# Patient Record
Sex: FEMALE | Race: WHITE | Employment: UNEMPLOYED | URBAN - METROPOLITAN AREA
[De-identification: names, ages, dates, MRNs, and addresses within clinical notes are randomized per-mention and may not be internally consistent; named-entity substitution may affect disease eponyms.]

---

## 2017-06-02 RX ORDER — SULINDAC 200 MG/1
TABLET ORAL
Qty: 60 TABLET | Refills: 2 | Status: SHIPPED | OUTPATIENT
Start: 2017-06-02 | End: 2018-03-08

## 2017-06-02 RX ORDER — OMEPRAZOLE 20 MG/1
20 CAPSULE, DELAYED RELEASE ORAL DAILY
Qty: 90 CAPSULE | Refills: 3 | Status: SHIPPED | OUTPATIENT
Start: 2017-06-02 | End: 2018-03-08 | Stop reason: SDUPTHER

## 2017-06-04 RX ORDER — LEVOTHYROXINE SODIUM 0.05 MG/1
50 TABLET ORAL DAILY
Qty: 90 TABLET | Refills: 3 | Status: SHIPPED | OUTPATIENT
Start: 2017-06-04 | End: 2018-03-08 | Stop reason: SDUPTHER

## 2017-06-04 RX ORDER — LISINOPRIL 2.5 MG/1
2.5 TABLET ORAL DAILY
Qty: 90 TABLET | Refills: 3 | Status: SHIPPED | OUTPATIENT
Start: 2017-06-04 | End: 2018-02-08 | Stop reason: HOSPADM

## 2017-08-09 ENCOUNTER — APPOINTMENT (OUTPATIENT)
Dept: CT IMAGING | Age: 62
End: 2017-08-09
Payer: COMMERCIAL

## 2017-08-09 ENCOUNTER — HOSPITAL ENCOUNTER (EMERGENCY)
Age: 62
Discharge: HOME | End: 2017-08-09
Attending: EMERGENCY MEDICINE
Payer: COMMERCIAL

## 2017-08-09 VITALS
TEMPERATURE: 97 F | OXYGEN SATURATION: 99 % | WEIGHT: 290 LBS | SYSTOLIC BLOOD PRESSURE: 156 MMHG | HEART RATE: 72 BPM | HEIGHT: 58 IN | BODY MASS INDEX: 60.87 KG/M2 | DIASTOLIC BLOOD PRESSURE: 109 MMHG | RESPIRATION RATE: 22 BRPM

## 2017-08-09 DIAGNOSIS — N20.0 KIDNEY STONE: Primary | ICD-10-CM

## 2017-08-09 LAB
-: ABNORMAL
AMORPHOUS: ABNORMAL
ANION GAP SERPL CALCULATED.3IONS-SCNC: 15 MMOL/L (ref 9–17)
BACTERIA: ABNORMAL
BILIRUBIN URINE: NEGATIVE
BUN BLDV-MCNC: 8 MG/DL (ref 8–23)
BUN/CREAT BLD: ABNORMAL (ref 9–20)
CALCIUM SERPL-MCNC: 8.9 MG/DL (ref 8.6–10.4)
CASTS UA: ABNORMAL /LPF (ref 0–8)
CHLORIDE BLD-SCNC: 99 MMOL/L (ref 98–107)
CO2: 24 MMOL/L (ref 20–31)
COLOR: YELLOW
CREAT SERPL-MCNC: 0.56 MG/DL (ref 0.5–0.9)
CRYSTALS, UA: ABNORMAL /HPF
EPITHELIAL CELLS UA: ABNORMAL /HPF (ref 0–5)
GFR AFRICAN AMERICAN: >60 ML/MIN
GFR NON-AFRICAN AMERICAN: >60 ML/MIN
GFR SERPL CREATININE-BSD FRML MDRD: ABNORMAL ML/MIN/{1.73_M2}
GFR SERPL CREATININE-BSD FRML MDRD: ABNORMAL ML/MIN/{1.73_M2}
GLUCOSE BLD-MCNC: 212 MG/DL (ref 70–99)
GLUCOSE URINE: NEGATIVE
HCT VFR BLD CALC: 46.9 % (ref 36–46)
HEMOGLOBIN: 15.4 G/DL (ref 12–16)
KETONES, URINE: NEGATIVE
LEUKOCYTE ESTERASE, URINE: ABNORMAL
MCH RBC QN AUTO: 28.6 PG (ref 26–34)
MCHC RBC AUTO-ENTMCNC: 32.8 G/DL (ref 31–37)
MCV RBC AUTO: 87.2 FL (ref 80–100)
MUCUS: ABNORMAL
NITRITE, URINE: NEGATIVE
OTHER OBSERVATIONS UA: ABNORMAL
PDW BLD-RTO: 13.9 % (ref 12.5–15.4)
PH UA: 8.5 (ref 5–8)
PLATELET # BLD: 193 K/UL (ref 140–450)
PMV BLD AUTO: 9.8 FL (ref 6–12)
POTASSIUM SERPL-SCNC: 3.8 MMOL/L (ref 3.7–5.3)
PROTEIN UA: ABNORMAL
RBC # BLD: 5.38 M/UL (ref 4–5.2)
RBC UA: ABNORMAL /HPF (ref 0–4)
RENAL EPITHELIAL, UA: ABNORMAL /HPF
SODIUM BLD-SCNC: 138 MMOL/L (ref 135–144)
SPECIFIC GRAVITY UA: 1.02 (ref 1–1.03)
TRICHOMONAS: ABNORMAL
TURBIDITY: CLEAR
URINE HGB: ABNORMAL
UROBILINOGEN, URINE: NORMAL
WBC # BLD: 8.5 K/UL (ref 3.5–11)
WBC UA: ABNORMAL /HPF (ref 0–5)
YEAST: ABNORMAL

## 2017-08-09 PROCEDURE — 99284 EMERGENCY DEPT VISIT MOD MDM: CPT

## 2017-08-09 PROCEDURE — 96375 TX/PRO/DX INJ NEW DRUG ADDON: CPT

## 2017-08-09 PROCEDURE — 2580000003 HC RX 258: Performed by: EMERGENCY MEDICINE

## 2017-08-09 PROCEDURE — 96374 THER/PROPH/DIAG INJ IV PUSH: CPT

## 2017-08-09 PROCEDURE — 81001 URINALYSIS AUTO W/SCOPE: CPT

## 2017-08-09 PROCEDURE — 74176 CT ABD & PELVIS W/O CONTRAST: CPT

## 2017-08-09 PROCEDURE — 85027 COMPLETE CBC AUTOMATED: CPT

## 2017-08-09 PROCEDURE — 6370000000 HC RX 637 (ALT 250 FOR IP): Performed by: EMERGENCY MEDICINE

## 2017-08-09 PROCEDURE — 93005 ELECTROCARDIOGRAM TRACING: CPT

## 2017-08-09 PROCEDURE — 6360000002 HC RX W HCPCS: Performed by: EMERGENCY MEDICINE

## 2017-08-09 PROCEDURE — 80048 BASIC METABOLIC PNL TOTAL CA: CPT

## 2017-08-09 RX ORDER — FENTANYL CITRATE 50 UG/ML
50 INJECTION, SOLUTION INTRAMUSCULAR; INTRAVENOUS ONCE
Status: COMPLETED | OUTPATIENT
Start: 2017-08-09 | End: 2017-08-09

## 2017-08-09 RX ORDER — PHENAZOPYRIDINE HYDROCHLORIDE 200 MG/1
200 TABLET, FILM COATED ORAL 3 TIMES DAILY PRN
Qty: 6 TABLET | Refills: 0 | Status: SHIPPED | OUTPATIENT
Start: 2017-08-09 | End: 2017-08-12

## 2017-08-09 RX ORDER — OXYCODONE HYDROCHLORIDE AND ACETAMINOPHEN 5; 325 MG/1; MG/1
TABLET ORAL
Qty: 10 TABLET | Refills: 0 | Status: SHIPPED | OUTPATIENT
Start: 2017-08-09 | End: 2018-02-08

## 2017-08-09 RX ORDER — 0.9 % SODIUM CHLORIDE 0.9 %
500 INTRAVENOUS SOLUTION INTRAVENOUS ONCE
Status: COMPLETED | OUTPATIENT
Start: 2017-08-09 | End: 2017-08-09

## 2017-08-09 RX ORDER — OXYCODONE HYDROCHLORIDE AND ACETAMINOPHEN 5; 325 MG/1; MG/1
2 TABLET ORAL EVERY 6 HOURS PRN
Qty: 10 TABLET | Refills: 0 | Status: SHIPPED | OUTPATIENT
Start: 2017-08-09 | End: 2018-02-08

## 2017-08-09 RX ORDER — KETOROLAC TROMETHAMINE 15 MG/ML
15 INJECTION, SOLUTION INTRAMUSCULAR; INTRAVENOUS ONCE
Status: COMPLETED | OUTPATIENT
Start: 2017-08-09 | End: 2017-08-09

## 2017-08-09 RX ORDER — OXYCODONE HYDROCHLORIDE AND ACETAMINOPHEN 5; 325 MG/1; MG/1
2 TABLET ORAL ONCE
Status: COMPLETED | OUTPATIENT
Start: 2017-08-09 | End: 2017-08-09

## 2017-08-09 RX ADMIN — SODIUM CHLORIDE 500 ML: 0.9 INJECTION, SOLUTION INTRAVENOUS at 10:42

## 2017-08-09 RX ADMIN — OXYCODONE HYDROCHLORIDE AND ACETAMINOPHEN 2 TABLET: 5; 325 TABLET ORAL at 10:41

## 2017-08-09 RX ADMIN — KETOROLAC TROMETHAMINE 15 MG: 15 INJECTION, SOLUTION INTRAMUSCULAR; INTRAVENOUS at 09:46

## 2017-08-09 RX ADMIN — HYDROMORPHONE HYDROCHLORIDE 0.5 MG: 1 INJECTION, SOLUTION INTRAMUSCULAR; INTRAVENOUS; SUBCUTANEOUS at 10:11

## 2017-08-09 RX ADMIN — FENTANYL CITRATE 50 MCG: 50 INJECTION INTRAMUSCULAR; INTRAVENOUS at 09:05

## 2017-08-09 ASSESSMENT — PAIN SCALES - GENERAL
PAINLEVEL_OUTOF10: 7
PAINLEVEL_OUTOF10: 8
PAINLEVEL_OUTOF10: 7
PAINLEVEL_OUTOF10: 8
PAINLEVEL_OUTOF10: 8

## 2017-08-09 ASSESSMENT — ENCOUNTER SYMPTOMS
BLOOD IN STOOL: 0
CONSTIPATION: 0
DIARRHEA: 0
COUGH: 0
SPUTUM PRODUCTION: 0
ABDOMINAL PAIN: 0
VOMITING: 0
NAUSEA: 0
SHORTNESS OF BREATH: 0

## 2017-08-09 ASSESSMENT — PAIN DESCRIPTION - PAIN TYPE: TYPE: ACUTE PAIN

## 2017-08-09 ASSESSMENT — PAIN DESCRIPTION - LOCATION: LOCATION: FLANK

## 2017-08-09 ASSESSMENT — PAIN DESCRIPTION - ORIENTATION: ORIENTATION: RIGHT

## 2017-08-14 LAB
EKG ATRIAL RATE: 82 BPM
EKG P AXIS: 27 DEGREES
EKG P-R INTERVAL: 136 MS
EKG Q-T INTERVAL: 430 MS
EKG QRS DURATION: 90 MS
EKG QTC CALCULATION (BAZETT): 502 MS
EKG R AXIS: 6 DEGREES
EKG T AXIS: 50 DEGREES
EKG VENTRICULAR RATE: 82 BPM

## 2017-11-01 ENCOUNTER — TELEPHONE (OUTPATIENT)
Dept: FAMILY MEDICINE CLINIC | Age: 62
End: 2017-11-01

## 2017-11-01 NOTE — TELEPHONE ENCOUNTER
Reach out to pt for no show appointment on 10/31/17 for diabetes and HTN with Dr Lidia Paige. Call to pt left vm to please call the office to reschedule her appointment and certified letter will be mailed to pt home today also.

## 2017-11-30 RX ORDER — INSULIN GLARGINE 100 [IU]/ML
INJECTION, SOLUTION SUBCUTANEOUS
Qty: 90 ML | Refills: 3 | Status: SHIPPED | OUTPATIENT
Start: 2017-11-30 | End: 2018-03-08 | Stop reason: DRUGHIGH

## 2017-11-30 NOTE — TELEPHONE ENCOUNTER
Please address the medication refill and close the encounter. If I can be of assistance, please route to the applicable pool. Thank you. Next Visit Date:  No future appointments. Health Maintenance   Topic Date Due    Hepatitis C screen  1955    HIV screen  05/14/1971    Cervical cancer screen  05/14/1977    Diabetic retinal exam  01/01/2014    Zostavax vaccine  05/14/2016    Diabetic foot exam  08/03/2016    Diabetic hemoglobin A1C test  08/10/2017    Diabetic microalbuminuria test  08/27/2017    Lipid screen  08/27/2017    Flu vaccine (1) 09/01/2017    Breast cancer screen  01/12/2018    DTaP/Tdap/Td vaccine (3 - Td) 06/27/2024    Colon cancer screen colonoscopy  08/01/2024    Pneumococcal med risk  Completed       Hemoglobin A1C (%)   Date Value   08/10/2016 10.1   08/03/2015 7.1   04/20/2015 8.1             ( goal A1C is < 7)   Microalb/Crt.  Ratio (mcg/mg creat)   Date Value   08/27/2016 50 (H)     LDL Cholesterol (mg/dL)   Date Value   08/27/2016 111       (goal LDL is <100)   AST (U/L)   Date Value   08/27/2016 47 (H)     ALT (U/L)   Date Value   05/27/2015 18     BUN (mg/dL)   Date Value   08/09/2017 8     BP Readings from Last 3 Encounters:   08/09/17 (!) 156/109   09/13/16 176/85   08/10/16 156/80          (goal 120/80)    All Future Testing planned in [facility]  Lab Frequency Next Occurrence               Patient Active Problem List:     Morbid obesity (HCC)     Anxiety     GERD (gastroesophageal reflux disease)     OA (osteoarthritis)     DM (diabetes mellitus)     Hypothyroid     Neuropathy (HCC)     Right knee DJD     Hypothyroidism     S/P left total knee arthroplasty     Obesity, morbid, BMI 50 or higher ([facility] [address]

## 2018-02-08 ENCOUNTER — OFFICE VISIT (OUTPATIENT)
Dept: FAMILY MEDICINE CLINIC | Age: 63
End: 2018-02-08

## 2018-02-08 DIAGNOSIS — J20.9 ACUTE BRONCHITIS, UNSPECIFIED ORGANISM: ICD-10-CM

## 2018-02-08 DIAGNOSIS — I10 ESSENTIAL HYPERTENSION: Primary | ICD-10-CM

## 2018-02-08 PROCEDURE — 99213 OFFICE O/P EST LOW 20 MIN: CPT | Performed by: STUDENT IN AN ORGANIZED HEALTH CARE EDUCATION/TRAINING PROGRAM

## 2018-02-08 PROCEDURE — 99213 OFFICE O/P EST LOW 20 MIN: CPT

## 2018-02-08 RX ORDER — LISINOPRIL 10 MG/1
10 TABLET ORAL DAILY
Qty: 30 TABLET | Refills: 3 | Status: SHIPPED | OUTPATIENT
Start: 2018-02-08 | End: 2018-03-08 | Stop reason: SDUPTHER

## 2018-02-08 RX ORDER — AZITHROMYCIN 500 MG/1
500 TABLET, FILM COATED ORAL DAILY
Qty: 1 PACKET | Refills: 0 | Status: CANCELLED | OUTPATIENT
Start: 2018-02-08 | End: 2018-02-11

## 2018-02-08 RX ORDER — AZITHROMYCIN 500 MG/1
500 TABLET, FILM COATED ORAL DAILY
Qty: 1 PACKET | Refills: 0 | Status: SHIPPED | OUTPATIENT
Start: 2018-02-08 | End: 2018-02-11

## 2018-02-08 ASSESSMENT — ENCOUNTER SYMPTOMS
NAUSEA: 0
SINUS PAIN: 0
RHINORRHEA: 1
STRIDOR: 0
COUGH: 1
SINUS PRESSURE: 0
CHEST TIGHTNESS: 0
VOMITING: 0

## 2018-02-08 NOTE — PROGRESS NOTES
materials - see patient instructions  Was a self-tracking handout given in paper form or via [employer]? No    Requested Prescriptions     Signed Prescriptions Disp Refills    lisinopril (PRINIVIL;ZESTRIL) 10 MG tablet 30 tablet 3     Sig: Take 1 tablet by mouth daily    Dextromethorphan-guaiFENesin (ROBITUSSIN COUGH+CHEST CESAR DM)  MG CAPS 168 capsule 0     Sig: Take as directed    azithromycin (ZITHROMAX) 500 MG tablet 1 packet 0     Sig: Take 1 tablet by mouth daily for 3 days    azithromycin (ZITHROMAX) 500 MG tablet 1 packet 0     Sig: Take 1 tablet by mouth daily for 3 days       All patient questions answered. Patient voiced understanding. Quality Measures    Body mass index is 54.27 kg/m². Elevated. Weight control planned discussed Healthy diet and regular exercise. BP: (!) 184/109 Blood pressure is high. Treatment plan consists of Antihypertensive Medication Increased. Lab Results   Component Value Date    LDLCHOLESTEROL 111 08/27/2016    (goal LDL reduction with dx if diabetes is 50% LDL reduction)      No flowsheet data found.   Interpretation of Total Score Depression Severity: 1-4 = Minimal depression, 5-9 = Mild depression, 10-14 = Moderate depression, 15-19 = Moderately severe depression, 20-27 = Severe depression    Requested Prescriptions     Signed Prescriptions Disp Refills    lisinopril (PRINIVIL;ZESTRIL) 10 MG tablet 30 tablet 3     Sig: Take 1 tablet by mouth daily    Dextromethorphan-guaiFENesin (ROBITUSSIN COUGH+CHEST ECSAR DM)  MG CAPS 168 capsule 0     Sig: Take as directed    azithromycin (ZITHROMAX) 500 MG tablet 1 packet 0     Sig: Take 1 tablet by mouth daily for 3 days    azithromycin (ZITHROMAX) 500 MG tablet 1 packet 0     Sig: Take 1 tablet by mouth daily for 3 days       Medications Discontinued During This Encounter   Medication Reason    butalbital-acetaminophen-caffeine-codeine (FIORICET WITH CODEINE) -92-30 MG per capsule Patient Choice   

## 2018-02-09 VITALS
HEART RATE: 114 BPM | WEIGHT: 259.6 LBS | SYSTOLIC BLOOD PRESSURE: 153 MMHG | HEIGHT: 58 IN | DIASTOLIC BLOOD PRESSURE: 83 MMHG | BODY MASS INDEX: 54.49 KG/M2 | TEMPERATURE: 97 F

## 2018-03-08 ENCOUNTER — OFFICE VISIT (OUTPATIENT)
Dept: FAMILY MEDICINE CLINIC | Age: 63
End: 2018-03-08

## 2018-03-08 VITALS
DIASTOLIC BLOOD PRESSURE: 86 MMHG | BODY MASS INDEX: 51.2 KG/M2 | HEART RATE: 108 BPM | WEIGHT: 254 LBS | TEMPERATURE: 96 F | HEIGHT: 59 IN | SYSTOLIC BLOOD PRESSURE: 142 MMHG

## 2018-03-08 DIAGNOSIS — I10 ESSENTIAL HYPERTENSION: ICD-10-CM

## 2018-03-08 DIAGNOSIS — E11.9 TYPE 2 DIABETES MELLITUS WITHOUT COMPLICATION, WITH LONG-TERM CURRENT USE OF INSULIN (HCC): Primary | Chronic | ICD-10-CM

## 2018-03-08 DIAGNOSIS — Z79.4 TYPE 2 DIABETES MELLITUS WITHOUT COMPLICATION, WITH LONG-TERM CURRENT USE OF INSULIN (HCC): Primary | Chronic | ICD-10-CM

## 2018-03-08 DIAGNOSIS — E78.00 HYPERCHOLESTEREMIA: ICD-10-CM

## 2018-03-08 DIAGNOSIS — E03.9 HYPOTHYROIDISM, UNSPECIFIED TYPE: ICD-10-CM

## 2018-03-08 LAB — HBA1C MFR BLD: 14 %

## 2018-03-08 PROCEDURE — 99213 OFFICE O/P EST LOW 20 MIN: CPT

## 2018-03-08 PROCEDURE — 99213 OFFICE O/P EST LOW 20 MIN: CPT | Performed by: FAMILY MEDICINE

## 2018-03-08 PROCEDURE — 83036 HEMOGLOBIN GLYCOSYLATED A1C: CPT | Performed by: FAMILY MEDICINE

## 2018-03-08 RX ORDER — OMEPRAZOLE 20 MG/1
20 CAPSULE, DELAYED RELEASE ORAL DAILY
Qty: 90 CAPSULE | Refills: 3 | Status: SHIPPED | OUTPATIENT
Start: 2018-03-08 | End: 2020-10-08 | Stop reason: SDUPTHER

## 2018-03-08 RX ORDER — LEVOTHYROXINE SODIUM 0.05 MG/1
50 TABLET ORAL DAILY
Qty: 90 TABLET | Refills: 3 | Status: SHIPPED | OUTPATIENT
Start: 2018-03-08 | End: 2018-09-13 | Stop reason: SDUPTHER

## 2018-03-08 RX ORDER — GABAPENTIN 300 MG/1
CAPSULE ORAL
Qty: 270 CAPSULE | Refills: 1 | Status: SHIPPED | OUTPATIENT
Start: 2018-03-08 | End: 2018-09-13 | Stop reason: SDUPTHER

## 2018-03-08 RX ORDER — GLIMEPIRIDE 4 MG/1
TABLET ORAL
Qty: 180 TABLET | Refills: 3 | Status: SHIPPED | OUTPATIENT
Start: 2018-03-08 | End: 2018-09-13 | Stop reason: SDUPTHER

## 2018-03-08 RX ORDER — ASPIRIN 81 MG/1
81 TABLET ORAL DAILY
Qty: 30 TABLET | Refills: 5 | Status: SHIPPED | OUTPATIENT
Start: 2018-03-08 | End: 2020-10-08 | Stop reason: SDUPTHER

## 2018-03-08 RX ORDER — LISINOPRIL 10 MG/1
10 TABLET ORAL DAILY
Qty: 30 TABLET | Refills: 3 | Status: SHIPPED | OUTPATIENT
Start: 2018-03-08 | End: 2018-09-13 | Stop reason: SDUPTHER

## 2018-03-08 NOTE — PROGRESS NOTES
Visit Information    Have you changed or started any medications since your last visit including any over-the-counter medicines, vitamins, or herbal medicines? no   Have you stopped taking any of your medications? Is so, why? -  no  Are you having any side effects from any of your medications? - no    Have you seen any other physician or provider since your last visit?  no   Have you had any other diagnostic tests since your last visit?  no   Have you been seen in the emergency room and/or had an admission in a hospital since we last saw you?  no   Have you had your routine dental cleaning in the past 6 months?  no     Do you have an active MyChart account? If no, what is the barrier?   Yes    Patient Care Team:  Irlanda Ruffin MD as PCP - Cooper Cortés MD as Surgeon (Orthopedic Surgery)    Medical History Review  Past Medical, Family, and Social History reviewed and does contribute to the patient presenting condition    Health Maintenance   Topic Date Due    Hepatitis C screen  1955    HIV screen  05/14/1971    Cervical cancer screen  05/14/1977    Shingles Vaccine (1 of 2 - 2 Dose Series) 05/14/2006    Diabetic retinal exam  01/01/2014    Diabetic foot exam  08/03/2016    A1C test (Diabetic or Prediabetic)  08/10/2017    Diabetic microalbuminuria test  08/27/2017    Lipid screen  08/27/2017    TSH testing  08/27/2017    Flu vaccine (1) 09/01/2017    Breast cancer screen  01/12/2018    Potassium monitoring  08/09/2018    Creatinine monitoring  08/09/2018    DTaP/Tdap/Td vaccine (3 - Td) 06/27/2024    Colon cancer screen colonoscopy  08/01/2024    Pneumococcal med risk  Completed

## 2018-04-04 ENCOUNTER — TELEPHONE (OUTPATIENT)
Dept: FAMILY MEDICINE CLINIC | Age: 63
End: 2018-04-04

## 2018-08-31 ENCOUNTER — TELEPHONE (OUTPATIENT)
Dept: ADMINISTRATIVE | Age: 63
End: 2018-08-31

## 2018-09-13 DIAGNOSIS — I10 ESSENTIAL HYPERTENSION: ICD-10-CM

## 2018-09-13 RX ORDER — GABAPENTIN 300 MG/1
CAPSULE ORAL
Qty: 270 CAPSULE | Refills: 1 | Status: SHIPPED | OUTPATIENT
Start: 2018-09-13 | End: 2019-05-12 | Stop reason: SDUPTHER

## 2018-09-13 RX ORDER — LISINOPRIL 10 MG/1
10 TABLET ORAL DAILY
Qty: 30 TABLET | Refills: 3 | Status: SHIPPED | OUTPATIENT
Start: 2018-09-13 | End: 2019-08-05 | Stop reason: SDUPTHER

## 2018-09-13 RX ORDER — GLIMEPIRIDE 4 MG/1
TABLET ORAL
Qty: 180 TABLET | Refills: 3 | Status: SHIPPED | OUTPATIENT
Start: 2018-09-13 | End: 2019-10-31 | Stop reason: SDUPTHER

## 2018-09-13 RX ORDER — CITALOPRAM 20 MG/1
TABLET ORAL
Qty: 270 TABLET | Refills: 3 | Status: SHIPPED | OUTPATIENT
Start: 2018-09-13 | End: 2019-10-31 | Stop reason: SDUPTHER

## 2018-09-13 RX ORDER — LEVOTHYROXINE SODIUM 0.05 MG/1
50 TABLET ORAL DAILY
Qty: 90 TABLET | Refills: 3 | Status: SHIPPED | OUTPATIENT
Start: 2018-09-13 | End: 2019-10-31 | Stop reason: SDUPTHER

## 2018-11-26 ENCOUNTER — TELEPHONE (OUTPATIENT)
Dept: FAMILY MEDICINE CLINIC | Age: 63
End: 2018-11-26

## 2019-01-10 ENCOUNTER — HOSPITAL ENCOUNTER (OUTPATIENT)
Age: 64
Setting detail: OBSERVATION
Discharge: HOME | End: 2019-01-11
Attending: EMERGENCY MEDICINE | Admitting: EMERGENCY MEDICINE

## 2019-01-10 ENCOUNTER — APPOINTMENT (OUTPATIENT)
Dept: GENERAL RADIOLOGY | Age: 64
End: 2019-01-10

## 2019-01-10 ENCOUNTER — APPOINTMENT (OUTPATIENT)
Dept: CT IMAGING | Age: 64
End: 2019-01-10

## 2019-01-10 DIAGNOSIS — R07.9 CHEST PAIN, UNSPECIFIED TYPE: Primary | ICD-10-CM

## 2019-01-10 LAB
ABSOLUTE EOS #: 0.18 K/UL (ref 0–0.44)
ABSOLUTE IMMATURE GRANULOCYTE: 0.03 K/UL (ref 0–0.3)
ABSOLUTE LYMPH #: 1.49 K/UL (ref 1.1–3.7)
ABSOLUTE MONO #: 0.81 K/UL (ref 0.1–1.2)
ALBUMIN SERPL-MCNC: 3.7 G/DL (ref 3.5–5.2)
ALBUMIN/GLOBULIN RATIO: 1.1 (ref 1–2.5)
ALP BLD-CCNC: 176 U/L (ref 35–104)
ALT SERPL-CCNC: 44 U/L (ref 5–33)
ANION GAP SERPL CALCULATED.3IONS-SCNC: 16 MMOL/L (ref 9–17)
AST SERPL-CCNC: 54 U/L
BASOPHILS # BLD: 0 % (ref 0–2)
BASOPHILS ABSOLUTE: <0.03 K/UL (ref 0–0.2)
BILIRUB SERPL-MCNC: 0.41 MG/DL (ref 0.3–1.2)
BNP INTERPRETATION: NORMAL
BUN BLDV-MCNC: 7 MG/DL (ref 8–23)
BUN/CREAT BLD: ABNORMAL (ref 9–20)
CALCIUM SERPL-MCNC: 9.2 MG/DL (ref 8.6–10.4)
CHLORIDE BLD-SCNC: 101 MMOL/L (ref 98–107)
CO2: 23 MMOL/L (ref 20–31)
CREAT SERPL-MCNC: 0.74 MG/DL (ref 0.5–0.9)
D-DIMER QUANTITATIVE: 0.75 MG/L FEU
DIFFERENTIAL TYPE: ABNORMAL
EOSINOPHILS RELATIVE PERCENT: 2 % (ref 1–4)
GFR AFRICAN AMERICAN: >60 ML/MIN
GFR NON-AFRICAN AMERICAN: >60 ML/MIN
GFR SERPL CREATININE-BSD FRML MDRD: ABNORMAL ML/MIN/{1.73_M2}
GFR SERPL CREATININE-BSD FRML MDRD: ABNORMAL ML/MIN/{1.73_M2}
GLUCOSE BLD-MCNC: 117 MG/DL (ref 65–105)
GLUCOSE BLD-MCNC: 200 MG/DL (ref 65–105)
GLUCOSE BLD-MCNC: 257 MG/DL (ref 65–105)
GLUCOSE BLD-MCNC: 479 MG/DL (ref 70–99)
HCT VFR BLD CALC: 45.1 % (ref 36.3–47.1)
HEMOGLOBIN: 14.7 G/DL (ref 11.9–15.1)
IMMATURE GRANULOCYTES: 0 %
LYMPHOCYTES # BLD: 18 % (ref 24–43)
MCH RBC QN AUTO: 29.3 PG (ref 25.2–33.5)
MCHC RBC AUTO-ENTMCNC: 32.6 G/DL (ref 28.4–34.8)
MCV RBC AUTO: 89.8 FL (ref 82.6–102.9)
MONOCYTES # BLD: 10 % (ref 3–12)
NRBC AUTOMATED: 0 PER 100 WBC
PDW BLD-RTO: 12.6 % (ref 11.8–14.4)
PLATELET # BLD: 165 K/UL (ref 138–453)
PLATELET ESTIMATE: ABNORMAL
PMV BLD AUTO: 11.4 FL (ref 8.1–13.5)
POTASSIUM SERPL-SCNC: 3.8 MMOL/L (ref 3.7–5.3)
PRO-BNP: 107 PG/ML
RBC # BLD: 5.02 M/UL (ref 3.95–5.11)
RBC # BLD: ABNORMAL 10*6/UL
SEG NEUTROPHILS: 70 % (ref 36–65)
SEGMENTED NEUTROPHILS ABSOLUTE COUNT: 5.67 K/UL (ref 1.5–8.1)
SODIUM BLD-SCNC: 140 MMOL/L (ref 135–144)
TOTAL PROTEIN: 7.2 G/DL (ref 6.4–8.3)
TROPONIN INTERP: ABNORMAL
TROPONIN INTERP: ABNORMAL
TROPONIN T: ABNORMAL NG/ML
TROPONIN T: ABNORMAL NG/ML
TROPONIN, HIGH SENSITIVITY: 17 NG/L (ref 0–14)
TROPONIN, HIGH SENSITIVITY: 17 NG/L (ref 0–14)
WBC # BLD: 8.2 K/UL (ref 3.5–11.3)
WBC # BLD: ABNORMAL 10*3/UL

## 2019-01-10 PROCEDURE — 2580000003 HC RX 258: Performed by: EMERGENCY MEDICINE

## 2019-01-10 PROCEDURE — 6360000004 HC RX CONTRAST MEDICATION: Performed by: EMERGENCY MEDICINE

## 2019-01-10 PROCEDURE — 85379 FIBRIN DEGRADATION QUANT: CPT

## 2019-01-10 PROCEDURE — 6360000002 HC RX W HCPCS: Performed by: EMERGENCY MEDICINE

## 2019-01-10 PROCEDURE — 6370000000 HC RX 637 (ALT 250 FOR IP): Performed by: STUDENT IN AN ORGANIZED HEALTH CARE EDUCATION/TRAINING PROGRAM

## 2019-01-10 PROCEDURE — 96374 THER/PROPH/DIAG INJ IV PUSH: CPT

## 2019-01-10 PROCEDURE — 96376 TX/PRO/DX INJ SAME DRUG ADON: CPT

## 2019-01-10 PROCEDURE — 6370000000 HC RX 637 (ALT 250 FOR IP): Performed by: EMERGENCY MEDICINE

## 2019-01-10 PROCEDURE — G0378 HOSPITAL OBSERVATION PER HR: HCPCS

## 2019-01-10 PROCEDURE — 84484 ASSAY OF TROPONIN QUANT: CPT

## 2019-01-10 PROCEDURE — 85025 COMPLETE CBC W/AUTO DIFF WBC: CPT

## 2019-01-10 PROCEDURE — 83880 ASSAY OF NATRIURETIC PEPTIDE: CPT

## 2019-01-10 PROCEDURE — 71260 CT THORAX DX C+: CPT

## 2019-01-10 PROCEDURE — 99285 EMERGENCY DEPT VISIT HI MDM: CPT

## 2019-01-10 PROCEDURE — 96372 THER/PROPH/DIAG INJ SC/IM: CPT

## 2019-01-10 PROCEDURE — 96375 TX/PRO/DX INJ NEW DRUG ADDON: CPT

## 2019-01-10 PROCEDURE — 93005 ELECTROCARDIOGRAM TRACING: CPT

## 2019-01-10 PROCEDURE — 80053 COMPREHEN METABOLIC PANEL: CPT

## 2019-01-10 PROCEDURE — 82947 ASSAY GLUCOSE BLOOD QUANT: CPT

## 2019-01-10 PROCEDURE — 71045 X-RAY EXAM CHEST 1 VIEW: CPT

## 2019-01-10 RX ORDER — FENTANYL CITRATE 50 UG/ML
25 INJECTION, SOLUTION INTRAMUSCULAR; INTRAVENOUS ONCE
Status: COMPLETED | OUTPATIENT
Start: 2019-01-10 | End: 2019-01-10

## 2019-01-10 RX ORDER — LIDOCAINE 4 G/G
1 PATCH TOPICAL DAILY
Status: DISCONTINUED | OUTPATIENT
Start: 2019-01-10 | End: 2019-01-11 | Stop reason: HOSPADM

## 2019-01-10 RX ORDER — MULTIVITAMIN WITH FOLIC ACID 400 MCG
1 TABLET ORAL DAILY
Status: DISCONTINUED | OUTPATIENT
Start: 2019-01-11 | End: 2019-01-11 | Stop reason: HOSPADM

## 2019-01-10 RX ORDER — KETOROLAC TROMETHAMINE 15 MG/ML
15 INJECTION, SOLUTION INTRAMUSCULAR; INTRAVENOUS ONCE
Status: COMPLETED | OUTPATIENT
Start: 2019-01-10 | End: 2019-01-10

## 2019-01-10 RX ORDER — CYCLOBENZAPRINE HCL 10 MG
10 TABLET ORAL ONCE
Status: COMPLETED | OUTPATIENT
Start: 2019-01-10 | End: 2019-01-10

## 2019-01-10 RX ORDER — NITROGLYCERIN 0.4 MG/1
0.4 TABLET SUBLINGUAL EVERY 5 MIN PRN
Status: DISCONTINUED | OUTPATIENT
Start: 2019-01-10 | End: 2019-01-11 | Stop reason: HOSPADM

## 2019-01-10 RX ORDER — CITALOPRAM 20 MG/1
20 TABLET ORAL DAILY
Status: DISCONTINUED | OUTPATIENT
Start: 2019-01-11 | End: 2019-01-11 | Stop reason: HOSPADM

## 2019-01-10 RX ORDER — ASPIRIN 81 MG/1
324 TABLET, CHEWABLE ORAL ONCE
Status: COMPLETED | OUTPATIENT
Start: 2019-01-10 | End: 2019-01-10

## 2019-01-10 RX ORDER — ONDANSETRON 2 MG/ML
4 INJECTION INTRAMUSCULAR; INTRAVENOUS EVERY 8 HOURS PRN
Status: DISCONTINUED | OUTPATIENT
Start: 2019-01-10 | End: 2019-01-11 | Stop reason: HOSPADM

## 2019-01-10 RX ORDER — LEVOTHYROXINE SODIUM 0.05 MG/1
50 TABLET ORAL DAILY
Status: DISCONTINUED | OUTPATIENT
Start: 2019-01-11 | End: 2019-01-11 | Stop reason: HOSPADM

## 2019-01-10 RX ORDER — SODIUM CHLORIDE 9 MG/ML
INJECTION, SOLUTION INTRAVENOUS CONTINUOUS
Status: DISCONTINUED | OUTPATIENT
Start: 2019-01-11 | End: 2019-01-11 | Stop reason: HOSPADM

## 2019-01-10 RX ORDER — SODIUM CHLORIDE 0.9 % (FLUSH) 0.9 %
10 SYRINGE (ML) INJECTION EVERY 12 HOURS SCHEDULED
Status: DISCONTINUED | OUTPATIENT
Start: 2019-01-10 | End: 2019-01-11 | Stop reason: HOSPADM

## 2019-01-10 RX ORDER — ASPIRIN 81 MG/1
81 TABLET ORAL DAILY
Status: DISCONTINUED | OUTPATIENT
Start: 2019-01-11 | End: 2019-01-11 | Stop reason: HOSPADM

## 2019-01-10 RX ORDER — PROMETHAZINE HYDROCHLORIDE 25 MG/ML
12.5 INJECTION, SOLUTION INTRAMUSCULAR; INTRAVENOUS ONCE
Status: COMPLETED | OUTPATIENT
Start: 2019-01-10 | End: 2019-01-10

## 2019-01-10 RX ORDER — LISINOPRIL 10 MG/1
10 TABLET ORAL DAILY
Status: DISCONTINUED | OUTPATIENT
Start: 2019-01-11 | End: 2019-01-11 | Stop reason: HOSPADM

## 2019-01-10 RX ORDER — DEXTROSE MONOHYDRATE 25 G/50ML
12.5 INJECTION, SOLUTION INTRAVENOUS PRN
Status: DISCONTINUED | OUTPATIENT
Start: 2019-01-10 | End: 2019-01-11 | Stop reason: HOSPADM

## 2019-01-10 RX ORDER — ACETAMINOPHEN 325 MG/1
650 TABLET ORAL EVERY 4 HOURS PRN
Status: DISCONTINUED | OUTPATIENT
Start: 2019-01-10 | End: 2019-01-11 | Stop reason: HOSPADM

## 2019-01-10 RX ORDER — ONDANSETRON 2 MG/ML
4 INJECTION INTRAMUSCULAR; INTRAVENOUS ONCE
Status: COMPLETED | OUTPATIENT
Start: 2019-01-10 | End: 2019-01-10

## 2019-01-10 RX ORDER — GABAPENTIN 300 MG/1
300 CAPSULE ORAL 3 TIMES DAILY
Status: DISCONTINUED | OUTPATIENT
Start: 2019-01-10 | End: 2019-01-11 | Stop reason: HOSPADM

## 2019-01-10 RX ORDER — DEXTROSE MONOHYDRATE 50 MG/ML
100 INJECTION, SOLUTION INTRAVENOUS PRN
Status: DISCONTINUED | OUTPATIENT
Start: 2019-01-10 | End: 2019-01-11 | Stop reason: HOSPADM

## 2019-01-10 RX ORDER — SODIUM CHLORIDE 0.9 % (FLUSH) 0.9 %
10 SYRINGE (ML) INJECTION PRN
Status: DISCONTINUED | OUTPATIENT
Start: 2019-01-10 | End: 2019-01-11 | Stop reason: HOSPADM

## 2019-01-10 RX ORDER — UREA 10 %
3 LOTION (ML) TOPICAL NIGHTLY PRN
Status: DISCONTINUED | OUTPATIENT
Start: 2019-01-10 | End: 2019-01-11 | Stop reason: HOSPADM

## 2019-01-10 RX ORDER — PANTOPRAZOLE SODIUM 40 MG/1
40 TABLET, DELAYED RELEASE ORAL
Status: DISCONTINUED | OUTPATIENT
Start: 2019-01-11 | End: 2019-01-11 | Stop reason: HOSPADM

## 2019-01-10 RX ORDER — NICOTINE POLACRILEX 4 MG
15 LOZENGE BUCCAL PRN
Status: DISCONTINUED | OUTPATIENT
Start: 2019-01-10 | End: 2019-01-11 | Stop reason: HOSPADM

## 2019-01-10 RX ORDER — 0.9 % SODIUM CHLORIDE 0.9 %
1000 INTRAVENOUS SOLUTION INTRAVENOUS ONCE
Status: COMPLETED | OUTPATIENT
Start: 2019-01-10 | End: 2019-01-10

## 2019-01-10 RX ORDER — FENTANYL CITRATE 50 UG/ML
25 INJECTION, SOLUTION INTRAMUSCULAR; INTRAVENOUS
Status: DISCONTINUED | OUTPATIENT
Start: 2019-01-10 | End: 2019-01-11 | Stop reason: HOSPADM

## 2019-01-10 RX ADMIN — ACETAMINOPHEN 650 MG: 325 TABLET ORAL at 23:02

## 2019-01-10 RX ADMIN — KETOROLAC TROMETHAMINE 15 MG: 15 INJECTION INTRAMUSCULAR; INTRAVENOUS at 14:16

## 2019-01-10 RX ADMIN — ONDANSETRON 4 MG: 2 INJECTION INTRAMUSCULAR; INTRAVENOUS at 14:00

## 2019-01-10 RX ADMIN — FENTANYL CITRATE 25 MCG: 50 INJECTION, SOLUTION INTRAMUSCULAR; INTRAVENOUS at 23:02

## 2019-01-10 RX ADMIN — FENTANYL CITRATE 25 MCG: 50 INJECTION, SOLUTION INTRAMUSCULAR; INTRAVENOUS at 18:13

## 2019-01-10 RX ADMIN — SODIUM CHLORIDE 1000 ML: 9 INJECTION, SOLUTION INTRAVENOUS at 14:00

## 2019-01-10 RX ADMIN — PROMETHAZINE HYDROCHLORIDE 12.5 MG: 25 INJECTION INTRAMUSCULAR; INTRAVENOUS at 15:33

## 2019-01-10 RX ADMIN — SODIUM CHLORIDE: 9 INJECTION, SOLUTION INTRAVENOUS at 23:03

## 2019-01-10 RX ADMIN — INSULIN LISPRO 10 UNITS: 100 INJECTION, SOLUTION INTRAVENOUS; SUBCUTANEOUS at 15:15

## 2019-01-10 RX ADMIN — FENTANYL CITRATE 25 MCG: 50 INJECTION, SOLUTION INTRAMUSCULAR; INTRAVENOUS at 15:30

## 2019-01-10 RX ADMIN — IOPAMIDOL 75 ML: 755 INJECTION, SOLUTION INTRAVENOUS at 17:16

## 2019-01-10 RX ADMIN — CYCLOBENZAPRINE HYDROCHLORIDE 10 MG: 10 TABLET, FILM COATED ORAL at 19:46

## 2019-01-10 RX ADMIN — ASPIRIN 81 MG 324 MG: 81 TABLET ORAL at 14:00

## 2019-01-10 ASSESSMENT — ENCOUNTER SYMPTOMS
ABDOMINAL PAIN: 0
VOICE CHANGE: 0
NAUSEA: 1
TROUBLE SWALLOWING: 0
VOMITING: 0
BACK PAIN: 0
SHORTNESS OF BREATH: 1

## 2019-01-10 ASSESSMENT — PAIN DESCRIPTION - LOCATION: LOCATION: CHEST

## 2019-01-10 ASSESSMENT — PAIN SCALES - WONG BAKER: WONGBAKER_NUMERICALRESPONSE: 6

## 2019-01-10 ASSESSMENT — PAIN SCALES - GENERAL
PAINLEVEL_OUTOF10: 6
PAINLEVEL_OUTOF10: 4
PAINLEVEL_OUTOF10: 10
PAINLEVEL_OUTOF10: 8
PAINLEVEL_OUTOF10: 6
PAINLEVEL_OUTOF10: 8
PAINLEVEL_OUTOF10: 6

## 2019-01-10 ASSESSMENT — PAIN DESCRIPTION - PAIN TYPE: TYPE: ACUTE PAIN

## 2019-01-11 ENCOUNTER — APPOINTMENT (OUTPATIENT)
Dept: NUCLEAR MEDICINE | Age: 64
End: 2019-01-11

## 2019-01-11 VITALS
SYSTOLIC BLOOD PRESSURE: 155 MMHG | RESPIRATION RATE: 16 BRPM | TEMPERATURE: 98.1 F | HEIGHT: 59 IN | HEART RATE: 72 BPM | OXYGEN SATURATION: 95 % | DIASTOLIC BLOOD PRESSURE: 84 MMHG | BODY MASS INDEX: 50.2 KG/M2 | WEIGHT: 249 LBS

## 2019-01-11 LAB
EKG ATRIAL RATE: 71 BPM
EKG ATRIAL RATE: 76 BPM
EKG ATRIAL RATE: 98 BPM
EKG P AXIS: 14 DEGREES
EKG P AXIS: 37 DEGREES
EKG P AXIS: 43 DEGREES
EKG P-R INTERVAL: 162 MS
EKG P-R INTERVAL: 170 MS
EKG P-R INTERVAL: 180 MS
EKG Q-T INTERVAL: 376 MS
EKG Q-T INTERVAL: 420 MS
EKG Q-T INTERVAL: 444 MS
EKG QRS DURATION: 76 MS
EKG QRS DURATION: 78 MS
EKG QRS DURATION: 98 MS
EKG QTC CALCULATION (BAZETT): 456 MS
EKG QTC CALCULATION (BAZETT): 480 MS
EKG QTC CALCULATION (BAZETT): 499 MS
EKG R AXIS: -15 DEGREES
EKG R AXIS: 0 DEGREES
EKG R AXIS: 8 DEGREES
EKG T AXIS: 39 DEGREES
EKG T AXIS: 46 DEGREES
EKG T AXIS: 55 DEGREES
EKG VENTRICULAR RATE: 71 BPM
EKG VENTRICULAR RATE: 76 BPM
EKG VENTRICULAR RATE: 98 BPM
GLUCOSE BLD-MCNC: 195 MG/DL (ref 65–105)
GLUCOSE BLD-MCNC: 223 MG/DL (ref 65–105)
LV EF: 57 %
LVEF MODALITY: NORMAL

## 2019-01-11 PROCEDURE — 96376 TX/PRO/DX INJ SAME DRUG ADON: CPT

## 2019-01-11 PROCEDURE — 93970 EXTREMITY STUDY: CPT

## 2019-01-11 PROCEDURE — 2580000003 HC RX 258: Performed by: EMERGENCY MEDICINE

## 2019-01-11 PROCEDURE — 3430000000 HC RX DIAGNOSTIC RADIOPHARMACEUTICAL: Performed by: EMERGENCY MEDICINE

## 2019-01-11 PROCEDURE — G0378 HOSPITAL OBSERVATION PER HR: HCPCS

## 2019-01-11 PROCEDURE — 6360000002 HC RX W HCPCS: Performed by: EMERGENCY MEDICINE

## 2019-01-11 PROCEDURE — 82947 ASSAY GLUCOSE BLOOD QUANT: CPT

## 2019-01-11 PROCEDURE — 93017 CV STRESS TEST TRACING ONLY: CPT | Performed by: NURSE PRACTITIONER

## 2019-01-11 PROCEDURE — G0108 DIAB MANAGE TRN  PER INDIV: HCPCS

## 2019-01-11 PROCEDURE — 6370000000 HC RX 637 (ALT 250 FOR IP): Performed by: EMERGENCY MEDICINE

## 2019-01-11 PROCEDURE — 78452 HT MUSCLE IMAGE SPECT MULT: CPT

## 2019-01-11 PROCEDURE — 96372 THER/PROPH/DIAG INJ SC/IM: CPT

## 2019-01-11 PROCEDURE — 93005 ELECTROCARDIOGRAM TRACING: CPT

## 2019-01-11 PROCEDURE — A9500 TC99M SESTAMIBI: HCPCS | Performed by: EMERGENCY MEDICINE

## 2019-01-11 RX ORDER — SODIUM CHLORIDE 9 MG/ML
INJECTION, SOLUTION INTRAVENOUS ONCE
Status: DISCONTINUED | OUTPATIENT
Start: 2019-01-11 | End: 2019-01-11

## 2019-01-11 RX ORDER — SODIUM CHLORIDE 0.9 % (FLUSH) 0.9 %
10 SYRINGE (ML) INJECTION PRN
Status: DISCONTINUED | OUTPATIENT
Start: 2019-01-11 | End: 2019-01-11 | Stop reason: HOSPADM

## 2019-01-11 RX ORDER — SODIUM CHLORIDE 0.9 % (FLUSH) 0.9 %
10 SYRINGE (ML) INJECTION PRN
Status: DISCONTINUED | OUTPATIENT
Start: 2019-01-11 | End: 2019-01-11

## 2019-01-11 RX ORDER — NITROGLYCERIN 0.4 MG/1
0.4 TABLET SUBLINGUAL EVERY 5 MIN PRN
Status: DISCONTINUED | OUTPATIENT
Start: 2019-01-11 | End: 2019-01-11

## 2019-01-11 RX ORDER — AMINOPHYLLINE DIHYDRATE 25 MG/ML
100 INJECTION, SOLUTION INTRAVENOUS
Status: DISCONTINUED | OUTPATIENT
Start: 2019-01-11 | End: 2019-01-11

## 2019-01-11 RX ORDER — METOPROLOL TARTRATE 5 MG/5ML
2.5 INJECTION INTRAVENOUS PRN
Status: DISCONTINUED | OUTPATIENT
Start: 2019-01-11 | End: 2019-01-11

## 2019-01-11 RX ADMIN — FENTANYL CITRATE 25 MCG: 50 INJECTION, SOLUTION INTRAMUSCULAR; INTRAVENOUS at 06:06

## 2019-01-11 RX ADMIN — SODIUM CHLORIDE, PRESERVATIVE FREE 10 ML: 5 INJECTION INTRAVENOUS at 10:30

## 2019-01-11 RX ADMIN — Medication 10 ML: at 02:53

## 2019-01-11 RX ADMIN — INSULIN LISPRO 4 UNITS: 100 INJECTION, SOLUTION INTRAVENOUS; SUBCUTANEOUS at 14:09

## 2019-01-11 RX ADMIN — ENOXAPARIN SODIUM 40 MG: 40 INJECTION SUBCUTANEOUS at 12:47

## 2019-01-11 RX ADMIN — Medication 10 ML: at 06:06

## 2019-01-11 RX ADMIN — ACETAMINOPHEN 650 MG: 325 TABLET ORAL at 06:06

## 2019-01-11 RX ADMIN — FENTANYL CITRATE 25 MCG: 50 INJECTION, SOLUTION INTRAMUSCULAR; INTRAVENOUS at 02:53

## 2019-01-11 RX ADMIN — TETRAKIS(2-METHOXYISOBUTYLISOCYANIDE)COPPER(I) TETRAFLUOROBORATE 44.4 MILLICURIE: 1 INJECTION, POWDER, LYOPHILIZED, FOR SOLUTION INTRAVENOUS at 10:29

## 2019-01-11 RX ADMIN — REGADENOSON 0.4 MG: 0.08 INJECTION, SOLUTION INTRAVENOUS at 10:28

## 2019-01-11 RX ADMIN — TETRAKIS(2-METHOXYISOBUTYLISOCYANIDE)COPPER(I) TETRAFLUOROBORATE 18.7 MILLICURIE: 1 INJECTION, POWDER, LYOPHILIZED, FOR SOLUTION INTRAVENOUS at 08:08

## 2019-01-11 RX ADMIN — Medication 10 ML: at 10:19

## 2019-01-11 RX ADMIN — Medication 10 ML: at 10:29

## 2019-01-11 RX ADMIN — FENTANYL CITRATE 25 MCG: 50 INJECTION, SOLUTION INTRAMUSCULAR; INTRAVENOUS at 12:47

## 2019-01-11 RX ADMIN — FENTANYL CITRATE 25 MCG: 50 INJECTION, SOLUTION INTRAMUSCULAR; INTRAVENOUS at 16:40

## 2019-01-11 ASSESSMENT — ENCOUNTER SYMPTOMS
ABDOMINAL PAIN: 0
SHORTNESS OF BREATH: 1
COUGH: 0
RHINORRHEA: 0
NAUSEA: 1
DIARRHEA: 0
SINUS PAIN: 0
BLOOD IN STOOL: 0
APNEA: 0
EYE PAIN: 0
EYE REDNESS: 0
VOMITING: 0
WHEEZING: 0

## 2019-01-11 ASSESSMENT — PAIN DESCRIPTION - PAIN TYPE: TYPE: ACUTE PAIN

## 2019-01-11 ASSESSMENT — PAIN SCALES - GENERAL
PAINLEVEL_OUTOF10: 6
PAINLEVEL_OUTOF10: 5
PAINLEVEL_OUTOF10: 7
PAINLEVEL_OUTOF10: 4

## 2019-01-11 ASSESSMENT — PAIN DESCRIPTION - ONSET: ONSET: ON-GOING

## 2019-01-11 ASSESSMENT — PAIN DESCRIPTION - PROGRESSION: CLINICAL_PROGRESSION: NOT CHANGED

## 2019-01-11 ASSESSMENT — PAIN DESCRIPTION - FREQUENCY: FREQUENCY: CONTINUOUS

## 2019-01-11 ASSESSMENT — PAIN DESCRIPTION - LOCATION: LOCATION: CHEST

## 2019-01-11 ASSESSMENT — PAIN DESCRIPTION - ORIENTATION: ORIENTATION: LEFT

## 2019-01-11 ASSESSMENT — PAIN DESCRIPTION - DIRECTION: RADIATING_TOWARDS: LEFT ARM

## 2019-01-11 ASSESSMENT — PAIN DESCRIPTION - DESCRIPTORS: DESCRIPTORS: DISCOMFORT

## 2019-05-08 ENCOUNTER — TELEPHONE (OUTPATIENT)
Dept: FAMILY MEDICINE CLINIC | Age: 64
End: 2019-05-08

## 2019-05-08 NOTE — TELEPHONE ENCOUNTER
Patient requesting Rx for Handicap sticker and handicap plates. Last visit:   Last Med refill:   Does patient have enough medication for 72 hours: Yes    Next Visit Date:  No future appointments. Health Maintenance   Topic Date Due    Hepatitis C screen  1955    HIV screen  05/14/1971    Cervical cancer screen  05/14/1977    Shingles Vaccine (1 of 2) 05/14/2006    Diabetic retinal exam  01/01/2014    Diabetic foot exam  08/03/2016    Diabetic microalbuminuria test  08/27/2017    Lipid screen  08/27/2017    TSH testing  08/27/2017    Breast cancer screen  01/12/2018    A1C test (Diabetic or Prediabetic)  03/08/2019    Flu vaccine (Season Ended) 09/01/2019    Potassium monitoring  01/10/2020    Creatinine monitoring  01/10/2020    DTaP/Tdap/Td vaccine (6 - Td) 06/27/2024    Colon cancer screen colonoscopy  08/01/2024    Pneumococcal 0-64 years Vaccine  Completed       Hemoglobin A1C (%)   Date Value   03/08/2018 14.0   08/10/2016 10.1   08/03/2015 7.1             ( goal A1C is < 7)   Microalb/Crt.  Ratio (mcg/mg creat)   Date Value   08/27/2016 50 (H)     LDL Cholesterol (mg/dL)   Date Value   08/27/2016 111   05/27/2015 109       (goal LDL is <100)   AST (U/L)   Date Value   01/10/2019 54 (H)     ALT (U/L)   Date Value   01/10/2019 44 (H)     BUN (mg/dL)   Date Value   01/10/2019 7 (L)     BP Readings from Last 3 Encounters:   01/11/19 (!) 155/84   03/08/18 (!) 142/86   02/09/18 (!) 153/83          (goal 120/80)    All Future Testing planned in [facility]  Lab Frequency Next Occurrence               Patient Active Problem List:     Morbid obesity (HCC)     Anxiety     GERD (gastroesophageal reflux disease)     OA (osteoarthritis)     DM (diabetes mellitus)     Hypothyroid     Neuropathy     Right knee DJD     Hypothyroidism     S/P left total knee arthroplasty     Obesity, morbid, BMI 50 or higher (HCC)     Chest pain

## 2019-05-13 RX ORDER — GABAPENTIN 300 MG/1
CAPSULE ORAL
Qty: 270 CAPSULE | Refills: 1 | Status: SHIPPED | OUTPATIENT
Start: 2019-05-13 | End: 2019-10-31 | Stop reason: SDUPTHER

## 2019-05-14 DIAGNOSIS — M17.0 PRIMARY OSTEOARTHRITIS OF BOTH KNEES: Primary | Chronic | ICD-10-CM

## 2019-07-17 ENCOUNTER — TELEPHONE (OUTPATIENT)
Dept: FAMILY MEDICINE CLINIC | Age: 64
End: 2019-07-17

## 2019-09-09 DIAGNOSIS — E11.9 TYPE 2 DIABETES MELLITUS WITHOUT COMPLICATION, WITH LONG-TERM CURRENT USE OF INSULIN (HCC): Primary | Chronic | ICD-10-CM

## 2019-09-09 DIAGNOSIS — Z79.4 TYPE 2 DIABETES MELLITUS WITHOUT COMPLICATION, WITH LONG-TERM CURRENT USE OF INSULIN (HCC): Primary | Chronic | ICD-10-CM

## 2019-10-22 ENCOUNTER — TELEPHONE (OUTPATIENT)
Dept: FAMILY MEDICINE CLINIC | Age: 64
End: 2019-10-22

## 2019-10-31 RX ORDER — GABAPENTIN 300 MG/1
CAPSULE ORAL
Qty: 270 CAPSULE | Refills: 1 | Status: SHIPPED | OUTPATIENT
Start: 2019-10-31 | End: 2020-04-24

## 2019-11-06 RX ORDER — GLIMEPIRIDE 4 MG/1
TABLET ORAL
Qty: 180 TABLET | Refills: 3 | Status: SHIPPED | OUTPATIENT
Start: 2019-11-06 | End: 2020-10-08 | Stop reason: SDUPTHER

## 2019-11-06 RX ORDER — CITALOPRAM 20 MG/1
TABLET ORAL
Qty: 270 TABLET | Refills: 3 | Status: ON HOLD | OUTPATIENT
Start: 2019-11-06 | End: 2020-07-21 | Stop reason: SDUPTHER

## 2019-11-06 RX ORDER — LEVOTHYROXINE SODIUM 50 UG/1
TABLET ORAL
Qty: 90 TABLET | Refills: 3 | Status: SHIPPED | OUTPATIENT
Start: 2019-11-06 | End: 2020-10-02

## 2019-11-22 ENCOUNTER — TELEPHONE (OUTPATIENT)
Dept: FAMILY MEDICINE CLINIC | Age: 64
End: 2019-11-22

## 2020-04-23 NOTE — TELEPHONE ENCOUNTER
Escribe Request for pending medications. Last Visit Date: 3/8/2018  Next Visit Date:  No future appointments. Health Maintenance   Topic Date Due    Hepatitis C screen  1955    HIV screen  05/14/1971    Cervical cancer screen  05/14/1977    Shingles Vaccine (1 of 2) 05/14/2006    Diabetic retinal exam  01/01/2014    Diabetic foot exam  08/03/2016    Diabetic microalbuminuria test  08/27/2017    Lipid screen  08/27/2017    TSH testing  08/27/2017    Breast cancer screen  01/12/2018    A1C test (Diabetic or Prediabetic)  03/08/2019    Potassium monitoring  01/10/2020    Creatinine monitoring  01/10/2020    Flu vaccine (Season Ended) 09/01/2020    DTaP/Tdap/Td vaccine (6 - Td) 06/27/2024    Colon cancer screen colonoscopy  08/01/2024    Pneumococcal 0-64 years Vaccine  Completed    Hepatitis A vaccine  Aged Out    Hib vaccine  Aged Out    Meningococcal (ACWY) vaccine  Aged Out       Hemoglobin A1C (%)   Date Value   03/08/2018 14.0   08/10/2016 10.1   08/03/2015 7.1             ( goal A1C is < 7)   Microalb/Crt. Ratio (mcg/mg creat)   Date Value   08/27/2016 50 (H)     LDL Cholesterol (mg/dL)   Date Value   08/27/2016 111       (goal LDL is <100)   AST (U/L)   Date Value   01/10/2019 54 (H)     ALT (U/L)   Date Value   01/10/2019 44 (H)     BUN (mg/dL)   Date Value   01/10/2019 7 (L)     BP Readings from Last 3 Encounters:   01/11/19 (!) 155/84   03/08/18 (!) 142/86   02/09/18 (!) 153/83          (goal 120/80)    All Future Testing planned in [facility]  Lab Frequency Next Occurrence   Hemoglobin A1C Once 09/09/2020   RADHA Digital Screen Bilateral [[id number]] Once 01/04/2021       Next Visit Date:  No future appointments.       Patient Active Problem List:     Morbid obesity ([address]     Anxiety     GERD (gastroesophageal reflux disease)     OA (osteoarthritis)     DM (diabetes mellitus)     Hypothyroid     Neuropathy     Right knee DJD     Hypothyroidism     S/P left total knee arthroplasty

## 2020-04-24 RX ORDER — LISINOPRIL 10 MG/1
TABLET ORAL
Qty: 90 TABLET | Refills: 0 | Status: ON HOLD | OUTPATIENT
Start: 2020-04-24 | End: 2020-07-20

## 2020-04-24 RX ORDER — GABAPENTIN 300 MG/1
CAPSULE ORAL
Qty: 270 CAPSULE | Refills: 0 | Status: SHIPPED | OUTPATIENT
Start: 2020-04-24 | End: 2020-08-21 | Stop reason: SDUPTHER

## 2020-05-18 ENCOUNTER — TELEPHONE (OUTPATIENT)
Dept: FAMILY MEDICINE CLINIC | Age: 65
End: 2020-05-18

## 2020-05-18 NOTE — TELEPHONE ENCOUNTER
Call pt. To make an appointment have not been seen in a year, pt. Is diabetic and needs A1C checked. Left message to call us for a appointment.

## 2020-06-04 ENCOUNTER — OFFICE VISIT (OUTPATIENT)
Dept: FAMILY MEDICINE CLINIC | Age: 65
End: 2020-06-04
Payer: COMMERCIAL

## 2020-06-04 ENCOUNTER — HOSPITAL ENCOUNTER (OUTPATIENT)
Age: 65
Setting detail: SPECIMEN
Discharge: HOME | End: 2020-06-04
Payer: COMMERCIAL

## 2020-06-04 ENCOUNTER — OFFICE VISIT (OUTPATIENT)
Dept: PRIMARY CARE CLINIC | Age: 65
End: 2020-06-04
Payer: COMMERCIAL

## 2020-06-04 VITALS — WEIGHT: 242.2 LBS | HEIGHT: 59 IN | BODY MASS INDEX: 48.83 KG/M2 | TEMPERATURE: 95 F

## 2020-06-04 VITALS
SYSTOLIC BLOOD PRESSURE: 147 MMHG | WEIGHT: 242 LBS | TEMPERATURE: 100.1 F | OXYGEN SATURATION: 98 % | BODY MASS INDEX: 48.79 KG/M2 | DIASTOLIC BLOOD PRESSURE: 76 MMHG | HEART RATE: 86 BPM

## 2020-06-04 PROCEDURE — 1036F TOBACCO NON-USER: CPT | Performed by: NURSE PRACTITIONER

## 2020-06-04 PROCEDURE — G8427 DOCREV CUR MEDS BY ELIG CLIN: HCPCS | Performed by: NURSE PRACTITIONER

## 2020-06-04 PROCEDURE — 3017F COLORECTAL CA SCREEN DOC REV: CPT | Performed by: NURSE PRACTITIONER

## 2020-06-04 PROCEDURE — 99212 OFFICE O/P EST SF 10 MIN: CPT | Performed by: FAMILY MEDICINE

## 2020-06-04 PROCEDURE — G8417 CALC BMI ABV UP PARAM F/U: HCPCS | Performed by: NURSE PRACTITIONER

## 2020-06-04 PROCEDURE — 96160 PT-FOCUSED HLTH RISK ASSMT: CPT | Performed by: NURSE PRACTITIONER

## 2020-06-04 PROCEDURE — 99213 OFFICE O/P EST LOW 20 MIN: CPT | Performed by: NURSE PRACTITIONER

## 2020-06-04 ASSESSMENT — ENCOUNTER SYMPTOMS
SHORTNESS OF BREATH: 0
CHEST TIGHTNESS: 0
EYE DISCHARGE: 0
DIARRHEA: 1
EYES NEGATIVE: 1
ABDOMINAL PAIN: 0
WHEEZING: 0
COUGH: 1
EYE ITCHING: 0
ALLERGIC/IMMUNOLOGIC NEGATIVE: 1
HEARTBURN: 0
HEMOPTYSIS: 0
VOMITING: 0
SORE THROAT: 0
NAUSEA: 1
RHINORRHEA: 0

## 2020-06-04 ASSESSMENT — PATIENT HEALTH QUESTIONNAIRE - PHQ9
5. POOR APPETITE OR OVEREATING: 2
8. MOVING OR SPEAKING SO SLOWLY THAT OTHER PEOPLE COULD HAVE NOTICED. OR THE OPPOSITE, BEING SO FIGETY OR RESTLESS THAT YOU HAVE BEEN MOVING AROUND A LOT MORE THAN USUAL: 3
6. FEELING BAD ABOUT YOURSELF - OR THAT YOU ARE A FAILURE OR HAVE LET YOURSELF OR YOUR FAMILY DOWN: 3
SUM OF ALL RESPONSES TO PHQ9 QUESTIONS 1 & 2: 3
SUM OF ALL RESPONSES TO PHQ QUESTIONS 1-9: 17
SUM OF ALL RESPONSES TO PHQ QUESTIONS 1-9: 17
3. TROUBLE FALLING OR STAYING ASLEEP: 3
9. THOUGHTS THAT YOU WOULD BE BETTER OFF DEAD, OR OF HURTING YOURSELF: 0
4. FEELING TIRED OR HAVING LITTLE ENERGY: 3
7. TROUBLE CONCENTRATING ON THINGS, SUCH AS READING THE NEWSPAPER OR WATCHING TELEVISION: 0
10. IF YOU CHECKED OFF ANY PROBLEMS, HOW DIFFICULT HAVE THESE PROBLEMS MADE IT FOR YOU TO DO YOUR WORK, TAKE CARE OF THINGS AT HOME, OR GET ALONG WITH OTHER PEOPLE: 1
2. FEELING DOWN, DEPRESSED OR HOPELESS: 2
1. LITTLE INTEREST OR PLEASURE IN DOING THINGS: 1

## 2020-06-04 NOTE — PROGRESS NOTES
Leonor Gary is a 60 y.o. female evaluated via telephone on 6/4/2020. Consent:  She and/or health care decision maker is aware that that she may receive a bill for this telephone service, depending on her insurance coverage, and has provided verbal consent to proceed: Yes      Documentation:  I communicated with the patient and/or health care decision maker about :  Covid concerns. Uncontrolled DM. Poor follow up. Health care screening. Details of this discussion including any medical advice provided:     Ms. Hanh Ley arrived today for an in person appointment. This was her first appointment with us in over 2 years. However upon arrival at the office she described having several symptoms that she was concerned about as being possible COVID related as a friend of hers stated that he thought he might had COVID and may currently be going under testing. She was asked to return to home and a phone visit with me was scheduled. She was also referred for testing later today at our [facility] clinic. Bishop Cockayne and I discussed that her diabetes was under very poor control at her follow-up in the spring 2018. She did not keep follow-up after that and did not get labs done which were ordered at that appointment. She states that she knows she is not very compliant and forgets to get things done and does not really like getting labs done but she will try to do better. Does not describe chest pain or shortness of breath. Reviewed with her the the labs that would need updating due to her hypothyroidism, her diabetes, and her hypertension. Counseled on the importance of follow-up for her health and trying to prevent the  possible very severe end-organ damage from uncontrolled diabetes, hypertension. For follow-up she will get these labs done within a month after she may have been tested for COVID.     She will try to follow-up with the office in about 6 weeks and we also will make that appointment for her today.    I affirm this is a Patient Initiated Episode with a Patient who has not had a related appointment within my department in the past 7 days or scheduled within the next 24 hours. Patient identification was verified at the start of the visit: Yes    Total Time: minutes: 5-10 minutes      Diagnosis Orders   1. Essential hypertension  Lipid Panel    Basic Metabolic Panel   2. Type 2 diabetes mellitus without complication, with long-term current use of insulin (HCC)  Microalbumin, Ur    Lipid Panel    Glycosylated HgB   3. Hypothyroidism due to Hashimoto's thyroiditis  TSH   4.  Encounter for health supervision  Lipid Panel         Note: not billable if this call serves to triage the patient into an appointment for the relevant concern      Marion Ariza

## 2020-06-04 NOTE — PROGRESS NOTES
[address] [address]  Dept: [phone]  Dept Fax: [phone]    Kermit Dorantes is a 60 y.o. female who presents today forher medical conditions/complaints as noted below. Kermit Dorantes is c/o of   Chief Complaint   Patient presents with    Covid Testing     pt c/o diarrhea, nausea, and coughing. HPI:     Cough   This is a new problem. The current episode started in the past 7 days. The problem has been unchanged. The problem occurs every few minutes. The cough is non-productive. Pertinent negatives include no chest pain, chills, ear congestion, ear pain, fever, headaches, heartburn, hemoptysis, myalgias, nasal congestion, postnasal drip, rash, rhinorrhea, sore throat, shortness of breath, sweats, weight loss or wheezing. Nothing aggravates the symptoms. She has tried nothing for the symptoms. The treatment provided mild relief. There is no history of asthma, bronchiectasis, bronchitis, COPD, emphysema, environmental allergies or pneumonia. Diarrhea    This is a new problem. The current episode started in the past 7 days. The problem occurs 2 to 4 times per day. The problem has been unchanged. The stool consistency is described as watery. The patient states that diarrhea does not awaken her from sleep. Associated symptoms include coughing. Pertinent negatives include no abdominal pain, chills, fever, headaches, myalgias, sweats, URI, vomiting or weight loss. Associated symptoms comments: Nausea . Nothing aggravates the symptoms. There are no known risk factors. She has tried nothing for the symptoms. Has been around someone with potential Covid. Denies any fever or chills.         Past Medical History:   Diagnosis Date    Anxiety     Borderline hypertension     GERD (gastroesophageal reflux disease)     Hypothyroidism     Neuropathy     Obesity     morbid    Osteoarthritis     Sleep apnea     possible    Type II or unspecified type diabetes mellitus without mention of complication, not stated as uncontrolled       Past Surgical History:   Procedure Laterality Date    APPENDECTOMY      COLONOSCOPY      DILATION AND CURETTAGE OF UTERUS      HIATAL HERNIA REPAIR      HYSTERECTOMY      THROAT SURGERY      POLYPS REMOVED FROM VOCAL CORD    TOTAL KNEE ARTHROPLASTY Right 01/06/2015    TOTAL KNEE ARTHROPLASTY Left 5/26/15    UPPER GASTROINTESTINAL ENDOSCOPY         Family History   Problem Relation Age of Onset    Heart Disease Mother     Arthritis Mother     Heart Disease Father     Arthritis Father     Cancer Father         \"oral\"    Diabetes Sister         gestational       Social History     Tobacco Use    Smoking status: Never Smoker    Smokeless tobacco: Never Used   Substance Use Topics    Alcohol use: Yes     Comment: once a month      Current Outpatient Medications   Medication Sig Dispense Refill    gabapentin (NEURONTIN) 300 MG capsule TAKE 1 CAPSULE BY MOUTH THREE TIMES DAILY 270 capsule 0    lisinopril (PRINIVIL;ZESTRIL) 10 MG tablet Take 1 tablet by mouth once daily 90 tablet 0    insulin glargine (LANTUS SOLOSTAR) 100 UNIT/ML injection pen INJECT 45 UNITS SUBCUTANEOUSLY IN THE MORNING AND 15 UNITS SUBCUTANEOUSLY IN THE EVENING 30 mL 3    EUTHYROX 50 MCG tablet TAKE 1 TABLET BY MOUTH ONCE DAILY 90 tablet 3    glimepiride (AMARYL) 4 MG tablet TAKE 1 TABLET BY MOUTH TWICE DAILY 180 tablet 3    citalopram (CELEXA) 20 MG tablet TAKE 2 TABLETS BY MOUTH IN THE MORNING AND 1 TABLET BY MOUTH IN THE EVENING 270 tablet 3    Handicap Placard MISC Is a permanent condition.   DX: M19.90 1 each 0    SITagliptin (JANUVIA) 100 MG tablet TAKE 1 TABLET DAILY 90 tablet 3    omeprazole (PRILOSEC) 20 MG delayed release capsule Take 1 capsule by mouth Daily 90 capsule 3    Insulin Pen Needle (B-D UF III MINI PEN NEEDLES) 31G X 5 MM MISC USE 1 PEN NEEDLE DAILY 50 each 1    aspirin 81 MG EC tablet Take 1 tablet by normal.      Nose: Nose normal.   Eyes:      Conjunctiva/sclera: Conjunctivae normal.      Pupils: Pupils are equal, round, and reactive to light. Neck:      Musculoskeletal: Normal range of motion and neck supple. Cardiovascular:      Rate and Rhythm: Normal rate and regular rhythm. Heart sounds: Normal heart sounds. No murmur. No friction rub. No gallop. Pulmonary:      Effort: Pulmonary effort is normal. No respiratory distress. Breath sounds: Normal breath sounds. Abdominal:      General: Bowel sounds are normal.      Palpations: Abdomen is soft. Musculoskeletal: Normal range of motion. Lymphadenopathy:      Cervical: No cervical adenopathy. Skin:     General: Skin is warm and dry. Findings: No rash. Neurological:      Mental Status: She is alert and oriented to person, place, and time. Cranial Nerves: No cranial nerve deficit. Coordination: Coordination normal.      Deep Tendon Reflexes: Reflexes are normal and symmetric. Psychiatric:         Thought Content: Thought content normal.       BP (!) 147/76 (Site: Left Upper Arm, Position: Sitting, Cuff Size: Large Adult)   Pulse 86   Temp 100.1 °F (37.8 °C) (Oral)   Wt 242 lb (109.8 kg)   SpO2 98%   BMI 48.79 kg/m²     Assessment:       Diagnosis Orders   1. Cough  COVID-19 Ambulatory             Plan:     1.) Covid swab collected- sent to lab, will call with results   2.) Quarantine until results given   3.) Symptom management   4.) Any severity of symptoms- severe SOB please go to ED for further management   5.) Follow-up with PCP     Advance Care Planning  People with COVID-19 may have no symptoms, mild symptoms, such as fever, cough, and shortness of breath or they may have more severe illness, developing severe and fatal pneumonia.   As a result, Advance Care Planning with attention to naming a health care decision maker (someone you trust to make healthcare decisions for you if you could not speak for yourself) When possible, have another member of your household care for your animals while you are sick. If you are sick with COVID-19, avoid contact with your pet, including petting, snuggling, being kissed or licked, and sharing food. If you must care for your pet or be around animals while you are sick, wash your hands before and after you interact with pets and wear a facemask. Call ahead before visiting your doctor  If you have a medical appointment, call the healthcare provider and tell them that you have or may have COVID-19. This will help the healthcare providers office take steps to keep other people from getting infected or exposed. Wear a facemask  You should wear a facemask when you are around other people (e.g., sharing a room or vehicle) or pets and before you enter a healthcare providers office. If you are not able to wear a facemask (for example, because it causes trouble breathing), then people who live with you should not stay in the same room with you, or they should wear a facemask if they enter your room. Cover your coughs and sneezes  Cover your mouth and nose with a tissue when you cough or sneeze. Throw used tissues in a lined trash can. Immediately wash your hands with soap and water for at least 20 seconds or, if soap and water are not available, clean your hands with an alcohol-based hand  that contains at least 60% alcohol. Clean your hands often  Wash your hands often with soap and water for at least 20 seconds, especially after blowing your nose, coughing, or sneezing; going to the bathroom; and before eating or preparing food. If soap and water are not readily available, use an alcohol-based hand  with at least 60% alcohol, covering all surfaces of your hands and rubbing them together until they feel dry. Soap and water are the best option if hands are visibly dirty. Avoid touching your eyes, nose, and mouth with unwashed hands.   Avoid sharing personal household The decision to discontinue home isolation precautions should be made on a case-by-case basis, in consultation with healthcare providers and state and local health departments. Problem List     None           Patient given educationalmaterials - see patient instructions. Discussed use, benefit, and side effectsof prescribed medications. All patient questions answered. Pt verbalized understanding. Instructed to continue current medications, diet and exercise. Patient agreedwith treatment plan. Follow up as directed.      Electronically signed by HAO Bonilla CNP on 6/4/2020 at 9:47 AM

## 2020-06-04 NOTE — PATIENT INSTRUCTIONS
condition or this instruction, always ask your healthcare professional. [place] [address] any warranty or liability for your use of this information.

## 2020-06-04 NOTE — PATIENT INSTRUCTIONS
Thank you for letting us take care of you today. We hope all your questions were addressed. If a question was overlooked or something else comes to mind after you return home, please contact a member of your Care Team listed below. Please make sure you have a routine office visit set up to follow-up on [address]. Your Care Team at [place] [address] is Team #4  Johnny Gonzalez MD (Faculty)  Corrine Mehta MD (Faculty  Venmatilde Crawford MD (Resident)  Paul Abernathy MD (Resident)  Cecelia Andujar MD (Resident)  Jaylene Severino MD (Resident)  Stephanie Bower MD (Resident)  LUIS Higgins. ,GIUSEPPE KERN,GIUSEPPE POLANCO, GIUSEPPE Yates ([address] [facility] office)  Autumn Rodriguez [facility] office)  [facility] office)  Julio Cesar Guerrero, [place] ([address] [address] )  Angeles Braun [facility] (Clinical Pharmacist)       Office phone number: [phone]    If you need to get in right away due to illness, please be advised we have \"Same Day\" appointments available Monday-Friday. Please call us at [phone] option #3 to schedule your \"Same Day\" appointment.

## 2020-06-08 LAB — SARS-COV-2, NAA: NOT DETECTED

## 2020-06-09 ENCOUNTER — TELEPHONE (OUTPATIENT)
Dept: PRIMARY CARE CLINIC | Age: 65
End: 2020-06-09

## 2020-06-17 ENCOUNTER — TELEPHONE (OUTPATIENT)
Dept: FAMILY MEDICINE CLINIC | Age: 65
End: 2020-06-17

## 2020-07-02 ENCOUNTER — TELEPHONE (OUTPATIENT)
Dept: FAMILY MEDICINE CLINIC | Age: 65
End: 2020-07-02

## 2020-07-02 NOTE — TELEPHONE ENCOUNTER
Call patient to talk about her A1C but get no answer and left message for her to give us a call back to schedule an appointment in office with provider or Nurse, or a lab order.

## 2020-07-06 ENCOUNTER — TELEPHONE (OUTPATIENT)
Dept: FAMILY MEDICINE CLINIC | Age: 65
End: 2020-07-06

## 2020-07-06 NOTE — TELEPHONE ENCOUNTER
Writer called the patient no answer left message on voicemail to call office and make herself an appointment.

## 2020-07-14 ENCOUNTER — HOSPITAL ENCOUNTER (OUTPATIENT)
Age: 65
Discharge: HOME | End: 2020-07-14
Payer: COMMERCIAL

## 2020-07-14 LAB
ANION GAP SERPL CALCULATED.3IONS-SCNC: 16 MMOL/L (ref 9–17)
BUN BLDV-MCNC: 7 MG/DL (ref 8–23)
BUN/CREAT BLD: ABNORMAL (ref 9–20)
CALCIUM SERPL-MCNC: 9.5 MG/DL (ref 8.6–10.4)
CHLORIDE BLD-SCNC: 100 MMOL/L (ref 98–107)
CHOLESTEROL/HDL RATIO: 3.6
CHOLESTEROL: 208 MG/DL
CO2: 26 MMOL/L (ref 20–31)
CREAT SERPL-MCNC: 0.66 MG/DL (ref 0.5–0.9)
CREATININE URINE: 122 MG/DL (ref 28–217)
GFR AFRICAN AMERICAN: >60 ML/MIN
GFR NON-AFRICAN AMERICAN: >60 ML/MIN
GFR SERPL CREATININE-BSD FRML MDRD: ABNORMAL ML/MIN/{1.73_M2}
GFR SERPL CREATININE-BSD FRML MDRD: ABNORMAL ML/MIN/{1.73_M2}
GLUCOSE BLD-MCNC: 336 MG/DL (ref 70–99)
HDLC SERPL-MCNC: 58 MG/DL
LDL CHOLESTEROL: 120 MG/DL (ref 0–130)
MICROALBUMIN/CREAT 24H UR: 41 MG/L
MICROALBUMIN/CREAT UR-RTO: 34 MCG/MG CREAT
POTASSIUM SERPL-SCNC: 3.9 MMOL/L (ref 3.7–5.3)
SODIUM BLD-SCNC: 142 MMOL/L (ref 135–144)
TRIGL SERPL-MCNC: 152 MG/DL
TSH SERPL DL<=0.05 MIU/L-ACNC: 2.74 MIU/L (ref 0.3–5)
VLDLC SERPL CALC-MCNC: ABNORMAL MG/DL (ref 1–30)

## 2020-07-14 PROCEDURE — 82570 ASSAY OF URINE CREATININE: CPT

## 2020-07-14 PROCEDURE — 83036 HEMOGLOBIN GLYCOSYLATED A1C: CPT

## 2020-07-14 PROCEDURE — 80048 BASIC METABOLIC PNL TOTAL CA: CPT

## 2020-07-14 PROCEDURE — 82043 UR ALBUMIN QUANTITATIVE: CPT

## 2020-07-14 PROCEDURE — 80061 LIPID PANEL: CPT

## 2020-07-14 PROCEDURE — 36415 COLL VENOUS BLD VENIPUNCTURE: CPT

## 2020-07-14 PROCEDURE — 84443 ASSAY THYROID STIM HORMONE: CPT

## 2020-07-14 NOTE — TELEPHONE ENCOUNTER
Next Visit Date:  No future appointments. Health Maintenance   Topic Date Due    Hepatitis C screen  1955    HIV screen  05/14/1971    Cervical cancer screen  05/14/1977    Shingles Vaccine (1 of 2) 05/14/2006    Diabetic retinal exam  01/01/2014    Diabetic foot exam  08/03/2016    Diabetic microalbuminuria test  08/27/2017    Lipid screen  08/27/2017    TSH testing  08/27/2017    Breast cancer screen  01/12/2018    A1C test (Diabetic or Prediabetic)  03/08/2019    Potassium monitoring  01/10/2020    Creatinine monitoring  01/10/2020    Flu vaccine (1) 09/01/2020    DTaP/Tdap/Td vaccine (6 - Td) 06/27/2024    Colon cancer screen colonoscopy  08/01/2024    Pneumococcal 0-64 years Vaccine  Completed    Hepatitis A vaccine  Aged Out    Hib vaccine  Aged Out    Meningococcal (ACWY) vaccine  Aged Out       Hemoglobin A1C (%)   Date Value   03/08/2018 14.0   08/10/2016 10.1   08/03/2015 7.1             ( goal A1C is < 7)   Microalb/Crt.  Ratio (mcg/mg creat)   Date Value   08/27/2016 50 (H)     LDL Cholesterol (mg/dL)   Date Value   08/27/2016 111   05/27/2015 109       (goal LDL is <100)   AST (U/L)   Date Value   01/10/2019 54 (H)     ALT (U/L)   Date Value   01/10/2019 44 (H)     BUN (mg/dL)   Date Value   01/10/2019 7 (L)     BP Readings from Last 3 Encounters:   06/04/20 (!) 147/76   01/11/19 (!) 155/84   03/08/18 (!) 142/86          (goal 120/80)    All Future Testing planned in [facility]  Lab Frequency Next Occurrence   Hemoglobin A1C Once 09/09/2020   RADHA Digital Screen Bilateral [[id number]] Once 01/04/2021   TSH Once 10/10/2020   Microalbumin, Ur Once 10/10/2020   Lipid Panel Once 79/34/2475   Basic Metabolic Panel Once 53/43/1790   Glycosylated HgB Once 10/10/2020               Patient Active Problem List:     Morbid obesity ([address]     Anxiety     GERD (gastroesophageal reflux disease)     OA (osteoarthritis)     DM (diabetes mellitus)     Hypothyroid     Neuropathy     Right knee DJD     Hypothyroidism     S/P left total knee arthroplasty     Obesity, morbid, BMI 50 or higher (HCC)     Chest pain

## 2020-07-16 LAB
ESTIMATED AVERAGE GLUCOSE: 260 MG/DL
HBA1C MFR BLD: 10.7 %

## 2020-07-18 ENCOUNTER — APPOINTMENT (OUTPATIENT)
Dept: GENERAL RADIOLOGY | Age: 65
DRG: 175 | End: 2020-07-18
Payer: COMMERCIAL

## 2020-07-18 ENCOUNTER — APPOINTMENT (OUTPATIENT)
Dept: NUCLEAR MEDICINE | Age: 65
DRG: 175 | End: 2020-07-18
Payer: COMMERCIAL

## 2020-07-18 ENCOUNTER — HOSPITAL ENCOUNTER (INPATIENT)
Age: 65
LOS: 3 days | Discharge: HOME | DRG: 175 | End: 2020-07-21
Attending: EMERGENCY MEDICINE | Admitting: FAMILY MEDICINE
Payer: COMMERCIAL

## 2020-07-18 LAB
ABSOLUTE EOS #: 0.23 K/UL (ref 0–0.44)
ABSOLUTE IMMATURE GRANULOCYTE: 0.03 K/UL (ref 0–0.3)
ABSOLUTE LYMPH #: 1.89 K/UL (ref 1.1–3.7)
ABSOLUTE MONO #: 0.76 K/UL (ref 0.1–1.2)
ANION GAP SERPL CALCULATED.3IONS-SCNC: 11 MMOL/L (ref 9–17)
BASOPHILS # BLD: 0 % (ref 0–2)
BASOPHILS ABSOLUTE: <0.03 K/UL (ref 0–0.2)
BUN BLDV-MCNC: 7 MG/DL (ref 8–23)
BUN/CREAT BLD: ABNORMAL (ref 9–20)
CALCIUM SERPL-MCNC: 9.2 MG/DL (ref 8.6–10.4)
CHLORIDE BLD-SCNC: 100 MMOL/L (ref 98–107)
CO2: 24 MMOL/L (ref 20–31)
CREAT SERPL-MCNC: 0.6 MG/DL (ref 0.5–0.9)
DIFFERENTIAL TYPE: ABNORMAL
EOSINOPHILS RELATIVE PERCENT: 4 % (ref 1–4)
GFR AFRICAN AMERICAN: >60 ML/MIN
GFR NON-AFRICAN AMERICAN: >60 ML/MIN
GFR SERPL CREATININE-BSD FRML MDRD: ABNORMAL ML/MIN/{1.73_M2}
GFR SERPL CREATININE-BSD FRML MDRD: ABNORMAL ML/MIN/{1.73_M2}
GLUCOSE BLD-MCNC: 246 MG/DL (ref 65–105)
GLUCOSE BLD-MCNC: 267 MG/DL (ref 70–99)
GLUCOSE BLD-MCNC: 301 MG/DL (ref 65–105)
HCT VFR BLD CALC: 45.4 % (ref 36.3–47.1)
HEMOGLOBIN: 15.1 G/DL (ref 11.9–15.1)
IMMATURE GRANULOCYTES: 1 %
LV EF: 61 %
LVEF MODALITY: NORMAL
LYMPHOCYTES # BLD: 29 % (ref 24–43)
MCH RBC QN AUTO: 29.9 PG (ref 25.2–33.5)
MCHC RBC AUTO-ENTMCNC: 33.3 G/DL (ref 28.4–34.8)
MCV RBC AUTO: 89.9 FL (ref 82.6–102.9)
MONOCYTES # BLD: 12 % (ref 3–12)
NRBC AUTOMATED: 0 PER 100 WBC
PDW BLD-RTO: 12.5 % (ref 11.8–14.4)
PLATELET # BLD: 149 K/UL (ref 138–453)
PLATELET ESTIMATE: ABNORMAL
PMV BLD AUTO: 11.4 FL (ref 8.1–13.5)
POTASSIUM SERPL-SCNC: 3.5 MMOL/L (ref 3.7–5.3)
RBC # BLD: 5.05 M/UL (ref 3.95–5.11)
RBC # BLD: ABNORMAL 10*6/UL
SEG NEUTROPHILS: 54 % (ref 36–65)
SEGMENTED NEUTROPHILS ABSOLUTE COUNT: 3.55 K/UL (ref 1.5–8.1)
SODIUM BLD-SCNC: 135 MMOL/L (ref 135–144)
TROPONIN INTERP: ABNORMAL
TROPONIN T: ABNORMAL NG/ML
TROPONIN, HIGH SENSITIVITY: 19 NG/L (ref 0–14)
TROPONIN, HIGH SENSITIVITY: 21 NG/L (ref 0–14)
TROPONIN, HIGH SENSITIVITY: 22 NG/L (ref 0–14)
WBC # BLD: 6.5 K/UL (ref 3.5–11.3)
WBC # BLD: ABNORMAL 10*3/UL

## 2020-07-18 PROCEDURE — 6360000002 HC RX W HCPCS: Performed by: STUDENT IN AN ORGANIZED HEALTH CARE EDUCATION/TRAINING PROGRAM

## 2020-07-18 PROCEDURE — 93017 CV STRESS TEST TRACING ONLY: CPT | Performed by: NURSE PRACTITIONER

## 2020-07-18 PROCEDURE — A9500 TC99M SESTAMIBI: HCPCS | Performed by: EMERGENCY MEDICINE

## 2020-07-18 PROCEDURE — 6370000000 HC RX 637 (ALT 250 FOR IP): Performed by: EMERGENCY MEDICINE

## 2020-07-18 PROCEDURE — 6370000000 HC RX 637 (ALT 250 FOR IP): Performed by: STUDENT IN AN ORGANIZED HEALTH CARE EDUCATION/TRAINING PROGRAM

## 2020-07-18 PROCEDURE — 99285 EMERGENCY DEPT VISIT HI MDM: CPT

## 2020-07-18 PROCEDURE — 6360000002 HC RX W HCPCS: Performed by: EMERGENCY MEDICINE

## 2020-07-18 PROCEDURE — 2500000003 HC RX 250 WO HCPCS: Performed by: STUDENT IN AN ORGANIZED HEALTH CARE EDUCATION/TRAINING PROGRAM

## 2020-07-18 PROCEDURE — 36415 COLL VENOUS BLD VENIPUNCTURE: CPT

## 2020-07-18 PROCEDURE — 82947 ASSAY GLUCOSE BLOOD QUANT: CPT

## 2020-07-18 PROCEDURE — 96375 TX/PRO/DX INJ NEW DRUG ADDON: CPT

## 2020-07-18 PROCEDURE — 3430000000 HC RX DIAGNOSTIC RADIOPHARMACEUTICAL: Performed by: EMERGENCY MEDICINE

## 2020-07-18 PROCEDURE — 85025 COMPLETE CBC W/AUTO DIFF WBC: CPT

## 2020-07-18 PROCEDURE — 2500000003 HC RX 250 WO HCPCS: Performed by: EMERGENCY MEDICINE

## 2020-07-18 PROCEDURE — 96374 THER/PROPH/DIAG INJ IV PUSH: CPT

## 2020-07-18 PROCEDURE — 80048 BASIC METABOLIC PNL TOTAL CA: CPT

## 2020-07-18 PROCEDURE — 93005 ELECTROCARDIOGRAM TRACING: CPT | Performed by: EMERGENCY MEDICINE

## 2020-07-18 PROCEDURE — 78452 HT MUSCLE IMAGE SPECT MULT: CPT

## 2020-07-18 PROCEDURE — 99222 1ST HOSP IP/OBS MODERATE 55: CPT | Performed by: FAMILY MEDICINE

## 2020-07-18 PROCEDURE — 71045 X-RAY EXAM CHEST 1 VIEW: CPT

## 2020-07-18 PROCEDURE — 84484 ASSAY OF TROPONIN QUANT: CPT

## 2020-07-18 PROCEDURE — 2060000000 HC ICU INTERMEDIATE R&B

## 2020-07-18 PROCEDURE — 2580000003 HC RX 258: Performed by: EMERGENCY MEDICINE

## 2020-07-18 RX ORDER — ACETAMINOPHEN 500 MG
1000 TABLET ORAL EVERY 6 HOURS PRN
Status: DISCONTINUED | OUTPATIENT
Start: 2020-07-18 | End: 2020-07-21 | Stop reason: HOSPADM

## 2020-07-18 RX ORDER — ASPIRIN 81 MG/1
324 TABLET, CHEWABLE ORAL ONCE
Status: COMPLETED | OUTPATIENT
Start: 2020-07-18 | End: 2020-07-18

## 2020-07-18 RX ORDER — POTASSIUM CHLORIDE 7.45 MG/ML
10 INJECTION INTRAVENOUS PRN
Status: DISCONTINUED | OUTPATIENT
Start: 2020-07-18 | End: 2020-07-21 | Stop reason: HOSPADM

## 2020-07-18 RX ORDER — ONDANSETRON 2 MG/ML
4 INJECTION INTRAMUSCULAR; INTRAVENOUS EVERY 6 HOURS PRN
Status: DISCONTINUED | OUTPATIENT
Start: 2020-07-18 | End: 2020-07-21 | Stop reason: HOSPADM

## 2020-07-18 RX ORDER — SODIUM CHLORIDE 0.9 % (FLUSH) 0.9 %
10 SYRINGE (ML) INJECTION PRN
Status: DISCONTINUED | OUTPATIENT
Start: 2020-07-18 | End: 2020-07-21 | Stop reason: HOSPADM

## 2020-07-18 RX ORDER — PANTOPRAZOLE SODIUM 40 MG/1
40 TABLET, DELAYED RELEASE ORAL
Status: DISCONTINUED | OUTPATIENT
Start: 2020-07-19 | End: 2020-07-21 | Stop reason: HOSPADM

## 2020-07-18 RX ORDER — SODIUM CHLORIDE 9 MG/ML
500 INJECTION, SOLUTION INTRAVENOUS CONTINUOUS PRN
Status: DISCONTINUED | OUTPATIENT
Start: 2020-07-18 | End: 2020-07-18

## 2020-07-18 RX ORDER — ALOGLIPTIN 6.25 MG/1
6.25 TABLET, FILM COATED ORAL DAILY
Status: DISCONTINUED | OUTPATIENT
Start: 2020-07-18 | End: 2020-07-21 | Stop reason: HOSPADM

## 2020-07-18 RX ORDER — CARVEDILOL 6.25 MG/1
6.25 TABLET ORAL 2 TIMES DAILY WITH MEALS
Status: DISCONTINUED | OUTPATIENT
Start: 2020-07-18 | End: 2020-07-21 | Stop reason: HOSPADM

## 2020-07-18 RX ORDER — CITALOPRAM 20 MG/1
20 TABLET ORAL EVERY EVENING
Status: DISCONTINUED | OUTPATIENT
Start: 2020-07-18 | End: 2020-07-21 | Stop reason: HOSPADM

## 2020-07-18 RX ORDER — HYDRALAZINE HYDROCHLORIDE 20 MG/ML
10 INJECTION INTRAMUSCULAR; INTRAVENOUS ONCE
Status: COMPLETED | OUTPATIENT
Start: 2020-07-18 | End: 2020-07-18

## 2020-07-18 RX ORDER — UREA 10 %
3 LOTION (ML) TOPICAL NIGHTLY PRN
Status: DISCONTINUED | OUTPATIENT
Start: 2020-07-19 | End: 2020-07-18

## 2020-07-18 RX ORDER — MULTIVITAMIN WITH IRON
1 TABLET ORAL DAILY
Status: DISCONTINUED | OUTPATIENT
Start: 2020-07-18 | End: 2020-07-21 | Stop reason: HOSPADM

## 2020-07-18 RX ORDER — DIPHENHYDRAMINE HYDROCHLORIDE 50 MG/ML
25 INJECTION INTRAMUSCULAR; INTRAVENOUS EVERY 6 HOURS PRN
Status: DISCONTINUED | OUTPATIENT
Start: 2020-07-18 | End: 2020-07-21 | Stop reason: HOSPADM

## 2020-07-18 RX ORDER — GABAPENTIN 300 MG/1
300 CAPSULE ORAL 3 TIMES DAILY
Status: DISCONTINUED | OUTPATIENT
Start: 2020-07-18 | End: 2020-07-21 | Stop reason: HOSPADM

## 2020-07-18 RX ORDER — METOPROLOL TARTRATE 5 MG/5ML
5 INJECTION INTRAVENOUS EVERY 5 MIN PRN
Status: DISCONTINUED | OUTPATIENT
Start: 2020-07-18 | End: 2020-07-18

## 2020-07-18 RX ORDER — CITALOPRAM 20 MG/1
40 TABLET ORAL DAILY
Status: DISCONTINUED | OUTPATIENT
Start: 2020-07-18 | End: 2020-07-21 | Stop reason: HOSPADM

## 2020-07-18 RX ORDER — ONDANSETRON 4 MG/1
4 TABLET, ORALLY DISINTEGRATING ORAL EVERY 8 HOURS PRN
Status: DISCONTINUED | OUTPATIENT
Start: 2020-07-18 | End: 2020-07-21 | Stop reason: HOSPADM

## 2020-07-18 RX ORDER — AMINOPHYLLINE DIHYDRATE 25 MG/ML
50 INJECTION, SOLUTION INTRAVENOUS PRN
Status: DISCONTINUED | OUTPATIENT
Start: 2020-07-18 | End: 2020-07-18

## 2020-07-18 RX ORDER — FENTANYL CITRATE 50 UG/ML
25 INJECTION, SOLUTION INTRAMUSCULAR; INTRAVENOUS ONCE
Status: COMPLETED | OUTPATIENT
Start: 2020-07-18 | End: 2020-07-18

## 2020-07-18 RX ORDER — MAGNESIUM HYDROXIDE/ALUMINUM HYDROXICE/SIMETHICONE 120; 1200; 1200 MG/30ML; MG/30ML; MG/30ML
30 SUSPENSION ORAL ONCE
Status: COMPLETED | OUTPATIENT
Start: 2020-07-18 | End: 2020-07-18

## 2020-07-18 RX ORDER — ONDANSETRON 2 MG/ML
4 INJECTION INTRAMUSCULAR; INTRAVENOUS ONCE
Status: COMPLETED | OUTPATIENT
Start: 2020-07-18 | End: 2020-07-18

## 2020-07-18 RX ORDER — IBUPROFEN 800 MG/1
800 TABLET ORAL EVERY 8 HOURS PRN
Status: DISCONTINUED | OUTPATIENT
Start: 2020-07-18 | End: 2020-07-21 | Stop reason: HOSPADM

## 2020-07-18 RX ORDER — METOCLOPRAMIDE HYDROCHLORIDE 5 MG/ML
10 INJECTION INTRAMUSCULAR; INTRAVENOUS ONCE
Status: COMPLETED | OUTPATIENT
Start: 2020-07-18 | End: 2020-07-18

## 2020-07-18 RX ORDER — LISINOPRIL 10 MG/1
10 TABLET ORAL DAILY
Status: DISCONTINUED | OUTPATIENT
Start: 2020-07-18 | End: 2020-07-21 | Stop reason: HOSPADM

## 2020-07-18 RX ORDER — POTASSIUM CHLORIDE 20 MEQ/1
40 TABLET, EXTENDED RELEASE ORAL PRN
Status: DISCONTINUED | OUTPATIENT
Start: 2020-07-18 | End: 2020-07-21 | Stop reason: HOSPADM

## 2020-07-18 RX ORDER — ALBUTEROL SULFATE 2.5 MG/3ML
2.5 SOLUTION RESPIRATORY (INHALATION) EVERY 6 HOURS PRN
Status: DISCONTINUED | OUTPATIENT
Start: 2020-07-18 | End: 2020-07-18

## 2020-07-18 RX ORDER — KETOROLAC TROMETHAMINE 30 MG/ML
30 INJECTION, SOLUTION INTRAMUSCULAR; INTRAVENOUS ONCE
Status: COMPLETED | OUTPATIENT
Start: 2020-07-18 | End: 2020-07-18

## 2020-07-18 RX ORDER — LIDOCAINE HYDROCHLORIDE 20 MG/ML
15 SOLUTION OROPHARYNGEAL ONCE
Status: COMPLETED | OUTPATIENT
Start: 2020-07-18 | End: 2020-07-18

## 2020-07-18 RX ORDER — SODIUM CHLORIDE 0.9 % (FLUSH) 0.9 %
10 SYRINGE (ML) INJECTION PRN
Status: DISCONTINUED | OUTPATIENT
Start: 2020-07-18 | End: 2020-07-18

## 2020-07-18 RX ORDER — GLIMEPIRIDE 2 MG/1
4 TABLET ORAL 2 TIMES DAILY WITH MEALS
Status: DISCONTINUED | OUTPATIENT
Start: 2020-07-18 | End: 2020-07-21 | Stop reason: HOSPADM

## 2020-07-18 RX ORDER — SODIUM CHLORIDE 0.9 % (FLUSH) 0.9 %
10 SYRINGE (ML) INJECTION EVERY 12 HOURS SCHEDULED
Status: DISCONTINUED | OUTPATIENT
Start: 2020-07-18 | End: 2020-07-21 | Stop reason: HOSPADM

## 2020-07-18 RX ORDER — AMLODIPINE BESYLATE 5 MG/1
5 TABLET ORAL DAILY
Status: DISCONTINUED | OUTPATIENT
Start: 2020-07-18 | End: 2020-07-21 | Stop reason: HOSPADM

## 2020-07-18 RX ORDER — ASPIRIN 81 MG/1
81 TABLET ORAL DAILY
Status: DISCONTINUED | OUTPATIENT
Start: 2020-07-18 | End: 2020-07-21 | Stop reason: HOSPADM

## 2020-07-18 RX ORDER — LEVOTHYROXINE SODIUM 0.05 MG/1
50 TABLET ORAL DAILY
Status: DISCONTINUED | OUTPATIENT
Start: 2020-07-18 | End: 2020-07-21 | Stop reason: HOSPADM

## 2020-07-18 RX ORDER — ATROPINE SULFATE 0.1 MG/ML
0.5 INJECTION INTRAVENOUS EVERY 5 MIN PRN
Status: DISCONTINUED | OUTPATIENT
Start: 2020-07-18 | End: 2020-07-18

## 2020-07-18 RX ORDER — HYDROXYZINE HYDROCHLORIDE 25 MG/1
25 TABLET, FILM COATED ORAL 3 TIMES DAILY PRN
Status: DISCONTINUED | OUTPATIENT
Start: 2020-07-18 | End: 2020-07-18

## 2020-07-18 RX ORDER — FENTANYL CITRATE 50 UG/ML
50 INJECTION, SOLUTION INTRAMUSCULAR; INTRAVENOUS ONCE
Status: COMPLETED | OUTPATIENT
Start: 2020-07-18 | End: 2020-07-18

## 2020-07-18 RX ORDER — NITROGLYCERIN 20 MG/100ML
5 INJECTION INTRAVENOUS CONTINUOUS
Status: DISCONTINUED | OUTPATIENT
Start: 2020-07-18 | End: 2020-07-20

## 2020-07-18 RX ORDER — UREA 10 %
3 LOTION (ML) TOPICAL NIGHTLY PRN
Status: DISCONTINUED | OUTPATIENT
Start: 2020-07-19 | End: 2020-07-21 | Stop reason: HOSPADM

## 2020-07-18 RX ORDER — NITROGLYCERIN 0.4 MG/1
0.4 TABLET SUBLINGUAL EVERY 5 MIN PRN
Status: DISCONTINUED | OUTPATIENT
Start: 2020-07-18 | End: 2020-07-18

## 2020-07-18 RX ORDER — DIPHENHYDRAMINE HYDROCHLORIDE 50 MG/ML
25 INJECTION INTRAMUSCULAR; INTRAVENOUS ONCE
Status: COMPLETED | OUTPATIENT
Start: 2020-07-18 | End: 2020-07-18

## 2020-07-18 RX ORDER — INSULIN GLARGINE 100 [IU]/ML
45 INJECTION, SOLUTION SUBCUTANEOUS EVERY MORNING
Status: DISCONTINUED | OUTPATIENT
Start: 2020-07-19 | End: 2020-07-21 | Stop reason: HOSPADM

## 2020-07-18 RX ORDER — ATORVASTATIN CALCIUM 40 MG/1
40 TABLET, FILM COATED ORAL NIGHTLY
Status: DISCONTINUED | OUTPATIENT
Start: 2020-07-18 | End: 2020-07-21 | Stop reason: HOSPADM

## 2020-07-18 RX ORDER — INSULIN GLARGINE 100 [IU]/ML
15 INJECTION, SOLUTION SUBCUTANEOUS NIGHTLY
Status: DISCONTINUED | OUTPATIENT
Start: 2020-07-18 | End: 2020-07-21 | Stop reason: HOSPADM

## 2020-07-18 RX ADMIN — FENTANYL CITRATE 50 MCG: 50 INJECTION INTRAMUSCULAR; INTRAVENOUS at 11:15

## 2020-07-18 RX ADMIN — ACETAMINOPHEN 1000 MG: 500 TABLET ORAL at 20:49

## 2020-07-18 RX ADMIN — ALUMINUM HYDROXIDE, MAGNESIUM HYDROXIDE, AND SIMETHICONE 30 ML: 200; 200; 20 SUSPENSION ORAL at 07:00

## 2020-07-18 RX ADMIN — GLIMEPIRIDE 4 MG: 2 TABLET ORAL at 19:09

## 2020-07-18 RX ADMIN — TETRAKIS(2-METHOXYISOBUTYLISOCYANIDE)COPPER(I) TETRAFLUOROBORATE 48 MILLICURIE: 1 INJECTION, POWDER, LYOPHILIZED, FOR SOLUTION INTRAVENOUS at 13:02

## 2020-07-18 RX ADMIN — POTASSIUM & SODIUM PHOSPHATES POWDER PACK 280-160-250 MG 250 MG: 280-160-250 PACK at 14:54

## 2020-07-18 RX ADMIN — LEVOTHYROXINE SODIUM 50 MCG: 50 TABLET ORAL at 19:09

## 2020-07-18 RX ADMIN — ONDANSETRON 4 MG: 2 INJECTION INTRAMUSCULAR; INTRAVENOUS at 06:54

## 2020-07-18 RX ADMIN — INSULIN LISPRO 4 UNITS: 100 INJECTION, SOLUTION INTRAVENOUS; SUBCUTANEOUS at 17:52

## 2020-07-18 RX ADMIN — NITROGLYCERIN 5 MCG/MIN: 20 INJECTION INTRAVENOUS at 18:56

## 2020-07-18 RX ADMIN — CITALOPRAM 40 MG: 20 TABLET, FILM COATED ORAL at 19:10

## 2020-07-18 RX ADMIN — INSULIN GLARGINE 15 UNITS: 100 INJECTION, SOLUTION SUBCUTANEOUS at 20:50

## 2020-07-18 RX ADMIN — ENOXAPARIN SODIUM 40 MG: 40 INJECTION SUBCUTANEOUS at 19:11

## 2020-07-18 RX ADMIN — DIPHENHYDRAMINE HYDROCHLORIDE 25 MG: 50 INJECTION, SOLUTION INTRAMUSCULAR; INTRAVENOUS at 08:26

## 2020-07-18 RX ADMIN — ASPIRIN 81 MG 324 MG: 81 TABLET ORAL at 06:55

## 2020-07-18 RX ADMIN — MYCOPHENOLATE MOFETIL 300 MG: 500 TABLET ORAL at 20:49

## 2020-07-18 RX ADMIN — METOCLOPRAMIDE 10 MG: 5 INJECTION, SOLUTION INTRAMUSCULAR; INTRAVENOUS at 11:15

## 2020-07-18 RX ADMIN — METOCLOPRAMIDE 10 MG: 5 INJECTION, SOLUTION INTRAMUSCULAR; INTRAVENOUS at 08:27

## 2020-07-18 RX ADMIN — AMLODIPINE BESYLATE 5 MG: 5 TABLET ORAL at 19:10

## 2020-07-18 RX ADMIN — Medication 10 ML: at 11:34

## 2020-07-18 RX ADMIN — CARVEDILOL 6.25 MG: 6.25 TABLET, FILM COATED ORAL at 19:10

## 2020-07-18 RX ADMIN — Medication 81 MG: at 19:10

## 2020-07-18 RX ADMIN — INSULIN LISPRO 4 UNITS: 100 INJECTION, SOLUTION INTRAVENOUS; SUBCUTANEOUS at 20:50

## 2020-07-18 RX ADMIN — DIPHENHYDRAMINE HYDROCHLORIDE 25 MG: 50 INJECTION, SOLUTION INTRAMUSCULAR; INTRAVENOUS at 20:49

## 2020-07-18 RX ADMIN — SODIUM CHLORIDE, PRESERVATIVE FREE 10 ML: 5 INJECTION INTRAVENOUS at 13:02

## 2020-07-18 RX ADMIN — TETRAKIS(2-METHOXYISOBUTYLISOCYANIDE)COPPER(I) TETRAFLUOROBORATE 13.5 MILLICURIE: 1 INJECTION, POWDER, LYOPHILIZED, FOR SOLUTION INTRAVENOUS at 11:40

## 2020-07-18 RX ADMIN — THERA TABS 1 TABLET: TAB at 19:10

## 2020-07-18 RX ADMIN — FAMOTIDINE 20 MG: 10 INJECTION, SOLUTION INTRAVENOUS at 06:54

## 2020-07-18 RX ADMIN — REGADENOSON 0.4 MG: 0.08 INJECTION, SOLUTION INTRAVENOUS at 11:39

## 2020-07-18 RX ADMIN — POTASSIUM CHLORIDE 40 MEQ: 1500 TABLET, EXTENDED RELEASE ORAL at 20:49

## 2020-07-18 RX ADMIN — DESMOPRESSIN ACETATE 40 MG: 0.2 TABLET ORAL at 20:49

## 2020-07-18 RX ADMIN — LIDOCAINE HYDROCHLORIDE 15 ML: 20 SOLUTION ORAL; TOPICAL at 06:55

## 2020-07-18 RX ADMIN — KETOROLAC TROMETHAMINE 30 MG: 30 INJECTION, SOLUTION INTRAMUSCULAR at 06:54

## 2020-07-18 RX ADMIN — FENTANYL CITRATE 25 MCG: 50 INJECTION INTRAMUSCULAR; INTRAVENOUS at 17:15

## 2020-07-18 RX ADMIN — HYDRALAZINE HYDROCHLORIDE 10 MG: 20 INJECTION INTRAMUSCULAR; INTRAVENOUS at 06:54

## 2020-07-18 RX ADMIN — SODIUM CHLORIDE, PRESERVATIVE FREE 10 ML: 5 INJECTION INTRAVENOUS at 11:40

## 2020-07-18 RX ADMIN — FENTANYL CITRATE 50 MCG: 50 INJECTION INTRAMUSCULAR; INTRAVENOUS at 08:26

## 2020-07-18 ASSESSMENT — PAIN SCALES - GENERAL
PAINLEVEL_OUTOF10: 7
PAINLEVEL_OUTOF10: 9
PAINLEVEL_OUTOF10: 7

## 2020-07-18 ASSESSMENT — ENCOUNTER SYMPTOMS
COUGH: 0
VOMITING: 0
EYE PAIN: 0
SORE THROAT: 0
DIARRHEA: 0
SHORTNESS OF BREATH: 0
ABDOMINAL PAIN: 1
NAUSEA: 1

## 2020-07-18 NOTE — ED PROVIDER NOTES
Handoff taken on the following patient from prior Attending Physician:    Pt Name: Hilary Ricardoman    PCP:  Abdoulaye Jones MD         Attestation    I was available and discussed any additional care issues that arose and coordinated the management plans with the resident(s) caring for the patient during my duty period. Any areas of disagreement with residents documentation of care or procedures are noted on the chart. I was personally present for the key portions of any/all procedures during my duty period. I have documented in the chart those procedures where I was not present during the boateng portions.         Marcella Boswell,   07/18/20 1521

## 2020-07-18 NOTE — ED NOTES
Pt resting on stretcher, in NAD, RR even and unlabored. Pt updated on plan of care. Will continue to monitor. Call light within reach.          Devin Joseph RN  07/18/20 [id number]

## 2020-07-18 NOTE — ED PROVIDER NOTES
of uterus; hiatal hernia repair; Throat surgery; Appendectomy; Total knee arthroplasty (Right, 01/06/2015); and Total knee arthroplasty (Left, 5/26/15). Social History     Socioeconomic History    Marital status:      Spouse name: Gene Francisco Number of children: 0    Years of education: Not on file    Highest education level: Not on file   Occupational History    Occupation: Retired      Employer: [employer] resource strain: Not on file    Food insecurity     Worry: Not on file     Inability: Not on file   [employer] needs     Medical: Not on file     Non-medical: Not on file   Tobacco Use    Smoking status: Never Smoker    Smokeless tobacco: Never Used   Substance and Sexual Activity    Alcohol use: Yes     Comment: once a month    Drug use: No    Sexual activity: Yes     Partners: Male   Lifestyle    Physical activity     Days per week: Not on file     Minutes per session: Not on file    Stress: Not on file   Relationships    Social connections     Talks on phone: Not on file     Gets together: Not on file     Attends Mandaen service: Not on file     Active member of club or organization: Not on file     Attends meetings of clubs or organizations: Not on file     Relationship status: Not on file    Intimate partner violence     Fear of current or ex partner: Not on file     Emotionally abused: Not on file     Physically abused: Not on file     Forced sexual activity: Not on file   Other Topics Concern    Not on file   Social History Narrative    Not on file       Family History   Problem Relation Age of Onset    Heart Disease Mother     Arthritis Mother     Heart Disease Father     Arthritis Father     Cancer Father         \"oral\"    Diabetes Sister         gestational       Allergies:  Dha-epa-vitamin e and Morphine    Home Medications:  Prior to Admission medications    Medication Sig Start Date End Date Taking? Authorizing Provider   gabapentin (NEURONTIN) 300 MG capsule TAKE 1 CAPSULE BY MOUTH THREE TIMES DAILY 4/24/20 7/24/20  Susana Hdz MD   lisinopril (PRINIVIL;ZESTRIL) 10 MG tablet Take 1 tablet by mouth once daily 4/24/20   Susana Hdz MD   insulin glargine (LANTUS SOLOSTAR) 100 UNIT/ML injection pen INJECT 45 UNITS SUBCUTANEOUSLY IN THE MORNING AND 15 UNITS SUBCUTANEOUSLY IN THE EVENING 1/27/20   Susana Hdz MD   EUTHYROX 50 MCG tablet TAKE 1 TABLET BY MOUTH ONCE DAILY 11/6/19   Susana Hdz MD   glimepiride (AMARYL) 4 MG tablet TAKE 1 TABLET BY MOUTH TWICE DAILY 11/6/19   Susana Hdz MD   citalopram (CELEXA) 20 MG tablet TAKE 2 TABLETS BY MOUTH IN THE MORNING AND 1 TABLET BY MOUTH IN THE EVENING 11/6/19   Susana Hdz MD   Handicap Placard MISC Is a permanent condition. DX: M19.90 5/14/19   Susana Hdz MD   SITagliptin (JANUVIA) 100 MG tablet TAKE 1 TABLET DAILY 9/13/18   Radha Colorado MD   omeprazole (PRILOSEC) 20 MG delayed release capsule Take 1 capsule by mouth Daily 3/8/18   Susana Hdz MD   Insulin Pen Needle (B-D UF III MINI PEN NEEDLES) 31G X 5 MM MISC USE 1 PEN NEEDLE DAILY 3/8/18   Susana Hdz MD   aspirin 81 MG EC tablet Take 1 tablet by mouth daily 3/8/18   Susana Hdz MD   Dextromethorphan-guaiFENesin (ROBITUSSIN COUGH+CHEST CESAR DM)  MG CAPS Take as directed  Patient not taking: Reported on 6/4/2020 2/8/18   MD Rita Katz Lancets Thin MISC Test before meals and at bedtime. DX: DM, on insulin 4/22/14   Andrew Daniels MD   Glucose Blood (BLOOD GLUCOSE TEST STRIPS) STRP Test before meals and at bedtime. DX: DM, on insulin 4/22/14   Andrew Daniels MD   MULTIPLE VITAMIN PO   Take 2 tablets by mouth daily DAILY    Historical Provider, MD   Alcohol Swabs (ALCOHOL PREP PADS) by Other route 2 times daily.       Historical Provider, MD       REVIEW OF SYSTEMS    (2-9 systems for level 4, 10 or more for level 5) Review of Systems   Constitutional: Negative for activity change, appetite change and fever. HENT: Negative for congestion and sore throat. Eyes: Negative for pain and visual disturbance. Respiratory: Negative for cough and shortness of breath. Cardiovascular: Positive for chest pain. Negative for leg swelling. Gastrointestinal: Positive for abdominal pain and nausea. Negative for diarrhea and vomiting. Endocrine: Negative for polyphagia and polyuria. Genitourinary: Negative for dysuria and frequency. Musculoskeletal: Negative for arthralgias and myalgias. Skin: Negative for rash and wound. Allergic/Immunologic: Negative for environmental allergies and food allergies. Neurological: Negative for syncope and weakness. Hematological: Negative for adenopathy. Does not bruise/bleed easily. Psychiatric/Behavioral: Negative for confusion. The patient is not nervous/anxious. PHYSICAL EXAM   (up to 7 for level 4, 8 or more for level 5)      INITIAL VITALS:   BP (!) 201/84   Pulse 94   Temp 98.8 °F (37.1 °C)   Resp 22   SpO2 97%     Physical Exam  Constitutional:       General: She is not in acute distress. Appearance: She is well-developed. Comments: Morbidly obese   HENT:      Head: Normocephalic and atraumatic. Eyes:      Conjunctiva/sclera: Conjunctivae normal.      Pupils: Pupils are equal, round, and reactive to light. Neck:      Musculoskeletal: Normal range of motion and neck supple. Cardiovascular:      Rate and Rhythm: Normal rate and regular rhythm. Heart sounds: Normal heart sounds. No murmur. No friction rub. No gallop. Pulmonary:      Effort: Pulmonary effort is normal. No respiratory distress. Breath sounds: Decreased breath sounds present. No wheezing, rhonchi or rales. Chest:      Chest wall: Tenderness present. Abdominal:      General: Bowel sounds are normal. There is no distension. Palpations: Abdomen is soft. Tenderness: There is no abdominal tenderness. There is no right CVA tenderness, left CVA tenderness, guarding or rebound. Musculoskeletal: Normal range of motion. General: No tenderness. Skin:     General: Skin is warm and dry. Findings: No rash. Neurological:      Mental Status: She is alert and oriented to person, place, and time. GCS: GCS eye subscore is 4. GCS verbal subscore is 5. GCS motor subscore is 6. Cranial Nerves: Cranial nerves are intact. Sensory: Sensation is intact. Motor: Motor function is intact. Comments: Neuro exam intact   Psychiatric:         Mood and Affect: Mood is depressed. Affect is tearful. Behavior: Behavior normal.         Thought Content: Thought content does not include homicidal or suicidal ideation. Thought content does not include homicidal or suicidal plan.          DIFFERENTIAL  DIAGNOSIS     PLAN (LABS / IMAGING / EKG):  Orders Placed This Encounter   Procedures    XR CHEST PORTABLE    CBC WITH AUTO DIFFERENTIAL    BASIC METABOLIC PANEL    Troponin    POCT Glucose    EKG 12 Lead    PATIENT STATUS (FROM ED OR OR/PROCEDURAL) Observation       MEDICATIONS ORDERED:  Orders Placed This Encounter   Medications    aspirin chewable tablet 324 mg    ketorolac (TORADOL) injection 30 mg    ondansetron (ZOFRAN) injection 4 mg    famotidine (PEPCID) injection 20 mg    aluminum & magnesium hydroxide-simethicone (MAALOX) 200-200-20 MG/5ML suspension 30 mL    lidocaine viscous hcl (XYLOCAINE) 2 % solution 15 mL    hydrALAZINE (APRESOLINE) injection 10 mg    fentaNYL (SUBLIMAZE) injection 50 mcg    metoclopramide (REGLAN) injection 10 mg    diphenhydrAMINE (BENADRYL) injection 25 mg    potassium & sodium phosphates (PHOS-NAK) 280-160-250 MG packet 250 mg       DIAGNOSTIC RESULTS / EMERGENCY DEPARTMENT COURSE / MDM     LABS:  Results for orders placed or performed during the hospital encounter of 07/18/20   CBC WITH AUTO chest pain TECHNOLOGIST PROVIDED HISTORY: chest pain Reason for Exam: portable upright/ chest pain Acuity: Acute Type of Exam: Initial FINDINGS: AP portable view of the chest time stamped at 635 hours demonstrates overlying cardiac monitoring electrodes. Heart is mildly enlarged. Pulmonary vasculature is within upper limits of normal.  No gross effusions, localized consolidation, effusion or pneumothorax is noted. Osseous and mediastinal structures are age-appropriate. No acute cardiopulmonary process. EKG  None    All EKG's are interpreted by the Emergency Department Physician who either signs or Co-signs this chart in the absence of a cardiologist.    EMERGENCY DEPARTMENT COURSE:  Patient seen and evaluated. She is laying in the bed comfortably, in no acute distress except for when she is talking about her recent history and home stressors. Nontoxic-appearing. On examination she is morbidly obese, she is a regular rate and rhythm lungs with decreased breath sounds but equal bilaterally. She is hypertensive. She does have some reproducible chest pain on the in the left inframammary fold. No abdominal pain at this time. She is neurovascularly intact. No other findings on examination. Cardiac work-up ordered. Patient given pain medication, antiemetics, aspirin, GI cocktail. Patient reported that the medications only made her pain worse. Additional pain medications and antiemetics were ordered. On review of the laboratory values, there are findings of hyperglycemia as well as a mildly elevated troponin from 19 and then to 21. No acute abnormalities noted on her EKG or chest x-ray. Discussed the results with the patient and treatment plan to have her admitted to the observation unit for continued monitoring and cardiology consultation for likely repeat stress test.  Patient voiced understanding and is in agreement with the plan.   We will also place consultation for social work to provide mental health resources as patient is reporting the increased stressors and difficulties with her depression and current depression medications. PROCEDURES:  None    CONSULTS:  IP CONSULT TO SOCIAL WORK  IP CONSULT TO CARDIOLOGY    CRITICAL CARE:  None    FINAL IMPRESSION      1. Chest pain, unspecified type    2. Chest wall pain    3. Anxiety state    4.  Reactive depression          DISPOSITION / PLAN     DISPOSITION Admitted 07/18/2020 10:03:37 AM      PATIENT REFERRED TO:  Dorothy Abel MD  [address] [address]  [phone]          [address] [place] [phone]            DISCHARGE MEDICATIONS:  New Prescriptions    No medications on file       Ania Archer MD  Emergency Medicine Resident    (Please note that portions of thisnote were completed with a voice recognition program.  Efforts were made to edit the dictations but occasionally words are mis-transcribed.)       Ania Archer MD  Resident  07/18/20 0572

## 2020-07-18 NOTE — ED PROVIDER NOTES
pulses bilaterally no focal calf tenderness no edema. Will check labs EKG chest x-ray, probable observation stay.     EKG interpretation sinus rhythm 84 normal intervals normal axis no acute ST or T changes normal EKG      Critical Care     none    Little Hernandez MD, Uli Loyola  Attending Emergency  Physician             Little Hernandez MD  07/18/20 [id number]

## 2020-07-18 NOTE — PROGRESS NOTES
[address] [facility]  Progress Note              Date:   7/18/2020  Patient name:  Karey Mohs  Date of admission:  7/18/2020  6:19 AM  MRN:   [id number]  YOB: 1955    CHIEF COMPLAINT:     Chief Complaint   Patient presents with    Chest Pain       History Obtained From:  Patient and chart review. HPI:     The patient is a 60 y.o.  female with a history of hypertension, morbid obesity, anxiety, GERD, osteoarthritis, type 2 diabetes mellitus on insulin, hypothyroidism, neuropathy, morbid obesity, dyslipidemia who presented with chest pain in the ED. Patient had presented with left chest pain with some radiation into the left arm. She denied any shortness of breath or cough. She also has a history of GERD with intermittent epigastric discomfort and takes Protonix at home. She notes that this pain is different than her acid reflux. She states the pain is a bandlike sensation underneath her left breast and moving towards the center of her chest.  Patient notes that she has had a lot of emotional stress over the past 1-1/2 to 2 weeks. She states that about 10 days ago she had some stress when her  Ashia Weaver lost her job and caused her to be a lot more stressed about her current living situation. Patient notes that currently she continues to have chest pain which is described as a sharp and as a strong pressure at times. Currently it is a 7 out of 10 although on initial presentation is a 9 out of 10. Most of her pain is on the left side midsternal chest. Patient had a prior stress test in 2019 which was negative. Patient denies a smoking history. She does have a history of diabetes that is not well controlled and on insulin as well as oral medications. Patient denies using any illicit substances or drugs. There is also a strong family history in both her mom and the father for cardiovascular disease.  She denies any history of stroke or TIA or prior MI's or CHF. In the ER, initial troponins were elevated, EKG indicated Infarct age undetermined, CXR no acute cardiopulmonary process, stress test was ordered and she was placed in observation which was high risk and now she is admitted to the hospital for chest pain. PAST MEDICAL HISTORY:        has a past medical history of Anxiety, Borderline hypertension, GERD (gastroesophageal reflux disease), Hypothyroidism, Neuropathy, Obesity, Osteoarthritis, Sleep apnea, and Type II or unspecified type diabetes mellitus without mention of complication, not stated as uncontrolled. PAST SURGICAL HISTORY:      has a past surgical history that includes Hysterectomy; Colonoscopy; Upper gastrointestinal endoscopy; Dilation and curettage of uterus; hiatal hernia repair; Throat surgery; Appendectomy; Total knee arthroplasty (Right, 01/06/2015); and Total knee arthroplasty (Left, 5/26/15). FAMILY HISTORY:     family history includes Arthritis in her father and mother; Cancer in her father; Diabetes in her sister; Heart Disease in her father and mother. HOME MEDICATIONS:     Prior to Admission medications    Medication Sig Start Date End Date Taking?  Authorizing Provider   gabapentin (NEURONTIN) 300 MG capsule TAKE 1 CAPSULE BY MOUTH THREE TIMES DAILY 4/24/20 7/24/20  Navdeep Alberto MD   lisinopril (PRINIVIL;ZESTRIL) 10 MG tablet Take 1 tablet by mouth once daily 4/24/20   Navdeep Alberto MD   insulin glargine (LANTUS SOLOSTAR) 100 UNIT/ML injection pen INJECT 45 UNITS SUBCUTANEOUSLY IN THE MORNING AND 15 UNITS SUBCUTANEOUSLY IN THE EVENING 1/27/20   Navdeep Alberto MD   EUTHYROX 50 MCG tablet TAKE 1 TABLET BY MOUTH ONCE DAILY 11/6/19   Navdeep Alberto MD   glimepiride (AMARYL) 4 MG tablet TAKE 1 TABLET BY MOUTH TWICE DAILY 11/6/19   Navdeep Alberto MD   citalopram (CELEXA) 20 MG tablet TAKE 2 TABLETS BY MOUTH IN THE MORNING AND 1 TABLET BY MOUTH IN THE EVENING 11/6/19   Malik Leavitt ALLERGIC/IMMUNOLOGIC: negative for urticaria   ENDOCRINE: no polydipsia, no polyuria, no hot or cold intolerance  MUSCULOSKELETAL: no joint pains, no muscle aches, no swelling of joints or extremities  NEUROLOGICAL: no numbness, positive for peripheral neuropathy. BEHAVIOR/PSYCH: positive for depression and anxiety    PHYSICAL EXAM:     Vitals:    07/18/20 0805 07/18/20 0806 07/18/20 1611 07/18/20 1700   BP:   (!) 190/84 (!) 182/80   Pulse:   71 69   Resp:   18 10   Temp: 98.8 °F (37.1 °C)  97.3 °F (36.3 °C)    TempSrc:   Oral    SpO2:  97% 99%        No intake or output data in the 24 hours ending 07/18/20 1724    General Appearance  Alert , awake , oriented x 3, not in acute distress  HEENT - Head is normocephalic, atraumatic. Eye - no icterus no redness, EOMI  Ear- NO ear pain, normal external ear , no discharge  Lungs - Bilateral equal air entry , no wheezes, rales or rhonchi, aeration good  Cardiovascular - Heart sounds are normal.  Regular rhythm, normal rate without murmur, gallop or rub. Abdomen - Soft, nontender, nondistended, no masses or organomegaly  Neurologic - There are no new focal motor or sensory deficits, muscle strength 5/5 in all extremities, normal muscle tone and bulk, no abnormal sensation. Skin - No bruising or bleeding on exposed skin area  Extremities - No cyanosis, clubbing or edema. Patient has tenderness with palpation of the lower extremity.   Psych - normal affect     DIAGNOSTICS:      Laboratory Testing:    Recent Results (from the past 24 hour(s))   EKG 12 Lead    Collection Time: 07/18/20  6:24 AM   Result Value Ref Range    Ventricular Rate 84 BPM    Atrial Rate 84 BPM    P-R Interval 176 ms    QRS Duration 74 ms    Q-T Interval 382 ms    QTc Calculation (Bazett) 451 ms    P Axis 34 degrees    R Axis 1 degrees    T Axis 43 degrees   CBC WITH AUTO DIFFERENTIAL    Collection Time: 07/18/20  6:42 AM   Result Value Ref Range    WBC 6.5 3.5 - 11.3 k/uL    RBC 5.05 3.95 - 5.11 m/uL Hemoglobin 15.1 11.9 - 15.1 g/dL    Hematocrit 45.4 36.3 - 47.1 %    MCV 89.9 82.6 - 102.9 fL    MCH 29.9 25.2 - 33.5 pg    MCHC 33.3 28.4 - 34.8 g/dL    RDW 12.5 11.8 - 14.4 %    Platelets 721 668 - 656 k/uL    MPV 11.4 8.1 - 13.5 fL    NRBC Automated 0.0 0.0 per 100 WBC    Differential Type NOT REPORTED     Seg Neutrophils 54 36 - 65 %    Lymphocytes 29 24 - 43 %    Monocytes 12 3 - 12 %    Eosinophils % 4 1 - 4 %    Basophils 0 0 - 2 %    Immature Granulocytes 1 (H) 0 %    Segs Absolute 3.55 1.50 - 8.10 k/uL    Absolute Lymph # 1.89 1.10 - 3.70 k/uL    Absolute Mono # 0.76 0.10 - 1.20 k/uL    Absolute Eos # 0.23 0.00 - 0.44 k/uL    Basophils Absolute <0.03 0.00 - 0.20 k/uL    Absolute Immature Granulocyte 0.03 0.00 - 0.30 k/uL    WBC Morphology NOT REPORTED     RBC Morphology NOT REPORTED     Platelet Estimate NOT REPORTED    BASIC METABOLIC PANEL    Collection Time: 07/18/20  6:42 AM   Result Value Ref Range    Glucose 267 (H) 70 - 99 mg/dL    BUN 7 (L) 8 - 23 mg/dL    CREATININE 0.60 0.50 - 0.90 mg/dL    Bun/Cre Ratio NOT REPORTED 9 - 20    Calcium 9.2 8.6 - 10.4 mg/dL    Sodium 135 135 - 144 mmol/L    Potassium 3.5 (L) 3.7 - 5.3 mmol/L    Chloride 100 98 - 107 mmol/L    CO2 24 20 - 31 mmol/L    Anion Gap 11 9 - 17 mmol/L    GFR Non-African American >60 >60 mL/min    GFR African American >60 >60 mL/min    GFR Comment          GFR Staging NOT REPORTED    Troponin    Collection Time: 07/18/20  6:42 AM   Result Value Ref Range    Troponin, High Sensitivity 19 (H) 0 - 14 ng/L    Troponin T NOT REPORTED <0.03 ng/mL    Troponin Interp NOT REPORTED    Troponin    Collection Time: 07/18/20  9:23 AM   Result Value Ref Range    Troponin, High Sensitivity 21 (H) 0 - 14 ng/L    Troponin T NOT REPORTED <0.03 ng/mL    Troponin Interp NOT REPORTED    POC Glucose Fingerstick    Collection Time: 07/18/20  4:54 PM   Result Value Ref Range    POC Glucose 246 (H) 65 - 105 mg/dL         Imaging/Diagonstics:  Xr Chest Portable    Result Date: 7/18/2020  EXAMINATION: ONE XRAY VIEW OF THE CHEST 7/18/2020 6:42 am COMPARISON: 10 January 2019 HISTORY: ORDERING SYSTEM PROVIDED HISTORY: chest pain TECHNOLOGIST PROVIDED HISTORY: chest pain Reason for Exam: portable upright/ chest pain Acuity: Acute Type of Exam: Initial FINDINGS: AP portable view of the chest time stamped at 635 hours demonstrates overlying cardiac monitoring electrodes. Heart is mildly enlarged. Pulmonary vasculature is within upper limits of normal.  No gross effusions, localized consolidation, effusion or pneumothorax is noted. Osseous and mediastinal structures are age-appropriate. No acute cardiopulmonary process. Nm Cardiac Stress Test Nuclear Imaging    Result Date: 7/18/2020  EXAMINATION: MYOCARDIAL PERFUSION IMAGING 7/18/2020 11:55 am TECHNIQUE: For the rest study, 13.5 mCi of Tc 99 labeled sestamibi were injected. SPECT images were acquired. Under cardiology supervision, 0.4mg Myke Ayala was infused. After pharmacologic stress, 48 mCi of Tc 99 labeled sestamibi were injected. SPECT images with ECG gating were acquired. COMPARISON: 28 September 2012 HISTORY: ORDERING SYSTEM PROVIDED HISTORY: Angina TECHNOLOGIST PROVIDED HISTORY: Reason for Exam: Angina Procedure Type->Rx angina Reason for Exam: chest pain/pressure, short of breath, nausea, DM, FINDINGS: Iimages interpreted utilizing [employer] system and [employer]. Mild to moderate reversible perfusion defects noted in the inferior apical, lateral a pickle, and a pickle septal locations, 13% of myocardium. Perfusion scores are visually adjusted to account for artifact. Summed stress score:  9 Summed rest score:  0 Summed reversibility score:  9 Function: End diastolic volume:  58HJ Left ventricular ejection fraction:  61% Wall motion abnormalities:  None.  TID score:  0.91 (scores greater than 1.39 are considered elevated for Lexiscan stress with Tc99m)     Perfusion:  Reversible defects involving 13% of the myocardium, as above Function:  Normal left ventricular wall motion. Normal left ventricular ejection fraction of 61% Risk stratification: High; stress-induced reversible defects involving 13% of the myocardium. Notes concerning risk stratification: Risk stratification incorporates both clinical history and some testing results. Final risk determination is the responsibility of the ordering provider as other patient information and test results may increase or decrease the risk assessment reported for this examination. Risk stratification criteria are adapted from \"Noninvasive Risk Stratification\" criteria from [employer] [place]. Al, ACC/AATS/AHA/ASE/ASNC/SCAI/SCCT/STS 2017 Appropriate Use Criteria For Coronary Revascularization in Patients With Stable Ischemic Heart Disease [facility] Volume 69, Issue 17, May 2017 High risk (>3% annual death or MI) 1. Severe resting LV dysfunction (LVEF <35%) not readily explained by non coronary causes 2. Resting perfusion abnormalities greater than 10% of the myocardium in patients without prior history or evidence of MI3. Stress-induced perfusion abnormalities encumbering greater than or equal to 10% myocardium or stress segmental scores indicating multiple vascular territories with abnormalities 4. Stress-induced LV dilatation (TID ratio greater than 1.19 for exercise and greater than 1.39 for regadenoson) Intermediate risk (1% to 3% annual death or MI) 1. Mild/moderate resting LV dysfunction (LVEF 35% to 49%) not readily explained by non coronary causes. 2. Resting perfusion abnormalities in 5%-9.9% of the myocardium in patients without a history or prior evidence of MI 3. Stress-induced perfusion abnormality encumbering 5%-9.9% of the myocardium or stress segmental scores indicating 1 vascular territory with abnormalities but without LV dilation 4. Small wall motion abnormality involving 1-2 segments and only 1 coronary bed.  Low Risk (Less than 1% annual death or MI) 1. Normal or small myocardial perfusion defect at rest or with stress encumbering less than 5% of the myocardium. Critical results were called by Dr. Vinnie Sheikh MD to [address]  on 7/18/2020 at 15:44. ASSESSMENT:       Principal Problem:    Chest pain  Active Problems: Morbid obesity (HCC)    Anxiety    GERD (gastroesophageal reflux disease)    OA (osteoarthritis)    DM (diabetes mellitus)    Hypothyroid    Neuropathy    Right knee DJD    Hypothyroidism    S/P left total knee arthroplasty    Obesity, morbid, BMI 50 or higher ([facility] [address]  Resolved Problems:    * No resolved hospital problems.  *      PLAN:     Chest Pain, likely due to ACS/unstable angina, HTN  Cardiology consulted, contacted via PS this afternoon  S/P NM Stress - High Risk - 7/18/2020  Likely plan for Cardiac Cath on Monday  Sign-out notes indicate a Nitro drip was started  Resumed lisinopril, ASA  May consider adding BB  Consider obtaining an ECHO  Telemetry  Tylenol PRN for pain control, Ibuprofen PRN  Monitor CBC, CMP    T2DM, not well controlled, on insulin, Morbid obesity, Neuropathy  Last HBA1c 10.7 on 7/14/2020  Continue home Lantus, ISS, POCT Glucose  Carb Control diet  Hold PO home meds - Nesina and Amaryl  Resume Home Gabapentin 300mg TID  Dietitian consult and Diabetic Education    Hypothyroidism  Last TSH 2.74 7/14/2020  Continue with synthroid 50 mcg    Dyslipidemia  -start a high dose statin - atorvastatin 40mg PO daily  The 10-year ASCVD risk score (Roman Elliott., et al., 2013) is: 23.9%    Values used to calculate the score:      Age: 60 years      Sex: Female      Is Non- : No      Diabetic: Yes      Tobacco smoker: No      Systolic Blood Pressure: 904 mmHg      Is BP treated: Yes      HDL Cholesterol: 58 mg/dL      Total Cholesterol: 208 mg/dL    Depression, Anxiety  -continue w/ Celexa, Atarax    Dispo  PT/OT/SW    DVT prophylaxis: Lovenox 40 mg SC  GI prophylaxis/GERD: Protonix 40 mg daily  Patient status: Admit the patient as Inpatient in the Step Down Unit    Consultations:   Consults: IP CONSULT TO CARDIOLOGY  IP CONSULT TO SOCIAL WORK  IP CONSULT TO CARDIOLOGY  IP CONSULT TO DIETITIAN  IP CONSULT TO DIABETES EDUCATOR  PT/OT     The severity of this patient's signs and symptoms (specify Chest pain) indicate the need for an inpatient admission.       Above plan discussed with the patient, who agreed to the above plan     Plan will be discussed with the attending, Gregorio Cheung MD  Family Medicine Resident  7/18/2020 5:24 PM

## 2020-07-18 NOTE — CARE COORDINATION
Case Management Initial Discharge Plan  [address],             Met with:patient to discuss discharge plans. Information verified: address, contacts, phone number, , insurance Yes    Emergency Contact/Next of Kin name & number: Estelita Bower () [phone]    PCP: Roberto Rodriguez MD  Date of last visit:     Insurance Provider: [address]    Discharge Planning    Living Arrangements:  Spouse/Significant Other   Support Systems:  Spouse/Significant Other    Home has 3 stories   stairs to climb to get into front door, stairs to climb to reach second floor  Location of bedroom/bathroom in home second    Patient able to perform ADL's:Independent    Current Services (outpatient & in home) none  DME equipment: cane, walker, cpap  DME provider:     Receiving oral anticoagulation therapy? If indicated:   Physician managing anticoagulation treatment:   Where does patient obtain lab work for ATC treatment? Potential Assistance Needed:  N/A    Patient agreeable to home care: No  [place] of choice provided:  no    Prior SNF/Rehab Placement and Facility: no  Agreeable to SNF/Rehab: No  [place] of choice provided: no     Evaluation: no    Expected Discharge date:       Patient expects to be discharged to:  home  Follow Up Appointment: Best Day/ Time: Monday AM    Transportation provider:   Transportation arrangements needed for discharge: No    Readmission Risk              Risk of Unplanned Readmission:        8             Does patient have a readmission risk score greater than 14?: No  If yes, follow-up appointment must be made within 7 days of discharge.      Goals of Care: return to prior level of function      Discharge Plan: home with           Electronically signed by Antolin Garibay RN on 20 at 5:49 PM EDT

## 2020-07-18 NOTE — H&P
[facility]  CDU / OBSERVATION ENCOUNTER  ATTENDING NOTE     Pt Name: Hellen Bermudez  MRN: [id number]  [place] 1955  Date of evaluation: 7/18/20  Patient's PCP is :  Emanuel Rice MD    CHIEF COMPLAINT       Chief Complaint   Patient presents with    Chest Pain         HISTORY OF PRESENT ILLNESS    Hellen Bermudez is a 60 y.o. female who presents with complaints chest pain. Patient describes pain is somewhat anginal.  Patient has pain that has developed into her left arm. Patient has history of anxiety and has had prior stress testing which is been negative. Patient does not have reproducible symptoms. She has no shortness of breath or cough. She does have intermittent epigastric discomfort. She has a history of reflux but this is different. She describes the pain is a bandlike sensation. Admitted from ER for further evaluation. Location/Symptom: Substernal chest pain  Timing/Onset: Just prior to presentation  Provocation: Emotional discomfort and stress  Quality: Crushing type pain  Radiation: Left arm  Severity: Moderate to severe when present now improved.   Timing/Duration: Over the course of the day  Modifying Factors: None, unclear    REVIEW OF SYSTEMS        General ROS - No fevers, No malaise   Ophthalmic ROS - No discharge, No changes in vision  ENT ROS -  No sore throat, No rhinorrhea,   Respiratory ROS - no shortness of breath, no cough, no  wheezing  Cardiovascular ROS -chest pain and substernal chest heaviness  Gastrointestinal ROS - No abdominal pain,  nausea present with symptoms, no vomiting, no change in bowel habits, no black or bloody stools  Genito-Urinary ROS - No dysuria, trouble voiding, or hematuria  Musculoskeletal ROS - No myalgias, No arthalgias  Neurological ROS - No headache, no dizziness/lightheadedness, No focal weakness, no loss of sensation  Dermatological ROS - No lesions, No rash     (PQRS) Advance directives on face sheet per hospital policy. No change unless specifically mentioned in chart    PAST MEDICAL HISTORY    has a past medical history of Anxiety, Borderline hypertension, GERD (gastroesophageal reflux disease), Hypothyroidism, Neuropathy, Obesity, Osteoarthritis, Sleep apnea, and Type II or unspecified type diabetes mellitus without mention of complication, not stated as uncontrolled. I have reviewed the past medical history with the patient and it is  pertinent to this complaint. SURGICAL HISTORY      has a past surgical history that includes Hysterectomy; Colonoscopy; Upper gastrointestinal endoscopy; Dilation and curettage of uterus; hiatal hernia repair; Throat surgery; Appendectomy; Total knee arthroplasty (Right, 2015); and Total knee arthroplasty (Left, 5/26/15). I have reviewed and agree with Surgical History entered and it is  pertinent to this complaint. CURRENT MEDICATIONS     potassium & sodium phosphates (PHOS-NAK) 280-160-250 MG packet 250 mg, Once        All medication charted and reviewed. ALLERGIES     is allergic to dha-epa-vitamin e and morphine. FAMILY HISTORY     She indicated that her mother is . She indicated that her father is . She indicated that her sister is alive. family history includes Arthritis in her father and mother; Cancer in her father; Diabetes in her sister; Heart Disease in her father and mother. The patient denies any pertinent family history. I have reviewed and agree with the family history entered. I have reviewed the Family History and it is not significant to the case    SOCIAL HISTORY      reports that she has never smoked. She has never used smokeless tobacco. She reports current alcohol use. She reports that she does not use drugs. I have reviewed and agree with all Social.  There are no  concerns for substance abuse/use. PHYSICAL EXAM     INITIAL VITALS:  temperature is 98.8 °F (37.1 °C).  Her blood pressure is 201/84 (abnormal) and her pulse is 94. Her respiration is 22 and oxygen saturation is 97%. CONSTITUTIONAL: AOx4, no apparent distress, appears stated age     HEAD: normocephalic, atraumatic   EYES: PERRLA, EOMI    ENT: moist mucous membranes, uvula midline   NECK: supple, symmetric   BACK: symmetric   LUNGS: clear to auscultation bilaterally   CARDIOVASCULAR: regular rate and rhythm, no murmurs, rubs or gallops   ABDOMEN: soft, non-tender, non-distended with normal active bowel sounds   NEUROLOGIC:  MAEx4, no focal sensory or motor deficits   MUSCULOSKELETAL: no clubbing, cyanosis or edema   SKIN: no rash or wounds       DIFFERENTIAL DIAGNOSIS/MDM:     History of anginal type discomfort. Patient with history of prior cardiac work-up which is been negative. Patient with history of anxiety. Patient has symptoms consistent with angina. Will do stress testing. Disposition based on testing results. DIAGNOSTIC RESULTS     EKG: All EKG's are interpreted by the Observation Physician who either signs or Co-signs this chart in the absence of a cardiologist.    EKG Interpretation    Interpreted by observation physician    Rhythm: normal sinus   Rate: normal  Axis: normal  Ectopy: none  Conduction: normal  ST Segments: no acute change  T Waves: no acute change  Q Waves: nonspecific    Clinical Impression: no acute changes    Miguel Enciso MD    Impaired to previous EKG. Effectively unchanged    RADIOLOGY:   I directly visualized the following  images and reviewed the radiologist interpretations:    Xr Chest Portable    Result Date: 7/18/2020  EXAMINATION: ONE XRAY VIEW OF THE CHEST 7/18/2020 6:42 am COMPARISON: 10 January 2019 HISTORY: ORDERING SYSTEM PROVIDED HISTORY: chest pain TECHNOLOGIST PROVIDED HISTORY: chest pain Reason for Exam: portable upright/ chest pain Acuity: Acute Type of Exam: Initial FINDINGS: AP portable view of the chest time stamped at 635 hours demonstrates overlying cardiac monitoring electrodes.   Heart is mildly enlarged. Pulmonary vasculature is within upper limits of normal.  No gross effusions, localized consolidation, effusion or pneumothorax is noted. Osseous and mediastinal structures are age-appropriate. No acute cardiopulmonary process. LABS:  I have reviewed and interpreted all available lab results. Labs Reviewed   CBC WITH AUTO DIFFERENTIAL - Abnormal; Notable for the following components:       Result Value    Immature Granulocytes 1 (*)     All other components within normal limits   BASIC METABOLIC PANEL - Abnormal; Notable for the following components:    Glucose 267 (*)     BUN 7 (*)     Potassium 3.5 (*)     All other components within normal limits   TROPONIN - Abnormal; Notable for the following components:    Troponin, High Sensitivity 19 (*)     All other components within normal limits   TROPONIN - Abnormal; Notable for the following components:    Troponin, High Sensitivity 21 (*)     All other components within normal limits   POCT GLUCOSE         CDU IMPRESSION / PLAN      Sejal John is a 60 y.o. female who presents with       · Acute substernal chest pain. Symptoms consistent with anginal discomfort. Patient for stress testing today. · History of anxiety. Possible acute exacerbation now. Patient with significant social stressors. · Discussed with [facility] service. As patient is here for observation care and we can possibly discharge her today we will maintain our services primary for the moment.   If patient requires admission with positive stress testing will involve [facility] to do admission discussion with the attending  · Continue home medications and pain control  · Monitor vitals, labs, and imaging  · DISPO: pending consults and clinical improvement    CONSULTS:    IP CONSULT TO SOCIAL WORK  IP CONSULT TO CARDIOLOGY    PROCEDURES:  Not indicated        PATIENT REFERRED TO:    Merary Akins MD  [id number] [address] Karen Simpson [address] Morales [address] [facility] 909 [phone]          OBS/CDU   RESIDENT NOTE FOR INPATIENT STATUS      INPATIENT       The Patient Now Meets Inpatient Criteria as of July 18, 2020, 6:19 AM.    For the Following reasons:    [x]   Severity of Signs/ Symptoms indicate admission need   [x]   Medical Condition Would be Threatened in lower level of care   [x]   Diagnostic studies procedures results justify inpatient care   [x]   Adverse event likely if not inpatient admission   [x]   Pre-existing conditions warrants inpatient care     The patient requires inpatient care for further evaluation of their Chest pain [R07.9]  Chest pain [R07.9]  Chest pain [R07.9]       --  Lisa Sanchez MD   Emergency Medicine Attending    This dictation was generated by voice recognition computer software. Although all attempts are made to edit the dictation for accuracy, there may be errors in the transcription that are not intended.

## 2020-07-18 NOTE — ED TRIAGE NOTES
Pt comes to ED as a walk in w/ c/o CP that started about a week and a half ago that radiates down the left arm, per pt she has had a few episodes like this over the years and has had extensive cardiac testing that has all been negative. Pt has stated multiple times that she does not think this is a heart attack, she is just very stressed out. Pt. Tearful. Patient changed into gown, PIV placed, labs collected and labeled at bedside and an ekg was obtained. Pt has been placed on continuous telemetry monitoring.

## 2020-07-19 LAB
ABSOLUTE EOS #: 0.21 K/UL (ref 0–0.44)
ABSOLUTE IMMATURE GRANULOCYTE: <0.03 K/UL (ref 0–0.3)
ABSOLUTE LYMPH #: 1.72 K/UL (ref 1.1–3.7)
ABSOLUTE MONO #: 0.66 K/UL (ref 0.1–1.2)
ALBUMIN SERPL-MCNC: 3.2 G/DL (ref 3.5–5.2)
ALBUMIN/GLOBULIN RATIO: 1.3 (ref 1–2.5)
ALP BLD-CCNC: 109 U/L (ref 35–104)
ALT SERPL-CCNC: 22 U/L (ref 5–33)
ANION GAP SERPL CALCULATED.3IONS-SCNC: 10 MMOL/L (ref 9–17)
AST SERPL-CCNC: 24 U/L
BASOPHILS # BLD: 1 % (ref 0–2)
BASOPHILS ABSOLUTE: 0.03 K/UL (ref 0–0.2)
BILIRUB SERPL-MCNC: 0.4 MG/DL (ref 0.3–1.2)
BUN BLDV-MCNC: 10 MG/DL (ref 8–23)
BUN/CREAT BLD: ABNORMAL (ref 9–20)
CALCIUM SERPL-MCNC: 8.7 MG/DL (ref 8.6–10.4)
CHLORIDE BLD-SCNC: 103 MMOL/L (ref 98–107)
CO2: 25 MMOL/L (ref 20–31)
CREAT SERPL-MCNC: 0.66 MG/DL (ref 0.5–0.9)
DIFFERENTIAL TYPE: NORMAL
EOSINOPHILS RELATIVE PERCENT: 4 % (ref 1–4)
GFR AFRICAN AMERICAN: >60 ML/MIN
GFR NON-AFRICAN AMERICAN: >60 ML/MIN
GFR SERPL CREATININE-BSD FRML MDRD: ABNORMAL ML/MIN/{1.73_M2}
GFR SERPL CREATININE-BSD FRML MDRD: ABNORMAL ML/MIN/{1.73_M2}
GLUCOSE BLD-MCNC: 206 MG/DL (ref 65–105)
GLUCOSE BLD-MCNC: 213 MG/DL (ref 65–105)
GLUCOSE BLD-MCNC: 254 MG/DL (ref 70–99)
GLUCOSE BLD-MCNC: 265 MG/DL (ref 65–105)
GLUCOSE BLD-MCNC: 373 MG/DL (ref 65–105)
HCT VFR BLD CALC: 40.8 % (ref 36.3–47.1)
HEMOGLOBIN: 13.3 G/DL (ref 11.9–15.1)
IMMATURE GRANULOCYTES: 0 %
INR BLD: 1
LYMPHOCYTES # BLD: 28 % (ref 24–43)
MCH RBC QN AUTO: 29.3 PG (ref 25.2–33.5)
MCHC RBC AUTO-ENTMCNC: 32.6 G/DL (ref 28.4–34.8)
MCV RBC AUTO: 89.9 FL (ref 82.6–102.9)
MONOCYTES # BLD: 11 % (ref 3–12)
NRBC AUTOMATED: 0 PER 100 WBC
PARTIAL THROMBOPLASTIN TIME: 25.8 SEC (ref 20.5–30.5)
PDW BLD-RTO: 12.4 % (ref 11.8–14.4)
PLATELET # BLD: NORMAL K/UL (ref 138–453)
PLATELET ESTIMATE: NORMAL
PLATELET, FLUORESCENCE: 134 K/UL (ref 138–453)
PLATELET, IMMATURE FRACTION: 3.6 % (ref 1.1–10.3)
PMV BLD AUTO: NORMAL FL (ref 8.1–13.5)
POTASSIUM SERPL-SCNC: 4.1 MMOL/L (ref 3.7–5.3)
PROTHROMBIN TIME: 10.9 SEC (ref 9–12)
RBC # BLD: 4.54 M/UL (ref 3.95–5.11)
RBC # BLD: NORMAL 10*6/UL
SARS-COV-2, PCR: NORMAL
SARS-COV-2, RAPID: NORMAL
SARS-COV-2: NOT DETECTED
SEG NEUTROPHILS: 56 % (ref 36–65)
SEGMENTED NEUTROPHILS ABSOLUTE COUNT: 3.42 K/UL (ref 1.5–8.1)
SODIUM BLD-SCNC: 138 MMOL/L (ref 135–144)
SOURCE: NORMAL
TOTAL PROTEIN: 5.7 G/DL (ref 6.4–8.3)
TROPONIN INTERP: ABNORMAL
TROPONIN INTERP: ABNORMAL
TROPONIN T: ABNORMAL NG/ML
TROPONIN T: ABNORMAL NG/ML
TROPONIN, HIGH SENSITIVITY: 20 NG/L (ref 0–14)
TROPONIN, HIGH SENSITIVITY: 21 NG/L (ref 0–14)
WBC # BLD: 6.1 K/UL (ref 3.5–11.3)
WBC # BLD: NORMAL 10*3/UL

## 2020-07-19 PROCEDURE — 6360000002 HC RX W HCPCS: Performed by: INTERNAL MEDICINE

## 2020-07-19 PROCEDURE — 6360000002 HC RX W HCPCS: Performed by: STUDENT IN AN ORGANIZED HEALTH CARE EDUCATION/TRAINING PROGRAM

## 2020-07-19 PROCEDURE — 6370000000 HC RX 637 (ALT 250 FOR IP): Performed by: STUDENT IN AN ORGANIZED HEALTH CARE EDUCATION/TRAINING PROGRAM

## 2020-07-19 PROCEDURE — 6370000000 HC RX 637 (ALT 250 FOR IP): Performed by: EMERGENCY MEDICINE

## 2020-07-19 PROCEDURE — 82947 ASSAY GLUCOSE BLOOD QUANT: CPT

## 2020-07-19 PROCEDURE — 84484 ASSAY OF TROPONIN QUANT: CPT

## 2020-07-19 PROCEDURE — U0003 INFECTIOUS AGENT DETECTION BY NUCLEIC ACID (DNA OR RNA); SEVERE ACUTE RESPIRATORY SYNDROME CORONAVIRUS 2 (SARS-COV-2) (CORONAVIRUS DISEASE [COVID-19]), AMPLIFIED PROBE TECHNIQUE, MAKING USE OF HIGH THROUGHPUT TECHNOLOGIES AS DESCRIBED BY CMS-2020-01-R: HCPCS

## 2020-07-19 PROCEDURE — 85055 RETICULATED PLATELET ASSAY: CPT

## 2020-07-19 PROCEDURE — 36415 COLL VENOUS BLD VENIPUNCTURE: CPT

## 2020-07-19 PROCEDURE — 6360000002 HC RX W HCPCS: Performed by: EMERGENCY MEDICINE

## 2020-07-19 PROCEDURE — 85610 PROTHROMBIN TIME: CPT

## 2020-07-19 PROCEDURE — 96375 TX/PRO/DX INJ NEW DRUG ADDON: CPT

## 2020-07-19 PROCEDURE — 2060000000 HC ICU INTERMEDIATE R&B

## 2020-07-19 PROCEDURE — 99232 SBSQ HOSP IP/OBS MODERATE 35: CPT | Performed by: FAMILY MEDICINE

## 2020-07-19 PROCEDURE — 2580000003 HC RX 258: Performed by: EMERGENCY MEDICINE

## 2020-07-19 PROCEDURE — 85730 THROMBOPLASTIN TIME PARTIAL: CPT

## 2020-07-19 PROCEDURE — 80053 COMPREHEN METABOLIC PANEL: CPT

## 2020-07-19 PROCEDURE — 85025 COMPLETE CBC W/AUTO DIFF WBC: CPT

## 2020-07-19 PROCEDURE — 93005 ELECTROCARDIOGRAM TRACING: CPT | Performed by: EMERGENCY MEDICINE

## 2020-07-19 RX ORDER — METHYLPREDNISOLONE SODIUM SUCCINATE 125 MG/2ML
125 INJECTION, POWDER, LYOPHILIZED, FOR SOLUTION INTRAMUSCULAR; INTRAVENOUS ONCE
Status: COMPLETED | OUTPATIENT
Start: 2020-07-20 | End: 2020-07-20

## 2020-07-19 RX ORDER — DIPHENHYDRAMINE HYDROCHLORIDE 50 MG/ML
50 INJECTION INTRAMUSCULAR; INTRAVENOUS ONCE
Status: COMPLETED | OUTPATIENT
Start: 2020-07-20 | End: 2020-07-19

## 2020-07-19 RX ORDER — NITROGLYCERIN 0.4 MG/1
0.4 TABLET SUBLINGUAL EVERY 5 MIN PRN
Status: DISCONTINUED | OUTPATIENT
Start: 2020-07-19 | End: 2020-07-21 | Stop reason: HOSPADM

## 2020-07-19 RX ORDER — DOCUSATE SODIUM 100 MG/1
100 CAPSULE, LIQUID FILLED ORAL DAILY
Status: DISCONTINUED | OUTPATIENT
Start: 2020-07-19 | End: 2020-07-21 | Stop reason: HOSPADM

## 2020-07-19 RX ORDER — DIAPER,BRIEF,INFANT-TODD,DISP
EACH MISCELLANEOUS 2 TIMES DAILY
Status: DISCONTINUED | OUTPATIENT
Start: 2020-07-19 | End: 2020-07-20

## 2020-07-19 RX ORDER — HYDRALAZINE HYDROCHLORIDE 20 MG/ML
10 INJECTION INTRAMUSCULAR; INTRAVENOUS EVERY 6 HOURS PRN
Status: DISCONTINUED | OUTPATIENT
Start: 2020-07-19 | End: 2020-07-21 | Stop reason: HOSPADM

## 2020-07-19 RX ADMIN — THERA TABS 1 TABLET: TAB at 08:33

## 2020-07-19 RX ADMIN — CARVEDILOL 6.25 MG: 6.25 TABLET, FILM COATED ORAL at 17:04

## 2020-07-19 RX ADMIN — HYDROCORTISONE: 10 CREAM TOPICAL at 20:20

## 2020-07-19 RX ADMIN — MYCOPHENOLATE MOFETIL 300 MG: 500 TABLET ORAL at 08:34

## 2020-07-19 RX ADMIN — CARVEDILOL 6.25 MG: 6.25 TABLET, FILM COATED ORAL at 08:34

## 2020-07-19 RX ADMIN — SODIUM CHLORIDE, PRESERVATIVE FREE 10 ML: 5 INJECTION INTRAVENOUS at 21:00

## 2020-07-19 RX ADMIN — INSULIN LISPRO 4 UNITS: 100 INJECTION, SOLUTION INTRAVENOUS; SUBCUTANEOUS at 08:35

## 2020-07-19 RX ADMIN — DIPHENHYDRAMINE HYDROCHLORIDE 50 MG: 50 INJECTION, SOLUTION INTRAMUSCULAR; INTRAVENOUS at 22:55

## 2020-07-19 RX ADMIN — INSULIN GLARGINE 15 UNITS: 100 INJECTION, SOLUTION SUBCUTANEOUS at 20:20

## 2020-07-19 RX ADMIN — DESMOPRESSIN ACETATE 40 MG: 0.2 TABLET ORAL at 20:19

## 2020-07-19 RX ADMIN — HYDRALAZINE HYDROCHLORIDE 10 MG: 20 INJECTION INTRAMUSCULAR; INTRAVENOUS at 22:55

## 2020-07-19 RX ADMIN — ACETAMINOPHEN 1000 MG: 500 TABLET ORAL at 13:34

## 2020-07-19 RX ADMIN — HYDROCORTISONE: 10 CREAM TOPICAL at 13:34

## 2020-07-19 RX ADMIN — DIPHENHYDRAMINE HYDROCHLORIDE 25 MG: 50 INJECTION, SOLUTION INTRAMUSCULAR; INTRAVENOUS at 03:42

## 2020-07-19 RX ADMIN — NITROGLYCERIN 0.4 MG: 0.4 TABLET SUBLINGUAL at 22:55

## 2020-07-19 RX ADMIN — PANTOPRAZOLE SODIUM 40 MG: 40 TABLET, DELAYED RELEASE ORAL at 05:47

## 2020-07-19 RX ADMIN — DOCUSATE SODIUM 100 MG: 100 CAPSULE, LIQUID FILLED ORAL at 13:29

## 2020-07-19 RX ADMIN — CITALOPRAM 40 MG: 20 TABLET, FILM COATED ORAL at 08:34

## 2020-07-19 RX ADMIN — INSULIN LISPRO 10 UNITS: 100 INJECTION, SOLUTION INTRAVENOUS; SUBCUTANEOUS at 13:03

## 2020-07-19 RX ADMIN — INSULIN GLARGINE 45 UNITS: 100 INJECTION, SOLUTION SUBCUTANEOUS at 08:34

## 2020-07-19 RX ADMIN — AMLODIPINE BESYLATE 5 MG: 5 TABLET ORAL at 08:35

## 2020-07-19 RX ADMIN — CITALOPRAM 20 MG: 20 TABLET, FILM COATED ORAL at 17:04

## 2020-07-19 RX ADMIN — Medication 81 MG: at 08:35

## 2020-07-19 RX ADMIN — GLIMEPIRIDE 4 MG: 2 TABLET ORAL at 08:34

## 2020-07-19 RX ADMIN — ENOXAPARIN SODIUM 40 MG: 40 INJECTION SUBCUTANEOUS at 08:35

## 2020-07-19 RX ADMIN — INSULIN LISPRO 2 UNITS: 100 INJECTION, SOLUTION INTRAVENOUS; SUBCUTANEOUS at 20:51

## 2020-07-19 RX ADMIN — ONDANSETRON 4 MG: 2 INJECTION INTRAMUSCULAR; INTRAVENOUS at 13:35

## 2020-07-19 RX ADMIN — ALOGLIPTIN 6.25 MG: 6.25 TABLET, FILM COATED ORAL at 08:35

## 2020-07-19 RX ADMIN — INSULIN LISPRO 6 UNITS: 100 INJECTION, SOLUTION INTRAVENOUS; SUBCUTANEOUS at 17:07

## 2020-07-19 RX ADMIN — MYCOPHENOLATE MOFETIL 300 MG: 500 TABLET ORAL at 15:17

## 2020-07-19 RX ADMIN — GLIMEPIRIDE 4 MG: 2 TABLET ORAL at 17:07

## 2020-07-19 RX ADMIN — Medication 3 MG: at 20:19

## 2020-07-19 RX ADMIN — Medication 3 MG: at 00:22

## 2020-07-19 RX ADMIN — MYCOPHENOLATE MOFETIL 300 MG: 500 TABLET ORAL at 20:19

## 2020-07-19 RX ADMIN — LEVOTHYROXINE SODIUM 50 MCG: 50 TABLET ORAL at 05:47

## 2020-07-19 ASSESSMENT — PAIN SCALES - GENERAL: PAINLEVEL_OUTOF10: 3

## 2020-07-19 NOTE — FLOWSHEET NOTE
Assessment:  Patient is a 60 y.o. female who presents with complaint of chest pain. Patient is in room 2022. Patient was resting in bed. Patient's family member was present. Family member stated he found the entry procedure very user friendly and the staff very helpful. Patient stated she was a little nauseous, but sta     07/19/20 1135   Encounter Summary   Services provided to: Patient;Significant other   Referral/Consult From: [employer] Spouse   Continue Visiting   (7/19/2020)   Complexity of Encounter Moderate   Length of Encounter 15 minutes   Spiritual Assessment Completed Yes   Routine   Type Initial   Assessment Calm; Approachable   Intervention Active listening;Sustaining presence/ Ministry of presence   Outcome Expressed gratitude   ff was getting it under control. Patient spoke highly of nursing staff. Intervention:   was ministry of presence.  provided emotional and spiritual support. Outcome:  Patient and spouse expressed gratitude for visit. Plan:  Chaplains will remain available for spiritual and emotional support as needed.

## 2020-07-19 NOTE — CONSULTS
Attestation signed by      Attending Physician Statement:    I have discussed the care of  [employer] , including pertinent history and exam findings, with the Cardiology fellow/resident. I have seen and examined the patient and the key elements of all parts of the encounter have been performed by me. I agree with the assessment, plan and orders as documented by the fellow/resident, after I modified exam findings and plan of treatments, and the final version is my approved version of the assessment. Additional Comments:   Cath in am   Explained risks and complications   Allergic to dye , needs premeds   Continue  rest   Possible tape local reaction to right fingers reaction   Dr. [facility]   Consultation Note               Today's Date: 7/19/2020  Patient Name: Jose Hinds  Date of admission: 7/18/2020  6:19 AM  Patient's age: 60 y.o., 1955  Admission Dx: Chest pain [R07.9]  Chest pain [R07.9]    Reason for Consult:  Chest pain    Requesting Physician: Shirley Up DO    CHIEF COMPLAINT:    Chief Complaint   Patient presents with    Chest Pain       History Obtained From:  patient, electronic medical record    HISTORY OF PRESENT ILLNESS:      The patient is a 60 y.o.  female who was admitted to the hospital for substernal chest pain with radiation to the arm. No associated dyspnea, nausea, diaphoresis. Blood pressures on presentation 231/86. Troponin 19>21>22>20, Cr 0.66,   EKG with inferior and anterior q waves, sinus rhythm, no acute st changes. A stress test test was ordered by primary with abnormal findings and hence cardiology was consulted. Patient was started on coreg, amlodipine and nitro gtt for blood pressure and chest pain management.        Past Medical History:   has a past medical history of Anxiety, Borderline hypertension, GERD (gastroesophageal reflux disease), Hypothyroidism, Neuropathy, Obesity, Osteoarthritis, Sleep apnea, and Type II or unspecified type diabetes mellitus without mention of complication, not stated as uncontrolled. Past Surgical History:   has a past surgical history that includes Hysterectomy; Colonoscopy; Upper gastrointestinal endoscopy; Dilation and curettage of uterus; hiatal hernia repair; Throat surgery; Appendectomy; Total knee arthroplasty (Right, 01/06/2015); and Total knee arthroplasty (Left, 5/26/15). Home Medications:    Prior to Admission medications    Medication Sig Start Date End Date Taking? Authorizing Provider   gabapentin (NEURONTIN) 300 MG capsule TAKE 1 CAPSULE BY MOUTH THREE TIMES DAILY 4/24/20 7/24/20  Ken Borrero MD   lisinopril (PRINIVIL;ZESTRIL) 10 MG tablet Take 1 tablet by mouth once daily 4/24/20   Ken Borrero MD   insulin glargine (LANTUS SOLOSTAR) 100 UNIT/ML injection pen INJECT 45 UNITS SUBCUTANEOUSLY IN THE MORNING AND 15 UNITS SUBCUTANEOUSLY IN THE EVENING 1/27/20   Ken Borrero MD   EUTHYROX 50 MCG tablet TAKE 1 TABLET BY MOUTH ONCE DAILY 11/6/19   Kne Borrero MD   glimepiride (AMARYL) 4 MG tablet TAKE 1 TABLET BY MOUTH TWICE DAILY 11/6/19   Ken Borrero MD   citalopram (CELEXA) 20 MG tablet TAKE 2 TABLETS BY MOUTH IN THE MORNING AND 1 TABLET BY MOUTH IN THE EVENING 11/6/19   Ken Borrero MD   Handicap Placard MISC Is a permanent condition.   DX: M19.90 5/14/19   Ken Borrero MD   SITagliptin (JANUVIA) 100 MG tablet TAKE 1 TABLET DAILY 9/13/18   Jessi Galvez MD   omeprazole (PRILOSEC) 20 MG delayed release capsule Take 1 capsule by mouth Daily 3/8/18   Ken Borrero MD   Insulin Pen Needle (B-D UF III MINI PEN NEEDLES) 31G X 5 MM MISC USE 1 PEN NEEDLE DAILY 3/8/18   Ken Borrero MD   aspirin 81 MG EC tablet Take 1 tablet by mouth daily 3/8/18   Ken Borrero MD   Dextromethorphan-guaiFENesin (ROBITUSSIN COUGH+CHEST CESAR DM)  MG CAPS Take as directed  Patient not taking: Reported on mL, 10 mL, Intravenous, PRN  atorvastatin (LIPITOR) tablet 40 mg, 40 mg, Oral, Nightly  ondansetron (ZOFRAN) injection 4 mg, 4 mg, Intravenous, Q6H PRN  nitroGLYCERIN 50 mg in dextrose 5% 250 mL infusion, 5 mcg/min, Intravenous, Continuous  carvedilol (COREG) tablet 6.25 mg, 6.25 mg, Oral, BID WC  amLODIPine (NORVASC) tablet 5 mg, 5 mg, Oral, Daily  diphenhydrAMINE (BENADRYL) injection 25 mg, 25 mg, Intravenous, Q6H PRN  potassium chloride (KLOR-CON M) extended release tablet 40 mEq, 40 mEq, Oral, PRN **OR** potassium bicarb-citric acid (EFFER-K) effervescent tablet 40 mEq, 40 mEq, Oral, PRN **OR** potassium chloride 10 mEq/100 mL IVPB (Peripheral Line), 10 mEq, Intravenous, PRN  melatonin tablet 3 mg, 3 mg, Oral, Nightly PRN      Allergies:  Dha-epa-vitamin e; Morphine; and Tape [adhesive tape]      Social History:   reports that she has never smoked. She has never used smokeless tobacco. She reports current alcohol use. She reports that she does not use drugs. Family History: family history includes Arthritis in her father and mother; Cancer in her father; Diabetes in her sister; Heart Disease in her father and mother. No h/o sudden cardiac death. No for premature CAD      REVIEW OF SYSTEMS:    · Constitutional: there has been no unanticipated weight loss. · Eyes: No visual changes or diplopia. · ENT: No Headaches  · Cardiovascular: see above  · Respiratory: No previous pulmonary problems, No cough  · Gastrointestinal: No abdominal pain. No change in bowel or bladder habits. · Genitourinary: No dysuria, trouble voiding, or hematuria. · Musculoskeletal:  No gait disturbance, No weakness or joint complaints. · Integumentary: No rash or pruritis.   · Neurological: No headache, diplopia      PHYSICAL EXAM:      BP (!) 157/77   Pulse 88   Temp 98.6 °F (37 °C) (Oral)   Resp 20   Ht 4' 11\" (1.499 m)   Wt 237 lb 14 oz (107.9 kg)   SpO2 91%   BMI 48.05 kg/m²    Constitutional and General Appearance: Alert, no distress  Respiratory:  · No increased work of breathing. · Clear to auscultation bilaterally. No wheeze or crackles. Cardiovascular:  · Normal S1 and S2.   · Jugular venous pulsation Normal  Abdomen:   · Soft  · No tenderness  Extremities:  · No lower extremity edema  Neurologic:  · Alert and oriented. · Moves all extremities well      DATA:    Diagnostics:    Labs:     CBC:   Recent Labs     07/18/20  0642 07/19/20  0535   WBC 6.5 6.1   HGB 15.1 13.3   HCT 45.4 40.8    See Reflexed IPF Result     BMP:   Recent Labs     07/18/20  0642 07/19/20  0535    138   K 3.5* 4.1   CO2 24 25   BUN 7* 10   CREATININE 0.60 0.66   LABGLOM >60 >60   GLUCOSE 267* 254*     BNP: No results for input(s): BNP in the last 72 hours. PT/INR:   Recent Labs     07/19/20  0535   PROTIME 10.9   INR 1.0     APTT:  Recent Labs     07/19/20  0535   APTT 25.8     CARDIAC ENZYMES:  Recent Labs     07/18/20  2217 07/19/20  0154 07/19/20  0535   TROPHS 22* 21* 20*     No results for input(s): CKTOTAL, CKMB, CKMBINDEX, TROPONINI in the last 72 hours. Invalid input(s):  [address]   Recent Labs     07/18/20  2217 07/19/20  0154 07/19/20  0535   TROPONINT NOT REPORTED NOT REPORTED NOT REPORTED     FASTING LIPID PANEL:  Lab Results   Component Value Date    HDL 58 07/14/2020    TRIG 152 07/14/2020     LIVER PROFILE:  Recent Labs     07/19/20  0535   AST 24   ALT 22   LABALBU 3.2*         EKG: as above    ECHO: pending    STRESS: 7/19/20  Perfusion:  Reversible defects involving 13% of the myocardium, as above         Function:  Normal left ventricular wall motion.  Normal left ventricular    ejection fraction of 61%         Risk stratification: High; stress-induced reversible defects involving 13% of    the myocardium. Mild to moderate reversible perfusion defects noted in the inferior apical,    lateral a pickle, and a pickle septal locations, 13% of myocardium       IMPRESSION:    1. Chest pain  2.  Hypertensive urgency  3. Abnormal stress test - mild to moderate perfusion defects in inferior/lateral/apical/septal, preserved EF  4. Minimal troponin elevation with flat trend  5. Anxiety  6. Obesity  7. Hypothyroid  8. DM    Patient Active Problem List   Diagnosis    Morbid obesity ([facility] [address]    Anxiety    GERD (gastroesophageal reflux disease)    OA (osteoarthritis)    DM (diabetes mellitus)    Hypothyroid    Neuropathy    Right knee DJD    Hypothyroidism    S/P left total knee arthroplasty    Obesity, morbid, BMI 50 or higher ([facility] [address] [facility]    Chest pain         RECOMMENDATIONS:  1. Keep npo after midnight. Will plan for cardiac cath due to significant risk factors and abnormal stress test. If non obstructive CAD symptoms likely from uncontrolled HTN. 2. Continue asa, statin, coreg and amlodipine. Up titrate as needed. 3. Nitro gtt prn for chest pain. Wean as tolerated. 4. COVID ordered. 5. Pending echo. Thank you for allowing us to participate in [address]. Will follow with you.       Electronically signed on 07/19/20 at 7:38 AM by:    Kady José MD   Fellow, [address]

## 2020-07-19 NOTE — PROGRESS NOTES
Nutrition Education    · Verbally reviewed information with Patient  · Educated on Carb Counting  · Written educational materials provided. · Contact name and number provided. · Refer to Patient Education activity for more details.     Electronically signed by Sandee Akhtar RD, LD on 7/19/20 at 2:06 PM EDT    Contact: [phone]

## 2020-07-19 NOTE — PROGRESS NOTES
[address] [facility]    Progress Note    Date:   7/19/2020  Patient name:  Jose Arnold  Date of admission:  7/18/2020  6:19 AM  MRN:   [id number]  YOB: 1955    SUBJECTIVE/Last 24 hours update:     Seen and examined at bedside this morning. No acute events overnight, no new complaints. Nitro drip being weaned down as tolerated, blood pressure significantly improved compared to yesterday. Seen by cardiology this morning plan for cardiac cath tomorrow in the a.m., n.p.o. after midnight. Patient complains of chest pain but states she feels much better than yesterday. Norvasc started last night and was improved today. HPI:   See H&P     REVIEW OF SYSTEMS:      CONSTITUTIONAL:  no fevers, no headcahes  EYES: negative for blury vision  HEENT: No headaches, No nasal congestion, no difficulty swallowing  RESPIRATORY:negative for dyspnea, no wheezing, no Cough  CARDIOVASCULAR: positive for chest pain, no palpitations  GASTROINTESTINAL: no nausea, no vomiting, no change in bowel habits, no abdominal pain   GENITOURINARY: negative for dysuria, no hematuria   MUSCULOSKELETAL: no joint pains, no muscle aches, no swelling of joints or extremities  NEUROLOGICAL: No  Weakness or numbness, + peripheral neuropathy       PAST MEDICAL HISTORY:      has a past medical history of Anxiety, Borderline hypertension, GERD (gastroesophageal reflux disease), Hypothyroidism, Neuropathy, Obesity, Osteoarthritis, Sleep apnea, and Type II or unspecified type diabetes mellitus without mention of complication, not stated as uncontrolled. PAST SURGICAL HISTORY:      has a past surgical history that includes Hysterectomy; Colonoscopy; Upper gastrointestinal endoscopy; Dilation and curettage of uterus; hiatal hernia repair; Throat surgery; Appendectomy; Total knee arthroplasty (Right, 01/06/2015); and Total knee arthroplasty (Left, 5/26/15).        SOCIAL HISTORY: reports that she has never smoked. She has never used smokeless tobacco. She reports current alcohol use. She reports that she does not use drugs. FAMILY HISTORY:     family history includes Arthritis in her father and mother; Cancer in her father; Diabetes in her sister; Heart Disease in her father and mother. HOME MEDICATIONS:      Prior to Admission medications    Medication Sig Start Date End Date Taking? Authorizing Provider   gabapentin (NEURONTIN) 300 MG capsule TAKE 1 CAPSULE BY MOUTH THREE TIMES DAILY 4/24/20 7/24/20  [facility], MD   lisinopril (PRINIVIL;ZESTRIL) 10 MG tablet Take 1 tablet by mouth once daily 4/24/20 Delana Meals, MD   insulin glargine (LANTUS SOLOSTAR) 100 UNIT/ML injection pen INJECT 45 UNITS SUBCUTANEOUSLY IN THE MORNING AND 15 UNITS SUBCUTANEOUSLY IN THE EVENING 1/27/20 Delana Meals, MD   EUTHYROX 50 MCG tablet TAKE 1 TABLET BY MOUTH ONCE DAILY 11/6/19 Delana Meals, MD   glimepiride (AMARYL) 4 MG tablet TAKE 1 TABLET BY MOUTH TWICE DAILY 11/6/19 Delana Meals, MD   citalopram (CELEXA) 20 MG tablet TAKE 2 TABLETS BY MOUTH IN THE MORNING AND 1 TABLET BY MOUTH IN THE EVENING 11/6/19 Delana Meals, MD   Handicap Placard MISC Is a permanent condition. DX: M19.90 5/14/19 Delana Meals, MD   SITagliptin (JANUVIA) 100 MG tablet TAKE 1 TABLET DAILY 9/13/18   Tana Yao, MD   omeprazole (PRILOSEC) 20 MG delayed release capsule Take 1 capsule by mouth Daily 3/8/18   Merary Meals, MD   Insulin Pen Needle (B-D UF III MINI PEN NEEDLES) 31G X 5 MM MISC USE 1 PEN NEEDLE DAILY 3/8/18   Merary Meals, MD   aspirin 81 MG EC tablet Take 1 tablet by mouth daily 3/8/18   Meraryana Meals, MD   Dextromethorphan-guaiFENesin (ROBITUSSIN COUGH+CHEST CESAR DM)  MG CAPS Take as directed  Patient not taking: Reported on 6/4/2020 2/8/18   MD Rita Duggan Thin MISC Test before meals and at bedtime.  DX: DM, on insulin 4/22/14 Sofie Calderón MD   Glucose Blood (BLOOD GLUCOSE TEST STRIPS) STRP Test before meals and at bedtime. DX: DM, on insulin 4/22/14   Sofie Calderón MD   MULTIPLE VITAMIN PO   Take 2 tablets by mouth daily DAILY    Historical Provider, MD   Alcohol Swabs (ALCOHOL PREP PADS) by Other route 2 times daily. Historical Provider, MD       ALLERGIES:     Dha-epa-vitamin e; Morphine; and Tape [adhesive tape]      OBJECTIVE:       Vitals:    07/18/20 2300 07/19/20 0330 07/19/20 0415 07/19/20 0700   BP: (!) 162/68 (!) 149/77  (!) 157/77   Pulse: 83 78  88   Resp: 22 20     Temp: 98.5 °F (36.9 °C) 98.6 °F (37 °C)  98.6 °F (37 °C)   TempSrc: Oral Oral  Oral   SpO2: 92% 95%  91%   Weight:   237 lb 14 oz (107.9 kg)    Height:             Intake/Output Summary (Last 24 hours) at 7/19/2020 4266  Last data filed at 7/19/2020 0430  Gross per 24 hour   Intake 585 ml   Output 600 ml   Net -15 ml       PHYSICAL EXAM:  General Appearance  Alert , awake , not in acute distress, obese   HEENT - Head is normocephalic, atraumatic. Lungs - Bilateral equal air entry , no wheezes, rales or rhonchi, aeration good  Cardiovascular - Heart sounds are normal.  Regular rhythm, normal rate without murmur, gallop or rub.   Abdomen - Soft, nontender, nondistended, no masses or organomegaly  Neurologic - There are no new focal motor or sensory deficits  Skin - No bruising or bleeding on exposed skin area  Extremities - No cyanosis, clubbing or edema      DIAGNOSTICS:     Laboratory Testing:    Recent Results (from the past 24 hour(s))   Troponin    Collection Time: 07/18/20  9:23 AM   Result Value Ref Range    Troponin, High Sensitivity 21 (H) 0 - 14 ng/L    Troponin T NOT REPORTED <0.03 ng/mL    Troponin Interp NOT REPORTED    POC Glucose Fingerstick    Collection Time: 07/18/20  4:54 PM   Result Value Ref Range    POC Glucose 246 (H) 65 - 105 mg/dL   POC Glucose Fingerstick    Collection Time: 07/18/20  8:01 PM   Result Value Ref Range    POC Glucose 301 (H) 65 - 105 mg/dL   Troponin    Collection Time: 07/18/20 10:17 PM   Result Value Ref Range    Troponin, High Sensitivity 22 (H) 0 - 14 ng/L    Troponin T NOT REPORTED <0.03 ng/mL    Troponin Interp NOT REPORTED    Troponin    Collection Time: 07/19/20  1:54 AM   Result Value Ref Range    Troponin, High Sensitivity 21 (H) 0 - 14 ng/L    Troponin T NOT REPORTED <0.03 ng/mL    Troponin Interp NOT REPORTED    EKG 12 Lead    Collection Time: 07/19/20  4:22 AM   Result Value Ref Range    Ventricular Rate 72 BPM    Atrial Rate 72 BPM    P-R Interval 162 ms    QRS Duration 94 ms    Q-T Interval 460 ms    QTc Calculation (Bazett) 503 ms    P Axis 41 degrees    R Axis -3 degrees    T Axis 54 degrees   CBC WITH AUTO DIFFERENTIAL    Collection Time: 07/19/20  5:35 AM   Result Value Ref Range    WBC 6.1 3.5 - 11.3 k/uL    RBC 4.54 3.95 - 5.11 m/uL    Hemoglobin 13.3 11.9 - 15.1 g/dL    Hematocrit 40.8 36.3 - 47.1 %    MCV 89.9 82.6 - 102.9 fL    MCH 29.3 25.2 - 33.5 pg    MCHC 32.6 28.4 - 34.8 g/dL    RDW 12.4 11.8 - 14.4 %    Platelets See Reflexed IPF Result 138 - 453 k/uL    MPV NOT REPORTED 8.1 - 13.5 fL    NRBC Automated 0.0 0.0 per 100 WBC    Differential Type NOT REPORTED     WBC Morphology NOT REPORTED     RBC Morphology NOT REPORTED     Platelet Estimate NOT REPORTED     Seg Neutrophils 56 36 - 65 %    Lymphocytes 28 24 - 43 %    Monocytes 11 3 - 12 %    Eosinophils % 4 1 - 4 %    Basophils 1 0 - 2 %    Immature Granulocytes 0 0 %    Segs Absolute 3.42 1.50 - 8.10 k/uL    Absolute Lymph # 1.72 1.10 - 3.70 k/uL    Absolute Mono # 0.66 0.10 - 1.20 k/uL    Absolute Eos # 0.21 0.00 - 0.44 k/uL    Basophils Absolute 0.03 0.00 - 0.20 k/uL    Absolute Immature Granulocyte <0.03 0.00 - 0.30 k/uL   Comprehensive Metabolic Panel w/ Reflex to MG    Collection Time: 07/19/20  5:35 AM   Result Value Ref Range    Glucose 254 (H) 70 - 99 mg/dL    BUN 10 8 - 23 mg/dL    CREATININE 0.66 0.50 - 0.90 mg/dL    Bun/Cre Ratio NOT REPORTED 9 - 20    Calcium 8.7 8.6 - 10.4 mg/dL    Sodium 138 135 - 144 mmol/L    Potassium 4.1 3.7 - 5.3 mmol/L    Chloride 103 98 - 107 mmol/L    CO2 25 20 - 31 mmol/L    Anion Gap 10 9 - 17 mmol/L    Alkaline Phosphatase 109 (H) 35 - 104 U/L    ALT 22 5 - 33 U/L    AST 24 <32 U/L    Total Bilirubin 0.40 0.3 - 1.2 mg/dL    Total Protein 5.7 (L) 6.4 - 8.3 g/dL    Alb 3.2 (L) 3.5 - 5.2 g/dL    Albumin/Globulin Ratio 1.3 1.0 - 2.5    GFR Non-African American >60 >60 mL/min    GFR African American >60 >60 mL/min    GFR Comment          GFR Staging NOT REPORTED    PROTIME-INR    Collection Time: 07/19/20  5:35 AM   Result Value Ref Range    Protime 10.9 9.0 - 12.0 sec    INR 1.0    APTT    Collection Time: 07/19/20  5:35 AM   Result Value Ref Range    PTT 25.8 20.5 - 30.5 sec   Troponin    Collection Time: 07/19/20  5:35 AM   Result Value Ref Range    Troponin, High Sensitivity 20 (H) 0 - 14 ng/L    Troponin T NOT REPORTED <0.03 ng/mL    Troponin Interp NOT REPORTED    Immature Platelet Fraction    Collection Time: 07/19/20  5:35 AM   Result Value Ref Range    Platelet, Immature Fraction 3.6 1.1 - 10.3 %    Platelet, Fluorescence 134 (L) 138 - 453 k/uL   POC Glucose Fingerstick    Collection Time: 07/19/20  7:12 AM   Result Value Ref Range    POC Glucose 213 (H) 65 - 105 mg/dL         Imaging/Diagonstics:      Xr Chest Portable    Result Date: 7/18/2020  EXAMINATION: ONE XRAY VIEW OF THE CHEST 7/18/2020 6:42 am COMPARISON: 10 January 2019 HISTORY: ORDERING SYSTEM PROVIDED HISTORY: chest pain TECHNOLOGIST PROVIDED HISTORY: chest pain Reason for Exam: portable upright/ chest pain Acuity: Acute Type of Exam: Initial FINDINGS: AP portable view of the chest time stamped at 635 hours demonstrates overlying cardiac monitoring electrodes. Heart is mildly enlarged. Pulmonary vasculature is within upper limits of normal.  No gross effusions, localized consolidation, effusion or pneumothorax is noted.   Osseous and mediastinal structures are age-appropriate. No acute cardiopulmonary process. Nm Cardiac Stress Test Nuclear Imaging    Result Date: 7/18/2020  EXAMINATION: MYOCARDIAL PERFUSION IMAGING 7/18/2020 11:55 am TECHNIQUE: For the rest study, 13.5 mCi of Tc 99 labeled sestamibi were injected. SPECT images were acquired. Under cardiology supervision, 0.4mg Bethene Anyi was infused. After pharmacologic stress, 48 mCi of Tc 99 labeled sestamibi were injected. SPECT images with ECG gating were acquired. COMPARISON: 28 September 2012 HISTORY: ORDERING SYSTEM PROVIDED HISTORY: Angina TECHNOLOGIST PROVIDED HISTORY: Reason for Exam: Angina Procedure Type->Rx angina Reason for Exam: chest pain/pressure, short of breath, nausea, DM, FINDINGS: Iimages interpreted utilizing [employer] system and [employer]. Mild to moderate reversible perfusion defects noted in the inferior apical, lateral a pickle, and a pickle septal locations, 13% of myocardium. Perfusion scores are visually adjusted to account for artifact. Summed stress score:  9 Summed rest score:  0 Summed reversibility score:  9 Function: End diastolic volume:  87TH Left ventricular ejection fraction:  61% Wall motion abnormalities:  None. TID score:  0.91 (scores greater than 1.39 are considered elevated for Lexiscan stress with Tc99m)     Perfusion:  Reversible defects involving 13% of the myocardium, as above Function:  Normal left ventricular wall motion. Normal left ventricular ejection fraction of 61% Risk stratification: High; stress-induced reversible defects involving 13% of the myocardium. Notes concerning risk stratification: Risk stratification incorporates both clinical history and some testing results. Final risk determination is the responsibility of the ordering provider as other patient information and test results may increase or decrease the risk assessment reported for this examination.  Risk stratification criteria are adapted from \"Noninvasive Risk Stratification\" criteria from [employer]. Al, ACC/AATS/AHA/ASE/ASNC/SCAI/SCCT/STS 2017 Appropriate Use Criteria For Coronary Revascularization in Patients With Stable Ischemic Heart Disease [facility] Volume 69, Issue 17, May 2017 High risk (>3% annual death or MI) 1. Severe resting LV dysfunction (LVEF <35%) not readily explained by non coronary causes 2. Resting perfusion abnormalities greater than 10% of the myocardium in patients without prior history or evidence of MI3. Stress-induced perfusion abnormalities encumbering greater than or equal to 10% myocardium or stress segmental scores indicating multiple vascular territories with abnormalities 4. Stress-induced LV dilatation (TID ratio greater than 1.19 for exercise and greater than 1.39 for regadenoson) Intermediate risk (1% to 3% annual death or MI) 1. Mild/moderate resting LV dysfunction (LVEF 35% to 49%) not readily explained by non coronary causes. 2. Resting perfusion abnormalities in 5%-9.9% of the myocardium in patients without a history or prior evidence of MI 3. Stress-induced perfusion abnormality encumbering 5%-9.9% of the myocardium or stress segmental scores indicating 1 vascular territory with abnormalities but without LV dilation 4. Small wall motion abnormality involving 1-2 segments and only 1 coronary bed. Low Risk (Less than 1% annual death or MI) 1. Normal or small myocardial perfusion defect at rest or with stress encumbering less than 5% of the myocardium. Critical results were called by Dr. Gianna Rea MD to [facility] on 7/18/2020 at 15:44.        Scheduled Meds:   docusate sodium  100 mg Oral Daily    aspirin  81 mg Oral Daily    citalopram  40 mg Oral Daily    levothyroxine  50 mcg Oral Daily    gabapentin  300 mg Oral TID    glimepiride  4 mg Oral BID     insulin glargine  45 Units Subcutaneous QAM    insulin glargine  15 Units Subcutaneous Nightly    insulin lispro  0-12 Units Subcutaneous TID     insulin lispro  0-6 Units Subcutaneous Nightly    [Held by provider] lisinopril  10 mg Oral Daily    multivitamin  1 tablet Oral Daily    pantoprazole  40 mg Oral QAM AC    alogliptin  6.25 mg Oral Daily    citalopram  20 mg Oral QPM    sodium chloride flush  10 mL Intravenous 2 times per day    enoxaparin  40 mg Subcutaneous Daily    atorvastatin  40 mg Oral Nightly    carvedilol  6.25 mg Oral BID WC    amLODIPine  5 mg Oral Daily     Continuous Infusions:   nitroGLYCERIN 40 mcg/min (07/19/20 0549)     PRN Meds:.sodium chloride flush, acetaminophen, ondansetron, ibuprofen, sodium chloride flush, sodium chloride flush, ondansetron, diphenhydrAMINE, potassium chloride **OR** potassium alternative oral replacement **OR** potassium chloride, melatonin    ASSESSMENT:     Principal Problem:    Chest pain  Active Problems: Morbid obesity (HCC)    Anxiety    GERD (gastroesophageal reflux disease)    OA (osteoarthritis)    DM (diabetes mellitus)    Hypothyroid    Neuropathy    Right knee DJD    Hypothyroidism    S/P left total knee arthroplasty    Obesity, morbid, BMI 50 or higher ([facility] [address]  Resolved Problems:    * No resolved hospital problems.  *      PLAN:     Chest Pain, likely due to ACS/unstable angina, HTN  -Cardiology consult; plan for cardiac path Monday AM, NPO after midnight   -S/P NM Stress - High Risk - 7/18/2020  -Titrate Nitro drip down as tolerated, BP significant improving   -Resume lisinopril, ASA  -Coreg started   -Echo pending   -Telemetry  -Tylenol PRN for pain control, Ibuprofen PRN  -Monitor CBC, CMP     T2DM, not well controlled, on insulin, Morbid obesity, Neuropathy  - HBA1c 10.7 on 7/14/2020  -Continue home Lantus, ISS, POCT Glucose  -Diet: carb control, NPO after midnight   Hold PO home meds - Nesina and Amaryl  -Home Gabapentin 300mg TID  -Inpatient consult to Dietitian and Diabetic Education     Hypothyroidism  -TSH 2.74 7/14/2020  -synthroid 50 mcg     Dyslipidemia  -atorvastatin 40mg PO daily  - 10-year ASCVD risk score 23.9%    Depression, Anxiety  -Celexa, Atarax     Dispo  PT/OT/SW     DVT prophylaxis: Lovenox 40 mg SC  GI prophylaxis Protonix 40 mg daily    Ca Solis MD  Family Medicine Resident  7/19/2020 8:22 AM

## 2020-07-20 ENCOUNTER — APPOINTMENT (OUTPATIENT)
Dept: CARDIAC CATH/INVASIVE PROCEDURES | Age: 65
DRG: 175 | End: 2020-07-20
Payer: COMMERCIAL

## 2020-07-20 PROBLEM — I16.0 HYPERTENSIVE URGENCY: Status: ACTIVE | Noted: 2020-07-20

## 2020-07-20 PROBLEM — I10 ESSENTIAL HYPERTENSION: Status: ACTIVE | Noted: 2020-07-20

## 2020-07-20 LAB
ABSOLUTE EOS #: 0 K/UL (ref 0–0.4)
ABSOLUTE IMMATURE GRANULOCYTE: 0 K/UL (ref 0–0.3)
ABSOLUTE LYMPH #: 0.51 K/UL (ref 1–4.8)
ABSOLUTE MONO #: 0 K/UL (ref 0.1–0.8)
ACTIVATED CLOTTING TIME: 352 SEC (ref 79–149)
ALBUMIN SERPL-MCNC: 3.6 G/DL (ref 3.5–5.2)
ALBUMIN/GLOBULIN RATIO: 1.2 (ref 1–2.5)
ALP BLD-CCNC: 119 U/L (ref 35–104)
ALT SERPL-CCNC: 22 U/L (ref 5–33)
ANION GAP SERPL CALCULATED.3IONS-SCNC: 14 MMOL/L (ref 9–17)
AST SERPL-CCNC: 24 U/L
BASOPHILS # BLD: 0 % (ref 0–2)
BASOPHILS ABSOLUTE: 0 K/UL (ref 0–0.2)
BILIRUB SERPL-MCNC: 0.59 MG/DL (ref 0.3–1.2)
BUN BLDV-MCNC: 12 MG/DL (ref 8–23)
BUN/CREAT BLD: ABNORMAL (ref 9–20)
CALCIUM SERPL-MCNC: 8.9 MG/DL (ref 8.6–10.4)
CHLORIDE BLD-SCNC: 103 MMOL/L (ref 98–107)
CO2: 22 MMOL/L (ref 20–31)
CREAT SERPL-MCNC: 0.58 MG/DL (ref 0.5–0.9)
DIFFERENTIAL TYPE: ABNORMAL
EKG ATRIAL RATE: 72 BPM
EKG ATRIAL RATE: 84 BPM
EKG P AXIS: 34 DEGREES
EKG P AXIS: 41 DEGREES
EKG P-R INTERVAL: 162 MS
EKG P-R INTERVAL: 176 MS
EKG Q-T INTERVAL: 382 MS
EKG Q-T INTERVAL: 460 MS
EKG QRS DURATION: 74 MS
EKG QRS DURATION: 94 MS
EKG QTC CALCULATION (BAZETT): 451 MS
EKG QTC CALCULATION (BAZETT): 503 MS
EKG R AXIS: -3 DEGREES
EKG R AXIS: 1 DEGREES
EKG T AXIS: 43 DEGREES
EKG T AXIS: 54 DEGREES
EKG VENTRICULAR RATE: 72 BPM
EKG VENTRICULAR RATE: 84 BPM
EOSINOPHILS RELATIVE PERCENT: 0 % (ref 1–4)
GFR AFRICAN AMERICAN: >60 ML/MIN
GFR NON-AFRICAN AMERICAN: >60 ML/MIN
GFR SERPL CREATININE-BSD FRML MDRD: ABNORMAL ML/MIN/{1.73_M2}
GFR SERPL CREATININE-BSD FRML MDRD: ABNORMAL ML/MIN/{1.73_M2}
GLUCOSE BLD-MCNC: 314 MG/DL (ref 70–99)
GLUCOSE BLD-MCNC: 316 MG/DL (ref 65–105)
GLUCOSE BLD-MCNC: 329 MG/DL (ref 65–105)
GLUCOSE BLD-MCNC: 390 MG/DL (ref 65–105)
GLUCOSE BLD-MCNC: 392 MG/DL (ref 65–105)
GLUCOSE BLD-MCNC: 405 MG/DL (ref 65–105)
HCT VFR BLD CALC: 47.6 % (ref 36.3–47.1)
HEMOGLOBIN: 15.1 G/DL (ref 11.9–15.1)
IMMATURE GRANULOCYTES: 0 %
INR BLD: 1
LV EF: 65 %
LVEF MODALITY: NORMAL
LYMPHOCYTES # BLD: 10 % (ref 24–44)
MCH RBC QN AUTO: 28.9 PG (ref 25.2–33.5)
MCHC RBC AUTO-ENTMCNC: 31.7 G/DL (ref 28.4–34.8)
MCV RBC AUTO: 91 FL (ref 82.6–102.9)
MONOCYTES # BLD: 0 % (ref 1–7)
MORPHOLOGY: NORMAL
NRBC AUTOMATED: 0 PER 100 WBC
PARTIAL THROMBOPLASTIN TIME: 26.3 SEC (ref 20.5–30.5)
PDW BLD-RTO: 12.5 % (ref 11.8–14.4)
PLATELET # BLD: ABNORMAL K/UL (ref 138–453)
PLATELET ESTIMATE: ABNORMAL
PLATELET, FLUORESCENCE: 133 K/UL (ref 138–453)
PLATELET, IMMATURE FRACTION: 3.2 % (ref 1.1–10.3)
PMV BLD AUTO: ABNORMAL FL (ref 8.1–13.5)
POTASSIUM SERPL-SCNC: 4.5 MMOL/L (ref 3.7–5.3)
PROTHROMBIN TIME: 11 SEC (ref 9–12)
RBC # BLD: 5.23 M/UL (ref 3.95–5.11)
RBC # BLD: ABNORMAL 10*6/UL
SEG NEUTROPHILS: 90 % (ref 36–66)
SEGMENTED NEUTROPHILS ABSOLUTE COUNT: 4.59 K/UL (ref 1.8–7.7)
SODIUM BLD-SCNC: 139 MMOL/L (ref 135–144)
TOTAL PROTEIN: 6.6 G/DL (ref 6.4–8.3)
WBC # BLD: 5.1 K/UL (ref 3.5–11.3)
WBC # BLD: ABNORMAL 10*3/UL

## 2020-07-20 PROCEDURE — 6360000002 HC RX W HCPCS: Performed by: STUDENT IN AN ORGANIZED HEALTH CARE EDUCATION/TRAINING PROGRAM

## 2020-07-20 PROCEDURE — 6360000004 HC RX CONTRAST MEDICATION

## 2020-07-20 PROCEDURE — 85347 COAGULATION TIME ACTIVATED: CPT

## 2020-07-20 PROCEDURE — 6370000000 HC RX 637 (ALT 250 FOR IP): Performed by: EMERGENCY MEDICINE

## 2020-07-20 PROCEDURE — 99232 SBSQ HOSP IP/OBS MODERATE 35: CPT | Performed by: FAMILY MEDICINE

## 2020-07-20 PROCEDURE — C1769 GUIDE WIRE: HCPCS

## 2020-07-20 PROCEDURE — 85730 THROMBOPLASTIN TIME PARTIAL: CPT

## 2020-07-20 PROCEDURE — 80053 COMPREHEN METABOLIC PANEL: CPT

## 2020-07-20 PROCEDURE — 6370000000 HC RX 637 (ALT 250 FOR IP): Performed by: STUDENT IN AN ORGANIZED HEALTH CARE EDUCATION/TRAINING PROGRAM

## 2020-07-20 PROCEDURE — 2580000003 HC RX 258: Performed by: EMERGENCY MEDICINE

## 2020-07-20 PROCEDURE — C1874 STENT, COATED/COV W/DEL SYS: HCPCS

## 2020-07-20 PROCEDURE — 82947 ASSAY GLUCOSE BLOOD QUANT: CPT

## 2020-07-20 PROCEDURE — B2151ZZ FLUOROSCOPY OF LEFT HEART USING LOW OSMOLAR CONTRAST: ICD-10-PCS | Performed by: INTERNAL MEDICINE

## 2020-07-20 PROCEDURE — 2580000003 HC RX 258: Performed by: STUDENT IN AN ORGANIZED HEALTH CARE EDUCATION/TRAINING PROGRAM

## 2020-07-20 PROCEDURE — C1725 CATH, TRANSLUMIN NON-LASER: HCPCS

## 2020-07-20 PROCEDURE — 6360000002 HC RX W HCPCS: Performed by: INTERNAL MEDICINE

## 2020-07-20 PROCEDURE — C1894 INTRO/SHEATH, NON-LASER: HCPCS

## 2020-07-20 PROCEDURE — 6360000002 HC RX W HCPCS

## 2020-07-20 PROCEDURE — 6370000000 HC RX 637 (ALT 250 FOR IP)

## 2020-07-20 PROCEDURE — 93010 ELECTROCARDIOGRAM REPORT: CPT | Performed by: INTERNAL MEDICINE

## 2020-07-20 PROCEDURE — 2580000003 HC RX 258: Performed by: INTERNAL MEDICINE

## 2020-07-20 PROCEDURE — 85055 RETICULATED PLATELET ASSAY: CPT

## 2020-07-20 PROCEDURE — 2500000003 HC RX 250 WO HCPCS

## 2020-07-20 PROCEDURE — 36415 COLL VENOUS BLD VENIPUNCTURE: CPT

## 2020-07-20 PROCEDURE — 93306 TTE W/DOPPLER COMPLETE: CPT

## 2020-07-20 PROCEDURE — 93458 L HRT ARTERY/VENTRICLE ANGIO: CPT | Performed by: INTERNAL MEDICINE

## 2020-07-20 PROCEDURE — 027034Z DILATION OF CORONARY ARTERY, ONE ARTERY WITH DRUG-ELUTING INTRALUMINAL DEVICE, PERCUTANEOUS APPROACH: ICD-10-PCS | Performed by: INTERNAL MEDICINE

## 2020-07-20 PROCEDURE — 92928 PRQ TCAT PLMT NTRAC ST 1 LES: CPT | Performed by: INTERNAL MEDICINE

## 2020-07-20 PROCEDURE — 85025 COMPLETE CBC W/AUTO DIFF WBC: CPT

## 2020-07-20 PROCEDURE — 2709999900 HC NON-CHARGEABLE SUPPLY

## 2020-07-20 PROCEDURE — B2111ZZ FLUOROSCOPY OF MULTIPLE CORONARY ARTERIES USING LOW OSMOLAR CONTRAST: ICD-10-PCS | Performed by: INTERNAL MEDICINE

## 2020-07-20 PROCEDURE — 85610 PROTHROMBIN TIME: CPT

## 2020-07-20 PROCEDURE — C1887 CATHETER, GUIDING: HCPCS

## 2020-07-20 PROCEDURE — 2060000000 HC ICU INTERMEDIATE R&B

## 2020-07-20 PROCEDURE — 2500000003 HC RX 250 WO HCPCS: Performed by: STUDENT IN AN ORGANIZED HEALTH CARE EDUCATION/TRAINING PROGRAM

## 2020-07-20 PROCEDURE — 4A023N7 MEASUREMENT OF CARDIAC SAMPLING AND PRESSURE, LEFT HEART, PERCUTANEOUS APPROACH: ICD-10-PCS | Performed by: INTERNAL MEDICINE

## 2020-07-20 RX ORDER — CLOPIDOGREL BISULFATE 75 MG/1
75 TABLET ORAL DAILY
Status: DISCONTINUED | OUTPATIENT
Start: 2020-07-21 | End: 2020-07-21 | Stop reason: HOSPADM

## 2020-07-20 RX ORDER — NICOTINE POLACRILEX 4 MG
15 LOZENGE BUCCAL PRN
Status: DISCONTINUED | OUTPATIENT
Start: 2020-07-20 | End: 2020-07-21 | Stop reason: HOSPADM

## 2020-07-20 RX ORDER — METHYLPREDNISOLONE SODIUM SUCCINATE 125 MG/2ML
125 INJECTION, POWDER, LYOPHILIZED, FOR SOLUTION INTRAMUSCULAR; INTRAVENOUS ONCE
Status: COMPLETED | OUTPATIENT
Start: 2020-07-20 | End: 2020-07-20

## 2020-07-20 RX ORDER — SODIUM CHLORIDE 0.9 % (FLUSH) 0.9 %
10 SYRINGE (ML) INJECTION PRN
Status: DISCONTINUED | OUTPATIENT
Start: 2020-07-20 | End: 2020-07-21 | Stop reason: HOSPADM

## 2020-07-20 RX ORDER — DEXTROSE MONOHYDRATE 50 MG/ML
100 INJECTION, SOLUTION INTRAVENOUS PRN
Status: DISCONTINUED | OUTPATIENT
Start: 2020-07-20 | End: 2020-07-21 | Stop reason: HOSPADM

## 2020-07-20 RX ORDER — LISINOPRIL 10 MG/1
TABLET ORAL
Qty: 90 TABLET | Refills: 0 | Status: SHIPPED | OUTPATIENT
Start: 2020-07-20 | End: 2020-09-30 | Stop reason: SDUPTHER

## 2020-07-20 RX ORDER — SODIUM CHLORIDE 0.9 % (FLUSH) 0.9 %
10 SYRINGE (ML) INJECTION EVERY 12 HOURS SCHEDULED
Status: DISCONTINUED | OUTPATIENT
Start: 2020-07-20 | End: 2020-07-21 | Stop reason: HOSPADM

## 2020-07-20 RX ORDER — SODIUM CHLORIDE 9 MG/ML
INJECTION, SOLUTION INTRAVENOUS CONTINUOUS
Status: DISCONTINUED | OUTPATIENT
Start: 2020-07-20 | End: 2020-07-21 | Stop reason: HOSPADM

## 2020-07-20 RX ORDER — DIPHENHYDRAMINE HYDROCHLORIDE, ZINC ACETATE 2; .1 G/100G; G/100G
CREAM TOPICAL 3 TIMES DAILY PRN
Status: DISCONTINUED | OUTPATIENT
Start: 2020-07-20 | End: 2020-07-21 | Stop reason: HOSPADM

## 2020-07-20 RX ORDER — DIPHENHYDRAMINE HYDROCHLORIDE 50 MG/ML
50 INJECTION INTRAMUSCULAR; INTRAVENOUS ONCE
Status: COMPLETED | OUTPATIENT
Start: 2020-07-20 | End: 2020-07-20

## 2020-07-20 RX ORDER — DEXTROSE MONOHYDRATE 25 G/50ML
12.5 INJECTION, SOLUTION INTRAVENOUS PRN
Status: DISCONTINUED | OUTPATIENT
Start: 2020-07-20 | End: 2020-07-21 | Stop reason: HOSPADM

## 2020-07-20 RX ORDER — INSULIN GLARGINE 100 [IU]/ML
20 INJECTION, SOLUTION SUBCUTANEOUS ONCE
Status: DISCONTINUED | OUTPATIENT
Start: 2020-07-20 | End: 2020-07-20

## 2020-07-20 RX ORDER — ACETAMINOPHEN 325 MG/1
650 TABLET ORAL EVERY 4 HOURS PRN
Status: DISCONTINUED | OUTPATIENT
Start: 2020-07-20 | End: 2020-07-21 | Stop reason: HOSPADM

## 2020-07-20 RX ORDER — GABAPENTIN 300 MG/1
CAPSULE ORAL
Qty: 270 CAPSULE | Refills: 0 | OUTPATIENT
Start: 2020-07-20

## 2020-07-20 RX ADMIN — LEVOTHYROXINE SODIUM 50 MCG: 50 TABLET ORAL at 06:18

## 2020-07-20 RX ADMIN — TRIAMCINOLONE ACETONIDE: 1 OINTMENT TOPICAL at 21:01

## 2020-07-20 RX ADMIN — GLIMEPIRIDE 4 MG: 2 TABLET ORAL at 17:43

## 2020-07-20 RX ADMIN — INSULIN GLARGINE 15 UNITS: 100 INJECTION, SOLUTION SUBCUTANEOUS at 20:48

## 2020-07-20 RX ADMIN — CARVEDILOL 6.25 MG: 6.25 TABLET, FILM COATED ORAL at 08:34

## 2020-07-20 RX ADMIN — CITALOPRAM 40 MG: 20 TABLET, FILM COATED ORAL at 08:34

## 2020-07-20 RX ADMIN — INSULIN LISPRO 10 UNITS: 100 INJECTION, SOLUTION INTRAVENOUS; SUBCUTANEOUS at 13:20

## 2020-07-20 RX ADMIN — GLIMEPIRIDE 4 MG: 2 TABLET ORAL at 13:16

## 2020-07-20 RX ADMIN — CARVEDILOL 6.25 MG: 6.25 TABLET, FILM COATED ORAL at 17:43

## 2020-07-20 RX ADMIN — ALOGLIPTIN 6.25 MG: 6.25 TABLET, FILM COATED ORAL at 13:17

## 2020-07-20 RX ADMIN — INSULIN LISPRO 6 UNITS: 100 INJECTION, SOLUTION INTRAVENOUS; SUBCUTANEOUS at 20:47

## 2020-07-20 RX ADMIN — METHYLPREDNISOLONE SODIUM SUCCINATE 125 MG: 125 INJECTION, POWDER, FOR SOLUTION INTRAMUSCULAR; INTRAVENOUS at 00:17

## 2020-07-20 RX ADMIN — HYDRALAZINE HYDROCHLORIDE 10 MG: 20 INJECTION INTRAMUSCULAR; INTRAVENOUS at 06:19

## 2020-07-20 RX ADMIN — SODIUM CHLORIDE: 9 INJECTION, SOLUTION INTRAVENOUS at 09:06

## 2020-07-20 RX ADMIN — MYCOPHENOLATE MOFETIL 300 MG: 500 TABLET ORAL at 20:48

## 2020-07-20 RX ADMIN — DIPHENHYDRAMINE HYDROCHLORIDE 25 MG: 50 INJECTION, SOLUTION INTRAMUSCULAR; INTRAVENOUS at 17:42

## 2020-07-20 RX ADMIN — MYCOPHENOLATE MOFETIL 300 MG: 500 TABLET ORAL at 08:35

## 2020-07-20 RX ADMIN — DIPHENHYDRAMINE HYDROCHLORIDE 50 MG: 50 INJECTION, SOLUTION INTRAMUSCULAR; INTRAVENOUS at 09:07

## 2020-07-20 RX ADMIN — NITROGLYCERIN 10 MCG/MIN: 20 INJECTION INTRAVENOUS at 07:54

## 2020-07-20 RX ADMIN — AMLODIPINE BESYLATE 5 MG: 5 TABLET ORAL at 08:35

## 2020-07-20 RX ADMIN — SODIUM CHLORIDE, PRESERVATIVE FREE 10 ML: 5 INJECTION INTRAVENOUS at 08:17

## 2020-07-20 RX ADMIN — PANTOPRAZOLE SODIUM 40 MG: 40 TABLET, DELAYED RELEASE ORAL at 06:18

## 2020-07-20 RX ADMIN — HYDROCORTISONE: 10 CREAM TOPICAL at 11:43

## 2020-07-20 RX ADMIN — NITROGLYCERIN 0.4 MG: 0.4 TABLET SUBLINGUAL at 07:13

## 2020-07-20 RX ADMIN — Medication 3 MG: at 20:49

## 2020-07-20 RX ADMIN — DESMOPRESSIN ACETATE 40 MG: 0.2 TABLET ORAL at 20:48

## 2020-07-20 RX ADMIN — INSULIN GLARGINE 45 UNITS: 100 INJECTION, SOLUTION SUBCUTANEOUS at 13:17

## 2020-07-20 RX ADMIN — Medication 10 ML: at 20:48

## 2020-07-20 RX ADMIN — ACETAMINOPHEN 650 MG: 325 TABLET ORAL at 11:43

## 2020-07-20 RX ADMIN — MYCOPHENOLATE MOFETIL 300 MG: 500 TABLET ORAL at 15:01

## 2020-07-20 RX ADMIN — THERA TABS 1 TABLET: TAB at 17:42

## 2020-07-20 RX ADMIN — Medication 81 MG: at 08:34

## 2020-07-20 RX ADMIN — METHYLPREDNISOLONE SODIUM SUCCINATE 125 MG: 125 INJECTION, POWDER, LYOPHILIZED, FOR SOLUTION INTRAMUSCULAR; INTRAVENOUS at 09:06

## 2020-07-20 RX ADMIN — DIPHENHYDRAMINE HYDROCHLORIDE, ZINC ACETATE: 2; .1 CREAM TOPICAL at 21:00

## 2020-07-20 RX ADMIN — INSULIN LISPRO 12 UNITS: 100 INJECTION, SOLUTION INTRAVENOUS; SUBCUTANEOUS at 17:41

## 2020-07-20 RX ADMIN — CITALOPRAM 20 MG: 20 TABLET, FILM COATED ORAL at 17:42

## 2020-07-20 ASSESSMENT — PAIN DESCRIPTION - ONSET
ONSET: GRADUAL
ONSET: AWAKENED FROM SLEEP

## 2020-07-20 ASSESSMENT — PAIN - FUNCTIONAL ASSESSMENT: PAIN_FUNCTIONAL_ASSESSMENT: ACTIVITIES ARE NOT PREVENTED

## 2020-07-20 ASSESSMENT — PAIN DESCRIPTION - DESCRIPTORS
DESCRIPTORS: DISCOMFORT
DESCRIPTORS: SHARP

## 2020-07-20 ASSESSMENT — PAIN SCALES - GENERAL
PAINLEVEL_OUTOF10: 0
PAINLEVEL_OUTOF10: 0
PAINLEVEL_OUTOF10: 3
PAINLEVEL_OUTOF10: 0
PAINLEVEL_OUTOF10: 0
PAINLEVEL_OUTOF10: 7

## 2020-07-20 ASSESSMENT — PAIN DESCRIPTION - PAIN TYPE
TYPE: CHRONIC PAIN
TYPE: ACUTE PAIN

## 2020-07-20 ASSESSMENT — PAIN DESCRIPTION - PROGRESSION
CLINICAL_PROGRESSION: GRADUALLY IMPROVING
CLINICAL_PROGRESSION: GRADUALLY IMPROVING

## 2020-07-20 ASSESSMENT — PAIN DESCRIPTION - ORIENTATION
ORIENTATION: UPPER
ORIENTATION: OTHER (COMMENT)

## 2020-07-20 ASSESSMENT — PAIN DESCRIPTION - LOCATION
LOCATION: HEAD
LOCATION: CHEST

## 2020-07-20 ASSESSMENT — PAIN DESCRIPTION - FREQUENCY: FREQUENCY: INTERMITTENT

## 2020-07-20 NOTE — CARE COORDINATION
Met with pt this date states ,she h/o depression for many years and her PCP  manages her medications for depression. Pt states she has been feeling overwhelmed and suicidal at times with no plan the past few weeks,however denies any suicidal ideations at this time. Pt reports they recently received notification from [facility] stating they owe $8,000.  lost his job 1 1/2 years ago . Pt states she is very concerned about her  mental state ,feels he is depressed. Pt  states he is a  and refuses to go to the [place] . Pt states they were recently approved for Medicaid this week. Pt  agrees to seek counseling . Writer provided list of [phone] and offered assistance in making an appointment. Pt plans to ask her friends what agency to use and will make own arrangements. Writer provided support and strongly encouraged pt to follow up with [facility].   Insurance is [employer]

## 2020-07-20 NOTE — OP NOTE
[facility]    CARDIAC CATHETERIZATION    Date:   7/20/2020  Patient name:  Caryl Catalan  Date of admission:  7/18/2020  6:19 AM  MRN:   [id number]  YOB: 1955    Operators:  Primary:   Shameka Rodas MD (Attending Physician)    Assistant/CV fellow:  Nisha Bustamante MD      Procedure performed:       [x] Left Heart Catheterization. [] Graft Angiography. [x] Left Ventriculography. [] Right Heart Catheterization. [x] Coronary Angiography. [] Aortic Valve Studies. [x] PCI:      [] Other:       Pre Procedure Conscious Sedation Data:  ASA Class:    [] I [x] II [] III [] IV    Mallampati Class:  [] I [x] II [] III [] IV      Indication:  [] STEMI      [x] + Stress test  [] ACS      [] + EKG Changes  [] Non Q MI       [x] Significant Risk Factors  [] Recurrent Angina             [x] Diabetes Mellitus    [] New LBBB      [] Uncontrolled HTN. [] CHF / Low EF changes     [] Abnormal CTA / Ca Score      Procedure:  Access:  [] Femoral  [x] Radial  artery       [x] Right  [] Left    Procedure: After informed consent was obtained with explanation of the risks and benefits, patient was brought to the cath lab. The access area was prepped and draped in sterile fashion. 1% lidocaine was used for local block. The artery was cannulated with 6  Fr sheath with brisk arterial blood return. The side port was frequently flushed and aspirated with normal saline. Findings:    LMCA: Normal 0% stenosis. Short     LAD: Minimal 30% mid area   D1: 25% stenosis     LCx: Ostial 40% stenosis   Mid 90% stenosis   OM1: Minimal disease         Lesion on Mid CX: Mid subsection. 95% stenosis 12 mm length reduced to 0%.     Pre procedure JAMMIE II flow was noted. Post Procedure JAMMIE III flow was     present. Good runoff was present. The lesion was diagnosed as High Risk     (C).          Comments:EKG changes were noticed with balloon inflation and stent     deployment     Devices used         - Trek Balloon 2.5mm x 12mm. 3 inflation(s) to a max pressure of: 10     susan.         - Xience Tessa 3.0 x 15 SHAKIR. 1 inflation(s) to a max pressure of: 12     susan.       RCA: Mid 80% stenosis         Lesion on Mid RCA: Mid subsection. 85% stenosis 8 mm length reduced to 0%.     Pre procedure JAMMIE III flow was noted. Post Procedure JAMMIE III flow was     present. Good runoff was present. The lesion was diagnosed as Moderate     Risk (B).       Devices used         - [employer] Flex 180 cm. Number of passes: 2.         - Trek Balloon 2.5mm x 12mm. 1 inflation(s) to a max pressure of: 12     susan.         - Xience Tessa 3.0 x 28 SHAKIR. 1 inflation(s) to a max pressure of: 12     susan.        Coronary Tree        Dominance: Right     The LV gram was performed in the SAMSON 30 position. LVEF: 55%. LV Wall Motion: normal    Estimated Blood Loss: 10 mL     Conclusions        Procedure Summary        Two vessel CAD    Preserved LV function    Successful PTCA -SHAKIR mid RCA and LCX        Recommendations        Post stent protocol    Risk factor modification. ____________________________________________________________________    History and Risk Factors    [x] Hypertension     [] Family history of CAD  [x] Hyperlipidemia     [] Cerebrovascular Disease   [] Prior MI       [] Peripheral Vascular disease   [] Prior PCI              [x] Diabetes Mellitus    [] Left Main PCI. [] Currently on Dialysis. [] Prior CABG. [] Currently smoker. [] Cardiac Arrest outside of healthcare facility. [] Yes    [x] No        Witnessed     [] Yes   [] No     Arrest after arrival of EMS  [] Yes   [] No     [] Cardiac Arrest at other Facility. [] Yes   [x] No    Pre-Procedure Information. Heart Failure       [] Yes    [x] No        Class  [] I      [] II  [] III    [] IV. New Diagnosis    [] Yes  [] No    HF Type      [] Systolic   [] Diastolic          [] Unknown.     Diagnostic Test:   EKG       [x] Normal   [] Abnormal    New antiarrhythmia medications:    [] Yes   [] No   New onset atrial fibrillation / Flutter     [] Yes   [] No   ECG Abnormalities:      [] V. Fib   [] Sol V. Tach           [] NS V. T   [] New LBBB           [] T. Inv  []  ST dev > 0.5 mm         [] PVC's freq  [] PVC's infrequent    Stress Test Performed:      [x] Yes    [] No     Type:     [] Stress Echo   [] Exercise Stress Test (no imaging)      [] Stress Nuclear  [x] Stress Imaging     Results   [] Negative   [x] Positive        [] Indeterminate  [] Unavailable     If Positive/ Risk / Extent of Ischemia:       [] Low  [] Intermediate         [x] High  [] Unavailable      Cardiac CTA Performed:     [] Yes    [x] No      Results   [] CAD   [] Non obstructive CAD      [] No CAD   [] Uncertain      [] Unknown   [] Structural Disease. Pre Procedure Medications:   [x] Yes    [] No         [x] ASA   [x] Beta Blockers      [x] Nitrate   [] Ca Channel Blockers      [] Ranolazine   [x] Statin       [] Plavix/Others antiplatelets      Electronically signed on 7/20/2020 at 11:01 AM by:    Neftali Acosta MD  Fellow, [address]      I have reviewed the case / procedure with resident / fellow  I have examined the patient personally  Patient agree with treatment plan, correction innotes was made as appropriate, and discussed final arrangement based on  my evaluation and exam.    Risk and benefit of procedure if planned were explained in details. Procedure was performed by me, with all aspect of the procedure being done using standard protocol. Note was modified based on my own assessment and treatment.     Reina Vazquez MD  [facility]

## 2020-07-20 NOTE — PROGRESS NOTES
[facility]  Progress Note    Date:   7/20/2020  Patient name:  Mandy Ruiz  Date of admission:  7/18/2020  6:19 AM  MRN:   [id number]  YOB: 1955    SUBJECTIVE/Last 24 hours update:     Patient seen and examined at the bed side, no new acute events overnight, no new complains. Still having intermittent CP. Is scheduled for cath this AM. Denies fever, chills, SOB, N/V. Notes from nursing staff and Consults had been reviewed, and the overnight progress had been checked with the nursing staff as well. REVIEW OF SYSTEMS:      CONSTITUTIONAL:  no fevers, no headcahes  EYES: negative for blury vision  HEENT: No headaches, No nasal congestion, no difficulty swallowing  RESPIRATORY:negative for dyspnea, no wheezing, no Cough  CARDIOVASCULAR: Positive for chest pain, no palpitations  GASTROINTESTINAL: no nausea, no vomiting, no change in bowel habits, no abdominal pain   GENITOURINARY: negative for dysuria, no hematuria   MUSCULOSKELETAL: no joint pains, no muscle aches, no swelling of joints or extremities  NEUROLOGICAL: No  Weakness or numbness    PAST MEDICAL HISTORY:      has a past medical history of Anxiety, Borderline hypertension, GERD (gastroesophageal reflux disease), Hypothyroidism, Neuropathy, Obesity, Osteoarthritis, Sleep apnea, and Type II or unspecified type diabetes mellitus without mention of complication, not stated as uncontrolled. PAST SURGICAL HISTORY:      has a past surgical history that includes Hysterectomy; Colonoscopy; Upper gastrointestinal endoscopy; Dilation and curettage of uterus; hiatal hernia repair; Throat surgery; Appendectomy; Total knee arthroplasty (Right, 01/06/2015); and Total knee arthroplasty (Left, 5/26/15). SOCIAL HISTORY:      reports that she has never smoked. She has never used smokeless tobacco. She reports current alcohol use. She reports that she does not use drugs.      FAMILY MULTIPLE VITAMIN PO   Take 2 tablets by mouth daily DAILY    Historical Provider, MD   Alcohol Swabs (ALCOHOL PREP PADS) by Other route 2 times daily. Historical Provider, MD       ALLERGIES:     Dha-epa-vitamin e; Morphine; and Tape [adhesive tape]      OBJECTIVE:       Vitals:    07/19/20 2030 07/19/20 2323 07/20/20 0815 07/20/20 0834   BP: (!) 150/65 (!) 126/59 (!) 162/70 (!) 152/92   Pulse: 76 83 97    Resp: 18 18 15    Temp: 98.7 °F (37.1 °C) 98.8 °F (37.1 °C) 98.5 °F (36.9 °C)    TempSrc: Oral Oral Oral    SpO2: 97% 97%     Weight:       Height:           No intake or output data in the 24 hours ending 07/20/20 0838    PHYSICAL EXAM:  General Appearance  Alert , awake , not in acute distress. Obese   HEENT - Head is normocephalic, atraumatic. Lungs - Bilateral equal air entry , no wheezes, rales or rhonchi, aeration good  Cardiovascular - Heart sounds are normal.  Regular rhythm, normal rate without murmur, gallop or rub. Abdomen - Soft, nontender, nondistended, no masses or organomegaly  Neurologic - There are no new focal motor or sensory deficits  Skin - No bruising or bleeding on exposed skin area  Extremities - No cyanosis, clubbing or edema    DIAGNOSTICS:     Laboratory Testing:    Recent Results (from the past 24 hour(s))   POC Glucose Fingerstick    Collection Time: 07/19/20 12:02 PM   Result Value Ref Range    POC Glucose 373 (H) 65 - 105 mg/dL   COVID-19    Collection Time: 07/19/20 12:32 PM    Specimen: Other   Result Value Ref Range    SARS-CoV-2 Not Detected Not Detected    SARS-CoV-2, Rapid          Source . NASOPHARYNGEAL SWAB     SARS-CoV-2, PCR         POC Glucose Fingerstick    Collection Time: 07/19/20  4:31 PM   Result Value Ref Range    POC Glucose 265 (H) 65 - 105 mg/dL   POC Glucose Fingerstick    Collection Time: 07/19/20  8:17 PM   Result Value Ref Range    POC Glucose 206 (H) 65 - 105 mg/dL   CBC WITH AUTO DIFFERENTIAL    Collection Time: 07/20/20  7:20 AM   Result Value Ref Range    WBC 5.1 3.5 - 11.3 k/uL    RBC 5.23 (H) 3.95 - 5.11 m/uL    Hemoglobin 15.1 11.9 - 15.1 g/dL    Hematocrit 47.6 (H) 36.3 - 47.1 %    MCV 91.0 82.6 - 102.9 fL    MCH 28.9 25.2 - 33.5 pg    MCHC 31.7 28.4 - 34.8 g/dL    RDW 12.5 11.8 - 14.4 %    Platelets See Reflexed IPF Result 138 - 453 k/uL    MPV NOT REPORTED 8.1 - 13.5 fL    NRBC Automated 0.0 0.0 per 100 WBC    Differential Type NOT REPORTED     WBC Morphology NOT REPORTED     RBC Morphology NOT REPORTED     Platelet Estimate NOT REPORTED     Immature Granulocytes 0 0 %    Seg Neutrophils 90 (H) 36 - 66 %    Lymphocytes 10 (L) 24 - 44 %    Monocytes 0 (L) 1 - 7 %    Eosinophils % 0 (L) 1 - 4 %    Basophils 0 0 - 2 %    Absolute Immature Granulocyte 0.00 0.00 - 0.30 k/uL    Segs Absolute 4.59 1.8 - 7.7 k/uL    Absolute Lymph # 0.51 (L) 1.0 - 4.8 k/uL    Absolute Mono # 0.00 (L) 0.1 - 0.8 k/uL    Absolute Eos # 0.00 0.0 - 0.4 k/uL    Basophils Absolute 0.00 0.0 - 0.2 k/uL    Morphology Normal    Comprehensive Metabolic Panel w/ Reflex to MG    Collection Time: 07/20/20  7:20 AM   Result Value Ref Range    Glucose 314 (H) 70 - 99 mg/dL    BUN 12 8 - 23 mg/dL    CREATININE 0.58 0.50 - 0.90 mg/dL    Bun/Cre Ratio NOT REPORTED 9 - 20    Calcium 8.9 8.6 - 10.4 mg/dL    Sodium 139 135 - 144 mmol/L    Potassium 4.5 3.7 - 5.3 mmol/L    Chloride 103 98 - 107 mmol/L    CO2 22 20 - 31 mmol/L    Anion Gap 14 9 - 17 mmol/L    Alkaline Phosphatase 119 (H) 35 - 104 U/L    ALT 22 5 - 33 U/L    AST 24 <32 U/L    Total Bilirubin 0.59 0.3 - 1.2 mg/dL    Total Protein 6.6 6.4 - 8.3 g/dL    Alb 3.6 3.5 - 5.2 g/dL    Albumin/Globulin Ratio 1.2 1.0 - 2.5    GFR Non-African American >60 >60 mL/min    GFR African American >60 >60 mL/min    GFR Comment          GFR Staging NOT REPORTED    PROTIME-INR    Collection Time: 07/20/20  7:20 AM   Result Value Ref Range    Protime 11.0 9.0 - 12.0 sec    INR 1.0    APTT    Collection Time: 07/20/20  7:20 AM   Result Value Ref Range    PTT 26.3 20.5 - 30.5 sec   Immature Platelet Fraction    Collection Time: 07/20/20  7:20 AM   Result Value Ref Range    Platelet, Immature Fraction 3.2 1.1 - 10.3 %    Platelet, Fluorescence 133 (L) 138 - 453 k/uL   POC Glucose Fingerstick    Collection Time: 07/20/20  8:17 AM   Result Value Ref Range    POC Glucose 316 (H) 65 - 105 mg/dL         Imaging/Diagonstics:  NA    Current Facility-Administered Medications   Medication Dose Route Frequency Provider Last Rate Last Dose    glucose (GLUTOSE) 40 % oral gel 15 g  15 g Oral PRN Brigette Nixon MD        dextrose 50 % IV solution  12.5 g Intravenous PRN Brigette Nixon MD        glucagon (rDNA) injection 1 mg  1 mg Intramuscular PRN Brigette Nixon MD        dextrose 5 % solution  100 mL/hr Intravenous PRN Brigette Nixon MD        0.9 % sodium chloride infusion   Intravenous Continuous Ameer Jesus Manuel Aguilera MD 75 mL/hr at 07/20/20 0906      docusate sodium (COLACE) capsule 100 mg  100 mg Oral Daily Aaron Mack MD   100 mg at 07/19/20 1329    hydrocortisone 1 % cream   Topical BID Aaron Mack MD        nitroGLYCERIN (NITROSTAT) SL tablet 0.4 mg  0.4 mg Sublingual Q5 Min PRN Aaron Mack MD   0.4 mg at 07/20/20 0713    hydrALAZINE (APRESOLINE) injection 10 mg  10 mg Intravenous Q6H PRN Aaron Mack MD   10 mg at 07/20/20 0619    aspirin EC tablet 81 mg  81 mg Oral Daily Андрей Gunn MD   81 mg at 07/20/20 0834    citalopram (CELEXA) tablet 40 mg  40 mg Oral Daily Андрей Gunn MD   40 mg at 07/20/20 0834    levothyroxine (SYNTHROID) tablet 50 mcg  50 mcg Oral Daily Андрей Gunn MD   50 mcg at 07/20/20 0618    gabapentin (NEURONTIN) capsule 300 mg  300 mg Oral TID Андрей Gunn MD   300 mg at 07/20/20 0835    glimepiride (AMARYL) tablet 4 mg  4 mg Oral BID  Андрей Gunn MD   4 mg at 07/19/20 1707    insulin glargine (LANTUS) injection vial 45 Units  45 Units Subcutaneous QAM Андрей Gunn MD   45 Units at 07/19/20 0835   

## 2020-07-20 NOTE — CARE COORDINATION
Transitional planning-talked with patient, plan is to go home with her , not wanting any home care at this time. Has ride.

## 2020-07-21 VITALS
SYSTOLIC BLOOD PRESSURE: 169 MMHG | DIASTOLIC BLOOD PRESSURE: 72 MMHG | HEART RATE: 88 BPM | OXYGEN SATURATION: 99 % | BODY MASS INDEX: 49.02 KG/M2 | HEIGHT: 59 IN | WEIGHT: 243.17 LBS | TEMPERATURE: 98.2 F | RESPIRATION RATE: 18 BRPM

## 2020-07-21 LAB
ABSOLUTE EOS #: <0.03 K/UL (ref 0–0.44)
ABSOLUTE IMMATURE GRANULOCYTE: 0.08 K/UL (ref 0–0.3)
ABSOLUTE LYMPH #: 0.95 K/UL (ref 1.1–3.7)
ABSOLUTE MONO #: 1.09 K/UL (ref 0.1–1.2)
ALBUMIN SERPL-MCNC: 3.4 G/DL (ref 3.5–5.2)
ALBUMIN/GLOBULIN RATIO: 1.3 (ref 1–2.5)
ALP BLD-CCNC: 107 U/L (ref 35–104)
ALT SERPL-CCNC: 20 U/L (ref 5–33)
ANION GAP SERPL CALCULATED.3IONS-SCNC: 9 MMOL/L (ref 9–17)
AST SERPL-CCNC: 25 U/L
BASOPHILS # BLD: 0 % (ref 0–2)
BASOPHILS ABSOLUTE: <0.03 K/UL (ref 0–0.2)
BILIRUB SERPL-MCNC: 0.3 MG/DL (ref 0.3–1.2)
BUN BLDV-MCNC: 13 MG/DL (ref 8–23)
BUN/CREAT BLD: ABNORMAL (ref 9–20)
CALCIUM SERPL-MCNC: 8.7 MG/DL (ref 8.6–10.4)
CHLORIDE BLD-SCNC: 103 MMOL/L (ref 98–107)
CO2: 23 MMOL/L (ref 20–31)
CREAT SERPL-MCNC: 0.6 MG/DL (ref 0.5–0.9)
DIFFERENTIAL TYPE: ABNORMAL
EOSINOPHILS RELATIVE PERCENT: 0 % (ref 1–4)
GFR AFRICAN AMERICAN: >60 ML/MIN
GFR NON-AFRICAN AMERICAN: >60 ML/MIN
GFR SERPL CREATININE-BSD FRML MDRD: ABNORMAL ML/MIN/{1.73_M2}
GFR SERPL CREATININE-BSD FRML MDRD: ABNORMAL ML/MIN/{1.73_M2}
GLUCOSE BLD-MCNC: 124 MG/DL (ref 65–105)
GLUCOSE BLD-MCNC: 205 MG/DL (ref 65–105)
GLUCOSE BLD-MCNC: 229 MG/DL (ref 65–105)
GLUCOSE BLD-MCNC: 291 MG/DL (ref 70–99)
HCT VFR BLD CALC: 43 % (ref 36.3–47.1)
HEMOGLOBIN: 13.6 G/DL (ref 11.9–15.1)
IMMATURE GRANULOCYTES: 1 %
INR BLD: 1
LYMPHOCYTES # BLD: 8 % (ref 24–43)
MCH RBC QN AUTO: 29.4 PG (ref 25.2–33.5)
MCHC RBC AUTO-ENTMCNC: 31.6 G/DL (ref 28.4–34.8)
MCV RBC AUTO: 93.1 FL (ref 82.6–102.9)
MONOCYTES # BLD: 9 % (ref 3–12)
NRBC AUTOMATED: 0 PER 100 WBC
PARTIAL THROMBOPLASTIN TIME: 24.6 SEC (ref 20.5–30.5)
PDW BLD-RTO: 12.3 % (ref 11.8–14.4)
PLATELET # BLD: 150 K/UL (ref 138–453)
PLATELET ESTIMATE: ABNORMAL
PMV BLD AUTO: 11.4 FL (ref 8.1–13.5)
POTASSIUM SERPL-SCNC: 4.2 MMOL/L (ref 3.7–5.3)
PROTHROMBIN TIME: 11 SEC (ref 9–12)
RBC # BLD: 4.62 M/UL (ref 3.95–5.11)
RBC # BLD: ABNORMAL 10*6/UL
SEG NEUTROPHILS: 82 % (ref 36–65)
SEGMENTED NEUTROPHILS ABSOLUTE COUNT: 9.61 K/UL (ref 1.5–8.1)
SODIUM BLD-SCNC: 135 MMOL/L (ref 135–144)
TOTAL PROTEIN: 6.1 G/DL (ref 6.4–8.3)
WBC # BLD: 11.7 K/UL (ref 3.5–11.3)
WBC # BLD: ABNORMAL 10*3/UL

## 2020-07-21 PROCEDURE — 97161 PT EVAL LOW COMPLEX 20 MIN: CPT

## 2020-07-21 PROCEDURE — 6370000000 HC RX 637 (ALT 250 FOR IP): Performed by: STUDENT IN AN ORGANIZED HEALTH CARE EDUCATION/TRAINING PROGRAM

## 2020-07-21 PROCEDURE — 80053 COMPREHEN METABOLIC PANEL: CPT

## 2020-07-21 PROCEDURE — 85610 PROTHROMBIN TIME: CPT

## 2020-07-21 PROCEDURE — 2580000003 HC RX 258: Performed by: STUDENT IN AN ORGANIZED HEALTH CARE EDUCATION/TRAINING PROGRAM

## 2020-07-21 PROCEDURE — 99239 HOSP IP/OBS DSCHRG MGMT >30: CPT | Performed by: FAMILY MEDICINE

## 2020-07-21 PROCEDURE — 97530 THERAPEUTIC ACTIVITIES: CPT

## 2020-07-21 PROCEDURE — 85025 COMPLETE CBC W/AUTO DIFF WBC: CPT

## 2020-07-21 PROCEDURE — G0108 DIAB MANAGE TRN  PER INDIV: HCPCS

## 2020-07-21 PROCEDURE — 6370000000 HC RX 637 (ALT 250 FOR IP): Performed by: EMERGENCY MEDICINE

## 2020-07-21 PROCEDURE — 2580000003 HC RX 258: Performed by: EMERGENCY MEDICINE

## 2020-07-21 PROCEDURE — 6360000002 HC RX W HCPCS: Performed by: EMERGENCY MEDICINE

## 2020-07-21 PROCEDURE — 82947 ASSAY GLUCOSE BLOOD QUANT: CPT

## 2020-07-21 PROCEDURE — 97165 OT EVAL LOW COMPLEX 30 MIN: CPT

## 2020-07-21 PROCEDURE — 36415 COLL VENOUS BLD VENIPUNCTURE: CPT

## 2020-07-21 PROCEDURE — 97535 SELF CARE MNGMENT TRAINING: CPT

## 2020-07-21 PROCEDURE — 85730 THROMBOPLASTIN TIME PARTIAL: CPT

## 2020-07-21 RX ORDER — CITALOPRAM 20 MG/1
40 TABLET ORAL DAILY
Qty: 120 TABLET | Refills: 0 | Status: SHIPPED | OUTPATIENT
Start: 2020-07-21 | End: 2020-07-21 | Stop reason: SDUPTHER

## 2020-07-21 RX ORDER — AMLODIPINE BESYLATE 5 MG/1
5 TABLET ORAL DAILY
Qty: 30 TABLET | Refills: 3 | Status: SHIPPED | OUTPATIENT
Start: 2020-07-22 | End: 2020-10-08 | Stop reason: SDUPTHER

## 2020-07-21 RX ORDER — EMPAGLIFLOZIN 10 MG/1
1 TABLET, FILM COATED ORAL DAILY
Qty: 90 TABLET | Refills: 1 | Status: SHIPPED | OUTPATIENT
Start: 2020-07-21 | End: 2020-12-18

## 2020-07-21 RX ORDER — INSULIN GLARGINE 100 [IU]/ML
60 INJECTION, SOLUTION SUBCUTANEOUS DAILY
Qty: 30 ML | Refills: 3 | Status: ON HOLD | OUTPATIENT
Start: 2020-07-21 | End: 2020-07-29 | Stop reason: SDUPTHER

## 2020-07-21 RX ORDER — UREA 10 %
3 LOTION (ML) TOPICAL NIGHTLY PRN
Qty: 30 TABLET | Refills: 0 | Status: SHIPPED | OUTPATIENT
Start: 2020-07-21 | End: 2020-09-30 | Stop reason: SDUPTHER

## 2020-07-21 RX ORDER — CLOPIDOGREL BISULFATE 75 MG/1
75 TABLET ORAL DAILY
Qty: 30 TABLET | Refills: 3 | Status: SHIPPED | OUTPATIENT
Start: 2020-07-22 | End: 2020-10-08 | Stop reason: SDUPTHER

## 2020-07-21 RX ORDER — CITALOPRAM 20 MG/1
40 TABLET ORAL DAILY
Qty: 120 TABLET | Refills: 0 | Status: SHIPPED | OUTPATIENT
Start: 2020-07-21 | End: 2020-08-21 | Stop reason: SDUPTHER

## 2020-07-21 RX ORDER — ATORVASTATIN CALCIUM 40 MG/1
40 TABLET, FILM COATED ORAL NIGHTLY
Qty: 30 TABLET | Refills: 3 | Status: SHIPPED | OUTPATIENT
Start: 2020-07-21 | End: 2020-10-08 | Stop reason: SDUPTHER

## 2020-07-21 RX ORDER — NITROGLYCERIN 0.4 MG/1
TABLET SUBLINGUAL
Qty: 25 TABLET | Refills: 3 | Status: SHIPPED | OUTPATIENT
Start: 2020-07-21

## 2020-07-21 RX ORDER — CARVEDILOL 6.25 MG/1
6.25 TABLET ORAL 2 TIMES DAILY WITH MEALS
Qty: 60 TABLET | Refills: 3 | Status: SHIPPED | OUTPATIENT
Start: 2020-07-21 | End: 2020-10-08 | Stop reason: SDUPTHER

## 2020-07-21 RX ADMIN — DOCUSATE SODIUM 100 MG: 100 CAPSULE, LIQUID FILLED ORAL at 08:14

## 2020-07-21 RX ADMIN — CITALOPRAM 40 MG: 20 TABLET, FILM COATED ORAL at 08:14

## 2020-07-21 RX ADMIN — SODIUM CHLORIDE, PRESERVATIVE FREE 10 ML: 5 INJECTION INTRAVENOUS at 08:21

## 2020-07-21 RX ADMIN — CARVEDILOL 6.25 MG: 6.25 TABLET, FILM COATED ORAL at 08:13

## 2020-07-21 RX ADMIN — THERA TABS 1 TABLET: TAB at 08:13

## 2020-07-21 RX ADMIN — CLOPIDOGREL 75 MG: 75 TABLET, FILM COATED ORAL at 08:14

## 2020-07-21 RX ADMIN — ENOXAPARIN SODIUM 40 MG: 40 INJECTION SUBCUTANEOUS at 08:13

## 2020-07-21 RX ADMIN — ACETAMINOPHEN 1000 MG: 500 TABLET ORAL at 08:12

## 2020-07-21 RX ADMIN — ALOGLIPTIN 6.25 MG: 6.25 TABLET, FILM COATED ORAL at 08:13

## 2020-07-21 RX ADMIN — GLIMEPIRIDE 4 MG: 2 TABLET ORAL at 08:13

## 2020-07-21 RX ADMIN — INSULIN GLARGINE 45 UNITS: 100 INJECTION, SOLUTION SUBCUTANEOUS at 08:14

## 2020-07-21 RX ADMIN — DIPHENHYDRAMINE HYDROCHLORIDE, ZINC ACETATE: 2; .1 CREAM TOPICAL at 08:20

## 2020-07-21 RX ADMIN — TRIAMCINOLONE ACETONIDE: 1 OINTMENT TOPICAL at 08:20

## 2020-07-21 RX ADMIN — Medication 10 ML: at 08:20

## 2020-07-21 RX ADMIN — INSULIN LISPRO 6 UNITS: 100 INJECTION, SOLUTION INTRAVENOUS; SUBCUTANEOUS at 08:12

## 2020-07-21 RX ADMIN — AMLODIPINE BESYLATE 5 MG: 5 TABLET ORAL at 08:14

## 2020-07-21 RX ADMIN — MYCOPHENOLATE MOFETIL 300 MG: 500 TABLET ORAL at 14:00

## 2020-07-21 RX ADMIN — Medication 81 MG: at 08:14

## 2020-07-21 RX ADMIN — LEVOTHYROXINE SODIUM 50 MCG: 50 TABLET ORAL at 08:13

## 2020-07-21 RX ADMIN — PANTOPRAZOLE SODIUM 40 MG: 40 TABLET, DELAYED RELEASE ORAL at 08:14

## 2020-07-21 RX ADMIN — MYCOPHENOLATE MOFETIL 300 MG: 500 TABLET ORAL at 08:14

## 2020-07-21 ASSESSMENT — PAIN DESCRIPTION - LOCATION: LOCATION: GENERALIZED

## 2020-07-21 ASSESSMENT — PAIN DESCRIPTION - PAIN TYPE: TYPE: SUPERFICIAL SOMATIC PAIN

## 2020-07-21 ASSESSMENT — PAIN DESCRIPTION - DESCRIPTORS: DESCRIPTORS: DISCOMFORT;ACHING

## 2020-07-21 ASSESSMENT — PAIN SCALES - GENERAL
PAINLEVEL_OUTOF10: 0
PAINLEVEL_OUTOF10: 2

## 2020-07-21 NOTE — PROGRESS NOTES
Physical Therapy    Facility/Department: [facility]  Initial Assessment    NAME: Yanick Serrano  : 1955  MRN: [id number]    Date of Service: 2020    Discharge Recommendations:    No further therapy required at discharge. PT Equipment Recommendations  Equipment Needed: No    Assessment   Assessment: Pt demonstrating safe mobility. Pt at baseline  Prognosis: Good  Decision Making: Low Complexity  PT Education: General Safety  No Skilled PT: At baseline function  REQUIRES PT FOLLOW UP: No  Activity Tolerance  Activity Tolerance: Patient Tolerated treatment well       Patient Diagnosis(es): The primary encounter diagnosis was Chest pain, unspecified type. Diagnoses of Chest wall pain, Anxiety state, and Reactive depression were also pertinent to this visit. has a past medical history of Anxiety, Borderline hypertension, GERD (gastroesophageal reflux disease), Hypothyroidism, Neuropathy, Obesity, Osteoarthritis, Sleep apnea, and Type II or unspecified type diabetes mellitus without mention of complication, not stated as uncontrolled. has a past surgical history that includes Hysterectomy; Colonoscopy; Upper gastrointestinal endoscopy; Dilation and curettage of uterus; hiatal hernia repair; Throat surgery; Appendectomy; Total knee arthroplasty (Right, 2015); and Total knee arthroplasty (Left, 5/26/15). Restrictions  Restrictions/Precautions  Restrictions/Precautions: Up as Tolerated, Cardiac  Required Braces or Orthoses?: No  Position Activity Restriction  Other position/activity restrictions: up in chair, SBR for only 2 hours  Vision/Hearing  Vision: Within Functional Limits  Hearing: Within functional limits     Subjective  General  Patient assessed for rehabilitation services?: Yes  Family / Caregiver Present: Yes  Follows Commands: Within Functional Limits  General Comment  Comments: Pt up in chair. Subjective  Subjective: Pt hoping to go home today.   Pain Screening  Patient Currently in Pain: Denies  Vital Signs  Patient Currently in Pain: Denies       Orientation  Orientation  Overall Orientation Status: Within Normal Limits  Social/Functional History  Social/Functional History  Lives With: Spouse  Type of Home: House  Home Layout: Multi-level, Bed/Bath upstairs  Home Access: Stairs to enter with rails  Entrance Stairs - Number of Steps: 5  Entrance Stairs - Rails: Left  Bathroom Shower/Tub: Tub/Shower unit  Bathroom Toilet: Handicap height  Bathroom Equipment: Grab bars in shower, Shower chair  Home Equipment: Cane, 4 wheeled walker(Cane inside the home and the rolling walker outide of home)  ADL Assistance: (Pt reports  assist with transfer out of shower.)  Homemaking Assistance: Independent  Homemaking Responsibilities: No(Pt reports  completes)  Meal Prep Responsibility: Secondary  Laundry Responsibility: Secondary  Cleaning Responsibility: Secondary  Ambulation Assistance: Independent  Transfer Assistance: Independent  Active : Yes  Mode of Transportation: [facility]  Occupation: Retired  Type of occupation: Teacher  Cognition   Cognition  Overall Cognitive Status: WFL    Objective          AROM RLE (degrees)  RLE AROM: WFL  AROM LLE (degrees)  LLE AROM : WFL  AROM RUE (degrees)  RUE AROM : WFL  AROM LUE (degrees)  LUE AROM : WFL  Strength RLE  Strength RLE: WFL  Strength LLE  Strength LLE: WFL  Strength RUE  Strength RUE: WFL  Strength LUE  Strength LUE: WFL  Motor Control  Gross Motor?: WNL  Sensation  Overall Sensation Status: Impaired(Bilateral LE peripheral neuropathy.)   Pt up in chair. Patient stated without assistance.   Transfers  Sit to Stand: Supervision  Stand to sit: Supervision  Ambulation  Ambulation?: Yes  Ambulation 1  Surface: level tile  Device: Rolling Walker  Assistance: Stand by assistance  Gait Deviations: None  Distance: 300 ft  Stairs/Curb  Stairs?: No     Balance  Sitting - Static: Good  Sitting - Dynamic: Good  Standing - Static: Good  Standing -

## 2020-07-21 NOTE — DISCHARGE SUMMARY
[facility]  Discharge Summary      NAME:  Nila Casiano  :    MRN:  [id number]    Admit date:  2020  Discharge date:  2020    Admitting Physician:  Dena Grant DO    Primary Diagnosis on Admission:   Present on Admission:   Chest pain   Morbid obesity ([facility] [address]   Anxiety   GERD (gastroesophageal reflux disease)   OA (osteoarthritis)   DM (diabetes mellitus)   Hypothyroid   Neuropathy   Right knee DJD   Hypothyroidism   S/P left total knee arthroplasty   Obesity, morbid, BMI 50 or higher ([facility] [address]   Ischemic heart disease   Essential hypertension   Hypertensive urgency      Secondary Diagnoses:  does not have any pertinent problems on file. Admission Condition:  good     Discharged Condition: serious    Hospital Course: The patient was admitted for the management of chest pain, as well as hypertensive urgency which was managed with a nitroglycerin drip. She had a positive stress test on day of admission followed by cardiac catheterization on 2020 where she had SHAKIR of the mid RCA and LCX. She was seen by diabetic educator in-hospital and it was agreed that upon discharge she will stop glipizide and begin jardiance. She will also begin Lantus dosing 60 IU at night instead of 45/15 split dosing, as she frequently missed her nighttime dose on that regimen. During her hospital stay she reported exacerbation of chronic depression/anxiety including suicidal thoughts, today on discharge she denies any suicidal thoughts or thoughts of harming herself or others. For now she will decrease her Celexa to 40 mg daily as pharmacy recommended decreasing to 20 mg for risk of QTC prolongation however need to taper more gradully than that, also to begin tapering down in anticipation of PCP changing to new medication as patient reports Celexa no longer controlling depression/anxiety.       Norvasc started, blood pressure empagliflozin (JARDIANCE) 10 MG tablet  Take 1 tablet by mouth daily             EUTHYROX 50 MCG tablet  TAKE 1 TABLET BY MOUTH ONCE DAILY             gabapentin (NEURONTIN) 300 MG capsule  TAKE 1 CAPSULE BY MOUTH THREE TIMES DAILY             glimepiride (AMARYL) 4 MG tablet  TAKE 1 TABLET BY MOUTH TWICE DAILY             Glucose Blood (BLOOD GLUCOSE TEST STRIPS) STRP  Test before meals and at bedtime. DX: DM, on insulin             Handicap Placard MISC  Is a permanent condition. DX: M19.90             insulin glargine (LANTUS SOLOSTAR) 100 UNIT/ML injection pen  Inject 60 Units into the skin daily INJECT 45 UNITS SUBCUTANEOUSLY IN THE MORNING AND 15 UNITS SUBCUTANEOUSLY IN THE EVENING             Insulin Pen Needle (B-D UF III MINI PEN NEEDLES) 31G X 5 MM MISC  USE 1 PEN NEEDLE DAILY             [employer] Lancets Thin MISC  Test before meals and at bedtime. DX: DM, on insulin             lisinopril (PRINIVIL;ZESTRIL) 10 MG tablet  Take 1 tablet by mouth once daily             melatonin 1 MG tablet  Take 3 tablets by mouth nightly as needed for Sleep             MULTIPLE VITAMIN PO    Take 2 tablets by mouth daily DAILY             nitroGLYCERIN (NITROSTAT) 0.4 MG SL tablet  up to max of 3 total doses. If no relief after 1 dose, call 911. omeprazole (PRILOSEC) 20 MG delayed release capsule  Take 1 capsule by mouth Daily             SITagliptin (JANUVIA) 100 MG tablet  TAKE 1 TABLET DAILY             triamcinolone (KENALOG) 0.1 % ointment  Apply topically 2 times daily.  For 7 days                 Send Copies to: Jun Rico MD, [facility]      Note that over 30 minutes was spent in preparing discharge papers, discussing discharge with patient and family, medication review, etc.    Signed:  Brenda Brush MD  Transitional Year Resident  7/21/2020 2:18 PM

## 2020-07-21 NOTE — PROGRESS NOTES
Merline Garter,  Seen for evaluation and education on Type 2 uncontrolled. Has about a 20+ year h/o diabetes, 12 of which on insulin. Pt's spouse in room. Lab Results   Component Value Date    LABA1C 10.7 (H) 07/14/2020     Lab Results   Component Value Date     07/14/2020       Discussed with Merline Garter, their current diabetes self care routines prior to this admission. medication compliance: noncompliant some of the time, diabetic diet compliance (pt admits to liking sweets and not following a diet): noncompliant some of the time (wasn't taking 2nd lantus shot (pm dose), home glucose monitoring: is not performed. Pt has a meter but states she has gotten lazy with using and is frustrated bc her # run high. Pt likes to swim and plans to start swimming again- that helped her wt and bs. Reviewed insulin administration (pt uses pens) and provided BD get started kit. Briefly discussed role of diet, physical activity, daily use of medications and self monitoring for good diabetes control, provided diabetes education folder. Also referred patient to view diabetes education programs on education TV - Channel 75 and 76 at 9am, 3pm 6pm and 9pm      Discussed need to be aware of sign and symptoms of hypoglycemia and hyperglycemia  and treatment for hypoglycemia and hyperglycemia. When to call PCP for advice / sick day plan. Provided patient with card and contact information for out patient  Diabetes education services and encouraged follow up with a PCP. Would recommend follow -up education at outpatient diabetes education at [place] [id number]. An ordered is needed for this service and can be placed via EHR.  Discharge Navigator --- Med Reconciliation -- New orders for discharge tab -- search diabetic ed - choose REF 20 -- review and sign     Patient will need prescriptions for the following supplies at discharge: Suggested to MD to change Lantus to 60 units once daily and add Jardiance d/t recent heart issues. [id number] [address] with supplies for insulin and SMBG      Patient verbalizes and demonstrates understanding  of survival skills education.           Pan Hays, JONATHON, LD

## 2020-07-21 NOTE — PROGRESS NOTES
[facility]   Progress Note                   Date:   7/21/2020  Patient name: Bennett Gill  Date of admission:  7/18/2020  6:19 AM  MRN:   [id number]  YOB: 1955  PCP: Dorothy Abel MD    Reason for Admission: Chest pain [R07.9]  Chest pain [R07.9]  Chest pain [R07.9]    Subjective:       Clinical Changes / Abnormalities: Pt seen and examined in the room with spouse. Pt denies any CP or SOB. Radial site soft.         Medications:   Scheduled Meds:   sodium chloride flush  10 mL Intravenous 2 times per day    clopidogrel  75 mg Oral Daily    triamcinolone   Topical BID    insulin lispro  0-18 Units Subcutaneous TID WC    insulin lispro  0-9 Units Subcutaneous Nightly    docusate sodium  100 mg Oral Daily    aspirin  81 mg Oral Daily    citalopram  40 mg Oral Daily    levothyroxine  50 mcg Oral Daily    gabapentin  300 mg Oral TID    glimepiride  4 mg Oral BID WC    insulin glargine  45 Units Subcutaneous QAM    insulin glargine  15 Units Subcutaneous Nightly    [Held by provider] lisinopril  10 mg Oral Daily    multivitamin  1 tablet Oral Daily    pantoprazole  40 mg Oral QAM AC    alogliptin  6.25 mg Oral Daily    citalopram  20 mg Oral QPM    sodium chloride flush  10 mL Intravenous 2 times per day    enoxaparin  40 mg Subcutaneous Daily    atorvastatin  40 mg Oral Nightly    carvedilol  6.25 mg Oral BID WC    amLODIPine  5 mg Oral Daily     Continuous Infusions:   dextrose      sodium chloride 75 mL/hr at 07/20/20 0906     CBC:   Recent Labs     07/19/20  0535 07/20/20  0720 07/21/20  0500   WBC 6.1 5.1 11.7*   HGB 13.3 15.1 13.6   PLT See Reflexed IPF Result See Reflexed IPF Result 150     BMP:    Recent Labs     07/19/20  0535 07/20/20  0720 07/21/20  0500    139 135   K 4.1 4.5 4.2    103 103   CO2 25 22 23   BUN 10 12 13   CREATININE 0.66 0.58 0.60   GLUCOSE 254* 314* 291*     Hepatic:   Recent Labs     07/19/20  0535 07/20/20  0720 07/21/20  0500   AST 24 24 25   ALT 22 22 20   BILITOT 0.40 0.59 0.30   ALKPHOS 109* 119* 107*     Troponin:   Recent Labs     07/18/20  2217 07/19/20  0154 07/19/20  0535   TROPHS 22* 21* 20*     BNP: No results for input(s): BNP in the last 72 hours. Lipids: No results for input(s): CHOL, HDL in the last 72 hours. Invalid input(s): LDLCALCU  INR:   Recent Labs     07/19/20  0535 07/20/20  0720 07/21/20  0500   INR 1.0 1.0 1.0     ECHO 7/21/20  Summary   Left ventricle is normal in size Global left ventricular systolic function   is normal   Estimated ejection fraction is 65 % . Moderate left ventricular hypertrophy. No significant valvular abnormalities. CARDIAC CATH 7/20/20  Findings:     LMCA: Normal 0% stenosis. Short     LAD: Minimal 30% mid area   D1: 25% stenosis     LCx: Ostial 40% stenosis   Mid 90% stenosis   OM1: Minimal disease         Lesion on Mid CX: Mid subsection. 95% stenosis 12 mm length reduced to 0%.     Pre procedure JAMMIE II flow was noted. Post Procedure JAMMIE III flow was     present. Good runoff was present. The lesion was diagnosed as High Risk     (C).       Comments:EKG changes were noticed with balloon inflation and stent     deployment     Devices used         - Trek Balloon 2.5mm x 12mm. 3 inflation(s) to a max pressure of: 10     susan.         - Xience Tessa 3.0 x 15 SHAKIR. 1 inflation(s) to a max pressure of: 12     susan.       RCA: Mid 80% stenosis         Lesion on Mid RCA: Mid subsection. 85% stenosis 8 mm length reduced to 0%.     Pre procedure JAMMIE III flow was noted. Post Procedure JAMMIE III flow was     present. Good runoff was present. The lesion was diagnosed as Moderate     Risk (B).       Devices used         - [employer] Flex 180 cm. Number of passes: 2.         - Trek Balloon 2.5mm x 12mm. 1 inflation(s) to a max pressure of: 12     susan.         - Xience Tessa 3.0 x 28 SHAKIR.  1 inflation(s) to a max pressure of: 12     susan.        Coronary Tree      Dominance: Right      The LV gram was performed in the SAMSON 30 position. LVEF: 55%. LV Wall Motion: normal     Estimated Blood Loss: 10 mL      Conclusions        Procedure Summary        Two vessel CAD    Preserved LV function    Successful PTCA -SHAKIR mid RCA and LCX        Recommendations        Post stent protocol    Risk factor modification.          STRESS: 7/19/20  Perfusion:  Reversible defects involving 13% of the myocardium, as above         Function:  Normal left ventricular wall motion.  Normal left ventricular    ejection fraction of 61%         Risk stratification: High; stress-induced reversible defects involving 13% of    the myocardium.       Mild to moderate reversible perfusion defects noted in the inferior apical,    lateral a pickle, and a pickle septal locations, 13% of myocardium       Objective:   Vitals: BP (!) 169/72   Pulse 88   Temp 98.2 °F (36.8 °C) (Oral)   Resp 18   Ht 4' 11\" (1.499 m)   Wt 243 lb 2.7 oz (110.3 kg)   SpO2 99%   BMI 49.11 kg/m²   General appearance: alert and cooperative with exam  HEENT: Head: Normocephalic, no lesions, without obvious abnormality. Neck: no JVD, trachea midline, no adenopathy  Lungs: Clear to auscultation  Heart: Regular rate and rhythm, s1/s2 auscultated, no murmurs  Abdomen: soft, non-tender, bowel sounds active  Extremities: no edema, radial site soft, no drainage, no hematoma   Neurologic: not done        Assessment / Acute Cardiac Problems:   1. Chest pain  2. Hypertensive urgency  3. Abnormal stress test - mild to moderate perfusion defects in inferior/lateral/apical/septal, preserved EF  4. Minimal troponin elevation with flat trend  5. Anxiety  6. Obesity  7. Hypothyroid  8.  DM    Patient Active Problem List:     Morbid obesity ([address]     Anxiety     GERD (gastroesophageal reflux disease)     OA (osteoarthritis)     DM (diabetes mellitus)     Hypothyroid     Neuropathy     Right knee DJD     Hypothyroidism     S/P left total knee arthroplasty     Obesity, morbid, BMI 50 or higher ([facility] [address]     Chest pain     Ischemic heart disease     Essential hypertension     Hypertensive urgency    Coronary Discharge Core Measure: Please indicate the medication given by X, and if not the reasons not given:    Not Given Reason  Given      Beta Blockers X      ACE-I X      Statins X      ASA X      OAP (Plavix/Effient/Brilinta) X    SL Nitro X       Plan of Treatment:   1. CAD,. Stable. Continue ASA, CCB, Statin, BB, plavix. Will restart ACE. SL nitro PRN. Reviewed medication importance and pt verbalizes understanding. 2. HTN. Elevated. Continue BB, CCB. Will restart ACE   3. Morbid obesity. BMI > 49 Advises to increase activity and start low fat, low sodium diet   4. Will sign off and follow up in 2 weeks in clinic.       Electronically signed by HAO Barron CNP on 7/21/2020 at 11:00 AM  [address]  [phone]

## 2020-07-21 NOTE — PROGRESS NOTES
independent   LE Dressing: Modified independent   Toileting: Modified independent (Pt completed toileting in bathroom.)  Tone RUE  RUE Tone: Normotonic  Tone LUE  LUE Tone: Normotonic  Coordination  Movements Are Fluid And Coordinated: Yes     Bed mobility  Comment: Pt seated in chair upon arrival and retired to chair  Transfers  Sit to stand: Contact guard assistance  Stand to sit: Stand by assistance  Transfer Comments: with RW     Cognition  Overall Cognitive Status: WFL        Sensation  Overall Sensation Status: Impaired(Pt reports neuropathy in BLE and hands.)        LUE AROM (degrees)  LUE AROM : WFL  Left Hand AROM (degrees)  Left Hand AROM: WFL  RUE AROM (degrees)  RUE AROM : WFL  Right Hand AROM (degrees)  Right Hand AROM: WFL  LUE Strength  Gross LUE Strength: WFL  L Hand General: 5/5  RUE Strength  Gross RUE Strength: WFL  R Hand General: 5/5     Plan   Plan  Times per week: discharge from OT    AM-PAC Score     AM-PAC Inpatient Daily Activity Raw Score: 24 (07/21/20 1147)  AM-PAC Inpatient ADL T-Scale Score : 57.54 (07/21/20 1147)  ADL Inpatient CMS 0-100% Score: 0 (07/21/20 1147)  ADL Inpatient CMS G-Code Modifier : [facility] (07/21/20 1147)      Therapy Time   Individual Concurrent Group Co-treatment   Time In 1047         Time Out 1117         Minutes 30      co-eval with PT part of the time   Timed Code Treatment Minutes: [address] [facility] S/OT

## 2020-07-21 NOTE — PROGRESS NOTES
[facility]  Progress Note    Date:   7/21/2020  Patient name:  Kathy Angel  Date of admission:  7/18/2020  6:19 AM  MRN:   [id number]  YOB: 1955    SUBJECTIVE/Last 24 hours update:     Patient seen and examined at the bed side, no new acute events overnight except intermittent chest pain which is consistent post stent placement, no new complaints. The rash on her L hand has not progressed but is still erythematous. She states that her peripheral neuropathy needs to be better controlled. She stated increased anxiety and depression without suicidal thoughts. Notes from nursing staff and Consults had been reviewed, and the overnight progress had been checked with the nursing staff as well. REVIEW OF SYSTEMS:      CONSTITUTIONAL:  no fevers, no headcahes  EYES: negative for blury vision  HEENT: No headaches, No nasal congestion, no difficulty swallowing  RESPIRATORY:negative for dyspnea, no wheezing, no Cough  CARDIOVASCULAR: intermittent for chest pain, no palpitations  GASTROINTESTINAL: no nausea, no vomiting, no change in bowel habits, no abdominal pain   GENITOURINARY: negative for dysuria, no hematuria   MUSCULOSKELETAL: no joint pains, no muscle aches, no swelling of joints or extremities, rash on L hand  NEUROLOGICAL: No  Weakness or numbness    PAST MEDICAL HISTORY:      has a past medical history of Anxiety, Borderline hypertension, GERD (gastroesophageal reflux disease), Hypothyroidism, Neuropathy, Obesity, Osteoarthritis, Sleep apnea, and Type II or unspecified type diabetes mellitus without mention of complication, not stated as uncontrolled. PAST SURGICAL HISTORY:      has a past surgical history that includes Hysterectomy; Colonoscopy; Upper gastrointestinal endoscopy; Dilation and curettage of uterus; hiatal hernia repair; Throat surgery; Appendectomy;  Total knee arthroplasty (Right, 01/06/2015); and Total knee arthroplasty (Left, 5/26/15). SOCIAL HISTORY:      reports that she has never smoked. She has never used smokeless tobacco. She reports current alcohol use. She reports that she does not use drugs. FAMILY HISTORY:     family history includes Arthritis in her father and mother; Cancer in her father; Diabetes in her sister; Heart Disease in her father and mother. HOME MEDICATIONS:      Prior to Admission medications    Medication Sig Start Date End Date Taking? Authorizing Provider   lisinopril (PRINIVIL;ZESTRIL) 10 MG tablet Take 1 tablet by mouth once daily 7/20/20   Roberto Rodriguez MD   gabapentin (NEURONTIN) 300 MG capsule TAKE 1 CAPSULE BY MOUTH THREE TIMES DAILY 4/24/20 7/24/20  Roberto Rodriguez MD   insulin glargine (LANTUS SOLOSTAR) 100 UNIT/ML injection pen INJECT 45 UNITS SUBCUTANEOUSLY IN THE MORNING AND 15 UNITS SUBCUTANEOUSLY IN THE EVENING 1/27/20   Roberto Rodriguez MD   EUTHYROX 50 MCG tablet TAKE 1 TABLET BY MOUTH ONCE DAILY 11/6/19   Roberto Rodriguez MD   glimepiride (AMARYL) 4 MG tablet TAKE 1 TABLET BY MOUTH TWICE DAILY 11/6/19   Roberto Rodriguez MD   citalopram (CELEXA) 20 MG tablet TAKE 2 TABLETS BY MOUTH IN THE MORNING AND 1 TABLET BY MOUTH IN THE EVENING 11/6/19   Roberto Rodriguez MD   Handicap Placard MISC Is a permanent condition.   DX: M19.90 5/14/19   Roberto Rodriguez MD   SITagliptin (JANUVIA) 100 MG tablet TAKE 1 TABLET DAILY 9/13/18   Kamilah Menon MD   omeprazole (PRILOSEC) 20 MG delayed release capsule Take 1 capsule by mouth Daily 3/8/18   Roberto Rodriguez MD   Insulin Pen Needle (B-D UF III MINI PEN NEEDLES) 31G X 5 MM MISC USE 1 PEN NEEDLE DAILY 3/8/18   Roberto Rodriguez MD   aspirin 81 MG EC tablet Take 1 tablet by mouth daily 3/8/18   Roberto Rodriguez MD   Dextromethorphan-guaiFENesin (ROBITUSSIN COUGH+CHEST CESAR DM)  MG CAPS Take as directed  Patient not taking: Reported on 6/4/2020 2/8/18   Rodriguez Byers, MD   [employer] Lancets Thin MISC Test before meals and at bedtime. DX: DM, on insulin 4/22/14   Khadijah Villarreal MD   Glucose Blood (BLOOD GLUCOSE TEST STRIPS) STRP Test before meals and at bedtime. DX: DM, on insulin 4/22/14   Khadijah Villarreal MD   MULTIPLE VITAMIN PO   Take 2 tablets by mouth daily DAILY    Historical Provider, MD   Alcohol Swabs (ALCOHOL PREP PADS) by Other route 2 times daily. Historical Provider, MD       ALLERGIES:     Shellfish-derived products; Morphine; and Tape [adhesive tape]      OBJECTIVE:       Vitals:    07/20/20 2051 07/21/20 0615 07/21/20 0634 07/21/20 0812   BP: (!) 151/65 (!) 138/48  124/62   Pulse: 92 86 80 79   Resp: 22 18 15 17   Temp: 98.3 °F (36.8 °C)      TempSrc: Oral      SpO2:       Weight:  243 lb 2.7 oz (110.3 kg)     Height:             Intake/Output Summary (Last 24 hours) at 7/21/2020 0839  Last data filed at 7/21/2020 7558  Gross per 24 hour   Intake 1283 ml   Output 1350 ml   Net -67 ml       PHYSICAL EXAM:  General Appearance  Alert , awake , not in acute distress  HEENT - Head is normocephalic, atraumatic. Lungs - Bilateral equal air entry , no wheezes, rales or rhonchi, aeration good  Cardiovascular - Heart sounds are normal.  Regular rhythm, normal rate without murmur, gallop or rub.   Abdomen - Soft, nontender, nondistended, no masses or organomegaly  Neurologic - There are no new focal motor or sensory deficits  Skin - No bruising or bleeding on exposed skin area, rash on L hand  Extremities - No cyanosis, clubbing or edema    DIAGNOSTICS:     Laboratory Testing:    Recent Results (from the past 24 hour(s))   Activated clotting time    Collection Time: 07/20/20 11:01 AM   Result Value Ref Range    Activated Clotting Time 352 (H) 79 - 149 sec   POC Glucose Fingerstick    Collection Time: 07/20/20  1:09 PM   Result Value Ref Range    POC Glucose 390 (H) 65 - 105 mg/dL   POC Glucose Fingerstick    Collection Time: 07/20/20  3:57 PM   Result Value Ref Range    POC Glucose 392 (H) 65 - 105 mg/dL   POC Glucose Fingerstick    Collection Time: 07/20/20  5:25 PM   Result Value Ref Range    POC Glucose 405 (HH) 65 - 105 mg/dL   POC Glucose Fingerstick    Collection Time: 07/20/20  8:46 PM   Result Value Ref Range    POC Glucose 329 (H) 65 - 105 mg/dL   CBC WITH AUTO DIFFERENTIAL    Collection Time: 07/21/20  5:00 AM   Result Value Ref Range    WBC 11.7 (H) 3.5 - 11.3 k/uL    RBC 4.62 3.95 - 5.11 m/uL    Hemoglobin 13.6 11.9 - 15.1 g/dL    Hematocrit 43.0 36.3 - 47.1 %    MCV 93.1 82.6 - 102.9 fL    MCH 29.4 25.2 - 33.5 pg    MCHC 31.6 28.4 - 34.8 g/dL    RDW 12.3 11.8 - 14.4 %    Platelets 697 784 - 210 k/uL    MPV 11.4 8.1 - 13.5 fL    NRBC Automated 0.0 0.0 per 100 WBC    Differential Type NOT REPORTED     WBC Morphology NOT REPORTED     RBC Morphology NOT REPORTED     Platelet Estimate NOT REPORTED     Seg Neutrophils 82 (H) 36 - 65 %    Lymphocytes 8 (L) 24 - 43 %    Monocytes 9 3 - 12 %    Eosinophils % 0 (L) 1 - 4 %    Basophils 0 0 - 2 %    Immature Granulocytes 1 (H) 0 %    Segs Absolute 9.61 (H) 1.50 - 8.10 k/uL    Absolute Lymph # 0.95 (L) 1.10 - 3.70 k/uL    Absolute Mono # 1.09 0.10 - 1.20 k/uL    Absolute Eos # <0.03 0.00 - 0.44 k/uL    Basophils Absolute <0.03 0.00 - 0.20 k/uL    Absolute Immature Granulocyte 0.08 0.00 - 0.30 k/uL   Comprehensive Metabolic Panel w/ Reflex to MG    Collection Time: 07/21/20  5:00 AM   Result Value Ref Range    Glucose 291 (H) 70 - 99 mg/dL    BUN 13 8 - 23 mg/dL    CREATININE 0.60 0.50 - 0.90 mg/dL    Bun/Cre Ratio NOT REPORTED 9 - 20    Calcium 8.7 8.6 - 10.4 mg/dL    Sodium 135 135 - 144 mmol/L    Potassium 4.2 3.7 - 5.3 mmol/L    Chloride 103 98 - 107 mmol/L    CO2 23 20 - 31 mmol/L    Anion Gap 9 9 - 17 mmol/L    Alkaline Phosphatase 107 (H) 35 - 104 U/L    ALT 20 5 - 33 U/L    AST 25 <32 U/L    Total Bilirubin 0.30 0.3 - 1.2 mg/dL    Total Protein 6.1 (L) 6.4 - 8.3 g/dL    Alb 3.4 (L) 3.5 - 5.2 g/dL    Albumin/Globulin Ratio 1.3 1.0 - 2.5    GFR  nitroGLYCERIN (NITROSTAT) SL tablet 0.4 mg  0.4 mg Sublingual Q5 Min PRN Daisha Mar MD   0.4 mg at 07/20/20 0713    hydrALAZINE (APRESOLINE) injection 10 mg  10 mg Intravenous Q6H PRN Daisha Mar MD   10 mg at 07/20/20 0619    aspirin EC tablet 81 mg  81 mg Oral Daily Issac Lehman MD   81 mg at 07/21/20 0814    citalopram (CELEXA) tablet 40 mg  40 mg Oral Daily Issac Lehman MD   40 mg at 07/21/20 0814    levothyroxine (SYNTHROID) tablet 50 mcg  50 mcg Oral Daily Issac Lehman MD   50 mcg at 07/21/20 0813    gabapentin (NEURONTIN) capsule 300 mg  300 mg Oral TID Issac Lehman MD   300 mg at 07/21/20 0814    glimepiride (AMARYL) tablet 4 mg  4 mg Oral BID WC Issac Lehman MD   4 mg at 07/21/20 0813    insulin glargine (LANTUS) injection vial 45 Units  45 Units Subcutaneous QAM Issac Lehman MD   45 Units at 07/21/20 0814    insulin glargine (LANTUS) injection vial 15 Units  15 Units Subcutaneous Nightly Issac Lehman MD   15 Units at 07/20/20 2048    [Held by provider] lisinopril (PRINIVIL;ZESTRIL) tablet 10 mg  10 mg Oral Daily Issac Lehman MD        multivitamin 1 tablet  1 tablet Oral Daily Issac Lehman MD   1 tablet at 07/21/20 0813    pantoprazole (PROTONIX) tablet 40 mg  40 mg Oral QAM AC Issac Lehman MD   40 mg at 07/21/20 0814    alogliptin (NESINA) tablet 6.25 mg  6.25 mg Oral Daily Issac Lehman MD   6.25 mg at 07/21/20 0813    citalopram (CELEXA) tablet 20 mg  20 mg Oral QPM Issac Lehman MD   20 mg at 07/20/20 1742    sodium chloride flush 0.9 % injection 10 mL  10 mL Intravenous 2 times per day Issac Lehman MD   10 mL at 07/21/20 0821    sodium chloride flush 0.9 % injection 10 mL  10 mL Intravenous PRN Issac Lehman MD        acetaminophen (TYLENOL) tablet 1,000 mg  1,000 mg Oral Q6H PRN Issac Lehman MD   1,000 mg at 07/21/20 0812    enoxaparin (LOVENOX) injection 40 mg  40 mg Subcutaneous Daily Burgess Davin MD   40 mg at 07/21/20 0813    ondansetron (ZOFRAN-ODT) disintegrating tablet 4 mg  4 mg Oral Q8H PRN Burgess Davin MD        ibuprofen (ADVIL;MOTRIN) tablet 800 mg  800 mg Oral Q8H PRN Burgess Davin MD        sodium chloride flush 0.9 % injection 10 mL  10 mL Intravenous PRN Marie Washington MD   10 mL at 07/18/20 1302    sodium chloride flush 0.9 % injection 10 mL  10 mL Intravenous PRN Marie Washington MD   10 mL at 07/18/20 1140    atorvastatin (LIPITOR) tablet 40 mg  40 mg Oral Nightly Laine Chan MD   40 mg at 07/20/20 2048    ondansetron (ZOFRAN) injection 4 mg  4 mg Intravenous Q6H PRN Laine Chan MD   4 mg at 07/19/20 1335    carvedilol (COREG) tablet 6.25 mg  6.25 mg Oral BID WC Antonio Sauceda MD   6.25 mg at 07/21/20 0813    amLODIPine (NORVASC) tablet 5 mg  5 mg Oral Daily Antonio Sauceda MD   5 mg at 07/21/20 0814    diphenhydrAMINE (BENADRYL) injection 25 mg  25 mg Intravenous Q6H PRN Antonio Sauceda MD   25 mg at 07/20/20 1742    potassium chloride (KLOR-CON M) extended release tablet 40 mEq  40 mEq Oral PRN Antonio Sauceda MD   40 mEq at 07/18/20 2049    Or    potassium bicarb-citric acid (EFFER-K) effervescent tablet 40 mEq  40 mEq Oral PRN Antonio Sauceda MD        Or    potassium chloride 10 mEq/100 mL IVPB (Peripheral Line)  10 mEq Intravenous PRN Antonio Sauceda MD        melatonin tablet 3 mg  3 mg Oral Nightly PRN Antonio Sauceda MD   3 mg at 07/20/20 2049       ASSESSMENT:     Principal Problem:    Chest pain  Active Problems: Morbid obesity (HCC)    Anxiety    GERD (gastroesophageal reflux disease)    OA (osteoarthritis)    DM (diabetes mellitus)    Hypothyroid    Neuropathy    Right knee DJD    Hypothyroidism    S/P left total knee arthroplasty    Obesity, morbid, BMI 50 or higher ([facility] [address]    Ischemic heart disease    Essential hypertension    Hypertensive urgency  Resolved Problems:    * No resolved hospital problems.  *      PLAN:     Chest Pain  Received Cardiac Cath POD #1  Telemetry  D/c Nitro  Tylenol PRN for pain control, Ibuprofen PRN    Hypertensive Urgency  BP is improving but still elevated    T2DM, not well controlled, on insulin, Morbid obesity, Neuropathy  Last HBA1c 10.7 on 7/14/2020   Lantus 45u Am, 15u nightly ISS, POCT glucose  Carb control diet  Continue Nesina and Amaryl  Resume Home Gabapentin 300 mg TID  Dietitian consult and Diabetic Education  Discuss better control of neuropathy outpatient    Hypothyroidism - stable  Synthyroid 50 mcg    Dyslipidemia  Atorvastatin 40 mg PO daily    Depression, Anxiety - Stable  Continue w/ Celexa, Atarax  Continue care Outpatient with change in management    Discussed care plan with nurse after getting her input.     Above plan discussed with the patient, who agreed to the above plan     Plan will be discussed with the attending, Dr. Bety Townsend MD  Family Medicine Resident  7/21/2020 8:39 AM

## 2020-07-23 ENCOUNTER — TELEPHONE (OUTPATIENT)
Dept: FAMILY MEDICINE CLINIC | Age: 65
End: 2020-07-23

## 2020-07-23 NOTE — PROGRESS NOTES
CDU transfer summary        Patient:  Merline Garter  YOB: 1955    MRN: [id number]   Acct: [de-identified]    Primary Care Physician: Leah Quintanilla MD    Admit date:  7/18/2020  6:19 AM   Transfer date: 7/18/2020      Transfer diagnoses:     1.)  Acute substernal chest pain possible cardiac etiology. Positive stress testing. Need for further rule out. Diagnosis unstable angina. Treated with nitroglycerin drip. Transferred to admitting service for cardiac catheterization and rule out. Medication List      START taking these medications    amLODIPine 5 MG tablet  Commonly known as:  NORVASC  Take 1 tablet by mouth daily     atorvastatin 40 MG tablet  Commonly known as:  LIPITOR  Take 1 tablet by mouth nightly     carvedilol 6.25 MG tablet  Commonly known as:  COREG  Take 1 tablet by mouth 2 times daily (with meals)     citalopram 20 MG tablet  Commonly known as:  CELEXA  Take 2 tablets by mouth daily TAKE 2 TABLETS BY MOUTH IN THE MORNING     clopidogrel 75 MG tablet  Commonly known as:  PLAVIX  Take 1 tablet by mouth daily     Jardiance 10 MG tablet  Generic drug:  empagliflozin  Take 1 tablet by mouth daily     melatonin 1 MG tablet  Take 3 tablets by mouth nightly as needed for Sleep     nitroGLYCERIN 0.4 MG SL tablet  Commonly known as:  NITROSTAT  up to max of 3 total doses. If no relief after 1 dose, call 911. triamcinolone 0.1 % ointment  Commonly known as:  KENALOG  Apply topically 2 times daily.  For 7 days        CHANGE how you take these medications    Lantus SoloStar 100 UNIT/ML injection pen  Generic drug:  insulin glargine  Inject 60 Units into the skin daily INJECT 45 UNITS SUBCUTANEOUSLY IN THE MORNING AND 15 UNITS SUBCUTANEOUSLY IN THE EVENING  What changed:    · how much to take  · how to take this  · when to take this        CONTINUE taking these medications    ALCOHOL PREP PADS     aspirin 81 MG EC tablet  Take 1 tablet by mouth daily     blood glucose test strips  Test before meals and at bedtime. DX: DM, on insulin     Euthyrox 50 MCG tablet  Generic drug:  levothyroxine  TAKE 1 TABLET BY MOUTH ONCE DAILY     gabapentin 300 MG capsule  Commonly known as:  NEURONTIN  TAKE 1 CAPSULE BY MOUTH THREE TIMES DAILY     glimepiride 4 MG tablet  Commonly known as:  AMARYL  TAKE 1 TABLET BY MOUTH TWICE DAILY     Handicap Placard Misc  Is a permanent condition. DX: M19.90     Insulin Pen Needle 31G X 5 MM Misc  Commonly known as:  B-D UF III MINI PEN NEEDLES  USE 1 PEN NEEDLE DAILY     [employer] Lancets Thin Misc  Test before meals and at bedtime.  DX: DM, on insulin     lisinopril 10 MG tablet  Commonly known as:  PRINIVIL;ZESTRIL  Take 1 tablet by mouth once daily     MULTIPLE VITAMIN PO     omeprazole 20 MG delayed release capsule  Commonly known as:  PRILOSEC  Take 1 capsule by mouth Daily     SITagliptin 100 MG tablet  Commonly known as:  Januvia  TAKE 1 TABLET DAILY        STOP taking these medications    Dextromethorphan-guaiFENesin  MG Caps  Commonly known as:  Robitussin Cough+Chest Chester DM           Where to Get Your Medications      These medications were sent to [facility] [address] [facility], [address] [facility]  [address] [facility], [place] [address]    Phone:  [phone]   · amLODIPine 5 MG tablet  · atorvastatin 40 MG tablet  · carvedilol 6.25 MG tablet  · citalopram 20 MG tablet  · clopidogrel 75 MG tablet  · Jardiance 10 MG tablet  · Lantus SoloStar 100 UNIT/ML injection pen  · melatonin 1 MG tablet  · nitroGLYCERIN 0.4 MG SL tablet  · triamcinolone 0.1 % ointment     Information about where to get these medications is not yet available    Ask your nurse or doctor about these medications  · lisinopril 10 MG tablet         Diet:  No diet orders on file, advance as tolerated     Activity:  As tolerated    Consultants: IP CONSULT TO CARDIOLOGY  IP CONSULT TO SOCIAL WORK  IP CONSULT TO CARDIOLOGY  IP CONSULT TO DIETITIAN  IP CONSULT TO DIABETES EDUCATOR  IP CONSULT TO CARDIAC REHAB    Procedures: Admitted for cardiac catheterization    Diagnostic Test:   Results for orders placed or performed during the hospital encounter of 07/18/20   CBC WITH AUTO DIFFERENTIAL   Result Value Ref Range    WBC 6.5 3.5 - 11.3 k/uL    RBC 5.05 3.95 - 5.11 m/uL    Hemoglobin 15.1 11.9 - 15.1 g/dL    Hematocrit 45.4 36.3 - 47.1 %    MCV 89.9 82.6 - 102.9 fL    MCH 29.9 25.2 - 33.5 pg    MCHC 33.3 28.4 - 34.8 g/dL    RDW 12.5 11.8 - 14.4 %    Platelets 507 768 - 666 k/uL    MPV 11.4 8.1 - 13.5 fL    NRBC Automated 0.0 0.0 per 100 WBC    Differential Type NOT REPORTED     Seg Neutrophils 54 36 - 65 %    Lymphocytes 29 24 - 43 %    Monocytes 12 3 - 12 %    Eosinophils % 4 1 - 4 %    Basophils 0 0 - 2 %    Immature Granulocytes 1 (H) 0 %    Segs Absolute 3.55 1.50 - 8.10 k/uL    Absolute Lymph # 1.89 1.10 - 3.70 k/uL    Absolute Mono # 0.76 0.10 - 1.20 k/uL    Absolute Eos # 0.23 0.00 - 0.44 k/uL    Basophils Absolute <0.03 0.00 - 0.20 k/uL    Absolute Immature Granulocyte 0.03 0.00 - 0.30 k/uL    WBC Morphology NOT REPORTED     RBC Morphology NOT REPORTED     Platelet Estimate NOT REPORTED    BASIC METABOLIC PANEL   Result Value Ref Range    Glucose 267 (H) 70 - 99 mg/dL    BUN 7 (L) 8 - 23 mg/dL    CREATININE 0.60 0.50 - 0.90 mg/dL    Bun/Cre Ratio NOT REPORTED 9 - 20    Calcium 9.2 8.6 - 10.4 mg/dL    Sodium 135 135 - 144 mmol/L    Potassium 3.5 (L) 3.7 - 5.3 mmol/L    Chloride 100 98 - 107 mmol/L    CO2 24 20 - 31 mmol/L    Anion Gap 11 9 - 17 mmol/L    GFR Non-African American >60 >60 mL/min    GFR African American >60 >60 mL/min    GFR Comment          GFR Staging NOT REPORTED    Troponin   Result Value Ref Range    Troponin, High Sensitivity 19 (H) 0 - 14 ng/L    Troponin T NOT REPORTED <0.03 ng/mL    Troponin Interp NOT REPORTED    Troponin   Result Value Ref Range    Troponin, High Sensitivity 21 (H) 0 - 14 ng/L    Troponin T NOT REPORTED <0.03 ng/mL Troponin Interp NOT REPORTED    CBC WITH AUTO DIFFERENTIAL   Result Value Ref Range    WBC 6.1 3.5 - 11.3 k/uL    RBC 4.54 3.95 - 5.11 m/uL    Hemoglobin 13.3 11.9 - 15.1 g/dL    Hematocrit 40.8 36.3 - 47.1 %    MCV 89.9 82.6 - 102.9 fL    MCH 29.3 25.2 - 33.5 pg    MCHC 32.6 28.4 - 34.8 g/dL    RDW 12.4 11.8 - 14.4 %    Platelets See Reflexed IPF Result 138 - 453 k/uL    MPV NOT REPORTED 8.1 - 13.5 fL    NRBC Automated 0.0 0.0 per 100 WBC    Differential Type NOT REPORTED     WBC Morphology NOT REPORTED     RBC Morphology NOT REPORTED     Platelet Estimate NOT REPORTED     Seg Neutrophils 56 36 - 65 %    Lymphocytes 28 24 - 43 %    Monocytes 11 3 - 12 %    Eosinophils % 4 1 - 4 %    Basophils 1 0 - 2 %    Immature Granulocytes 0 0 %    Segs Absolute 3.42 1.50 - 8.10 k/uL    Absolute Lymph # 1.72 1.10 - 3.70 k/uL    Absolute Mono # 0.66 0.10 - 1.20 k/uL    Absolute Eos # 0.21 0.00 - 0.44 k/uL    Basophils Absolute 0.03 0.00 - 0.20 k/uL    Absolute Immature Granulocyte <0.03 0.00 - 0.30 k/uL   Comprehensive Metabolic Panel w/ Reflex to MG   Result Value Ref Range    Glucose 254 (H) 70 - 99 mg/dL    BUN 10 8 - 23 mg/dL    CREATININE 0.66 0.50 - 0.90 mg/dL    Bun/Cre Ratio NOT REPORTED 9 - 20    Calcium 8.7 8.6 - 10.4 mg/dL    Sodium 138 135 - 144 mmol/L    Potassium 4.1 3.7 - 5.3 mmol/L    Chloride 103 98 - 107 mmol/L    CO2 25 20 - 31 mmol/L    Anion Gap 10 9 - 17 mmol/L    Alkaline Phosphatase 109 (H) 35 - 104 U/L    ALT 22 5 - 33 U/L    AST 24 <32 U/L    Total Bilirubin 0.40 0.3 - 1.2 mg/dL    Total Protein 5.7 (L) 6.4 - 8.3 g/dL    Alb 3.2 (L) 3.5 - 5.2 g/dL    Albumin/Globulin Ratio 1.3 1.0 - 2.5    GFR Non-African American >60 >60 mL/min    GFR African American >60 >60 mL/min    GFR Comment          GFR Staging NOT REPORTED    PROTIME-INR   Result Value Ref Range    Protime 10.9 9.0 - 12.0 sec    INR 1.0    APTT   Result Value Ref Range    PTT 25.8 20.5 - 30.5 sec   Troponin   Result Value Ref Range Troponin, High Sensitivity 22 (H) 0 - 14 ng/L    Troponin T NOT REPORTED <0.03 ng/mL    Troponin Interp NOT REPORTED    Troponin   Result Value Ref Range    Troponin, High Sensitivity 21 (H) 0 - 14 ng/L    Troponin T NOT REPORTED <0.03 ng/mL    Troponin Interp NOT REPORTED    Troponin   Result Value Ref Range    Troponin, High Sensitivity 20 (H) 0 - 14 ng/L    Troponin T NOT REPORTED <0.03 ng/mL    Troponin Interp NOT REPORTED    Immature Platelet Fraction   Result Value Ref Range    Platelet, Immature Fraction 3.6 1.1 - 10.3 %    Platelet, Fluorescence 134 (L) 138 - 453 k/uL   COVID-19    Specimen: Other   Result Value Ref Range    SARS-CoV-2 Not Detected Not Detected    SARS-CoV-2, Rapid          Source . NASOPHARYNGEAL SWAB     SARS-CoV-2, PCR         CBC WITH AUTO DIFFERENTIAL   Result Value Ref Range    WBC 5.1 3.5 - 11.3 k/uL    RBC 5.23 (H) 3.95 - 5.11 m/uL    Hemoglobin 15.1 11.9 - 15.1 g/dL    Hematocrit 47.6 (H) 36.3 - 47.1 %    MCV 91.0 82.6 - 102.9 fL    MCH 28.9 25.2 - 33.5 pg    MCHC 31.7 28.4 - 34.8 g/dL    RDW 12.5 11.8 - 14.4 %    Platelets See Reflexed IPF Result 138 - 453 k/uL    MPV NOT REPORTED 8.1 - 13.5 fL    NRBC Automated 0.0 0.0 per 100 WBC    Differential Type NOT REPORTED     WBC Morphology NOT REPORTED     RBC Morphology NOT REPORTED     Platelet Estimate NOT REPORTED     Immature Granulocytes 0 0 %    Seg Neutrophils 90 (H) 36 - 66 %    Lymphocytes 10 (L) 24 - 44 %    Monocytes 0 (L) 1 - 7 %    Eosinophils % 0 (L) 1 - 4 %    Basophils 0 0 - 2 %    Absolute Immature Granulocyte 0.00 0.00 - 0.30 k/uL    Segs Absolute 4.59 1.8 - 7.7 k/uL    Absolute Lymph # 0.51 (L) 1.0 - 4.8 k/uL    Absolute Mono # 0.00 (L) 0.1 - 0.8 k/uL    Absolute Eos # 0.00 0.0 - 0.4 k/uL    Basophils Absolute 0.00 0.0 - 0.2 k/uL    Morphology Normal    Comprehensive Metabolic Panel w/ Reflex to MG   Result Value Ref Range    Glucose 314 (H) 70 - 99 mg/dL    BUN 12 8 - 23 mg/dL    CREATININE 0.58 0.50 - 0.90 mg/dL Bun/Cre Ratio NOT REPORTED 9 - 20    Calcium 8.9 8.6 - 10.4 mg/dL    Sodium 139 135 - 144 mmol/L    Potassium 4.5 3.7 - 5.3 mmol/L    Chloride 103 98 - 107 mmol/L    CO2 22 20 - 31 mmol/L    Anion Gap 14 9 - 17 mmol/L    Alkaline Phosphatase 119 (H) 35 - 104 U/L    ALT 22 5 - 33 U/L    AST 24 <32 U/L    Total Bilirubin 0.59 0.3 - 1.2 mg/dL    Total Protein 6.6 6.4 - 8.3 g/dL    Alb 3.6 3.5 - 5.2 g/dL    Albumin/Globulin Ratio 1.2 1.0 - 2.5    GFR Non-African American >60 >60 mL/min    GFR African American >60 >60 mL/min    GFR Comment          GFR Staging NOT REPORTED    PROTIME-INR   Result Value Ref Range    Protime 11.0 9.0 - 12.0 sec    INR 1.0    APTT   Result Value Ref Range    PTT 26.3 20.5 - 30.5 sec   Immature Platelet Fraction   Result Value Ref Range    Platelet, Immature Fraction 3.2 1.1 - 10.3 %    Platelet, Fluorescence 133 (L) 138 - 453 k/uL   Activated clotting time   Result Value Ref Range    Activated Clotting Time 352 (H) 79 - 149 sec   CBC WITH AUTO DIFFERENTIAL   Result Value Ref Range    WBC 11.7 (H) 3.5 - 11.3 k/uL    RBC 4.62 3.95 - 5.11 m/uL    Hemoglobin 13.6 11.9 - 15.1 g/dL    Hematocrit 43.0 36.3 - 47.1 %    MCV 93.1 82.6 - 102.9 fL    MCH 29.4 25.2 - 33.5 pg    MCHC 31.6 28.4 - 34.8 g/dL    RDW 12.3 11.8 - 14.4 %    Platelets 439 275 - 745 k/uL    MPV 11.4 8.1 - 13.5 fL    NRBC Automated 0.0 0.0 per 100 WBC    Differential Type NOT REPORTED     WBC Morphology NOT REPORTED     RBC Morphology NOT REPORTED     Platelet Estimate NOT REPORTED     Seg Neutrophils 82 (H) 36 - 65 %    Lymphocytes 8 (L) 24 - 43 %    Monocytes 9 3 - 12 %    Eosinophils % 0 (L) 1 - 4 %    Basophils 0 0 - 2 %    Immature Granulocytes 1 (H) 0 %    Segs Absolute 9.61 (H) 1.50 - 8.10 k/uL    Absolute Lymph # 0.95 (L) 1.10 - 3.70 k/uL    Absolute Mono # 1.09 0.10 - 1.20 k/uL    Absolute Eos # <0.03 0.00 - 0.44 k/uL    Basophils Absolute <0.03 0.00 - 0.20 k/uL    Absolute Immature Granulocyte 0.08 0.00 - 0.30 k/uL Comprehensive Metabolic Panel w/ Reflex to MG   Result Value Ref Range    Glucose 291 (H) 70 - 99 mg/dL    BUN 13 8 - 23 mg/dL    CREATININE 0.60 0.50 - 0.90 mg/dL    Bun/Cre Ratio NOT REPORTED 9 - 20    Calcium 8.7 8.6 - 10.4 mg/dL    Sodium 135 135 - 144 mmol/L    Potassium 4.2 3.7 - 5.3 mmol/L    Chloride 103 98 - 107 mmol/L    CO2 23 20 - 31 mmol/L    Anion Gap 9 9 - 17 mmol/L    Alkaline Phosphatase 107 (H) 35 - 104 U/L    ALT 20 5 - 33 U/L    AST 25 <32 U/L    Total Bilirubin 0.30 0.3 - 1.2 mg/dL    Total Protein 6.1 (L) 6.4 - 8.3 g/dL    Alb 3.4 (L) 3.5 - 5.2 g/dL    Albumin/Globulin Ratio 1.3 1.0 - 2.5    GFR Non-African American >60 >60 mL/min    GFR African American >60 >60 mL/min    GFR Comment          GFR Staging NOT REPORTED    PROTIME-INR   Result Value Ref Range    Protime 11.0 9.0 - 12.0 sec    INR 1.0    APTT   Result Value Ref Range    PTT 24.6 20.5 - 30.5 sec   POC Glucose Fingerstick   Result Value Ref Range    POC Glucose 246 (H) 65 - 105 mg/dL   POC Glucose Fingerstick   Result Value Ref Range    POC Glucose 301 (H) 65 - 105 mg/dL   POC Glucose Fingerstick   Result Value Ref Range    POC Glucose 213 (H) 65 - 105 mg/dL   POC Glucose Fingerstick   Result Value Ref Range    POC Glucose 373 (H) 65 - 105 mg/dL   POC Glucose Fingerstick   Result Value Ref Range    POC Glucose 265 (H) 65 - 105 mg/dL   POC Glucose Fingerstick   Result Value Ref Range    POC Glucose 206 (H) 65 - 105 mg/dL   POC Glucose Fingerstick   Result Value Ref Range    POC Glucose 316 (H) 65 - 105 mg/dL   POC Glucose Fingerstick   Result Value Ref Range    POC Glucose 390 (H) 65 - 105 mg/dL   POC Glucose Fingerstick   Result Value Ref Range    POC Glucose 405 (HH) 65 - 105 mg/dL   POC Glucose Fingerstick   Result Value Ref Range    POC Glucose 392 (H) 65 - 105 mg/dL   POC Glucose Fingerstick   Result Value Ref Range    POC Glucose 329 (H) 65 - 105 mg/dL   POC Glucose Fingerstick   Result Value Ref Range    POC Glucose 205 (H) 65 - 105 mg/dL   POC Glucose Fingerstick   Result Value Ref Range    POC Glucose 229 (H) 65 - 105 mg/dL   POC Glucose Fingerstick   Result Value Ref Range    POC Glucose 124 (H) 65 - 105 mg/dL   EKG 12 Lead   Result Value Ref Range    Ventricular Rate 84 BPM    Atrial Rate 84 BPM    P-R Interval 176 ms    QRS Duration 74 ms    Q-T Interval 382 ms    QTc Calculation (Bazett) 451 ms    P Axis 34 degrees    R Axis 1 degrees    T Axis 43 degrees   EKG 12 Lead   Result Value Ref Range    Ventricular Rate 72 BPM    Atrial Rate 72 BPM    P-R Interval 162 ms    QRS Duration 94 ms    Q-T Interval 460 ms    QTc Calculation (Bazett) 503 ms    P Axis 41 degrees    R Axis -3 degrees    T Axis 54 degrees     Xr Chest Portable    Result Date: 7/18/2020  EXAMINATION: ONE XRAY VIEW OF THE CHEST 7/18/2020 6:42 am COMPARISON: 10 January 2019 HISTORY: ORDERING SYSTEM PROVIDED HISTORY: chest pain TECHNOLOGIST PROVIDED HISTORY: chest pain Reason for Exam: portable upright/ chest pain Acuity: Acute Type of Exam: Initial FINDINGS: AP portable view of the chest time stamped at 635 hours demonstrates overlying cardiac monitoring electrodes. Heart is mildly enlarged. Pulmonary vasculature is within upper limits of normal.  No gross effusions, localized consolidation, effusion or pneumothorax is noted. Osseous and mediastinal structures are age-appropriate. No acute cardiopulmonary process. Nm Cardiac Stress Test Nuclear Imaging    Result Date: 7/18/2020  EXAMINATION: MYOCARDIAL PERFUSION IMAGING 7/18/2020 11:55 am TECHNIQUE: For the rest study, 13.5 mCi of Tc 99 labeled sestamibi were injected. SPECT images were acquired. Under cardiology supervision, 0.4mg [employer] was infused. After pharmacologic stress, 48 mCi of Tc 99 labeled sestamibi were injected. SPECT images with ECG gating were acquired.  COMPARISON: 28 September 2012 HISTORY: ORDERING SYSTEM PROVIDED HISTORY: Angina TECHNOLOGIST PROVIDED HISTORY: Reason for Exam: Angina Procedure Type->Rx angina Reason for Exam: chest pain/pressure, short of breath, nausea, DM, FINDINGS: Iimages interpreted utilizing [employer] system and [employer]. Mild to moderate reversible perfusion defects noted in the inferior apical, lateral a pickle, and a pickle septal locations, 13% of myocardium. Perfusion scores are visually adjusted to account for artifact. Summed stress score:  9 Summed rest score:  0 Summed reversibility score:  9 Function: End diastolic volume:  74SS Left ventricular ejection fraction:  61% Wall motion abnormalities:  None. TID score:  0.91 (scores greater than 1.39 are considered elevated for Lexiscan stress with Tc99m)     Perfusion:  Reversible defects involving 13% of the myocardium, as above Function:  Normal left ventricular wall motion. Normal left ventricular ejection fraction of 61% Risk stratification: High; stress-induced reversible defects involving 13% of the myocardium. Notes concerning risk stratification: Risk stratification incorporates both clinical history and some testing results. Final risk determination is the responsibility of the ordering provider as other patient information and test results may increase or decrease the risk assessment reported for this examination. Risk stratification criteria are adapted from \"Noninvasive Risk Stratification\" criteria from [employer]. Al, ACC/AATS/AHA/ASE/ASNC/SCAI/SCCT/STS 2017 Appropriate Use Criteria For Coronary Revascularization in Patients With Stable Ischemic Heart Disease [facility] Volume 69, Issue 17, May 2017 High risk (>3% annual death or MI) 1. Severe resting LV dysfunction (LVEF <35%) not readily explained by non coronary causes 2. Resting perfusion abnormalities greater than 10% of the myocardium in patients without prior history or evidence of MI3.  Stress-induced perfusion abnormalities encumbering greater than or equal to 10% myocardium or stress segmental scores indicating multiple vascular territories with abnormalities 4. Stress-induced LV dilatation (TID ratio greater than 1.19 for exercise and greater than 1.39 for regadenoson) Intermediate risk (1% to 3% annual death or MI) 1. Mild/moderate resting LV dysfunction (LVEF 35% to 49%) not readily explained by non coronary causes. 2. Resting perfusion abnormalities in 5%-9.9% of the myocardium in patients without a history or prior evidence of MI 3. Stress-induced perfusion abnormality encumbering 5%-9.9% of the myocardium or stress segmental scores indicating 1 vascular territory with abnormalities but without LV dilation 4. Small wall motion abnormality involving 1-2 segments and only 1 coronary bed. Low Risk (Less than 1% annual death or MI) 1. Normal or small myocardial perfusion defect at rest or with stress encumbering less than 5% of the myocardium. Critical results were called by Dr. Lauro Baer MD to [address] [place] [address] on 7/18/2020 at 15:44. Physical Exam:  At time of transfer  General appearance - NAD, AOx 3    Lungs -CTAB, no R/R/R  Heart - RRR, no M/R/G  Abdomen - Soft, NT/ND  Neurological:  MAEx4, No focal motor deficit, sensory loss  Extremities - Cap refil <2 sec in all ext., no edema  Skin -warm, dry      Hospital Course:  Clinical course has improved, labs and imaging reviewed. Ariel Watson originally presented to the hospital on 7/18/2020  6:19 AM with substernal chest pain. At that time it was determined that She required further observation and further cardiac testing including stress testing. Stress testing came back positive. Patient needs further cardiac work-up. Transferred to primary service for nitro drip and further cardiac evaluation including catheterization lab. Labs and imaging were followed daily. Imaging results as above. She is medically stable to be discharged.        Disposition: Transfer  Condition: Good        Time Spent: 3 day      --  Pat Renner MD  Emergency Medicine Attending Physician    This dictation was generated by voice recognition computer software. Although all attempts are made to edit the dictation for accuracy, there may be errors in the transcription that are not intended.

## 2020-07-23 NOTE — TELEPHONE ENCOUNTER
PA request for Sonja SAAVEDRA processed and submitted to pt insurance, waiting for response in regards to coverage

## 2020-07-23 NOTE — TELEPHONE ENCOUNTER
[facility] Transitions Initial Follow Up Call    Call within 2 business days of discharge: Yes     Patient: Irma Clements Patient : 1955 MRN: [id number]    [unfilled]    RARS: Readmission Risk Score: 12       Spoke with: First attempt to reach out to pt she has a hospital follow up appt already scheduled. VM left to call the office.     Discharge department/facility: [address] [place] [address] services provided:  Scheduled appointment with PCP- 2020 @ 1030 am    Follow Up  Future Appointments   Date Time Provider Osmar Hutchinson   2020 10:30 AM DO Genet Gonzales [facility]   2020  8:00 AM STA SCR MAMMO RM 2 [facility] MAMMO STA [facility]iolog       Alton Crowder LPN

## 2020-07-27 ENCOUNTER — APPOINTMENT (OUTPATIENT)
Dept: GENERAL RADIOLOGY | Age: 65
DRG: 198 | End: 2020-07-27
Payer: COMMERCIAL

## 2020-07-27 ENCOUNTER — APPOINTMENT (OUTPATIENT)
Dept: ULTRASOUND IMAGING | Age: 65
DRG: 198 | End: 2020-07-27
Payer: COMMERCIAL

## 2020-07-27 ENCOUNTER — HOSPITAL ENCOUNTER (INPATIENT)
Age: 65
LOS: 2 days | Discharge: HOME | DRG: 198 | End: 2020-07-29
Attending: EMERGENCY MEDICINE | Admitting: FAMILY MEDICINE
Payer: COMMERCIAL

## 2020-07-27 PROBLEM — I21.4 NSTEMI (NON-ST ELEVATED MYOCARDIAL INFARCTION) (HCC): Status: ACTIVE | Noted: 2020-07-27

## 2020-07-27 PROBLEM — K74.69 OTHER CIRRHOSIS OF LIVER (HCC): Status: ACTIVE | Noted: 2020-07-27

## 2020-07-27 LAB
-: ABNORMAL
ABSOLUTE EOS #: 0.37 K/UL (ref 0–0.44)
ABSOLUTE IMMATURE GRANULOCYTE: 0.07 K/UL (ref 0–0.3)
ABSOLUTE LYMPH #: 2.22 K/UL (ref 1.1–3.7)
ABSOLUTE MONO #: 1.29 K/UL (ref 0.1–1.2)
AMORPHOUS: ABNORMAL
ANION GAP SERPL CALCULATED.3IONS-SCNC: 14 MMOL/L (ref 9–17)
BACTERIA: ABNORMAL
BASOPHILS # BLD: 0 % (ref 0–2)
BASOPHILS ABSOLUTE: 0.05 K/UL (ref 0–0.2)
BILIRUBIN URINE: NEGATIVE
BNP INTERPRETATION: NORMAL
BUN BLDV-MCNC: 10 MG/DL (ref 8–23)
BUN/CREAT BLD: ABNORMAL (ref 9–20)
CALCIUM SERPL-MCNC: 9.3 MG/DL (ref 8.6–10.4)
CASTS UA: ABNORMAL /LPF (ref 0–2)
CHLORIDE BLD-SCNC: 102 MMOL/L (ref 98–107)
CK MB: 2.5 NG/ML
CO2: 21 MMOL/L (ref 20–31)
COLOR: YELLOW
COMMENT UA: ABNORMAL
CREAT SERPL-MCNC: 0.9 MG/DL (ref 0.5–0.9)
CRYSTALS, UA: ABNORMAL /HPF
D-DIMER QUANTITATIVE: 0.59 MG/L FEU
DIFFERENTIAL TYPE: ABNORMAL
EOSINOPHILS RELATIVE PERCENT: 3 % (ref 1–4)
EPITHELIAL CELLS UA: ABNORMAL /HPF (ref 0–5)
GFR AFRICAN AMERICAN: >60 ML/MIN
GFR NON-AFRICAN AMERICAN: >60 ML/MIN
GFR SERPL CREATININE-BSD FRML MDRD: ABNORMAL ML/MIN/{1.73_M2}
GFR SERPL CREATININE-BSD FRML MDRD: ABNORMAL ML/MIN/{1.73_M2}
GGT: 118 U/L (ref 5–36)
GLUCOSE BLD-MCNC: 201 MG/DL (ref 65–105)
GLUCOSE BLD-MCNC: 325 MG/DL (ref 70–99)
GLUCOSE URINE: ABNORMAL
HAV IGM SER IA-ACNC: NONREACTIVE
HCT VFR BLD CALC: 50.8 % (ref 36.3–47.1)
HEMOGLOBIN: 16.5 G/DL (ref 11.9–15.1)
HEPATITIS B CORE IGM ANTIBODY: NONREACTIVE
HEPATITIS B SURFACE ANTIGEN: NONREACTIVE
HEPATITIS C ANTIBODY: NONREACTIVE
HIV AG/AB: NONREACTIVE
IMMATURE GRANULOCYTES: 1 %
KETONES, URINE: NEGATIVE
LEUKOCYTE ESTERASE, URINE: ABNORMAL
LIPASE: 209 U/L (ref 13–60)
LYMPHOCYTES # BLD: 18 % (ref 24–43)
MCH RBC QN AUTO: 29.3 PG (ref 25.2–33.5)
MCHC RBC AUTO-ENTMCNC: 32.5 G/DL (ref 28.4–34.8)
MCV RBC AUTO: 90.1 FL (ref 82.6–102.9)
MONOCYTES # BLD: 11 % (ref 3–12)
MUCUS: ABNORMAL
NITRITE, URINE: NEGATIVE
NRBC AUTOMATED: 0 PER 100 WBC
OTHER OBSERVATIONS UA: ABNORMAL
PARTIAL THROMBOPLASTIN TIME: 20.2 SEC (ref 20.5–30.5)
PARTIAL THROMBOPLASTIN TIME: 34.6 SEC (ref 20.5–30.5)
PDW BLD-RTO: 12.4 % (ref 11.8–14.4)
PH UA: 5.5 (ref 5–8)
PLATELET # BLD: 358 K/UL (ref 138–453)
PLATELET ESTIMATE: ABNORMAL
PMV BLD AUTO: 11.1 FL (ref 8.1–13.5)
POTASSIUM SERPL-SCNC: 3.8 MMOL/L (ref 3.7–5.3)
PRO-BNP: 258 PG/ML
PROTEIN UA: NEGATIVE
RBC # BLD: 5.64 M/UL (ref 3.95–5.11)
RBC # BLD: ABNORMAL 10*6/UL
RBC UA: ABNORMAL /HPF (ref 0–2)
RENAL EPITHELIAL, UA: ABNORMAL /HPF
SEG NEUTROPHILS: 67 % (ref 36–65)
SEGMENTED NEUTROPHILS ABSOLUTE COUNT: 8.27 K/UL (ref 1.5–8.1)
SODIUM BLD-SCNC: 137 MMOL/L (ref 135–144)
SPECIFIC GRAVITY UA: 1.03 (ref 1–1.03)
TRICHOMONAS: ABNORMAL
TROPONIN INTERP: ABNORMAL
TROPONIN T: ABNORMAL NG/ML
TROPONIN, HIGH SENSITIVITY: 132 NG/L (ref 0–14)
TROPONIN, HIGH SENSITIVITY: 160 NG/L (ref 0–14)
TROPONIN, HIGH SENSITIVITY: 161 NG/L (ref 0–14)
TURBIDITY: ABNORMAL
URINE HGB: ABNORMAL
UROBILINOGEN, URINE: NORMAL
WBC # BLD: 12.3 K/UL (ref 3.5–11.3)
WBC # BLD: ABNORMAL 10*3/UL
WBC UA: ABNORMAL /HPF (ref 0–5)
YEAST: ABNORMAL

## 2020-07-27 PROCEDURE — 6360000002 HC RX W HCPCS: Performed by: PHYSICIAN ASSISTANT

## 2020-07-27 PROCEDURE — 2580000003 HC RX 258: Performed by: STUDENT IN AN ORGANIZED HEALTH CARE EDUCATION/TRAINING PROGRAM

## 2020-07-27 PROCEDURE — 99285 EMERGENCY DEPT VISIT HI MDM: CPT

## 2020-07-27 PROCEDURE — 84484 ASSAY OF TROPONIN QUANT: CPT

## 2020-07-27 PROCEDURE — 71046 X-RAY EXAM CHEST 2 VIEWS: CPT

## 2020-07-27 PROCEDURE — 82553 CREATINE MB FRACTION: CPT

## 2020-07-27 PROCEDURE — 96374 THER/PROPH/DIAG INJ IV PUSH: CPT

## 2020-07-27 PROCEDURE — 83690 ASSAY OF LIPASE: CPT

## 2020-07-27 PROCEDURE — 83880 ASSAY OF NATRIURETIC PEPTIDE: CPT

## 2020-07-27 PROCEDURE — 76705 ECHO EXAM OF ABDOMEN: CPT

## 2020-07-27 PROCEDURE — 2580000003 HC RX 258: Performed by: PHYSICIAN ASSISTANT

## 2020-07-27 PROCEDURE — 82977 ASSAY OF GGT: CPT

## 2020-07-27 PROCEDURE — 87389 HIV-1 AG W/HIV-1&-2 AB AG IA: CPT

## 2020-07-27 PROCEDURE — 2060000000 HC ICU INTERMEDIATE R&B

## 2020-07-27 PROCEDURE — 82947 ASSAY GLUCOSE BLOOD QUANT: CPT

## 2020-07-27 PROCEDURE — 96376 TX/PRO/DX INJ SAME DRUG ADON: CPT

## 2020-07-27 PROCEDURE — 85730 THROMBOPLASTIN TIME PARTIAL: CPT

## 2020-07-27 PROCEDURE — 80048 BASIC METABOLIC PNL TOTAL CA: CPT

## 2020-07-27 PROCEDURE — 96375 TX/PRO/DX INJ NEW DRUG ADDON: CPT

## 2020-07-27 PROCEDURE — 93005 ELECTROCARDIOGRAM TRACING: CPT | Performed by: STUDENT IN AN ORGANIZED HEALTH CARE EDUCATION/TRAINING PROGRAM

## 2020-07-27 PROCEDURE — 6370000000 HC RX 637 (ALT 250 FOR IP): Performed by: PHYSICIAN ASSISTANT

## 2020-07-27 PROCEDURE — 36415 COLL VENOUS BLD VENIPUNCTURE: CPT

## 2020-07-27 PROCEDURE — 85025 COMPLETE CBC W/AUTO DIFF WBC: CPT

## 2020-07-27 PROCEDURE — 6370000000 HC RX 637 (ALT 250 FOR IP): Performed by: STUDENT IN AN ORGANIZED HEALTH CARE EDUCATION/TRAINING PROGRAM

## 2020-07-27 PROCEDURE — 81001 URINALYSIS AUTO W/SCOPE: CPT

## 2020-07-27 PROCEDURE — 80074 ACUTE HEPATITIS PANEL: CPT

## 2020-07-27 PROCEDURE — 2500000003 HC RX 250 WO HCPCS: Performed by: STUDENT IN AN ORGANIZED HEALTH CARE EDUCATION/TRAINING PROGRAM

## 2020-07-27 PROCEDURE — 6360000002 HC RX W HCPCS: Performed by: STUDENT IN AN ORGANIZED HEALTH CARE EDUCATION/TRAINING PROGRAM

## 2020-07-27 PROCEDURE — 85379 FIBRIN DEGRADATION QUANT: CPT

## 2020-07-27 RX ORDER — ACETAMINOPHEN 650 MG/1
650 SUPPOSITORY RECTAL EVERY 6 HOURS PRN
Status: DISCONTINUED | OUTPATIENT
Start: 2020-07-27 | End: 2020-07-27 | Stop reason: SDUPTHER

## 2020-07-27 RX ORDER — NICOTINE POLACRILEX 4 MG
15 LOZENGE BUCCAL PRN
Status: DISCONTINUED | OUTPATIENT
Start: 2020-07-27 | End: 2020-07-27 | Stop reason: SDUPTHER

## 2020-07-27 RX ORDER — PROMETHAZINE HYDROCHLORIDE 25 MG/1
12.5 TABLET ORAL EVERY 6 HOURS PRN
Status: DISCONTINUED | OUTPATIENT
Start: 2020-07-27 | End: 2020-07-29 | Stop reason: HOSPADM

## 2020-07-27 RX ORDER — PANTOPRAZOLE SODIUM 40 MG/1
40 TABLET, DELAYED RELEASE ORAL
Status: DISCONTINUED | OUTPATIENT
Start: 2020-07-28 | End: 2020-07-29 | Stop reason: HOSPADM

## 2020-07-27 RX ORDER — HEPARIN SODIUM 1000 [USP'U]/ML
4000 INJECTION, SOLUTION INTRAVENOUS; SUBCUTANEOUS PRN
Status: DISCONTINUED | OUTPATIENT
Start: 2020-07-27 | End: 2020-07-29

## 2020-07-27 RX ORDER — UREA 10 %
3 LOTION (ML) TOPICAL NIGHTLY PRN
Status: DISCONTINUED | OUTPATIENT
Start: 2020-07-27 | End: 2020-07-29 | Stop reason: HOSPADM

## 2020-07-27 RX ORDER — PROMETHAZINE HYDROCHLORIDE 25 MG/1
12.5 TABLET ORAL EVERY 6 HOURS PRN
Status: DISCONTINUED | OUTPATIENT
Start: 2020-07-27 | End: 2020-07-27

## 2020-07-27 RX ORDER — DEXTROSE MONOHYDRATE 50 MG/ML
100 INJECTION, SOLUTION INTRAVENOUS PRN
Status: DISCONTINUED | OUTPATIENT
Start: 2020-07-27 | End: 2020-07-29 | Stop reason: HOSPADM

## 2020-07-27 RX ORDER — MAGNESIUM SULFATE 1 G/100ML
1 INJECTION INTRAVENOUS PRN
Status: DISCONTINUED | OUTPATIENT
Start: 2020-07-27 | End: 2020-07-29 | Stop reason: HOSPADM

## 2020-07-27 RX ORDER — POTASSIUM CHLORIDE 20 MEQ/1
40 TABLET, EXTENDED RELEASE ORAL PRN
Status: DISCONTINUED | OUTPATIENT
Start: 2020-07-27 | End: 2020-07-27 | Stop reason: SDUPTHER

## 2020-07-27 RX ORDER — ACETAMINOPHEN 325 MG/1
650 TABLET ORAL EVERY 6 HOURS PRN
Status: DISCONTINUED | OUTPATIENT
Start: 2020-07-27 | End: 2020-07-28

## 2020-07-27 RX ORDER — INSULIN GLARGINE 100 [IU]/ML
45 INJECTION, SOLUTION SUBCUTANEOUS EVERY MORNING
Status: DISCONTINUED | OUTPATIENT
Start: 2020-07-28 | End: 2020-07-29 | Stop reason: HOSPADM

## 2020-07-27 RX ORDER — INSULIN GLARGINE 100 [IU]/ML
20 INJECTION, SOLUTION SUBCUTANEOUS NIGHTLY
Status: DISCONTINUED | OUTPATIENT
Start: 2020-07-27 | End: 2020-07-29 | Stop reason: HOSPADM

## 2020-07-27 RX ORDER — NITROGLYCERIN 20 MG/100ML
20 INJECTION INTRAVENOUS CONTINUOUS
Status: DISCONTINUED | OUTPATIENT
Start: 2020-07-27 | End: 2020-07-29

## 2020-07-27 RX ORDER — FENTANYL CITRATE 50 UG/ML
50 INJECTION, SOLUTION INTRAMUSCULAR; INTRAVENOUS ONCE
Status: COMPLETED | OUTPATIENT
Start: 2020-07-27 | End: 2020-07-27

## 2020-07-27 RX ORDER — POTASSIUM CHLORIDE 7.45 MG/ML
10 INJECTION INTRAVENOUS PRN
Status: DISCONTINUED | OUTPATIENT
Start: 2020-07-27 | End: 2020-07-29 | Stop reason: HOSPADM

## 2020-07-27 RX ORDER — HEPARIN SODIUM 1000 [USP'U]/ML
4000 INJECTION, SOLUTION INTRAVENOUS; SUBCUTANEOUS ONCE
Status: COMPLETED | OUTPATIENT
Start: 2020-07-27 | End: 2020-07-27

## 2020-07-27 RX ORDER — DEXTROSE MONOHYDRATE 25 G/50ML
12.5 INJECTION, SOLUTION INTRAVENOUS PRN
Status: DISCONTINUED | OUTPATIENT
Start: 2020-07-27 | End: 2020-07-27 | Stop reason: SDUPTHER

## 2020-07-27 RX ORDER — ASPIRIN 81 MG/1
81 TABLET ORAL DAILY
Status: DISCONTINUED | OUTPATIENT
Start: 2020-07-28 | End: 2020-07-29 | Stop reason: HOSPADM

## 2020-07-27 RX ORDER — SODIUM CHLORIDE 0.9 % (FLUSH) 0.9 %
10 SYRINGE (ML) INJECTION EVERY 12 HOURS SCHEDULED
Status: DISCONTINUED | OUTPATIENT
Start: 2020-07-27 | End: 2020-07-29 | Stop reason: HOSPADM

## 2020-07-27 RX ORDER — POLYETHYLENE GLYCOL 3350 17 G/17G
17 POWDER, FOR SOLUTION ORAL DAILY PRN
Status: DISCONTINUED | OUTPATIENT
Start: 2020-07-27 | End: 2020-07-29

## 2020-07-27 RX ORDER — CLOPIDOGREL BISULFATE 75 MG/1
75 TABLET ORAL DAILY
Status: DISCONTINUED | OUTPATIENT
Start: 2020-07-28 | End: 2020-07-29 | Stop reason: HOSPADM

## 2020-07-27 RX ORDER — LISINOPRIL 10 MG/1
10 TABLET ORAL DAILY
Status: DISCONTINUED | OUTPATIENT
Start: 2020-07-28 | End: 2020-07-29 | Stop reason: HOSPADM

## 2020-07-27 RX ORDER — POTASSIUM CHLORIDE 7.45 MG/ML
10 INJECTION INTRAVENOUS PRN
Status: DISCONTINUED | OUTPATIENT
Start: 2020-07-27 | End: 2020-07-27 | Stop reason: SDUPTHER

## 2020-07-27 RX ORDER — ONDANSETRON 2 MG/ML
4 INJECTION INTRAMUSCULAR; INTRAVENOUS EVERY 6 HOURS PRN
Status: DISCONTINUED | OUTPATIENT
Start: 2020-07-27 | End: 2020-07-27

## 2020-07-27 RX ORDER — DEXTROSE MONOHYDRATE 25 G/50ML
12.5 INJECTION, SOLUTION INTRAVENOUS PRN
Status: DISCONTINUED | OUTPATIENT
Start: 2020-07-27 | End: 2020-07-29 | Stop reason: HOSPADM

## 2020-07-27 RX ORDER — ACETAMINOPHEN 325 MG/1
650 TABLET ORAL EVERY 6 HOURS PRN
Status: DISCONTINUED | OUTPATIENT
Start: 2020-07-27 | End: 2020-07-27 | Stop reason: SDUPTHER

## 2020-07-27 RX ORDER — LEVOTHYROXINE SODIUM 0.05 MG/1
50 TABLET ORAL DAILY
Status: DISCONTINUED | OUTPATIENT
Start: 2020-07-28 | End: 2020-07-29 | Stop reason: HOSPADM

## 2020-07-27 RX ORDER — ASPIRIN 81 MG/1
324 TABLET, CHEWABLE ORAL ONCE
Status: COMPLETED | OUTPATIENT
Start: 2020-07-27 | End: 2020-07-27

## 2020-07-27 RX ORDER — HEPARIN SODIUM 10000 [USP'U]/100ML
9.1 INJECTION, SOLUTION INTRAVENOUS CONTINUOUS
Status: DISCONTINUED | OUTPATIENT
Start: 2020-07-27 | End: 2020-07-29

## 2020-07-27 RX ORDER — ACETAMINOPHEN 650 MG/1
650 SUPPOSITORY RECTAL EVERY 6 HOURS PRN
Status: DISCONTINUED | OUTPATIENT
Start: 2020-07-27 | End: 2020-07-28

## 2020-07-27 RX ORDER — SODIUM CHLORIDE 0.9 % (FLUSH) 0.9 %
10 SYRINGE (ML) INJECTION PRN
Status: DISCONTINUED | OUTPATIENT
Start: 2020-07-27 | End: 2020-07-29 | Stop reason: HOSPADM

## 2020-07-27 RX ORDER — SODIUM CHLORIDE 9 MG/ML
INJECTION, SOLUTION INTRAVENOUS CONTINUOUS
Status: DISCONTINUED | OUTPATIENT
Start: 2020-07-28 | End: 2020-07-29 | Stop reason: HOSPADM

## 2020-07-27 RX ORDER — CARVEDILOL 6.25 MG/1
6.25 TABLET ORAL 2 TIMES DAILY WITH MEALS
Status: DISCONTINUED | OUTPATIENT
Start: 2020-07-28 | End: 2020-07-29 | Stop reason: HOSPADM

## 2020-07-27 RX ORDER — CITALOPRAM 20 MG/1
40 TABLET ORAL DAILY
Status: DISCONTINUED | OUTPATIENT
Start: 2020-07-28 | End: 2020-07-29 | Stop reason: HOSPADM

## 2020-07-27 RX ORDER — POTASSIUM CHLORIDE 20 MEQ/1
40 TABLET, EXTENDED RELEASE ORAL PRN
Status: DISCONTINUED | OUTPATIENT
Start: 2020-07-27 | End: 2020-07-29 | Stop reason: HOSPADM

## 2020-07-27 RX ORDER — INSULIN GLARGINE 100 [IU]/ML
45 INJECTION, SOLUTION SUBCUTANEOUS EVERY MORNING
Status: DISCONTINUED | OUTPATIENT
Start: 2020-07-28 | End: 2020-07-27 | Stop reason: SDUPTHER

## 2020-07-27 RX ORDER — ONDANSETRON 2 MG/ML
4 INJECTION INTRAMUSCULAR; INTRAVENOUS EVERY 6 HOURS PRN
Status: DISCONTINUED | OUTPATIENT
Start: 2020-07-27 | End: 2020-07-29 | Stop reason: HOSPADM

## 2020-07-27 RX ORDER — HEPARIN SODIUM 1000 [USP'U]/ML
2000 INJECTION, SOLUTION INTRAVENOUS; SUBCUTANEOUS PRN
Status: DISCONTINUED | OUTPATIENT
Start: 2020-07-27 | End: 2020-07-29

## 2020-07-27 RX ORDER — DEXTROSE MONOHYDRATE 50 MG/ML
100 INJECTION, SOLUTION INTRAVENOUS PRN
Status: DISCONTINUED | OUTPATIENT
Start: 2020-07-27 | End: 2020-07-27 | Stop reason: SDUPTHER

## 2020-07-27 RX ORDER — AMLODIPINE BESYLATE 5 MG/1
5 TABLET ORAL DAILY
Status: DISCONTINUED | OUTPATIENT
Start: 2020-07-28 | End: 2020-07-29 | Stop reason: HOSPADM

## 2020-07-27 RX ORDER — INSULIN GLARGINE 100 [IU]/ML
15 INJECTION, SOLUTION SUBCUTANEOUS NIGHTLY
Status: DISCONTINUED | OUTPATIENT
Start: 2020-07-27 | End: 2020-07-27

## 2020-07-27 RX ORDER — MULTIVITAMIN WITH IRON
1 TABLET ORAL DAILY
Status: DISCONTINUED | OUTPATIENT
Start: 2020-07-28 | End: 2020-07-29 | Stop reason: HOSPADM

## 2020-07-27 RX ORDER — ATORVASTATIN CALCIUM 40 MG/1
40 TABLET, FILM COATED ORAL NIGHTLY
Status: DISCONTINUED | OUTPATIENT
Start: 2020-07-27 | End: 2020-07-29 | Stop reason: HOSPADM

## 2020-07-27 RX ORDER — NICOTINE POLACRILEX 4 MG
15 LOZENGE BUCCAL PRN
Status: DISCONTINUED | OUTPATIENT
Start: 2020-07-27 | End: 2020-07-29 | Stop reason: HOSPADM

## 2020-07-27 RX ORDER — MAGNESIUM SULFATE 1 G/100ML
2 INJECTION INTRAVENOUS PRN
Status: DISCONTINUED | OUTPATIENT
Start: 2020-07-27 | End: 2020-07-29 | Stop reason: HOSPADM

## 2020-07-27 RX ORDER — GABAPENTIN 300 MG/1
300 CAPSULE ORAL 3 TIMES DAILY
Status: DISCONTINUED | OUTPATIENT
Start: 2020-07-27 | End: 2020-07-29 | Stop reason: HOSPADM

## 2020-07-27 RX ADMIN — INSULIN LISPRO 2 UNITS: 100 INJECTION, SOLUTION INTRAVENOUS; SUBCUTANEOUS at 22:50

## 2020-07-27 RX ADMIN — Medication 10 ML: at 22:42

## 2020-07-27 RX ADMIN — HYDROMORPHONE HYDROCHLORIDE 0.5 MG: 1 INJECTION, SOLUTION INTRAMUSCULAR; INTRAVENOUS; SUBCUTANEOUS at 17:51

## 2020-07-27 RX ADMIN — GABAPENTIN 300 MG: 300 CAPSULE ORAL at 22:52

## 2020-07-27 RX ADMIN — HYDROMORPHONE HYDROCHLORIDE 0.5 MG: 1 INJECTION, SOLUTION INTRAMUSCULAR; INTRAVENOUS; SUBCUTANEOUS at 16:25

## 2020-07-27 RX ADMIN — HEPARIN SODIUM 9.1 UNITS/KG/HR: 5000 INJECTION, SOLUTION INTRAVENOUS; SUBCUTANEOUS at 16:25

## 2020-07-27 RX ADMIN — NITROGLYCERIN 20 MCG/MIN: 20 INJECTION INTRAVENOUS at 18:14

## 2020-07-27 RX ADMIN — ASPIRIN 81 MG 324 MG: 81 TABLET ORAL at 14:59

## 2020-07-27 RX ADMIN — HEPARIN SODIUM 4000 UNITS: 1000 INJECTION INTRAVENOUS; SUBCUTANEOUS at 16:26

## 2020-07-27 RX ADMIN — DESMOPRESSIN ACETATE 40 MG: 0.2 TABLET ORAL at 22:52

## 2020-07-27 RX ADMIN — FENTANYL CITRATE 50 MCG: 50 INJECTION, SOLUTION INTRAMUSCULAR; INTRAVENOUS at 14:59

## 2020-07-27 RX ADMIN — HEPARIN SODIUM 2000 UNITS: 1000 INJECTION INTRAVENOUS; SUBCUTANEOUS at 23:46

## 2020-07-27 RX ADMIN — SODIUM CHLORIDE: 9 INJECTION, SOLUTION INTRAVENOUS at 23:41

## 2020-07-27 RX ADMIN — ONDANSETRON 4 MG: 2 INJECTION INTRAMUSCULAR; INTRAVENOUS at 21:44

## 2020-07-27 ASSESSMENT — ENCOUNTER SYMPTOMS
RECTAL PAIN: 0
COUGH: 0
STRIDOR: 0
SORE THROAT: 0
VOMITING: 0
BACK PAIN: 0
NAUSEA: 1
RHINORRHEA: 0
PHOTOPHOBIA: 0
APNEA: 0
NAUSEA: 0
ABDOMINAL DISTENTION: 0
SHORTNESS OF BREATH: 0
CHEST TIGHTNESS: 1
CONSTIPATION: 0
DIARRHEA: 0
CHOKING: 0
ANAL BLEEDING: 0
WHEEZING: 0
BLOOD IN STOOL: 0
ABDOMINAL PAIN: 1
SHORTNESS OF BREATH: 1

## 2020-07-27 ASSESSMENT — PAIN SCALES - GENERAL
PAINLEVEL_OUTOF10: 7
PAINLEVEL_OUTOF10: 5
PAINLEVEL_OUTOF10: 9
PAINLEVEL_OUTOF10: 9
PAINLEVEL_OUTOF10: 8

## 2020-07-27 ASSESSMENT — PAIN DESCRIPTION - PAIN TYPE: TYPE: ACUTE PAIN

## 2020-07-27 ASSESSMENT — PAIN DESCRIPTION - LOCATION: LOCATION: CHEST

## 2020-07-27 ASSESSMENT — PAIN DESCRIPTION - ORIENTATION: ORIENTATION: LEFT;MID

## 2020-07-27 NOTE — ED PROVIDER NOTES
FACULTY SIGN-OUT  ADDENDUM     Care of this patient was assumed from previous attending physician. The patient's initial evaluation and plan have been discussed with the prior provider who initially evaluated the patient. Attestation  I was available and discussed any additional care issues that arose and coordinated the management plans with the resident(s) caring for the patient during my duty period. Any areas of disagreement with resident's documentation of care or procedures are noted on the chart. I was personally present for the key portions of any/all procedures, during my duty period. I have documented in the chart those procedures where I was not present during the key portions. ED COURSE      The patient was given the following medications:  Orders Placed This Encounter   Medications    aspirin chewable tablet 324 mg    fentaNYL (SUBLIMAZE) injection 50 mcg    heparin (porcine) injection 4,000 Units    heparin (porcine) injection 4,000 Units    heparin (porcine) injection 2,000 Units    heparin 25,000 units in dextrose 5% 250 mL infusion    HYDROmorphone (DILAUDID) injection 0.5 mg    nitroGLYCERIN 50 mg in dextrose 5% 250 mL infusion       RECENT VITALS:   Temp: 98.3 °F (36.8 °C), Pulse: 89, Resp: 18, BP: (!) 157/75    MEDICAL DECISION MAKING        Nila Casiano is a 60 y.o. female who presents to the Emergency Department with complaints of chest pain. Recent stent. Elevated trop. Admit with cards consult.       Lopez Cotton MD  Attending Emergency Physician    (Please note that portions of this note were completed with a voice recognition program.  Efforts were made to edit the dictations but occasionally words are mis-transcribed.)          Lopez Cotton MD  07/27/20 2753

## 2020-07-27 NOTE — ED PROVIDER NOTES
[address]  Emergency Department Encounter  Emergency Medicine Resident     Pt Name: Caryl Catalan  MRN: [id number]  [place] 1955  Date of evaluation: 7/27/20  PCP:  Anika Aguilera MD    [address] [place] [address]       Chief Complaint   Patient presents with    Chest Pain       HISTORY OF PRESENT ILLNESS  (Location/Symptom, Timing/Onset, Context/Setting, Quality, Duration, Modifying Factors, Severity.)    Caryl Catalan is a 60 y.o. female who presents with left-sided chest pain. Nonradiating. Worse when patient sits forward. Ongoing since last night, no specific trigger. Patient states that started out feeling mildly annoying pain and then progressed to heavy weight on the chest like pain. Denies any radiation to extremities, denies any radiation to jaw or neck. Denies any syncope. Denies any back pain. States she has been taking all of her medication as prescribed. PPE Worn:  Gloves: Yes  Eye Protection: Goggles  Mask: Surgical Mask  Gown: NO    PAST MEDICAL / SURGICAL / SOCIAL / FAMILY HISTORY    has a past medical history of Anxiety, Borderline hypertension, GERD (gastroesophageal reflux disease), Hypothyroidism, Neuropathy, Obesity, Osteoarthritis, Sleep apnea, and Type II or unspecified type diabetes mellitus without mention of complication, not stated as uncontrolled. has a past surgical history that includes Hysterectomy; Colonoscopy; Upper gastrointestinal endoscopy; Dilation and curettage of uterus; hiatal hernia repair; Throat surgery; Appendectomy; Total knee arthroplasty (Right, 01/06/2015); and Total knee arthroplasty (Left, 5/26/15).     Social History     Socioeconomic History    Marital status:      Spouse name: Cate Echeverria Number of children: 0    Years of education: Not on file    Highest education level: Not on file   Occupational History    Occupation: Retired      Employer: [employer] resource strain: Not on file    Food insecurity     Worry: Not on file     Inability: Not on file    Transportation needs     Medical: Not on file     Non-medical: Not on file   Tobacco Use    Smoking status: Never Smoker    Smokeless tobacco: Never Used   Substance and Sexual Activity    Alcohol use: Yes     Comment: once a month    Drug use: No    Sexual activity: Yes     Partners: Male   Lifestyle    Physical activity     Days per week: Not on file     Minutes per session: Not on file    Stress: Not on file   Relationships    Social connections     Talks on phone: Not on file     Gets together: Not on file     Attends Buddhist service: Not on file     Active member of club or organization: Not on file     Attends meetings of clubs or organizations: Not on file     Relationship status: Not on file    Intimate partner violence     Fear of current or ex partner: Not on file     Emotionally abused: Not on file     Physically abused: Not on file     Forced sexual activity: Not on file   Other Topics Concern    Not on file   Social History Narrative    Not on file       Family History   Problem Relation Age of Onset    Heart Disease Mother     Arthritis Mother     Heart Disease Father     Arthritis Father     Cancer Father         \"oral\"    Diabetes Sister         gestational       Allergies:    Shellfish-derived products; Morphine; and Tape [adhesive tape]    Home Medications:  Prior to Admission medications    Medication Sig Start Date End Date Taking? Authorizing Provider   nitroGLYCERIN (NITROSTAT) 0.4 MG SL tablet up to max of 3 total doses.  If no relief after 1 dose, call 911. 7/21/20   HAO Luna CNP   atorvastatin (LIPITOR) 40 MG tablet Take 1 tablet by mouth nightly 7/21/20   HAO Luna - CNP   carvedilol (COREG) 6.25 MG tablet Take 1 tablet by mouth 2 times daily (with meals) 7/21/20   HAO Luna CNP   amLODIPine (NORVASC) 5 MG tablet Take 1 tablet by mouth daily 7/22/20   HAO Celeste CNP   clopidogrel (PLAVIX) 75 MG tablet Take 1 tablet by mouth daily 7/22/20   HAO Celeste CNP   empagliflozin (JARDIANCE) 10 MG tablet Take 1 tablet by mouth daily 7/21/20   Cheryl Stanford MD   insulin glargine (LANTUS SOLOSTAR) 100 UNIT/ML injection pen Inject 60 Units into the skin daily INJECT 45 UNITS SUBCUTANEOUSLY IN THE MORNING AND 15 UNITS SUBCUTANEOUSLY IN THE EVENING 7/21/20   Cheryl Stanford MD   triamcinolone (KENALOG) 0.1 % ointment Apply topically 2 times daily. For 7 days 7/21/20 7/28/20  Cheryl Stanford MD   melatonin 1 MG tablet Take 3 tablets by mouth nightly as needed for Sleep 7/21/20   Cheryl Stanford MD   citalopram (CELEXA) 20 MG tablet Take 2 tablets by mouth daily TAKE 2 TABLETS BY MOUTH IN THE MORNING 7/21/20   Cheryl Stanford MD   lisinopril (PRINIVIL;ZESTRIL) 10 MG tablet Take 1 tablet by mouth once daily 7/20/20   Angela Novak MD   gabapentin (NEURONTIN) 300 MG capsule TAKE 1 CAPSULE BY MOUTH THREE TIMES DAILY 4/24/20 7/24/20  Angela Novak MD   EUTHYROX 50 MCG tablet TAKE 1 TABLET BY MOUTH ONCE DAILY 11/6/19   Angela Novak MD   glimepiride (AMARYL) 4 MG tablet TAKE 1 TABLET BY MOUTH TWICE DAILY 11/6/19   Angela Novak MD   Handicap Placard MISC Is a permanent condition. DX: M19.90 5/14/19   Angela Novak MD   SITagliptin (JANUVIA) 100 MG tablet TAKE 1 TABLET DAILY 9/13/18   Nasrin Grubbs MD   omeprazole (PRILOSEC) 20 MG delayed release capsule Take 1 capsule by mouth Daily 3/8/18   Angela Novak MD   Insulin Pen Needle (B-D UF III MINI PEN NEEDLES) 31G X 5 MM MISC USE 1 PEN NEEDLE DAILY 3/8/18   Angela Novak MD   aspirin 81 MG EC tablet Take 1 tablet by mouth daily 3/8/18   Angela Novak MD   [employer] Lancets Thin MISC Test before meals and at bedtime.  DX: DM, on insulin 4/22/14   Gabino Salas MD   Glucose Blood (BLOOD GLUCOSE TEST STRIPS) STRP Test before meals and at bedtime. DX: DM, on insulin 4/22/14   Celester MD Mary   MULTIPLE VITAMIN PO   Take 2 tablets by mouth daily DAILY    Historical Provider, MD   Alcohol Swabs (ALCOHOL PREP PADS) by Other route 2 times daily. Historical Provider, MD       REVIEW OF SYSTEMS    (2-9 systems for level 4, 10 or more for level 5)    Review of Systems   Constitutional: Negative for chills, fatigue and fever. HENT: Negative for congestion and rhinorrhea. Respiratory: Negative for cough and shortness of breath. Cardiovascular: Positive for chest pain. Negative for palpitations and leg swelling. Gastrointestinal: Positive for abdominal pain. Negative for blood in stool, nausea and vomiting. Genitourinary: Negative for flank pain. Musculoskeletal: Negative for myalgias. Neurological: Negative for headaches. All other systems reviewed and are negative. PHYSICAL EXAM   (up to 7 for level 4, 8 or more for level 5)    INITIAL VITALS:   ED Triage Vitals   BP Temp Temp Source Pulse Resp SpO2 Height Weight   07/27/20 1426 07/27/20 1426 07/27/20 1426 07/27/20 1425 07/27/20 1425 07/27/20 1425 07/27/20 1600 07/27/20 1600   (!) 157/75 98.3 °F (36.8 °C) Oral 89 18 97 % 4' 11\" (1.499 m) 242 lb (109.8 kg)       Physical Exam  Vitals signs and nursing note reviewed. Constitutional:       General: She is not in acute distress. Appearance: She is well-developed. She is obese. She is not ill-appearing. Cardiovascular:      Rate and Rhythm: Normal rate and regular rhythm. Pulses: Normal pulses. Radial pulses are 2+ on the right side and 2+ on the left side. Heart sounds: Normal heart sounds. No murmur. Pulmonary:      Effort: Pulmonary effort is normal. No tachypnea or respiratory distress. Breath sounds: Normal breath sounds. No decreased breath sounds. Chest:      Chest wall: No tenderness. Abdominal:      General: Bowel sounds are normal. There is no distension.       Palpations: Abdomen is soft. Tenderness: There is abdominal tenderness in the right upper quadrant and epigastric area. There is no right CVA tenderness or left CVA tenderness. Negative signs include Nam's sign. Musculoskeletal:      Right lower leg: No edema. Left lower leg: No edema. Skin:     General: Skin is warm and dry. Capillary Refill: Capillary refill takes less than 2 seconds. Neurological:      Mental Status: She is alert and oriented to person, place, and time. Psychiatric:         Behavior: Behavior is cooperative.          DIFFERENTIAL  DIAGNOSIS   PLAN (LABS / IMAGING / EKG):  Orders Placed This Encounter   Procedures    XR CHEST (2 VW)    US GALLBLADDER RUQ    CBC Auto Differential    Basic Metabolic Panel w/ Reflex to MG    Lipase    Troponin    Brain Natriuretic Peptide    Troponin    APTT    Height and weight    Inpatient consult to Cardiology    Inpatient consult to Hospitalist    EKG 12 Lead    EKG 12 Lead    PATIENT STATUS (FROM ED OR OR/PROCEDURAL) Inpatient       MEDICATIONS ORDERED:  Orders Placed This Encounter   Medications    aspirin chewable tablet 324 mg    fentaNYL (SUBLIMAZE) injection 50 mcg    heparin (porcine) injection 4,000 Units    heparin (porcine) injection 4,000 Units    heparin (porcine) injection 2,000 Units    heparin 25,000 units in dextrose 5% 250 mL infusion    HYDROmorphone (DILAUDID) injection 0.5 mg    nitroGLYCERIN 50 mg in dextrose 5% 250 mL infusion    HYDROmorphone (DILAUDID) injection 0.5 mg         DIAGNOSTIC RESULTS / EMERGENCYDEPARTMENT COURSE / MDM   LABS:  Labs Reviewed   CBC WITH AUTO DIFFERENTIAL - Abnormal; Notable for the following components:       Result Value    WBC 12.3 (*)     RBC 5.64 (*)     Hemoglobin 16.5 (*)     Hematocrit 50.8 (*)     Seg Neutrophils 67 (*)     Lymphocytes 18 (*)     Immature Granulocytes 1 (*)     Segs Absolute 8.27 (*)     Absolute Mono # 1.29 (*)     All other components within normal limits BASIC METABOLIC PANEL W/ REFLEX TO MG FOR LOW K - Abnormal; Notable for the following components:    Glucose 325 (*)     All other components within normal limits   LIPASE - Abnormal; Notable for the following components:    Lipase 209 (*)     All other components within normal limits   TROPONIN - Abnormal; Notable for the following components:    Troponin, High Sensitivity 161 (*)     All other components within normal limits   TROPONIN - Abnormal; Notable for the following components:    Troponin, High Sensitivity 132 (*)     All other components within normal limits   APTT - Abnormal; Notable for the following components:    PTT 20.2 (*)     All other components within normal limits   BRAIN NATRIURETIC PEPTIDE   APTT   APTT       RADIOLOGY:  Xr Chest (2 Vw)    Result Date: 7/27/2020  EXAMINATION: TWO XRAY VIEWS OF THE CHEST 7/27/2020 3:29 pm COMPARISON: Chest July 18, 2020. HISTORY: ORDERING SYSTEM PROVIDED HISTORY: chest pain TECHNOLOGIST PROVIDED HISTORY: -SIRD chest pain FINDINGS: Heart appears normal size. Lungs are clear. No free air. Osseous structures demonstrate degenerative change. No acute cardiopulmonary process. EKG    EKG Interpretation at 1420    Interpreted by me    Rhythm: normal sinus   Rate: normal  Axis: Leftward  Ectopy: none  Conduction: normal  ST Segments: no acute change  T Waves: no acute change  Q Waves: none    Sinus rhythm rate of 91. No ST segment changes, no arrhythmia, no ectopy. Leftward axis with poor R wave progression    EKG Interpretation at 1754    Interpreted by me    Rhythm: normal sinus   Rate: normal  Axis: Leftward  Ectopy: none  Conduction: normal  ST Segments: no acute change  T Waves: no acute change  Q Waves: none    Clinical Impression: Sinus rhythm rate of 77. No ST segment changes, no arrhythmia, no ectopy. Leftward axis with poor R wave progression. T wave flattening seen in lead III again.   No changes from previous EKG    Impression:  Patient presents left-sided chest pain, ongoing since last night has been worsening. Does not radiate. Not associated with nausea or vomiting. No shortness of breath, does not radiate to arms. Recent stenting last week, high concern for restenosis. Will do cardiac work-up, EKG, CBC, BMP, chest x-ray, and troponins. Patient will likely require admission. EMERGENCY DEPARTMENT COURSE:   Elevated troponins over the 160. Due to patient's recent stenting, higher concern now for restenosis, will start patient on a heparin drip. Patient started on heparin drip, second troponin returned at 132. Pain controlled currently with Dilaudid due to morphine intolerance and    Fentanyl appearing and effective. Second EKG demonstrates no significant change from previous except that heart rate is more controlled. Elevated lipase, with epigastric pain, white blood cell count slightly elevated, concern for cholelithiasis, possible cholecystitis, will check right upper quadrant ultrasound. Elevated white blood cell count also secondary to stress leukocytosis possibly. Started patient on nitroglycerin drip due to ongoing chest pain that was only partially relieved with Dilaudid. Consult cardiology and they agreed with plan to do heparin and trend troponins. Patient will be at [facility] admission stepdown unit.     Patient signed out to Dr. Sunday MANSFIELD  Number of Diagnoses or Management Options  NSTEMI (non-ST elevated myocardial infarction) [facility]): new, needed workup     Amount and/or Complexity of Data Reviewed  Clinical lab tests: ordered and reviewed  Tests in the radiology section of CPT®: ordered and reviewed  Review and summarize past medical records: yes  Discuss the patient with other providers: yes  Independent visualization of images, tracings, or specimens: yes    Risk of Complications, Morbidity, and/or Mortality  Presenting problems: moderate  Diagnostic procedures: moderate  Management options: moderate    Patient Progress  Patient progress: stable      PROCEDURES:  none    CONSULTS:  IP CONSULT TO CARDIOLOGY  IP CONSULT TO HOSPITALIST  IP CONSULT TO CARDIOLOGY    CRITICAL CARE:  Please see attending note    FINAL IMPRESSION     1. NSTEMI (non-ST elevated myocardial infarction) ([facility] [address]          DISPOSITION / PLAN   DISPOSITION Admitted 07/27/2020 06:32:29 PM        PATIENT REFERRED TO:  Josafat Guthrie MD  [address] [place] [id number]  [phone]          Francesco Chavez [facility] [address] [facility] [place], [place] [address] [facility]  [phone]            DISCHARGE MEDICATIONS:  New Prescriptions    No medications on file       Kimberly Fuller,   Emergency Medicine Resident Physician, PGY-2    (Please note that portions of this note were completed with a voice recognition program.  Efforts were made to edit the dictations but occasionally words are mis-transcribed.)          Kimberly Fuller MD  07/27/20 [id number]

## 2020-07-27 NOTE — ED PROVIDER NOTES
[facility]  Emergency Department  Faculty Attestation     I performed a history and physical examination of the patient and discussed management with the resident. I reviewed the residents note and agree with the documented findings and plan of care. Any areas of disagreement are noted on the chart. I was personally present for the key portions of any procedures. I have documented in the chart those procedures where I was not present during the key portions. I have reviewed the emergency nurses triage note. I agree with the chief complaint, past medical history, past surgical history, allergies, medications, social and family history as documented unless otherwise noted below. For Physician Assistant/ Nurse Practitioner cases/documentation I have personally evaluated this patient and have completed at least one if not all key elements of the E/M (history, physical exam, and MDM). Additional findings are as noted. Primary Care Physician:  Silvia Nam MD    Screenings:  [unfilled]    CHIEF COMPLAINT     No chief complaint on file. RECENT VITALS:   Temp: 98.3 °F (36.8 °C),  Pulse: 89, Resp: 18, BP: (!) 157/75    LABS:  Labs Reviewed - No data to display    Radiology  No orders to display         EKG:   EKG Interpretation    Interpreted by me    Rhythm: normal sinus   Rate: normal  Axis: normal  Ectopy: none  Conduction: normal  ST Segments: Minimal elevation in lead III only, unchanged from previous  T Waves: no acute change  Q Waves: n septal and inferior    Clinical Impression: no acute changes and normal EKG    Attending Physician Additional  Notes    Patient has recurrence of her left inframammary chest discomfort. She feels anxious but this is been unchanged for years. She has shortness of breath and cough but no sputum or fevers. No definite COVID symptoms.   She also has deep boring pain in the epigastrium and right mid abdomen which she is had previously. She had recent stent and is compliant with her baby aspirin, Plavix, statin. No relief with nitroglycerin. She also complains of increased thirst.  She is diabetic but has not checked her sugar. On exam she is uncomfortable, vital signs reveal hypertension otherwise normal.  Lungs are clear. No chest wall tenderness. No edema or cords. Pulses are symmetrical.  Abdomen is benign. Impression is chest pain rule out ACS. Plan is EKG, troponin, aspirin, morphine, reassess, anticipate admission. Ray Shirley.  Sky Ervin MD, [facility]  Attending Emergency  Physician               Roz Abdalla MD  07/27/20 2851

## 2020-07-27 NOTE — ED TRIAGE NOTES
Pt to ED with c/o severe midsternal/left sided CP since last night, was unable to sleep all night d/t pain. Pt had two cardiac stents placed last week and was d/c from cardiac center last Tuesday. Pt had no issues up until last night. Denies n/v/d, dizziness or headache. Pt states she was started on multiple new cardiac medications and thinks pain may be caused from new meds or possibly her anxiety. Pt on full cardiac monitoring, EKG obtained, IV inserted and labs drawn.

## 2020-07-27 NOTE — ED PROVIDER NOTES
[facility] ED  Emergency Department  Emergency Medicine Resident Sign-out     Care of Caledonia Sour was assumed from Dr. Eddie Castañeda and is being seen for Chest Pain   . The patient's initial evaluation and plan have been discussed with the prior provider who initially evaluated the patient.      EMERGENCY DEPARTMENT COURSE / MEDICAL DECISION MAKING:       MEDICATIONS GIVEN:  Orders Placed This Encounter   Medications    aspirin chewable tablet 324 mg    fentaNYL (SUBLIMAZE) injection 50 mcg    heparin (porcine) injection 4,000 Units    heparin (porcine) injection 4,000 Units    heparin (porcine) injection 2,000 Units    heparin 25,000 units in dextrose 5% 250 mL infusion    HYDROmorphone (DILAUDID) injection 0.5 mg    nitroGLYCERIN 50 mg in dextrose 5% 250 mL infusion    HYDROmorphone (DILAUDID) injection 0.5 mg       LABS / RADIOLOGY:     Labs Reviewed   CBC WITH AUTO DIFFERENTIAL - Abnormal; Notable for the following components:       Result Value    WBC 12.3 (*)     RBC 5.64 (*)     Hemoglobin 16.5 (*)     Hematocrit 50.8 (*)     Seg Neutrophils 67 (*)     Lymphocytes 18 (*)     Immature Granulocytes 1 (*)     Segs Absolute 8.27 (*)     Absolute Mono # 1.29 (*)     All other components within normal limits   BASIC METABOLIC PANEL W/ REFLEX TO MG FOR LOW K - Abnormal; Notable for the following components:    Glucose 325 (*)     All other components within normal limits   LIPASE - Abnormal; Notable for the following components:    Lipase 209 (*)     All other components within normal limits   TROPONIN - Abnormal; Notable for the following components:    Troponin, High Sensitivity 161 (*)     All other components within normal limits   TROPONIN - Abnormal; Notable for the following components:    Troponin, High Sensitivity 132 (*)     All other components within normal limits   APTT - Abnormal; Notable for the following components:    PTT 20.2 (*)     All other components within

## 2020-07-27 NOTE — LETTER
Outpatient Diabetes Education Program     7/28/20    RE: Kt Check  [id number]  [facility] [address]        Dear Sera Arteaga MD    As a follow up to a [facility] admission for diabetes related illness or inpatient diabetes education referral, please consider ordering an outpatient diabetes education / MNT to address ongoing diabetes self management needs. Please place order via [employer]/ "[employer] system:   REF20 - Ambulatory referral to Diabetic Education -  [facility]     Please also consider use of Jardiance with this patient. Due to her need to lower BG and the cardio protective effect of this medication. Her insurance does prefer and will cover jardiance with step therapy protocol. Diabetes Education Services  staff will contact the patient to set up assessment and education sessions. Thank you.

## 2020-07-28 PROBLEM — F32.A DEPRESSION: Status: ACTIVE | Noted: 2020-07-28

## 2020-07-28 PROBLEM — R74.8 ELEVATED LIPASE: Status: ACTIVE | Noted: 2020-07-28

## 2020-07-28 PROBLEM — R10.9 ABDOMINAL PAIN: Status: ACTIVE | Noted: 2020-07-28

## 2020-07-28 PROBLEM — Z79.4 TYPE 2 DIABETES MELLITUS, WITH LONG-TERM CURRENT USE OF INSULIN (HCC): Status: ACTIVE | Noted: 2020-07-28

## 2020-07-28 PROBLEM — N30.90 CYSTITIS: Status: ACTIVE | Noted: 2020-07-28

## 2020-07-28 PROBLEM — E11.9 TYPE 2 DIABETES MELLITUS, WITH LONG-TERM CURRENT USE OF INSULIN (HCC): Status: ACTIVE | Noted: 2020-07-28

## 2020-07-28 LAB
ABSOLUTE EOS #: 0.32 K/UL (ref 0–0.44)
ABSOLUTE IMMATURE GRANULOCYTE: 0.08 K/UL (ref 0–0.3)
ABSOLUTE LYMPH #: 1.93 K/UL (ref 1.1–3.7)
ABSOLUTE MONO #: 1.19 K/UL (ref 0.1–1.2)
BASOPHILS # BLD: 1 % (ref 0–2)
BASOPHILS ABSOLUTE: 0.05 K/UL (ref 0–0.2)
CHOLESTEROL/HDL RATIO: 3.2
CHOLESTEROL: 119 MG/DL
CK MB: 2.8 NG/ML
DIFFERENTIAL TYPE: ABNORMAL
EKG ATRIAL RATE: 77 BPM
EKG ATRIAL RATE: 91 BPM
EKG P AXIS: 24 DEGREES
EKG P AXIS: 33 DEGREES
EKG P-R INTERVAL: 156 MS
EKG P-R INTERVAL: 174 MS
EKG Q-T INTERVAL: 378 MS
EKG Q-T INTERVAL: 422 MS
EKG QRS DURATION: 68 MS
EKG QRS DURATION: 76 MS
EKG QTC CALCULATION (BAZETT): 464 MS
EKG QTC CALCULATION (BAZETT): 477 MS
EKG R AXIS: -18 DEGREES
EKG R AXIS: -20 DEGREES
EKG T AXIS: 38 DEGREES
EKG T AXIS: 54 DEGREES
EKG VENTRICULAR RATE: 77 BPM
EKG VENTRICULAR RATE: 91 BPM
EOSINOPHILS RELATIVE PERCENT: 3 % (ref 1–4)
GLUCOSE BLD-MCNC: 102 MG/DL (ref 65–105)
GLUCOSE BLD-MCNC: 121 MG/DL (ref 65–105)
GLUCOSE BLD-MCNC: 193 MG/DL (ref 65–105)
GLUCOSE BLD-MCNC: 95 MG/DL (ref 65–105)
HCT VFR BLD CALC: 45.1 % (ref 36.3–47.1)
HDLC SERPL-MCNC: 37 MG/DL
HEMOGLOBIN: 14.2 G/DL (ref 11.9–15.1)
IMMATURE GRANULOCYTES: 1 %
LDL CHOLESTEROL: 60 MG/DL (ref 0–130)
LYMPHOCYTES # BLD: 19 % (ref 24–43)
MAGNESIUM: 2.5 MG/DL (ref 1.6–2.6)
MCH RBC QN AUTO: 29.7 PG (ref 25.2–33.5)
MCHC RBC AUTO-ENTMCNC: 31.5 G/DL (ref 28.4–34.8)
MCV RBC AUTO: 94.4 FL (ref 82.6–102.9)
MONOCYTES # BLD: 12 % (ref 3–12)
NRBC AUTOMATED: 0 PER 100 WBC
PARTIAL THROMBOPLASTIN TIME: 42.7 SEC (ref 20.5–30.5)
PARTIAL THROMBOPLASTIN TIME: 49.4 SEC (ref 20.5–30.5)
PARTIAL THROMBOPLASTIN TIME: 56.4 SEC (ref 20.5–30.5)
PARTIAL THROMBOPLASTIN TIME: 66.5 SEC (ref 20.5–30.5)
PDW BLD-RTO: 12.5 % (ref 11.8–14.4)
PLATELET # BLD: 226 K/UL (ref 138–453)
PLATELET ESTIMATE: ABNORMAL
PMV BLD AUTO: 11 FL (ref 8.1–13.5)
RBC # BLD: 4.78 M/UL (ref 3.95–5.11)
RBC # BLD: ABNORMAL 10*6/UL
SEG NEUTROPHILS: 65 % (ref 36–65)
SEGMENTED NEUTROPHILS ABSOLUTE COUNT: 6.59 K/UL (ref 1.5–8.1)
TRIGL SERPL-MCNC: 111 MG/DL
TROPONIN INTERP: ABNORMAL
TROPONIN T: ABNORMAL NG/ML
TROPONIN, HIGH SENSITIVITY: 108 NG/L (ref 0–14)
TROPONIN, HIGH SENSITIVITY: 129 NG/L (ref 0–14)
TROPONIN, HIGH SENSITIVITY: 93 NG/L (ref 0–14)
TROPONIN, HIGH SENSITIVITY: 95 NG/L (ref 0–14)
VLDLC SERPL CALC-MCNC: ABNORMAL MG/DL (ref 1–30)
WBC # BLD: 10.2 K/UL (ref 3.5–11.3)
WBC # BLD: ABNORMAL 10*3/UL

## 2020-07-28 PROCEDURE — G0108 DIAB MANAGE TRN  PER INDIV: HCPCS

## 2020-07-28 PROCEDURE — 2580000003 HC RX 258: Performed by: STUDENT IN AN ORGANIZED HEALTH CARE EDUCATION/TRAINING PROGRAM

## 2020-07-28 PROCEDURE — 6370000000 HC RX 637 (ALT 250 FOR IP): Performed by: PHYSICIAN ASSISTANT

## 2020-07-28 PROCEDURE — 99223 1ST HOSP IP/OBS HIGH 75: CPT | Performed by: FAMILY MEDICINE

## 2020-07-28 PROCEDURE — 80061 LIPID PANEL: CPT

## 2020-07-28 PROCEDURE — 6370000000 HC RX 637 (ALT 250 FOR IP): Performed by: STUDENT IN AN ORGANIZED HEALTH CARE EDUCATION/TRAINING PROGRAM

## 2020-07-28 PROCEDURE — 93005 ELECTROCARDIOGRAM TRACING: CPT | Performed by: INTERNAL MEDICINE

## 2020-07-28 PROCEDURE — 82553 CREATINE MB FRACTION: CPT

## 2020-07-28 PROCEDURE — 97166 OT EVAL MOD COMPLEX 45 MIN: CPT

## 2020-07-28 PROCEDURE — 84484 ASSAY OF TROPONIN QUANT: CPT

## 2020-07-28 PROCEDURE — 2580000003 HC RX 258: Performed by: PHYSICIAN ASSISTANT

## 2020-07-28 PROCEDURE — 85730 THROMBOPLASTIN TIME PARTIAL: CPT

## 2020-07-28 PROCEDURE — 97530 THERAPEUTIC ACTIVITIES: CPT

## 2020-07-28 PROCEDURE — 97162 PT EVAL MOD COMPLEX 30 MIN: CPT

## 2020-07-28 PROCEDURE — 85025 COMPLETE CBC W/AUTO DIFF WBC: CPT

## 2020-07-28 PROCEDURE — 93010 ELECTROCARDIOGRAM REPORT: CPT | Performed by: INTERNAL MEDICINE

## 2020-07-28 PROCEDURE — 82947 ASSAY GLUCOSE BLOOD QUANT: CPT

## 2020-07-28 PROCEDURE — 6360000002 HC RX W HCPCS: Performed by: PHYSICIAN ASSISTANT

## 2020-07-28 PROCEDURE — 93005 ELECTROCARDIOGRAM TRACING: CPT | Performed by: PHYSICIAN ASSISTANT

## 2020-07-28 PROCEDURE — 36415 COLL VENOUS BLD VENIPUNCTURE: CPT

## 2020-07-28 PROCEDURE — 2060000000 HC ICU INTERMEDIATE R&B

## 2020-07-28 PROCEDURE — 83735 ASSAY OF MAGNESIUM: CPT

## 2020-07-28 PROCEDURE — 2500000003 HC RX 250 WO HCPCS: Performed by: PHYSICIAN ASSISTANT

## 2020-07-28 PROCEDURE — 6360000002 HC RX W HCPCS: Performed by: STUDENT IN AN ORGANIZED HEALTH CARE EDUCATION/TRAINING PROGRAM

## 2020-07-28 PROCEDURE — 6370000000 HC RX 637 (ALT 250 FOR IP)

## 2020-07-28 PROCEDURE — 97535 SELF CARE MNGMENT TRAINING: CPT

## 2020-07-28 RX ORDER — LIDOCAINE 4 G/G
1 PATCH TOPICAL DAILY
Status: DISCONTINUED | OUTPATIENT
Start: 2020-07-28 | End: 2020-07-29 | Stop reason: HOSPADM

## 2020-07-28 RX ORDER — NITROGLYCERIN 0.4 MG/1
0.4 TABLET SUBLINGUAL EVERY 5 MIN PRN
Status: DISCONTINUED | OUTPATIENT
Start: 2020-07-28 | End: 2020-07-29 | Stop reason: HOSPADM

## 2020-07-28 RX ORDER — DICYCLOMINE HYDROCHLORIDE 10 MG/1
10 CAPSULE ORAL
Status: DISCONTINUED | OUTPATIENT
Start: 2020-07-28 | End: 2020-07-29 | Stop reason: HOSPADM

## 2020-07-28 RX ORDER — OXYCODONE HYDROCHLORIDE 5 MG/1
10 TABLET ORAL
Status: COMPLETED | OUTPATIENT
Start: 2020-07-28 | End: 2020-07-28

## 2020-07-28 RX ORDER — HYDROXYZINE HYDROCHLORIDE 10 MG/1
10 TABLET, FILM COATED ORAL 3 TIMES DAILY PRN
Status: DISCONTINUED | OUTPATIENT
Start: 2020-07-28 | End: 2020-07-29

## 2020-07-28 RX ORDER — BUSPIRONE HYDROCHLORIDE 10 MG/1
10 TABLET ORAL 2 TIMES DAILY
Status: DISCONTINUED | OUTPATIENT
Start: 2020-07-28 | End: 2020-07-29 | Stop reason: HOSPADM

## 2020-07-28 RX ORDER — OXYCODONE HYDROCHLORIDE 5 MG/1
5 TABLET ORAL
Status: COMPLETED | OUTPATIENT
Start: 2020-07-28 | End: 2020-07-28

## 2020-07-28 RX ORDER — ACETAMINOPHEN 500 MG
1000 TABLET ORAL EVERY 6 HOURS PRN
Status: DISCONTINUED | OUTPATIENT
Start: 2020-07-28 | End: 2020-07-29 | Stop reason: HOSPADM

## 2020-07-28 RX ORDER — ACETAMINOPHEN 650 MG/1
650 SUPPOSITORY RECTAL EVERY 6 HOURS PRN
Status: DISCONTINUED | OUTPATIENT
Start: 2020-07-28 | End: 2020-07-29 | Stop reason: HOSPADM

## 2020-07-28 RX ADMIN — CITALOPRAM 40 MG: 20 TABLET, FILM COATED ORAL at 08:56

## 2020-07-28 RX ADMIN — OXYCODONE HYDROCHLORIDE 5 MG: 5 TABLET ORAL at 21:51

## 2020-07-28 RX ADMIN — GABAPENTIN 300 MG: 300 CAPSULE ORAL at 13:30

## 2020-07-28 RX ADMIN — Medication 3 MG: at 22:13

## 2020-07-28 RX ADMIN — OXYCODONE HYDROCHLORIDE 10 MG: 5 TABLET ORAL at 23:19

## 2020-07-28 RX ADMIN — GABAPENTIN 300 MG: 300 CAPSULE ORAL at 08:54

## 2020-07-28 RX ADMIN — HEPARIN SODIUM 2000 UNITS: 1000 INJECTION INTRAVENOUS; SUBCUTANEOUS at 05:21

## 2020-07-28 RX ADMIN — CLOPIDOGREL 75 MG: 75 TABLET, FILM COATED ORAL at 08:54

## 2020-07-28 RX ADMIN — THERA TABS 1 TABLET: TAB at 08:54

## 2020-07-28 RX ADMIN — BUSPIRONE HYDROCHLORIDE 10 MG: 10 TABLET ORAL at 14:32

## 2020-07-28 RX ADMIN — CARVEDILOL 6.25 MG: 6.25 TABLET, FILM COATED ORAL at 16:55

## 2020-07-28 RX ADMIN — DICYCLOMINE HYDROCHLORIDE 10 MG: 10 CAPSULE ORAL at 16:54

## 2020-07-28 RX ADMIN — DICYCLOMINE HYDROCHLORIDE 10 MG: 10 CAPSULE ORAL at 10:56

## 2020-07-28 RX ADMIN — GABAPENTIN 300 MG: 300 CAPSULE ORAL at 20:39

## 2020-07-28 RX ADMIN — INSULIN GLARGINE 20 UNITS: 100 INJECTION, SOLUTION SUBCUTANEOUS at 21:45

## 2020-07-28 RX ADMIN — INSULIN LISPRO 1 UNITS: 100 INJECTION, SOLUTION INTRAVENOUS; SUBCUTANEOUS at 21:47

## 2020-07-28 RX ADMIN — CEFTRIAXONE SODIUM 1 G: 1 INJECTION, POWDER, FOR SOLUTION INTRAMUSCULAR; INTRAVENOUS at 08:45

## 2020-07-28 RX ADMIN — SODIUM CHLORIDE: 9 INJECTION, SOLUTION INTRAVENOUS at 13:30

## 2020-07-28 RX ADMIN — DICLOFENAC 4 G: 10 GEL TOPICAL at 20:37

## 2020-07-28 RX ADMIN — ACETAMINOPHEN 1000 MG: 500 TABLET ORAL at 18:43

## 2020-07-28 RX ADMIN — NITROGLYCERIN 20 MCG/MIN: 20 INJECTION INTRAVENOUS at 23:08

## 2020-07-28 RX ADMIN — LEVOTHYROXINE SODIUM 50 MCG: 50 TABLET ORAL at 07:35

## 2020-07-28 RX ADMIN — PANTOPRAZOLE SODIUM 40 MG: 40 TABLET, DELAYED RELEASE ORAL at 07:35

## 2020-07-28 RX ADMIN — DESMOPRESSIN ACETATE 40 MG: 0.2 TABLET ORAL at 20:39

## 2020-07-28 RX ADMIN — HEPARIN SODIUM 13.1 UNITS/KG/HR: 5000 INJECTION, SOLUTION INTRAVENOUS; SUBCUTANEOUS at 12:36

## 2020-07-28 RX ADMIN — NITROGLYCERIN 0.4 MG: 0.4 TABLET SUBLINGUAL at 23:07

## 2020-07-28 RX ADMIN — Medication 81 MG: at 08:54

## 2020-07-28 RX ADMIN — ONDANSETRON 4 MG: 2 INJECTION INTRAMUSCULAR; INTRAVENOUS at 08:42

## 2020-07-28 RX ADMIN — CARVEDILOL 6.25 MG: 6.25 TABLET, FILM COATED ORAL at 08:54

## 2020-07-28 RX ADMIN — BUSPIRONE HYDROCHLORIDE 10 MG: 10 TABLET ORAL at 20:39

## 2020-07-28 RX ADMIN — LISINOPRIL 10 MG: 10 TABLET ORAL at 08:54

## 2020-07-28 RX ADMIN — PROMETHAZINE HYDROCHLORIDE 12.5 MG: 25 TABLET ORAL at 07:42

## 2020-07-28 RX ADMIN — AMLODIPINE BESYLATE 5 MG: 5 TABLET ORAL at 08:54

## 2020-07-28 ASSESSMENT — PAIN DESCRIPTION - PAIN TYPE
TYPE: ACUTE PAIN

## 2020-07-28 ASSESSMENT — PAIN DESCRIPTION - LOCATION
LOCATION: CHEST

## 2020-07-28 ASSESSMENT — PAIN SCALES - GENERAL
PAINLEVEL_OUTOF10: 2
PAINLEVEL_OUTOF10: 4
PAINLEVEL_OUTOF10: 3
PAINLEVEL_OUTOF10: 5
PAINLEVEL_OUTOF10: 7
PAINLEVEL_OUTOF10: 5
PAINLEVEL_OUTOF10: 4

## 2020-07-28 ASSESSMENT — PAIN DESCRIPTION - ORIENTATION
ORIENTATION: MID;LEFT
ORIENTATION: MID;LEFT

## 2020-07-28 ASSESSMENT — PAIN DESCRIPTION - FREQUENCY
FREQUENCY: CONTINUOUS
FREQUENCY: CONTINUOUS

## 2020-07-28 ASSESSMENT — PAIN - FUNCTIONAL ASSESSMENT: PAIN_FUNCTIONAL_ASSESSMENT: ACTIVITIES ARE NOT PREVENTED

## 2020-07-28 ASSESSMENT — PAIN DESCRIPTION - DESCRIPTORS
DESCRIPTORS: CONSTANT
DESCRIPTORS: CONSTANT

## 2020-07-28 ASSESSMENT — PAIN DESCRIPTION - ONSET
ONSET: ON-GOING
ONSET: ON-GOING

## 2020-07-28 NOTE — PROGRESS NOTES
Physical Therapy    Facility/Department: [facility] CAR 1  Initial Assessment    NAME: Diana Rasmussen  : 1955  MRN: [id number]  Chief Complaint   Patient presents with    Chest Pain        Date of Service: 2020    Discharge Recommendations: Further therapy recommended at discharge. PT Equipment Recommendations  Equipment Needed: No    Assessment   Body structures, Functions, Activity limitations: Decreased functional mobility ; Decreased balance;Decreased strength;Decreased endurance  Assessment: The pt ambulated 220ft with RW and CGA, requiring Min A for one LOB. Recommend continued therapy to maximize safety and independence. Prognosis: Good  Decision Making: Medium Complexity  PT Education: Goals;PT Role;Plan of Care; Functional Mobility Training  REQUIRES PT FOLLOW UP: Yes  Activity Tolerance  Activity Tolerance: Patient limited by endurance       Patient Diagnosis(es): The encounter diagnosis was NSTEMI (non-ST elevated myocardial infarction) ([facility] [address] has a past medical history of Anxiety, Borderline hypertension, Depression, GERD (gastroesophageal reflux disease), Hypothyroidism, Neuropathy, Obesity, Osteoarthritis, Other cirrhosis of liver ([facility] [address] Sleep apnea, and Type II or unspecified type diabetes mellitus without mention of complication, not stated as uncontrolled. has a past surgical history that includes Hysterectomy; Colonoscopy; Upper gastrointestinal endoscopy; Dilation and curettage of uterus; hiatal hernia repair; Throat surgery; Appendectomy; Total knee arthroplasty (Right, 2015); and Total knee arthroplasty (Left, 5/26/15).     Restrictions  Restrictions/Precautions  Restrictions/Precautions: Fall Risk  Required Braces or Orthoses?: No  Position Activity Restriction  Other position/activity restrictions: up with assist  Vision/Hearing  Vision: Impaired  Vision Exceptions: Wears glasses for reading  Hearing: Within functional limits     Subjective  General  Patient assessed for rehabilitation services?: Yes  Response To Previous Treatment: Not applicable  Family / Caregiver Present: No  Follows Commands: Within Functional Limits  Subjective  Subjective: RN and pt agreeable to PT. Pt supine in bed upon arrival, pleasant and cooperative throughout. Pain Screening  Patient Currently in Pain: Yes  Pain Assessment  Pain Assessment: 0-10  Pain Level: 4  Pain Type: Acute pain  Pain Location: Chest  Pain Orientation: Mid;Left  Pain Descriptors: Constant  Pain Frequency: Continuous  Pain Onset: On-going  Non-Pharmaceutical Pain Intervention(s): Ambulation/Increased Activity  Response to Pain Intervention: Patient Satisfied  Vital Signs  Patient Currently in Pain: Yes       Orientation  Orientation  Overall Orientation Status: Within Functional Limits  Social/Functional History  Social/Functional History  Lives With: Spouse  Type of Home: House  Home Layout: Multi-level, Laundry in basement, Bed/Bath upstairs(full flight to bed/bath with L HR)  Home Access: Stairs to enter with rails  Entrance Stairs - Number of Steps: 6  Entrance Stairs - Rails: Right  Bathroom Shower/Tub: Tub/Shower unit  Bathroom Toilet: Handicap height  Bathroom Equipment: Grab bars in shower, Shower chair  Home Equipment: [employer], 4 wheeled walker(pt reports using 4WW in the home due to falls and longer distances)  Receives Help From: Family  ADL Assistance: Needs assistance(pt needs assistance with tub transfers)  Homemaking Assistance: Needs assistance( completes most)  Homemaking Responsibilities: Yes  Ambulation Assistance: Independent  Transfer Assistance: Independent  Active : Yes  Mode of Transportation: [facility]  Occupation: Retired  Leisure & Hobbies: reading, swimming  Additional Comments: Pt reports  is also retired and able to assist as needed.   Cognition   Cognition  Overall Cognitive Status: WFL    Objective          Joint Mobility  Spine: WFL  ROM RLE: WFL  ROM LLE: WFL  ROM RUE: WFL  ROM LUE: WFL  Strength RLE  Strength RLE: WFL  Strength LLE  Strength LLE: WFL  Strength RUE  Strength RUE: WFL  Strength LUE  Strength LUE: WFL  Tone RLE  RLE Tone: Normotonic  Tone LLE  LLE Tone: Normotonic  Motor Control  Gross Motor?: WFL  Sensation  Overall Sensation Status: Impaired(pt reports chronic numbness/tingling to bilateral hands, distal BLE and feet)  Bed mobility  Supine to Sit: Stand by assistance  Sit to Supine: Stand by assistance  Scooting: Stand by assistance  Transfers  Sit to Stand: Contact guard assistance  Stand to sit: Contact guard assistance  Stand Pivot Transfers: Contact guard assistance  Comment: Transfers performed with RW, verbal cues for UE placement with good return  Ambulation  Ambulation?: Yes  Ambulation 1  Surface: level tile  Device: Rolling Walker  Assistance: Contact guard assistance;Minimal assistance(Min A for one LOB)  Gait Deviations: Slow Kamini; Increased BARBARA; Decreased step length;Decreased step height  Distance: 220ft  Comments: Pt c/o nausea with increased ambulation, RN notified  Stairs/Curb  Stairs?: No     Balance  Posture: Fair  Sitting - Static: Good  Sitting - Dynamic: Good;-  Standing - Static: Fair;+  Standing - Dynamic: Fair  Comments: standing balance assessed with RW        Plan   Plan  Times per week: 5x/wk  Current Treatment Recommendations: Strengthening, ROM, Balance Training, Functional Mobility Training, Transfer Training, Gait Training, Stair training, Home Exercise Program, Safety Education & Training, Patient/Caregiver Education & Training, Endurance Training  Safety Devices  Type of devices: Nurse notified, Call light within reach, Gait belt, Left in bed  Restraints  Initially in place: No      AM-PAC Score  AM-PAC Inpatient Mobility Raw Score : 18 (07/28/20 1530)  AM-PAC Inpatient T-Scale Score : 43.63 (07/28/20 1530)  Mobility Inpatient CMS 0-100% Score: 46.58 (07/28/20 1530)  Mobility Inpatient CMS G-Code Modifier : CK (07/28/20 1530)

## 2020-07-28 NOTE — PROGRESS NOTES
Mouna Thapa,  Seen for evaluation and education on Type 2 uncontrolled. Has about a 20+ year h/o diabetes, 12 of which on insulin. Pt's spouse in room. Lab Results   Component Value Date    LABA1C 10.7 (H) 07/14/2020     Lab Results   Component Value Date     07/14/2020       Discussed with Mouna Thapa, their current diabetes self care routines prior to this admission. medication compliance: noncompliant some of the time,- stated doing better since recent discharge home and taking lantus now twice per day. Diet compliance (pt admits to liking sweets and not following a diet): noncompliant some of the time  Monitoring:  -stated she has started to test BG at home now. Reviewed insulin administration (pt uses pens) and provided BD get started kit. She stated she has all medications that were RX at last discharge and a home BG meter. Writer asked if patient discussed Yessy Farah with HCP and she indicated yes and that she planned to start/  Add this to her plan. Briefly  Reviewed  role of diet, physical activity, daily use of medications and self monitoring for good diabetes control, she has  provided diabetes education folder from last visit  ( 7 / 21/2020  )     Provided patient with card and contact information for out patient  Diabetes education services and encouraged follow up with a PCP. Patient will need prescriptions for the following supplies at discharge:   Suggested to MD to change Lantus to 65 units once daily and add Jardiance d/t recent heart issues. Note patient insurance will cover Jardiance with step therapy approach. Patient stated understanding  of survival skills education.     Maciej Romero RN CDE

## 2020-07-28 NOTE — PLAN OF CARE
Problem: Falls - Risk of:  Goal: Will remain free from falls  Description: Will remain free from falls  7/28/2020 0917 by Berto Hernandez  Outcome: Ongoing  7/27/2020 2309 by Etelvina Ontiveros RN  Outcome: Ongoing  Goal: Absence of physical injury  Description: Absence of physical injury  7/28/2020 0917 by Berto Hernandez  Outcome: Ongoing  7/27/2020 2309 by Etelvina Ontiveros RN  Outcome: Ongoing     Problem: Pain:  Goal: Pain level will decrease  Description: Pain level will decrease  Outcome: Ongoing  Goal: Control of acute pain  Description: Control of acute pain  Outcome: Ongoing  Goal: Control of chronic pain  Description: Control of chronic pain  Outcome: Ongoing

## 2020-07-28 NOTE — CARE COORDINATION
Case Management Initial Discharge Plan  [address],             Met with:patient to discuss discharge plans. Information verified: address, contacts, phone number, , insurance Yes    Emergency Contact/Next of Kin name & number:   Jong Bass () [phone]    PCP: Anup Mixon MD  Date of last visit: 1 year ago    Insurance Provider: [address]    Discharge Planning    Living Arrangements:  Spouse/Significant Other   Support Systems:  Spouse/Significant Other, Family Members    Home has 3 stories  5 stairs to climb to get into front door, 15 stairs to climb to reach second floor  Location of bedroom/bathroom in home 2nd floor    Patient able to perform ADL's:Independent    Current Services (outpatient & in home) none  DME equipment: cane,walker,cpap,glucometer  DME provider:     Receiving oral anticoagulation therapy? No    If indicated:   Physician managing anticoagulation treatment: n/a  Where does patient obtain lab work for ATC treatment? n/a      Potential Assistance Needed:  N/A    Patient agreeable to home care: No  Hooker of choice provided:  n/a    Prior SNF/Rehab Placement and Facility: none  Agreeable to SNF/Rehab: No  Hooker of choice provided: n/a     Evaluation: n/a    Expected Discharge date:       Patient expects to be discharged to:  Home  Follow Up Appointment: Best Day/ Time:      Transportation provider:   Transportation arrangements needed for discharge: No    Readmission Risk              Risk of Unplanned Readmission:        19             Does patient have a readmission risk score greater than 14?: Yes  If yes, follow-up appointment must be made within 7 days of discharge.      Goals of Care: free of chest pain      Discharge Plan: home with           Electronically signed by Addy Colbert RN on 20 at 6:36 PM EDT

## 2020-07-28 NOTE — CONSULTS
[facility]    Inpatient Consultation Note               Today's Date: 7/28/2020  Patient Name: Nat Hayes  Date of admission: 7/27/2020  2:08 PM  Patient's age: 60 y.o., 1955  Admission Dx: NSTEMI (non-ST elevated myocardial infarction) [facility]) [I21.4]  Chest pain [R07.9]    Reason for  Consult:  NSTEMI    Requesting Physician: Kamilah Menon MD    CHIEF COMPLAINT:     Chief Complaint   Patient presents with    Chest Pain       History Obtained From:  patient, electronic medical record    HISTORY OF PRESENT ILLNESS:      The patient is a 60 y.o. female who is admitted to the hospital for NSTEMI. She has a past medical history of essential hypertension and diabetes mellitus type 2. She has history of coronary artery disease with last heart cath on 20 July of this month and she had PCI for mid RCA and left circumflex. At that time had a troponin was 20. Patient reported that when she was discharged. He was doing well until Sunday she started feeling sharp chest pain that is continuous. Similar to the pain when she had had a stent. No aggravating or relieving factors. She also reported nausea but no vomiting. No shortness of breath. No palpitation or syncope. EKG showed normal sinus rhythm/LVH/Q waves in the inferior leads    She is not sure whether she took her Plavix daily at home. Troponin I 61/160/129    Past Medical History:   has a past medical history of Anxiety, Borderline hypertension, GERD (gastroesophageal reflux disease), Hypothyroidism, Neuropathy, Obesity, Osteoarthritis, Other cirrhosis of liver ([address] Sleep apnea, and Type II or unspecified type diabetes mellitus without mention of complication, not stated as uncontrolled. Past Surgical History:   has a past surgical history that includes Hysterectomy; Colonoscopy; Upper gastrointestinal endoscopy; Dilation and curettage of uterus; hiatal hernia repair; Throat surgery; Appendectomy;  Total knee arthroplasty (Right, 01/06/2015); and Total knee arthroplasty (Left, 5/26/15). Home Medications:    Prior to Admission medications    Medication Sig Start Date End Date Taking? Authorizing Provider   nitroGLYCERIN (NITROSTAT) 0.4 MG SL tablet up to max of 3 total doses. If no relief after 1 dose, call 911. 7/21/20   HAO Banerjee CNP   atorvastatin (LIPITOR) 40 MG tablet Take 1 tablet by mouth nightly 7/21/20   HAO Banerjee CNP   carvedilol (COREG) 6.25 MG tablet Take 1 tablet by mouth 2 times daily (with meals) 7/21/20   HAO Banerjee CNP   amLODIPine (NORVASC) 5 MG tablet Take 1 tablet by mouth daily 7/22/20   HAO Banerjee CNP   clopidogrel (PLAVIX) 75 MG tablet Take 1 tablet by mouth daily 7/22/20   HAO Banerjee CNP   empagliflozin (JARDIANCE) 10 MG tablet Take 1 tablet by mouth daily 7/21/20   Antoinette Boogie MD   insulin glargine (LANTUS SOLOSTAR) 100 UNIT/ML injection pen Inject 60 Units into the skin daily INJECT 45 UNITS SUBCUTANEOUSLY IN THE MORNING AND 15 UNITS SUBCUTANEOUSLY IN THE EVENING 7/21/20   Antoinette Boogie MD   triamcinolone (KENALOG) 0.1 % ointment Apply topically 2 times daily.  For 7 days 7/21/20 7/28/20  Antoinette Boogie MD   melatonin 1 MG tablet Take 3 tablets by mouth nightly as needed for Sleep 7/21/20   Antoinette Boogie MD   citalopram (CELEXA) 20 MG tablet Take 2 tablets by mouth daily TAKE 2 TABLETS BY MOUTH IN THE MORNING 7/21/20   Antoinette Bogoie MD   lisinopril (PRINIVIL;ZESTRIL) 10 MG tablet Take 1 tablet by mouth once daily 7/20/20   Sera Arteaga MD   gabapentin (NEURONTIN) 300 MG capsule TAKE 1 CAPSULE BY MOUTH THREE TIMES DAILY 4/24/20 7/24/20  Sera Arteaga MD   EUTHYROX 50 MCG tablet TAKE 1 TABLET BY MOUTH ONCE DAILY 11/6/19   Sera Arteaga MD   glimepiride (AMARYL) 4 MG tablet TAKE 1 TABLET BY MOUTH TWICE DAILY 11/6/19   MD Oliverio Desai [facility] Is a permanent (HUMALOG) injection vial 0-6 Units, 0-6 Units, Subcutaneous, Nightly  glucose (GLUTOSE) 40 % oral gel 15 g, 15 g, Oral, PRN  dextrose 50 % IV solution, 12.5 g, Intravenous, PRN  glucagon (rDNA) injection 1 mg, 1 mg, Intramuscular, PRN  dextrose 5 % solution, 100 mL/hr, Intravenous, PRN  sodium chloride flush 0.9 % injection 10 mL, 10 mL, Intravenous, 2 times per day  sodium chloride flush 0.9 % injection 10 mL, 10 mL, Intravenous, PRN  potassium chloride (KLOR-CON M) extended release tablet 40 mEq, 40 mEq, Oral, PRN **OR** potassium bicarb-citric acid (EFFER-K) effervescent tablet 40 mEq, 40 mEq, Oral, PRN **OR** potassium chloride 10 mEq/100 mL IVPB (Peripheral Line), 10 mEq, Intravenous, PRN  magnesium sulfate 1 g in dextrose 5% 100 mL IVPB, 1 g, Intravenous, PRN  acetaminophen (TYLENOL) tablet 650 mg, 650 mg, Oral, Q6H PRN **OR** acetaminophen (TYLENOL) suppository 650 mg, 650 mg, Rectal, Q6H PRN  magnesium hydroxide (MILK OF MAGNESIA) 400 MG/5ML suspension 30 mL, 30 mL, Oral, Daily PRN  promethazine (PHENERGAN) tablet 12.5 mg, 12.5 mg, Oral, Q6H PRN **OR** ondansetron (ZOFRAN) injection 4 mg, 4 mg, Intravenous, Q6H PRN  levothyroxine (SYNTHROID) tablet 50 mcg, 50 mcg, Oral, Daily  gabapentin (NEURONTIN) capsule 300 mg, 300 mg, Oral, TID  multivitamin 1 tablet, 1 tablet, Oral, Daily  triamcinolone (KENALOG) 0.1 % ointment, , Topical, BID PRN  sodium chloride flush 0.9 % injection 10 mL, 10 mL, Intravenous, 2 times per day  sodium chloride flush 0.9 % injection 10 mL, 10 mL, Intravenous, PRN  polyethylene glycol (GLYCOLAX) packet 17 g, 17 g, Oral, Daily PRN  magnesium sulfate 1 g in dextrose 5% 100 mL IVPB, 2 g, Intravenous, PRN  insulin glargine (LANTUS) injection vial 20 Units, 20 Units, Subcutaneous, Nightly  0.9 % sodium chloride infusion, , Intravenous, Continuous    Allergies:  Shellfish-derived products; Morphine; and Tape [adhesive tape]    Social History:   reports that she has never smoked.  She has never used smokeless tobacco. She reports current alcohol use. She reports that she does not use drugs. Family History: family history includes Arthritis in her father and mother; Cancer in her father; Diabetes in her sister; Heart Disease in her father and mother. REVIEW OF SYSTEMS:      · Constitutional: there has been no unanticipated weight loss. · Eyes: No visual changes or diplopia. · ENT: No Headaches  · Cardiovascular:  Remaining as above  · Respiratory: No cough  · Gastrointestinal: No abdominal pain. No change in bowel or bladder habits. · Genitourinary: No dysuria, trouble voiding, or hematuria. · Musculoskeletal: No joint complaints. · Neurological: No headache  · Hematologic/Lymphatic: No abnormal bruising or bleeding      PHYSICAL EXAM:      BP (!) 152/61   Pulse 81   Temp 98.8 °F (37.1 °C) (Oral)   Resp 16   Ht 4' 11\" (1.499 m)   Wt 226 lb 13.7 oz (102.9 kg)   SpO2 99%   BMI 45.82 kg/m²      Intake/Output Summary (Last 24 hours) at 7/28/2020 9668  Last data filed at 7/28/2020 0200  Gross per 24 hour   Intake --   Output 530 ml   Net -530 ml         Constitutional and General Appearance:    Alert, cooperative, no distress and appears stated age  Respiratory:  · No for increased work of breathing. · On auscultation: clear to auscultation bilaterally  Cardiovascular:  · Regular S1 and S2.   · No murmurs  Abdomen:   · No masses or tenderness  · Bowel sounds present  Extremities:  ·  No Cyanosis or Clubbing  ·  Lower extremity edema: No  Neurological:  · Alert and oriented. · Moves all extremities well    DATA:    Diagnostics:    ECHO:    Left ventricle is normal in size Global left ventricular systolic function  is normal  Estimated ejection fraction is 65 % . Moderate left ventricular hypertrophy. No significant valvular abnormalities.     Signature  Stress Test:   N/A  Cardiac Angiography:   LMCA: Normal 0% stenosis. Short     LAD: Minimal 30% mid area   D1: 25% stenosis     LCx: Ostial 40% stenosis   Mid 90% stenosis   OM1: Minimal disease         Lesion on Mid CX: Mid subsection. 95% stenosis 12 mm length reduced to 0%.     Pre procedure JAMMIE II flow was noted. Post Procedure JAMMIE III flow was     present. Good runoff was present. The lesion was diagnosed as High Risk     (C).       Comments:EKG changes were noticed with balloon inflation and stent     deployment     Devices used         - Trek Balloon 2.5mm x 12mm. 3 inflation(s) to a max pressure of: 10     susan.         - Xience Tessa 3.0 x 15 SHAKIR. 1 inflation(s) to a max pressure of: 12     susan.       RCA: Mid 80% stenosis         Lesion on Mid RCA: Mid subsection. 85% stenosis 8 mm length reduced to 0%.     Pre procedure JAMMIE III flow was noted. Post Procedure JAMMIE III flow was     present. Good runoff was present. The lesion was diagnosed as Moderate     Risk (B).       Devices used         - [employer] Prowater Flex 180 cm. Number of passes: 2.         - Trek Balloon 2.5mm x 12mm. 1 inflation(s) to a max pressure of: 12     susan.         - Xience Tessa 3.0 x 28 SHAKIR. 1 inflation(s) to a max pressure of: 12     susan.        Coronary Tree        Dominance: Right      The LV gram was performed in the SAMSON 30 position. LVEF: 55%. LV Wall Motion: normal     Estimated Blood Loss: 10 mL      Conclusions        Procedure Summary        Two vessel CAD    Preserved LV function    Successful PTCA -SHAKIR mid RCA and LCX        Recommendations        Post stent protocol    Risk factor modification.        Labs:     CBC:   Recent Labs     07/27/20  1454 07/28/20  0352   WBC 12.3* 10.2   HGB 16.5* 14.2   HCT 50.8* 45.1    226     BMP:   Recent Labs     07/27/20  1454      K 3.8   CO2 21   BUN 10   CREATININE 0.90   LABGLOM >60   GLUCOSE 325*     Pro-BNP:    Recent Labs     07/27/20  1454   PROBNP 258     BNP: No results for input(s): BNP in the last 72 hours.   PT/INR: No results for input(s): PROTIME, INR in the last 72 hours.  APTT:  Recent Labs     07/27/20  2215 07/28/20  0352   APTT 34.6* 42.7*     CARDIAC ENZYMES:  Recent Labs     07/27/20  2210 07/28/20  0352   CKMB 2.5 2.8     Recent Labs     07/27/20  1616 07/27/20  2210 07/28/20  0105   TROPONINT NOT REPORTED NOT REPORTED NOT REPORTED       FASTING LIPID PANEL:  Lab Results   Component Value Date    HDL 37 07/28/2020    TRIG 111 07/28/2020     LIVER PROFILE:No results for input(s): AST, ALT, LABALBU in the last 72 hours. Patient's Active Problem List  Principal Problem:    Chest pain  Active Problems: Morbid obesity (HCC)    Anxiety    GERD (gastroesophageal reflux disease)    OA (osteoarthritis)    DM (diabetes mellitus)    Hypothyroid    Neuropathy    S/P left total knee arthroplasty    Obesity, morbid, BMI 50 or higher ([facility] [address]    Ischemic heart disease    Essential hypertension    NSTEMI (non-ST elevated myocardial infarction) ([facility] [address]    Other [facility] ([facility]  Resolved Problems:    * No resolved hospital problems. *        IMPRESSION:    1. NSTEMI type 1 vs Troponin Trending down from the last admission/stent. Trop was not checked after PCI was placed in last admission. 2. History of coronary artery disease with PCI to the RCA and the left circumflex  3. Essential hypertension  4. DM type 2     RECOMMENDATIONS:    1. Continue to trend troponin  2. Continue Statin, Asprin, heparin gtt and Beta Blocker. Continue nitro gtt   3. Continue ACE and norvasc. 4.   Will continue to follow up       Thank you for allowing us to participate in the care of [employer]. If you have any questions or concerns, please do not hesitate to contact us. Discussed with patient and Nurse. Zuly Ogden M.D. Fellow, [address] [place] [address]        Please note that part of this chart were generated using voice recognition  dictation software.   Although every effort was made to ensure the accuracy of this automated transcription, some errors in transcription may have occurred. Attestation signed by      Attending Physician Statement:    I have discussed the care of  [employer] , including pertinent history and exam findings, with the Cardiology fellow/resident. I have seen and examined the patient and the key elements of all parts of the encounter have been performed by me. I agree with the assessment, plan and orders as documented by the fellow/resident, after I modified exam findings and plan of treatments, and the final version is my approved version of the assessment. Additional Comments:   Patient with recent PCI to RCA and OM presented with left sided chest pain similar to what she felt before the stents, pain is persistent and somewhat reproducible on exam, ECG shows old inferior and anterior infarct with no new change, troponins elevated but with downward trend likely post-PCI trend. Cath films reviewed, will plan to optimize antianginal and monitor response. Cont heparin drip for another 24 hours. Monitor telemetry.

## 2020-07-28 NOTE — PROGRESS NOTES
Occupational Therapy   Occupational Therapy Initial Assessment  Date: 2020   Patient Name: Bennett Gill  MRN: [id number]     : 1955    Date of Service: 2020    Discharge Recommendations:   No occupational therapy recommended at discharge. Assessment   Performance deficits / Impairments: Decreased functional mobility ; Decreased ADL status; Decreased endurance;Decreased balance;Decreased high-level IADLs  Treatment Diagnosis: NSTEMI  Prognosis: Good  Decision Making: Medium Complexity  Patient Education: pt edu on purpos of eval, plan of care, purpose of gait belt, and EC/WS. good return  REQUIRES OT FOLLOW UP: Yes  Activity Tolerance  Activity Tolerance: Patient Tolerated treatment well;Patient limited by fatigue  Safety Devices  Safety Devices in place: Yes  Type of devices: Call light within reach;Gait belt;Patient at risk for falls; Left in bed  Restraints  Initially in place: No           Patient Diagnosis(es): The encounter diagnosis was NSTEMI (non-ST elevated myocardial infarction) ([facility] [address] has a past medical history of Anxiety, Borderline hypertension, Depression, GERD (gastroesophageal reflux disease), Hypothyroidism, Neuropathy, Obesity, Osteoarthritis, Other cirrhosis of liver ([facility] [address] Sleep apnea, and Type II or unspecified type diabetes mellitus without mention of complication, not stated as uncontrolled. has a past surgical history that includes Hysterectomy; Colonoscopy; Upper gastrointestinal endoscopy; Dilation and curettage of uterus; hiatal hernia repair; Throat surgery; Appendectomy; Total knee arthroplasty (Right, 2015); and Total knee arthroplasty (Left, 5/26/15).     Treatment Diagnosis: NSTEMI      Restrictions  Restrictions/Precautions  Restrictions/Precautions: Fall Risk  Required Braces or Orthoses?: No  Position Activity Restriction  Other position/activity restrictions: up with assist    Subjective   General  Patient assessed for rehabilitation services?: Yes  Family / Caregiver Present: No  Diagnosis: NSTEMI  General Comment  Comments: RN ok'd for therapy this morning. Pt was agreeable and pleasan throughout session  Patient Currently in Pain: Yes  Pain Assessment  Pain Assessment: 0-10  Pain Level: 4  Pain Type: Acute pain  Pain Location: Chest  Non-Pharmaceutical Pain Intervention(s): Ambulation/Increased Activity; Distraction;Repositioned  Response to Pain Intervention: Patient Satisfied    Social/Functional History  Social/Functional History  Lives With: Spouse  Type of Home: House  Home Layout: Multi-level, Laundry in basement, Bed/Bath upstairs(full flight to bed/bath with L HR)  Home Access: Stairs to enter with rails  Entrance Stairs - Number of Steps: 6  Entrance Stairs - Rails: Right  Bathroom Shower/Tub: Tub/Shower unit  Bathroom Toilet: Handicap height  Bathroom Equipment: Grab bars in shower, Shower chair  Home Equipment: [employer], 4 wheeled walker(pt reports using 4WW in the home due to falls and longer distances)  Receives Help From: Family  ADL Assistance: Needs assistance(pt needs assistance with tub transfers)  Homemaking Assistance: Needs assistance( completes most)  Homemaking Responsibilities: Yes  Ambulation Assistance: Independent  Transfer Assistance: Independent  Active : Yes  Mode of Transportation: SUV  Occupation: Retired  Leisure & Hobbies: reading, swimming  Additional Comments: Pt reports  is also retired and able to assist as needed.        Objective   Vision: Impaired  Vision Exceptions: Wears glasses for reading  Hearing: Within functional limits    Orientation  Overall Orientation Status: Within Functional Limits     Balance  Sitting Balance: Supervision(~10 minutes while EOB, on toilet and sitting at chair.)  Standing Balance: Contact guard assistance  Standing Balance  Time: ~ 12 minutes  Activity: Pt complete functional mobiltiy with PT in san and from bed> toilet and complete static sanding at sink for oral hygiene. Comment: Pt had 1 LOB and requried min A to correct. Pt reported fatigue and nausea with increased functional mobility. Pt required a rest break of ~3 minutes seat in chair after functional mobility in san proir to completing oral hygiene at sink. Toilet Transfers  Toilet - Technique: Ambulating  Equipment Used: Standard toilet  Toilet Transfer: Contact guard assistance  ADL  Feeding: Independent  Grooming: Stand by assistance(Pt completed oral hygiene while standing at sink)  UE Bathing: Setup, Stand by assistance   LE Bathing: Contact guard assistance  UE Dressing: Stand by assistance  LE Dressing: Contact guard assistance  Toileting: Contact guard assistance(Pt completed toileting task in bathroom.  Pt required assistance with pericare due to IV)  Tone RUE  RUE Tone: Normotonic  Tone LUE  LUE Tone: Normotonic  Coordination  Movements Are Fluid And Coordinated: Yes     Bed mobility  Supine to Sit: Stand by assistance  Sit to Supine: Stand by assistance  Scooting: Stand by assistance  Transfers  Sit to stand: Contact guard assistance  Stand to sit: Contact guard assistance  Transfer Comments: with RW     Cognition  Overall Cognitive Status: WFL        Sensation  Overall Sensation Status: Impaired(pt reports diabetic neuropathy in BLE and BUE)        LUE AROM (degrees)  LUE AROM : WFL  Left Hand AROM (degrees)  Left Hand AROM: WFL  RUE AROM (degrees)  RUE AROM : WFL  Right Hand AROM (degrees)  Right Hand AROM: WFL  LUE Strength  Gross LUE Strength: WFL  L Hand General: 5/5  RUE Strength  Gross RUE Strength: WFL  R Hand General: 5/5        Plan   Plan  Times per week: 2-3x/wk    AM-PAC Score     AM-PAC Inpatient Daily Activity Raw Score: 20 (07/28/20 1211)  AM-PAC Inpatient ADL T-Scale Score : 42.03 (07/28/20 1211)  ADL Inpatient CMS 0-100% Score: 38.32 (07/28/20 1211)  ADL Inpatient CMS G-Code Modifier : CJ (07/28/20 1211)    Goals  Short term goals  Time Frame for Short term goals: Pt will, by d/c  Short

## 2020-07-28 NOTE — PROGRESS NOTES
[facility]  Progress Note    Date:   7/28/2020  Patient name:  Karey Mohs  Date of admission:  7/27/2020  2:08 PM  MRN:   [id number]  YOB: 1955    SUBJECTIVE/Last 24 hours update:     Patient seen and examined at the bed side, in no acute distress. Is still very talkative. No new acute events since admission. Still c/o CP and abo pain with some nausea but thinks she may be able to eat. Denies missing any medication or using drugs or EtOH. Denies SOB, F/C or vomiting. Notes from nursing staff and Consults had been reviewed, and the overnight progress had been checked with the nursing staff as well. REVIEW OF SYSTEMS:      CONSTITUTIONAL:  no fevers, no headcahes  EYES: negative for blury vision  HEENT: No headaches, No nasal congestion, no difficulty swallowing  RESPIRATORY:negative for dyspnea, no wheezing, no Cough  CARDIOVASCULAR: positive chest pain, no palpitations  GASTROINTESTINAL: no nausea, no vomiting, no change in bowel habits, positive abdominal pain   GENITOURINARY: negative for dysuria, no hematuria   MUSCULOSKELETAL: +joint pains, no muscle aches, no swelling of joints or extremities  NEUROLOGICAL: No  Weakness or numbness    PAST MEDICAL HISTORY:      has a past medical history of Anxiety, Borderline hypertension, GERD (gastroesophageal reflux disease), Hypothyroidism, Neuropathy, Obesity, Osteoarthritis, Other cirrhosis of liver ([address] Sleep apnea, and Type II or unspecified type diabetes mellitus without mention of complication, not stated as uncontrolled. PAST SURGICAL HISTORY:      has a past surgical history that includes Hysterectomy; Colonoscopy; Upper gastrointestinal endoscopy; Dilation and curettage of uterus; hiatal hernia repair; Throat surgery; Appendectomy; Total knee arthroplasty (Right, 01/06/2015); and Total knee arthroplasty (Left, 5/26/15).        SOCIAL HISTORY:      reports that she has never smoked. She has never used smokeless tobacco. She reports current alcohol use. She reports that she does not use drugs. FAMILY HISTORY:     family history includes Arthritis in her father and mother; Cancer in her father; Diabetes in her sister; Heart Disease in her father and mother. HOME MEDICATIONS:      Prior to Admission medications    Medication Sig Start Date End Date Taking? Authorizing Provider   nitroGLYCERIN (NITROSTAT) 0.4 MG SL tablet up to max of 3 total doses. If no relief after 1 dose, call 911. 7/21/20   HAO Encarnacion CNP   atorvastatin (LIPITOR) 40 MG tablet Take 1 tablet by mouth nightly 7/21/20   HAO Encarnacion CNP   carvedilol (COREG) 6.25 MG tablet Take 1 tablet by mouth 2 times daily (with meals) 7/21/20   HAO Encarnacion CNP   amLODIPine (NORVASC) 5 MG tablet Take 1 tablet by mouth daily 7/22/20   HAO Encarnacion CNP   clopidogrel (PLAVIX) 75 MG tablet Take 1 tablet by mouth daily 7/22/20   HAO Encarnacion CNP   empagliflozin (JARDIANCE) 10 MG tablet Take 1 tablet by mouth daily 7/21/20   Lb Chandler MD   insulin glargine (LANTUS SOLOSTAR) 100 UNIT/ML injection pen Inject 60 Units into the skin daily INJECT 45 UNITS SUBCUTANEOUSLY IN THE MORNING AND 15 UNITS SUBCUTANEOUSLY IN THE EVENING 7/21/20   Lb Chandler MD   triamcinolone (KENALOG) 0.1 % ointment Apply topically 2 times daily.  For 7 days 7/21/20 7/28/20  Lb Chandler MD   melatonin 1 MG tablet Take 3 tablets by mouth nightly as needed for Sleep 7/21/20   Lb Chandler MD   citalopram (CELEXA) 20 MG tablet Take 2 tablets by mouth daily TAKE 2 TABLETS BY MOUTH IN THE MORNING 7/21/20   Lb Chandler MD   lisinopril (PRINIVIL;ZESTRIL) 10 MG tablet Take 1 tablet by mouth once daily 7/20/20   Hemal Martinez MD   gabapentin (NEURONTIN) 300 MG capsule TAKE 1 CAPSULE BY MOUTH THREE TIMES DAILY 4/24/20 7/24/20  Hemal Martinez MD   EUTHYROX 50 MCG tablet or rub.   Abdomen - Soft, tender, nondistended, no masses or organomegaly  Neurologic - There are no new focal motor or sensory deficits  Skin - No bruising or bleeding on exposed skin area  Extremities - No cyanosis, clubbing   Psych - anxious, denies low mood, no suicide or self harm, appropriate thought     DIAGNOSTICS:     Laboratory Testing:    Recent Results (from the past 24 hour(s))   EKG 12 Lead    Collection Time: 07/27/20  2:20 PM   Result Value Ref Range    Ventricular Rate 91 BPM    Atrial Rate 91 BPM    P-R Interval 174 ms    QRS Duration 68 ms    Q-T Interval 378 ms    QTc Calculation (Bazett) 464 ms    P Axis 33 degrees    R Axis -20 degrees    T Axis 54 degrees   CBC Auto Differential    Collection Time: 07/27/20  2:54 PM   Result Value Ref Range    WBC 12.3 (H) 3.5 - 11.3 k/uL    RBC 5.64 (H) 3.95 - 5.11 m/uL    Hemoglobin 16.5 (H) 11.9 - 15.1 g/dL    Hematocrit 50.8 (H) 36.3 - 47.1 %    MCV 90.1 82.6 - 102.9 fL    MCH 29.3 25.2 - 33.5 pg    MCHC 32.5 28.4 - 34.8 g/dL    RDW 12.4 11.8 - 14.4 %    Platelets 702 642 - 339 k/uL    MPV 11.1 8.1 - 13.5 fL    NRBC Automated 0.0 0.0 per 100 WBC    Differential Type NOT REPORTED     WBC Morphology NOT REPORTED     RBC Morphology NOT REPORTED     Platelet Estimate NOT REPORTED     Seg Neutrophils 67 (H) 36 - 65 %    Lymphocytes 18 (L) 24 - 43 %    Monocytes 11 3 - 12 %    Eosinophils % 3 1 - 4 %    Basophils 0 0 - 2 %    Immature Granulocytes 1 (H) 0 %    Segs Absolute 8.27 (H) 1.50 - 8.10 k/uL    Absolute Lymph # 2.22 1.10 - 3.70 k/uL    Absolute Mono # 1.29 (H) 0.10 - 1.20 k/uL    Absolute Eos # 0.37 0.00 - 0.44 k/uL    Basophils Absolute 0.05 0.00 - 0.20 k/uL    Absolute Immature Granulocyte 0.07 0.00 - 0.30 k/uL   Basic Metabolic Panel w/ Reflex to MG    Collection Time: 07/27/20  2:54 PM   Result Value Ref Range    Glucose 325 (H) 70 - 99 mg/dL    BUN 10 8 - 23 mg/dL    CREATININE 0.90 0.50 - 0.90 mg/dL    Bun/Cre Ratio NOT REPORTED 9 - 20    Calcium 9.3 8.6 - 10.4 mg/dL    Sodium 137 135 - 144 mmol/L    Potassium 3.8 3.7 - 5.3 mmol/L    Chloride 102 98 - 107 mmol/L    CO2 21 20 - 31 mmol/L    Anion Gap 14 9 - 17 mmol/L    GFR Non-African American >60 >60 mL/min    GFR African American >60 >60 mL/min    GFR Comment          GFR Staging NOT REPORTED    Lipase    Collection Time: 07/27/20  2:54 PM   Result Value Ref Range    Lipase 209 (H) 13 - 60 U/L   Troponin    Collection Time: 07/27/20  2:54 PM   Result Value Ref Range    Troponin, High Sensitivity 161 (HH) 0 - 14 ng/L    Troponin T NOT REPORTED <0.03 ng/mL    Troponin Interp NOT REPORTED    Brain Natriuretic Peptide    Collection Time: 07/27/20  2:54 PM   Result Value Ref Range    Pro- <300 pg/mL    BNP Interpretation Pro-BNP Reference Range:    Troponin    Collection Time: 07/27/20  4:16 PM   Result Value Ref Range    Troponin, High Sensitivity 132 (HH) 0 - 14 ng/L    Troponin T NOT REPORTED <0.03 ng/mL    Troponin Interp NOT REPORTED    APTT    Collection Time: 07/27/20  4:16 PM   Result Value Ref Range    PTT 20.2 (L) 20.5 - 30.5 sec   D-Dimer, Quantitative    Collection Time: 07/27/20  4:16 PM   Result Value Ref Range    D-Dimer, Quant 0.59 mg/L FEU   EKG 12 Lead    Collection Time: 07/27/20  5:54 PM   Result Value Ref Range    Ventricular Rate 77 BPM    Atrial Rate 77 BPM    P-R Interval 156 ms    QRS Duration 76 ms    Q-T Interval 422 ms    QTc Calculation (Bazett) 477 ms    P Axis 24 degrees    R Axis -18 degrees    T Axis 38 degrees   Urinalysis Reflex to Culture    Collection Time: 07/27/20  9:43 PM    Specimen: Urine, clean catch   Result Value Ref Range    Color, UA YELLOW YELLOW    Turbidity UA CLOUDY (A) CLEAR    Glucose, Ur 3+ (A) NEGATIVE    Bilirubin Urine NEGATIVE NEGATIVE    Ketones, Urine NEGATIVE NEGATIVE    Specific Gravity, UA 1.032 (H) 1.005 - 1.030    Urine Hgb LARGE (A) NEGATIVE    pH, UA 5.5 5.0 - 8.0    Protein, UA NEGATIVE NEGATIVE    Urobilinogen, Urine Normal Normal Nitrite, Urine NEGATIVE NEGATIVE    Leukocyte Esterase, Urine TRACE (A) NEGATIVE    Urinalysis Comments NOT REPORTED    Microscopic Urinalysis    Collection Time: 07/27/20  9:43 PM   Result Value Ref Range    -          WBC, UA 0 TO 2 0 - 5 /HPF    RBC, UA 5 TO 10 0 - 2 /HPF    Casts UA NOT REPORTED 0 - 2 /LPF    Crystals, UA NOT REPORTED None /HPF    Epithelial Cells UA 0 TO 2 0 - 5 /HPF    Renal Epithelial, UA NOT REPORTED 0 /HPF    Bacteria, UA FEW (A) None    Mucus, UA NOT REPORTED None    Trichomonas, UA NOT REPORTED None    Amorphous, UA NOT REPORTED None    Other Observations UA NOT REPORTED NOT REQ.     Yeast, UA NOT REPORTED None   Troponin    Collection Time: 07/27/20 10:10 PM   Result Value Ref Range    Troponin, High Sensitivity 160 (HH) 0 - 14 ng/L    Troponin T NOT REPORTED <0.03 ng/mL    Troponin Interp NOT REPORTED    HEPATITIS PANEL, ACUTE    Collection Time: 07/27/20 10:10 PM   Result Value Ref Range    Hepatitis B Surface Ag NONREACTIVE NONREACTIVE    Hepatitis C Ab NONREACTIVE NONREACTIVE    Hep B Core Ab, IgM NONREACTIVE NONREACTIVE    Hep A IgM NONREACTIVE NONREACTIVE   HIV Screen    Collection Time: 07/27/20 10:10 PM   Result Value Ref Range    HIV Ag/Ab NONREACTIVE NONREACTIVE   GAMMA GT    Collection Time: 07/27/20 10:10 PM   Result Value Ref Range     (H) 5 - 36 U/L   CK MB    Collection Time: 07/27/20 10:10 PM   Result Value Ref Range    CK-MB 2.5 <5.4 ng/mL   APTT    Collection Time: 07/27/20 10:15 PM   Result Value Ref Range    PTT 34.6 (H) 20.5 - 30.5 sec   POC Glucose Fingerstick    Collection Time: 07/27/20 10:47 PM   Result Value Ref Range    POC Glucose 201 (H) 65 - 105 mg/dL   Troponin    Collection Time: 07/28/20  1:05 AM   Result Value Ref Range    Troponin, High Sensitivity 129 (HH) 0 - 14 ng/L    Troponin T NOT REPORTED <0.03 ng/mL    Troponin Interp NOT REPORTED    CBC auto differential    Collection Time: 07/28/20  3:52 AM   Result Value Ref Range    WBC 10.2 3.5 - 11.3 k/uL    RBC 4.78 3.95 - 5.11 m/uL    Hemoglobin 14.2 11.9 - 15.1 g/dL    Hematocrit 45.1 36.3 - 47.1 %    MCV 94.4 82.6 - 102.9 fL    MCH 29.7 25.2 - 33.5 pg    MCHC 31.5 28.4 - 34.8 g/dL    RDW 12.5 11.8 - 14.4 %    Platelets 971 365 - 044 k/uL    MPV 11.0 8.1 - 13.5 fL    NRBC Automated 0.0 0.0 per 100 WBC    Differential Type NOT REPORTED     WBC Morphology NOT REPORTED     RBC Morphology NOT REPORTED     Platelet Estimate NOT REPORTED     Seg Neutrophils 65 36 - 65 %    Lymphocytes 19 (L) 24 - 43 %    Monocytes 12 3 - 12 %    Eosinophils % 3 1 - 4 %    Basophils 1 0 - 2 %    Immature Granulocytes 1 (H) 0 %    Segs Absolute 6.59 1.50 - 8.10 k/uL    Absolute Lymph # 1.93 1.10 - 3.70 k/uL    Absolute Mono # 1.19 0.10 - 1.20 k/uL    Absolute Eos # 0.32 0.00 - 0.44 k/uL    Basophils Absolute 0.05 0.00 - 0.20 k/uL    Absolute Immature Granulocyte 0.08 0.00 - 0.30 k/uL   CK MB    Collection Time: 07/28/20  3:52 AM   Result Value Ref Range    CK-MB 2.8 <5.4 ng/mL   APTT    Collection Time: 07/28/20  3:52 AM   Result Value Ref Range    PTT 42.7 (H) 20.5 - 30.5 sec   Lipid Panel    Collection Time: 07/28/20  3:52 AM   Result Value Ref Range    Cholesterol 119 <200 mg/dL    HDL 37 (L) >40 mg/dL    LDL Cholesterol 60 0 - 130 mg/dL    Chol/HDL Ratio 3.2 <5    Triglycerides 111 <150 mg/dL    VLDL NOT REPORTED 1 - 30 mg/dL   Magnesium    Collection Time: 07/28/20  3:52 AM   Result Value Ref Range    Magnesium 2.5 1.6 - 2.6 mg/dL   POC Glucose Fingerstick    Collection Time: 07/28/20  6:42 AM   Result Value Ref Range    POC Glucose 121 (H) 65 - 105 mg/dL       Current Facility-Administered Medications   Medication Dose Route Frequency Provider Last Rate Last Dose    cefTRIAXone (ROCEPHIN) 1 g IVPB in 50 mL D5W minibag  1 g Intravenous Q24H Sadie Wells MD   Stopped at 07/28/20 0915    heparin (porcine) injection 4,000 Units  4,000 Units Intravenous OSIEL Palmer PA-C        heparin (porcine) injection 2,000 NATHAN Palmer        dextrose 5 % solution  100 mL/hr Intravenous PRN Abdoulaye Palmer PA-C        sodium chloride flush 0.9 % injection 10 mL  10 mL Intravenous 2 times per day Michelle Rodríguez PA-C   10 mL at 07/27/20 2242    sodium chloride flush 0.9 % injection 10 mL  10 mL Intravenous PRN Abdoulaye Palmer PA-C        potassium chloride (KLOR-CON M) extended release tablet 40 mEq  40 mEq Oral PRN Michelle Rodríguez PA-C        Or    potassium bicarb-citric acid (EFFER-K) effervescent tablet 40 mEq  40 mEq Oral PRN Abdoulaye Palmer PA-C        Or    potassium chloride 10 mEq/100 mL IVPB (Peripheral Line)  10 mEq Intravenous PRN Michelle Rodríguez PA-C        magnesium sulfate 1 g in dextrose 5% 100 mL IVPB  1 g Intravenous PRN Abdoulaye Palmer PA-C        acetaminophen (TYLENOL) tablet 650 mg  650 mg Oral Q6H PRN Abdoulaye Palmer PA-C        Or    acetaminophen (TYLENOL) suppository 650 mg  650 mg Rectal Q6H PRN Abdoulaye Palmer PA-C        magnesium hydroxide (MILK OF MAGNESIA) 400 MG/5ML suspension 30 mL  30 mL Oral Daily PRN Abdoulaye Palmer PA-C        promethazine (PHENERGAN) tablet 12.5 mg  12.5 mg Oral Q6H PRN Abdoulaye Palmer PA-C   12.5 mg at 07/28/20 1022    Or    ondansetron (ZOFRAN) injection 4 mg  4 mg Intravenous Q6H PRN Abdoulaye Palmer PA-C   4 mg at 07/28/20 7169    levothyroxine (SYNTHROID) tablet 50 mcg  50 mcg Oral Daily Dario Green MD   50 mcg at 07/28/20 0735    gabapentin (NEURONTIN) capsule 300 mg  300 mg Oral TID Dario Green MD   300 mg at 07/28/20 0854    multivitamin 1 tablet  1 tablet Oral Daily Dario Green MD   1 tablet at 07/28/20 0854    triamcinolone (KENALOG) 0.1 % ointment   Topical BID PRN Dario Green MD        sodium chloride flush 0.9 % injection 10 mL  10 mL Intravenous 2 times per day Dario Green MD   10 mL at 07/27/20 2242    sodium chloride flush 0.9 % injection 10 mL  10 mL Intravenous PRN Dario Green MD  polyethylene glycol (GLYCOLAX) packet 17 g  17 g Oral Daily PRN Chloé Velez MD        magnesium sulfate 1 g in dextrose 5% 100 mL IVPB  2 g Intravenous PRN Chloé Velez MD        insulin glargine (LANTUS) injection vial 20 Units  20 Units Subcutaneous Nightly Chloé Velez MD        0.9 % sodium chloride infusion   Intravenous Continuous Chloé Velez MD 75 mL/hr at 07/27/20 2341         ASSESSMENT:     Principal Problem:    Chest pain  Active Problems: Morbid obesity (HCC)    Anxiety    GERD (gastroesophageal reflux disease)    Hypothyroid    Neuropathy    Essential hypertension    Hypertensive urgency    Other cirrhosis of liver (HCC)    Abdominal pain    Elevated lipase    Depression    Cystitis    Type 2 diabetes mellitus, with long-term current use of insulin ([address]  Resolved Problems:    * No resolved hospital problems.  *      PLAN:     Chest Pain with elevated troponin   Cardiology consulted   CAD s/p PCI to the RCA and the left circumflex 7/20/2020  Telemetry  Heparin ggt   IVF NS 75mL/h  Tylenol PRN for pain control  Continued ASA, plavix, Lipitor 40, and Coreg 6.25 BID  Trending trops     Hypertension   BP is improving   Nitro ggt  Restarted home meds lisinopril 10 and norvasc     Abdominal Pain with elevated lipase and associated Nausea  US abdo notes Cirrhosis    Lipase 209  IVF NS 75mL/h  Zofran and Phenergan     Cystitis   UA LE+, sent for culture will F/U, hematuria and few bacteria    Rocephin 1g qd Day 1      T2DM, on insulin, previously not well controlled, Morbid obesity, Neuropathy  Lantus 45u Am, 20u nightly, ISS, POCT glucose - Held this AM  NPO currently, Carb control diet when appropriate   Holding Nesina and Amaryl  Resume Home Gabapentin 300 mg TID  Dietitian consult and Diabetic Education     Depression, Anxiety - Appears Stable  Continue w/ Celexa 40   Melatonin 3mg - sleep     Other  Protonix - GI proph, Hx GERD   Synthyroid 50 mcg - hypothyroidism, stable     Discussed care plan with nurse after getting her input.     Above plan discussed with the patient who agreed to the above plan     Plan will be discussed with the attending, Dr. Sky Durant MD  Family Medicine Resident  7/28/2020 9:55 AM

## 2020-07-28 NOTE — PROGRESS NOTES
Perfect serve received stating patient is complaining of chest wall tenderness 4/10. Team suggested turning on nitroglycerin to which she replied \"it does nothing for me, I was on it for several days and all I get was a headache. Cannot he prescribe me anything else for pain? \" We offered the patient Tylenol, however she does not think it will help with her pain. Went bedside to further evaluate the patient and spoke with nursing staff. Decision made to provide the patient with non-opiate measures for pain control. Tylenol 1 g p.o. every 6 hours as needed and if that does not work lidocaine 4% external patch transdermal, and if that does not work diclofenac sodium 1% gel 4 g for pain. If all conservative pain measures do not control the pain patient may receive 5 mg p.o. once Roxicodone for severe pain and if NSAIDs lidocaine and Tylenol is not effective. Plan discussed with patient and team and patient acknowledges and understands overnight treatment plan. Patient counseled on healthy dietary practices including minimizing consumption of diet Coca-Cola and substitution with healthy alternatives such as water. Electronically signed by Ying Lr MD on 7/28/2020 at 8:31 PM       Please note that this chart was generated using voice recognition Dragon dictation software. Although every effort was made to ensure the accuracy of this automated transcription, some errors in transcription may have occurred.

## 2020-07-28 NOTE — PROGRESS NOTES
Nutrition Education    · Verbally reviewed information with Patient  · Educated on DM II Diet  · Written educational materials provided. · Contact name and number provided. · Refer to Patient Education activity for more details.     Electronically signed by Alona Ochoa RD, LD on 7/28/20 at 1:44 PM EDT    Contact: [phone]

## 2020-07-28 NOTE — H&P
HISTORY AND PHYSICAL             Date: 7/27/2020        Patient Name: Hellen Bermudez     YOB: 1955      Age:  60 y.o. Chief Complaint     Chief Complaint   Patient presents with    Chest Pain           History Obtained From   patient    History of Present Illness   Today: 60-year-old morbidly obese female with PMH of cardiac catheterization where she had SHAKIR of the mid RCA and LCX on 07/20/2020, DM2 (A1c=10.7 on 7/14/20), hypertension, hepatic cirrhosis, chronic depression/anxiety presents with nonradiating upper abdominal pain & nonradiating, left-sided, pressure-like, stinging, chest pain. Patient states it has been ongoing for 1 day and is 2/10 currently and was 8/10 prior to admission (Troponin 132, 161; Lipase 209). Patient states nothing has alleviated or exacerbated the pain. Patient states the pain is associated with nausea, shortness of breath, increased urinary urgency, polyuria, extremity neuropathy. Denies fever, chills, diaphoresis, back pain, syncope, vomiting, diarrhea, hematuria, dysuria, dizziness, lightheadedness, visual changes, myalgias, arthralgias, muscle pain, significant sleep changes. Patient states she drink four 12 ounce bottles of diet Coca-Cola per day and occasional caffeinated tea beverages. Patient denies prior pancreatitis history. patient denies lifetime tobacco usage, illicit drug use, but drinks alcohol socially with the last drink being over 1 month ago. From 07/27/20 ER note: \"Lisset Muir is a 60 y.o. female who presents with left-sided chest pain. Nonradiating. Worse when patient sits forward. Ongoing since last night, no specific trigger. Patient states that started out feeling mildly annoying pain and then progressed to heavy weight on the chest like pain. Denies any radiation to extremities, denies any radiation to jaw or neck. Denies any syncope. Denies any back pain.   States she has been taking all of her medication as prescribed. \"    Past Medical History     Past Medical History:   Diagnosis Date    Anxiety     Borderline hypertension     GERD (gastroesophageal reflux disease)     Hypothyroidism     Neuropathy     Obesity     morbid    Osteoarthritis     Other cirrhosis of liver ([facility] [address] 7/27/2020    Sleep apnea     possible    Type II or unspecified type diabetes mellitus without mention of complication, not stated as uncontrolled         Past Surgical History     Past Surgical History:   Procedure Laterality Date    APPENDECTOMY      COLONOSCOPY      DILATION AND CURETTAGE OF UTERUS      HIATAL HERNIA REPAIR      HYSTERECTOMY      THROAT SURGERY      POLYPS REMOVED FROM VOCAL CORD    TOTAL KNEE ARTHROPLASTY Right 01/06/2015    TOTAL KNEE ARTHROPLASTY Left 5/26/15    UPPER GASTROINTESTINAL ENDOSCOPY          Medications Prior to Admission     Prior to Admission medications    Medication Sig Start Date End Date Taking? Authorizing Provider   nitroGLYCERIN (NITROSTAT) 0.4 MG SL tablet up to max of 3 total doses. If no relief after 1 dose, call 911. 7/21/20   Loreda Payment, APRN - CNP   atorvastatin (LIPITOR) 40 MG tablet Take 1 tablet by mouth nightly 7/21/20   Loreda Payment, APRN - CNP   carvedilol (COREG) 6.25 MG tablet Take 1 tablet by mouth 2 times daily (with meals) 7/21/20   Loreda Payment, APRN - CNP   amLODIPine (NORVASC) 5 MG tablet Take 1 tablet by mouth daily 7/22/20   Loreda Payment, APRN - CNP   clopidogrel (PLAVIX) 75 MG tablet Take 1 tablet by mouth daily 7/22/20   Loreda Payment, APRN - CNP   empagliflozin (JARDIANCE) 10 MG tablet Take 1 tablet by mouth daily 7/21/20   Luz Gonzalez MD   insulin glargine (LANTUS SOLOSTAR) 100 UNIT/ML injection pen Inject 60 Units into the skin daily INJECT 45 UNITS SUBCUTANEOUSLY IN THE MORNING AND 15 UNITS SUBCUTANEOUSLY IN THE EVENING 7/21/20   Luz Gonzalez MD   triamcinolone (KENALOG) 0.1 % ointment Apply topically 2 times daily.  For 7 days 7/21/20 7/28/20  Collette Collins, MD   melatonin 1 MG tablet Take 3 tablets by mouth nightly as needed for Sleep 7/21/20   Collette Collins, MD   citalopram (CELEXA) 20 MG tablet Take 2 tablets by mouth daily TAKE 2 TABLETS BY MOUTH IN THE MORNING 7/21/20   Collette Collins, MD   lisinopril (PRINIVIL;ZESTRIL) 10 MG tablet Take 1 tablet by mouth once daily 7/20/20   Patrick Giles MD   gabapentin (NEURONTIN) 300 MG capsule TAKE 1 CAPSULE BY MOUTH THREE TIMES DAILY 4/24/20 7/24/20  Patrick Giles MD   EUTHYROX 50 MCG tablet TAKE 1 TABLET BY MOUTH ONCE DAILY 11/6/19   Patrick Giles MD   glimepiride (AMARYL) 4 MG tablet TAKE 1 TABLET BY MOUTH TWICE DAILY 11/6/19   Patrick Giles MD   Handicap Placard MISC Is a permanent condition. DX: M19.90 5/14/19   Patrick Giles MD   SITagliptin (JANUVIA) 100 MG tablet TAKE 1 TABLET DAILY 9/13/18   Araceli Gillis MD   omeprazole (PRILOSEC) 20 MG delayed release capsule Take 1 capsule by mouth Daily 3/8/18   Patrick Giles MD   Insulin Pen Needle (B-D UF III MINI PEN NEEDLES) 31G X 5 MM MISC USE 1 PEN NEEDLE DAILY 3/8/18   Patrick Giles MD   aspirin 81 MG EC tablet Take 1 tablet by mouth daily 3/8/18   Patrick Giles MD   [employer] Lancets Thin MISC Test before meals and at bedtime. DX: DM, on insulin 4/22/14   Nicholas Batista MD   Glucose Blood (BLOOD GLUCOSE TEST STRIPS) STRP Test before meals and at bedtime. DX: DM, on insulin 4/22/14   Nicholas Batista MD   MULTIPLE VITAMIN PO   Take 2 tablets by mouth daily DAILY    Historical Provider, MD   Alcohol Swabs (ALCOHOL PREP PADS) by Other route 2 times daily.       Historical Provider, MD        Allergies   Shellfish-derived products; Morphine; and Tape [adhesive tape]    Social History     Social History     Tobacco History     Smoking Status  Never Smoker    Smokeless Tobacco Use  Never Used          Alcohol History     Alcohol Use Status  Yes Comment  once a month          Drug Use     Drug Use past 24 hour(s))   EKG 12 Lead    Collection Time: 07/27/20  2:20 PM   Result Value Ref Range    Ventricular Rate 91 BPM    Atrial Rate 91 BPM    P-R Interval 174 ms    QRS Duration 68 ms    Q-T Interval 378 ms    QTc Calculation (Bazett) 464 ms    P Axis 33 degrees    R Axis -20 degrees    T Axis 54 degrees   CBC Auto Differential    Collection Time: 07/27/20  2:54 PM   Result Value Ref Range    WBC 12.3 (H) 3.5 - 11.3 k/uL    RBC 5.64 (H) 3.95 - 5.11 m/uL    Hemoglobin 16.5 (H) 11.9 - 15.1 g/dL    Hematocrit 50.8 (H) 36.3 - 47.1 %    MCV 90.1 82.6 - 102.9 fL    MCH 29.3 25.2 - 33.5 pg    MCHC 32.5 28.4 - 34.8 g/dL    RDW 12.4 11.8 - 14.4 %    Platelets 621 390 - 096 k/uL    MPV 11.1 8.1 - 13.5 fL    NRBC Automated 0.0 0.0 per 100 WBC    Differential Type NOT REPORTED     WBC Morphology NOT REPORTED     RBC Morphology NOT REPORTED     Platelet Estimate NOT REPORTED     Seg Neutrophils 67 (H) 36 - 65 %    Lymphocytes 18 (L) 24 - 43 %    Monocytes 11 3 - 12 %    Eosinophils % 3 1 - 4 %    Basophils 0 0 - 2 %    Immature Granulocytes 1 (H) 0 %    Segs Absolute 8.27 (H) 1.50 - 8.10 k/uL    Absolute Lymph # 2.22 1.10 - 3.70 k/uL    Absolute Mono # 1.29 (H) 0.10 - 1.20 k/uL    Absolute Eos # 0.37 0.00 - 0.44 k/uL    Basophils Absolute 0.05 0.00 - 0.20 k/uL    Absolute Immature Granulocyte 0.07 0.00 - 0.30 k/uL   Basic Metabolic Panel w/ Reflex to MG    Collection Time: 07/27/20  2:54 PM   Result Value Ref Range    Glucose 325 (H) 70 - 99 mg/dL    BUN 10 8 - 23 mg/dL    CREATININE 0.90 0.50 - 0.90 mg/dL    Bun/Cre Ratio NOT REPORTED 9 - 20    Calcium 9.3 8.6 - 10.4 mg/dL    Sodium 137 135 - 144 mmol/L    Potassium 3.8 3.7 - 5.3 mmol/L    Chloride 102 98 - 107 mmol/L    CO2 21 20 - 31 mmol/L    Anion Gap 14 9 - 17 mmol/L    GFR Non-African American >60 >60 mL/min    GFR African American >60 >60 mL/min    GFR Comment          GFR Staging NOT REPORTED    Lipase    Collection Time: 07/27/20  2:54 PM   Result Value Ref Range 7/27/2020 Yes    OA (osteoarthritis) (Chronic) 7/27/2020 Yes    DM (diabetes mellitus) (Chronic) 7/27/2020 Yes    Hypothyroid (Chronic) 7/27/2020 Yes    Neuropathy (Chronic) 7/27/2020 Yes    S/P left total knee arthroplasty 7/27/2020 Yes    Obesity, morbid, BMI 50 or higher ([facility] 7/27/2020 Yes    Ischemic heart disease 7/27/2020 Yes    Essential hypertension 7/27/2020 Yes    NSTEMI (non-ST elevated myocardial infarction) ([facility] 7/27/2020 Yes    Other cirrhosis of liver ([facility] 7/27/2020 Yes          Plan     Chest pain, morbid obesity, Essential HTN, Ischemic heart disease, NSTEMI  · Consult cardiology   · Aspirin 81mg PO daily  · Nitroglycerin 20mcg/min (6ml/hr)  · Heparin infusion  · Lipitor 40mg nightly  · Fasting lipid panel - pending  · Coreg 6.25mg PO bid  · Norvasc 5mg PO  · Plavix 75 PO  · EKG, Telemetry monitoring, monitor vital signs, cardiac enzymes - trend troponins for 2 more occurrences, CKMB   · Diet n.p.o. after midnight  · S/p stent on 07/20/20 with positive stress test, SHAKIR of RCA & LCX    Abdominal pain, Hepatic cirrhosis, nausea  · Tylenol PRN  · Gamma GT, Hepatitis panel, HIV screen - pending  · Urinalysis, Urine culture - pending   · Urine drug screen - pending  · 7/27/20 Abdominal US: hepatic cirrhosis  · Zofran 4mg/Phenergan 12.5mg   · Etiology of liver cirrhosis to be determined   ·  on healthy dietary practices   · 0.9% NS infusion 75 ml/hr  · 07/27/20: Lipase = 209 U/L     DM2, neuropathy  · Inpatient consult to diabetes educator  · Inpatient consult to dietitian  · Glucose 325 mg/dl at 3PM on 07/27/20  · Lantus 20U SQ nightly and 45U SQ every morning; Medium sliding scale  · Held home PO diabetic meds for now  · POCT Glucose  · Monitor for signs/symptoms of urinary retention  · Neurontin 300mg PO tid  · Consider home glipizide on discharge  · Consider Jardiance 10mg for cardioprotection    Hypothyroid  · 07/14/20: TSH 2.74  · Synthroid 50mcg PO daily    Anxiety, Insomnia  · Celexa

## 2020-07-28 NOTE — PROGRESS NOTES
[facility]  Documentation Note                Admission Dx: NSTEMI (non-ST elevated myocardial infarction) ([facility] [address] [I21.4]  Chest pain [R07.9]    Past Medical History:   has a past medical history of Anxiety, Borderline hypertension, GERD (gastroesophageal reflux disease), Hypothyroidism, Neuropathy, Obesity, Osteoarthritis, Other cirrhosis of liver ([facility] [address] Sleep apnea, and Type II or unspecified type diabetes mellitus without mention of complication, not stated as uncontrolled. Previous Testing:      ECHO 7/20/2020: EF 65%, moderate LVH, no valvular abnormalities. CATH 7/20/2020: Two vessel CAD. SHAKIR to RCA and LCx. EF 55%. STRESS 7/18/2020:   Mild to moderate reversible perfusion defects noted in the inferior apical,    lateral a pickle, and a pickle septal locations, 13% of myocardium. Previous office/hospital visit:   Autumn Grajeda NP 7/21/2020:   1. Chest pain  2. Hypertensive urgency  3. Abnormal stress test - mild to moderate perfusion defects in inferior/lateral/apical/septal, preserved EF  4. Minimal troponin elevation with flat trend  5. Anxiety  6. Obesity  7. Hypothyroid  8. DM    Plan --   9. CAD,. Stable. Continue ASA, CCB, Statin, BB, plavix. Will restart ACE. SL nitro PRN. Reviewed medication importance and pt verbalizes understanding. 10. HTN. Elevated. Continue BB, CCB. Will restart ACE   11. Morbid obesity. BMI > 49 Advises to increase activity and start low fat, low sodium diet   12. Will sign off and follow up in 2 weeks in clinic.       Ruben Martinez [facility]

## 2020-07-29 ENCOUNTER — APPOINTMENT (OUTPATIENT)
Dept: GENERAL RADIOLOGY | Age: 65
DRG: 198 | End: 2020-07-29
Payer: COMMERCIAL

## 2020-07-29 ENCOUNTER — APPOINTMENT (OUTPATIENT)
Dept: ULTRASOUND IMAGING | Age: 65
DRG: 198 | End: 2020-07-29
Payer: COMMERCIAL

## 2020-07-29 VITALS
HEIGHT: 59 IN | OXYGEN SATURATION: 98 % | WEIGHT: 236.77 LBS | TEMPERATURE: 98.8 F | DIASTOLIC BLOOD PRESSURE: 60 MMHG | BODY MASS INDEX: 47.73 KG/M2 | HEART RATE: 67 BPM | RESPIRATION RATE: 18 BRPM | SYSTOLIC BLOOD PRESSURE: 111 MMHG

## 2020-07-29 LAB
ABSOLUTE EOS #: 0.3 K/UL (ref 0–0.44)
ABSOLUTE IMMATURE GRANULOCYTE: 0.09 K/UL (ref 0–0.3)
ABSOLUTE LYMPH #: 2.48 K/UL (ref 1.1–3.7)
ABSOLUTE MONO #: 1.17 K/UL (ref 0.1–1.2)
ALBUMIN SERPL-MCNC: 3.1 G/DL (ref 3.5–5.2)
ALBUMIN/GLOBULIN RATIO: 1.1 (ref 1–2.5)
ALP BLD-CCNC: 110 U/L (ref 35–104)
ALT SERPL-CCNC: 21 U/L (ref 5–33)
ANION GAP SERPL CALCULATED.3IONS-SCNC: 14 MMOL/L (ref 9–17)
AST SERPL-CCNC: 27 U/L
BASOPHILS # BLD: 0 % (ref 0–2)
BASOPHILS ABSOLUTE: 0.03 K/UL (ref 0–0.2)
BILIRUB SERPL-MCNC: 0.31 MG/DL (ref 0.3–1.2)
BUN BLDV-MCNC: 10 MG/DL (ref 8–23)
BUN/CREAT BLD: ABNORMAL (ref 9–20)
CALCIUM SERPL-MCNC: 8.5 MG/DL (ref 8.6–10.4)
CHLORIDE BLD-SCNC: 105 MMOL/L (ref 98–107)
CO2: 22 MMOL/L (ref 20–31)
CREAT SERPL-MCNC: 0.81 MG/DL (ref 0.5–0.9)
DIFFERENTIAL TYPE: ABNORMAL
EKG ATRIAL RATE: 326 BPM
EKG ATRIAL RATE: 69 BPM
EKG P AXIS: 41 DEGREES
EKG P-R INTERVAL: 182 MS
EKG Q-T INTERVAL: 434 MS
EKG Q-T INTERVAL: 452 MS
EKG QRS DURATION: 74 MS
EKG QRS DURATION: 76 MS
EKG QTC CALCULATION (BAZETT): 478 MS
EKG QTC CALCULATION (BAZETT): 484 MS
EKG R AXIS: -7 DEGREES
EKG R AXIS: 0 DEGREES
EKG T AXIS: 46 DEGREES
EKG T AXIS: 51 DEGREES
EKG VENTRICULAR RATE: 69 BPM
EKG VENTRICULAR RATE: 73 BPM
EOSINOPHILS RELATIVE PERCENT: 3 % (ref 1–4)
GFR AFRICAN AMERICAN: >60 ML/MIN
GFR NON-AFRICAN AMERICAN: >60 ML/MIN
GFR SERPL CREATININE-BSD FRML MDRD: ABNORMAL ML/MIN/{1.73_M2}
GFR SERPL CREATININE-BSD FRML MDRD: ABNORMAL ML/MIN/{1.73_M2}
GLUCOSE BLD-MCNC: 196 MG/DL (ref 65–105)
GLUCOSE BLD-MCNC: 212 MG/DL (ref 65–105)
GLUCOSE BLD-MCNC: 220 MG/DL (ref 65–105)
GLUCOSE BLD-MCNC: 230 MG/DL (ref 70–99)
GLUCOSE BLD-MCNC: 80 MG/DL (ref 65–105)
HCT VFR BLD CALC: 41.3 % (ref 36.3–47.1)
HEMOGLOBIN: 13.1 G/DL (ref 11.9–15.1)
IMMATURE GRANULOCYTES: 1 %
LIPASE: 96 U/L (ref 13–60)
LYMPHOCYTES # BLD: 27 % (ref 24–43)
MCH RBC QN AUTO: 29.9 PG (ref 25.2–33.5)
MCHC RBC AUTO-ENTMCNC: 31.7 G/DL (ref 28.4–34.8)
MCV RBC AUTO: 94.3 FL (ref 82.6–102.9)
MONOCYTES # BLD: 13 % (ref 3–12)
NRBC AUTOMATED: 0 PER 100 WBC
PARTIAL THROMBOPLASTIN TIME: 58.9 SEC (ref 20.5–30.5)
PARTIAL THROMBOPLASTIN TIME: 66.6 SEC (ref 20.5–30.5)
PDW BLD-RTO: 12.5 % (ref 11.8–14.4)
PLATELET # BLD: 221 K/UL (ref 138–453)
PLATELET ESTIMATE: ABNORMAL
PMV BLD AUTO: 10.7 FL (ref 8.1–13.5)
POTASSIUM SERPL-SCNC: 4.1 MMOL/L (ref 3.7–5.3)
RBC # BLD: 4.38 M/UL (ref 3.95–5.11)
RBC # BLD: ABNORMAL 10*6/UL
SEG NEUTROPHILS: 56 % (ref 36–65)
SEGMENTED NEUTROPHILS ABSOLUTE COUNT: 5.11 K/UL (ref 1.5–8.1)
SODIUM BLD-SCNC: 141 MMOL/L (ref 135–144)
TOTAL PROTEIN: 5.8 G/DL (ref 6.4–8.3)
TROPONIN INTERP: ABNORMAL
TROPONIN T: ABNORMAL NG/ML
TROPONIN, HIGH SENSITIVITY: 86 NG/L (ref 0–14)
WBC # BLD: 9.2 K/UL (ref 3.5–11.3)
WBC # BLD: ABNORMAL 10*3/UL

## 2020-07-29 PROCEDURE — 97116 GAIT TRAINING THERAPY: CPT

## 2020-07-29 PROCEDURE — 6370000000 HC RX 637 (ALT 250 FOR IP): Performed by: STUDENT IN AN ORGANIZED HEALTH CARE EDUCATION/TRAINING PROGRAM

## 2020-07-29 PROCEDURE — 83690 ASSAY OF LIPASE: CPT

## 2020-07-29 PROCEDURE — 36415 COLL VENOUS BLD VENIPUNCTURE: CPT

## 2020-07-29 PROCEDURE — 85730 THROMBOPLASTIN TIME PARTIAL: CPT

## 2020-07-29 PROCEDURE — 80053 COMPREHEN METABOLIC PANEL: CPT

## 2020-07-29 PROCEDURE — 99223 1ST HOSP IP/OBS HIGH 75: CPT | Performed by: FAMILY MEDICINE

## 2020-07-29 PROCEDURE — 6360000002 HC RX W HCPCS: Performed by: STUDENT IN AN ORGANIZED HEALTH CARE EDUCATION/TRAINING PROGRAM

## 2020-07-29 PROCEDURE — 76705 ECHO EXAM OF ABDOMEN: CPT

## 2020-07-29 PROCEDURE — 2580000003 HC RX 258: Performed by: STUDENT IN AN ORGANIZED HEALTH CARE EDUCATION/TRAINING PROGRAM

## 2020-07-29 PROCEDURE — 82947 ASSAY GLUCOSE BLOOD QUANT: CPT

## 2020-07-29 PROCEDURE — 6370000000 HC RX 637 (ALT 250 FOR IP): Performed by: PHYSICIAN ASSISTANT

## 2020-07-29 PROCEDURE — 85025 COMPLETE CBC W/AUTO DIFF WBC: CPT

## 2020-07-29 PROCEDURE — 6360000002 HC RX W HCPCS: Performed by: PHYSICIAN ASSISTANT

## 2020-07-29 PROCEDURE — 84484 ASSAY OF TROPONIN QUANT: CPT

## 2020-07-29 PROCEDURE — 2580000003 HC RX 258: Performed by: PHYSICIAN ASSISTANT

## 2020-07-29 PROCEDURE — 74018 RADEX ABDOMEN 1 VIEW: CPT

## 2020-07-29 PROCEDURE — 97530 THERAPEUTIC ACTIVITIES: CPT

## 2020-07-29 RX ORDER — INSULIN GLARGINE 100 [IU]/ML
60 INJECTION, SOLUTION SUBCUTANEOUS DAILY
Qty: 30 ML | Refills: 3 | Status: SHIPPED | OUTPATIENT
Start: 2020-07-29 | End: 2020-08-21 | Stop reason: SDUPTHER

## 2020-07-29 RX ORDER — BUSPIRONE HYDROCHLORIDE 10 MG/1
10 TABLET ORAL 2 TIMES DAILY
Qty: 120 TABLET | Refills: 1 | Status: SHIPPED | OUTPATIENT
Start: 2020-07-29 | End: 2020-09-30

## 2020-07-29 RX ORDER — AMOXICILLIN AND CLAVULANATE POTASSIUM 875; 125 MG/1; MG/1
1 TABLET, FILM COATED ORAL 2 TIMES DAILY
Qty: 6 TABLET | Refills: 0 | Status: SHIPPED | OUTPATIENT
Start: 2020-07-29 | End: 2020-08-01

## 2020-07-29 RX ORDER — SUCRALFATE 1 G/1
1 TABLET ORAL EVERY 6 HOURS
Status: DISCONTINUED | OUTPATIENT
Start: 2020-07-29 | End: 2020-07-29 | Stop reason: HOSPADM

## 2020-07-29 RX ORDER — ISOSORBIDE MONONITRATE 30 MG/1
30 TABLET, EXTENDED RELEASE ORAL ONCE
Qty: 30 TABLET | Refills: 0 | Status: ON HOLD | OUTPATIENT
Start: 2020-07-29 | End: 2020-08-13 | Stop reason: HOSPADM

## 2020-07-29 RX ORDER — ISOSORBIDE MONONITRATE 30 MG/1
30 TABLET, EXTENDED RELEASE ORAL ONCE
Status: COMPLETED | OUTPATIENT
Start: 2020-07-29 | End: 2020-07-29

## 2020-07-29 RX ORDER — POLYETHYLENE GLYCOL 3350 17 G/17G
17 POWDER, FOR SOLUTION ORAL DAILY
Qty: 1530 G | Refills: 1 | Status: SHIPPED | OUTPATIENT
Start: 2020-07-29 | End: 2021-01-25

## 2020-07-29 RX ORDER — POLYETHYLENE GLYCOL 3350 17 G/17G
17 POWDER, FOR SOLUTION ORAL DAILY
Status: DISCONTINUED | OUTPATIENT
Start: 2020-07-29 | End: 2020-07-29 | Stop reason: HOSPADM

## 2020-07-29 RX ORDER — MAGNESIUM CARB/ALUMINUM HYDROX 105-160MG
30 TABLET,CHEWABLE ORAL DAILY PRN
Qty: 900 ML | Refills: 1 | Status: SHIPPED | OUTPATIENT
Start: 2020-07-29 | End: 2020-08-28

## 2020-07-29 RX ORDER — SUCRALFATE 1 G/1
1 TABLET ORAL DAILY
Qty: 180 TABLET | Refills: 1 | Status: SHIPPED | OUTPATIENT
Start: 2020-07-29 | End: 2021-06-08

## 2020-07-29 RX ADMIN — POLYETHYLENE GLYCOL 3350 17 G: 17 POWDER, FOR SOLUTION ORAL at 12:03

## 2020-07-29 RX ADMIN — LEVOTHYROXINE SODIUM 50 MCG: 50 TABLET ORAL at 06:18

## 2020-07-29 RX ADMIN — HEPARIN SODIUM 15.1 UNITS/KG/HR: 5000 INJECTION, SOLUTION INTRAVENOUS; SUBCUTANEOUS at 06:18

## 2020-07-29 RX ADMIN — ISOSORBIDE MONONITRATE 30 MG: 30 TABLET ORAL at 18:10

## 2020-07-29 RX ADMIN — ACETAMINOPHEN 1000 MG: 500 TABLET ORAL at 00:43

## 2020-07-29 RX ADMIN — INSULIN GLARGINE 45 UNITS: 100 INJECTION, SOLUTION SUBCUTANEOUS at 09:55

## 2020-07-29 RX ADMIN — INSULIN LISPRO 2 UNITS: 100 INJECTION, SOLUTION INTRAVENOUS; SUBCUTANEOUS at 09:36

## 2020-07-29 RX ADMIN — DICYCLOMINE HYDROCHLORIDE 10 MG: 10 CAPSULE ORAL at 18:11

## 2020-07-29 RX ADMIN — SUCRALFATE 1 G: 1 TABLET ORAL at 14:16

## 2020-07-29 RX ADMIN — INSULIN LISPRO 4 UNITS: 100 INJECTION, SOLUTION INTRAVENOUS; SUBCUTANEOUS at 14:13

## 2020-07-29 RX ADMIN — CEFTRIAXONE SODIUM 1 G: 1 INJECTION, POWDER, FOR SOLUTION INTRAMUSCULAR; INTRAVENOUS at 10:05

## 2020-07-29 RX ADMIN — THERA TABS 1 TABLET: TAB at 09:36

## 2020-07-29 RX ADMIN — DICYCLOMINE HYDROCHLORIDE 10 MG: 10 CAPSULE ORAL at 09:37

## 2020-07-29 RX ADMIN — PANTOPRAZOLE SODIUM 40 MG: 40 TABLET, DELAYED RELEASE ORAL at 06:18

## 2020-07-29 RX ADMIN — CARVEDILOL 6.25 MG: 6.25 TABLET, FILM COATED ORAL at 18:12

## 2020-07-29 RX ADMIN — CARVEDILOL 6.25 MG: 6.25 TABLET, FILM COATED ORAL at 09:36

## 2020-07-29 RX ADMIN — LISINOPRIL 10 MG: 10 TABLET ORAL at 09:36

## 2020-07-29 RX ADMIN — BUSPIRONE HYDROCHLORIDE 10 MG: 10 TABLET ORAL at 09:36

## 2020-07-29 RX ADMIN — DICLOFENAC 4 G: 10 GEL TOPICAL at 10:05

## 2020-07-29 RX ADMIN — AMLODIPINE BESYLATE 5 MG: 5 TABLET ORAL at 10:10

## 2020-07-29 RX ADMIN — CLOPIDOGREL 75 MG: 75 TABLET, FILM COATED ORAL at 12:04

## 2020-07-29 RX ADMIN — Medication 81 MG: at 09:36

## 2020-07-29 RX ADMIN — ACETAMINOPHEN 1000 MG: 500 TABLET ORAL at 12:02

## 2020-07-29 RX ADMIN — DICYCLOMINE HYDROCHLORIDE 10 MG: 10 CAPSULE ORAL at 06:18

## 2020-07-29 RX ADMIN — GABAPENTIN 300 MG: 300 CAPSULE ORAL at 09:37

## 2020-07-29 RX ADMIN — CITALOPRAM 40 MG: 20 TABLET, FILM COATED ORAL at 09:36

## 2020-07-29 RX ADMIN — GABAPENTIN 300 MG: 300 CAPSULE ORAL at 18:11

## 2020-07-29 ASSESSMENT — PAIN SCALES - GENERAL
PAINLEVEL_OUTOF10: 10
PAINLEVEL_OUTOF10: 3

## 2020-07-29 NOTE — PROGRESS NOTES
surgery; Appendectomy; Total knee arthroplasty (Right, 01/06/2015); and Total knee arthroplasty (Left, 5/26/15). SOCIAL HISTORY:      reports that she has never smoked. She has never used smokeless tobacco. She reports current alcohol use. She reports that she does not use drugs. FAMILY HISTORY:     family history includes Arthritis in her father and mother; Cancer in her father; Diabetes in her sister; Heart Disease in her father and mother. HOME MEDICATIONS:      Prior to Admission medications    Medication Sig Start Date End Date Taking? Authorizing Provider   nitroGLYCERIN (NITROSTAT) 0.4 MG SL tablet up to max of 3 total doses.  If no relief after 1 dose, call 911. 7/21/20   HAO Figueroa - CNP   atorvastatin (LIPITOR) 40 MG tablet Take 1 tablet by mouth nightly 7/21/20   Roberto Steen, APRN - CNP   carvedilol (COREG) 6.25 MG tablet Take 1 tablet by mouth 2 times daily (with meals) 7/21/20   HAO Figueroa - CNP   amLODIPine (NORVASC) 5 MG tablet Take 1 tablet by mouth daily 7/22/20   Roberto Steen, APRN - CNP   clopidogrel (PLAVIX) 75 MG tablet Take 1 tablet by mouth daily 7/22/20   Roberto Steen, APRN - CNP   empagliflozin (JARDIANCE) 10 MG tablet Take 1 tablet by mouth daily 7/21/20   Shawna Nguyen MD   insulin glargine (LANTUS SOLOSTAR) 100 UNIT/ML injection pen Inject 60 Units into the skin daily INJECT 45 UNITS SUBCUTANEOUSLY IN THE MORNING AND 15 UNITS SUBCUTANEOUSLY IN THE EVENING 7/21/20   Shawna Nguyen MD   melatonin 1 MG tablet Take 3 tablets by mouth nightly as needed for Sleep 7/21/20   Shawna Nguyen MD   citalopram (CELEXA) 20 MG tablet Take 2 tablets by mouth daily TAKE 2 TABLETS BY MOUTH IN THE MORNING 7/21/20   Shawna Nguyen MD   lisinopril (PRINIVIL;ZESTRIL) 10 MG tablet Take 1 tablet by mouth once daily 7/20/20   Renetta Corrales MD   gabapentin (NEURONTIN) 300 MG capsule TAKE 1 CAPSULE BY MOUTH THREE TIMES DAILY 4/24/20 7/24/20 Loretta Pool MD   EUTHYROX 50 MCG tablet TAKE 1 TABLET BY MOUTH ONCE DAILY 11/6/19   Loretta Pool MD   glimepiride (AMARYL) 4 MG tablet TAKE 1 TABLET BY MOUTH TWICE DAILY 11/6/19   Loretta Pool MD   Handicap Placard MISC Is a permanent condition. DX: M19.90 5/14/19   Loretta Pool MD   SITagliptin (JANUVIA) 100 MG tablet TAKE 1 TABLET DAILY 9/13/18   Amos Pantoja MD   omeprazole (PRILOSEC) 20 MG delayed release capsule Take 1 capsule by mouth Daily 3/8/18   Loretta Pool MD   Insulin Pen Needle (B-D UF III MINI PEN NEEDLES) 31G X 5 MM MISC USE 1 PEN NEEDLE DAILY 3/8/18   Loretta Pool MD   aspirin 81 MG EC tablet Take 1 tablet by mouth daily 3/8/18   Loretta Pool MD   [employer] Lancets Thin MISC Test before meals and at bedtime. DX: DM, on insulin 4/22/14   Michelle Hernandez MD   Glucose Blood (BLOOD GLUCOSE TEST STRIPS) STRP Test before meals and at bedtime. DX: DM, on insulin 4/22/14   Michelle Hernandez MD   MULTIPLE VITAMIN PO   Take 2 tablets by mouth daily DAILY    Historical Provider, MD   Alcohol Swabs (ALCOHOL PREP PADS) by Other route 2 times daily. Historical Provider, MD       ALLERGIES:     Shellfish-derived products; Morphine; and Tape [adhesive tape]      OBJECTIVE:       Vitals:    07/29/20 0400 07/29/20 0600 07/29/20 0626 07/29/20 0804   BP: (!) 102/53 (!) 115/50 (!) 115/50 (!) 144/55   Pulse: 65 67  67   Resp:    18   Temp:   97.9 °F (36.6 °C) 97.4 °F (36.3 °C)   TempSrc:   Oral Oral   SpO2: 99% 98%  97%   Weight:  236 lb 12.4 oz (107.4 kg)     Height:             Intake/Output Summary (Last 24 hours) at 7/29/2020 1046  Last data filed at 7/29/2020 0382  Gross per 24 hour   Intake --   Output 2625 ml   Net -2625 ml       PHYSICAL EXAM:  General Appearance  Alert , awake , not in acute distress  HEENT - Head is normocephalic, atraumatic.   Lungs - Bilateral equal air entry , no wheezes, rales or rhonchi, aeration good  Cardiovascular - Heart sounds are - 14 ng/L    Troponin T NOT REPORTED <0.03 ng/mL    Troponin Interp NOT REPORTED    EKG 12 Lead    Collection Time: 07/28/20 10:27 PM   Result Value Ref Range    Ventricular Rate 73 BPM    Atrial Rate 326 BPM    QRS Duration 76 ms    Q-T Interval 434 ms    QTc Calculation (Bazett) 478 ms    R Axis 0 degrees    T Axis 46 degrees   POC Glucose Fingerstick    Collection Time: 07/29/20  2:03 AM   Result Value Ref Range    POC Glucose 212 (H) 65 - 105 mg/dL   Comprehensive Metabolic Panel w/ Reflex to MG    Collection Time: 07/29/20  5:11 AM   Result Value Ref Range    Glucose 230 (H) 70 - 99 mg/dL    BUN 10 8 - 23 mg/dL    CREATININE 0.81 0.50 - 0.90 mg/dL    Bun/Cre Ratio NOT REPORTED 9 - 20    Calcium 8.5 (L) 8.6 - 10.4 mg/dL    Sodium 141 135 - 144 mmol/L    Potassium 4.1 3.7 - 5.3 mmol/L    Chloride 105 98 - 107 mmol/L    CO2 22 20 - 31 mmol/L    Anion Gap 14 9 - 17 mmol/L    Alkaline Phosphatase 110 (H) 35 - 104 U/L    ALT 21 5 - 33 U/L    AST 27 <32 U/L    Total Bilirubin 0.31 0.3 - 1.2 mg/dL    Total Protein 5.8 (L) 6.4 - 8.3 g/dL    Alb 3.1 (L) 3.5 - 5.2 g/dL    Albumin/Globulin Ratio 1.1 1.0 - 2.5    GFR Non-African American >60 >60 mL/min    GFR African American >60 >60 mL/min    GFR Comment          GFR Staging NOT REPORTED    CBC auto differential    Collection Time: 07/29/20  5:11 AM   Result Value Ref Range    WBC 9.2 3.5 - 11.3 k/uL    RBC 4.38 3.95 - 5.11 m/uL    Hemoglobin 13.1 11.9 - 15.1 g/dL    Hematocrit 41.3 36.3 - 47.1 %    MCV 94.3 82.6 - 102.9 fL    MCH 29.9 25.2 - 33.5 pg    MCHC 31.7 28.4 - 34.8 g/dL    RDW 12.5 11.8 - 14.4 %    Platelets 125 343 - 971 k/uL    MPV 10.7 8.1 - 13.5 fL    NRBC Automated 0.0 0.0 per 100 WBC    Differential Type NOT REPORTED     Seg Neutrophils 56 36 - 65 %    Lymphocytes 27 24 - 43 %    Monocytes 13 (H) 3 - 12 %    Eosinophils % 3 1 - 4 %    Basophils 0 0 - 2 %    Immature Granulocytes 1 (H) 0 %    Segs Absolute 5.11 1.50 - 8.10 k/uL    Absolute Lymph # 2.48 1.10 - 3.70 k/uL    Absolute Mono # 1.17 0.10 - 1.20 k/uL    Absolute Eos # 0.30 0.00 - 0.44 k/uL    Basophils Absolute 0.03 0.00 - 0.20 k/uL    Absolute Immature Granulocyte 0.09 0.00 - 0.30 k/uL    WBC Morphology NOT REPORTED     RBC Morphology NOT REPORTED     Platelet Estimate NOT REPORTED    LIPASE    Collection Time: 07/29/20  5:11 AM   Result Value Ref Range    Lipase 96 (H) 13 - 60 U/L   APTT    Collection Time: 07/29/20  5:11 AM   Result Value Ref Range    PTT 66.6 (H) 20.5 - 30.5 sec   Troponin    Collection Time: 07/29/20  5:11 AM   Result Value Ref Range    Troponin, High Sensitivity 86 (HH) 0 - 14 ng/L    Troponin T NOT REPORTED <0.03 ng/mL    Troponin Interp NOT REPORTED    POC Glucose Fingerstick    Collection Time: 07/29/20  6:23 AM   Result Value Ref Range    POC Glucose 196 (H) 65 - 105 mg/dL         Current Facility-Administered Medications   Medication Dose Route Frequency Provider Last Rate Last Dose    polyethylene glycol (GLYCOLAX) packet 17 g  17 g Oral Daily Richard Mayes MD        magnesium hydroxide (MILK OF MAGNESIA) 400 MG/5ML suspension 30 mL  30 mL Oral Once Richard Mayes MD        cefTRIAXone (ROCEPHIN) 1 g IVPB in 50 mL D5W minibag  1 g Intravenous Q24H Petey Brown  mL/hr at 07/29/20 1005 1 g at 07/29/20 1005    dicyclomine (BENTYL) capsule 10 mg  10 mg Oral TID AC Petey Brown MD   10 mg at 07/29/20 2834    [Held by provider] hydrOXYzine (ATARAX) tablet 10 mg  10 mg Oral TID PRN Petey Brown MD        busPIRone (BUSPAR) tablet 10 mg  10 mg Oral BID Petey Brown MD   10 mg at 07/29/20 0936    acetaminophen (TYLENOL) tablet 1,000 mg  1,000 mg Oral Q6H PRN Alcon Marie MD   1,000 mg at 07/29/20 0043    Or    acetaminophen (TYLENOL) suppository 650 mg  650 mg Rectal Q6H PRN Alcon Marie MD        diclofenac sodium (VOLTAREN) 1 % gel 4 g  4 g Topical 4x Daily PRN Alcon Marie MD   4 g at 07/29/20 1005    lidocaine 4 % external patch 1 patch  1 Karen Ramirez PA-C   10 mL at 07/27/20 2242    sodium chloride flush 0.9 % injection 10 mL  10 mL Intravenous PRN Mara Palmer PA-C        potassium chloride (KLOR-CON M) extended release tablet 40 mEq  40 mEq Oral PRN Karen Ramirez PA-C        Or    potassium bicarb-citric acid (EFFER-K) effervescent tablet 40 mEq  40 mEq Oral PRN Mara Palmer PA-C        Or    potassium chloride 10 mEq/100 mL IVPB (Peripheral Line)  10 mEq Intravenous PRN Karen Ramirez PA-C        magnesium sulfate 1 g in dextrose 5% 100 mL IVPB  1 g Intravenous PRN Karen Ramirez PA-C        magnesium hydroxide (MILK OF MAGNESIA) 400 MG/5ML suspension 30 mL  30 mL Oral Daily PRN Karen Ramirez PA-C        promethazine (PHENERGAN) tablet 12.5 mg  12.5 mg Oral Q6H PRN Mara Palmer PA-C   12.5 mg at 07/28/20 5259    Or    ondansetron (ZOFRAN) injection 4 mg  4 mg Intravenous Q6H PRN Mara Palmer PA-C   4 mg at 07/28/20 7683    levothyroxine (SYNTHROID) tablet 50 mcg  50 mcg Oral Daily Daina Lugo MD   50 mcg at 07/29/20 0618    gabapentin (NEURONTIN) capsule 300 mg  300 mg Oral TID Daina Lugo MD   300 mg at 07/29/20 8352    multivitamin 1 tablet  1 tablet Oral Daily Daina Lugo MD   1 tablet at 07/29/20 0936    triamcinolone (KENALOG) 0.1 % ointment   Topical BID PRN Daina Lugo MD        sodium chloride flush 0.9 % injection 10 mL  10 mL Intravenous 2 times per day Daina Lugo MD   10 mL at 07/27/20 2242    sodium chloride flush 0.9 % injection 10 mL  10 mL Intravenous PRN Daina Lugo MD        magnesium sulfate 1 g in dextrose 5% 100 mL IVPB  2 g Intravenous PRN Daina Lugo MD        insulin glargine (LANTUS) injection vial 20 Units  20 Units Subcutaneous Nightly Daina Lugo MD   20 Units at 07/28/20 2145    0.9 % sodium chloride infusion   Intravenous Continuous Daina Lugo MD 75 mL/hr at 07/28/20 1330         ASSESSMENT:     Principal Problem:    Chest pain  Active Problems: Morbid obesity (HCC)    Anxiety    GERD (gastroesophageal reflux disease)    Hypothyroid    Neuropathy    Essential hypertension    Other cirrhosis of liver (HCC)    Abdominal pain    Elevated lipase    Depression    Cystitis    Type 2 diabetes mellitus, with long-term current use of insulin (HCC)  Resolved Problems:    * No resolved hospital problems. *      PLAN:     Chest pain with elevated troponins  - reproducible, pleuritic  - Cardiology starting Imdur, discontinuing heparin gtt, weaning off nitro gtt  - Cardiology not pursuing any further inpatient workup if echo shows preserved EF, states \"minimal troponin elevation likely due to post PCI and trending down\"   - troponins trending down: 161 > 160 > 129 > 108 > 93 > 86  - telemetry  - ASA, plavix, lipitor 40 mg, Coreg 6.25 BID  - IVF NS 75 mL/hr    Hypertension  - BP is improving  - wean off nitro gtt  - restarted home meds: lisinopril 10 mg and norvasc 5 mg    Abdominal pain with elevated lipase and associated nausea:  - lipase 96 today, was 209 yesterday  - US pancreas today  - XR abdomen (KUB) shows large stool burden  - Zofran and Phenergan for nausea  - Tylenol PRN  - avoid escalation of opiates, patient requesting Dilaudid by name    Cystitis  - UA LE+, no growth on culture, hematuria and few bacteria  - Rocephin 1 g qd Day 2    T2DM, on insulin, previously not well controlled, Morbid Obesity, Neuropathy  - Latus 45 u AM, 20 u nightly, ISS, POCT glucose  - on discharge, increase lantus to 65 units in morning once daily  - Carb control diet, low sodium, no caffeine  - holding Nesina and Amaryl  - Gabapentin 300 mg TID    Depression, Anxiety  - appears stable  - continue with Celexa 40 mg  - begin Buspar 10 mg PO bid  - melatonin 3 mg for sleep    Other  Protonix, start carafate - Gi prophylaxis, h/o GERD  Synthroid 50 mcg - hypothyroidism, stable      Discussed care plan with nurse after getting her input.     Above plan discussed with the patient in room, who agreed to the above plan     Plan will be discussed with the attending, Dr. Bre Ho MD  Transitional Year Resident  7/29/2020 10:46 AM

## 2020-07-29 NOTE — DISCHARGE SUMMARY
[address] [facility]  Discharge Summary      NAME:  Caryl Catalan  :  33[birth year]  MRN:  [id number]    Admit date:  2020  Discharge date:  2020    Admitting Physician:  Collin Sanchez MD    Primary Diagnosis on Admission:   Present on Admission:   Chest pain   Anxiety   Morbid obesity ([facility]   GERD (gastroesophageal reflux disease)   Hypothyroid   Essential hypertension   Other cirrhosis of liver (HCC)   Abdominal pain   Elevated lipase   Depression   Cystitis   Type 2 diabetes mellitus, with long-term current use of insulin ([facility]   Neuropathy      Secondary Diagnoses:  does not have any pertinent problems on file. Admission Condition:  poor     Discharged Condition: good    Hospital Course: The patient was admitted for the management of chest pain with elevated troponins (h/o CAD s/p PCI RCA and OM 2020) and abdominal pain with elevated lipase. She was started on nitro gtt and heparin gtt, but upon downward trend of troponins the cardiology team found no further need for inpatient cardiac workup. US of gallbladder and pancreas showed no acute pathology in addition to lipase trending downward. Her pain management was complicated by numerous patient requests for opioid medications. Carafate is added to her Protonix regimen for GERD. She is to follow up outpatient to discuss her GERD, morbid obesity, and diabetes. On discharge, lantus changed to 60 units once daily with continuation of oral diabetes medications. Today on day of discharge pt feels better with no further complaints. Vitals and Labs are at pts baseline. All consultants involved during this admission are agreeable to d/c.     Consults:  cardiology    Significant Diagnostic/theraputic interventions:       Lab Results   Component Value Date    WBC 9.2 2020    HGB 13.1 2020    HCT 41.3 2020     2020    CHOL 119 2020    TRIG condition. DX: M19.90             insulin glargine (LANTUS SOLOSTAR) 100 UNIT/ML injection pen  Inject 60 Units into the skin daily             Insulin Pen Needle (B-D UF III MINI PEN NEEDLES) 31G X 5 MM MISC  USE 1 PEN NEEDLE DAILY             [employer] Lancets Thin MISC  Test before meals and at bedtime. DX: DM, on insulin             lisinopril (PRINIVIL;ZESTRIL) 10 MG tablet  Take 1 tablet by mouth once daily             melatonin 1 MG tablet  Take 3 tablets by mouth nightly as needed for Sleep             MULTIPLE VITAMIN PO    Take 2 tablets by mouth daily DAILY             nitroGLYCERIN (NITROSTAT) 0.4 MG SL tablet  up to max of 3 total doses. If no relief after 1 dose, call 911. omeprazole (PRILOSEC) 20 MG delayed release capsule  Take 1 capsule by mouth Daily             SITagliptin (JANUVIA) 100 MG tablet  TAKE 1 TABLET DAILY             sucralfate (CARAFATE) 1 GM tablet  Take 1 tablet by mouth daily             triamcinolone (KENALOG) 0.1 % ointment  Apply topically 2 times daily.  For 7 days                 Send Copies to: Sergo King MD, Aggie Goodpasture      Note that over 30 minutes was spent in preparing discharge papers, discussing discharge with patient and family, medication review, etc.    Signed:  Gokul Wharton MD  Transitional Year Resident  7/29/2020 4:16 PM

## 2020-07-29 NOTE — PROGRESS NOTES
Discharge instructions given to patient. Medications reviewed and questions answered. Instructed patient on what to do if chest pain returns, follow bowel regimen as directed by Dr Tiffanie Urias. Patient returning to home via spouse. RN taking patient out in wheelchair.

## 2020-07-29 NOTE — PROGRESS NOTES
CLINICAL PHARMACY NOTE: MEDS TO [address] Select Patient?: No  Total # of Prescriptions Filled: 3   The following medications were delivered to the patient:  · miralax  · augmentin  · Isosorbide  · Triamcinolone  · Buspirone  · carafate  Total # of Interventions Completed: 0  Time Spent (min): 0    Additional Documentation:

## 2020-07-29 NOTE — PROGRESS NOTES
Patient complaining of chest pain, states it feels like pressure on her chest. EKG completed, cardiology notified. 0.4 mg SL nitro given and nitro gtt started at 20 mcg/min per cardiology.  Will continue to monitor  Patient currently on heparin gtt at 13.1 units/kg/hr

## 2020-07-29 NOTE — PROGRESS NOTES
Patient continuing to have chest pain, still currently on nitro gtt. 10 mg of Roxicodone given per Dr. Tarun Barahona. Patient requesting to have nitro discontinued, requesting dilaudid states that is the only thing that works. Medicine resident notified,  will continue to monitor.

## 2020-07-29 NOTE — PROGRESS NOTES
Medicine resident refers CP to be treated per cardiology, see previous note  Dr. Coreen Lopez contacted   EKG completed & sent to Dr. Irene Cavazos gtt started

## 2020-07-29 NOTE — PROGRESS NOTES
Nitro gtt to remain at 30 mcg/min as long as patient tolerates per Dr. Elizabeth Pineda. 2L of O2 applied.  Will continue to monitor

## 2020-07-29 NOTE — PROGRESS NOTES
[facility]   Progress Note                    Date:   7/29/2020  Patient name:  Eriberto Meadows  Date of admission:  7/27/2020  2:08 PM  MRN:   [id number]  YOB: 1955  PCP:    Hemal Martinez MD    Reason for Admission:  NSTEMI (non-ST elevated myocardial infarction) [facility]) [I21.4]  Chest pain [R07.9]    Subjective:          Clinical Changes / Abnormalities:    Patient seen and examined at bedside. No acute events overnight. He continues to complain of chest pain.   The pain is mainly reproducible and worsened with deep breathing  Tropes 161/160/129/108/93/86      Medications:   Scheduled Meds:   cefTRIAXone (ROCEPHIN) IV  1 g Intravenous Q24H    dicyclomine  10 mg Oral TID AC    busPIRone  10 mg Oral BID    lidocaine  1 patch Transdermal Daily    amLODIPine  5 mg Oral Daily    aspirin  81 mg Oral Daily    atorvastatin  40 mg Oral Nightly    carvedilol  6.25 mg Oral BID WC    citalopram  40 mg Oral Daily    clopidogrel  75 mg Oral Daily    insulin glargine  45 Units Subcutaneous QAM    lisinopril  10 mg Oral Daily    pantoprazole  40 mg Oral QAM AC    insulin lispro  0-12 Units Subcutaneous TID WC    insulin lispro  0-6 Units Subcutaneous Nightly    sodium chloride flush  10 mL Intravenous 2 times per day    levothyroxine  50 mcg Oral Daily    gabapentin  300 mg Oral TID    multivitamin  1 tablet Oral Daily    sodium chloride flush  10 mL Intravenous 2 times per day    insulin glargine  20 Units Subcutaneous Nightly     Continuous Infusions:   heparin (porcine) 15.1 Units/kg/hr (07/29/20 0618)    nitroGLYCERIN 15 mcg/min (07/29/20 0328)    dextrose      sodium chloride 75 mL/hr at 07/28/20 1330     CBC:   Recent Labs     07/27/20  1454 07/28/20  0352 07/29/20  0511   WBC 12.3* 10.2 9.2   HGB 16.5* 14.2 13.1    226 221     BMP:    Recent Labs     07/27/20  1454 07/29/20  0511    141   K 3.8 4.1    105   CO2 21 22   BUN 10 10   CREATININE noticed with balloon inflation and stent     deployment     Devices used         - Trek Balloon 2.5mm x 12mm. 3 inflation(s) to a max pressure of: 10     susan.         - Xience Tessa 3.0 x 15 SHAKIR. 1 inflation(s) to a max pressure of: 12     susan.       RCA: Mid 80% stenosis         Lesion on Mid RCA: Mid subsection. 85% stenosis 8 mm length reduced to 0%.     Pre procedure JAMMIE III flow was noted. Post Procedure JAMMIE III flow was     present. Good runoff was present. The lesion was diagnosed as Moderate     Risk (B).       Devices used         - Re Pet Flex 180 cm. Number of passes: 2.         - Trek Balloon 2.5mm x 12mm. 1 inflation(s) to a max pressure of: 12     susan.         - Xience Tessa 3.0 x 28 SHAKIR. 1 inflation(s) to a max pressure of: 12     susan.        Coronary Tree        Dominance: Right      The LV gram was performed in the SAMSON 30 position. LVEF: 55%. LV Wall Motion: normal     Estimated Blood Loss: 10 mL      Conclusions        Procedure Summary        Two vessel CAD    Preserved LV function    Successful PTCA -SHAKIR mid RCA and LCX        Recommendations        Post stent protocol    Risk factor modification.      Patient Active Problem List:     Morbid obesity ([address]     Anxiety     GERD (gastroesophageal reflux disease)     OA (osteoarthritis)     DM (diabetes mellitus)     Hypothyroid     Neuropathy     Right knee DJD     Hypothyroidism     S/P left total knee arthroplasty     Obesity, morbid, BMI 50 or higher (HCC)     Chest pain     Ischemic heart disease     Essential hypertension     Hypertensive urgency     NSTEMI (non-ST elevated myocardial infarction) ([address]     Other cirrhosis of liver (HCC)     Abdominal pain     Elevated lipase     Depression     Cystitis     Type 2 diabetes mellitus, with long-term current use of insulin (HCC)      Assessment / Acute Cardiac Problems:     1. CAD s/p PCI RCA and OM  2.  Minimal troponins elevated but with downward trend likely post-PCI trend  3. Essential hypertension  4. DM type 2   5. Elevated lipase         Plan of Treatment:   1. Chest pain is reproducible. Minimal troponin elevation likely due to post PCI and trending down. Will optimize antianginal medications. Wean off nitro gtt. and will discontinue heparin GTT today. Repeat limited echocardiogram and if EF is preserved and no significant wall motion abnormalities no further work-up as an inpatient. Oly Maloney MD  Fellow, [address]        Attending Physician Statement  I have discussed the care of [facility], including pertinent history and exam findings,  with the cardiology fellow/resident. I have seen and examined the patient and the key elements of all parts of the encounter have been performed by me. I agree with the assessment, plan and orders as documented by the resident with additional recommendations as below:     Feeling better, chest pain controlled with pain medication, troponins trending down, no ecg changes, cath films reviewed with Dr Yoselin Branham, no further work up needed, will optimize medical therapy, d/c heparin and nitro, add imdur, and follow up as OP. Will sign off, please call with questions.        Deven Brantley MD, [address] [facility]

## 2020-07-29 NOTE — PROGRESS NOTES
Physical Therapy  Facility/Department: [facility]  Daily Treatment Note  NAME: Ismael Castellanos  : 1955  MRN: [id number]    Date of Service: 2020    Discharge Recommendations:  Patient would benefit from continued therapy after discharge    Assessment   Body structures, Functions, Activity limitations: Decreased functional mobility ; Decreased balance;Decreased strength;Decreased endurance  Assessment: The pt ambulated 400ft with RW and CGA, demo slow and cautious throughout, Recommend continued therapy to maximize safety and independence. Prognosis: Good  PT Education: Goals;Plan of Care; Functional Mobility Training;General Safety;Transfer Training;Gait Training  REQUIRES PT FOLLOW UP: Yes  Activity Tolerance  Activity Tolerance: Patient Tolerated treatment well;Patient limited by fatigue     Patient Diagnosis(es): The encounter diagnosis was NSTEMI (non-ST elevated myocardial infarction) ([facility] has a past medical history of Anxiety, Borderline hypertension, Depression, GERD (gastroesophageal reflux disease), Hypothyroidism, Neuropathy, Obesity, Osteoarthritis, Other cirrhosis of liver ([facility] Sleep apnea, and Type II or unspecified type diabetes mellitus without mention of complication, not stated as uncontrolled. has a past surgical history that includes Hysterectomy; Colonoscopy; Upper gastrointestinal endoscopy; Dilation and curettage of uterus; hiatal hernia repair; Throat surgery; Appendectomy; Total knee arthroplasty (Right, 2015); and Total knee arthroplasty (Left, 5/26/15). Restrictions  Restrictions/Precautions  Restrictions/Precautions: Fall Risk  Required Braces or Orthoses?: No  Position Activity Restriction  Other position/activity restrictions: up with assist  Subjective   General  Response To Previous Treatment: Patient with no complaints from previous session. Family / Caregiver Present: No  Subjective  Subjective: RN and pt agreeable to PT.  Pt supine in bed upon arrival, pleasant and cooperative throughout. Pain Screening  Patient Currently in Pain: Denies  Vital Signs  Patient Currently in Pain: Denies       Orientation  Orientation  Overall Orientation Status: Within Functional Limits  Cognition      Objective   Bed mobility  Supine to Sit: Stand by assistance  Sit to Supine: Stand by assistance  Scooting: Stand by assistance  Transfers  Sit to Stand: Contact guard assistance  Stand to sit: Contact guard assistance  Stand Pivot Transfers: Contact guard assistance  Ambulation  Ambulation?: Yes  Ambulation 1  Surface: level tile  Device: Rolling Walker  Assistance: Contact guard assistance  Quality of Gait: cautious  Gait Deviations: Slow Kamini; Increased BARBARA; Decreased step length;Decreased step height  Distance: 400ft  Comments: Sanding rest breaks x3. Pt very motivated this Am, limited by fatigue. Stairs/Curb  Stairs?: No     Balance  Posture: Fair  Sitting - Static: Good  Sitting - Dynamic: Good;-  Standing - Static: Fair;+  Standing - Dynamic: Fair  Comments: standing balance assessed with RW  Exercises  Hip Flexion: 10 reps  Knee Long Arc Quad: 10 res  Ankle Pumps: 10 reps  Comments: Interupterupted by other staffs.       Goals  Short term goals  Time Frame for Short term goals: 14 visits  Short term goal 1: Perform bed mobility and functional transfers independently  Short term goal 2: Ambulate 300ft with RW and supervision  Short term goal 3: Ascend/descend 6 steps with HR and CGA  Short term goal 4: Participate in 30 minutes of therapy to demo increased endurance  Short term goal 5: Demo Good dynamic standing balance to decrease risk of falls    Plan    Plan  Times per week: 5x/wk  Current Treatment Recommendations: Strengthening, ROM, Balance Training, Functional Mobility Training, Transfer Training, Gait Training, Stair training, Home Exercise Program, Safety Education & Training, Patient/Caregiver Education & Training, Endurance Training  Safety Devices  Type of devices: Nurse notified, Call light within reach, Gait belt, Left in bed  Restraints  Initially in place: No     Therapy Time   Individual Concurrent Group Co-treatment   Time In 0903         Time Out 0937         Minutes 34         Timed Code Treatment Minutes: 1955 [facility], [facility]

## 2020-08-04 NOTE — TELEPHONE ENCOUNTER
PA DENIED. Patient needs to complete A1C within the last three month. VM left for patient to contact office for an appointment. Form scanned into media.

## 2020-08-07 ENCOUNTER — TELEPHONE (OUTPATIENT)
Dept: FAMILY MEDICINE CLINIC | Age: 65
End: 2020-08-07

## 2020-08-07 NOTE — TELEPHONE ENCOUNTER
Please advise her that we need an office visit - or - she should consider contacting her cardiologist ASAP.

## 2020-08-07 NOTE — TELEPHONE ENCOUNTER
PC from pt in regards to chest pain, states its due to stent that's were placed. Pt states Dr Akiko Mcgregor saw her in ED and she wishes to speak to him about what to do.  States ED wasn't able to do anything to help her     Please review and advise

## 2020-08-12 ENCOUNTER — HOSPITAL ENCOUNTER (OUTPATIENT)
Age: 65
Setting detail: OBSERVATION
Discharge: HOME | End: 2020-08-13
Attending: EMERGENCY MEDICINE | Admitting: EMERGENCY MEDICINE
Payer: COMMERCIAL

## 2020-08-12 ENCOUNTER — APPOINTMENT (OUTPATIENT)
Dept: GENERAL RADIOLOGY | Age: 65
End: 2020-08-12
Payer: COMMERCIAL

## 2020-08-12 ENCOUNTER — OFFICE VISIT (OUTPATIENT)
Dept: FAMILY MEDICINE CLINIC | Age: 65
End: 2020-08-12
Payer: COMMERCIAL

## 2020-08-12 VITALS
BODY MASS INDEX: 47.38 KG/M2 | DIASTOLIC BLOOD PRESSURE: 66 MMHG | TEMPERATURE: 97.6 F | SYSTOLIC BLOOD PRESSURE: 113 MMHG | HEART RATE: 86 BPM | WEIGHT: 234.6 LBS

## 2020-08-12 LAB
ABSOLUTE EOS #: 0.24 K/UL (ref 0–0.44)
ABSOLUTE IMMATURE GRANULOCYTE: 0.03 K/UL (ref 0–0.3)
ABSOLUTE LYMPH #: 1.53 K/UL (ref 1.1–3.7)
ABSOLUTE MONO #: 0.99 K/UL (ref 0.1–1.2)
ANION GAP SERPL CALCULATED.3IONS-SCNC: 13 MMOL/L (ref 9–17)
BASOPHILS # BLD: 0 % (ref 0–2)
BASOPHILS ABSOLUTE: 0.04 K/UL (ref 0–0.2)
BUN BLDV-MCNC: 12 MG/DL (ref 8–23)
BUN/CREAT BLD: ABNORMAL (ref 9–20)
CALCIUM SERPL-MCNC: 9.3 MG/DL (ref 8.6–10.4)
CHLORIDE BLD-SCNC: 102 MMOL/L (ref 98–107)
CO2: 23 MMOL/L (ref 20–31)
CREAT SERPL-MCNC: 0.8 MG/DL (ref 0.5–0.9)
DIFFERENTIAL TYPE: ABNORMAL
EOSINOPHILS RELATIVE PERCENT: 2 % (ref 1–4)
GFR AFRICAN AMERICAN: >60 ML/MIN
GFR NON-AFRICAN AMERICAN: >60 ML/MIN
GFR SERPL CREATININE-BSD FRML MDRD: ABNORMAL ML/MIN/{1.73_M2}
GFR SERPL CREATININE-BSD FRML MDRD: ABNORMAL ML/MIN/{1.73_M2}
GLUCOSE BLD-MCNC: 198 MG/DL (ref 70–99)
HCT VFR BLD CALC: 46.1 % (ref 36.3–47.1)
HEMOGLOBIN: 14.7 G/DL (ref 11.9–15.1)
IMMATURE GRANULOCYTES: 0 %
LYMPHOCYTES # BLD: 14 % (ref 24–43)
MCH RBC QN AUTO: 29.3 PG (ref 25.2–33.5)
MCHC RBC AUTO-ENTMCNC: 31.9 G/DL (ref 28.4–34.8)
MCV RBC AUTO: 92 FL (ref 82.6–102.9)
MONOCYTES # BLD: 9 % (ref 3–12)
NRBC AUTOMATED: 0 PER 100 WBC
PDW BLD-RTO: 13 % (ref 11.8–14.4)
PLATELET # BLD: 229 K/UL (ref 138–453)
PLATELET ESTIMATE: ABNORMAL
PMV BLD AUTO: 11 FL (ref 8.1–13.5)
POTASSIUM SERPL-SCNC: 3.8 MMOL/L (ref 3.7–5.3)
RBC # BLD: 5.01 M/UL (ref 3.95–5.11)
RBC # BLD: ABNORMAL 10*6/UL
SEG NEUTROPHILS: 75 % (ref 36–65)
SEGMENTED NEUTROPHILS ABSOLUTE COUNT: 7.88 K/UL (ref 1.5–8.1)
SODIUM BLD-SCNC: 138 MMOL/L (ref 135–144)
TROPONIN INTERP: ABNORMAL
TROPONIN INTERP: ABNORMAL
TROPONIN T: ABNORMAL NG/ML
TROPONIN T: ABNORMAL NG/ML
TROPONIN, HIGH SENSITIVITY: 25 NG/L (ref 0–14)
TROPONIN, HIGH SENSITIVITY: 31 NG/L (ref 0–14)
WBC # BLD: 10.7 K/UL (ref 3.5–11.3)
WBC # BLD: ABNORMAL 10*3/UL

## 2020-08-12 PROCEDURE — 80048 BASIC METABOLIC PNL TOTAL CA: CPT

## 2020-08-12 PROCEDURE — 93010 ELECTROCARDIOGRAM REPORT: CPT | Performed by: STUDENT IN AN ORGANIZED HEALTH CARE EDUCATION/TRAINING PROGRAM

## 2020-08-12 PROCEDURE — 84484 ASSAY OF TROPONIN QUANT: CPT

## 2020-08-12 PROCEDURE — 96374 THER/PROPH/DIAG INJ IV PUSH: CPT

## 2020-08-12 PROCEDURE — 99285 EMERGENCY DEPT VISIT HI MDM: CPT

## 2020-08-12 PROCEDURE — 6360000002 HC RX W HCPCS: Performed by: STUDENT IN AN ORGANIZED HEALTH CARE EDUCATION/TRAINING PROGRAM

## 2020-08-12 PROCEDURE — 96372 THER/PROPH/DIAG INJ SC/IM: CPT

## 2020-08-12 PROCEDURE — 85025 COMPLETE CBC W/AUTO DIFF WBC: CPT

## 2020-08-12 PROCEDURE — G0378 HOSPITAL OBSERVATION PER HR: HCPCS

## 2020-08-12 PROCEDURE — 99213 OFFICE O/P EST LOW 20 MIN: CPT | Performed by: STUDENT IN AN ORGANIZED HEALTH CARE EDUCATION/TRAINING PROGRAM

## 2020-08-12 PROCEDURE — 93005 ELECTROCARDIOGRAM TRACING: CPT | Performed by: STUDENT IN AN ORGANIZED HEALTH CARE EDUCATION/TRAINING PROGRAM

## 2020-08-12 PROCEDURE — 71046 X-RAY EXAM CHEST 2 VIEWS: CPT

## 2020-08-12 PROCEDURE — 6370000000 HC RX 637 (ALT 250 FOR IP): Performed by: STUDENT IN AN ORGANIZED HEALTH CARE EDUCATION/TRAINING PROGRAM

## 2020-08-12 PROCEDURE — 96376 TX/PRO/DX INJ SAME DRUG ADON: CPT

## 2020-08-12 RX ORDER — KETOROLAC TROMETHAMINE 30 MG/ML
30 INJECTION, SOLUTION INTRAMUSCULAR; INTRAVENOUS EVERY 6 HOURS PRN
Status: DISCONTINUED | OUTPATIENT
Start: 2020-08-12 | End: 2020-08-13 | Stop reason: HOSPADM

## 2020-08-12 RX ORDER — ACETAMINOPHEN 325 MG/1
650 TABLET ORAL EVERY 4 HOURS PRN
Status: DISCONTINUED | OUTPATIENT
Start: 2020-08-12 | End: 2020-08-13 | Stop reason: HOSPADM

## 2020-08-12 RX ORDER — CLOPIDOGREL BISULFATE 75 MG/1
75 TABLET ORAL DAILY
Status: DISCONTINUED | OUTPATIENT
Start: 2020-08-13 | End: 2020-08-13 | Stop reason: HOSPADM

## 2020-08-12 RX ORDER — NITROGLYCERIN 0.4 MG/1
0.4 TABLET SUBLINGUAL EVERY 5 MIN PRN
Status: DISCONTINUED | OUTPATIENT
Start: 2020-08-12 | End: 2020-08-13 | Stop reason: HOSPADM

## 2020-08-12 RX ORDER — ISOSORBIDE MONONITRATE 30 MG/1
30 TABLET, EXTENDED RELEASE ORAL ONCE
Status: DISCONTINUED | OUTPATIENT
Start: 2020-08-13 | End: 2020-08-12

## 2020-08-12 RX ORDER — KETOROLAC TROMETHAMINE 15 MG/ML
15 INJECTION, SOLUTION INTRAMUSCULAR; INTRAVENOUS ONCE
Status: COMPLETED | OUTPATIENT
Start: 2020-08-12 | End: 2020-08-12

## 2020-08-12 RX ORDER — INSULIN GLARGINE 100 [IU]/ML
60 INJECTION, SOLUTION SUBCUTANEOUS DAILY
Status: DISCONTINUED | OUTPATIENT
Start: 2020-08-13 | End: 2020-08-13 | Stop reason: HOSPADM

## 2020-08-12 RX ORDER — LISINOPRIL 10 MG/1
10 TABLET ORAL DAILY
Status: DISCONTINUED | OUTPATIENT
Start: 2020-08-13 | End: 2020-08-13 | Stop reason: HOSPADM

## 2020-08-12 RX ORDER — GLIMEPIRIDE 2 MG/1
4 TABLET ORAL 2 TIMES DAILY WITH MEALS
Status: DISCONTINUED | OUTPATIENT
Start: 2020-08-12 | End: 2020-08-13 | Stop reason: HOSPADM

## 2020-08-12 RX ORDER — GABAPENTIN 300 MG/1
300 CAPSULE ORAL 3 TIMES DAILY
Status: DISCONTINUED | OUTPATIENT
Start: 2020-08-12 | End: 2020-08-13 | Stop reason: HOSPADM

## 2020-08-12 RX ORDER — LEVOTHYROXINE SODIUM 0.05 MG/1
50 TABLET ORAL DAILY
Status: DISCONTINUED | OUTPATIENT
Start: 2020-08-13 | End: 2020-08-13 | Stop reason: HOSPADM

## 2020-08-12 RX ORDER — CITALOPRAM 20 MG/1
40 TABLET ORAL DAILY
Status: DISCONTINUED | OUTPATIENT
Start: 2020-08-13 | End: 2020-08-13 | Stop reason: HOSPADM

## 2020-08-12 RX ORDER — ASPIRIN 81 MG/1
81 TABLET ORAL DAILY
Status: DISCONTINUED | OUTPATIENT
Start: 2020-08-13 | End: 2020-08-13 | Stop reason: HOSPADM

## 2020-08-12 RX ORDER — BUSPIRONE HYDROCHLORIDE 10 MG/1
10 TABLET ORAL 2 TIMES DAILY
Status: DISCONTINUED | OUTPATIENT
Start: 2020-08-12 | End: 2020-08-13 | Stop reason: HOSPADM

## 2020-08-12 RX ORDER — UREA 10 %
3 LOTION (ML) TOPICAL NIGHTLY PRN
Status: DISCONTINUED | OUTPATIENT
Start: 2020-08-12 | End: 2020-08-13 | Stop reason: HOSPADM

## 2020-08-12 RX ORDER — ACETAMINOPHEN 500 MG
1000 TABLET ORAL ONCE
Status: COMPLETED | OUTPATIENT
Start: 2020-08-12 | End: 2020-08-12

## 2020-08-12 RX ORDER — AMLODIPINE BESYLATE 10 MG/1
5 TABLET ORAL DAILY
Status: DISCONTINUED | OUTPATIENT
Start: 2020-08-13 | End: 2020-08-13 | Stop reason: HOSPADM

## 2020-08-12 RX ORDER — PANTOPRAZOLE SODIUM 40 MG/1
40 TABLET, DELAYED RELEASE ORAL
Status: DISCONTINUED | OUTPATIENT
Start: 2020-08-13 | End: 2020-08-13 | Stop reason: HOSPADM

## 2020-08-12 RX ORDER — ISOSORBIDE MONONITRATE 30 MG/1
30 TABLET, EXTENDED RELEASE ORAL ONCE
Status: COMPLETED | OUTPATIENT
Start: 2020-08-12 | End: 2020-08-12

## 2020-08-12 RX ORDER — ATORVASTATIN CALCIUM 80 MG/1
40 TABLET, FILM COATED ORAL NIGHTLY
Status: DISCONTINUED | OUTPATIENT
Start: 2020-08-12 | End: 2020-08-13 | Stop reason: HOSPADM

## 2020-08-12 RX ORDER — CARVEDILOL 12.5 MG/1
6.25 TABLET ORAL 2 TIMES DAILY WITH MEALS
Status: DISCONTINUED | OUTPATIENT
Start: 2020-08-12 | End: 2020-08-13 | Stop reason: HOSPADM

## 2020-08-12 RX ADMIN — ENOXAPARIN SODIUM 40 MG: 40 INJECTION, SOLUTION INTRAVENOUS; SUBCUTANEOUS at 22:56

## 2020-08-12 RX ADMIN — BUSPIRONE HYDROCHLORIDE 10 MG: 10 TABLET ORAL at 22:57

## 2020-08-12 RX ADMIN — KETOROLAC TROMETHAMINE 30 MG: 30 INJECTION, SOLUTION INTRAMUSCULAR at 19:22

## 2020-08-12 RX ADMIN — ATORVASTATIN CALCIUM 40 MG: 80 TABLET, FILM COATED ORAL at 23:10

## 2020-08-12 RX ADMIN — CARVEDILOL 6.25 MG: 12.5 TABLET, FILM COATED ORAL at 22:56

## 2020-08-12 RX ADMIN — GABAPENTIN 300 MG: 300 CAPSULE ORAL at 22:56

## 2020-08-12 RX ADMIN — ACETAMINOPHEN 1000 MG: 500 TABLET ORAL at 16:49

## 2020-08-12 RX ADMIN — ISOSORBIDE MONONITRATE 30 MG: 30 TABLET ORAL at 22:55

## 2020-08-12 RX ADMIN — GLIMEPIRIDE 4 MG: 2 TABLET ORAL at 22:55

## 2020-08-12 RX ADMIN — KETOROLAC TROMETHAMINE 15 MG: 15 INJECTION, SOLUTION INTRAMUSCULAR; INTRAVENOUS at 16:49

## 2020-08-12 ASSESSMENT — ENCOUNTER SYMPTOMS
SHORTNESS OF BREATH: 0
WHEEZING: 0
SORE THROAT: 0
CONSTIPATION: 0
VOMITING: 0
RHINORRHEA: 0
APNEA: 0
NAUSEA: 1
BLOOD IN STOOL: 0
DIARRHEA: 0
DIARRHEA: 0
NAUSEA: 1
SHORTNESS OF BREATH: 0
ABDOMINAL PAIN: 0
EYE PAIN: 0
COUGH: 0
VOMITING: 0
ABDOMINAL PAIN: 0

## 2020-08-12 ASSESSMENT — PATIENT HEALTH QUESTIONNAIRE - PHQ9
SUM OF ALL RESPONSES TO PHQ QUESTIONS 1-9: 0
2. FEELING DOWN, DEPRESSED OR HOPELESS: 0
1. LITTLE INTEREST OR PLEASURE IN DOING THINGS: 0
SUM OF ALL RESPONSES TO PHQ QUESTIONS 1-9: 0
SUM OF ALL RESPONSES TO PHQ9 QUESTIONS 1 & 2: 0

## 2020-08-12 ASSESSMENT — PAIN SCALES - GENERAL
PAINLEVEL_OUTOF10: 6
PAINLEVEL_OUTOF10: 6
PAINLEVEL_OUTOF10: 7
PAINLEVEL_OUTOF10: 7

## 2020-08-12 ASSESSMENT — PAIN DESCRIPTION - ORIENTATION: ORIENTATION: LEFT

## 2020-08-12 ASSESSMENT — PAIN DESCRIPTION - PROGRESSION: CLINICAL_PROGRESSION: GRADUALLY IMPROVING

## 2020-08-12 ASSESSMENT — PAIN DESCRIPTION - PAIN TYPE
TYPE: ACUTE PAIN
TYPE: ACUTE PAIN

## 2020-08-12 ASSESSMENT — PAIN DESCRIPTION - LOCATION
LOCATION: CHEST
LOCATION: CHEST

## 2020-08-12 NOTE — PROGRESS NOTES
Medium Adult)   Pulse 86   Temp 97.6 °F (36.4 °C) (Temporal)   Wt 234 lb 9.6 oz (106.4 kg)   BMI 47.38 kg/m²    BP Readings from Last 3 Encounters:   08/12/20 113/66   07/29/20 111/60   07/21/20 (!) 169/72       Physical Exam  Vitals signs and nursing note reviewed. Constitutional:       General: She is not in acute distress. Appearance: She is not diaphoretic. HENT:      Head: Normocephalic and atraumatic. Cardiovascular:      Rate and Rhythm: Normal rate and regular rhythm. Heart sounds: Normal heart sounds. Comments: Chest pain is NOT reproducible  Pulmonary:      Effort: Pulmonary effort is normal.      Breath sounds: Normal breath sounds. Abdominal:      General: Bowel sounds are normal.      Palpations: Abdomen is soft. Tenderness: There is no abdominal tenderness. Neurological:      Mental Status: She is alert. Lab Results   Component Value Date    WBC 9.2 07/29/2020    HGB 13.1 07/29/2020    HCT 41.3 07/29/2020     07/29/2020    CHOL 119 07/28/2020    TRIG 111 07/28/2020    HDL 37 (L) 07/28/2020    ALT 21 07/29/2020    AST 27 07/29/2020     07/29/2020    K 4.1 07/29/2020     07/29/2020    CREATININE 0.81 07/29/2020    BUN 10 07/29/2020    CO2 22 07/29/2020    TSH 2.74 07/14/2020    INR 1.0 07/21/2020    LABA1C 10.7 (H) 07/14/2020    LABMICR 34 (H) 07/14/2020     Lab Results   Component Value Date    CALCIUM 8.5 (L) 07/29/2020     Lab Results   Component Value Date    LDLCHOLESTEROL 60 07/28/2020       Assessment and Plan:    1. Chest pain, unspecified type  - Patient had recent stent x2, EKG in office showed normal sinus rhythm with no ST elevations. Patient was given nitro tablet in office, and was transported to [address] [place] [address] for further workup.      - Isaiah Farmer MD, Cardiology, [place]  - EKG 12 Lead  - nitroGLYCERIN (NITROSTAT) SL tablet 0.4 mg          Requested Prescriptions      No prescriptions requested or ordered in this encounter Medications Discontinued During This Encounter   Medication Reason    SITagliptin (JANUVIA) 100 MG tablet LIST CLEANUP        All patient questions answered. Pt voiced understanding. Return in about 1 week (around 8/19/2020) for chest pain, DM. Disclaimer: Some orall of this note was transcribed using voice-recognition software. This may cause typographical errors occasionally. Although all effort is made to fix these errors, please do not hesitate to contact our office if there Carlos Oar concern with the understanding of this note.

## 2020-08-12 NOTE — PROGRESS NOTES
Visit Information    Have you changed or started any medications since your last visit including any over-the-counter medicines, vitamins, or herbal medicines? no   Have you stopped taking any of your medications? Is so, why? -  no  Are you having any side effects from any of your medications? - no    Have you seen any other physician or provider since your last visit?  no   Have you had any other diagnostic tests since your last visit?  no   Have you been seen in the emergency room and/or had an admission in a hospital since we last saw you?  no   Have you had your routine dental cleaning in the past 6 months?  no     Do you have an active MyChart account? If no, what is the barrier?   No:     Patient Care Team:  Ken Borrero MD as PCP - General  Ken Borrero MD as PCP - [facility] Provider  Issac Pelletier MD as Surgeon (Orthopedic Surgery)    Medical History Review  Past Medical, Family, and Social History reviewed and does not contribute to the patient presenting condition    Health Maintenance   Topic Date Due    Hepatitis A vaccine (1 of 2 - Risk 2-dose series) 05/14/1956    Cervical cancer screen  05/14/1977    Shingles Vaccine (1 of 2) 05/14/2006    Diabetic retinal exam  01/01/2014    Diabetic foot exam  08/03/2016    Breast cancer screen  01/12/2018    A1C test (Diabetic or Prediabetic)  06/08/2018    Flu vaccine (1) 09/01/2020    Diabetic microalbuminuria test  07/14/2021    TSH testing  07/14/2021    Lipid screen  07/28/2021    Potassium monitoring  07/29/2021    Creatinine monitoring  07/29/2021    DTaP/Tdap/Td vaccine (6 - Td) 06/27/2024    Colon cancer screen colonoscopy  08/01/2024    Pneumococcal 0-64 years Vaccine  Completed    Hepatitis C screen  Completed    HIV screen  Completed    Hib vaccine  Aged Out    Meningococcal (ACWY) vaccine  Aged Out

## 2020-08-12 NOTE — ED PROVIDER NOTES
[facility] ED  Emergency Department Encounter  EmergencyMedicineResident     This patient was seen during the COVID-19 crisis. There were limited resources and those resources we did have had to be conserved for the sickest of patients. Pt Name: Merline Garter  MRN: [id number]  [place] 1955  Date of evaluation: 8/12/20  PCP: Leah Quintanilla MD    [address] [place] [address]       Chief Complaint   Patient presents with    Chest Pain     Left side chest pain under left breast x2 weeks, had recent cardiac even and had stents placed. Pt was at PCP office when they called EMS to transport pt    Shortness of Breath       HISTORY OF PRESENT ILLNESS  (Location/Symptom, Timing/Onset, Context/Setting, Quality, Duration, Modifying Factors, Severity.)      Merline Garter is a 60 y.o. female who presents with chest pain in the center of her chest, and under the left breast that started about 3 weeks ago after she underwent stent placement. At that time, patient presented to the hospital with chest pain, hypertensive emergency, initially managed with nitroglycerin drip. Positive stress test and ultimately went for cardiac cath and stent placement. Patient was discharged and then returned to the hospital the following week with chest pain. Increasing troponins. Patient was managed medically and discharged shortly after. Patient states that since the stents were placed, she has had constant chest pain. Waxes and wanes. Anywhere from a 5 out of 10 to a 8 out of 10. She states that the pain is never gone away fully. She has trouble sleeping. She does have associated nausea, and headache from the nitro. Patient denies shortness of breath, abdominal pain, vomiting, fever, chills, dizziness, lightheadedness, vision changes, dysuria, hematuria, changes in bowel movements. Denies diaphoresis, vomiting. Heart cath performed on 7/20/2020.  2 stents placed, 55% LVEF.       PAST MEDICAL / SURGICAL /SOCIAL / FAMILY HISTORY      has a past medical history of Anxiety, Borderline hypertension, Depression, GERD (gastroesophageal reflux disease), Hypothyroidism, Neuropathy, Obesity, Osteoarthritis, Other cirrhosis of liver ([address] Sleep apnea, and Type II or unspecified type diabetes mellitus without mention of complication, not stated as uncontrolled. has a past surgical history that includes Hysterectomy; Colonoscopy; Upper gastrointestinal endoscopy; Dilation and curettage of uterus; hiatal hernia repair; Throat surgery; Appendectomy; Total knee arthroplasty (Right, 01/06/2015); and Total knee arthroplasty (Left, 5/26/15).       Social History     Socioeconomic History    Marital status:      Spouse name: Kaur Cochran Number of children: 0    Years of education: Not on file    Highest education level: Not on file   Occupational History    Occupation: Retired      Employer: [employer] [address] [place] resource strain: Not on file    Food insecurity     Worry: Not on file     Inability: Not on file   [employer] needs     Medical: Not on file     Non-medical: Not on file   Tobacco Use    Smoking status: Never Smoker    Smokeless tobacco: Never Used   Substance and Sexual Activity    Alcohol use: Yes     Comment: once a month    Drug use: No    Sexual activity: Yes     Partners: Male   Lifestyle    Physical activity     Days per week: Not on file     Minutes per session: Not on file    Stress: Not on file   Relationships    Social connections     Talks on phone: Not on file     Gets together: Not on file     Attends Jewish service: Not on file     Active member of club or organization: Not on file     Attends meetings of clubs or organizations: Not on file     Relationship status: Not on file    Intimate partner violence     Fear of current or ex partner: Not on file     Emotionally abused: Not on file     Physically abused: Not on file Forced sexual activity: Not on file   Other Topics Concern    Not on file   Social History Narrative    Not on file       Family History   Problem Relation Age of Onset    Heart Disease Mother     Arthritis Mother     Heart Disease Father     Arthritis Father     Cancer Father         \"oral\"    Diabetes Sister         gestational       Allergies:  Shellfish-derived products; Morphine; and Tape [adhesive tape]    Home Medications:  Prior to Admission medications    Medication Sig Start Date End Date Taking? Authorizing Provider   busPIRone (BUSPAR) 10 MG tablet Take 1 tablet by mouth 2 times daily  Patient not taking: Reported on 8/12/2020 7/29/20   Danielito Graham MD   insulin glargine (LANTUS SOLOSTAR) 100 UNIT/ML injection pen Inject 60 Units into the skin daily 7/29/20   Danielito Graham MD   sucralfate (CARAFATE) 1 GM tablet Take 1 tablet by mouth daily 7/29/20   Danielito Graham MD   polyethylene glycol [facility]) 17 GM/SCOOP powder Take 17 g by mouth daily  Patient not taking: Reported on 8/12/2020 7/29/20 1/25/21  Danielito Graham MD   magnesium hydroxide (MILK OF MAGNESIA) 400 MG/5ML suspension Take 30 mLs by mouth daily as needed for Constipation 7/29/20   Danielito Graham MD   isosorbide mononitrate (IMDUR) 30 MG extended release tablet Take 1 tablet by mouth once for 1 dose 7/29/20 7/29/20  Danielito Graham MD   mineral oil liquid Take 30 mLs by mouth daily as needed for Constipation 7/29/20 8/28/20  Rena Benites MD   nitroGLYCERIN (NITROSTAT) 0.4 MG SL tablet up to max of 3 total doses.  If no relief after 1 dose, call 911. 7/21/20   HAO Esteban CNP   atorvastatin (LIPITOR) 40 MG tablet Take 1 tablet by mouth nightly 7/21/20   HAO Esteban CNP   carvedilol (COREG) 6.25 MG tablet Take 1 tablet by mouth 2 times daily (with meals) 7/21/20   HAO Esteban CNP   amLODIPine (NORVASC) 5 MG tablet Take 1 tablet by mouth daily 7/22/20   HAO Esteban CNP clopidogrel (PLAVIX) 75 MG tablet Take 1 tablet by mouth daily 7/22/20   Antonio Mcfadden APRN - CNP   empagliflozin (JARDIANCE) 10 MG tablet Take 1 tablet by mouth daily 7/21/20   Lesli Jorge MD   melatonin 1 MG tablet Take 3 tablets by mouth nightly as needed for Sleep 7/21/20   Lesli Jorge MD   citalopram (CELEXA) 20 MG tablet Take 2 tablets by mouth daily TAKE 2 TABLETS BY MOUTH IN THE MORNING 7/21/20   Lesli Jorge MD   lisinopril (PRINIVIL;ZESTRIL) 10 MG tablet Take 1 tablet by mouth once daily 7/20/20   Foreign Mireles MD   gabapentin (NEURONTIN) 300 MG capsule TAKE 1 CAPSULE BY MOUTH THREE TIMES DAILY 4/24/20 7/24/20  Foreign Mireles MD   EUTHYROX 50 MCG tablet TAKE 1 TABLET BY MOUTH ONCE DAILY 11/6/19   Foreign Mireles MD   glimepiride (AMARYL) 4 MG tablet TAKE 1 TABLET BY MOUTH TWICE DAILY 11/6/19   Foreign Mireles MD   Handicap Anjumard MISC Is a permanent condition. DX: M19.90 5/14/19   Foreign Mireles MD   omeprazole (PRILOSEC) 20 MG delayed release capsule Take 1 capsule by mouth Daily 3/8/18   Foreign Mireles MD   Insulin Pen Needle (B-D UF III MINI PEN NEEDLES) 31G X 5 MM MISC USE 1 PEN NEEDLE DAILY 3/8/18   Foreign Mireles MD   aspirin 81 MG EC tablet Take 1 tablet by mouth daily 3/8/18   Foreign Mireles MD   [employer] Lancets Thin MISC Test before meals and at bedtime. DX: DM, on insulin 4/22/14   Richardson Hogan MD   Glucose Blood (BLOOD GLUCOSE TEST STRIPS) STRP Test before meals and at bedtime. DX: DM, on insulin 4/22/14   Richardson Hogan MD   MULTIPLE VITAMIN PO   Take 2 tablets by mouth daily DAILY    Historical Provider, MD   Alcohol Swabs (ALCOHOL PREP PADS) by Other route 2 times daily. Historical Provider, MD       REVIEW OF SYSTEMS    (2-9 systems for level 4, 10 or more forlevel 5)      Review of Systems   Constitutional: Negative for activity change, appetite change, chills, diaphoresis, fatigue and fever.    HENT: Negative for congestion, rhinorrhea and sore throat. Eyes: Negative for pain and visual disturbance. Respiratory: Negative for apnea, cough, shortness of breath and wheezing. Cardiovascular: Positive for chest pain. Gastrointestinal: Positive for nausea. Negative for abdominal pain, blood in stool, diarrhea and vomiting. Genitourinary: Negative for decreased urine volume, difficulty urinating, dysuria and hematuria. Musculoskeletal: Negative for neck pain and neck stiffness. Skin: Negative for rash. Neurological: Positive for headaches. Negative for dizziness, weakness and light-headedness. PHYSICAL EXAM   (up to 7 for level 4, 8 or more forlevel 5)      ED TRIAGE VITALS BP: (!) 148/75, Temp: 98.9 °F (37.2 °C), Pulse: 77, Resp: 20, SpO2: 98 %    Vitals:    08/12/20 1526 08/12/20 1532 08/12/20 1616 08/12/20 1632   BP: (!) 148/75 136/78 (!) 125/53 125/62   Pulse: 77 79 64 63   Resp: 20 21 13 14   Temp: 98.9 °F (37.2 °C)      TempSrc: Oral      SpO2: 98% 96% 97%    Weight: 232 lb (105.2 kg)      Height: 4' 11\" (1.499 m)            Physical Exam  Constitutional:       Appearance: She is well-developed. She is not ill-appearing, toxic-appearing or diaphoretic. Comments: Appears comfortable, not ill or toxic. Does not appear to be in significant pain. HENT:      Head: Normocephalic and atraumatic. Right Ear: External ear normal.      Left Ear: External ear normal.      Nose: Nose normal.      Mouth/Throat:      Mouth: Mucous membranes are moist.      Pharynx: No posterior oropharyngeal erythema. Eyes:      General: No scleral icterus. Right eye: No discharge. Left eye: No discharge. Extraocular Movements: Extraocular movements intact. Pupils: Pupils are equal, round, and reactive to light. Neck:      Musculoskeletal: Normal range of motion. Trachea: No tracheal deviation. Cardiovascular:      Rate and Rhythm: Normal rate and regular rhythm.       Heart sounds: Normal heart sounds. No murmur. No friction rub. No gallop. Pulmonary:      Effort: Pulmonary effort is normal. No respiratory distress. Breath sounds: Normal breath sounds. No wheezing, rhonchi or rales. Abdominal:      General: Bowel sounds are normal. There is no distension. Palpations: Abdomen is soft. There is no mass. Tenderness: There is no abdominal tenderness. There is no guarding. Musculoskeletal: Normal range of motion. Skin:     General: Skin is warm and dry. Capillary Refill: Capillary refill takes less than 2 seconds. Findings: No rash. Neurological:      Mental Status: She is alert and oriented to person, place, and time. Motor: No abnormal muscle tone. Coordination: Coordination normal.           DIFFERENTIAL  DIAGNOSIS     PLAN (LABS / IMAGING / EKG):  Orders Placed This Encounter   Procedures    XR CHEST (2 VW)    CBC Auto Differential    Basic Metabolic Panel    Troponin    Troponin    Inpatient consult to Cardiology    EKG 12 Lead       MEDICATIONS ORDERED:  ED Medication Orders (From admission, onward)    Start Ordered     Status Ordering Provider    08/12/20 1630 08/12/20 1627  acetaminophen (TYLENOL) tablet 1,000 mg  ONCE      Last MAR action:  Given - by Sky Angry on 08/12/20 at [facility]    08/12/20 1630 08/12/20 1627  ketorolac (TORADOL) injection 15 mg  ONCE      Last MAR action:  Given - by Sky Angry on 08/12/20 at [address]          DDX: ACS, musculoskeletal, costochondritis, pericarditis, pneumonia, PE, pneumothorax    DIAGNOSTIC RESULTS / EMERGENCY DEPARTMENT COURSE / MDM     IMPRESSION & INITIAL PLAN:  69-year-old female, history of CAD, diabetes type 2, presents emergency department with chest pain that started about 3 weeks ago after undergoing stent placement. 2 stents placed in the heart at that time. Constant pain since then. On exam, patient appears comfortable, not ill or toxic.  Cardiac RRR, no murmurs, rubs, gallops, Lungs are CTA-B, no wheezes, rales, rhonchi, Abd soft, nontender, nondistended. Will discuss with cardiology. Will check CBC, BMP, EKG, chest x-ray, troponin. Will attempt to control patient's pain and will discharge if able to do so and work-up is unremarkable.     LABS:  Results for orders placed or performed during the hospital encounter of 08/12/20   CBC Auto Differential   Result Value Ref Range    WBC 10.7 3.5 - 11.3 k/uL    RBC 5.01 3.95 - 5.11 m/uL    Hemoglobin 14.7 11.9 - 15.1 g/dL    Hematocrit 46.1 36.3 - 47.1 %    MCV 92.0 82.6 - 102.9 fL    MCH 29.3 25.2 - 33.5 pg    MCHC 31.9 28.4 - 34.8 g/dL    RDW 13.0 11.8 - 14.4 %    Platelets 281 159 - 767 k/uL    MPV 11.0 8.1 - 13.5 fL    NRBC Automated 0.0 0.0 per 100 WBC    Differential Type NOT REPORTED     Seg Neutrophils 75 (H) 36 - 65 %    Lymphocytes 14 (L) 24 - 43 %    Monocytes 9 3 - 12 %    Eosinophils % 2 1 - 4 %    Basophils 0 0 - 2 %    Immature Granulocytes 0 0 %    Segs Absolute 7.88 1.50 - 8.10 k/uL    Absolute Lymph # 1.53 1.10 - 3.70 k/uL    Absolute Mono # 0.99 0.10 - 1.20 k/uL    Absolute Eos # 0.24 0.00 - 0.44 k/uL    Basophils Absolute 0.04 0.00 - 0.20 k/uL    Absolute Immature Granulocyte 0.03 0.00 - 0.30 k/uL    WBC Morphology NOT REPORTED     RBC Morphology NOT REPORTED     Platelet Estimate NOT REPORTED    Basic Metabolic Panel   Result Value Ref Range    Glucose 198 (H) 70 - 99 mg/dL    BUN 12 8 - 23 mg/dL    CREATININE 0.80 0.50 - 0.90 mg/dL    Bun/Cre Ratio NOT REPORTED 9 - 20    Calcium 9.3 8.6 - 10.4 mg/dL    Sodium 138 135 - 144 mmol/L    Potassium 3.8 3.7 - 5.3 mmol/L    Chloride 102 98 - 107 mmol/L    CO2 23 20 - 31 mmol/L    Anion Gap 13 9 - 17 mmol/L    GFR Non-African American >60 >60 mL/min    GFR African American >60 >60 mL/min    GFR Comment          GFR Staging NOT REPORTED    Troponin   Result Value Ref Range    Troponin, High Sensitivity 31 (H) 0 - 14 ng/L    Troponin T NOT REPORTED <0.03 ng/mL with a voice recognition program.  Efforts were made to edit the dictations but occasionally words are mis-transcribed.)       Marcos Hinkle MD  Resident  08/13/20 6297

## 2020-08-12 NOTE — ED NOTES
Report received from Fawn Campos. Pt helped to bathroom with a wheelchair.      Stefanie Cavazos RN  08/12/20 0795

## 2020-08-12 NOTE — ED NOTES
Pt arrived to ED alert and oriented x4. Pt c/o chest pain and SOB x2 weeks. Pt reports left side chest under left breast, states that she had stents placed 2 weeks ago and the pain has not gone away. Pt reports that her SOB is on exertion, improves at rest.   Pt reports that she was at her PCP's office prior to coming to ED and they called EMS for pt. Pt received 324 mg ASA total today and 1 Nitro. Pt denies having been around anyone suspected to have COVID-19 or anyone that has been sick, denies recent travel outside the [facility] or [address]. Pt placed on cardiac monitor, continuous pulse ox, and BP cuff. RR even and unlabored. NAD noted. Will continue monitor.      Morales Ramirez RN  08/12/20 7470

## 2020-08-12 NOTE — CARE COORDINATION
Case Management Initial Discharge Plan  [address],             Met with:patient to discuss discharge plans. Information verified: address, contacts, phone number, , insurance Yes    Emergency Contact/Next of Kin name & number: Megan Curry (() [phone]    PCP: Anika Aguilera MD  Date of last visit: 20    Insurance Provider: [facility]    Discharge Planning    Living Arrangements:  Spouse/Significant Other   Support Systems:  Spouse/Significant Other    Home has 3 stories  7 stairs to climb to get into front door, 1 flight stairs to climb to reach second floor  Location of bedroom/bathroom in home bath on the 1st and 2nd floor, bedroom on the 2nd floor. Patient able to perform ADL's:Independent    Current Services (outpatient & in home) none  DME equipment: cane, walker, c-pap, glucometer, shower chair. DME provider:     Receiving oral anticoagulation therapy? Yes-plavix    If indicated:   Physician managing anticoagulation treatment: TCC  Where does patient obtain lab work for ATC treatment? Potential Assistance Needed:  N/A    Patient agreeable to home care: Yes  Freedom of choice provided:  no    Prior SNF/Rehab Placement and Facility:   Agreeable to SNF/Rehab: No  [place] of choice provided: n/a     Evaluation: no    Expected Discharge date:  20    Patient expects to be discharged to:  return to home  Follow Up Appointment: Best Day/ Time:      Transportation provider:   Transportation arrangements needed for discharge: No,  will transport. Readmission Risk              Risk of Unplanned Readmission:        0             Does patient have a readmission risk score greater than 14?: not calculated. If yes, follow-up appointment must be made within 7 days of discharge. Goals of Care: decreased pain.       Discharge Plan: return to home          Electronically signed by Brandt Torres RN on 20 at 6:51 PM EDT

## 2020-08-12 NOTE — ED NOTES
Bed: 28  Expected date:   Expected time:   Means of arrival:   Comments:  [address] RN  08/12/20 0661

## 2020-08-12 NOTE — ED PROVIDER NOTES
[address] [facility]     Emergency Department     Faculty Attestation    I performed a history and physical examination of the patient and discussed management with the resident. I reviewed the residents note and agree with the documented findings and plan of care. Any areas of disagreement are noted on the chart. I was personally present for the key portions of any procedures. I have documented in the chart those procedures where I was not present during the key portions. I have reviewed the emergency nurses triage note. I agree with the chief complaint, past medical history, past surgical history, allergies, medications, social and family history as documented unless otherwise noted below. For Physician Assistant/ Nurse Practitioner cases/documentation I have personally evaluated this patient and have completed at least one if not all key elements of the E/M (history, physical exam, and MDM). Additional findings are as noted. I have personally seen and evaluated the patient. I find the patient's history and physical exam are consistent with the NP/PA documentation. I agree with the care provided, treatment rendered, disposition and follow-up plan. Reporting chest discomfort which she has had since and probably before her recent stent placements. Patient does not appear to be in acute distress          EKG Interpretation    Interpreted by emergency department physician    Rhythm: normal sinus   Rate: normal  Axis: normal  Conduction: normal  ST Segments: no acute change  T Waves: no acute change  Q Waves: no acute change    Clinical Impression:  nonspecific EKG. Critical Care     Reyes Ba M.D.   Attending Emergency  Physician              Kasie Howe MD  08/12/20 2391

## 2020-08-12 NOTE — ED PROVIDER NOTES
[address] HANDOFF       Handoff taken on the following patient from prior Attending Physician:  Pt Name: Moriah Sarkar  PCP:  Abdoulaye Langford MD    Attestation  I was available and discussed any additional care issues that arose and coordinated the management plans with the resident(s) caring for the patient during my duty period. Any areas of disagreement with resident's documentation of care or procedures are noted on the chart. I was personally present for the key portions of any/all procedures during my duty period. I have documented in the chart those procedures where I was not present during the key portions.              Manas Yusuf MD  08/12/20 3250

## 2020-08-12 NOTE — PROGRESS NOTES
Attending Physician Statement  I have discussed the care of [address] pertinent history and exam findings,  with the resident. I have reviewed the key elements of all parts of the encounter with the resident. I agree with the assessment, plan and orders as documented by the resident.   (GE Modifier)    CP- S/P MI and stent placement- Sent to ER for CP/EKG

## 2020-08-13 VITALS
HEIGHT: 59 IN | BODY MASS INDEX: 46.77 KG/M2 | SYSTOLIC BLOOD PRESSURE: 114 MMHG | DIASTOLIC BLOOD PRESSURE: 59 MMHG | RESPIRATION RATE: 16 BRPM | TEMPERATURE: 97.3 F | HEART RATE: 67 BPM | OXYGEN SATURATION: 98 % | WEIGHT: 232 LBS

## 2020-08-13 LAB
EKG ATRIAL RATE: 61 BPM
EKG ATRIAL RATE: 81 BPM
EKG P AXIS: 14 DEGREES
EKG P AXIS: 8 DEGREES
EKG P-R INTERVAL: 154 MS
EKG P-R INTERVAL: 162 MS
EKG Q-T INTERVAL: 426 MS
EKG Q-T INTERVAL: 486 MS
EKG QRS DURATION: 96 MS
EKG QRS DURATION: 98 MS
EKG QTC CALCULATION (BAZETT): 489 MS
EKG QTC CALCULATION (BAZETT): 494 MS
EKG R AXIS: -13 DEGREES
EKG R AXIS: -6 DEGREES
EKG T AXIS: 42 DEGREES
EKG T AXIS: 48 DEGREES
EKG VENTRICULAR RATE: 61 BPM
EKG VENTRICULAR RATE: 81 BPM
GLUCOSE BLD-MCNC: 222 MG/DL (ref 65–105)

## 2020-08-13 PROCEDURE — 2580000003 HC RX 258: Performed by: STUDENT IN AN ORGANIZED HEALTH CARE EDUCATION/TRAINING PROGRAM

## 2020-08-13 PROCEDURE — 6360000002 HC RX W HCPCS: Performed by: STUDENT IN AN ORGANIZED HEALTH CARE EDUCATION/TRAINING PROGRAM

## 2020-08-13 PROCEDURE — 6370000000 HC RX 637 (ALT 250 FOR IP): Performed by: INTERNAL MEDICINE

## 2020-08-13 PROCEDURE — G0378 HOSPITAL OBSERVATION PER HR: HCPCS

## 2020-08-13 PROCEDURE — 6370000000 HC RX 637 (ALT 250 FOR IP): Performed by: STUDENT IN AN ORGANIZED HEALTH CARE EDUCATION/TRAINING PROGRAM

## 2020-08-13 PROCEDURE — 96376 TX/PRO/DX INJ SAME DRUG ADON: CPT

## 2020-08-13 PROCEDURE — 93010 ELECTROCARDIOGRAM REPORT: CPT | Performed by: INTERNAL MEDICINE

## 2020-08-13 PROCEDURE — 82947 ASSAY GLUCOSE BLOOD QUANT: CPT

## 2020-08-13 PROCEDURE — 93005 ELECTROCARDIOGRAM TRACING: CPT | Performed by: EMERGENCY MEDICINE

## 2020-08-13 RX ORDER — ISOSORBIDE MONONITRATE 60 MG/1
60 TABLET, EXTENDED RELEASE ORAL DAILY
Status: DISCONTINUED | OUTPATIENT
Start: 2020-08-13 | End: 2020-08-13 | Stop reason: HOSPADM

## 2020-08-13 RX ORDER — SODIUM CHLORIDE 0.9 % (FLUSH) 0.9 %
10 SYRINGE (ML) INJECTION PRN
Status: DISCONTINUED | OUTPATIENT
Start: 2020-08-13 | End: 2020-08-13 | Stop reason: HOSPADM

## 2020-08-13 RX ORDER — ISOSORBIDE MONONITRATE 60 MG/1
60 TABLET, EXTENDED RELEASE ORAL DAILY
Qty: 30 TABLET | Refills: 3 | Status: SHIPPED | OUTPATIENT
Start: 2020-08-13 | End: 2020-10-08 | Stop reason: SDUPTHER

## 2020-08-13 RX ORDER — SODIUM CHLORIDE 0.9 % (FLUSH) 0.9 %
10 SYRINGE (ML) INJECTION EVERY 12 HOURS SCHEDULED
Status: DISCONTINUED | OUTPATIENT
Start: 2020-08-13 | End: 2020-08-13 | Stop reason: HOSPADM

## 2020-08-13 RX ADMIN — CITALOPRAM 40 MG: 20 TABLET, FILM COATED ORAL at 10:25

## 2020-08-13 RX ADMIN — AMLODIPINE BESYLATE 5 MG: 10 TABLET ORAL at 10:26

## 2020-08-13 RX ADMIN — ISOSORBIDE MONONITRATE 60 MG: 60 TABLET ORAL at 11:37

## 2020-08-13 RX ADMIN — LISINOPRIL 10 MG: 10 TABLET ORAL at 10:24

## 2020-08-13 RX ADMIN — LEVOTHYROXINE SODIUM 50 MCG: 50 TABLET ORAL at 06:36

## 2020-08-13 RX ADMIN — GABAPENTIN 300 MG: 300 CAPSULE ORAL at 10:25

## 2020-08-13 RX ADMIN — KETOROLAC TROMETHAMINE 30 MG: 30 INJECTION, SOLUTION INTRAMUSCULAR at 01:30

## 2020-08-13 RX ADMIN — INSULIN GLARGINE 60 UNITS: 100 INJECTION, SOLUTION SUBCUTANEOUS at 11:38

## 2020-08-13 RX ADMIN — CLOPIDOGREL 75 MG: 75 TABLET, FILM COATED ORAL at 10:24

## 2020-08-13 RX ADMIN — SODIUM CHLORIDE, PRESERVATIVE FREE 10 ML: 5 INJECTION INTRAVENOUS at 09:00

## 2020-08-13 RX ADMIN — BUSPIRONE HYDROCHLORIDE 10 MG: 10 TABLET ORAL at 10:26

## 2020-08-13 RX ADMIN — PANTOPRAZOLE SODIUM 40 MG: 40 TABLET, DELAYED RELEASE ORAL at 06:36

## 2020-08-13 RX ADMIN — KETOROLAC TROMETHAMINE 30 MG: 30 INJECTION, SOLUTION INTRAMUSCULAR at 09:04

## 2020-08-13 RX ADMIN — GLIMEPIRIDE 4 MG: 2 TABLET ORAL at 10:24

## 2020-08-13 RX ADMIN — CARVEDILOL 6.25 MG: 12.5 TABLET, FILM COATED ORAL at 10:25

## 2020-08-13 RX ADMIN — Medication 81 MG: at 10:26

## 2020-08-13 ASSESSMENT — ENCOUNTER SYMPTOMS
SINUS PRESSURE: 0
ABDOMINAL PAIN: 0
DIARRHEA: 0
SHORTNESS OF BREATH: 0
CONSTIPATION: 0
SINUS PAIN: 0
VOMITING: 0
NAUSEA: 1
SORE THROAT: 0
ABDOMINAL DISTENTION: 0
COUGH: 0
TROUBLE SWALLOWING: 0
CHEST TIGHTNESS: 1
RHINORRHEA: 0
WHEEZING: 0
BACK PAIN: 0

## 2020-08-13 ASSESSMENT — PAIN DESCRIPTION - ORIENTATION: ORIENTATION: LEFT

## 2020-08-13 ASSESSMENT — PAIN SCALES - GENERAL
PAINLEVEL_OUTOF10: 6
PAINLEVEL_OUTOF10: 7
PAINLEVEL_OUTOF10: 8

## 2020-08-13 ASSESSMENT — PAIN DESCRIPTION - LOCATION: LOCATION: CHEST

## 2020-08-13 ASSESSMENT — PAIN DESCRIPTION - PAIN TYPE: TYPE: ACUTE PAIN

## 2020-08-13 NOTE — PROGRESS NOTES
CDU Daily Progress Note  Attending Physician       Pt Name: Ashlyn Bueno  MRN: [id number]  [place] 1955  Date of evaluation: 8/13/20    I performed a history and physical examination of the patient and discussed management with the resident. I reviewed the residents note and agree with the documented findings and plan of care. Any areas of disagreement are noted on the chart. I was personally present for the key portions of any procedures. I have documented in the chart those procedures where I was not present during the key portions. I have reviewed the emergency nurses triage note. I agree with the chief complaint, past medical history, past surgical history, allergies, medications, social and family history as documented unless otherwise noted below. Documentation of the HPI, Physical Exam and Medical Decision Making performed by medical students or scribes is based on my personal performance of the HPI, PE and MDM. For Physician Assistant/ Nurse Practitioner cases/documentation I have personally evaluated this patient and have completed at least one if not all key elements of the E/M (history, physical exam, and MDM). Additional findings are as noted. The Family History, Social History and Review of Systems are unchanged from the previous day. No significant events overnight. Nonsmoker. The patient is diabetic. I have reviewed the patient's chart and found the most recent A1c is 10.7. This indicates poor control. Will alter diabetic regimen and help arrange follow-up with PCP. Diabetic education ordered. Cardiology has seen and evaluated. Added Imdur. Cleared for discharge.       Fiordaliza Nobles MD  Attending Physician  Critical Decision Unit

## 2020-08-13 NOTE — ED PROVIDER NOTES
Faculty Sign-Out Attestation  Handoff taken on the following patient from prior Attending Physician: Jarrett Gallegos     I was available and discussed any additional care issues that arose and coordinated the management plans with the resident(s) caring for the patient during my duty period. Any areas of disagreement with residents documentation of care or procedures are noted on the chart. I was personally present for the key portions of any/all procedures during my duty period. I have documented in the chart those procedures where I was not present during the boateng portions.     Dr Jarrett Gallegos sign out, cp, admitted to obs    Dolly Cisneros DO  Attending Physician      Dolly Cisneros DO  08/13/20 0157

## 2020-08-13 NOTE — DISCHARGE SUMMARY
CDU Discharge Summary        Patient:  Nat Hayes  YOB: 1955    MRN: [id number]   Acct: [de-identified]    Primary Care Physician: Roberto Rodriguez MD    Admit date:  8/12/2020  3:24 PM  Discharge date: 8/13/2020    Discharge Diagnoses:     Acute left-sided chest pain due to angina  Improved with rest and Tylenol    Follow-up:  Call today/tomorrow for a follow up appointment with Roberto Rodriguez MD , or return to the Emergency Room with worsening symptoms    Stressed to patient the importance of following up with primary care doctor for further workup/management of symptoms. Pt verbalizes understanding and agrees with plan. Discharge Medications:  Changes to medications increase your Imdur to 60 mg        Aron [place]   Home Medication Instructions GPO:[phone]    Printed on:08/13/20 1043   Medication Information                      Alcohol Swabs (ALCOHOL PREP PADS)  by Other route 2 times daily.                amLODIPine (NORVASC) 5 MG tablet  Take 1 tablet by mouth daily             aspirin 81 MG EC tablet  Take 1 tablet by mouth daily             atorvastatin (LIPITOR) 40 MG tablet  Take 1 tablet by mouth nightly             busPIRone (BUSPAR) 10 MG tablet  Take 1 tablet by mouth 2 times daily             carvedilol (COREG) 6.25 MG tablet  Take 1 tablet by mouth 2 times daily (with meals)             citalopram (CELEXA) 20 MG tablet  Take 2 tablets by mouth daily TAKE 2 TABLETS BY MOUTH IN THE MORNING             clopidogrel (PLAVIX) 75 MG tablet  Take 1 tablet by mouth daily             empagliflozin (JARDIANCE) 10 MG tablet  Take 1 tablet by mouth daily             EUTHYROX 50 MCG tablet  TAKE 1 TABLET BY MOUTH ONCE DAILY             gabapentin (NEURONTIN) 300 MG capsule  TAKE 1 CAPSULE BY MOUTH THREE TIMES DAILY             glimepiride (AMARYL) 4 MG tablet  TAKE 1 TABLET BY MOUTH TWICE DAILY             Glucose Blood (BLOOD GLUCOSE TEST STRIPS) STRP  Test before meals and at bedtime. DX: DM, on insulin             Handicap Placard MISC  Is a permanent condition. DX: M19.90             insulin glargine (LANTUS SOLOSTAR) 100 UNIT/ML injection pen  Inject 60 Units into the skin daily             Insulin Pen Needle (B-D UF III MINI PEN NEEDLES) 31G X 5 MM MISC  USE 1 PEN NEEDLE DAILY             isosorbide mononitrate (IMDUR) 60 MG extended release tablet  Take 1 tablet by mouth daily             [employer] Lancets Thin MISC  Test before meals and at bedtime. DX: DM, on insulin             lisinopril (PRINIVIL;ZESTRIL) 10 MG tablet  Take 1 tablet by mouth once daily             magnesium hydroxide (MILK OF MAGNESIA) 400 MG/5ML suspension  Take 30 mLs by mouth daily as needed for Constipation             melatonin 1 MG tablet  Take 3 tablets by mouth nightly as needed for Sleep             mineral oil liquid  Take 30 mLs by mouth daily as needed for Constipation             MULTIPLE VITAMIN PO    Take 2 tablets by mouth daily DAILY             nitroGLYCERIN (NITROSTAT) 0.4 MG SL tablet  up to max of 3 total doses. If no relief after 1 dose, call 911.              omeprazole (PRILOSEC) 20 MG delayed release capsule  Take 1 capsule by mouth Daily             polyethylene glycol (GLYCOLAX) 17 GM/SCOOP powder  Take 17 g by mouth daily             sucralfate (CARAFATE) 1 GM tablet  Take 1 tablet by mouth daily                 Diet:  DIET CARDIAC; , Advance as tolerated     Activity:  As tolerated    Consultants: IP CONSULT TO CARDIOLOGY    Procedures:  Not indicated     Diagnostic Test:   Results for orders placed or performed during the hospital encounter of 08/12/20   CBC Auto Differential   Result Value Ref Range    WBC 10.7 3.5 - 11.3 k/uL    RBC 5.01 3.95 - 5.11 m/uL    Hemoglobin 14.7 11.9 - 15.1 g/dL    Hematocrit 46.1 36.3 - 47.1 %    MCV 92.0 82.6 - 102.9 fL    MCH 29.3 25.2 - 33.5 pg    MCHC 31.9 28.4 - 34.8 g/dL    RDW 13.0 11.8 - 14.4 %    Platelets 073 961 - 969 k/uL    MPV 11.0 8.1 - 13.5 fL    NRBC Automated 0.0 0.0 per 100 WBC    Differential Type NOT REPORTED     Seg Neutrophils 75 (H) 36 - 65 %    Lymphocytes 14 (L) 24 - 43 %    Monocytes 9 3 - 12 %    Eosinophils % 2 1 - 4 %    Basophils 0 0 - 2 %    Immature Granulocytes 0 0 %    Segs Absolute 7.88 1.50 - 8.10 k/uL    Absolute Lymph # 1.53 1.10 - 3.70 k/uL    Absolute Mono # 0.99 0.10 - 1.20 k/uL    Absolute Eos # 0.24 0.00 - 0.44 k/uL    Basophils Absolute 0.04 0.00 - 0.20 k/uL    Absolute Immature Granulocyte 0.03 0.00 - 0.30 k/uL    WBC Morphology NOT REPORTED     RBC Morphology NOT REPORTED     Platelet Estimate NOT REPORTED    Basic Metabolic Panel   Result Value Ref Range    Glucose 198 (H) 70 - 99 mg/dL    BUN 12 8 - 23 mg/dL    CREATININE 0.80 0.50 - 0.90 mg/dL    Bun/Cre Ratio NOT REPORTED 9 - 20    Calcium 9.3 8.6 - 10.4 mg/dL    Sodium 138 135 - 144 mmol/L    Potassium 3.8 3.7 - 5.3 mmol/L    Chloride 102 98 - 107 mmol/L    CO2 23 20 - 31 mmol/L    Anion Gap 13 9 - 17 mmol/L    GFR Non-African American >60 >60 mL/min    GFR African American >60 >60 mL/min    GFR Comment          GFR Staging NOT REPORTED    Troponin   Result Value Ref Range    Troponin, High Sensitivity 31 (H) 0 - 14 ng/L    Troponin T NOT REPORTED <0.03 ng/mL    Troponin Interp NOT REPORTED    Troponin   Result Value Ref Range    Troponin, High Sensitivity 25 (H) 0 - 14 ng/L    Troponin T NOT REPORTED <0.03 ng/mL    Troponin Interp NOT REPORTED    POC Glucose Fingerstick   Result Value Ref Range    POC Glucose 222 (H) 65 - 105 mg/dL   EKG 12 Lead   Result Value Ref Range    Ventricular Rate 81 BPM    Atrial Rate 81 BPM    P-R Interval 154 ms    QRS Duration 96 ms    Q-T Interval 426 ms    QTc Calculation (Bazett) 494 ms    P Axis 14 degrees    R Axis -13 degrees    T Axis 42 degrees   EKG 12 Lead   Result Value Ref Range    Ventricular Rate 61 BPM    Atrial Rate 61 BPM    P-R Interval 162 ms    QRS Duration 98 ms    Q-T Interval 486 ms    QTc Calculation (Everzett) 489 ms    P Axis 8 degrees    R Axis -6 degrees    T Axis 48 degrees     Xr Chest (2 Vw)    Result Date: 8/12/2020  EXAMINATION: TWO XRAY VIEWS OF THE CHEST 8/12/2020 4:53 pm COMPARISON: 07/27/2020 HISTORY: ORDERING SYSTEM PROVIDED HISTORY: Chest pain TECHNOLOGIST PROVIDED HISTORY: Chest pain Reason for Exam: Chest Pain; Shortness of Breath Acuity: Unknown Type of Exam: Unknown FINDINGS: The lungs are without acute focal process. There is no effusion or pneumothorax. The cardiomediastinal silhouette is stable. The osseous structures are stable. No acute process. Xr Chest (2 Vw)    Result Date: 7/27/2020  EXAMINATION: TWO XRAY VIEWS OF THE CHEST 7/27/2020 3:29 pm COMPARISON: Chest July 18, 2020. HISTORY: ORDERING SYSTEM PROVIDED HISTORY: chest pain TECHNOLOGIST PROVIDED HISTORY: -SIRD chest pain FINDINGS: Heart appears normal size. Lungs are clear. No free air. Osseous structures demonstrate degenerative change. No acute cardiopulmonary process. Xr Abdomen (kub) (single Ap View)    Result Date: 7/29/2020  EXAMINATION: ONE SUPINE XRAY VIEW(S) OF THE ABDOMEN 7/29/2020 9:36 am COMPARISON: July 31, 2014 HISTORY: ORDERING SYSTEM PROVIDED HISTORY: r/o acute pathology, thining constipated/ stool burden TECHNOLOGIST PROVIDED HISTORY: r/o acute pathology, thining constipated/ stool burden Acuity: Unknown Type of Exam: Initial FINDINGS: Overlying bowel gas and stool compromises the sensitivity to detect calculi. No definite urinary tract calculus. There is large amount of well-formed stool. Otherwise, nonobstructive bowel gas pattern. No mass effect. Osseous structures are grossly intact. Large stool burden. Constipation could be considered.      Us Gallbladder Ruq    Result Date: 7/27/2020  EXAMINATION: RIGHT UPPER QUADRANT ULTRASOUND 7/27/2020 7:05 pm COMPARISON: CT abdomen and pelvis August 9, 2017 HISTORY: ORDERING SYSTEM PROVIDED HISTORY: elevated lipase, epigastric pain, obese, concern pancreatic ductal dilatation. Liver is diffusely increased in echogenicity. Main portal vein is patent. Tiny stones are noted in the gallbladder. No gallbladder wall thickening or pericholecystic fluid. Common bile duct is within normal limits, measuring 6 mm. Limited images of the right kidney demonstrate no evidence of hydronephrosis. 1.  Limited sonographic visibility of the pancreas due to patient's body habitus and overlying bowel gas. No sonographically visible abnormality. 2.  Increased echogenicity of the liver, which is nonspecific, most commonly seen with hepatic steatosis. 3.  Cholelithiasis. No sonographic findings to indicate acute cholecystitis. Nm Cardiac Stress Test Nuclear Imaging    Result Date: 7/18/2020  EXAMINATION: MYOCARDIAL PERFUSION IMAGING 7/18/2020 11:55 am TECHNIQUE: For the rest study, 13.5 mCi of Tc 99 labeled sestamibi were injected. SPECT images were acquired. Under cardiology supervision, 0.4mg Enma Pall was infused. After pharmacologic stress, 48 mCi of Tc 99 labeled sestamibi were injected. SPECT images with ECG gating were acquired. COMPARISON: 28 September 2012 HISTORY: ORDERING SYSTEM PROVIDED HISTORY: Angina TECHNOLOGIST PROVIDED HISTORY: Reason for Exam: Angina Procedure Type->Rx angina Reason for Exam: chest pain/pressure, short of breath, nausea, DM, FINDINGS: Iimages interpreted utilizing [employer] system and [employer]. Mild to moderate reversible perfusion defects noted in the inferior apical, lateral a pickle, and a pickle septal locations, 13% of myocardium. Perfusion scores are visually adjusted to account for artifact. Summed stress score:  9 Summed rest score:  0 Summed reversibility score:  9 Function: End diastolic volume:  33VW Left ventricular ejection fraction:  61% Wall motion abnormalities:  None.  TID score:  0.91 (scores greater than 1.39 are considered elevated for Lexiscan stress with Tc99m)     Perfusion:  Reversible defects involving 13% of the myocardium, as above Function:  Normal left ventricular wall motion. Normal left ventricular ejection fraction of 61% Risk stratification: High; stress-induced reversible defects involving 13% of the myocardium. Notes concerning risk stratification: Risk stratification incorporates both clinical history and some testing results. Final risk determination is the responsibility of the ordering provider as other patient information and test results may increase or decrease the risk assessment reported for this examination. Risk stratification criteria are adapted from \"Noninvasive Risk Stratification\" criteria from [employer]. Al, ACC/AATS/AHA/ASE/ASNC/SCAI/SCCT/STS 2017 Appropriate Use Criteria For Coronary Revascularization in Patients With Stable Ischemic Heart Disease [facility] Volume 69, Issue 17, May 2017 High risk (>3% annual death or MI) 1. Severe resting LV dysfunction (LVEF <35%) not readily explained by non coronary causes 2. Resting perfusion abnormalities greater than 10% of the myocardium in patients without prior history or evidence of MI3. Stress-induced perfusion abnormalities encumbering greater than or equal to 10% myocardium or stress segmental scores indicating multiple vascular territories with abnormalities 4. Stress-induced LV dilatation (TID ratio greater than 1.19 for exercise and greater than 1.39 for regadenoson) Intermediate risk (1% to 3% annual death or MI) 1. Mild/moderate resting LV dysfunction (LVEF 35% to 49%) not readily explained by non coronary causes. 2. Resting perfusion abnormalities in 5%-9.9% of the myocardium in patients without a history or prior evidence of MI 3. Stress-induced perfusion abnormality encumbering 5%-9.9% of the myocardium or stress segmental scores indicating 1 vascular territory with abnormalities but without LV dilation 4. Small wall motion abnormality involving 1-2 segments and only 1 coronary bed.  Low Risk (Less than 1% annual death or MI) computer software. Although all attempts are made to edit the dictation for accuracy, there may be errors in the transcription that are not intended.

## 2020-08-13 NOTE — ED PROVIDER NOTES
components within normal limits   TROPONIN - Abnormal; Notable for the following components:    Troponin, High Sensitivity 31 (*)     All other components within normal limits   TROPONIN - Abnormal; Notable for the following components:    Troponin, High Sensitivity 25 (*)     All other components within normal limits       Xr Chest (2 Vw)    Result Date: 8/12/2020  EXAMINATION: TWO XRAY VIEWS OF THE CHEST 8/12/2020 4:53 pm COMPARISON: 07/27/2020 HISTORY: ORDERING SYSTEM PROVIDED HISTORY: Chest pain TECHNOLOGIST PROVIDED HISTORY: Chest pain Reason for Exam: Chest Pain; Shortness of Breath Acuity: Unknown Type of Exam: Unknown FINDINGS: The lungs are without acute focal process. There is no effusion or pneumothorax. The cardiomediastinal silhouette is stable. The osseous structures are stable. No acute process. Xr Chest (2 Vw)    Result Date: 7/27/2020  EXAMINATION: TWO XRAY VIEWS OF THE CHEST 7/27/2020 3:29 pm COMPARISON: Chest July 18, 2020. HISTORY: ORDERING SYSTEM PROVIDED HISTORY: chest pain TECHNOLOGIST PROVIDED HISTORY: -SIRD chest pain FINDINGS: Heart appears normal size. Lungs are clear. No free air. Osseous structures demonstrate degenerative change. No acute cardiopulmonary process. Xr Abdomen (kub) (single Ap View)    Result Date: 7/29/2020  EXAMINATION: ONE SUPINE XRAY VIEW(S) OF THE ABDOMEN 7/29/2020 9:36 am COMPARISON: July 31, 2014 HISTORY: ORDERING SYSTEM PROVIDED HISTORY: r/o acute pathology, thining constipated/ stool burden TECHNOLOGIST PROVIDED HISTORY: r/o acute pathology, thining constipated/ stool burden Acuity: Unknown Type of Exam: Initial FINDINGS: Overlying bowel gas and stool compromises the sensitivity to detect calculi. No definite urinary tract calculus. There is large amount of well-formed stool. Otherwise, nonobstructive bowel gas pattern. No mass effect. Osseous structures are grossly intact. Large stool burden. Constipation could be considered.      Us End diastolic volume:  16XB Left ventricular ejection fraction:  61% Wall motion abnormalities:  None. TID score:  0.91 (scores greater than 1.39 are considered elevated for Lexiscan stress with Tc99m)     Perfusion:  Reversible defects involving 13% of the myocardium, as above Function:  Normal left ventricular wall motion. Normal left ventricular ejection fraction of 61% Risk stratification: High; stress-induced reversible defects involving 13% of the myocardium. Notes concerning risk stratification: Risk stratification incorporates both clinical history and some testing results. Final risk determination is the responsibility of the ordering provider as other patient information and test results may increase or decrease the risk assessment reported for this examination. Risk stratification criteria are adapted from \"Noninvasive Risk Stratification\" criteria from [employer]. Al, ACC/AATS/AHA/ASE/ASNC/SCAI/SCCT/STS 2017 Appropriate Use Criteria For Coronary Revascularization in Patients With Stable Ischemic Heart Disease [facility] Volume 69, Issue 17, May 2017 High risk (>3% annual death or MI) 1. Severe resting LV dysfunction (LVEF <35%) not readily explained by non coronary causes 2. Resting perfusion abnormalities greater than 10% of the myocardium in patients without prior history or evidence of MI3. Stress-induced perfusion abnormalities encumbering greater than or equal to 10% myocardium or stress segmental scores indicating multiple vascular territories with abnormalities 4. Stress-induced LV dilatation (TID ratio greater than 1.19 for exercise and greater than 1.39 for regadenoson) Intermediate risk (1% to 3% annual death or MI) 1. Mild/moderate resting LV dysfunction (LVEF 35% to 49%) not readily explained by non coronary causes. 2. Resting perfusion abnormalities in 5%-9.9% of the myocardium in patients without a history or prior evidence of MI 3.  Stress-induced perfusion abnormality encumbering 5%-9.9% of the myocardium or stress segmental scores indicating 1 vascular territory with abnormalities but without LV dilation 4. Small wall motion abnormality involving 1-2 segments and only 1 coronary bed. Low Risk (Less than 1% annual death or MI) 1. Normal or small myocardial perfusion defect at rest or with stress encumbering less than 5% of the myocardium. Critical results were called by Dr. Criselda Swanson MD to [address] on 7/18/2020 at 15:44. RECENT VITALS:     Temp: 98.9 °F (37.2 °C),  Pulse: 63, Resp: 16, BP: (!) 129/57, SpO2: 95 %      This patient is a 60 y.o. Female with chest pain after recent cardiac stent placement. Work-up here is unremarkable. Admitted observation unit for cardiology consultation. ED Course as of Aug 13 0208   Wed Aug 12, 2020   1650 First troponin 31. Repeat pending. Down from 80 at last admission.    [SM]   1 All other labs are unremarkable    [SM]   0 Spoke with cardiology who recommended admission to ETU for cardiology evaluation.    []   [phone] Discussed with patient and she is agreeable to admission. Admission orders to ETU placed by me.    [SM]      ED Course User Index  [SM] Van Kelsey MD       OUTSTANDING TASKS / RECOMMENDATIONS:    1. Awaiting transfer to floor     FINAL IMPRESSION:     1.  Chest pain, unspecified type        DISPOSITION:         DISPOSITION:  []  Discharge   []  Transfer -    [x]  Admission -  ETU   []  Against Medical Advice   []  Eloped   FOLLOW-UP: Jose Angel Sun MD  [address] [address]  [phone]          Francesco Almonte [facility]. [place] [phone]           DISCHARGE MEDICATIONS: New Prescriptions    No medications on file          Anitra Hernandez DO  Emergency Medicine Resident  [address]       Anitra Hernandez [place]  Resident  08/13/20 1953

## 2020-08-13 NOTE — CONSULTS
endoscopy; Dilation and curettage of uterus; hiatal hernia repair; Throat surgery; Appendectomy; Total knee arthroplasty (Right, 01/06/2015); and Total knee arthroplasty (Left, 5/26/15). Home Medications:    Prior to Admission medications    Medication Sig Start Date End Date Taking? Authorizing Provider   isosorbide mononitrate (IMDUR) 60 MG extended release tablet Take 1 tablet by mouth daily 8/13/20  Yes Mary Chen DO   insulin glargine (LANTUS SOLOSTAR) 100 UNIT/ML injection pen Inject 60 Units into the skin daily  Patient taking differently: Inject 65 Units into the skin daily  7/29/20  Yes Angella Salazar MD   polyethylene glycol [facility]) 17 GM/SCOOP powder Take 17 g by mouth daily 7/29/20 1/25/21 Yes Angella Salazar MD   mineral oil liquid Take 30 mLs by mouth daily as needed for Constipation 7/29/20 8/28/20 Yes Lam Hdz MD   nitroGLYCERIN (NITROSTAT) 0.4 MG SL tablet up to max of 3 total doses.  If no relief after 1 dose, call 911. 7/21/20  Yes HAO Muhammad CNP   atorvastatin (LIPITOR) 40 MG tablet Take 1 tablet by mouth nightly 7/21/20  Yes HAO Muhammad CNP   carvedilol (COREG) 6.25 MG tablet Take 1 tablet by mouth 2 times daily (with meals) 7/21/20  Yes HAO Muhammad CNP   amLODIPine (NORVASC) 5 MG tablet Take 1 tablet by mouth daily 7/22/20  Yes HAO Muhammad CNP   clopidogrel (PLAVIX) 75 MG tablet Take 1 tablet by mouth daily 7/22/20  Yes HAO Muhammad CNP   empagliflozin (JARDIANCE) 10 MG tablet Take 1 tablet by mouth daily 7/21/20  Yes Angella Salazar MD   melatonin 1 MG tablet Take 3 tablets by mouth nightly as needed for Sleep  Patient taking differently: Take 10 mg by mouth nightly as needed for Sleep  7/21/20  Yes Angella Salazar MD   citalopram (CELEXA) 20 MG tablet Take 2 tablets by mouth daily TAKE 2 TABLETS BY MOUTH IN THE MORNING  Patient taking differently: Take 40 mg by mouth 2 times daily TAKE 2 TABLETS BY MOUTH IN THE MORNING, 1 TABLET IN THE EVENING 7/21/20  Yes Riddhi Lopez MD   lisinopril (PRINIVIL;ZESTRIL) 10 MG tablet Take 1 tablet by mouth once daily 7/20/20  Yes Luisa Atkinson MD   gabapentin (NEURONTIN) 300 MG capsule TAKE 1 CAPSULE BY MOUTH THREE TIMES DAILY 4/24/20 8/13/20 Yes Luisa Atkinson MD   EUTHYROX 50 MCG tablet TAKE 1 TABLET BY MOUTH ONCE DAILY 11/6/19  Yes Luisa Atkinson MD   glimepiride (AMARYL) 4 MG tablet TAKE 1 TABLET BY MOUTH TWICE DAILY 11/6/19  Yes Luisa Atkinson MD   Handramos Silva MISC Is a permanent condition. DX: M19.90 5/14/19  Yes Luisa Atkinson MD   omeprazole (PRILOSEC) 20 MG delayed release capsule Take 1 capsule by mouth Daily 3/8/18  Yes Luisa Atkinson MD   Insulin Pen Needle (B-D UF III MINI PEN NEEDLES) 31G X 5 MM MISC USE 1 PEN NEEDLE DAILY 3/8/18  Yes Luisa Atkinson MD   aspirin 81 MG EC tablet Take 1 tablet by mouth daily 3/8/18  Yes Luisa Atkinson MD   [employer] Lancets Thin MISC Test before meals and at bedtime. DX: DM, on insulin 4/22/14  Yes Artemio Garland MD   Glucose Blood (BLOOD GLUCOSE TEST STRIPS) STRP Test before meals and at bedtime. DX: DM, on insulin 4/22/14  Yes Artemio Garland MD   MULTIPLE VITAMIN PO   Take 2 tablets by mouth daily DAILY   Yes Historical Provider, MD   Alcohol Swabs (ALCOHOL PREP PADS) by Other route 2 times daily.      Yes Historical Provider, MD   busPIRone (BUSPAR) 10 MG tablet Take 1 tablet by mouth 2 times daily  Patient not taking: Reported on 8/12/2020 7/29/20   Riddhi Lopez MD   sucralfate (CARAFATE) 1 GM tablet Take 1 tablet by mouth daily 7/29/20   Riddhi Lopez MD   magnesium hydroxide (MILK OF MAGNESIA) 400 MG/5ML suspension Take 30 mLs by mouth daily as needed for Constipation 7/29/20   Riddhi Lopez MD        Current Facility-Administered Medications: sodium chloride flush 0.9 % injection 10 mL, 10 mL, Intravenous, 2 times per day  sodium chloride flush 0.9 % injection 10 mL, 10 mL, Intravenous, PRN  isosorbide mononitrate (IMDUR) extended release tablet 60 mg, 60 mg, Oral, Daily  amLODIPine (NORVASC) tablet 5 mg, 5 mg, Oral, Daily  aspirin EC tablet 81 mg, 81 mg, Oral, Daily  atorvastatin (LIPITOR) tablet 40 mg, 40 mg, Oral, Nightly  busPIRone (BUSPAR) tablet 10 mg, 10 mg, Oral, BID  carvedilol (COREG) tablet 6.25 mg, 6.25 mg, Oral, BID WC  citalopram (CELEXA) tablet 40 mg, 40 mg, Oral, Daily  clopidogrel (PLAVIX) tablet 75 mg, 75 mg, Oral, Daily  empagliflozin (JARDIANCE) tablet TABS 10 mg, 10 mg, Oral, Daily  levothyroxine (SYNTHROID) tablet 50 mcg, 50 mcg, Oral, Daily  gabapentin (NEURONTIN) capsule 300 mg, 300 mg, Oral, TID  glimepiride (AMARYL) tablet 4 mg, 4 mg, Oral, BID WC  insulin glargine (LANTUS) injection vial 60 Units, 60 Units, Subcutaneous, Daily  lisinopril (PRINIVIL;ZESTRIL) tablet 10 mg, 10 mg, Oral, Daily  melatonin tablet 3 mg, 3 mg, Oral, Nightly PRN  nitroGLYCERIN (NITROSTAT) SL tablet 0.4 mg, 0.4 mg, Sublingual, Q5 Min PRN  pantoprazole (PROTONIX) tablet 40 mg, 40 mg, Oral, QAM AC  acetaminophen (TYLENOL) tablet 650 mg, 650 mg, Oral, Q4H PRN  ketorolac (TORADOL) injection 30 mg, 30 mg, Intravenous, Q6H PRN  enoxaparin (LOVENOX) injection 40 mg, 40 mg, Subcutaneous, Daily    Allergies:  Shellfish-derived products; Morphine; and Tape [adhesive tape]    Social History:   reports that she has never smoked. She has never used smokeless tobacco. She reports current alcohol use. She reports that she does not use drugs. Family History: family history includes Arthritis in her father and mother; Cancer in her father; Diabetes in her sister; Heart Disease in her father and mother. No h/o sudden cardiac death. No for premature CAD    REVIEW OF SYSTEMS:    · Constitutional: there has been no unanticipated weight loss. There's been No change in energy level, No change in activity level. · Eyes: No visual changes or diplopia. No scleral icterus.   · ENT: No Headaches  · Cardiovascular: Remaining as above  · Respiratory: No previous pulmonary problems, No cough  · Gastrointestinal: No abdominal pain. No change in bowel or bladder habits. · Genitourinary: No dysuria, trouble voiding, or hematuria. · Musculoskeletal:  No gait disturbance, No weakness or joint complaints. · Integumentary: No rash or pruritis. · Neurological: No headache, diplopia, change in muscle strength, numbness or tingling. No change in gait, balance, coordination, mood, affect, memory, mentation, behavior. · Psychiatric: No anxiety, or depression. · Endocrine: No temperature intolerance. No excessive thirst, fluid intake, or urination. No tremor. · Hematologic/Lymphatic: No abnormal bruising or bleeding, blood clots or swollen lymph nodes. · Allergic/Immunologic: No nasal congestion or hives. PHYSICAL EXAM:      BP (!) 114/59   Pulse 67   Temp 97.3 °F (36.3 °C) (Axillary)   Resp 16   Ht 4' 11\" (1.499 m)   Wt 232 lb (105.2 kg)   SpO2 98%   BMI 46.86 kg/m²    No intake or output data in the 24 hours ending 08/13/20 1219      Constitutional and General Appearance: alert, cooperative, no distress and appears stated age  HEENT: PERRL, no cervical lymphadenopathy. No masses palpable. Normal oral mucosa  Respiratory:  · Normal excursion and expansion without use of accessory muscles  · Resp Auscultation: Good respiratory effort. No for increased work of breathing. On auscultation: clear to auscultation bilaterally  Cardiovascular:  · The apical impulse is not displaced  · Heart tones are crisp and normal. regular S1 and S2.  · Jugular venous pulsation Normal  · The carotid upstroke is normal in amplitude and contour without delay or bruit  · Peripheral pulses are symmetrical and full     Abdomen:   · No masses or tenderness  · Bowel sounds present  Extremities:  ·  No Cyanosis or Clubbing  ·  Lower extremity edema: No  ·  Skin: Warm and dry  Neurological:  · Alert and oriented.   · Moves films were carefully reviewed and patient had an excellent outcome without concern for any residual disease. 2.   Increased Imdur to 60 mg daily  3. Continue aspirin and Plavix  4. Continue home BP meds Coreg and amlodipine  5. Can follow-up outpatient            Discussed with patient and Nurse. Silverio King M.D. Fellow, [address]        Attestation signed by      Attending Physician Statement:    I have discussed the care of  Leonor Giraldo , including pertinent history and exam findings, with the Cardiology fellow/resident. I have seen and examined the patient and the key elements of all parts of the encounter have been performed by me. I agree with the assessment, plan and orders as documented by the fellow/resident, after I modified exam findings and plan of treatments, and the final version is my approved version of the assessment. Additional Comments:     Noncardiac chest pain, reproducible on examination, localized at one spot over left lateral chest, no evidence of ACS from ECG and troponin, continue current medical therapy with dual antiplatelets, long-acting nitro and beta-blockers. Okay to discharge from Cardiology standpoint with outpatient follow-up.

## 2020-08-13 NOTE — H&P
[address]  CDU / OBSERVATION ENCOUNTER  RESIDENT NOTE     Pt Name: Mandy Ruiz  MRN: [id number]  [place] 1955  Date of evaluation: 8/13/20  Patient's PCP is :  Navdeep Alberto MD    [address] [place] [address]       Chief Complaint   Patient presents with    Chest Pain     Left side chest pain under left breast x2 weeks, had recent cardiac even and had stents placed. Pt was at PCP office when they called EMS to transport pt    Shortness of [address]en is a 60 y.o. female who presents to the observation unit for cardiac evaluation. Patient initially was seen at her primary care's office complaining of chest pain. Patient had a MI 2 weeks ago and had 2 stents placed. Since that time she has been experiencing daily chest pain. Patient has been taking nitroglycerin. Patient reports that she took nitro yesterday for the chest pain and developed nausea and a headache. Patient was transported to [facility] emergency department from the primary care physician's office. Patient's work-up in the emergency department was negative but due to significant cardiac history she was admitted to the observation unit for cardiac evaluation today. Patient reports that she is still having some pain but it is much improved this morning. Location/Symptom: Left-sided chest pain  Timing/Onset: 2 weeks ago  Provocation: None  Quality: Sharp  Radiation: None  Severity: 8 out of 10  Timing/Duration: Worsening over the past 2 weeks  Modifying Factors: None    REVIEW OF SYSTEMS       Review of Systems   Constitutional: Positive for diaphoresis. Negative for activity change, appetite change, chills, fatigue and fever. HENT: Negative for congestion, rhinorrhea, sinus pressure, sinus pain, sore throat and trouble swallowing. Respiratory: Positive for chest tightness. Negative for cough, shortness of breath and wheezing.     Cardiovascular: Positive for chest pain. Negative for palpitations and leg swelling. Gastrointestinal: Positive for nausea. Negative for abdominal distention, abdominal pain, constipation, diarrhea and vomiting. Genitourinary: Negative for difficulty urinating, dysuria, flank pain, hematuria and urgency. Musculoskeletal: Negative for arthralgias, back pain, joint swelling, neck pain and neck stiffness. Neurological: Positive for headaches. Negative for dizziness, tremors, syncope, weakness, light-headedness and numbness. Psychiatric/Behavioral: Positive for sleep disturbance. Negative for confusion and decreased concentration. The patient is not nervous/anxious. (PQRS) Advance directives on face sheet per hospital policy. No change unless specifically mentioned in chart    PAST MEDICAL HISTORY    has a past medical history of Anxiety, Borderline hypertension, Depression, GERD (gastroesophageal reflux disease), Hypothyroidism, Neuropathy, Obesity, Osteoarthritis, Other cirrhosis of liver ([address] Sleep apnea, and Type II or unspecified type diabetes mellitus without mention of complication, not stated as uncontrolled. I have reviewed the past medical history with the patient and it is pertinent to this complaint. SURGICAL HISTORY      has a past surgical history that includes Hysterectomy; Colonoscopy; Upper gastrointestinal endoscopy; Dilation and curettage of uterus; hiatal hernia repair; Throat surgery; Appendectomy; Total knee arthroplasty (Right, 01/06/2015); and Total knee arthroplasty (Left, 5/26/15). I have reviewed and agree with Surgical History entered and it is pertinent to this complaint.      CURRENT MEDICATIONS     sodium chloride flush 0.9 % injection 10 mL, 2 times per day  sodium chloride flush 0.9 % injection 10 mL, PRN  amLODIPine (NORVASC) tablet 5 mg, Daily  aspirin EC tablet 81 mg, Daily  atorvastatin (LIPITOR) tablet 40 mg, Nightly  busPIRone (BUSPAR) tablet 10 mg, BID  carvedilol (COREG) tablet 6.25 mg, BID WC  citalopram (CELEXA) tablet 40 mg, Daily  clopidogrel (PLAVIX) tablet 75 mg, Daily  empagliflozin (JARDIANCE) tablet TABS 10 mg, Daily  levothyroxine (SYNTHROID) tablet 50 mcg, Daily  gabapentin (NEURONTIN) capsule 300 mg, TID  glimepiride (AMARYL) tablet 4 mg, BID WC  insulin glargine (LANTUS) injection vial 60 Units, Daily  lisinopril (PRINIVIL;ZESTRIL) tablet 10 mg, Daily  melatonin tablet 3 mg, Nightly PRN  nitroGLYCERIN (NITROSTAT) SL tablet 0.4 mg, Q5 Min PRN  pantoprazole (PROTONIX) tablet 40 mg, QAM AC  acetaminophen (TYLENOL) tablet 650 mg, Q4H PRN  ketorolac (TORADOL) injection 30 mg, Q6H PRN  enoxaparin (LOVENOX) injection 40 mg, Daily        All medication charted and reviewed. ALLERGIES     is allergic to shellfish-derived products; morphine; and tape [adhesive tape]. FAMILY HISTORY     She indicated that her mother is . She indicated that her father is . She indicated that her sister is alive. family history includes Arthritis in her father and mother; Cancer in her father; Diabetes in her sister; Heart Disease in her father and mother. The patient denies any pertinent family history. I have reviewed and agree with the family history entered. I have reviewed the Family History and it is not significant to the case    SOCIAL HISTORY      reports that she has never smoked. She has never used smokeless tobacco. She reports current alcohol use. She reports that she does not use drugs. I have reviewed and agree with all Social.  There are no concerns for substance abuse/use. PHYSICAL EXAM     INITIAL VITALS:  height is 4' 11\" (1.499 m) and weight is 232 lb (105.2 kg). Her oral temperature is 97.9 °F (36.6 °C). Her blood pressure is 138/69 and her pulse is 67. Her respiration is 16 and oxygen saturation is 97%. Physical Exam  Constitutional:       Appearance: Normal appearance. She is obese. She is not toxic-appearing or diaphoretic.    HENT:      Head: Normocephalic and atraumatic. Nose: Nose normal.      Mouth/Throat:      Mouth: Mucous membranes are moist.      Pharynx: Oropharynx is clear. No posterior oropharyngeal erythema. Eyes:      General: No scleral icterus. Extraocular Movements: Extraocular movements intact. Conjunctiva/sclera: Conjunctivae normal.      Pupils: Pupils are equal, round, and reactive to light. Neck:      Musculoskeletal: Normal range of motion and neck supple. Vascular: No carotid bruit. Cardiovascular:      Rate and Rhythm: Normal rate and regular rhythm. Pulses: Normal pulses. Heart sounds: Normal heart sounds. No murmur. Pulmonary:      Effort: Pulmonary effort is normal. No respiratory distress. Breath sounds: Normal breath sounds. No wheezing or rales. Chest:      Chest wall: Tenderness present. Abdominal:      General: Abdomen is flat. Bowel sounds are normal.      Palpations: Abdomen is soft. Tenderness: There is no abdominal tenderness. There is no guarding. Musculoskeletal: Normal range of motion. Skin:     General: Skin is warm and dry. Capillary Refill: Capillary refill takes less than 2 seconds. Neurological:      General: No focal deficit present. Mental Status: She is alert and oriented to person, place, and time. Mental status is at baseline.            DIFFERENTIAL DIAGNOSIS/MDM:     ACS versus STEMI versus unstable angina versus postop pain    DIAGNOSTIC RESULTS     EKG: All EKG's are interpreted by the Observation Physician who either signs or Co-signs this chart in the absence of a cardiologist.    EKG Interpretation      Interpreted by observation physician    Rhythm: normal sinus   Rate: normal  Axis: normal  Ectopy: none  Conduction: normal  ST Segments: no acute change  T Waves: no acute change  Q Waves: none    Clinical Impression: non-specific EKG    Bryce Fischer DO        RADIOLOGY:   I directly visualized the following  images and reviewed the radiologist interpretations:    Xr Chest (2 Vw)    Result Date: 8/12/2020  EXAMINATION: TWO XRAY VIEWS OF THE CHEST 8/12/2020 4:53 pm COMPARISON: 07/27/2020 HISTORY: ORDERING SYSTEM PROVIDED HISTORY: Chest pain TECHNOLOGIST PROVIDED HISTORY: Chest pain Reason for Exam: Chest Pain; Shortness of Breath Acuity: Unknown Type of Exam: Unknown FINDINGS: The lungs are without acute focal process. There is no effusion or pneumothorax. The cardiomediastinal silhouette is stable. The osseous structures are stable. No acute process. LABS:  I have reviewed and interpreted all available lab results.   Labs Reviewed   CBC WITH AUTO DIFFERENTIAL - Abnormal; Notable for the following components:       Result Value    Seg Neutrophils 75 (*)     Lymphocytes 14 (*)     All other components within normal limits   BASIC METABOLIC PANEL - Abnormal; Notable for the following components:    Glucose 198 (*)     All other components within normal limits   TROPONIN - Abnormal; Notable for the following components:    Troponin, High Sensitivity 31 (*)     All other components within normal limits   TROPONIN - Abnormal; Notable for the following components:    Troponin, High Sensitivity 25 (*)     All other components within normal limits       SCREENING TOOLS:    HEART Risk Score for Chest Pain Patients   History and Physical Exam Suspicion Level  (Nausea, Vomiting, Diaphoresis, Radiation, Exertion)   Slightly Suspicious (0 pts)   Moderately Suspicious (1 pt)   Highly Suspicious (2 pts)   EKG Interpretation   Normal (0 pts)   Non-Specific Repolarization Disturbance (1 pt)   Significant ST-Depression (2 pts)   Age of Patient (in years)   = 39 (0 pts)   46-64 (1 pt)   = 65 (2 pts)   Risk Factors   No Risk Factors (0 pts)   1-2 Risk Factors (1 pt)   = 3 Risk Factors (2 pts)   Risk Factors Include:   Hypercholesterolemia   Hypertension   Diabetes Mellitus   Cigarette smoking   Positive family history   Obesity   CAD   (SLE, CKDz, HIV, Cocaine abuse)   Troponin Levels   = Normal Limit (0 pts)   1-3 Times Normal Limit (1 pt)   > 3 Times Normal Limit (2 pts)  TOTAL: 4    Percent Risk for Major Adverse Cardiac Event (MACE)  0-3 pts indicates low risk for MACE   2.5% (DISCHARGE)   4-7 pts indicates moderate risk for MACE  20.3% (OBS)  8-10 pts indicates high risk for MACE  72.7% (EARLY INVASIVE TX)    CDU IMPRESSION / PLAN      Sharon Bauman is a 60 y.o. female who presents with acute on chronic left-sided chest pain. Patient reports her pain has been there since she had her stents placed 2 weeks ago. Patient reports that the pain got worse yesterday and saw her primary care provider who sent her to the emergency department. · Appreciate cardiology recommendations  · Continue home medications and pain control  · Monitor vitals, labs, and imaging  · DISPO: pending consults and clinical improvement    CONSULTS:    IP CONSULT TO CARDIOLOGY    PROCEDURES:  Not indicated       PATIENT REFERRED TO:    Rosetta Roland MD  [id number] [place] [address] 49, Maya [address] [place] [address] [phone]            --  Martina Nation DO   Emergency Medicine Resident     This dictation was generated by voice recognition computer software. Although all attempts are made to edit the dictation for accuracy, there may be errors in the transcription that are not intended.

## 2020-08-13 NOTE — ED NOTES
Pt helped to restroom with wheelchair.  No needs at this time     Prashant Morillo RN  08/13/20 8652

## 2020-08-17 ENCOUNTER — OFFICE VISIT (OUTPATIENT)
Dept: FAMILY MEDICINE CLINIC | Age: 65
End: 2020-08-17
Payer: COMMERCIAL

## 2020-08-17 VITALS
TEMPERATURE: 97.2 F | HEART RATE: 77 BPM | DIASTOLIC BLOOD PRESSURE: 70 MMHG | WEIGHT: 233 LBS | SYSTOLIC BLOOD PRESSURE: 151 MMHG | HEIGHT: 59 IN | OXYGEN SATURATION: 96 % | BODY MASS INDEX: 46.97 KG/M2

## 2020-08-17 LAB — HBA1C MFR BLD: 9 %

## 2020-08-17 PROCEDURE — 99211 OFF/OP EST MAY X REQ PHY/QHP: CPT | Performed by: STUDENT IN AN ORGANIZED HEALTH CARE EDUCATION/TRAINING PROGRAM

## 2020-08-17 PROCEDURE — 90632 HEPA VACCINE ADULT IM: CPT | Performed by: STUDENT IN AN ORGANIZED HEALTH CARE EDUCATION/TRAINING PROGRAM

## 2020-08-17 PROCEDURE — 83036 HEMOGLOBIN GLYCOSYLATED A1C: CPT | Performed by: STUDENT IN AN ORGANIZED HEALTH CARE EDUCATION/TRAINING PROGRAM

## 2020-08-17 PROCEDURE — 99213 OFFICE O/P EST LOW 20 MIN: CPT | Performed by: STUDENT IN AN ORGANIZED HEALTH CARE EDUCATION/TRAINING PROGRAM

## 2020-08-17 PROCEDURE — 3052F HG A1C>EQUAL 8.0%<EQUAL 9.0%: CPT | Performed by: STUDENT IN AN ORGANIZED HEALTH CARE EDUCATION/TRAINING PROGRAM

## 2020-08-17 ASSESSMENT — PATIENT HEALTH QUESTIONNAIRE - PHQ9
2. FEELING DOWN, DEPRESSED OR HOPELESS: 0
SUM OF ALL RESPONSES TO PHQ QUESTIONS 1-9: 0
SUM OF ALL RESPONSES TO PHQ QUESTIONS 1-9: 0
SUM OF ALL RESPONSES TO PHQ9 QUESTIONS 1 & 2: 0
1. LITTLE INTEREST OR PLEASURE IN DOING THINGS: 0

## 2020-08-17 NOTE — PROGRESS NOTES
[facility] MEDICINE RESIDENCY PROGRAM    Subjective:    Trent Shen is a 60 y.o. female with  has a past medical history of Anxiety, Borderline hypertension, Depression, GERD (gastroesophageal reflux disease), Hypothyroidism, Neuropathy, Obesity, Osteoarthritis, Other cirrhosis of liver ([address] Sleep apnea, and Type II or unspecified type diabetes mellitus without mention of complication, not stated as uncontrolled. Family History   Problem Relation Age of Onset    Heart Disease Mother     Arthritis Mother     Heart Disease Father     Arthritis Father     Cancer Father         \"oral\"    Diabetes Sister         gestational       Presented to the office today for:  Chief Complaint   Patient presents with    Follow-Up from Hospital     chest pain       HPI    Chest Pain  Is Improved   Seen in ED 8/12/20 - Imdur increased; believed not acute   Sees Cardiology soon, unsure    HTN   BP is elevated in office    T2DM  Patient Reports Diabetes is okay  Meds: Lantus 65u daily, Jardiance 10 OD,   Compliance: Reports good   Home blood sugar records: fasting range: 100-200  Any episodes of hypoglycemia: No  Fluctuating blood sugars: No   Weight trend: Stable but elevated   Current diet: improving, more diversity with veg  Current exercise: No, uses walker   Known diabetic complications: Neuropathy on Gabapentin   Urine microalb: 41 on 7/14/2020 - On lisinopril 10 OD   Hba1c 10.7 on 7/14/2020, was 14.0 on 3/8/2018  Foot exam today, will get      Wants a HepA shot,     Review of Systems   Constitutional:       General: He is not in acute distress. Appearance: Normal appearance. HENT:      Head: Normocephalic and atraumatic. Cardiovascular:      Rate and Rhythm: Normal rate and regular rhythm. No murmur   Pulmonary:      Effort: Pulmonary effort is normal. No respiratory distress. Breath sounds: Normal breath sounds. No wheezing. Abdominal:      General: Abdomen is flat.  Bowel sounds are normal. There is no distension. Palpations: Abdomen is soft. Tenderness: There is no tenderness. Musculoskeletal: Normal range of motion. Right lower leg: No edema. Left lower leg: No edema. Skin:     General: Skin is warm and dry. Neurological:      General: No focal deficit present. Mental Status: He is alert and oriented to person, place, and time. Psychiatric:         Mood and Affect: Mood normal.      Objective:    BP (!) 151/70   Pulse 77   Temp 97.2 °F (36.2 °C) (Temporal)   Ht 4' 11\" (1.499 m)   Wt 233 lb (105.7 kg)   SpO2 96%   BMI 47.06 kg/m²    BP Readings from Last 3 Encounters:   08/17/20 (!) 151/70   08/13/20 (!) 114/59   08/12/20 113/66     Physical Exam  Cardiovascular:      Pulses:           Dorsalis pedis pulses are 2+ on the right side and 2+ on the left side. Posterior tibial pulses are 2+ on the right side and 2+ on the left side. Feet:      Right foot:      Protective Sensation: 10 sites tested. 10 sites sensed. Skin integrity: Callus and dry skin present. No skin breakdown or erythema. Toenail Condition: Right toenails are abnormally thick and long. Left foot:      Protective Sensation: 10 sites tested. 10 sites sensed. Skin integrity: Callus and dry skin present. No skin breakdown or erythema. Toenail Condition: Left toenails are abnormally thick and long. Constitutional:       General: He is not in acute distress. Appearance: Normal appearance. Obese   HENT:      Head: Normocephalic and atraumatic. Cardiovascular:      Rate and Rhythm: Normal rate and regular rhythm. No murmur   Pulmonary:      Effort: Pulmonary effort is normal. No respiratory distress. Breath sounds: Normal breath sounds. No wheezing. Abdominal:      General: Abdomen is flat. Bowel sounds are normal. There is no distension. Palpations: Abdomen is soft. Tenderness: There is no tenderness.    Musculoskeletal: Normal range of motion. Right lower leg: No edema. Left lower leg: No edema. Skin:     General: Skin is warm and dry. Neurological:      General: No focal deficit present. Mental Status: He is alert and oriented to person, place, and time. Gait is analgesic, uses walker   Psychiatric:         Mood and Affect: Mood normal.    Lab Results   Component Value Date    WBC 10.7 08/12/2020    HGB 14.7 08/12/2020    HCT 46.1 08/12/2020     08/12/2020    CHOL 119 07/28/2020    TRIG 111 07/28/2020    HDL 37 (L) 07/28/2020    ALT 21 07/29/2020    AST 27 07/29/2020     08/12/2020    K 3.8 08/12/2020     08/12/2020    CREATININE 0.80 08/12/2020    BUN 12 08/12/2020    CO2 23 08/12/2020    TSH 2.74 07/14/2020    INR 1.0 07/21/2020    LABA1C 9.0 08/17/2020    LABMICR 34 (H) 07/14/2020     Lab Results   Component Value Date    CALCIUM 9.3 08/12/2020     Lab Results   Component Value Date    LDLCHOLESTEROL 60 07/28/2020       Assessment:     1. Chest pain, unspecified type    2. Type 2 diabetes mellitus without complication, with long-term current use of insulin ([facility]    3. Essential hypertension    4. Encounter for screening mammogram for breast cancer    5. Need for hepatitis A immunization    6. Decreased hearing of both ears    7. SHERITA on CPAP        Plan:   1. Chest pain, unspecified type  - Improved since increase in Imdur  - Recent PCI s/p SHAKIR with ED visit last week   - Advised follow up with cardiology   - Discussed that pain should continue to improve with time but we will follow along    2. Type 2 diabetes mellitus without complication, with long-term current use of insulin ([facility])  - Unchanged   - POCT glycosylated hemoglobin (Hb A1C)  -  DIABETES FOOT EXAM  - blood glucose monitor kit and supplies; Dispense sufficient amount for indicated testing frequency plus additional to accommodate PRN testing needs.  Dispense all needed supplies to include: monitor, strips, lancing device, lancets, control solutions, alcohol swabs. Dispense: 1 kit; Refill: 0  - KOFFI - Iris Hinkle MD, Ophthalmology, [place]    3. Essential hypertension  - Elevated in office  - Reports compliance with medication, no side effects   - Advised on diet (DASH) and increased physical activity   - declines changes in medication at this time       4. Encounter for screening mammogram for breast cancer  - Reports scheduled for this week  - [facility] Digital Screen Bilateral [[id number]]; Future    5. Need for hepatitis A immunization  - Hep A Vaccine Adult (HAVRIX)    6. Decreased hearing of both ears  - diminished hearing BL; believe decrease hearing due to aging at this time   - Ear canals with cerumen, uses Q-tips daily   - Advised to stop using cotton swabs inside of ear canal   - Ana Lopez, MARY, Audiology, [place]  - carbamide peroxide (DEBROX) 6.5 % otic solution; Place 5 drops in ear(s) 2 times daily for 14 days  Dispense: 1 Bottle; Refill: 0    7. SHERITA on CPAP  - Has CPAP. Does not use it, finds it to confining. Would like o2 at night to sleep  - Advised using CPAP as ordered will consult pulm to see if she can get nasal cushion   - Genet Mcmillan MD, Pulmonology, [place] received counseling on the following healthy behaviors: nutrition, exercise and medication adherence    Patient given educational materials : see patient instruction   Discussed use, benefit, and side effects of prescribed medications. Barriers to medicationcompliance addressed. All patient questions answered. Pt voiced understanding. There are no discontinued medications. Return in about 4 weeks (around 9/14/2020) for F/U HTN and DM .

## 2020-08-17 NOTE — PROGRESS NOTES
Attending Physician Statement  I have discussed the care of [facility] 60 y.o. female, including pertinent history and exam findings, with the resident Dr. Rene Trujillo MD.    History and Exam:   Chief Complaint   Patient presents with    Follow-Up from Hospital     chest pain     Past Medical History:   Diagnosis Date    Anxiety     Borderline hypertension     Depression 7/28/2020    GERD (gastroesophageal reflux disease)     Hypothyroidism     Neuropathy     Obesity     morbid    Osteoarthritis     Other cirrhosis of liver ([address] 7/27/2020    Sleep apnea     possible    Type II or unspecified type diabetes mellitus without mention of complication, not stated as uncontrolled      Allergies   Allergen Reactions    Shellfish-Derived Products Nausea Only    Morphine Other (See Comments)     HALLUCINATIONS      Tape [Adhesive Tape] Rash      I have seen and examined the patient and the key elements of the encounter have been performed by me.   BP Readings from Last 3 Encounters:   08/17/20 (!) 151/70   08/13/20 (!) 114/59   08/12/20 113/66     BP (!) 151/70   Pulse 77   Temp 97.2 °F (36.2 °C) (Temporal)   Ht 4' 11\" (1.499 m)   Wt 233 lb (105.7 kg)   SpO2 96%   BMI 47.06 kg/m²   Lab Results   Component Value Date    WBC 10.7 08/12/2020    HGB 14.7 08/12/2020    HCT 46.1 08/12/2020     08/12/2020    CHOL 119 07/28/2020    TRIG 111 07/28/2020    HDL 37 (L) 07/28/2020    ALT 21 07/29/2020    AST 27 07/29/2020     08/12/2020    K 3.8 08/12/2020     08/12/2020    CREATININE 0.80 08/12/2020    BUN 12 08/12/2020    CO2 23 08/12/2020    TSH 2.74 07/14/2020    INR 1.0 07/21/2020    LABA1C 9.0 08/17/2020    LABMICR 34 (H) 07/14/2020     Lab Results   Component Value Date    LABALBU 3.1 (L) 07/29/2020     No results found for: IRON, TIBC, FERRITIN  Lab Results   Component Value Date    LDLCHOLESTEROL 60 07/28/2020     I agree with the assessment, plan and the diagnosis of    Diagnosis Orders   1. Chest pain, unspecified type     2. Type 2 diabetes mellitus without complication, with long-term current use of insulin (HCC)  POCT glycosylated hemoglobin (Hb A1C)     DIABETES FOOT EXAM    blood glucose monitor kit and supplies    KOFFI Harris MD, Ophthalmology, [place]   3. Essential hypertension     4. Encounter for screening mammogram for breast cancer  [facility] Digital Screen Bilateral [[id number]]   5. Need for hepatitis A immunization  Hep A Vaccine Adult (HAVRIX)   6. Decreased hearing of both ears  KOFFI - Ana Galeano, AUD, Audiology, [place]    carbamide peroxide (DEBROX) 6.5 % otic solution   7. SHERITA on CPAP  Genet Dunbar MD, Pulmonology, [place]    . I agree with orders as documented by the resident. Recommendations: Follow up from ED visit for CP. Imdur was increased. Follows with Cardiology  SHERITA- noncompliant with CPAP, would like O2. Will refer to pulm for further recs  DM- foot exam   Audiology referral for decreased hearing, possibly due to cerumen, will prescribe debrox  HM updated    More than 25 minutes spent  in face to face encounter with the patient and more than half in counseling. Patient's questions were answered. Patient Voiced understanding to the counseling. Return in about 4 weeks (around 9/14/2020) for F/U HTN and DM .    (GC Modifier)-Dr. Elsi Rodriguez MD

## 2020-08-17 NOTE — PATIENT INSTRUCTIONS
Thank you for letting us take care of you today. We hope all your questions were addressed. If a question was overlooked or something else comes to mind after you return home, please contact a member of your Care Team listed below. Your Care Team at [place] [address] is Team #1  Paresh Jose MD (Faculty)  Ene Servin (Resident)  August Coles MD (Resident)  Mariza Whitman., LUIS Gonzales, LUIS [facility] office)  Otis Oneil, [address] [facility] (Clinical Practice Manager)  Denae Johns, [facility] (Clinical Pharmacist)     Office phone number: [phone]    If you need to get in right away due to illness, please be advised we have \"Same Day\" appointments available Monday-Friday. Please call us at [phone] option #3 to schedule your \"Same Day\" appointment.

## 2020-08-20 ENCOUNTER — HOSPITAL ENCOUNTER (OUTPATIENT)
Dept: MAMMOGRAPHY | Age: 65
Discharge: HOME | End: 2020-08-22
Payer: COMMERCIAL

## 2020-08-20 PROCEDURE — 77063 BREAST TOMOSYNTHESIS BI: CPT

## 2020-08-21 ENCOUNTER — TELEPHONE (OUTPATIENT)
Dept: FAMILY MEDICINE CLINIC | Age: 65
End: 2020-08-21

## 2020-08-21 RX ORDER — GABAPENTIN 300 MG/1
CAPSULE ORAL
Qty: 270 CAPSULE | Refills: 0 | Status: SHIPPED
Start: 2020-08-21 | End: 2020-10-01 | Stop reason: DRUGHIGH

## 2020-08-21 RX ORDER — INSULIN GLARGINE 100 [IU]/ML
65 INJECTION, SOLUTION SUBCUTANEOUS DAILY
Qty: 15 PEN | Refills: 12 | Status: SHIPPED | OUTPATIENT
Start: 2020-08-21 | End: 2020-10-08 | Stop reason: SDUPTHER

## 2020-08-21 NOTE — TELEPHONE ENCOUNTER
Pt called needs refill on her Lantus Solostar new dose 65 ml and Gabapentin sent to the [facility] on [address] appt is 9/21/2020

## 2020-08-21 NOTE — TELEPHONE ENCOUNTER
Escribe Request for pending medications. Script was not correct when it was sent for the emergency room, please advise. Patient would like a call when script is completed. Last Visit Date: 8/17/2020  Next Visit Date:  Future Appointments   Date Time Provider Osmar Mercadoi   9/14/2020  3:00 PM Mesfin Owens MD Resp [facility]   9/21/2020  8:30 AM Josafat Guthrie MD [address] [place] [address] Maintenance   Topic Date Due    Cervical cancer screen  05/14/1977    Shingles Vaccine (1 of 2) 05/14/2006    Diabetic retinal exam  01/01/2014    Flu vaccine (1) 09/01/2020    A1C test (Diabetic or Prediabetic)  11/17/2020    Hepatitis A vaccine (2 of 2 - Risk 2-dose series) 02/17/2021    Diabetic microalbuminuria test  07/14/2021    TSH testing  07/14/2021    Lipid screen  07/28/2021    Potassium monitoring  08/12/2021    Creatinine monitoring  08/12/2021    Diabetic foot exam  08/17/2021    Breast cancer screen  08/20/2022    DTaP/Tdap/Td vaccine (6 - Td) 06/27/2024    Colon cancer screen colonoscopy  08/01/2024    Pneumococcal 0-64 years Vaccine  Completed    Hepatitis C screen  Completed    HIV screen  Completed    Hib vaccine  Aged Out    Meningococcal (ACWY) vaccine  Aged Out       Hemoglobin A1C (%)   Date Value   08/17/2020 9.0   07/14/2020 10.7 (H)   03/08/2018 14.0             ( goal A1C is < 7)   Microalb/Crt.  Ratio (mcg/mg creat)   Date Value   07/14/2020 34 (H)     LDL Cholesterol (mg/dL)   Date Value   07/28/2020 60       (goal LDL is <100)   AST (U/L)   Date Value   07/29/2020 27     ALT (U/L)   Date Value   07/29/2020 21     BUN (mg/dL)   Date Value   08/12/2020 12     BP Readings from Last 3 Encounters:   08/17/20 (!) 151/70   08/13/20 (!) 114/59   08/12/20 113/66          (goal 120/80)    All Future Testing planned in [facility]  Lab Frequency Next Occurrence   Hemoglobin A1C Once 09/09/2020   [facility] Digital Screen Bilateral [[id number]] Once 09/16/2020       Next Visit Date:  Future Appointments   Date Time Provider Osmar Evangelina   9/14/2020  3:00 PM Kendy Hogan MD Resp [facility]   9/21/2020  8:30 AM Elvis Welsh MD [address] [facility]         Patient Active Problem List:     Morbid obesity ([address]     Anxiety     GERD (gastroesophageal reflux disease)     OA (osteoarthritis)     DM (diabetes mellitus)     Hypothyroid     Neuropathy     Right knee DJD     Hypothyroidism     S/P left total knee arthroplasty     Obesity, morbid, BMI 50 or higher ([address]     Chest pain     Ischemic heart disease     Essential hypertension     Hypertensive urgency     NSTEMI (non-ST elevated myocardial infarction) ([address]     Other cirrhosis of liver (HCC)     Abdominal pain     Elevated lipase     Depression     Cystitis     Type 2 diabetes mellitus, with long-term current use of insulin ([address]

## 2020-08-21 NOTE — TELEPHONE ENCOUNTER
Next Visit Date:  Future Appointments   Date Time Provider Osmar Hutchinson   9/14/2020  3:00 PM Shirley Licea MD Resp [facility]   9/21/2020  8:30 AM Foreign Mireles MD [address] [place] [address] Maintenance   Topic Date Due    Cervical cancer screen  05/14/1977    Shingles Vaccine (1 of 2) 05/14/2006    Diabetic retinal exam  01/01/2014    Flu vaccine (1) 09/01/2020    A1C test (Diabetic or Prediabetic)  11/17/2020    Hepatitis A vaccine (2 of 2 - Risk 2-dose series) 02/17/2021    Diabetic microalbuminuria test  07/14/2021    TSH testing  07/14/2021    Lipid screen  07/28/2021    Potassium monitoring  08/12/2021    Creatinine monitoring  08/12/2021    Diabetic foot exam  08/17/2021    Breast cancer screen  08/20/2022    DTaP/Tdap/Td vaccine (6 - Td) 06/27/2024    Colon cancer screen colonoscopy  08/01/2024    Pneumococcal 0-64 years Vaccine  Completed    Hepatitis C screen  Completed    HIV screen  Completed    Hib vaccine  Aged Out    Meningococcal (ACWY) vaccine  Aged Out       Hemoglobin A1C (%)   Date Value   08/17/2020 9.0   07/14/2020 10.7 (H)   03/08/2018 14.0             ( goal A1C is < 7)   Microalb/Crt.  Ratio (mcg/mg creat)   Date Value   07/14/2020 34 (H)     LDL Cholesterol (mg/dL)   Date Value   07/28/2020 60   07/14/2020 120       (goal LDL is <100)   AST (U/L)   Date Value   07/29/2020 27     ALT (U/L)   Date Value   07/29/2020 21     BUN (mg/dL)   Date Value   08/12/2020 12     BP Readings from Last 3 Encounters:   08/17/20 (!) 151/70   08/13/20 (!) 114/59   08/12/20 113/66          (goal 120/80)    All Future Testing planned in [facility]  Lab Frequency Next Occurrence   Hemoglobin A1C Once 09/09/2020   [facility] Digital Screen Bilateral [[id number]] Once 09/16/2020               Patient Active Problem List:     Morbid obesity ([address]     Anxiety     GERD (gastroesophageal reflux disease)     OA (osteoarthritis)     DM (diabetes mellitus)     Hypothyroid     Neuropathy Right knee DJD     Hypothyroidism     S/P left total knee arthroplasty     Obesity, morbid, BMI 50 or higher (HCC)     Chest pain     Ischemic heart disease     Essential hypertension     Hypertensive urgency     NSTEMI (non-ST elevated myocardial infarction) ([address]     Other cirrhosis of liver (HCC)     Abdominal pain     Elevated lipase     Depression     Cystitis     Type 2 diabetes mellitus, with long-term current use of insulin ([address]

## 2020-08-26 RX ORDER — CITALOPRAM 20 MG/1
20 TABLET ORAL DAILY
Qty: 120 TABLET | Refills: 0 | Status: SHIPPED | OUTPATIENT
Start: 2020-08-26 | End: 2020-08-31

## 2020-08-31 RX ORDER — CITALOPRAM 40 MG/1
40 TABLET ORAL DAILY
Qty: 90 TABLET | Refills: 1 | Status: SHIPPED | OUTPATIENT
Start: 2020-08-31 | End: 2021-01-15

## 2020-09-09 ENCOUNTER — TELEPHONE (OUTPATIENT)
Dept: SURGERY | Age: 65
End: 2020-09-09

## 2020-09-09 NOTE — TELEPHONE ENCOUNTER
----- Message from Pari Galaviz MD sent at 9/8/2020 11:46 PM EDT -----  Notify patient Mammogram is normal. Repeat in one year.  ----- Message -----  From: Petey Aponte Incoming Radiant Results From [facility]  Sent: 8/20/2020  10:51 AM EDT  To: Pari Galaviz MD

## 2020-09-09 NOTE — TELEPHONE ENCOUNTER
Message left for patient notifying her of her normal mammogram result. Patient was told to call office with any questions.

## 2020-09-14 ENCOUNTER — OFFICE VISIT (OUTPATIENT)
Dept: PULMONOLOGY | Age: 65
End: 2020-09-14
Payer: COMMERCIAL

## 2020-09-14 VITALS
HEIGHT: 59 IN | TEMPERATURE: 97.6 F | RESPIRATION RATE: 14 BRPM | OXYGEN SATURATION: 98 % | WEIGHT: 237 LBS | BODY MASS INDEX: 47.78 KG/M2 | SYSTOLIC BLOOD PRESSURE: 128 MMHG | HEART RATE: 73 BPM | DIASTOLIC BLOOD PRESSURE: 70 MMHG

## 2020-09-14 PROCEDURE — G8427 DOCREV CUR MEDS BY ELIG CLIN: HCPCS | Performed by: INTERNAL MEDICINE

## 2020-09-14 PROCEDURE — 99204 OFFICE O/P NEW MOD 45 MIN: CPT | Performed by: INTERNAL MEDICINE

## 2020-09-14 PROCEDURE — 90686 IIV4 VACC NO PRSV 0.5 ML IM: CPT | Performed by: INTERNAL MEDICINE

## 2020-09-14 PROCEDURE — G8417 CALC BMI ABV UP PARAM F/U: HCPCS | Performed by: INTERNAL MEDICINE

## 2020-09-14 PROCEDURE — 3017F COLORECTAL CA SCREEN DOC REV: CPT | Performed by: INTERNAL MEDICINE

## 2020-09-14 PROCEDURE — 90471 IMMUNIZATION ADMIN: CPT | Performed by: INTERNAL MEDICINE

## 2020-09-14 PROCEDURE — 1036F TOBACCO NON-USER: CPT | Performed by: INTERNAL MEDICINE

## 2020-09-14 NOTE — PROGRESS NOTES
PULMONARY MEDICINE OUTPATIENT CONSULT NOTE     PATIENT:  Marc Child OF BIRTH: 1955  MRN: [id number]     REFERRED BY: Sotero Thomas MD   CHIEF COMPLIANT: New Patient       HISTORY     HISTORY OF PRESENT ILLNESS:   Aysha Chou is a 60y.o. year old female here for establishing care. Patient is a non-smoker, retired . She is morbidly obese. She was recently hospitalized with acute coronary syndrome underwent stent placement x2. She is in the clinic to re-initiate treatment for sleep apnea. SHERITA:     Patient has history of severe obstructive sleep apnea, but she has not used CPAP machine for a few years due to intolerance to mask   Current Symptoms: Patient reports excessive daytime sleepiness and states that while she was on oxygen at hospital she was able to \"think clearly\".  She is in the clinic for restarting treatment for sleep apnea.  Baseline sleep study (1/12/2016): showed AHI of 43.2, lowest SaO2 77%   Titration study (1/13/2016): recommended CPAP at 14 cm of H20   Compliance: Patient is currently not using the CPAP machine    She denies any cough or sputum. She does have grade 2 exertional dyspnea.        PAST MEDICAL HISTORY:      Diagnosis Date    Anxiety     Borderline hypertension     Depression 7/28/2020    GERD (gastroesophageal reflux disease)     Hypothyroidism     Neuropathy     Obesity     morbid    Osteoarthritis     Other cirrhosis of liver ([address] 7/27/2020    Sleep apnea     possible    Type II or unspecified type diabetes mellitus without mention of complication, not stated as uncontrolled      PAST SURGICAL HISTORY:      Procedure Laterality Date    APPENDECTOMY      COLONOSCOPY      DILATION AND CURETTAGE OF UTERUS      HIATAL HERNIA REPAIR      HYSTERECTOMY      THROAT SURGERY      POLYPS REMOVED FROM VOCAL CORD    TOTAL KNEE ARTHROPLASTY Right 01/06/2015    TOTAL KNEE ARTHROPLASTY Left 5/26/15    UPPER GASTROINTESTINAL ENDOSCOPY       FAMILY HISTORY:      Problem Relation Age of Onset    Heart Disease Mother     Arthritis Mother     Heart Disease Father     Arthritis Father     Cancer Father         \"oral\"    Diabetes Sister         gestational     SOCIAL HISTORY:  TOBACCO:   reports that she has never smoked. She has never used smokeless tobacco.  ETOH:   reports current alcohol use. DRUGS: reports no history of drug use. AVOCATION/OCCUPATIONAL EXPOSURE:  None    IMMUNIZATION HISTORY:  Immunization History   Administered Date(s) Administered    DTaP 07/07/2011, 09/01/2011, 12/15/2011, 05/29/2012    Hepatitis A Adult (Havrix, Vaqta) 08/17/2020    Influenza Vaccine, unspecified formulation 09/27/2012    Influenza Virus Vaccine 09/30/2014    Influenza Whole 11/15/2005    Influenza, Quadv, IM, PF (6 mo and older Fluzone, Flulaval, Fluarix, and 3 yrs and older Afluria) 09/14/2020    Pneumococcal Polysaccharide ([id number]) 09/27/2012, 01/07/2015    Tdap (Boostrix, Adacel) 06/27/2014        ALLERGIES:  Allergies   Allergen Reactions    Shellfish-Derived Products Nausea Only    Morphine Other (See Comments)     HALLUCINATIONS      Tape Halle Rice Tape] Rash      MEDICATIONS:  Outpatient Medications Prior to Visit   Medication Sig Dispense Refill    citalopram (CELEXA) 40 MG tablet Take 1 tablet by mouth daily 90 tablet 1    insulin glargine (LANTUS SOLOSTAR) 100 UNIT/ML injection pen Inject 65 Units into the skin daily 15 pen 12    gabapentin (NEURONTIN) 300 MG capsule TAKE 1 CAPSULE BY MOUTH THREE TIMES DAILY 270 capsule 0    blood glucose monitor kit and supplies Dispense sufficient amount for indicated testing frequency plus additional to accommodate PRN testing needs. Dispense all needed supplies to include: monitor, strips, lancing device, lancets, control solutions, alcohol swabs.  1 kit 0    isosorbide mononitrate (IMDUR) 60 MG extended release tablet Take 1 tablet by mouth daily 30 tablet 3    busPIRone (BUSPAR) 10 MG tablet Take 1 tablet by mouth 2 times daily 120 tablet 1    sucralfate (CARAFATE) 1 GM tablet Take 1 tablet by mouth daily 180 tablet 1    polyethylene glycol (GLYCOLAX) 17 GM/SCOOP powder Take 17 g by mouth daily 1530 g 1    magnesium hydroxide (MILK OF MAGNESIA) 400 MG/5ML suspension Take 30 mLs by mouth daily as needed for Constipation 900 mL 0    nitroGLYCERIN (NITROSTAT) 0.4 MG SL tablet up to max of 3 total doses. If no relief after 1 dose, call 911. 25 tablet 3    atorvastatin (LIPITOR) 40 MG tablet Take 1 tablet by mouth nightly 30 tablet 3    carvedilol (COREG) 6.25 MG tablet Take 1 tablet by mouth 2 times daily (with meals) 60 tablet 3    amLODIPine (NORVASC) 5 MG tablet Take 1 tablet by mouth daily 30 tablet 3    clopidogrel (PLAVIX) 75 MG tablet Take 1 tablet by mouth daily 30 tablet 3    empagliflozin (JARDIANCE) 10 MG tablet Take 1 tablet by mouth daily 90 tablet 1    melatonin 1 MG tablet Take 3 tablets by mouth nightly as needed for Sleep (Patient taking differently: Take 10 mg by mouth nightly as needed for Sleep ) 30 tablet 0    lisinopril (PRINIVIL;ZESTRIL) 10 MG tablet Take 1 tablet by mouth once daily 90 tablet 0    glimepiride (AMARYL) 4 MG tablet TAKE 1 TABLET BY MOUTH TWICE DAILY 180 tablet 3    EUTHYROX 50 MCG tablet TAKE 1 TABLET BY MOUTH ONCE DAILY 90 tablet 3    Handicap Placard MISC Is a permanent condition. DX: M19.90 1 each 0    omeprazole (PRILOSEC) 20 MG delayed release capsule Take 1 capsule by mouth Daily 90 capsule 3    Insulin Pen Needle (B-D UF III MINI PEN NEEDLES) 31G X 5 MM MISC USE 1 PEN NEEDLE DAILY 50 each 1    aspirin 81 MG EC tablet Take 1 tablet by mouth daily 30 tablet 5    [employer] Lancets Thin MISC Test before meals and at bedtime. DX: DM, on insulin 100 each 11    Glucose Blood (BLOOD GLUCOSE TEST STRIPS) STRP Test before meals and at bedtime.  DX: DM, on insulin 100 strip 11    MULTIPLE VITAMIN PO   Take 2 tablets by mouth supple, no significant adenopathy, carotids upstroke normal bilaterally, no bruits   Chest- clear to auscultation, no wheezes, rales or rhonchi, symmetric air entry   Heart - normal rate, regular rhythm, normal S1, S2, no murmurs, rubs, clicks or gallops   Abdomen - soft, nontender, non distended   Neurological/Psych- motor and sensory grossly normal bilaterally, alert, oriented to person, place, and time   Musculoskeletal/Extremities- no joint tenderness or swelling, peripheral pulses normal, no pedal edema, no clubbing or cyanosis   Skin - normal coloration and turgor, no rashes, no suspicious skin lesions noted     LABORATORY DATA      LABS:  ABG:   No results found for: PH, PHART, PCO2, ENY5CMA, PO2, PO2ART, HCO3, YML1JHN, BE, BEART, THGB, I7PTYJUJ  VBG:  No results found for: PHVEN, OGE2KGZ, BEVEN, Q8WKHLOT  CBC:   Lab Results   Component Value Date    WBC 10.7 08/12/2020    RBC 5.01 08/12/2020    HGB 14.7 08/12/2020    HCT 46.1 08/12/2020     08/12/2020    MCV 92.0 08/12/2020    MCH 29.3 08/12/2020    MCHC 31.9 08/12/2020    RDW 13.0 08/12/2020    LYMPHOPCT 14 (L) 08/12/2020    MONOPCT 9 08/12/2020    BASOPCT 0 08/12/2020    MONOSABS 0.99 08/12/2020    LYMPHSABS 1.53 08/12/2020    EOSABS 0.24 08/12/2020    BASOSABS 0.04 08/12/2020    DIFFTYPE NOT REPORTED 08/12/2020     Eosinophils/IgE:   Lab Results   Component Value Date    EOSABS 0.24 08/12/2020     Alpha 1 antitrypsin: No results found for: A1A  CMP:   Lab Results   Component Value Date     08/12/2020    K 3.8 08/12/2020     08/12/2020    CO2 23 08/12/2020    BUN 12 08/12/2020    CREATININE 0.80 08/12/2020    GLUCOSE 198 (H) 08/12/2020    MG 2.5 07/28/2020    CALCIUM 9.3 08/12/2020    PROT 5.8 (L) 07/29/2020    LABALBU 3.1 (L) 07/29/2020    BILITOT 0.31 07/29/2020    BILIDIR <0.08 05/27/2015    IBILI CANNOT BE CALCULATED 05/27/2015    ALT 21 07/29/2020    AST 27 07/29/2020    ALKPHOS 110 (H) 07/29/2020    LIPASE 96 (H) 07/29/2020 Coagulation Profile:   Lab Results   Component Value Date    INR 1.0 07/21/2020    PROTIME 11.0 07/21/2020    APTT 58.9 (H) 07/29/2020     BNP:   Lab Results   Component Value Date    PROBNP 258 07/27/2020     D-Dimer/Fibrinogen:  Lab Results   Component Value Date    DDIMER 0.59 07/27/2020     Others labs:  Lab Results   Component Value Date    TSH 2.74 07/14/2020     No results found for: ANANT, ANATITER, RHEUMFACTOR, RF, C3, C4, MPO, PR3, SEDRATE, CRP  No results found for: IRON, TIBC, FERRITIN, FOLATE, LDH  No results found for: SPEP, UPEP, PSA, CEA, , [id number],   No results found for: RPR, HIV    RADIOLOGY:  Chest x-ray 8/12/2020     FINDINGS:    The lungs are without acute focal process.  There is no effusion or    pneumothorax. The cardiomediastinal silhouette is stable. The osseous    structures are stable.              Impression    No acute process. CT chest 1/10/2019       FINDINGS:    Pulmonary Arteries: Pulmonary arteries are adequately opacified for    evaluation.  No evidence of intraluminal filling defect to suggest pulmonary    embolism.  Main pulmonary artery is normal in caliber.         Mediastinum: No evidence of mediastinal lymphadenopathy.  The heart and    pericardium demonstrate no acute abnormality.  There is no acute abnormality    of the thoracic aorta.  Calcific coronary artery disease.         Lungs/pleura: The lungs are without acute process.  No focal consolidation or    pulmonary edema.  No evidence of pleural effusion or pneumothorax.         Upper Abdomen: Gallstone.         Soft Tissues/Bones: No acute bone or soft tissue abnormality.              Impression    No evidence of pulmonary embolism or acute pulmonary abnormality.       PULMONARY FUNCTION TEST:  No results found for: FEV1, FVC, AMS1MUO, TLC, DLCO    ECHOCARDIOGRAM:   Results for orders placed during the hospital encounter of 07/18/20   ECHO Complete 2D W Doppler W Color    Narrative   Summary  Left ventricle is normal in size Global left ventricular systolic function  is normal  Estimated ejection fraction is 65 % . Moderate left ventricular hypertrophy. No significant valvular abnormalities. CARDIAC CATHETERIZATION: 7/18/2020   Procedure Summary      Two vessel CAD   Preserved LV function   Successful PTCA -SHAKIR mid RCA and LCX      Recommendations      Post stent protocol   Risk factor modification. ASSESSMENT AND PLAN     ASSESSMENT:    ICD-10-CM    1. SHERITA (obstructive sleep apnea)  G47.33 Sleep Study with PAP Titration   2. Exertional dyspnea  R06.09 Full PFT Study With Bronchodilator     6 Minute Walk Test   3. Morbid obesity with BMI of 45.0-49.9, adult ([facility]  E66.01     Z68.42    4. Gastroesophageal reflux disease, unspecified whether esophagitis present  K21.9    5.  Encounter for immunization  Z23 INFLUENZA, QUADV, 3 YRS AND OLDER, IM PF, PREFILL SYR OR SDV, 0.5ML (AFLURIA QUADV, PF)     PLAN/RECOMMENDATIONS:    Patient reports having excessive daytime sleepiness and unrefreshing and non-restorative sleep  Patient has been previously recommended CPAP at 14 cmH2O  She has not used her CPAP machine for several years due to intolerance to mask  She recently underwent a cardiac cath with placement of 2 stents and was advised to reinitiate the treatment for sleep apnea  Order placed for a new titration study  Explained importance of compliance with treatment of sleep apnea  Encouraged increased adherence to positive pressure therapy and recommended usage for at least 4 hours every night  Use humidifier if needed  Weight loss was recommended and discussed  Recommended good sleep hygiene and instructions provided  Patient instructed not to drive or operate heavy machinery, if sleepy      Pulmonary function tests ordered   Imaging data reviewed   Patient is currently on not on any bronchodilators   Patient received counseling on the following healthy behaviors: nutrition, exercise and medication adherence   Maintain an active lifestyle  Lung Cancer Screening: After reviewing the patient's smoking history, age and other clinical criteria, the low dose CT is not indicated (Nonsmoker/Smoking <30 pack-years)   Recommend influenza vaccination in the fall annually; Ordered   Recommendations were given regarding pneumococcal vaccination; up-to-date    All the questions that the patient had were answered to her satisfaction  We'll see the patient back in two months  Thank you for having us involved in the care of your patient. Please call us if you have any questions or concerns. Anais Griggs MD    Pulmonary and Critical Care Medicine            Please note that this chart was generated using voice recognition Dragon dictation software. Although every effort was made to ensure the accuracy of this automated transcription, some errors in transcription may have occurred.

## 2020-09-30 NOTE — TELEPHONE ENCOUNTER
Last Visit Date:  8-17-20  Next Visit Date:  10/1/2020    Hemoglobin A1C (%)   Date Value   08/17/2020 9.0   07/14/2020 10.7 (H)   03/08/2018 14.0             ( goal A1C is < 7)   Microalb/Crt.  Ratio (mcg/mg creat)   Date Value   07/14/2020 34 (H)     LDL Cholesterol (mg/dL)   Date Value   07/28/2020 60       (goal LDL is <100)   AST (U/L)   Date Value   07/29/2020 27     ALT (U/L)   Date Value   07/29/2020 21     BUN (mg/dL)   Date Value   08/12/2020 12     BP Readings from Last 3 Encounters:   09/14/20 128/70   08/17/20 (!) 151/70   08/13/20 (!) 114/59          (goal 120/80)        Patient Active Problem List:     Morbid obesity ([address]     Anxiety     GERD (gastroesophageal reflux disease)     OA (osteoarthritis)     DM (diabetes mellitus)     Hypothyroid     Neuropathy     Right knee DJD     Hypothyroidism     S/P left total knee arthroplasty     Obesity, morbid, BMI 50 or higher ([address]     Chest pain     Ischemic heart disease     Essential hypertension     Hypertensive urgency     NSTEMI (non-ST elevated myocardial infarction) ([address]     Other cirrhosis of liver (HCC)     Abdominal pain     Elevated lipase     Depression     Cystitis     Type 2 diabetes mellitus, with long-term current use of insulin ([address]      ----Faith Raines

## 2020-10-01 ENCOUNTER — OFFICE VISIT (OUTPATIENT)
Dept: FAMILY MEDICINE CLINIC | Age: 65
End: 2020-10-01
Payer: COMMERCIAL

## 2020-10-01 VITALS
WEIGHT: 234 LBS | HEIGHT: 59 IN | HEART RATE: 69 BPM | SYSTOLIC BLOOD PRESSURE: 139 MMHG | BODY MASS INDEX: 47.17 KG/M2 | TEMPERATURE: 96.9 F | DIASTOLIC BLOOD PRESSURE: 67 MMHG

## 2020-10-01 PROCEDURE — G8427 DOCREV CUR MEDS BY ELIG CLIN: HCPCS | Performed by: FAMILY MEDICINE

## 2020-10-01 PROCEDURE — 1036F TOBACCO NON-USER: CPT | Performed by: FAMILY MEDICINE

## 2020-10-01 PROCEDURE — 3017F COLORECTAL CA SCREEN DOC REV: CPT | Performed by: FAMILY MEDICINE

## 2020-10-01 PROCEDURE — 99213 OFFICE O/P EST LOW 20 MIN: CPT | Performed by: FAMILY MEDICINE

## 2020-10-01 PROCEDURE — G8417 CALC BMI ABV UP PARAM F/U: HCPCS | Performed by: FAMILY MEDICINE

## 2020-10-01 PROCEDURE — G8482 FLU IMMUNIZE ORDER/ADMIN: HCPCS | Performed by: FAMILY MEDICINE

## 2020-10-01 RX ORDER — GABAPENTIN 600 MG/1
600 TABLET ORAL 3 TIMES DAILY
Qty: 270 TABLET | Refills: 1 | Status: SHIPPED | OUTPATIENT
Start: 2020-10-01 | End: 2021-02-04

## 2020-10-01 RX ORDER — MICONAZOLE NITRATE 20.6 MG/G
POWDER TOPICAL
Qty: 45 G | Refills: 1 | Status: SHIPPED | OUTPATIENT
Start: 2020-10-01

## 2020-10-01 NOTE — PATIENT INSTRUCTIONS
Thank you for letting us take care of you today. We hope all your questions were addressed. If a question was overlooked or something else comes to mind after you return home, please contact a member of your Care Team listed below. Your Care Team at [place] [address] is Team #4  Symone Casiano MD (Faculty)  Estephania Watkins MD (Resident)  Joseph Carbajal MD (Resident)  Roberta Cochran MD (Resident)  Lorena Oliva MD (Resident)  LUIS Davis RMA [place] [facility] office)  Jono Blanco, [address] (Clinical Practice Manager)  Tip Foreman, [address] [place] [address] (Clinical Pharmacist)       Office phone number: [phone]    If you need to get in right away due to illness, please be advised we have \"Same Day\" appointments available Monday-Friday. Please call us at [phone] option #3 to schedule your \"Same Day\" appointment.

## 2020-10-01 NOTE — PROGRESS NOTES
Follow up after cardiac stents. DM.    Main complaint today is possible yeast infection. Constant itch in skin folds of groin. For several weeks. Has had before. Responded to antifungals in the past.    DM: states glucose readings - highest morning reading 155. Usually runs under 115. Would like to go up on Neurontin. Is on 300 TID. States leg neuropathy if not controlled. Jardiance daily. Lantus 65 in the morning. Vitals:    10/01/20 0831   BP: 139/67   Pulse: 69   Temp: 96.9 °F (36.1 °C)       Vitals reviewed.w  Neck-neg masses, thyroid esperanza. Lungs clear in all fields. CV- RRR with normal heart sounds. Neg peripheral edema    Lab Results   Component Value Date    WBC 10.7 08/12/2020    HGB 14.7 08/12/2020    HCT 46.1 08/12/2020     08/12/2020    CHOL 119 07/28/2020    TRIG 111 07/28/2020    HDL 37 (L) 07/28/2020    ALT 21 07/29/2020    AST 27 07/29/2020     08/12/2020    K 3.8 08/12/2020     08/12/2020    CREATININE 0.80 08/12/2020    BUN 12 08/12/2020    CO2 23 08/12/2020    TSH 2.74 07/14/2020    INR 1.0 07/21/2020    LABA1C 9.0 08/17/2020    LABMICR 34 (H) 07/14/2020      Diagnosis Orders   1. Primary osteoarthritis of both knees     2. Ischemic heart disease  No CP. Does follow with cardiology   3. Essential hypertension  controlled   4. Intertriginous candidiasis  miconazole (MICONAZOLE ANTIFUNGAL) 2 % cream    miconazole (ZEASORB-AF) 2 % powder   5. Neuropathy  gabapentin (NEURONTIN) 600 MG tablet TID. Increased from 300 TID. States did see pulmonology after being referred to them at last visit. Did not have audiology testing or her eye exam.  We are reprinting those referral sheets for her. Follow-up in 2 months with Dr. Lina Beard. She would be due for point-of-care A1c at that time.

## 2020-10-02 RX ORDER — LEVOTHYROXINE SODIUM 0.05 MG/1
TABLET ORAL
Qty: 30 TABLET | Refills: 10 | Status: SHIPPED | OUTPATIENT
Start: 2020-10-02 | End: 2021-08-26 | Stop reason: SDUPTHER

## 2020-10-02 NOTE — TELEPHONE ENCOUNTER
Please address the medication refill and close the encounter. If I can be of assistance, please route to the applicable pool. Thank you. Last visit: 10/01/2020  Last Med refill: 10/31/19  In patient medication history   Does patient have enough medication for 72 hours: No:     Next Visit Date:  Future Appointments   Date Time Provider Osmar Evangelina   10/3/2020  2:20 PM STA PAT RM 4 [facility]AZ PAT [facility]   10/7/2020  7:30 PM STAZ SLEEP RM 3 STAZSLEC [facility]   10/14/2020 10:20 AM STA PAT RM 4 STAZ PAT [facility]   10/19/2020 11:30 AM STV PFT RM 1 STVZ PFT [facility]   10/22/2020 11:30 AM Lenard Chua MD Resp [facility] Maintenance   Topic Date Due    Cervical cancer screen  05/14/1977    Shingles Vaccine (1 of 2) 05/14/2006    Diabetic retinal exam  01/01/2014    A1C test (Diabetic or Prediabetic)  11/17/2020    Hepatitis A vaccine (2 of 2 - Risk 2-dose series) 02/17/2021    Diabetic microalbuminuria test  07/14/2021    TSH testing  07/14/2021    Lipid screen  07/28/2021    Potassium monitoring  08/12/2021    Creatinine monitoring  08/12/2021    Diabetic foot exam  08/17/2021    Breast cancer screen  08/20/2022    DTaP/Tdap/Td vaccine (6 - Td) 06/27/2024    Colon cancer screen colonoscopy  08/01/2024    Flu vaccine  Completed    Pneumococcal 0-64 years Vaccine  Completed    Hepatitis C screen  Completed    HIV screen  Completed    Hib vaccine  Aged Out    Meningococcal (ACWY) vaccine  Aged Out       Hemoglobin A1C (%)   Date Value   08/17/2020 9.0   07/14/2020 10.7 (H)   03/08/2018 14.0             ( goal A1C is < 7)   Microalb/Crt.  Ratio (mcg/mg creat)   Date Value   07/14/2020 34 (H)     LDL Cholesterol (mg/dL)   Date Value   07/28/2020 60   07/14/2020 120       (goal LDL is <100)   AST (U/L)   Date Value   07/29/2020 27     ALT (U/L)   Date Value   07/29/2020 21     BUN (mg/dL)   Date Value   08/12/2020 12     BP Readings from Last 3 Encounters:   10/01/20 139/67 09/14/20 128/70   08/17/20 (!) 151/70          (goal 120/80)    All Future Testing planned in [facility]  Lab Frequency Next Occurrence   Hemoglobin A1C Once 03/17/2021   RADHA Digital Screen Bilateral [[id number]] Once 08/17/2021   6 Minute Walk Test Once 09/28/2020   Sleep Study with PAP Titration Once 09/14/2020   COVID-19 TOMORROW AM                Patient Active Problem List:     Morbid obesity ([address]     Anxiety     GERD (gastroesophageal reflux disease)     OA (osteoarthritis)     DM (diabetes mellitus)     Hypothyroid     Neuropathy     Right knee DJD     Hypothyroidism     S/P left total knee arthroplasty     Obesity, morbid, BMI 50 or higher ([address]     Chest pain     Ischemic heart disease     Essential hypertension     Hypertensive urgency     NSTEMI (non-ST elevated myocardial infarction) ([address]     Other cirrhosis of liver (HCC)     Abdominal pain     Elevated lipase     Depression     Cystitis     Type 2 diabetes mellitus, with long-term current use of insulin ([address]

## 2020-10-03 ENCOUNTER — HOSPITAL ENCOUNTER (OUTPATIENT)
Dept: PREADMISSION TESTING | Age: 65
Discharge: HOME | End: 2020-10-07
Payer: COMMERCIAL

## 2020-10-03 PROCEDURE — U0003 INFECTIOUS AGENT DETECTION BY NUCLEIC ACID (DNA OR RNA); SEVERE ACUTE RESPIRATORY SYNDROME CORONAVIRUS 2 (SARS-COV-2) (CORONAVIRUS DISEASE [COVID-19]), AMPLIFIED PROBE TECHNIQUE, MAKING USE OF HIGH THROUGHPUT TECHNOLOGIES AS DESCRIBED BY CMS-2020-01-R: HCPCS

## 2020-10-04 PROBLEM — G47.33 OSA (OBSTRUCTIVE SLEEP APNEA): Status: ACTIVE | Noted: 2020-10-04

## 2020-10-04 PROBLEM — E66.01 MORBID OBESITY WITH BMI OF 45.0-49.9, ADULT (HCC): Status: ACTIVE | Noted: 2020-10-04

## 2020-10-05 LAB — SARS-COV-2, NAA: NOT DETECTED

## 2020-10-05 RX ORDER — LEVOTHYROXINE SODIUM 0.05 MG/1
TABLET ORAL
Qty: 90 TABLET | Refills: 3 | Status: SHIPPED | OUTPATIENT
Start: 2020-10-05 | End: 2021-02-04

## 2020-10-06 ENCOUNTER — TELEPHONE (OUTPATIENT)
Dept: PRIMARY CARE CLINIC | Age: 65
End: 2020-10-06

## 2020-10-07 ENCOUNTER — HOSPITAL ENCOUNTER (OUTPATIENT)
Dept: SLEEP CENTER | Age: 65
Discharge: HOME | End: 2020-10-09
Payer: COMMERCIAL

## 2020-10-07 VITALS — TEMPERATURE: 97.5 F | WEIGHT: 234 LBS | BODY MASS INDEX: 41.46 KG/M2 | HEIGHT: 63 IN

## 2020-10-07 PROCEDURE — 95811 POLYSOM 6/>YRS CPAP 4/> PARM: CPT

## 2020-10-08 RX ORDER — GLIMEPIRIDE 4 MG/1
TABLET ORAL
Qty: 180 TABLET | Refills: 3 | Status: SHIPPED | OUTPATIENT
Start: 2020-10-08 | End: 2021-09-16

## 2020-10-08 RX ORDER — ASPIRIN 81 MG/1
81 TABLET ORAL DAILY
Qty: 30 TABLET | Refills: 5 | Status: SHIPPED | OUTPATIENT
Start: 2020-10-08 | End: 2021-03-16

## 2020-10-08 RX ORDER — CARVEDILOL 6.25 MG/1
6.25 TABLET ORAL 2 TIMES DAILY WITH MEALS
Qty: 60 TABLET | Refills: 3 | Status: SHIPPED | OUTPATIENT
Start: 2020-10-08 | End: 2021-01-15

## 2020-10-08 RX ORDER — GLUCOSAMINE HCL/CHONDROITIN SU 500-400 MG
CAPSULE ORAL
Qty: 100 STRIP | Refills: 11 | Status: SHIPPED | OUTPATIENT
Start: 2020-10-08

## 2020-10-08 RX ORDER — UREA 10 %
3 LOTION (ML) TOPICAL NIGHTLY PRN
Qty: 30 TABLET | Refills: 0 | Status: SHIPPED | OUTPATIENT
Start: 2020-10-08 | End: 2020-10-28 | Stop reason: SDUPTHER

## 2020-10-08 RX ORDER — INSULIN GLARGINE 100 [IU]/ML
65 INJECTION, SOLUTION SUBCUTANEOUS DAILY
Qty: 15 PEN | Refills: 12 | Status: SHIPPED | OUTPATIENT
Start: 2020-10-08 | End: 2021-06-17

## 2020-10-08 RX ORDER — OMEPRAZOLE 20 MG/1
20 CAPSULE, DELAYED RELEASE ORAL DAILY
Qty: 90 CAPSULE | Refills: 3 | Status: SHIPPED | OUTPATIENT
Start: 2020-10-08 | End: 2021-09-22

## 2020-10-08 RX ORDER — AMLODIPINE BESYLATE 5 MG/1
5 TABLET ORAL DAILY
Qty: 30 TABLET | Refills: 3 | Status: SHIPPED | OUTPATIENT
Start: 2020-10-08 | End: 2021-01-15

## 2020-10-08 RX ORDER — BUSPIRONE HYDROCHLORIDE 10 MG/1
10 TABLET ORAL 2 TIMES DAILY
Qty: 120 TABLET | Refills: 1 | Status: SHIPPED | OUTPATIENT
Start: 2020-10-08 | End: 2021-01-15

## 2020-10-08 RX ORDER — GABAPENTIN 300 MG/1
CAPSULE ORAL
Qty: 270 CAPSULE | Refills: 0 | Status: SHIPPED | OUTPATIENT
Start: 2020-10-08 | End: 2020-12-17

## 2020-10-08 RX ORDER — ISOSORBIDE MONONITRATE 60 MG/1
60 TABLET, EXTENDED RELEASE ORAL DAILY
Qty: 30 TABLET | Refills: 3 | Status: SHIPPED | OUTPATIENT
Start: 2020-10-08 | End: 2021-01-15

## 2020-10-08 RX ORDER — ATORVASTATIN CALCIUM 40 MG/1
40 TABLET, FILM COATED ORAL NIGHTLY
Qty: 30 TABLET | Refills: 3 | Status: SHIPPED | OUTPATIENT
Start: 2020-10-08 | End: 2021-01-15

## 2020-10-08 RX ORDER — LISINOPRIL 10 MG/1
TABLET ORAL
Qty: 90 TABLET | Refills: 0 | Status: SHIPPED | OUTPATIENT
Start: 2020-10-08 | End: 2020-12-21 | Stop reason: SDUPTHER

## 2020-10-08 RX ORDER — CLOPIDOGREL BISULFATE 75 MG/1
75 TABLET ORAL DAILY
Qty: 30 TABLET | Refills: 3 | Status: SHIPPED | OUTPATIENT
Start: 2020-10-08 | End: 2021-01-15

## 2020-10-14 ENCOUNTER — HOSPITAL ENCOUNTER (OUTPATIENT)
Dept: PREADMISSION TESTING | Age: 65
Setting detail: SPECIMEN
Discharge: HOME | End: 2020-10-18
Payer: COMMERCIAL

## 2020-10-14 LAB
SARS-COV-2, RAPID: NORMAL
SARS-COV-2: NORMAL
SARS-COV-2: NOT DETECTED
SOURCE: NORMAL

## 2020-10-14 PROCEDURE — U0003 INFECTIOUS AGENT DETECTION BY NUCLEIC ACID (DNA OR RNA); SEVERE ACUTE RESPIRATORY SYNDROME CORONAVIRUS 2 (SARS-COV-2) (CORONAVIRUS DISEASE [COVID-19]), AMPLIFIED PROBE TECHNIQUE, MAKING USE OF HIGH THROUGHPUT TECHNOLOGIES AS DESCRIBED BY CMS-2020-01-R: HCPCS

## 2020-10-19 ENCOUNTER — HOSPITAL ENCOUNTER (OUTPATIENT)
Dept: PULMONOLOGY | Age: 65
Discharge: HOME | End: 2020-10-19
Payer: COMMERCIAL

## 2020-10-19 PROCEDURE — 94618 PULMONARY STRESS TESTING: CPT

## 2020-10-19 PROCEDURE — 94060 EVALUATION OF WHEEZING: CPT

## 2020-10-19 PROCEDURE — 94640 AIRWAY INHALATION TREATMENT: CPT

## 2020-10-19 PROCEDURE — 94664 DEMO&/EVAL PT USE INHALER: CPT

## 2020-10-19 PROCEDURE — 94726 PLETHYSMOGRAPHY LUNG VOLUMES: CPT

## 2020-10-19 PROCEDURE — 94729 DIFFUSING CAPACITY: CPT

## 2020-10-20 LAB — STATUS: NORMAL

## 2020-10-21 NOTE — PROCEDURES
[address]                  [facility]                               PULMONARY FUNCTION    PATIENT NAME: Keon Sanders                  :        1955  MED REC NO:   [id number]                             ROOM:  ACCOUNT NO:   [de-identified]                           ADMIT DATE: 10/19/2020  PROVIDER:     Jacobo Cervantes    DATE OF PROCEDURE:  10/19/2020    Spirometry does not show any obstructive ventilatory defect. The  patient's FEV1 is 1.95 L and FVC is 2.39 L. The post-bronchodilator study does not show significant change. The patient's FEV1 is 2.13 L and FVC is 2.48 L. Total lung capacity is normal at 88% predicted. Airway resistance is normal at 96% predicted. Specific conductance is normal at 121% predicted. Flow-volume loop shows no obstructive ventilatory defect. IMPRESSION:  Normal pulmonary function test with an FEV1 of 2.13 L and  FVC of 2.48 L.         Rocco Bangura    D: 10/20/2020 17:00:43       T: 10/20/2020 21:50:29     [email or web address]  Job#: [id number]     Doc#: [id number]    CC:

## 2020-10-22 ENCOUNTER — OFFICE VISIT (OUTPATIENT)
Dept: PULMONOLOGY | Age: 65
End: 2020-10-22
Payer: COMMERCIAL

## 2020-10-22 VITALS
DIASTOLIC BLOOD PRESSURE: 95 MMHG | BODY MASS INDEX: 47.17 KG/M2 | HEIGHT: 59 IN | WEIGHT: 234 LBS | SYSTOLIC BLOOD PRESSURE: 186 MMHG | TEMPERATURE: 97 F | OXYGEN SATURATION: 98 % | HEART RATE: 92 BPM

## 2020-10-22 PROCEDURE — G8427 DOCREV CUR MEDS BY ELIG CLIN: HCPCS | Performed by: INTERNAL MEDICINE

## 2020-10-22 PROCEDURE — G8417 CALC BMI ABV UP PARAM F/U: HCPCS | Performed by: INTERNAL MEDICINE

## 2020-10-22 PROCEDURE — 3017F COLORECTAL CA SCREEN DOC REV: CPT | Performed by: INTERNAL MEDICINE

## 2020-10-22 PROCEDURE — G8482 FLU IMMUNIZE ORDER/ADMIN: HCPCS | Performed by: INTERNAL MEDICINE

## 2020-10-22 PROCEDURE — 1036F TOBACCO NON-USER: CPT | Performed by: INTERNAL MEDICINE

## 2020-10-22 PROCEDURE — 99214 OFFICE O/P EST MOD 30 MIN: CPT | Performed by: INTERNAL MEDICINE

## 2020-10-22 NOTE — PROGRESS NOTES
PULMONARY MEDICINE OUTPATIENT CONSULT NOTE     PATIENT:  Cl Bauer  YOB: 1955  MRN: [id number]     REFERRED BY: Bette Lopez MD   CHIEF COMPLIANT: Follow-up (Follow up PFT)       HISTORY     HISTORY OF PRESENT ILLNESS:   Cl Bauer is a 60y.o. year old female here for follow-up. SHERITA:     Patient has history of severe obstructive sleep apnea, but she has not used CPAP machine for a few years due to intolerance to mask   She is coming after recent repeat titration study   Current Symptoms: Patient reports excessive daytime sleepiness and states that while she was on oxygen at hospital she was able to \"think clearly\".  Baseline sleep study (1/12/2016): showed AHI of 43.2, lowest SaO2 77%   Titration study (1/13/2016): recommended CPAP at 14 cm of H20   Titration study (10/7/2020): Recommended BiPAP at 17/13 cmH2O   Compliance: Patient is currently not using the CPAP machine    Exertional dyspnea:  Patient is a non-smoker, retired . She is morbidly obese. She was recently hospitalized with acute coronary syndrome underwent stent placement x2. She denies any cough and has grade 2 exertional dyspnea.   PFT was reportedly normal and she did not have any desaturation on 6-minute walk test.       PAST MEDICAL HISTORY:      Diagnosis Date    Anxiety     Borderline hypertension     Depression 7/28/2020    GERD (gastroesophageal reflux disease)     Hypothyroidism     Neuropathy     Obesity     morbid    Osteoarthritis     Other cirrhosis of liver ([facility] 7/27/2020    Sleep apnea     possible    Type II or unspecified type diabetes mellitus without mention of complication, not stated as uncontrolled      PAST SURGICAL HISTORY:      Procedure Laterality Date    APPENDECTOMY      COLONOSCOPY      DILATION AND CURETTAGE OF UTERUS      HIATAL HERNIA REPAIR      HYSTERECTOMY      THROAT SURGERY      POLYPS REMOVED FROM VOCAL CORD    TOTAL KNEE DAILY 180 tablet 3    isosorbide mononitrate (IMDUR) 60 MG extended release tablet Take 1 tablet by mouth daily 30 tablet 3    aspirin 81 MG EC tablet Take 1 tablet by mouth daily 30 tablet 5    blood glucose monitor strips Test before meals and at bedtime. DX: DM, on insulin 100 strip 11    gabapentin (NEURONTIN) 300 MG capsule TAKE 1 CAPSULE BY MOUTH THREE TIMES DAILY 270 capsule 0    insulin glargine (LANTUS SOLOSTAR) 100 UNIT/ML injection pen Inject 65 Units into the skin daily 15 pen 12    melatonin 1 MG tablet Take 3 tablets by mouth nightly as needed for Sleep 30 tablet 0    omeprazole (PRILOSEC) 20 MG delayed release capsule Take 1 capsule by mouth Daily 90 capsule 3    levothyroxine (EUTHYROX) 50 MCG tablet TAKE 1 TABLET BY MOUTH ONCE DAILY 90 tablet 3    levothyroxine (SYNTHROID) 50 MCG tablet TAKE 1 TABLET BY MOUTH EVERY DAY 30 tablet 10    miconazole (MICONAZOLE ANTIFUNGAL) 2 % cream Apply topically 2 times daily. 1 Tube 1    miconazole (ZEASORB-AF) 2 % powder Apply topically 2 times daily. 45 g 1    gabapentin (NEURONTIN) 600 MG tablet Take 1 tablet by mouth 3 times daily for 180 days. 270 tablet 1    citalopram (CELEXA) 40 MG tablet Take 1 tablet by mouth daily 90 tablet 1    blood glucose monitor kit and supplies Dispense sufficient amount for indicated testing frequency plus additional to accommodate PRN testing needs. Dispense all needed supplies to include: monitor, strips, lancing device, lancets, control solutions, alcohol swabs. 1 kit 0    sucralfate (CARAFATE) 1 GM tablet Take 1 tablet by mouth daily 180 tablet 1    polyethylene glycol (GLYCOLAX) 17 GM/SCOOP powder Take 17 g by mouth daily 1530 g 1    magnesium hydroxide (MILK OF MAGNESIA) 400 MG/5ML suspension Take 30 mLs by mouth daily as needed for Constipation 900 mL 0    nitroGLYCERIN (NITROSTAT) 0.4 MG SL tablet up to max of 3 total doses.  If no relief after 1 dose, call 911. 25 tablet 3    empagliflozin (Gelene Filipe) 10 MG tablet Take 1 tablet by mouth daily 90 tablet 1    Handicap Placard MISC Is a permanent condition. DX: M19.90 1 each 0    Insulin Pen Needle (B-D UF III MINI PEN NEEDLES) 31G X 5 MM MISC USE 1 PEN NEEDLE DAILY 50 each 1    [employer] Lancets Thin MISC Test before meals and at bedtime. DX: DM, on insulin 100 each 11    MULTIPLE VITAMIN PO   Take 2 tablets by mouth daily DAILY      Alcohol Swabs (ALCOHOL PREP PADS) by Other route 2 times daily. No facility-administered medications prior to visit.          REVIEW OF SYSTEMS:   General: negative for - chills, fever, or sleep disturbance   Psychological: negative for - anxiety or depression   Ophthalmic: negative for - dry or itchy eyes, or loss of vision   ENT: negative   Allergy and Immunology: negative   Hematological and Lymphatic: negative for - bleeding problems, jaundice, or swollen lymph nodes   Endocrine: negative for - polydipsia/polyuria or temperature intolerance   Respiratory: positive for - shortness of breath   negative for - cough, wheezing or sputum   Cardiovascular: negative for - chest pain or palpitations   Gastrointestinal: negative for - change in bowel habits, nausea/vomiting, or swallowing difficulty/pain   Genito-Urinary: negative for - dysuria or urinary frequency/urgency   Musculoskeletal: negative for - joint pain or joint swelling   Neurological: negative for - numbness/tingling or weakness   Dermatological: negative for - rash or skin lesion changes      PHYSICAL EXAMINATION      VITAL SIGNS:   BP (!) 186/95   Pulse 92   Temp 97 °F (36.1 °C) (Temporal)   Ht 4' 11\" (1.499 m)   Wt 234 lb (106.1 kg)   SpO2 98% Comment: Room air resting  BMI 47.26 kg/m²   Wt Readings from Last 3 Encounters:   10/22/20 234 lb (106.1 kg)   10/07/20 234 lb (106.1 kg)   10/01/20 234 lb (106.1 kg)     SYSTEMIC EXAMINATION:   General appearance - alert, well appearing, and in no distress and overweight   Eyes - sclera anicteric, no 07/28/2020    CALCIUM 9.3 08/12/2020    PROT 5.8 (L) 07/29/2020    LABALBU 3.1 (L) 07/29/2020    BILITOT 0.31 07/29/2020    BILIDIR <0.08 05/27/2015    IBILI CANNOT BE CALCULATED 05/27/2015    ALT 21 07/29/2020    AST 27 07/29/2020    ALKPHOS 110 (H) 07/29/2020    LIPASE 96 (H) 07/29/2020     Coagulation Profile:   Lab Results   Component Value Date    INR 1.0 07/21/2020    PROTIME 11.0 07/21/2020    APTT 58.9 (H) 07/29/2020     BNP:   Lab Results   Component Value Date    PROBNP 258 07/27/2020     D-Dimer/Fibrinogen:  Lab Results   Component Value Date    DDIMER 0.59 07/27/2020     Others labs:  Lab Results   Component Value Date    TSH 2.74 07/14/2020     No results found for: ANANT, ANATITER, RHEUMFACTOR, RF, C3, C4, MPO, PR3, SEDRATE, CRP  No results found for: IRON, TIBC, FERRITIN, FOLATE, LDH  No results found for: SPEP, UPEP, PSA, CEA, , [id number],   No results found for: RPR, HIV    RADIOLOGY:  Chest x-ray 8/12/2020     FINDINGS:    The lungs are without acute focal process.  There is no effusion or    pneumothorax. The cardiomediastinal silhouette is stable. The osseous    structures are stable.              Impression    No acute process. CT chest 1/10/2019       FINDINGS:    Pulmonary Arteries: Pulmonary arteries are adequately opacified for    evaluation.  No evidence of intraluminal filling defect to suggest pulmonary    embolism.  Main pulmonary artery is normal in caliber.         Mediastinum: No evidence of mediastinal lymphadenopathy.  The heart and    pericardium demonstrate no acute abnormality.  There is no acute abnormality    of the thoracic aorta.  Calcific coronary artery disease.         Lungs/pleura:  The lungs are without acute process.  No focal consolidation or    pulmonary edema.  No evidence of pleural effusion or pneumothorax.         Upper Abdomen: Gallstone.         Soft Tissues/Bones: No acute bone or soft tissue abnormality.              Impression    No evidence of pulmonary embolism or acute pulmonary abnormality. PULMONARY FUNCTION TEST:10/19/2020    Spirometry does not show any obstructive ventilatory defect. The  patient's FEV1 is 1.95 L and FVC is 2.39 L. The post-bronchodilator study does not show significant change. The patient's FEV1 is 2.13 L and FVC is 2.48 L. Total lung capacity is normal at 88% predicted. Airway resistance is normal at 96% predicted. Specific conductance is normal at 121% predicted. Flow-volume loop shows no obstructive ventilatory defect.     IMPRESSION:  Normal pulmonary function test with an FEV1 of 2.13 L and  FVC of 2.48 L.    ECHOCARDIOGRAM:   Results for orders placed during the hospital encounter of 07/18/20   ECHO Complete 2D W Doppler W Color    Narrative   Summary  Left ventricle is normal in size Global left ventricular systolic function  is normal  Estimated ejection fraction is 65 % . Moderate left ventricular hypertrophy. No significant valvular abnormalities. CARDIAC CATHETERIZATION: 7/18/2020   Procedure Summary      Two vessel CAD   Preserved LV function   Successful PTCA -SHAKIR mid RCA and LCX      Recommendations      Post stent protocol   Risk factor modification. ASSESSMENT AND PLAN     ASSESSMENT:    ICD-10-CM    1. SHERITA (obstructive sleep apnea)  G47.33 DME Order for BiPAP as OP   2.  Morbid obesity with BMI of 45.0-49.9, adult ([facility]  E66.01     Z68.42      PLAN/RECOMMENDATIONS:    Patient reports having excessive daytime sleepiness and unrefreshing and non-restorative sleep  Patient recently had a repeat titration study recommended BiPAP 17/13  Order placed for BiPAP  Explained importance of compliance with treatment of sleep apnea  Recommended usage for at least 4 hours every night  Use humidifier if needed  Weight loss was recommended and discussed  Recommended good sleep hygiene and instructions provided  Patient instructed not to drive or operate heavy machinery, if sleepy      Pulmonary function tests and 6-minute walk test reviewed   Imaging data reviewed   Patient is currently on not on any bronchodilators   Exertional dyspnea is likely due to morbid obesity   Patient received counseling on the following healthy behaviors: nutrition, exercise and medication adherence   Maintain an active lifestyle  Lung Cancer Screening: After reviewing the patient's smoking history, age and other clinical criteria, the low dose CT is not indicated (Nonsmoker/Smoking <30 pack-years)   Recommend influenza vaccination in the fall annually; up-to-date  Recommendations were given regarding pneumococcal vaccination; up-to-date    All the questions that the patient had were answered to her satisfaction  We'll see the patient back in 3 months  Thank you for having us involved in the care of your patient. Please call us if you have any questions or concerns. Brooklyn Yang MD    Pulmonary and Critical Care Medicine            Please note that this chart was generated using voice recognition Dragon dictation software. Although every effort was made to ensure the accuracy of this automated transcription, some errors in transcription may have occurred.

## 2020-10-23 NOTE — PATIENT INSTRUCTIONS
10/23/2020 faxed DME order and dictation to ANURAG CH[facility] at [phone].JOHN

## 2020-10-28 ENCOUNTER — TELEPHONE (OUTPATIENT)
Dept: FAMILY MEDICINE CLINIC | Age: 65
End: 2020-10-28

## 2020-10-28 RX ORDER — UREA 10 %
3 LOTION (ML) TOPICAL NIGHTLY PRN
Qty: 30 TABLET | Refills: 5 | Status: SHIPPED | OUTPATIENT
Start: 2020-10-28 | End: 2021-02-04

## 2020-10-28 RX ORDER — UREA 10 %
3 LOTION (ML) TOPICAL NIGHTLY PRN
Qty: 30 TABLET | Refills: 3 | Status: CANCELLED | OUTPATIENT
Start: 2020-10-28

## 2020-10-28 NOTE — TELEPHONE ENCOUNTER
PC from [address] [place] [address] asking for refill on melatonin-previous request denied stating it was ordered 2 weeks ago w 10 refills but order placed on 10/08 has 0 refills therefore due for refill now     Please review and advise

## 2020-11-04 ENCOUNTER — TELEPHONE (OUTPATIENT)
Dept: FAMILY MEDICINE CLINIC | Age: 65
End: 2020-11-04

## 2020-11-12 NOTE — TELEPHONE ENCOUNTER
Attempt to call patient phone rings and then states please enter your access code, will mail referrals to patient

## 2020-11-18 NOTE — TELEPHONE ENCOUNTER
Please ask pt to contact her insurance and ask what they cover for:    1. Eye doctor    2. ENT doctor (ear nose throat)    Then I can do the referrals. Thank you!     Electronically signed by Arley Lane MD on 11/18/2020 at 11:32 AM

## 2020-11-19 NOTE — TELEPHONE ENCOUNTER
Attempted to reach out to patient to ask her to call her insurance company to find out where we can refer her to for ENT and Eye doctor. Left message on mobile number for patient to call with the information.

## 2020-12-07 ENCOUNTER — TELEPHONE (OUTPATIENT)
Dept: FAMILY MEDICINE CLINIC | Age: 65
End: 2020-12-07

## 2020-12-07 NOTE — TELEPHONE ENCOUNTER
Received a call stating the referral placed for patient for ENT needs to be changed to an Audio Eval, otherwise, the patient can not be seen. Please advise or address.

## 2020-12-17 RX ORDER — LISINOPRIL 10 MG/1
TABLET ORAL
Qty: 30 TABLET | Refills: 0 | OUTPATIENT
Start: 2020-12-17

## 2020-12-17 NOTE — TELEPHONE ENCOUNTER
Last visit: 10/1/20  Last Med refill: 12/1/20  Does patient have enough medication for 72 hours: Yes    Next Visit Date:  Future Appointments   Date Time Provider Osmar Hutchinson   2/5/2021 10:45 AM Mo Serrano  [address] Maintenance   Topic Date Due    Cervical cancer screen  05/14/1977    Shingles Vaccine (1 of 2) 05/14/2006    Diabetic retinal exam  01/01/2014    A1C test (Diabetic or Prediabetic)  11/17/2020    Hepatitis A vaccine (2 of 2 - Risk 2-dose series) 02/17/2021    Diabetic microalbuminuria test  07/14/2021    TSH testing  07/14/2021    Lipid screen  07/28/2021    Potassium monitoring  08/12/2021    Creatinine monitoring  08/12/2021    Diabetic foot exam  08/17/2021    Breast cancer screen  08/20/2022    DTaP/Tdap/Td vaccine (6 - Td) 06/27/2024    Colon cancer screen colonoscopy  08/01/2024    Flu vaccine  Completed    Pneumococcal 0-64 years Vaccine  Completed    Hepatitis C screen  Completed    HIV screen  Completed    Hib vaccine  Aged Out    Meningococcal (ACWY) vaccine  Aged Out       Hemoglobin A1C (%)   Date Value   08/17/2020 9.0   07/14/2020 10.7 (H)   03/08/2018 14.0             ( goal A1C is < 7)   Microalb/Crt.  Ratio (mcg/mg creat)   Date Value   07/14/2020 34 (H)     LDL Cholesterol (mg/dL)   Date Value   07/28/2020 60   07/14/2020 120       (goal LDL is <100)   AST (U/L)   Date Value   07/29/2020 27     ALT (U/L)   Date Value   07/29/2020 21     BUN (mg/dL)   Date Value   08/12/2020 12     BP Readings from Last 3 Encounters:   10/22/20 (!) 186/95   10/01/20 139/67   09/14/20 128/70          (goal 120/80)    All Future Testing planned in [facility]  Lab Frequency Next Occurrence   Hemoglobin A1C Once 03/17/2021   RADHA Digital Screen Bilateral [[id number]] Once 08/17/2021   6 Minute Walk Test Once 09/28/2020               Patient Active Problem List:     Morbid obesity ([address]     Anxiety     GERD (gastroesophageal reflux disease)     OA (osteoarthritis)     DM (diabetes mellitus)     Neuropathy     Right knee DJD     Hypothyroidism     S/P left total knee arthroplasty     Obesity, morbid, BMI 50 or higher (HCC)     Chest pain     Ischemic heart disease     Essential hypertension     Hypertensive urgency     NSTEMI (non-ST elevated myocardial infarction) ([facility]     Other cirrhosis of liver (HCC)     Abdominal pain     Elevated lipase     Depression     Cystitis     Type 2 diabetes mellitus, with long-term current use of insulin (HCC)     SHERITA (obstructive sleep apnea)     Morbid obesity with BMI of 45.0-49.9, adult ([facility]

## 2020-12-21 ENCOUNTER — TELEPHONE (OUTPATIENT)
Dept: FAMILY MEDICINE CLINIC | Age: 65
End: 2020-12-21

## 2020-12-21 RX ORDER — LISINOPRIL 10 MG/1
TABLET ORAL
Qty: 30 TABLET | Refills: 5 | Status: SHIPPED | OUTPATIENT
Start: 2020-12-21 | End: 2021-06-08

## 2021-01-14 DIAGNOSIS — F41.9 ANXIETY: Chronic | ICD-10-CM

## 2021-01-15 ENCOUNTER — TELEPHONE (OUTPATIENT)
Dept: FAMILY MEDICINE CLINIC | Age: 66
End: 2021-01-15

## 2021-01-15 RX ORDER — ISOSORBIDE MONONITRATE 60 MG/1
60 TABLET, EXTENDED RELEASE ORAL DAILY
Qty: 30 TABLET | Refills: 3 | Status: SHIPPED | OUTPATIENT
Start: 2021-01-15 | End: 2021-05-12

## 2021-01-15 RX ORDER — CARVEDILOL 6.25 MG/1
TABLET ORAL
Qty: 60 TABLET | Refills: 3 | Status: SHIPPED | OUTPATIENT
Start: 2021-01-15 | End: 2021-05-12

## 2021-01-15 RX ORDER — ATORVASTATIN CALCIUM 40 MG/1
40 TABLET, FILM COATED ORAL NIGHTLY
Qty: 30 TABLET | Refills: 3 | Status: SHIPPED | OUTPATIENT
Start: 2021-01-15 | End: 2021-05-12

## 2021-01-15 RX ORDER — AMLODIPINE BESYLATE 5 MG/1
TABLET ORAL
Qty: 30 TABLET | Refills: 3 | Status: SHIPPED | OUTPATIENT
Start: 2021-01-15 | End: 2021-05-12

## 2021-01-15 RX ORDER — CLOPIDOGREL BISULFATE 75 MG/1
75 TABLET ORAL DAILY
Qty: 30 TABLET | Refills: 3 | Status: SHIPPED | OUTPATIENT
Start: 2021-01-15 | End: 2021-05-12

## 2021-01-15 RX ORDER — BUSPIRONE HYDROCHLORIDE 10 MG/1
10 TABLET ORAL 2 TIMES DAILY
Qty: 60 TABLET | Refills: 1 | Status: SHIPPED | OUTPATIENT
Start: 2021-01-15 | End: 2021-03-16

## 2021-01-15 RX ORDER — CITALOPRAM 40 MG/1
TABLET ORAL
Qty: 30 TABLET | Refills: 0 | Status: SHIPPED | OUTPATIENT
Start: 2021-01-15 | End: 2021-02-23 | Stop reason: SDUPTHER

## 2021-01-15 NOTE — TELEPHONE ENCOUNTER
Last visit: 10/10/20  Last Med refill:   Does patient have enough medication for 72 hours:     Next Visit Date:  Future Appointments   Date Time Provider Osmar Mercadoi   1/28/2021  9:45 AM William Schwarz MD [id number]   2/5/2021 10:45 AM Ruddy Mcdonald MD Resp [address] Maintenance   Topic Date Due    Cervical cancer screen  05/14/1977    Shingles Vaccine (1 of 2) 05/14/2006    Diabetic retinal exam  01/01/2014    A1C test (Diabetic or Prediabetic)  11/17/2020    Hepatitis A vaccine (2 of 2 - Risk 2-dose series) 02/17/2021    Diabetic microalbuminuria test  07/14/2021    TSH testing  07/14/2021    Lipid screen  07/28/2021    Potassium monitoring  08/12/2021    Creatinine monitoring  08/12/2021    Diabetic foot exam  08/17/2021    Breast cancer screen  08/20/2022    DTaP/Tdap/Td vaccine (6 - Td) 06/27/2024    Colon cancer screen colonoscopy  08/01/2024    Flu vaccine  Completed    Pneumococcal 0-64 years Vaccine  Completed    Hepatitis C screen  Completed    HIV screen  Completed    Hib vaccine  Aged Out    Meningococcal (ACWY) vaccine  Aged Out       Hemoglobin A1C (%)   Date Value   08/17/2020 9.0   07/14/2020 10.7 (H)   03/08/2018 14.0             ( goal A1C is < 7)   Microalb/Crt.  Ratio (mcg/mg creat)   Date Value   07/14/2020 34 (H)     LDL Cholesterol (mg/dL)   Date Value   07/28/2020 60   07/14/2020 120       (goal LDL is <100)   AST (U/L)   Date Value   07/29/2020 27     ALT (U/L)   Date Value   07/29/2020 21     BUN (mg/dL)   Date Value   08/12/2020 12     BP Readings from Last 3 Encounters:   10/22/20 (!) 186/95   10/01/20 139/67   09/14/20 128/70          (goal 120/80)    All Future Testing planned in [facility]  Lab Frequency Next Occurrence   Hemoglobin A1C Once 03/17/2021   RADHA Digital Screen Bilateral [[id number]] Once 08/17/2021   6 Minute Walk Test Once 09/28/2020               Patient Active Problem List:     Morbid obesity ([address]     Anxiety     GERD (gastroesophageal reflux disease)     OA (osteoarthritis)     DM (diabetes mellitus)     Neuropathy     Right knee DJD     Hypothyroidism     S/P left total knee arthroplasty     Obesity, morbid, BMI 50 or higher (HCC)     Chest pain     Ischemic heart disease     Essential hypertension     Hypertensive urgency     NSTEMI (non-ST elevated myocardial infarction) ([facility]     Other cirrhosis of liver (HCC)     Abdominal pain     Elevated lipase     Depression     Cystitis     Type 2 diabetes mellitus, with long-term current use of insulin (HCC)     SHERITA (obstructive sleep apnea)     Morbid obesity with BMI of 45.0-49.9, adult ([facility]

## 2021-01-15 NOTE — TELEPHONE ENCOUNTER
Call placed to pt rings busy will x 3 attempts pt is in the process of moving I will mail appointment card to pts home with future appt.

## 2021-02-04 ENCOUNTER — OFFICE VISIT (OUTPATIENT)
Dept: FAMILY MEDICINE CLINIC | Age: 66
End: 2021-02-04
Payer: COMMERCIAL

## 2021-02-04 ENCOUNTER — HOSPITAL ENCOUNTER (OUTPATIENT)
Age: 66
Setting detail: SPECIMEN
Discharge: HOME | End: 2021-02-04
Payer: COMMERCIAL

## 2021-02-04 VITALS
TEMPERATURE: 96.9 F | WEIGHT: 247.4 LBS | BODY MASS INDEX: 49.97 KG/M2 | HEART RATE: 71 BPM | DIASTOLIC BLOOD PRESSURE: 68 MMHG | SYSTOLIC BLOOD PRESSURE: 134 MMHG

## 2021-02-04 DIAGNOSIS — F51.01 PRIMARY INSOMNIA: ICD-10-CM

## 2021-02-04 DIAGNOSIS — I10 ESSENTIAL HYPERTENSION: ICD-10-CM

## 2021-02-04 DIAGNOSIS — E03.9 HYPOTHYROIDISM, UNSPECIFIED TYPE: ICD-10-CM

## 2021-02-04 DIAGNOSIS — I10 ESSENTIAL HYPERTENSION: Primary | ICD-10-CM

## 2021-02-04 DIAGNOSIS — E11.59 TYPE 2 DIABETES MELLITUS WITH OTHER CIRCULATORY COMPLICATION, WITH LONG-TERM CURRENT USE OF INSULIN (HCC): ICD-10-CM

## 2021-02-04 DIAGNOSIS — Z79.4 TYPE 2 DIABETES MELLITUS WITH OTHER CIRCULATORY COMPLICATION, WITH LONG-TERM CURRENT USE OF INSULIN (HCC): ICD-10-CM

## 2021-02-04 LAB
ABSOLUTE EOS #: 0.26 K/UL (ref 0–0.44)
ABSOLUTE IMMATURE GRANULOCYTE: 0.05 K/UL (ref 0–0.3)
ABSOLUTE LYMPH #: 1.08 K/UL (ref 1.1–3.7)
ABSOLUTE MONO #: 1.09 K/UL (ref 0.1–1.2)
ANION GAP SERPL CALCULATED.3IONS-SCNC: 19 MMOL/L (ref 9–17)
BASOPHILS # BLD: 0 % (ref 0–2)
BASOPHILS ABSOLUTE: 0.05 K/UL (ref 0–0.2)
BUN BLDV-MCNC: 10 MG/DL (ref 8–23)
BUN/CREAT BLD: ABNORMAL (ref 9–20)
CALCIUM SERPL-MCNC: 9.3 MG/DL (ref 8.6–10.4)
CHLORIDE BLD-SCNC: 104 MMOL/L (ref 98–107)
CO2: 22 MMOL/L (ref 20–31)
CREAT SERPL-MCNC: 0.8 MG/DL (ref 0.5–0.9)
DIFFERENTIAL TYPE: ABNORMAL
EOSINOPHILS RELATIVE PERCENT: 2 % (ref 1–4)
GFR AFRICAN AMERICAN: >60 ML/MIN
GFR NON-AFRICAN AMERICAN: >60 ML/MIN
GFR SERPL CREATININE-BSD FRML MDRD: ABNORMAL ML/MIN/{1.73_M2}
GFR SERPL CREATININE-BSD FRML MDRD: ABNORMAL ML/MIN/{1.73_M2}
GLUCOSE BLD-MCNC: 69 MG/DL (ref 70–99)
HBA1C MFR BLD: 7.5 %
HCT VFR BLD CALC: 49.2 % (ref 36.3–47.1)
HEMOGLOBIN: 14.9 G/DL (ref 11.9–15.1)
IMMATURE GRANULOCYTES: 0 %
LYMPHOCYTES # BLD: 9 % (ref 24–43)
MCH RBC QN AUTO: 28.4 PG (ref 25.2–33.5)
MCHC RBC AUTO-ENTMCNC: 30.3 G/DL (ref 28.4–34.8)
MCV RBC AUTO: 93.9 FL (ref 82.6–102.9)
MONOCYTES # BLD: 9 % (ref 3–12)
NRBC AUTOMATED: 0 PER 100 WBC
PDW BLD-RTO: 12.6 % (ref 11.8–14.4)
PLATELET # BLD: 262 K/UL (ref 138–453)
PLATELET ESTIMATE: ABNORMAL
PMV BLD AUTO: 11.4 FL (ref 8.1–13.5)
POTASSIUM SERPL-SCNC: 3.7 MMOL/L (ref 3.7–5.3)
RBC # BLD: 5.24 M/UL (ref 3.95–5.11)
RBC # BLD: ABNORMAL 10*6/UL
SEG NEUTROPHILS: 80 % (ref 36–65)
SEGMENTED NEUTROPHILS ABSOLUTE COUNT: 9.12 K/UL (ref 1.5–8.1)
SODIUM BLD-SCNC: 145 MMOL/L (ref 135–144)
TSH SERPL DL<=0.05 MIU/L-ACNC: 3.95 MIU/L (ref 0.3–5)
WBC # BLD: 11.7 K/UL (ref 3.5–11.3)
WBC # BLD: ABNORMAL 10*3/UL

## 2021-02-04 PROCEDURE — 3051F HG A1C>EQUAL 7.0%<8.0%: CPT | Performed by: STUDENT IN AN ORGANIZED HEALTH CARE EDUCATION/TRAINING PROGRAM

## 2021-02-04 PROCEDURE — G8482 FLU IMMUNIZE ORDER/ADMIN: HCPCS | Performed by: STUDENT IN AN ORGANIZED HEALTH CARE EDUCATION/TRAINING PROGRAM

## 2021-02-04 PROCEDURE — G8417 CALC BMI ABV UP PARAM F/U: HCPCS | Performed by: STUDENT IN AN ORGANIZED HEALTH CARE EDUCATION/TRAINING PROGRAM

## 2021-02-04 PROCEDURE — 3017F COLORECTAL CA SCREEN DOC REV: CPT | Performed by: STUDENT IN AN ORGANIZED HEALTH CARE EDUCATION/TRAINING PROGRAM

## 2021-02-04 PROCEDURE — 2022F DILAT RTA XM EVC RTNOPTHY: CPT | Performed by: STUDENT IN AN ORGANIZED HEALTH CARE EDUCATION/TRAINING PROGRAM

## 2021-02-04 PROCEDURE — 83036 HEMOGLOBIN GLYCOSYLATED A1C: CPT | Performed by: STUDENT IN AN ORGANIZED HEALTH CARE EDUCATION/TRAINING PROGRAM

## 2021-02-04 PROCEDURE — 99211 OFF/OP EST MAY X REQ PHY/QHP: CPT | Performed by: STUDENT IN AN ORGANIZED HEALTH CARE EDUCATION/TRAINING PROGRAM

## 2021-02-04 PROCEDURE — 99213 OFFICE O/P EST LOW 20 MIN: CPT | Performed by: STUDENT IN AN ORGANIZED HEALTH CARE EDUCATION/TRAINING PROGRAM

## 2021-02-04 PROCEDURE — 1036F TOBACCO NON-USER: CPT | Performed by: STUDENT IN AN ORGANIZED HEALTH CARE EDUCATION/TRAINING PROGRAM

## 2021-02-04 PROCEDURE — G8427 DOCREV CUR MEDS BY ELIG CLIN: HCPCS | Performed by: STUDENT IN AN ORGANIZED HEALTH CARE EDUCATION/TRAINING PROGRAM

## 2021-02-04 RX ORDER — MELATONIN 10 MG
10 CAPSULE ORAL NIGHTLY PRN
Qty: 30 CAPSULE | Refills: 2 | Status: SHIPPED | OUTPATIENT
Start: 2021-02-04 | End: 2021-06-08

## 2021-02-04 RX ORDER — MUPIROCIN CALCIUM 20 MG/G
CREAM TOPICAL
Qty: 1 TUBE | Refills: 0 | Status: SHIPPED | OUTPATIENT
Start: 2021-02-04 | End: 2021-02-17 | Stop reason: SDUPTHER

## 2021-02-04 RX ORDER — GABAPENTIN 400 MG/1
400 CAPSULE ORAL 3 TIMES DAILY
Qty: 90 CAPSULE | Refills: 0 | Status: SHIPPED | OUTPATIENT
Start: 2021-02-04 | End: 2021-02-24 | Stop reason: SDUPTHER

## 2021-02-04 ASSESSMENT — ENCOUNTER SYMPTOMS
ABDOMINAL PAIN: 0
CONSTIPATION: 0
BACK PAIN: 0
DIARRHEA: 0
WHEEZING: 0
SHORTNESS OF BREATH: 0

## 2021-02-04 NOTE — PROGRESS NOTES
Subjective:    Norman Martinez is a 60 y.o. female with  has a past medical history of Anxiety, Borderline hypertension, Depression, GERD (gastroesophageal reflux disease), Hypothyroidism, Neuropathy, Obesity, Osteoarthritis, Other cirrhosis of liver ([address] Sleep apnea, and Type II or unspecified type diabetes mellitus without mention of complication, not stated as uncontrolled. Presented to the office today for:  Chief Complaint   Patient presents with    Medication Adjustment     patient states she is doing well    Leg Pain     patient states having right leg pain       HPI     Patient is a 28-year-old female here today for follow-up. Type 2 diabetes patient's hemoglobin A1c is 7.5 today. Continues to be compliant with taking Lantus 65 units at night, glimepiride 4 mg and Jardiance 10 mg. Checks her blood sugars at home fasting 90-1 50. Patient's fasting blood sugar this morning was 86. Patient has not had any hypoglycemic symptoms or blood sugars less than 60. Diabetic eye exam patient was scheduled this year  Athletic foot exam completed  Diabetic neuropathy patient is currently taking gabapentin 300 3 times daily. Patient states that she has been on this medication for the last 4 to 5 years however starting to feel that it is not working anymore. Patient complains of burning tingling in her lower extremities. Insomnia patient is currently taking melatonin 10 mg which is working. Patient has been trying to work on her sleeping hygiene and try to improve. Hypertension patient's blood pressure is within normal range today. Patient is compliant with taking lisinopril and Norvasc. Hypothyroidism patient is compliant with taking Synthroid 50 MCG. Patient's last TSH was checked more than a year ago. Review of Systems   Constitutional: Negative for activity change, appetite change, chills and fever. Respiratory: Negative for shortness of breath and wheezing.     Cardiovascular: Negative for chest pain, palpitations and leg swelling. Gastrointestinal: Negative for abdominal pain, constipation and diarrhea. Genitourinary: Negative for difficulty urinating. Musculoskeletal: Positive for arthralgias and gait problem (walker). Negative for back pain and joint swelling. Skin: Positive for rash (lower extremity). Neurological: Negative for dizziness, weakness, numbness and headaches. The patient has a   Family History   Problem Relation Age of Onset    Heart Disease Mother     Arthritis Mother     Heart Disease Father     Arthritis Father     Cancer Father         \"oral\"    Diabetes Sister         gestational       Objective:    /68 (Site: Left Upper Arm, Position: Sitting, Cuff Size: Large Adult)   Pulse 71   Temp 96.9 °F (36.1 °C) (Temporal)   Wt 247 lb 6.4 oz (112.2 kg)   BMI 49.97 kg/m²    BP Readings from Last 3 Encounters:   02/04/21 134/68   10/22/20 (!) 186/95   10/01/20 139/67       Physical Exam  Vitals signs reviewed. Constitutional:       Appearance: Normal appearance. Cardiovascular:      Rate and Rhythm: Normal rate and regular rhythm. Pulses: Normal pulses. Heart sounds: Normal heart sounds. Pulmonary:      Effort: Pulmonary effort is normal.      Breath sounds: Normal breath sounds. No wheezing. Musculoskeletal:      Right lower leg: Edema (3+ pitting edema) present. Left lower leg: Edema present. Skin:     General: Skin is warm. Neurological:      Mental Status: She is alert.          Lab Results   Component Value Date    WBC 10.7 08/12/2020    HGB 14.7 08/12/2020    HCT 46.1 08/12/2020     08/12/2020    CHOL 119 07/28/2020    TRIG 111 07/28/2020    HDL 37 (L) 07/28/2020    ALT 21 07/29/2020    AST 27 07/29/2020     08/12/2020    K 3.8 08/12/2020     08/12/2020    CREATININE 0.80 08/12/2020    BUN 12 08/12/2020    CO2 23 08/12/2020    TSH 2.74 07/14/2020    INR 1.0 07/21/2020    LABA1C 7.5 02/04/2021 LABMICR 34 (H) 07/14/2020     Lab Results   Component Value Date    CALCIUM 9.3 08/12/2020     Lab Results   Component Value Date    LDLCHOLESTEROL 60 07/28/2020       Assessment and Plan:    1. Hypothyroidism, unspecified type  Continue Synthroid dose.  - TSH With Reflex Ft4; Future    2. Essential hypertension  Patient educated on [address]. Continue lisinopril and Norvasc 5 mg. Patient last month got diagnosed with SHERITA and has started the CPAP machine. At next visit if blood pressure continues to be stable consider discontinuing Norvasc due to lower extremity swelling  - CBC With Auto Differential; Future  - Basic Metabolic Panel; Future    3. Type 2 diabetes mellitus with other circulatory complication, with long-term current use of insulin ([facility])  Hemoglobin A1c 7.5 well-controlled. Continue current regimen  Diabetic neuropathy increased gabapentin to 400 3 times daily  - POCT glycosylated hemoglobin (Hb A1C)  - gabapentin (NEURONTIN) 400 MG capsule; Take 1 capsule by mouth 3 times daily for 30 days. Dispense: 90 capsule; Refill: 0    4. Primary insomnia  Patient educated on sleep hygiene  - melatonin 10 MG CAPS capsule; Take 1 capsule by mouth nightly as needed (sleep)  Dispense: 30 capsule; Refill: 2          Requested Prescriptions     Signed Prescriptions Disp Refills    melatonin 10 MG CAPS capsule 30 capsule 2     Sig: Take 1 capsule by mouth nightly as needed (sleep)    gabapentin (NEURONTIN) 400 MG capsule 90 capsule 0     Sig: Take 1 capsule by mouth 3 times daily for 30 days.     mupirocin (BACTROBAN) 2 % cream 1 Tube 0     Sig: Apply 2 times daily to affected area       Medications Discontinued During This Encounter   Medication Reason    levothyroxine (EUTHYROX) 50 MCG tablet LIST CLEANUP    melatonin 1 MG tablet LIST CLEANUP    gabapentin (NEURONTIN) 300 MG capsule LIST CLEANUP    gabapentin (NEURONTIN) 600 MG tablet LIST CLEANUP       Mary Jane Marx received counseling on the following healthy behaviors: nutrition, exercise and medication adherence    Discussed use,benefit, and side effects of prescribed medications. Barriers to medication compliance addressed. All patient questions answered. Pt voiced understanding. Return in about 3 months (around 5/4/2021), or follow up. Disclaimer: Some orall of this note was transcribed using voice-recognition software. This may cause typographical errors occasionally. Although all effort is made to fix these errors, please do not hesitate to contact our office if there Yariel Bernard concern with the understanding of this note.

## 2021-02-04 NOTE — PATIENT INSTRUCTIONS
Thank you for letting us take care of you today. We hope all your questions were addressed. If a question was overlooked or something else comes to mind after you return home, please contact a member of your Care Team listed below. Your Care Team at [place] [address] is Team #2  Mitchel Garcia DO (Faculty)  Piedad Dumont (Faculty)  Marla Pereira MD (Resident)  Royce Garcia MD (Resident)  Lorenza Sales MD (Resident)  Marek Rico MD (Resident)  Eladio Baumann MD (Resident)  LUIS De La Rosa. ,GIUSEPPE CARDENAS [facility] ([address] [facility] office)  Cande Damico (Clinical Practice Manager)  Liam Giraldo, [address] [place] [address] (Clinical Pharmacist)     Office phone number: [phone]    If you need to get in right away due to illness, please be advised we have \"Same Day\" appointments available Monday-Friday. Please call us at [phone] option #3 to schedule your \"Same Day\" appointment.

## 2021-02-04 NOTE — PROGRESS NOTES
Visit Information    Have you changed or started any medications since your last visit including any over-the-counter medicines, vitamins, or herbal medicines? no   Have you stopped taking any of your medications? Is so, why? -  no  Are you having any side effects from any of your medications? - no    Have you seen any other physician or provider since your last visit?  no   Have you had any other diagnostic tests since your last visit?  no   Have you been seen in the emergency room and/or had an admission in a hospital since we last saw you?  no   Have you had your routine dental cleaning in the past 6 months?  no     Do you have an active MyChart account? If no, what is the barrier?   No:     Patient Care Team:  Emily Villarreal MD as PCP - General (Family Medicine)  Emily Villarreal MD as PCP - [facility] Provider  Rylee Shane MD as Surgeon (Orthopedic Surgery)    Medical History Review  Past Medical, Family, and Social History reviewed and does not contribute to the patient presenting condition    Health Maintenance   Topic Date Due    Cervical cancer screen  05/14/1977    Shingles Vaccine (1 of 2) 05/14/2006    Diabetic retinal exam  01/01/2014    A1C test (Diabetic or Prediabetic)  11/17/2020    Hepatitis A vaccine (2 of 2 - Risk 2-dose series) 02/17/2021    Diabetic microalbuminuria test  07/14/2021    TSH testing  07/14/2021    Lipid screen  07/28/2021    Potassium monitoring  08/12/2021    Creatinine monitoring  08/12/2021    Diabetic foot exam  08/17/2021    Breast cancer screen  08/20/2022    DTaP/Tdap/Td vaccine (6 - Td) 06/27/2024    Colon cancer screen colonoscopy  08/01/2024    Flu vaccine  Completed    Pneumococcal 0-64 years Vaccine  Completed    Hepatitis C screen  Completed    HIV screen  Completed    Hib vaccine  Aged Out    Meningococcal (ACWY) vaccine  Aged Out

## 2021-02-04 NOTE — PROGRESS NOTES
Attending Physician Statement  I have discussed the care of [facility], 60 y.o. female,including pertinent history and exam findings,  with the resident Dr. Hema Mason MD.  History:  Chief Complaint   Patient presents with    Medication Adjustment     patient states she is doing well    Leg Pain     patient states having right leg pain     Patient is here for follow up on type II DM and hypertension. I have reviewed the key elements of the encounter with the resident. Examination was done by resident as documented in residents note. BP Readings from Last 3 Encounters:   02/04/21 134/68   10/22/20 (!) 186/95   10/01/20 139/67     /68 (Site: Left Upper Arm, Position: Sitting, Cuff Size: Large Adult)   Pulse 71   Temp 96.9 °F (36.1 °C) (Temporal)   Wt 247 lb 6.4 oz (112.2 kg)   BMI 49.97 kg/m²   Lab Results   Component Value Date    WBC 10.7 08/12/2020    HGB 14.7 08/12/2020    HCT 46.1 08/12/2020     08/12/2020    CHOL 119 07/28/2020    TRIG 111 07/28/2020    HDL 37 (L) 07/28/2020    ALT 21 07/29/2020    AST 27 07/29/2020     08/12/2020    K 3.8 08/12/2020     08/12/2020    CREATININE 0.80 08/12/2020    BUN 12 08/12/2020    CO2 23 08/12/2020    TSH 2.74 07/14/2020    INR 1.0 07/21/2020    LABA1C 7.5 02/04/2021    LABMICR 34 (H) 07/14/2020     Lab Results   Component Value Date    CALCIUM 9.3 08/12/2020     Lab Results   Component Value Date    LDLCHOLESTEROL 60 07/28/2020     I agree with the assessment, plan and diagnosis of    Diagnosis Orders   1. Essential hypertension  CBC With Auto Differential    Basic Metabolic Panel   2. Hypothyroidism, unspecified type  TSH With Reflex Ft4   3. Type 2 diabetes mellitus with other circulatory complication, with long-term current use of insulin (HCC)  POCT glycosylated hemoglobin (Hb A1C)    gabapentin (NEURONTIN) 400 MG capsule   4.  Primary insomnia  melatonin 10 MG CAPS capsule     I agree with  orders as documented by the resident. Recommendations:   Patient is doing well on current regimen. She was advised to continue current regimen and update labs. Return in about 3 months (around 5/4/2021), or follow up.    (Sharifa Maxwell ) Dr. Rio Murphy MD

## 2021-02-11 DIAGNOSIS — D72.829 LEUKOCYTOSIS, UNSPECIFIED TYPE: Primary | ICD-10-CM

## 2021-02-17 ENCOUNTER — TELEPHONE (OUTPATIENT)
Dept: FAMILY MEDICINE CLINIC | Age: 66
End: 2021-02-17

## 2021-02-17 RX ORDER — MUPIROCIN CALCIUM 20 MG/G
CREAM TOPICAL
Qty: 1 TUBE | Refills: 0 | Status: SHIPPED | OUTPATIENT
Start: 2021-02-17 | End: 2021-03-19

## 2021-02-17 RX ORDER — MUPIROCIN CALCIUM 20 MG/G
CREAM TOPICAL
Qty: 1 TUBE | Refills: 0 | Status: CANCELLED | OUTPATIENT
Start: 2021-02-17 | End: 2021-03-19

## 2021-02-22 ENCOUNTER — TELEPHONE (OUTPATIENT)
Dept: FAMILY MEDICINE CLINIC | Age: 66
End: 2021-02-22

## 2021-02-23 ENCOUNTER — OFFICE VISIT (OUTPATIENT)
Dept: FAMILY MEDICINE CLINIC | Age: 66
End: 2021-02-23
Payer: COMMERCIAL

## 2021-02-23 VITALS
SYSTOLIC BLOOD PRESSURE: 123 MMHG | TEMPERATURE: 96.6 F | HEART RATE: 72 BPM | BODY MASS INDEX: 48.55 KG/M2 | DIASTOLIC BLOOD PRESSURE: 69 MMHG | WEIGHT: 240.4 LBS

## 2021-02-23 DIAGNOSIS — F41.9 ANXIETY: Chronic | ICD-10-CM

## 2021-02-23 DIAGNOSIS — E11.59 TYPE 2 DIABETES MELLITUS WITH OTHER CIRCULATORY COMPLICATION, WITH LONG-TERM CURRENT USE OF INSULIN (HCC): ICD-10-CM

## 2021-02-23 DIAGNOSIS — T14.8XXA WOUND, OPEN: Primary | ICD-10-CM

## 2021-02-23 DIAGNOSIS — I87.2 VENOUS INSUFFICIENCY OF BOTH LOWER EXTREMITIES: ICD-10-CM

## 2021-02-23 DIAGNOSIS — Z79.4 TYPE 2 DIABETES MELLITUS WITH OTHER CIRCULATORY COMPLICATION, WITH LONG-TERM CURRENT USE OF INSULIN (HCC): ICD-10-CM

## 2021-02-23 DIAGNOSIS — B37.2 INTERTRIGINOUS CANDIDIASIS: ICD-10-CM

## 2021-02-23 DIAGNOSIS — L03.115 CELLULITIS OF RIGHT LOWER EXTREMITY: ICD-10-CM

## 2021-02-23 PROCEDURE — 99213 OFFICE O/P EST LOW 20 MIN: CPT | Performed by: STUDENT IN AN ORGANIZED HEALTH CARE EDUCATION/TRAINING PROGRAM

## 2021-02-23 PROCEDURE — 99211 OFF/OP EST MAY X REQ PHY/QHP: CPT | Performed by: STUDENT IN AN ORGANIZED HEALTH CARE EDUCATION/TRAINING PROGRAM

## 2021-02-23 PROCEDURE — G8482 FLU IMMUNIZE ORDER/ADMIN: HCPCS | Performed by: STUDENT IN AN ORGANIZED HEALTH CARE EDUCATION/TRAINING PROGRAM

## 2021-02-23 PROCEDURE — 3017F COLORECTAL CA SCREEN DOC REV: CPT | Performed by: STUDENT IN AN ORGANIZED HEALTH CARE EDUCATION/TRAINING PROGRAM

## 2021-02-23 PROCEDURE — G8417 CALC BMI ABV UP PARAM F/U: HCPCS | Performed by: STUDENT IN AN ORGANIZED HEALTH CARE EDUCATION/TRAINING PROGRAM

## 2021-02-23 PROCEDURE — 1036F TOBACCO NON-USER: CPT | Performed by: STUDENT IN AN ORGANIZED HEALTH CARE EDUCATION/TRAINING PROGRAM

## 2021-02-23 PROCEDURE — G8427 DOCREV CUR MEDS BY ELIG CLIN: HCPCS | Performed by: STUDENT IN AN ORGANIZED HEALTH CARE EDUCATION/TRAINING PROGRAM

## 2021-02-23 RX ORDER — IBUPROFEN 800 MG/1
800 TABLET ORAL 4 TIMES DAILY PRN
Qty: 120 TABLET | Refills: 0 | Status: SHIPPED | OUTPATIENT
Start: 2021-02-23 | End: 2021-08-05 | Stop reason: SDUPTHER

## 2021-02-23 RX ORDER — CEPHALEXIN 500 MG/1
500 CAPSULE ORAL 3 TIMES DAILY
Qty: 30 CAPSULE | Refills: 0 | Status: SHIPPED | OUTPATIENT
Start: 2021-02-23 | End: 2021-03-05

## 2021-02-23 ASSESSMENT — ENCOUNTER SYMPTOMS
CONSTIPATION: 0
DIARRHEA: 0
ABDOMINAL PAIN: 0
COLOR CHANGE: 1
WHEEZING: 0
SHORTNESS OF BREATH: 0

## 2021-02-23 NOTE — PROGRESS NOTES
I have reviewed and discussed key elements of [facility] with the resident including plan of care and follow up and agree with the care reginaldo plan.

## 2021-02-23 NOTE — TELEPHONE ENCOUNTER
Screen Bilateral [[id number]] Once 08/17/2021   6 Minute Walk Test Once 09/28/2020   CBC With Auto Differential Once 03/11/2021               Patient Active Problem List:     Morbid obesity ([facility]     Anxiety     GERD (gastroesophageal reflux disease)     OA (osteoarthritis)     DM (diabetes mellitus)     Neuropathy     Right knee DJD     Hypothyroidism     S/P left total knee arthroplasty     Obesity, morbid, BMI 50 or higher (HCC)     Chest pain     Ischemic heart disease     Essential hypertension     Hypertensive urgency     NSTEMI (non-ST elevated myocardial infarction) ([facility]     Other cirrhosis of liver (HCC)     Abdominal pain     Elevated lipase     Depression     Cystitis     Type 2 diabetes mellitus, with long-term current use of insulin (HCC)     SHERITA (obstructive sleep apnea)     Morbid obesity with BMI of 45.0-49.9, adult ([facility]

## 2021-02-23 NOTE — PATIENT INSTRUCTIONS
Thank you for letting us take care of you today. We hope all your questions were addressed. If a question was overlooked or something else comes to mind after you return home, please contact a member of your Care Team listed below. Your Care Team at [place] [address] is Team #2  Imani Whiteside DO (Faculty)  Jeff Murillo (Faculty)  Nadia Andujar MD (Resident)  Edel Baron MD (Resident)  Brenda Allred MD (Resident)  Gemini Saavedra MD (Resident)  Jennie Dillon MD (Resident)  LUIS Delgdao [employer] ,NOLAN CARDENAS [facility] ([address] [facility] office)  Ravinder Lloyd, [address] (Clinical Practice Manager)  Ra Bhandari, [facility] (Clinical Pharmacist)     Office phone number: [phone]    If you need to get in right away due to illness, please be advised we have \"Same Day\" appointments available Monday-Friday. Please call us at [phone] option #3 to schedule your \"Same Day\" appointment.

## 2021-02-23 NOTE — PROGRESS NOTES
Visit Information    Have you changed or started any medications since your last visit including any over-the-counter medicines, vitamins, or herbal medicines? no   Have you stopped taking any of your medications? Is so, why? -  no  Are you having any side effects from any of your medications? - no    Have you seen any other physician or provider since your last visit?  no   Have you had any other diagnostic tests since your last visit?  no   Have you been seen in the emergency room and/or had an admission in a hospital since we last saw you?  no   Have you had your routine dental cleaning in the past 6 months?  no     Do you have an active MyChart account? If no, what is the barrier?   No:     Patient Care Team:  Faith Harris MD as PCP - General (Family Medicine)  Faith Harris MD as PCP - [facility]  Robby Wetzel MD as Surgeon (Orthopedic Surgery)    Medical History Review  Past Medical, Family, and Social History reviewed and does not contribute to the patient presenting condition    Health Maintenance   Topic Date Due    Cervical cancer screen  05/14/1977    Shingles Vaccine (1 of 2) 05/14/2006    Diabetic retinal exam  01/01/2014    Hepatitis A vaccine (2 of 2 - Risk 2-dose series) 02/17/2021    Diabetic microalbuminuria test  07/14/2021    Lipid screen  07/28/2021    Diabetic foot exam  08/17/2021    A1C test (Diabetic or Prediabetic)  02/04/2022    TSH testing  02/04/2022    Potassium monitoring  02/04/2022    Creatinine monitoring  02/04/2022    Breast cancer screen  08/20/2022    DTaP/Tdap/Td vaccine (6 - Td) 06/27/2024    Colon cancer screen colonoscopy  08/01/2024    Flu vaccine  Completed    Pneumococcal 0-64 years Vaccine  Completed    Hepatitis C screen  Completed    HIV screen  Completed    Hib vaccine  Aged Out    Meningococcal (ACWY) vaccine  Aged Out

## 2021-02-23 NOTE — PROGRESS NOTES
Subjective:    Juan Franklin is a 60 y.o. female with  has a past medical history of Anxiety, Borderline hypertension, Depression, GERD (gastroesophageal reflux disease), Hypothyroidism, Neuropathy, Obesity, Osteoarthritis, Other cirrhosis of liver ([address] Sleep apnea, and Type II or unspecified type diabetes mellitus without mention of complication, not stated as uncontrolled. Presented to the office today for:  Chief Complaint   Patient presents with    Leg Pain     patient states she notice bruising on right leg about two weeks ago. painful, not getting better       HPI    Pt is a 23-year-old female here today for wound on her right leg. Patient states she has had 3 wounds on her right leg that have been worsening. Patient states her  has been keeping it clean however it is very tender to touch. Patient has noticed warmth, tenderness, and redness behind her right calf. Patient denies any drainage or discharge. Patient denies any fever, chills, chest pain, palpitations, or body aches. Review of Systems   Constitutional: Negative for activity change, appetite change, chills and fever. Respiratory: Negative for shortness of breath and wheezing. Cardiovascular: Negative for chest pain, palpitations and leg swelling. Gastrointestinal: Negative for abdominal pain, constipation and diarrhea. Genitourinary: Negative for difficulty urinating. Skin: Positive for color change, rash and wound. Neurological: Negative for dizziness, weakness, numbness and headaches.        The patient has a   Family History   Problem Relation Age of Onset    Heart Disease Mother     Arthritis Mother     Heart Disease Father     Arthritis Father     Cancer Father         \"oral\"    Diabetes Sister         gestational               Objective:    /69 (Site: Right Upper Arm, Position: Sitting, Cuff Size: Medium Adult)   Pulse 72   Temp 96.6 °F (35.9 °C) (Temporal)   Wt 240 lb 6.4 oz (109 kg)   BMI 48.55 kg/m²    BP Readings from Last 3 Encounters:   02/23/21 123/69   02/04/21 134/68   10/22/20 (!) 186/95       Physical Exam  Vitals signs reviewed. Constitutional:       Appearance: Normal appearance. Cardiovascular:      Rate and Rhythm: Normal rate and regular rhythm. Pulses: Normal pulses. Heart sounds: Normal heart sounds. Pulmonary:      Effort: Pulmonary effort is normal.      Breath sounds: Normal breath sounds. No wheezing. Skin:     Comments: Right calf 5 cm necrotic, redness, warmth to touch open ulcerative wound. Right foot medial 2 cm wound   Neurological:      Mental Status: She is alert. Lab Results   Component Value Date    WBC 11.7 (H) 02/04/2021    HGB 14.9 02/04/2021    HCT 49.2 (H) 02/04/2021     02/04/2021    CHOL 119 07/28/2020    TRIG 111 07/28/2020    HDL 37 (L) 07/28/2020    ALT 21 07/29/2020    AST 27 07/29/2020     (H) 02/04/2021    K 3.7 02/04/2021     02/04/2021    CREATININE 0.80 02/04/2021    BUN 10 02/04/2021    CO2 22 02/04/2021    TSH 3.95 02/04/2021    INR 1.0 07/21/2020    LABA1C 7.5 02/04/2021    LABMICR 34 (H) 07/14/2020     Lab Results   Component Value Date    CALCIUM 9.3 02/04/2021     Lab Results   Component Value Date    LDLCHOLESTEROL 60 07/28/2020       Assessment and Plan:    1. Venous ulceration  See media for picture. Patient will need debridement and wound care. Scheduled appointment with for patient for tomorrow  - [address]  - cephALEXin (KEFLEX) 500 MG capsule; Take 1 capsule by mouth 3 times daily for 10 days  Dispense: 30 capsule; Refill: 0  - ibuprofen (ADVIL;MOTRIN) 800 MG tablet; Take 1 tablet by mouth 4 times daily as needed for Pain  Dispense: 120 tablet; Refill: 0    2. Venous insufficiency of both lower extremities  - [address]  - cephALEXin (KEFLEX) 500 MG capsule;  Take 1 capsule by mouth 3 times daily for 10 days  Dispense: 30 capsule; Refill: 0  - ibuprofen (ADVIL;MOTRIN) 800 MG tablet; Take 1 tablet by mouth 4 times daily as needed for Pain  Dispense: 120 tablet; Refill: 0    3. Cellulitis of right lower extremity  Sent patient prescription for Keflex. Reviewed medication side effects with patient. Requested Prescriptions     Signed Prescriptions Disp Refills    cephALEXin (KEFLEX) 500 MG capsule 30 capsule 0     Sig: Take 1 capsule by mouth 3 times daily for 10 days    ibuprofen (ADVIL;MOTRIN) 800 MG tablet 120 tablet 0     Sig: Take 1 tablet by mouth 4 times daily as needed for Pain       There are no discontinued medications. Kye Hernandez received counseling on the following healthy behaviors: nutrition, exercise and medication adherence    Discussed use,benefit, and side effects of prescribed medications. Barriers to medication compliance addressed. All patient questions answered. Pt voiced understanding. Return in about 2 weeks (around 3/9/2021), or follow uop. Disclaimer: Some orall of this note was transcribed using voice-recognition software. This may cause typographical errors occasionally. Although all effort is made to fix these errors, please do not hesitate to contact our office if there Lyric Aguilera concern with the understanding of this note.

## 2021-02-24 ENCOUNTER — TELEPHONE (OUTPATIENT)
Dept: FAMILY MEDICINE CLINIC | Age: 66
End: 2021-02-24

## 2021-02-24 ENCOUNTER — HOSPITAL ENCOUNTER (OUTPATIENT)
Dept: WOUND CARE | Age: 66
Discharge: HOME | End: 2021-02-24
Payer: COMMERCIAL

## 2021-02-24 DIAGNOSIS — Z79.4 TYPE 2 DIABETES MELLITUS WITH OTHER CIRCULATORY COMPLICATION, WITH LONG-TERM CURRENT USE OF INSULIN (HCC): ICD-10-CM

## 2021-02-24 DIAGNOSIS — I73.9 PVD (PERIPHERAL VASCULAR DISEASE) (HCC): ICD-10-CM

## 2021-02-24 DIAGNOSIS — S91.301D OPEN WOUND OF RIGHT FOOT, SUBSEQUENT ENCOUNTER: ICD-10-CM

## 2021-02-24 DIAGNOSIS — E66.01 MORBID OBESITY WITH BMI OF 45.0-49.9, ADULT (HCC): ICD-10-CM

## 2021-02-24 DIAGNOSIS — L97.212 SKIN ULCER OF RIGHT CALF WITH FAT LAYER EXPOSED (HCC): Primary | ICD-10-CM

## 2021-02-24 DIAGNOSIS — E11.59 TYPE 2 DIABETES MELLITUS WITH OTHER CIRCULATORY COMPLICATION, WITH LONG-TERM CURRENT USE OF INSULIN (HCC): ICD-10-CM

## 2021-02-24 DIAGNOSIS — G62.9 NEUROPATHY: Chronic | ICD-10-CM

## 2021-02-24 DIAGNOSIS — R60.0 BILATERAL EDEMA OF LOWER EXTREMITY: ICD-10-CM

## 2021-02-24 PROBLEM — S91.301A OPEN WOUND OF RIGHT FOOT: Status: ACTIVE | Noted: 2021-02-24

## 2021-02-24 PROCEDURE — 87070 CULTURE OTHR SPECIMN AEROBIC: CPT

## 2021-02-24 PROCEDURE — 11042 DBRDMT SUBQ TIS 1ST 20SQCM/<: CPT | Performed by: NURSE PRACTITIONER

## 2021-02-24 PROCEDURE — 87176 TISSUE HOMOGENIZATION CULTR: CPT

## 2021-02-24 PROCEDURE — 87075 CULTR BACTERIA EXCEPT BLOOD: CPT

## 2021-02-24 PROCEDURE — 11042 DBRDMT SUBQ TIS 1ST 20SQCM/<: CPT

## 2021-02-24 PROCEDURE — 87205 SMEAR GRAM STAIN: CPT

## 2021-02-24 PROCEDURE — 99214 OFFICE O/P EST MOD 30 MIN: CPT

## 2021-02-24 PROCEDURE — 99203 OFFICE O/P NEW LOW 30 MIN: CPT | Performed by: NURSE PRACTITIONER

## 2021-02-24 RX ORDER — LIDOCAINE HYDROCHLORIDE 40 MG/ML
SOLUTION TOPICAL ONCE
Status: CANCELLED | OUTPATIENT
Start: 2021-02-24 | End: 2021-02-24

## 2021-02-24 RX ORDER — LIDOCAINE 50 MG/G
OINTMENT TOPICAL ONCE
Status: CANCELLED | OUTPATIENT
Start: 2021-02-24 | End: 2021-02-24

## 2021-02-24 RX ORDER — LIDOCAINE HYDROCHLORIDE 20 MG/ML
JELLY TOPICAL ONCE
Status: CANCELLED | OUTPATIENT
Start: 2021-02-24 | End: 2021-02-24

## 2021-02-24 RX ORDER — LIDOCAINE 40 MG/G
CREAM TOPICAL ONCE
Status: CANCELLED | OUTPATIENT
Start: 2021-02-24 | End: 2021-02-24

## 2021-02-24 RX ORDER — CITALOPRAM 40 MG/1
TABLET ORAL
Qty: 30 TABLET | Refills: 0 | Status: SHIPPED | OUTPATIENT
Start: 2021-02-24 | End: 2021-03-16

## 2021-02-24 RX ORDER — GABAPENTIN 400 MG/1
400 CAPSULE ORAL 3 TIMES DAILY
Qty: 90 CAPSULE | Refills: 0 | Status: SHIPPED | OUTPATIENT
Start: 2021-02-24 | End: 2021-03-16

## 2021-02-24 RX ORDER — LIDOCAINE HYDROCHLORIDE 20 MG/ML
JELLY TOPICAL ONCE
Status: COMPLETED | OUTPATIENT
Start: 2021-02-24 | End: 2021-02-24

## 2021-02-24 RX ADMIN — LIDOCAINE HYDROCHLORIDE: 20 JELLY TOPICAL at 15:32

## 2021-02-24 NOTE — PROGRESS NOTES
Wound Care Supplies      Supply Company:     [employer]  Uma Antony  p: [phone] f: [phone]     Ordering Center:     [place] [address]  [phone]  WOUND CARE Dept: [address] NUMBER [phone]    Patient Information:      Eliane Rosales [address] [facility],[address]   [phone]   : 1955  AGE: 60 y.o. GENDER: female   TODAYS DATE:  2021    Insurance:      PRIMARY INSURANCE:  Plan: [address]  Coverage: [address] PLAN  Effective Date: 2020  [id number] - (Medicaid Managed)    SECONDARY INSURANCE:  Plan:   Coverage:   Effective Date:   @NetSpendGROUPNUM@    [unfilled]   [unfilled]     Patient Wound Information:      Problem List Items Addressed This Visit     Neuropathy (Chronic)    Type 2 diabetes mellitus, with long-term current use of insulin ([address]    Morbid obesity with BMI of 45.0-49.9, adult ([address] - Primary    Skin ulcer of right calf with fat layer exposed ([address]    Open wound of right foot          WOUNDS REQUIRING DRESSING SUPPLIES:     Wound 21 Leg Lower; Posterior;Right #1 (Active)   Wound Image   21 1456   Wound Etiology Traumatic 21 145   Dressing Status Old drainage noted;New drainage noted 21 145   Wound Cleansed Cleansed with saline 21 145   Wound Length (cm) 4 cm 21 1456   Wound Width (cm) 3.4 cm 21 1456   Wound Depth (cm) 0.1 cm 21 1456   Wound Surface Area (cm^2) 13.6 cm^2 21 1456   Wound Volume (cm^3) 1.36 cm^3 21 1456   Post-Procedure Length (cm) 4 cm 21 1456   Post-Procedure Width (cm) 3.4 cm 21 1456   Post-Procedure Depth (cm) 0.1 cm 02/1456   Post-Procedure Surface Area (cm^2) 13.6 cm^2 21   Post-Procedure Volume (cm^3) 1.36 cm^3 21   Wound Assessment Eschar dry;Pink/red 21   Drainage Amount Moderate 21 Drainage Description Serosanguinous 02/24/21 1456   Odor None 02/24/21 1456   Jeannie-wound Assessment Blanchable erythema 02/24/21 1456   Margins Attached edges 02/24/21 1456   Wound Thickness Description not for Pressure Injury Full thickness 02/24/21 1456   Number of days: 0       Wound 02/24/21 Leg Right; Lower;Medial #2 (Active)   Wound Image   02/24/21 1456   Wound Etiology Traumatic 02/24/21 1456   Dressing Status Old drainage noted;New drainage noted 02/24/21 1456   Wound Cleansed Cleansed with saline 02/24/21 1456   Wound Length (cm) 2.4 cm 02/24/21 1456   Wound Width (cm) 0.8 cm 02/24/21 1456   Wound Depth (cm) 0.1 cm 02/24/21 1456   Wound Surface Area (cm^2) 1.92 cm^2 02/24/21 1456   Wound Volume (cm^3) 0.19 cm^3 02/24/21 1456   Post-Procedure Length (cm) 2.4 cm 02/24/21 1456   Post-Procedure Width (cm) 0.8 cm 02/24/21 1456   Post-Procedure Depth (cm) 0.1 cm 02/24/21 1456   Post-Procedure Surface Area (cm^2) 1.92 cm^2 02/24/21 1456   Post-Procedure Volume (cm^3) 0.19 cm^3 02/24/21 1456   Wound Assessment Pink/red;Slough 02/24/21 1456   Drainage Amount Moderate 02/24/21 1456   Drainage Description Serosanguinous 02/24/21 1456   Odor None 02/24/21 1456   Jeannie-wound Assessment Blanchable erythema 02/24/21 1456   Margins Attached edges 02/24/21 1456   Wound Thickness Description not for Pressure Injury Full thickness 02/24/21 1456   Number of days: 0       Wound 02/24/21 Ankle Right;Medial #3 (Active)   Wound Image   02/24/21 1456   Wound Etiology Traumatic 02/24/21 1456   Dressing Status Old drainage noted;New drainage noted 02/24/21 1456   Wound Cleansed Cleansed with saline 02/24/21 1456   Wound Length (cm) 0.5 cm 02/24/21 1456   Wound Width (cm) 0.4 cm 02/24/21 1456   Wound Depth (cm) 0.1 cm 02/24/21 1456   Wound Surface Area (cm^2) 0.2 cm^2 02/24/21 1456   Wound Volume (cm^3) 0.02 cm^3 02/24/21 1456   Post-Procedure Length (cm) 0.5 cm 02/24/21 1456   Post-Procedure Width (cm) 0.4 cm 02/24/21 1456 Post-Procedure Depth (cm) 0.1 cm 02/24/21 1456   Post-Procedure Surface Area (cm^2) 0.2 cm^2 02/24/21 1456   Post-Procedure Volume (cm^3) 0.02 cm^3 02/24/21 1456   Wound Assessment Eschar dry 02/24/21 1456   Drainage Amount None 02/24/21 1456   Odor None 02/24/21 1456   Jeannie-wound Assessment Blanchable erythema 02/24/21 1456   Margins Attached edges 02/24/21 1456   Wound Thickness Description not for Pressure Injury Full thickness 02/24/21 1456   Number of days: 0          Supplies Requested :      WOUND #: 1, 2 and 3   PRIMARY DRESSING:  Pharmaceutical medication Triad   Cover and Secure with:  Other Mepilex Border   20-2X2  10-4x4   FREQUENCY OF DRESSING CHANGES:  Other Every 3 Days     ADDITIONAL ITEMS:  [] Gloves Small  [x] Gloves Medium [] Gloves Large [] Gloves XLarge  [] Tape 1\" [x] Tape 2\" [] Tape 3\"  [] Medipore Tape  [x] Saline  [] Skin Prep   [] Adhesive Remover   [] Cotton Tip Applicators   [] Other:    Patient Wound(s) Debrided: [x] Yes   [] No    Debribement Type: subcutaneous tissue    Debridement Date: 2/24/2021    Patient currently being seen by Home Health: [] Yes   [x] No    Duration for needed supplies:  []15  [x]30  []60  []90 Days    Provider Information:     PROVIDER NAME: JAYDE Live  NPI: [id number]

## 2021-02-24 NOTE — PROGRESS NOTES
[address]   Progress Note and Procedure Note      [address] [facility] RECORD NUMBER:  [id number]  AGE: 60 y.o. GENDER: female  : 1955  EPISODE DATE:  2021    Subjective:     Chief Complaint   Patient presents with    Wound Check     right lower leg          HISTORY of PRESENT ILLNESS HPI     Emilia Corrales is a 60 y.o. female who presents today for wound/ulcer evaluation. History of Wound Context: presents to wound clinic today for evaluation of right lower leg wounds that have been present for last 3 weeks. Started after she scratched her leg d/t dry skin. Right lower leg is very tender to any touching.    Wound/Ulcer Pain Timing/Severity: constant  Quality of pain: sharp  Severity:  4 / 10   Modifying Factors: Pain worsens with touching  Associated Signs/Symptoms: erythema and pain    Ulcer Identification:  Ulcer Type: traumatic  Contributing Factors: diabetes, decreased mobility and obesity    Wound: N/A        PAST MEDICAL HISTORY        Diagnosis Date    Anxiety     Borderline hypertension     Depression 2020    GERD (gastroesophageal reflux disease)     [facility] 2020    Hypothyroidism     Neuropathy     Obesity     morbid    Osteoarthritis     Other cirrhosis of liver ([facility] 2020    Sleep apnea     possible    Type II or unspecified type diabetes mellitus without mention of complication, not stated as uncontrolled        PAST SURGICAL HISTORY    Past Surgical History:   Procedure Laterality Date    APPENDECTOMY      COLONOSCOPY      DILATION AND CURETTAGE OF UTERUS      HIATAL HERNIA REPAIR      HYSTERECTOMY      THROAT SURGERY      POLYPS REMOVED FROM VOCAL CORD    TOTAL KNEE ARTHROPLASTY Right 2015    TOTAL KNEE ARTHROPLASTY Left 5/26/15    UPPER GASTROINTESTINAL ENDOSCOPY         FAMILY HISTORY    Family History   Problem Relation Age of Onset    Heart Disease Mother     Arthritis Mother    [place] Heart Disease Father     Arthritis Father     Cancer Father         \"oral\"    Diabetes Sister         gestational       SOCIAL HISTORY    Social History     Tobacco Use    Smoking status: Never Smoker    Smokeless tobacco: Never Used   Substance Use Topics    Alcohol use: Yes     Comment: once a month    Drug use: No       ALLERGIES    Allergies   Allergen Reactions    Shellfish-Derived Products Nausea Only    Morphine Other (See Comments)     HALLUCINATIONS      Tape [Adhesive Tape] Rash       MEDICATIONS    Current Outpatient Medications on File Prior to Encounter   Medication Sig Dispense Refill    citalopram (CELEXA) 40 MG tablet TAKE 1 TABLET BY MOUTH EVERY DAY 30 tablet 0    gabapentin (NEURONTIN) 400 MG capsule Take 1 capsule by mouth 3 times daily for 30 days. 90 capsule 0    miconazole (MICOTIN) 2 % cream Apply topically 2 times daily.  30 g 2    cephALEXin (KEFLEX) 500 MG capsule Take 1 capsule by mouth 3 times daily for 10 days 30 capsule 0    ibuprofen (ADVIL;MOTRIN) 800 MG tablet Take 1 tablet by mouth 4 times daily as needed for Pain 120 tablet 0    mupirocin (BACTROBAN) 2 % cream Apply 2 times daily to affected area 1 Tube 0    melatonin 10 MG CAPS capsule Take 1 capsule by mouth nightly as needed (sleep) 30 capsule 2    amLODIPine (NORVASC) 5 MG tablet TAKE 1 TABLET BY MOUTH EVERY DAY 30 tablet 3    busPIRone (BUSPAR) 10 MG tablet TAKE 1 TABLET BY MOUTH 2 TIMES DAILY 60 tablet 1    atorvastatin (LIPITOR) 40 MG tablet TAKE 1 TABLET BY MOUTH NIGHTLY 30 tablet 3    carvedilol (COREG) 6.25 MG tablet TAKE 1 TABLET BY MOUTH 2 TIMES DAILY WITH MEALS 60 tablet 3    isosorbide mononitrate (IMDUR) 60 MG extended release tablet TAKE 1 TABLET BY MOUTH DAILY 30 tablet 3    clopidogrel (PLAVIX) 75 MG tablet TAKE 1 TABLET BY MOUTH DAILY 30 tablet 3    lisinopril (PRINIVIL;ZESTRIL) 10 MG tablet Take 1 tablet by mouth once daily 30 tablet 5    JARDIANCE 10 MG tablet TAKE 1 TABLET BY MOUTH EVERY DAY 30 tablet 5    glimepiride (AMARYL) 4 MG tablet TAKE 1 TABLET BY MOUTH TWICE DAILY 180 tablet 3    aspirin 81 MG EC tablet Take 1 tablet by mouth daily 30 tablet 5    blood glucose monitor strips Test before meals and at bedtime. DX: DM, on insulin 100 strip 11    insulin glargine (LANTUS SOLOSTAR) 100 UNIT/ML injection pen Inject 65 Units into the skin daily 15 pen 12    omeprazole (PRILOSEC) 20 MG delayed release capsule Take 1 capsule by mouth Daily 90 capsule 3    levothyroxine (SYNTHROID) 50 MCG tablet TAKE 1 TABLET BY MOUTH EVERY DAY 30 tablet 10    miconazole (ZEASORB-AF) 2 % powder Apply topically 2 times daily. 45 g 1    blood glucose monitor kit and supplies Dispense sufficient amount for indicated testing frequency plus additional to accommodate PRN testing needs. Dispense all needed supplies to include: monitor, strips, lancing device, lancets, control solutions, alcohol swabs. 1 kit 0    sucralfate (CARAFATE) 1 GM tablet Take 1 tablet by mouth daily 180 tablet 1    magnesium hydroxide (MILK OF MAGNESIA) 400 MG/5ML suspension Take 30 mLs by mouth daily as needed for Constipation 900 mL 0    nitroGLYCERIN (NITROSTAT) 0.4 MG SL tablet up to max of 3 total doses. If no relief after 1 dose, call 911. 25 tablet 3    Handicap Placard MISC Is a permanent condition. DX: M19.90 1 each 0    Insulin Pen Needle (B-D UF III MINI PEN NEEDLES) 31G X 5 MM MISC USE 1 PEN NEEDLE DAILY 50 each 1    [employer] Lancets Thin MISC Test before meals and at bedtime. DX: DM, on insulin 100 each 11    MULTIPLE VITAMIN PO   Take 2 tablets by mouth daily DAILY      Alcohol Swabs (ALCOHOL PREP PADS) by Other route 2 times daily. No current facility-administered medications on file prior to encounter.         REVIEW OF SYSTEMS    Constitutional: negative  Eyes: negative  Ears, nose, mouth, throat, and face: negative  Respiratory: negative  Cardiovascular: negative  Gastrointestinal: negative  Genitourinary:negative  Integument/breast: negative except for right lower leg / foot wounds  Hematologic/lymphatic: negative  Musculoskeletal:negative  Neurological: negative except for paresthesia  Behavioral/Psych: negative except for anxiety  Endocrine: negative  Allergic/Immunologic: negative    Objective: There were no vitals taken for this visit.     Wt Readings from Last 3 Encounters:   02/23/21 240 lb 6.4 oz (109 kg)   02/04/21 247 lb 6.4 oz (112.2 kg)   10/22/20 234 lb (106.1 kg)       PHYSICAL EXAM    General Appearance: alert and oriented to person, place and time, well developed and obese, in no acute distress  Skin: warm and dry, no rash or erythema, right lower leg ulcer   Head: normocephalic and atraumatic  Eyes: pupils equal, round, extraocular eye movements intact, and conjunctivae normal  Pulmonary/Chest: clear to auscultation bilaterally- no wheezes, rales or rhonchi, normal air movement, no respiratory distress  Cardiovascular: normal rate, regular rhythm, normal S1 and S2, no murmurs  Abdomen: soft, non-tender, non-distended, normal bowel sounds  Extremities: no cyanosis, clubbing   Musculoskeletal: no joint swelling, deformity or tenderness  Neurologic: gait, coordination and speech normal      Assessment:     Problem List Items Addressed This Visit     Type 2 diabetes mellitus, with long-term current use of insulin ([facility])    Relevant Orders    Supply: Wound Cleanser    Supply: Cover and Secure    Morbid obesity with BMI of 45.0-49.9, adult ([facility])    Relevant Orders    Supply: Wound Cleanser    Supply: Cover and Secure    Skin ulcer of right calf with fat layer exposed ([facility] - Primary    Relevant Orders    Reflux study/Reflux Venous Insufficiency Bilateral    VL ARTERIAL PVR LOWER WO EXERCISE    Supply: Wound Cleanser    Supply: Cover and Secure    Open wound of right foot    Relevant Orders    Supply: Wound Cleanser    Supply: Cover and Secure    PVD (peripheral vascular disease) ([facility]    Relevant Orders    Reflux study/Reflux Venous Insufficiency Bilateral    VL ARTERIAL PVR LOWER WO EXERCISE    Bilateral edema of lower extremity    Relevant Orders    Reflux study/Reflux Venous Insufficiency Bilateral    VL ARTERIAL PVR LOWER WO EXERCISE    Neuropathy (Chronic)    Relevant Orders    Supply: Wound Cleanser    Supply: Cover and Secure           Procedure Note  Indications:  Based on my examination of this patient's wound(s)/ulcer(s) today, debridement is required to promote healing and evaluate the wound base. Performed by: HAO Suarez CNP    Consent obtained:  Yes    Time out taken:  Yes    Pain Control: Anesthetic  Anesthetic: 2% Lidocaine Gel Topical       Debridement:Excisional Debridement    Using curette the wound(s)/ulcer(s) was/were sharply debrided down through and including the removal of subcutaneous tissue. Devitalized Tissue Debrided:  biofilm and slough    Pre Debridement Measurements:  Are located in the [facility]  Documentation Flow Sheet    Wound/Ulcer #: 1    Post Debridement Measurements:  Wound/Ulcer Descriptions are Pre Debridement except measurements:    Wound 02/24/21 Leg Lower; Posterior;Right #1 (Active)   Wound Image   02/24/21 1456   Wound Etiology Traumatic 02/24/21 1456   Dressing Status Old drainage noted;New drainage noted 02/24/21 1456   Wound Cleansed Cleansed with saline 02/24/21 1456   Dressing/Treatment Other (comment) 02/24/21 1525   Wound Length (cm) 4 cm 02/24/21 1456   Wound Width (cm) 3.4 cm 02/24/21 1456   Wound Depth (cm) 0.1 cm 02/24/21 1456   Wound Surface Area (cm^2) 13.6 cm^2 02/24/21 1456   Wound Volume (cm^3) 1.36 cm^3 02/24/21 1456   Post-Procedure Length (cm) 4 cm 02/24/21 1456   Post-Procedure Width (cm) 3.4 cm 02/24/21 1456   Post-Procedure Depth (cm) 0.1 cm 02/24/21 1456   Post-Procedure Surface Area (cm^2) 13.6 cm^2 02/24/21 1456   Post-Procedure Volume (cm^3) 1.36 cm^3 02/24/21 1456   Wound Assessment Eschar dry;Pink/red 02/24/21 1456   Drainage Amount Moderate 02/24/21 1456   Drainage Description Serosanguinous 02/24/21 1456   Odor None 02/24/21 1456   Jeannie-wound Assessment Blanchable erythema 02/24/21 1456   Margins Attached edges 02/24/21 1456   Wound Thickness Description not for Pressure Injury Full thickness 02/24/21 1456   Number of days: 0       Wound 02/24/21 Leg Right; Lower;Medial #2 (Active)   Wound Image   02/24/21 1456   Wound Etiology Traumatic 02/24/21 1456   Dressing Status Old drainage noted;New drainage noted 02/24/21 1456   Wound Cleansed Cleansed with saline 02/24/21 1456   Wound Length (cm) 2.4 cm 02/24/21 1456   Wound Width (cm) 0.8 cm 02/24/21 1456   Wound Depth (cm) 0.1 cm 02/24/21 1456   Wound Surface Area (cm^2) 1.92 cm^2 02/24/21 1456   Wound Volume (cm^3) 0.19 cm^3 02/24/21 1456   Post-Procedure Length (cm) 2.4 cm 02/24/21 1456   Post-Procedure Width (cm) 0.8 cm 02/24/21 1456   Post-Procedure Depth (cm) 0.1 cm 02/24/21 1456   Post-Procedure Surface Area (cm^2) 1.92 cm^2 02/24/21 1456   Post-Procedure Volume (cm^3) 0.19 cm^3 02/24/21 1456   Wound Assessment Pink/red;Slough 02/24/21 1456   Drainage Amount Moderate 02/24/21 1456   Drainage Description Serosanguinous 02/24/21 1456   Odor None 02/24/21 1456   Jeannie-wound Assessment Blanchable erythema 02/24/21 1456   Margins Attached edges 02/24/21 1456   Wound Thickness Description not for Pressure Injury Full thickness 02/24/21 1456   Number of days: 0       Wound 02/24/21 Ankle Right;Medial #3 (Active)   Wound Image   02/24/21 1456   Wound Etiology Traumatic 02/24/21 1456   Dressing Status Old drainage noted;New drainage noted 02/24/21 1456   Wound Cleansed Cleansed with saline 02/24/21 1456   Wound Length (cm) 0.5 cm 02/24/21 1456   Wound Width (cm) 0.4 cm 02/24/21 1456   Wound Depth (cm) 0.1 cm 02/24/21 1456   Wound Surface Area (cm^2) 0.2 cm^2 02/24/21 1456   Wound Volume (cm^3) 0.02 cm^3 02/24/21 1456   Post-Procedure Length (cm) 0.5 cm 02/24/21 1456   Post-Procedure Width (cm) 0.4 cm 02/24/21 1456   Post-Procedure Depth (cm) 0.1 cm 02/24/21 1456   Post-Procedure Surface Area (cm^2) 0.2 cm^2 02/24/21 1456   Post-Procedure Volume (cm^3) 0.02 cm^3 02/24/21 1456   Wound Assessment Eschar dry 02/24/21 1456   Drainage Amount None 02/24/21 1456   Odor None 02/24/21 1456   Jeannie-wound Assessment Blanchable erythema 02/24/21 1456   Margins Attached edges 02/24/21 1456   Wound Thickness Description not for Pressure Injury Full thickness 02/24/21 1456   Number of days: 0          Percent of Wound(s)/Ulcer(s) Debrided: 10%    Total Surface Area Debrided:  1.57 sq cm       Diabetic/Pressure/Non Pressure Ulcers only:  Ulcer: Non-Pressure ulcer, fat layer exposed      Estimated Blood Loss:  None    Hemostasis Achieved:  not needed    Procedural Pain:  4  / 10     Post Procedural Pain:  4 / 10     Response to treatment:  Well tolerated by patient. Plan:     Treatment Note please see attached Discharge Instructions    Written patient dismissal instructions given to patient and signed by patient or POA.          Discharge Instructions          [facility] -Phone: [phone] Fax: [phone]   Visit  Discharge Instructions / Physician Orders    DATE: 2/24/2021     Home Care: NA     SUPPLIES ORDERED THRU: [facility]-ordered 2/24/2021     Wound Location:  Right Lower Leg X3     Cleanse with: Liquid antibacterial soap and water, rinse well      Dressing Orders: Triad Cream, Mepilex Border     Frequency:  Every 3 Days     Additional Orders: Increase protein to diet (meat, cheese, eggs, fish, peanut butter, nuts and beans)  Multivitamin daily  ELEVATE LEGS AS MUCH AS POSSIBLE  Culture Taken  Vascular Studies Ordered     Your next appointment with [address] [place] [address] is in 1 week                                                                                                   ROOM TYPE   [] CHAIR     [x] STRETCHER  [] EITHER             (Please note your next appointment above and if you are unable to keep, kindly give a 24 hour notice. Thank you.)     If you experience any of the following, please call the [address] [place] [address] during business hours:  [phone]  Your Phone call may be forwarded to [address] during business hours that [phone] is closed. * Increase in Pain  * Temperature over 101  * Increase in drainage from your wound  * Drainage with a foul odor  * Bleeding  * Increase in swelling  * Need for compression bandage changes due to slippage, breakthrough drainage. If you need medical attention outside of the business hours of the [address] [place] [address] please contact your PCP or go to the nearest emergency room. The information contained in the After Visit Summary has been reviewed with me, the patient and/or responsible adult, by my health care provider(s). I had the opportunity to ask questions regarding this information.  I have elected to receive;      []After Visit Summary  [x]Comprehensive Discharge Instruction      Patient signature______________________________________Date:________  Electronically signed by Dannie Pleitez RN on 2/24/2021 at 3:13 PM  Electronically signed by HAO Caballero CNP on 2/24/2021 at 3:16 PM          Electronically signed by HAO Caballero CNP on 2/24/2021 at 3:33 PM

## 2021-02-25 DIAGNOSIS — T14.8XXA WOUND, OPEN: Primary | ICD-10-CM

## 2021-02-25 RX ORDER — ACETAMINOPHEN 500 MG
1000 TABLET ORAL 3 TIMES DAILY PRN
Qty: 180 TABLET | Refills: 0 | Status: SHIPPED | OUTPATIENT
Start: 2021-02-25 | End: 2021-03-25 | Stop reason: SDUPTHER

## 2021-03-02 LAB
CULTURE: ABNORMAL
CULTURE: ABNORMAL
DIRECT EXAM: ABNORMAL
DIRECT EXAM: ABNORMAL
Lab: ABNORMAL
SPECIMEN DESCRIPTION: ABNORMAL

## 2021-03-03 ENCOUNTER — HOSPITAL ENCOUNTER (OUTPATIENT)
Dept: WOUND CARE | Age: 66
Discharge: HOME | End: 2021-03-03
Payer: COMMERCIAL

## 2021-03-03 VITALS
HEART RATE: 62 BPM | RESPIRATION RATE: 17 BRPM | SYSTOLIC BLOOD PRESSURE: 137 MMHG | WEIGHT: 240 LBS | BODY MASS INDEX: 50.38 KG/M2 | HEIGHT: 58 IN | DIASTOLIC BLOOD PRESSURE: 62 MMHG | TEMPERATURE: 96.6 F

## 2021-03-03 DIAGNOSIS — L97.212 SKIN ULCER OF RIGHT CALF WITH FAT LAYER EXPOSED (HCC): Primary | ICD-10-CM

## 2021-03-03 DIAGNOSIS — S91.301D OPEN WOUND OF RIGHT FOOT, SUBSEQUENT ENCOUNTER: ICD-10-CM

## 2021-03-03 DIAGNOSIS — E11.59 TYPE 2 DIABETES MELLITUS WITH OTHER CIRCULATORY COMPLICATION, WITH LONG-TERM CURRENT USE OF INSULIN (HCC): ICD-10-CM

## 2021-03-03 DIAGNOSIS — G62.9 NEUROPATHY: ICD-10-CM

## 2021-03-03 DIAGNOSIS — E66.01 MORBID OBESITY WITH BMI OF 45.0-49.9, ADULT (HCC): ICD-10-CM

## 2021-03-03 DIAGNOSIS — Z79.4 TYPE 2 DIABETES MELLITUS WITH OTHER CIRCULATORY COMPLICATION, WITH LONG-TERM CURRENT USE OF INSULIN (HCC): ICD-10-CM

## 2021-03-03 PROCEDURE — 11042 DBRDMT SUBQ TIS 1ST 20SQCM/<: CPT | Performed by: NURSE PRACTITIONER

## 2021-03-03 PROCEDURE — 11042 DBRDMT SUBQ TIS 1ST 20SQCM/<: CPT

## 2021-03-03 RX ORDER — LIDOCAINE 50 MG/G
OINTMENT TOPICAL ONCE
Status: CANCELLED | OUTPATIENT
Start: 2021-03-03 | End: 2021-03-03

## 2021-03-03 RX ORDER — LIDOCAINE HYDROCHLORIDE 20 MG/ML
JELLY TOPICAL ONCE
Status: CANCELLED | OUTPATIENT
Start: 2021-03-03 | End: 2021-03-03

## 2021-03-03 RX ORDER — LIDOCAINE HYDROCHLORIDE 20 MG/ML
JELLY TOPICAL ONCE
Status: COMPLETED | OUTPATIENT
Start: 2021-03-03 | End: 2021-03-03

## 2021-03-03 RX ORDER — LIDOCAINE 40 MG/G
CREAM TOPICAL ONCE
Status: CANCELLED | OUTPATIENT
Start: 2021-03-03 | End: 2021-03-03

## 2021-03-03 RX ORDER — LIDOCAINE HYDROCHLORIDE 40 MG/ML
SOLUTION TOPICAL ONCE
Status: CANCELLED | OUTPATIENT
Start: 2021-03-03 | End: 2021-03-03

## 2021-03-03 RX ADMIN — LIDOCAINE HYDROCHLORIDE 6 ML: 20 JELLY TOPICAL at 15:14

## 2021-03-03 ASSESSMENT — PAIN SCALES - GENERAL: PAINLEVEL_OUTOF10: 7

## 2021-03-03 ASSESSMENT — PAIN DESCRIPTION - ONSET: ONSET: ON-GOING

## 2021-03-03 ASSESSMENT — PAIN DESCRIPTION - ORIENTATION: ORIENTATION: RIGHT

## 2021-03-03 ASSESSMENT — PAIN DESCRIPTION - PAIN TYPE: TYPE: ACUTE PAIN

## 2021-03-03 NOTE — PROGRESS NOTES
Wound Care Supplies      Supply Company:     [address] [place]  p: [phone] f: [phone]     Ordering Center:     [place] [address]  [phone]  WOUND CARE Dept: [facility] NUMBER [phone]    Patient Information:      Shoshana Rosales [address] [facility]   [phone]   : 1955  AGE: 60 y.o. GENDER: female   TODAYS DATE:  3/3/2021    Insurance:      PRIMARY INSURANCE:  Plan: [address] PL[address]  Coverage: [address] PLAN  Effective Date: 2020  [id number] - (Medicaid Managed)    SECONDARY INSURANCE:  Plan:   Coverage:   Effective Date:   @[employer]@    [unfilled]   [unfilled]     Patient Wound Information:      Problem List Items Addressed This Visit     Neuropathy (Chronic)    Relevant Orders    Supply: Wound Cleanser    Supply: Wound Dressings    Supply: Cover and Secure    Type 2 diabetes mellitus, with long-term current use of insulin ([address]    Relevant Orders    Supply: Wound Cleanser    Supply: Wound Dressings    Supply: Cover and Secure    Morbid obesity with BMI of 45.0-49.9, adult ([address]    Relevant Orders    Supply: Wound Cleanser    Supply: Wound Dressings    Supply: Cover and Secure    Skin ulcer of right calf with fat layer exposed ([address] - Primary    Relevant Orders    Supply: Wound Cleanser    Supply: Wound Dressings    Supply: Cover and Secure    Open wound of right foot    Relevant Orders    Supply: Wound Cleanser    Supply: Wound Dressings    Supply: Cover and Secure          WOUNDS REQUIRING DRESSING SUPPLIES:     Wound 21 Leg Lower; Posterior;Right #1 (Active)   Wound Image   21 1456   Wound Etiology Traumatic 21 1501   Dressing Status Old drainage noted;New drainage noted 21 1501   Wound Cleansed Cleansed with saline 21 1501   Dressing/Treatment Other (comment) 21 1534   Wound Length (cm) 4 cm 03/03/21 1501   Jeannie-wound Assessment Blanchable erythema 03/03/21 1501   Margins Attached edges 03/03/21 1501   Wound Thickness Description not for Pressure Injury Full thickness 03/03/21 1501   Number of days: 7       Wound 02/24/21 Ankle Right;Medial #3 (Active)   Wound Image   02/24/21 1456   Wound Etiology Traumatic 03/03/21 1501   Dressing Status Old drainage noted;New drainage noted 03/03/21 1501   Wound Cleansed Cleansed with saline 03/03/21 1501   Dressing/Treatment Other (comment) 03/03/21 1534   Wound Length (cm) 0.5 cm 03/03/21 1501   Wound Width (cm) 0.5 cm 03/03/21 1501   Wound Depth (cm) 0.1 cm 03/03/21 1501   Wound Surface Area (cm^2) 0.25 cm^2 03/03/21 1501   Change in Wound Size % (l*w) -25 03/03/21 1501   Wound Volume (cm^3) 0.02 cm^3 03/03/21 1501   Wound Healing % 0 03/03/21 1501   Post-Procedure Length (cm) 0.5 cm 03/03/21 1501   Post-Procedure Width (cm) 0.5 cm 03/03/21 1501   Post-Procedure Depth (cm) 0.1 cm 03/03/21 1501   Post-Procedure Surface Area (cm^2) 0.25 cm^2 03/03/21 1501   Post-Procedure Volume (cm^3) 0.02 cm^3 03/03/21 1501   Wound Assessment Eschar moist;Slough 03/03/21 1501   Drainage Amount Small 03/03/21 1501   Drainage Description Serosanguinous 03/03/21 1501   Odor None 03/03/21 1501   Jeannie-wound Assessment Blanchable erythema 03/03/21 1501   Margins Attached edges 03/03/21 1501   Wound Thickness Description not for Pressure Injury Full thickness 03/03/21 1501   Number of days: 7          Supplies Requested :      WOUND #: 1   PRIMARY DRESSING:  Collagen with silver-Britt        FREQUENCY OF DRESSING CHANGES:  Every 3 Days     ADDITIONAL ITEMS:  [] Gloves Small  [x] Gloves Medium [] Gloves Large [] Gloves XLarge  [] Tape 1\" [x] Tape 2\" [] Tape 3\"  [] Medipore Tape  [x] Saline  [] Skin Prep   [] Adhesive Remover   [] Cotton Tip Applicators   [] Other:    Patient Wound(s) Debrided: [x] Yes   [] No    Debribement Type: subcutaneous tissue    Debridement Date: 3/3/2021    Patient currently being seen by Home Health: [] Yes   [x] No    Duration for needed supplies:  []15  [x]30  []60  []90 Days    Provider Information:      PROVIDER NAME: JAYDE Carvalho  NPI: [id number]

## 2021-03-03 NOTE — PROGRESS NOTES
Father     Cancer Father         \"oral\"    Diabetes Sister         gestational       SOCIAL HISTORY    Social History     Tobacco Use    Smoking status: Never Smoker    Smokeless tobacco: Never Used   Substance Use Topics    Alcohol use: Yes     Comment: once a month    Drug use: No       ALLERGIES    Allergies   Allergen Reactions    Shellfish-Derived Products Nausea Only    Morphine Other (See Comments)     HALLUCINATIONS      Tape [Adhesive Tape] Rash       MEDICATIONS    Current Outpatient Medications on File Prior to Encounter   Medication Sig Dispense Refill    acetaminophen (TYLENOL) 500 MG tablet Take 2 tablets by mouth 3 times daily as needed for Pain 180 tablet 0    citalopram (CELEXA) 40 MG tablet TAKE 1 TABLET BY MOUTH EVERY DAY 30 tablet 0    gabapentin (NEURONTIN) 400 MG capsule Take 1 capsule by mouth 3 times daily for 30 days. 90 capsule 0    miconazole (MICOTIN) 2 % cream Apply topically 2 times daily.  30 g 2    lidocaine (XYLOCAINE) 2 % jelly Apply topically with dressing changes every 3 days 1 Tube 1    cephALEXin (KEFLEX) 500 MG capsule Take 1 capsule by mouth 3 times daily for 10 days 30 capsule 0    ibuprofen (ADVIL;MOTRIN) 800 MG tablet Take 1 tablet by mouth 4 times daily as needed for Pain 120 tablet 0    mupirocin (BACTROBAN) 2 % cream Apply 2 times daily to affected area 1 Tube 0    melatonin 10 MG CAPS capsule Take 1 capsule by mouth nightly as needed (sleep) 30 capsule 2    amLODIPine (NORVASC) 5 MG tablet TAKE 1 TABLET BY MOUTH EVERY DAY 30 tablet 3    busPIRone (BUSPAR) 10 MG tablet TAKE 1 TABLET BY MOUTH 2 TIMES DAILY 60 tablet 1    atorvastatin (LIPITOR) 40 MG tablet TAKE 1 TABLET BY MOUTH NIGHTLY 30 tablet 3    carvedilol (COREG) 6.25 MG tablet TAKE 1 TABLET BY MOUTH 2 TIMES DAILY WITH MEALS 60 tablet 3    isosorbide mononitrate (IMDUR) 60 MG extended release tablet TAKE 1 TABLET BY MOUTH DAILY 30 tablet 3    clopidogrel (PLAVIX) 75 MG tablet TAKE 1 TABLET BY Supply: Wound Dressings    Supply: Cover and Secure    Neuropathy (Chronic)    Relevant Orders    Supply: Wound Cleanser    Supply: Wound Dressings    Supply: Cover and Secure           Procedure Note  Indications:  Based on my examination of this patient's wound(s)/ulcer(s) today, debridement is required to promote healing and evaluate the wound base. Performed by: HAO Alvarez CNP    Consent obtained:  Yes    Time out taken:  Yes    Pain Control: Anesthetic  Anesthetic: 2% Lidocaine Gel Topical       Debridement:Excisional Debridement    Using curette, scissors and forceps the wound(s)/ulcer(s) was/were sharply debrided down through and including the removal of subcutaneous tissue. Devitalized Tissue Debrided:  fibrin, biofilm, slough and necrotic/eschar    Pre Debridement Measurements:  Are located in the Wound/Ulcer Documentation Flow Sheet    Wound/Ulcer #: 1, 2 and 3    Post Debridement Measurements:  Wound/Ulcer Descriptions are Pre Debridement except measurements:    Wound 02/24/21 Leg Lower; Posterior;Right #1 (Active)   Wound Image   02/24/21 1456   Wound Etiology Traumatic 03/03/21 1501   Dressing Status Old drainage noted;New drainage noted 03/03/21 1501   Wound Cleansed Cleansed with saline 03/03/21 1501   Dressing/Treatment Other (comment) 03/03/21 1534   Wound Length (cm) 4 cm 03/03/21 1501   Wound Width (cm) 3.3 cm 03/03/21 1501   Wound Depth (cm) 0.1 cm 03/03/21 1501   Wound Surface Area (cm^2) 13.2 cm^2 03/03/21 1501   Change in Wound Size % (l*w) 2.94 03/03/21 1501   Wound Volume (cm^3) 1.32 cm^3 03/03/21 1501   Wound Healing % 3 03/03/21 1501   Post-Procedure Length (cm) 4 cm 03/03/21 1501   Post-Procedure Width (cm) 3.3 cm 03/03/21 1501   Post-Procedure Depth (cm) 0.1 cm 03/03/21 1501   Post-Procedure Surface Area (cm^2) 13.2 cm^2 03/03/21 1501   Post-Procedure Volume (cm^3) 1.32 cm^3 03/03/21 1501   Wound Assessment Eschar dry;Pink/red;Slough 03/03/21 1501   Drainage Amount Moderate 03/03/21 1501   Drainage Description Serosanguinous 03/03/21 1501   Odor None 03/03/21 1501   Jeannie-wound Assessment Blanchable erythema 03/03/21 1501   Margins Attached edges 03/03/21 1501   Wound Thickness Description not for Pressure Injury Full thickness 03/03/21 1501   Number of days: 7       Wound 02/24/21 Leg Right; Lower;Medial #2 cluster (Active)   Wound Image   02/24/21 1456   Wound Etiology Traumatic 03/03/21 1501   Dressing Status Old drainage noted;New drainage noted 03/03/21 1501   Wound Cleansed Cleansed with saline 03/03/21 1501   Dressing/Treatment Other (comment) 03/03/21 1534   Wound Length (cm) 2.4 cm 03/03/21 1501   Wound Width (cm) 0.9 cm 03/03/21 1501   Wound Depth (cm) 0.1 cm 03/03/21 1501   Wound Surface Area (cm^2) 2.16 cm^2 03/03/21 1501   Change in Wound Size % (l*w) -12.5 03/03/21 1501   Wound Volume (cm^3) 0.22 cm^3 03/03/21 1501   Wound Healing % -16 03/03/21 1501   Post-Procedure Length (cm) 2.4 cm 03/03/21 1501   Post-Procedure Width (cm) 0.9 cm 03/03/21 1501   Post-Procedure Depth (cm) 0.1 cm 03/03/21 1501   Post-Procedure Surface Area (cm^2) 2.16 cm^2 03/03/21 1501   Post-Procedure Volume (cm^3) 0.22 cm^3 03/03/21 1501   Wound Assessment Pink/red;Slough 03/03/21 1501   Drainage Amount Moderate 03/03/21 1501   Drainage Description Serosanguinous 03/03/21 1501   Odor None 03/03/21 1501   Jeannie-wound Assessment Blanchable erythema 03/03/21 1501   Margins Attached edges 03/03/21 1501   Wound Thickness Description not for Pressure Injury Full thickness 03/03/21 1501   Number of days: 7       Wound 02/24/21 Ankle Right;Medial #3 (Active)   Wound Image   02/24/21 1456   Wound Etiology Traumatic 03/03/21 1501   Dressing Status Old drainage noted;New drainage noted 03/03/21 1501   Wound Cleansed Cleansed with saline 03/03/21 1501   Dressing/Treatment Other (comment) 03/03/21 1534   Wound Length (cm) 0.5 cm 03/03/21 1501   Wound Width (cm) 0.5 cm 03/03/21 1501   Wound Depth (cm) 0.1 cm 03/03/21 1501   Wound Surface Area (cm^2) 0.25 cm^2 03/03/21 1501   Change in Wound Size % (l*w) -25 03/03/21 1501   Wound Volume (cm^3) 0.02 cm^3 03/03/21 1501   Wound Healing % 0 03/03/21 1501   Post-Procedure Length (cm) 0.5 cm 03/03/21 1501   Post-Procedure Width (cm) 0.5 cm 03/03/21 1501   Post-Procedure Depth (cm) 0.1 cm 03/03/21 1501   Post-Procedure Surface Area (cm^2) 0.25 cm^2 03/03/21 1501   Post-Procedure Volume (cm^3) 0.02 cm^3 03/03/21 1501   Wound Assessment Eschar moist;Slough 03/03/21 1501   Drainage Amount Small 03/03/21 1501   Drainage Description Serosanguinous 03/03/21 1501   Odor None 03/03/21 1501   Jeannie-wound Assessment Blanchable erythema 03/03/21 1501   Margins Attached edges 03/03/21 1501   Wound Thickness Description not for Pressure Injury Full thickness 03/03/21 1501   Number of days: 7          Percent of Wound(s)/Ulcer(s) Debrided: 100%    Total Surface Area Debrided:  15.61 sq cm       Diabetic/Pressure/Non Pressure Ulcers only:  Ulcer: Non-Pressure ulcer, fat layer exposed      Estimated Blood Loss:  None    Hemostasis Achieved:  not needed    Procedural Pain:  4  / 10     Post Procedural Pain:  4 / 10     Response to treatment:  Well tolerated by patient. Plan:     Treatment Note please see attached Discharge Instructions    Written patient dismissal instructions given to patient and signed by patient or POA.          Discharge Instructions          [facility] -Phone: [phone] Fax: [phone]             Visit  Discharge Instructions / Physician Orders     DATE: 3/3/2021     Home Care: NA     SUPPLIES ORDERED THRU: [facility]-ordered 2/24/2021     Wound Location:  Right Lower Leg X3     Cleanse with: Liquid antibacterial soap and water, rinse well      Dressing Orders: Lateral lower leg: Triad Cream, Mepilex Border                                 Medial LegX2: Britt, Mepilex Border     Frequency:  Every 3 Days     Additional Orders: Increase protein to diet (meat, cheese, eggs, fish, peanut butter, nuts and beans)  Multivitamin daily  ELEVATE LEGS AS MUCH AS POSSIBLE  Vascular Studies Ordered-March 16, 2021@ 1:00pm      Your next appointment with [facility] is in 1 week                                                                                                    ROOM TYPE   []? CHAIR     [x]?  STRETCHER  []? EITHER             (Please note your next appointment above and if you are unable to keep, kindly give a 24 hour notice. Thank you.)     If you experience any of the following, please call the [address] [place] [address] during business hours:  [phone]  Your Phone call may be forwarded to [employer] during business hours that [facility] is closed.     * Increase in Pain  * Temperature over 101  * Increase in drainage from your wound  * Drainage with a foul odor  * Bleeding  * Increase in swelling  * Need for compression bandage changes due to slippage, breakthrough drainage.     If you need medical attention outside of the business hours of the [address] [place] [address] please contact your PCP or go to the nearest emergency room. The information contained in the After Visit Summary has been reviewed with me, the patient and/or responsible adult, by my health care provider(s). I had the opportunity to ask questions regarding this information. I have elected to receive;      []? After Visit Summary  [x]? Comprehensive Discharge Instruction        Patient signature______________________________________Date:________  Electronically signed by Franki Strickland RN on 3/3/2021 at 3:25 PM  Electronically signed by HAO Tripp CNP on 3/3/2021 at 3:33 PM          Electronically signed by HAO Tripp CNP on 3/3/2021 at 3:39 PM

## 2021-03-10 ENCOUNTER — HOSPITAL ENCOUNTER (OUTPATIENT)
Dept: WOUND CARE | Age: 66
Discharge: HOME | End: 2021-03-10
Payer: COMMERCIAL

## 2021-03-10 ENCOUNTER — TELEPHONE (OUTPATIENT)
Dept: FAMILY MEDICINE CLINIC | Age: 66
End: 2021-03-10

## 2021-03-10 VITALS
BODY MASS INDEX: 50.38 KG/M2 | HEART RATE: 77 BPM | SYSTOLIC BLOOD PRESSURE: 135 MMHG | RESPIRATION RATE: 18 BRPM | WEIGHT: 240 LBS | DIASTOLIC BLOOD PRESSURE: 72 MMHG | HEIGHT: 58 IN | TEMPERATURE: 97.7 F

## 2021-03-10 DIAGNOSIS — L97.212 SKIN ULCER OF RIGHT CALF WITH FAT LAYER EXPOSED (HCC): Primary | ICD-10-CM

## 2021-03-10 DIAGNOSIS — G62.9 NEUROPATHY: ICD-10-CM

## 2021-03-10 DIAGNOSIS — Z79.4 TYPE 2 DIABETES MELLITUS WITH OTHER CIRCULATORY COMPLICATION, WITH LONG-TERM CURRENT USE OF INSULIN (HCC): ICD-10-CM

## 2021-03-10 DIAGNOSIS — S91.301D OPEN WOUND OF RIGHT FOOT, SUBSEQUENT ENCOUNTER: ICD-10-CM

## 2021-03-10 DIAGNOSIS — E66.01 MORBID OBESITY WITH BMI OF 45.0-49.9, ADULT (HCC): ICD-10-CM

## 2021-03-10 DIAGNOSIS — E11.59 TYPE 2 DIABETES MELLITUS WITH OTHER CIRCULATORY COMPLICATION, WITH LONG-TERM CURRENT USE OF INSULIN (HCC): ICD-10-CM

## 2021-03-10 PROCEDURE — 11042 DBRDMT SUBQ TIS 1ST 20SQCM/<: CPT

## 2021-03-10 PROCEDURE — 11042 DBRDMT SUBQ TIS 1ST 20SQCM/<: CPT | Performed by: NURSE PRACTITIONER

## 2021-03-10 RX ORDER — LIDOCAINE 50 MG/G
OINTMENT TOPICAL ONCE
Status: CANCELLED | OUTPATIENT
Start: 2021-03-10 | End: 2021-03-10

## 2021-03-10 RX ORDER — LIDOCAINE HYDROCHLORIDE 40 MG/ML
SOLUTION TOPICAL ONCE
Status: CANCELLED | OUTPATIENT
Start: 2021-03-10 | End: 2021-03-10

## 2021-03-10 RX ORDER — LIDOCAINE HYDROCHLORIDE 20 MG/ML
JELLY TOPICAL ONCE
Status: CANCELLED | OUTPATIENT
Start: 2021-03-10 | End: 2021-03-10

## 2021-03-10 RX ORDER — LIDOCAINE 40 MG/G
CREAM TOPICAL ONCE
Status: CANCELLED | OUTPATIENT
Start: 2021-03-10 | End: 2021-03-10

## 2021-03-10 RX ORDER — LIDOCAINE HYDROCHLORIDE 20 MG/ML
JELLY TOPICAL ONCE
Status: COMPLETED | OUTPATIENT
Start: 2021-03-10 | End: 2021-03-10

## 2021-03-10 RX ADMIN — LIDOCAINE HYDROCHLORIDE: 20 JELLY TOPICAL at 13:14

## 2021-03-10 ASSESSMENT — PAIN DESCRIPTION - PAIN TYPE: TYPE: ACUTE PAIN;CHRONIC PAIN

## 2021-03-10 ASSESSMENT — PAIN DESCRIPTION - FREQUENCY: FREQUENCY: INTERMITTENT

## 2021-03-10 ASSESSMENT — PAIN DESCRIPTION - LOCATION: LOCATION: LEG;FOOT

## 2021-03-10 NOTE — PROGRESS NOTES
[employer] Application   Below Knee    NAME:  Zina Martinez  YOB: 1955  MEDICAL RECORD NUMBER:  [id number]  DATE:  3/10/2021     [x] Applied moisturizing agent to dry skin as needed.  [x] Appied primary and secondary dressing as ordered     [x] Applied Unna roll from toes to knee overlapping each time.  [x] Applied ace wrap or coban from toes to below the knee.  [x] Secured with tape and/or metal clips covered with tape.  [x] Instructed patient/caregiver to keep dressing dry and intact. DO NOT REMOVE DRESSING.  [x] Instructed pt/family/caregiver to report excessive draining, loose bandage, wet dressing, severe pain or tingling in toes.  [x] Applied [place] dressing below the knee to RIGHT lower leg(s)        Unna Boot(s) were applied per  Guidelines.      Electronically signed by Madison Kumar RN on 3/10/2021 at 1:39 PM

## 2021-03-10 NOTE — TELEPHONE ENCOUNTER
PA request for Melatonin     PA processed and submitted to pt insurance, waiting for response in regards to coverage

## 2021-03-10 NOTE — PROGRESS NOTES
[address]   Progress Note and Procedure Note      [address] [facility] RECORD NUMBER:  [id number]  AGE: 60 y.o. GENDER: female  : 1955  EPISODE DATE:  3/10/2021    Subjective:     Chief Complaint   Patient presents with    Wound Check     right leg         HISTORY of PRESENT ILLNESS HPI     Nicholas Gonzalez is a 60 y.o. female who presents today for wound/ulcer evaluation. History of Wound Context: here for follow up on right lower leg / foot wounds that have been present for last 6 weeks. Started after she scratched her leg d/t dry skin.    Wound/Ulcer Pain Timing/Severity: constant  Quality of pain: sharp  Severity:  3 / 10   Modifying Factors: Pain worsens with touching / debridement  Associated Signs/Symptoms: erythema and pain    Ulcer Identification:  Ulcer Type: traumatic  Contributing Factors: edema, diabetes, decreased mobility and obesity    Wound: N/A        PAST MEDICAL HISTORY        Diagnosis Date    Anxiety     Borderline hypertension     Depression 2020    GERD (gastroesophageal reflux disease)     Heart attack ([facility] 2020    Hypothyroidism     Neuropathy     Obesity     morbid    Osteoarthritis     Other cirrhosis of liver ([facility] 2020    Sleep apnea     possible    Type II or unspecified type diabetes mellitus without mention of complication, not stated as uncontrolled        PAST SURGICAL HISTORY    Past Surgical History:   Procedure Laterality Date    APPENDECTOMY      COLONOSCOPY      DILATION AND CURETTAGE OF UTERUS      HIATAL HERNIA REPAIR      HYSTERECTOMY      THROAT SURGERY      POLYPS REMOVED FROM VOCAL CORD    TOTAL KNEE ARTHROPLASTY Right 2015    TOTAL KNEE ARTHROPLASTY Left 5/26/15    UPPER GASTROINTESTINAL ENDOSCOPY         FAMILY HISTORY    Family History   Problem Relation Age of Onset    Heart Disease Mother     Arthritis Mother     Heart Disease Father     Arthritis Father     Cancer Father         \"oral\"    Diabetes Sister         gestational       SOCIAL HISTORY    Social History     Tobacco Use    Smoking status: Never Smoker    Smokeless tobacco: Never Used   Substance Use Topics    Alcohol use: Yes     Comment: once a month    Drug use: No       ALLERGIES    Allergies   Allergen Reactions    Shellfish-Derived Products Nausea Only    Morphine Other (See Comments)     HALLUCINATIONS      Tape [Adhesive Tape] Rash       MEDICATIONS    Current Outpatient Medications on File Prior to Encounter   Medication Sig Dispense Refill    acetaminophen (TYLENOL) 500 MG tablet Take 2 tablets by mouth 3 times daily as needed for Pain 180 tablet 0    citalopram (CELEXA) 40 MG tablet TAKE 1 TABLET BY MOUTH EVERY DAY 30 tablet 0    gabapentin (NEURONTIN) 400 MG capsule Take 1 capsule by mouth 3 times daily for 30 days. 90 capsule 0    miconazole (MICOTIN) 2 % cream Apply topically 2 times daily.  30 g 2    lidocaine (XYLOCAINE) 2 % jelly Apply topically with dressing changes every 3 days 1 Tube 1    ibuprofen (ADVIL;MOTRIN) 800 MG tablet Take 1 tablet by mouth 4 times daily as needed for Pain 120 tablet 0    mupirocin (BACTROBAN) 2 % cream Apply 2 times daily to affected area 1 Tube 0    melatonin 10 MG CAPS capsule Take 1 capsule by mouth nightly as needed (sleep) 30 capsule 2    amLODIPine (NORVASC) 5 MG tablet TAKE 1 TABLET BY MOUTH EVERY DAY 30 tablet 3    busPIRone (BUSPAR) 10 MG tablet TAKE 1 TABLET BY MOUTH 2 TIMES DAILY 60 tablet 1    atorvastatin (LIPITOR) 40 MG tablet TAKE 1 TABLET BY MOUTH NIGHTLY 30 tablet 3    carvedilol (COREG) 6.25 MG tablet TAKE 1 TABLET BY MOUTH 2 TIMES DAILY WITH MEALS 60 tablet 3    isosorbide mononitrate (IMDUR) 60 MG extended release tablet TAKE 1 TABLET BY MOUTH DAILY 30 tablet 3    clopidogrel (PLAVIX) 75 MG tablet TAKE 1 TABLET BY MOUTH DAILY 30 tablet 3    lisinopril (PRINIVIL;ZESTRIL) 10 MG tablet Take 1 tablet by mouth once daily 30 tablet 5    JARDIANCE 10 MG tablet TAKE 1 TABLET BY MOUTH EVERY DAY 30 tablet 5    glimepiride (AMARYL) 4 MG tablet TAKE 1 TABLET BY MOUTH TWICE DAILY 180 tablet 3    aspirin 81 MG EC tablet Take 1 tablet by mouth daily 30 tablet 5    blood glucose monitor strips Test before meals and at bedtime. DX: DM, on insulin 100 strip 11    insulin glargine (LANTUS SOLOSTAR) 100 UNIT/ML injection pen Inject 65 Units into the skin daily 15 pen 12    omeprazole (PRILOSEC) 20 MG delayed release capsule Take 1 capsule by mouth Daily 90 capsule 3    levothyroxine (SYNTHROID) 50 MCG tablet TAKE 1 TABLET BY MOUTH EVERY DAY 30 tablet 10    miconazole (ZEASORB-AF) 2 % powder Apply topically 2 times daily. 45 g 1    blood glucose monitor kit and supplies Dispense sufficient amount for indicated testing frequency plus additional to accommodate PRN testing needs. Dispense all needed supplies to include: monitor, strips, lancing device, lancets, control solutions, alcohol swabs. 1 kit 0    sucralfate (CARAFATE) 1 GM tablet Take 1 tablet by mouth daily 180 tablet 1    magnesium hydroxide (MILK OF MAGNESIA) 400 MG/5ML suspension Take 30 mLs by mouth daily as needed for Constipation 900 mL 0    nitroGLYCERIN (NITROSTAT) 0.4 MG SL tablet up to max of 3 total doses. If no relief after 1 dose, call 911. 25 tablet 3    Handicap Placard MISC Is a permanent condition. DX: M19.90 1 each 0    Insulin Pen Needle (B-D UF III MINI PEN NEEDLES) 31G X 5 MM MISC USE 1 PEN NEEDLE DAILY 50 each 1    [employer] Lancets Thin MISC Test before meals and at bedtime. DX: DM, on insulin 100 each 11    MULTIPLE VITAMIN PO   Take 2 tablets by mouth daily DAILY      Alcohol Swabs (ALCOHOL PREP PADS) by Other route 2 times daily. No current facility-administered medications on file prior to encounter.         REVIEW OF SYSTEMS    Constitutional: negative  Eyes: negative  Ears, nose, mouth, throat, and face: negative  Respiratory: negative  Cardiovascular: negative except for lower extremity edema  Gastrointestinal: negative  Genitourinary:negative  Integument/breast: negative except for right lower leg / foot wounds  Hematologic/lymphatic: negative  Musculoskeletal:negative  Neurological: negative except for paresthesia  Behavioral/Psych: negative except for anxiety  Endocrine: negative  Allergic/Immunologic: negative    Objective:      /72   Pulse 77   Temp 97.7 °F (36.5 °C) (Tympanic)   Resp 18   Ht 4' 10\" (1.473 m)   Wt 240 lb (108.9 kg)   BMI 50.16 kg/m²     Wt Readings from Last 3 Encounters:   03/10/21 240 lb (108.9 kg)   03/03/21 240 lb (108.9 kg)   02/23/21 240 lb 6.4 oz (109 kg)       PHYSICAL EXAM    General Appearance: alert and oriented to person, place and time, well-developed and obese, in no acute distress  Skin: warm and dry, no rash or erythema, right lower leg / foot wounds  Head: normocephalic and atraumatic  Eyes: pupils equal, round, extraocular eye movements intact, and conjunctivae normal  Pulmonary/Chest: normal air movement, no respiratory distress  Extremities: no cyanosis and no clubbing  Musculoskeletal: no joint swelling, deformity or tenderness  Neurologic: gait, coordination normal and speech normal      Assessment:     Problem List Items Addressed This Visit     Morbid obesity with BMI of 45.0-49.9, adult (HCC)    Relevant Orders    Supply: Wound Cleanser    Supply: Wound Dressings    Supply: Cover and Secure    Supply: Edema Control    Neuropathy (Chronic)    Relevant Orders    Supply: Wound Cleanser    Supply: Wound Dressings    Supply: Cover and Secure    Supply: Edema Control    Open wound of right foot    Relevant Orders    Supply: Wound Cleanser    Supply: Wound Dressings    Supply: Cover and Secure    Supply: Edema Control    Skin ulcer of right calf with fat layer exposed ([facility] - Primary    Relevant Orders    Supply: Wound Cleanser    Supply: Wound Dressings    Supply: Cover and Secure    Supply: Edema Control Type 2 diabetes mellitus, with long-term current use of insulin ([facility])    Relevant Orders    Supply: Wound Cleanser    Supply: Wound Dressings    Supply: Cover and Secure    Supply: Edema Control           Procedure Note  Indications:  Based on my examination of this patient's wound(s)/ulcer(s) today, debridement is required to promote healing and evaluate the wound base. Performed by: HAO Stevens CNP    Consent obtained:  Yes    Time out taken:  Yes    Pain Control: Anesthetic  Anesthetic: 2% Lidocaine Gel Topical       Debridement:Excisional Debridement    Using curette, scissors and forceps the wound(s)/ulcer(s) was/were sharply debrided down through and including the removal of subcutaneous tissue. Devitalized Tissue Debrided:  fibrin, biofilm, slough, necrotic/eschar and exudate    Pre Debridement Measurements:  Are located in the Wound/Ulcer Documentation Flow Sheet    Wound/Ulcer #: 1, 2 and 3    Post Debridement Measurements:  Wound/Ulcer Descriptions are Pre Debridement except measurements:    Wound 02/24/21 Leg Lower; Posterior;Right #1 (Active)   Wound Image   02/24/21 1456   Wound Etiology Traumatic 03/10/21 1255   Dressing Status Old drainage noted;New drainage noted 03/10/21 1255   Wound Cleansed Cleansed with saline 03/10/21 1255   Dressing/Treatment Other (comment) 03/03/21 1534   Wound Length (cm) 4 cm 03/10/21 1255   Wound Width (cm) 3.3 cm 03/10/21 1255   Wound Depth (cm) 0.1 cm 03/10/21 1255   Wound Surface Area (cm^2) 13.2 cm^2 03/10/21 1255   Change in Wound Size % (l*w) 2.94 03/10/21 1255   Wound Volume (cm^3) 1.32 cm^3 03/10/21 1255   Wound Healing % 3 03/10/21 1255   Post-Procedure Length (cm) 4 cm 03/10/21 1255   Post-Procedure Width (cm) 3.3 cm 03/10/21 1255   Post-Procedure Depth (cm) 0.1 cm 03/10/21 1255   Post-Procedure Surface Area (cm^2) 13.2 cm^2 03/10/21 1255   Post-Procedure Volume (cm^3) 1.32 cm^3 03/10/21 1255   Wound Assessment Pink/red;Slough 03/10/21 1801 Drainage Amount Moderate 03/10/21 1255   Drainage Description Serosanguinous 03/10/21 1255   Odor None 03/10/21 1255   Jeannie-wound Assessment Blanchable erythema 03/10/21 1255   Margins Attached edges 03/10/21 1255   Wound Thickness Description not for Pressure Injury Full thickness 03/10/21 1255   Number of days: 13       Wound 02/24/21 Leg Right; Lower;Medial #2 cluster (Active)   Wound Image   02/24/21 1456   Wound Etiology Traumatic 03/10/21 1255   Dressing Status Old drainage noted;New drainage noted 03/10/21 1255   Wound Cleansed Cleansed with saline 03/10/21 1255   Dressing/Treatment Other (comment) 03/03/21 1534   Wound Length (cm) 2.3 cm 03/10/21 1255   Wound Width (cm) 1.2 cm 03/10/21 1255   Wound Depth (cm) 0.1 cm 03/10/21 1255   Wound Surface Area (cm^2) 2.76 cm^2 03/10/21 1255   Change in Wound Size % (l*w) -43.75 03/10/21 1255   Wound Volume (cm^3) 0.28 cm^3 03/10/21 1255   Wound Healing % -47 03/10/21 1255   Post-Procedure Length (cm) 2.3 cm 03/10/21 1255   Post-Procedure Width (cm) 1.2 cm 03/10/21 1255   Post-Procedure Depth (cm) 0.1 cm 03/10/21 1255   Post-Procedure Surface Area (cm^2) 2.76 cm^2 03/10/21 1255   Post-Procedure Volume (cm^3) 0.28 cm^3 03/10/21 1255   Wound Assessment Pink/red;Slough 03/10/21 1255   Drainage Amount Moderate 03/10/21 1255   Drainage Description Serosanguinous 03/10/21 1255   Odor None 03/10/21 1255   Jeannie-wound Assessment Blanchable erythema 03/10/21 1255   Margins Attached edges 03/10/21 1255   Wound Thickness Description not for Pressure Injury Full thickness 03/10/21 1255   Number of days: 13       Wound 02/24/21 Ankle Right;Medial #3 (Active)   Wound Image   02/24/21 1456   Wound Etiology Traumatic 03/10/21 1255   Dressing Status Old drainage noted;New drainage noted 03/10/21 1255   Wound Cleansed Cleansed with saline 03/10/21 1255   Dressing/Treatment Other (comment) 03/03/21 1534   Wound Length (cm) 0.5 cm 03/10/21 1255   Wound Width (cm) 0.4 cm 03/10/21 1255 Wound Depth (cm) 0.1 cm 03/10/21 1255   Wound Surface Area (cm^2) 0.2 cm^2 03/10/21 1255   Change in Wound Size % (l*w) 0 03/10/21 1255   Wound Volume (cm^3) 0.02 cm^3 03/10/21 1255   Wound Healing % 0 03/10/21 1255   Post-Procedure Length (cm) 0.5 cm 03/10/21 1255   Post-Procedure Width (cm) 0.4 cm 03/10/21 1255   Post-Procedure Depth (cm) 0.1 cm 03/10/21 1255   Post-Procedure Surface Area (cm^2) 0.2 cm^2 03/10/21 1255   Post-Procedure Volume (cm^3) 0.02 cm^3 03/10/21 1255   Wound Assessment Eschar moist;Slough 03/10/21 1255   Drainage Amount Small 03/10/21 1255   Drainage Description Serosanguinous 03/10/21 1255   Odor None 03/10/21 1255   Jaennie-wound Assessment Blanchable erythema 03/10/21 1255   Margins Attached edges 03/10/21 1255   Wound Thickness Description not for Pressure Injury Full thickness 03/10/21 1255   Number of days: 13          Percent of Wound(s)/Ulcer(s) Debrided: 100%    Total Surface Area Debrided:  16.16 sq cm       Diabetic/Pressure/Non Pressure Ulcers only:  Ulcer: Non-Pressure ulcer, fat layer exposed      Estimated Blood Loss:  Minimal    Hemostasis Achieved:  by pressure    Procedural Pain:  3  / 10     Post Procedural Pain:  3 / 10     Response to treatment:  Well tolerated by patient., With complaints of pain. Plan:     Treatment Note please see attached Discharge Instructions    Written patient dismissal instructions given to patient and signed by patient or POA.          Discharge Instructions          [facility] -Phone: [phone] Fax: [phone]             Visit  Discharge Instructions / Physician Orders     DATE: 3/10/2021     Home Care: NA     SUPPLIES ORDERED THRU: [facility]-ordered 2/24/2021     Wound Location:  Right Lower Leg X3     Cleanse with: Keep Dry and Intact     Dressing Orders: Promogran, Silvercel, Calamine Unna Boot- unwrap before vascular studies and put china into all wounds and cover with Mepilex Border    Frequency:  Keep Dry and Intact     Additional Orders: Increase protein to diet (meat, cheese, eggs, fish, peanut butter, nuts and beans)  Multivitamin daily  ELEVATE LEGS AS MUCH AS POSSIBLE  Vascular Studies Ordered-March 16, 2021@ 1:00pm      Your next appointment with [facility] is in 1 week                                                                                                    ROOM TYPE   []? ? CHAIR     [x]? ? STRETCHER  []?? EITHER             (Please note your next appointment above and if you are unable to keep, kindly give a 24 hour notice. Thank you.)     If you experience any of the following, please call the [address] [place] [address] during business hours:  [phone]  Your Phone call may be forwarded to [facility] during business hours that [facility] is closed.     * Increase in Pain  * Temperature over 101  * Increase in drainage from your wound  * Drainage with a foul odor  * Bleeding  * Increase in swelling  * Need for compression bandage changes due to slippage, breakthrough drainage.     If you need medical attention outside of the business hours of the [address] [place] [address] please contact your PCP or go to the nearest emergency room.     The information contained in the After Visit Summary has been reviewed with me, the patient and/or responsible adult, by my health care provider(s). I had the opportunity to ask questions regarding this information. I have elected to receive;      []? ? After Visit Summary  [x]? ? Comprehensive Discharge Instruction        Patient signature______________________________________Date:________  Electronically signed by Gi Paulino RN on 3/10/2021 at 1:24 PM  Electronically signed by HAO Ibarra CNP on 3/10/2021 at 1:31 PM          Electronically signed by HAO Ibarra CNP on 3/10/2021 at 1:37 PM

## 2021-03-16 ENCOUNTER — HOSPITAL ENCOUNTER (OUTPATIENT)
Dept: VASCULAR LAB | Age: 66
Discharge: HOME | End: 2021-03-16
Payer: COMMERCIAL

## 2021-03-16 DIAGNOSIS — F41.9 ANXIETY: Chronic | ICD-10-CM

## 2021-03-16 DIAGNOSIS — I73.9 PVD (PERIPHERAL VASCULAR DISEASE) (HCC): ICD-10-CM

## 2021-03-16 DIAGNOSIS — R60.0 BILATERAL EDEMA OF LOWER EXTREMITY: ICD-10-CM

## 2021-03-16 DIAGNOSIS — L97.212 SKIN ULCER OF RIGHT CALF WITH FAT LAYER EXPOSED (HCC): ICD-10-CM

## 2021-03-16 PROCEDURE — 93970 EXTREMITY STUDY: CPT

## 2021-03-16 PROCEDURE — 93923 UPR/LXTR ART STDY 3+ LVLS: CPT

## 2021-03-16 RX ORDER — BUSPIRONE HYDROCHLORIDE 10 MG/1
TABLET ORAL
Qty: 60 TABLET | Refills: 10 | Status: SHIPPED | OUTPATIENT
Start: 2021-03-16 | End: 2021-08-05 | Stop reason: SDUPTHER

## 2021-03-16 RX ORDER — CITALOPRAM 40 MG/1
TABLET ORAL
Qty: 30 TABLET | Refills: 10 | Status: SHIPPED | OUTPATIENT
Start: 2021-03-16 | End: 2021-08-05 | Stop reason: SDUPTHER

## 2021-03-16 RX ORDER — ASPIRIN 81 MG/1
TABLET, COATED ORAL
Qty: 30 TABLET | Refills: 10 | Status: SHIPPED | OUTPATIENT
Start: 2021-03-16 | End: 2021-08-05 | Stop reason: SDUPTHER

## 2021-03-16 NOTE — TELEPHONE ENCOUNTER
buspar aspirin and celexa pending for refill     Health Maintenance   Topic Date Due    COVID-19 Vaccine (1) Never done    Cervical cancer screen  Never done    Shingles Vaccine (1 of 2) Never done    Diabetic retinal exam  01/01/2014    Hepatitis A vaccine (2 of 2 - Risk 2-dose series) 02/17/2021    Diabetic microalbuminuria test  07/14/2021    Lipid screen  07/28/2021    Diabetic foot exam  08/17/2021    A1C test (Diabetic or Prediabetic)  02/04/2022    TSH testing  02/04/2022    Potassium monitoring  02/04/2022    Creatinine monitoring  02/04/2022    Breast cancer screen  08/20/2022    DTaP/Tdap/Td vaccine (6 - Td) 06/27/2024    Colon cancer screen colonoscopy  08/01/2024    Flu vaccine  Completed    Pneumococcal 0-64 years Vaccine  Completed    Hepatitis C screen  Completed    HIV screen  Completed    Hib vaccine  Aged Out    Meningococcal (ACWY) vaccine  Aged Out             (applicable per patient's age: Cancer Screenings, Depression Screening, Fall Risk Screening, Immunizations)    Hemoglobin A1C (%)   Date Value   02/04/2021 7.5   08/17/2020 9.0   07/14/2020 10.7 (H)     Microalb/Crt.  Ratio (mcg/mg creat)   Date Value   07/14/2020 34 (H)     LDL Cholesterol (mg/dL)   Date Value   07/28/2020 60     AST (U/L)   Date Value   07/29/2020 27     ALT (U/L)   Date Value   07/29/2020 21     BUN (mg/dL)   Date Value   02/04/2021 10      (goal A1C is < 7)   (goal LDL is <100) need 30-50% reduction from baseline     BP Readings from Last 3 Encounters:   03/10/21 135/72   03/03/21 137/62   02/23/21 123/69    (goal /80)      All Future Testing planned in [facility]:  Lab Frequency Next Occurrence   Hemoglobin A1C Once 03/17/2021   RADHA Digital Screen Bilateral [[id number]] Once 08/17/2021   6 Minute Walk Test Once 09/28/2020   CBC With Auto Differential Once 03/11/2021   Reflux study/Reflux Venous Insufficiency Bilateral Once 02/24/2022       Next Visit Date:  Future Appointments   Date Time Provider Osmar Evangelina   3/17/2021  1:00 PM Alvaro Bhat, APRN - CNP [facility]            Patient Active Problem List:     Anxiety     GERD (gastroesophageal reflux disease)     OA (osteoarthritis)     Neuropathy     Right knee DJD     Hypothyroidism     S/P left total knee arthroplasty     Chest pain     Ischemic heart disease     Essential hypertension     Hypertensive urgency     NSTEMI (non-ST elevated myocardial infarction) ([address]     Other cirrhosis of liver (HCC)     Abdominal pain     Elevated lipase     Depression     Cystitis     Type 2 diabetes mellitus, with long-term current use of insulin (HCC)     SHERITA (obstructive sleep apnea)     Morbid obesity with BMI of 45.0-49.9, adult ([address]     Skin ulcer of right calf with fat layer exposed ([address]     Open wound of right foot     PVD (peripheral vascular disease) ([address]     Bilateral edema of lower extremity

## 2021-03-17 ENCOUNTER — HOSPITAL ENCOUNTER (OUTPATIENT)
Dept: WOUND CARE | Age: 66
Discharge: HOME | End: 2021-03-17
Payer: COMMERCIAL

## 2021-03-17 VITALS
HEART RATE: 61 BPM | WEIGHT: 240 LBS | TEMPERATURE: 98 F | DIASTOLIC BLOOD PRESSURE: 70 MMHG | BODY MASS INDEX: 50.38 KG/M2 | RESPIRATION RATE: 19 BRPM | HEIGHT: 58 IN | SYSTOLIC BLOOD PRESSURE: 156 MMHG

## 2021-03-17 DIAGNOSIS — E11.59 TYPE 2 DIABETES MELLITUS WITH OTHER CIRCULATORY COMPLICATION, WITH LONG-TERM CURRENT USE OF INSULIN (HCC): ICD-10-CM

## 2021-03-17 DIAGNOSIS — L97.212 SKIN ULCER OF RIGHT CALF WITH FAT LAYER EXPOSED (HCC): Primary | ICD-10-CM

## 2021-03-17 DIAGNOSIS — E66.01 MORBID OBESITY WITH BMI OF 45.0-49.9, ADULT (HCC): ICD-10-CM

## 2021-03-17 DIAGNOSIS — S91.301D OPEN WOUND OF RIGHT FOOT, SUBSEQUENT ENCOUNTER: ICD-10-CM

## 2021-03-17 DIAGNOSIS — Z79.4 TYPE 2 DIABETES MELLITUS WITH OTHER CIRCULATORY COMPLICATION, WITH LONG-TERM CURRENT USE OF INSULIN (HCC): ICD-10-CM

## 2021-03-17 DIAGNOSIS — G62.9 NEUROPATHY: ICD-10-CM

## 2021-03-17 PROCEDURE — 11042 DBRDMT SUBQ TIS 1ST 20SQCM/<: CPT | Performed by: NURSE PRACTITIONER

## 2021-03-17 PROCEDURE — 11042 DBRDMT SUBQ TIS 1ST 20SQCM/<: CPT

## 2021-03-17 RX ORDER — LIDOCAINE 50 MG/G
OINTMENT TOPICAL ONCE
Status: CANCELLED | OUTPATIENT
Start: 2021-03-17 | End: 2021-03-17

## 2021-03-17 RX ORDER — LIDOCAINE HYDROCHLORIDE 20 MG/ML
JELLY TOPICAL ONCE
Status: CANCELLED | OUTPATIENT
Start: 2021-03-17 | End: 2021-03-17

## 2021-03-17 RX ORDER — LIDOCAINE HYDROCHLORIDE 20 MG/ML
JELLY TOPICAL ONCE
Status: COMPLETED | OUTPATIENT
Start: 2021-03-17 | End: 2021-03-17

## 2021-03-17 RX ORDER — LIDOCAINE 40 MG/G
CREAM TOPICAL ONCE
Status: CANCELLED | OUTPATIENT
Start: 2021-03-17 | End: 2021-03-17

## 2021-03-17 RX ORDER — LIDOCAINE HYDROCHLORIDE 40 MG/ML
SOLUTION TOPICAL ONCE
Status: CANCELLED | OUTPATIENT
Start: 2021-03-17 | End: 2021-03-17

## 2021-03-17 RX ADMIN — LIDOCAINE HYDROCHLORIDE 6 ML: 20 JELLY TOPICAL at 13:20

## 2021-03-17 ASSESSMENT — PAIN DESCRIPTION - LOCATION: LOCATION: LEG

## 2021-03-17 ASSESSMENT — PAIN DESCRIPTION - DESCRIPTORS: DESCRIPTORS: ACHING;THROBBING

## 2021-03-17 NOTE — PROGRESS NOTES
 Cancer Father         \"oral\"    Diabetes Sister         gestational       SOCIAL HISTORY    Social History     Tobacco Use    Smoking status: Never Smoker    Smokeless tobacco: Never Used   Substance Use Topics    Alcohol use: Yes     Comment: once a month    Drug use: No       ALLERGIES    Allergies   Allergen Reactions    Shellfish-Derived Products Nausea Only    Morphine Other (See Comments)     HALLUCINATIONS      Tape [Adhesive Tape] Rash       MEDICATIONS    Current Outpatient Medications on File Prior to Encounter   Medication Sig Dispense Refill    citalopram (CELEXA) 40 MG tablet TAKE 1 TABLET BY MOUTH EVERY DAY 30 tablet 10    gabapentin (NEURONTIN) 400 MG capsule TAKE 1 CAPSULE BY MOUTH THREE TIMES DAILY *EMERGENCY REFILL* 90 capsule 2    ASPIRIN LOW DOSE 81 MG EC tablet TAKE 1 TABLET BY MOUTH DAILY 30 tablet 10    busPIRone (BUSPAR) 10 MG tablet TAKE 1 TABLET BY MOUTH TWICE DAILY 60 tablet 10    acetaminophen (TYLENOL) 500 MG tablet Take 2 tablets by mouth 3 times daily as needed for Pain 180 tablet 0    miconazole (MICOTIN) 2 % cream Apply topically 2 times daily.  30 g 2    lidocaine (XYLOCAINE) 2 % jelly Apply topically with dressing changes every 3 days 1 Tube 1    ibuprofen (ADVIL;MOTRIN) 800 MG tablet Take 1 tablet by mouth 4 times daily as needed for Pain 120 tablet 0    mupirocin (BACTROBAN) 2 % cream Apply 2 times daily to affected area 1 Tube 0    melatonin 10 MG CAPS capsule Take 1 capsule by mouth nightly as needed (sleep) 30 capsule 2    amLODIPine (NORVASC) 5 MG tablet TAKE 1 TABLET BY MOUTH EVERY DAY 30 tablet 3    atorvastatin (LIPITOR) 40 MG tablet TAKE 1 TABLET BY MOUTH NIGHTLY 30 tablet 3    carvedilol (COREG) 6.25 MG tablet TAKE 1 TABLET BY MOUTH 2 TIMES DAILY WITH MEALS 60 tablet 3    isosorbide mononitrate (IMDUR) 60 MG extended release tablet TAKE 1 TABLET BY MOUTH DAILY 30 tablet 3    clopidogrel (PLAVIX) 75 MG tablet TAKE 1 TABLET BY MOUTH DAILY 30 tablet 3    lisinopril (PRINIVIL;ZESTRIL) 10 MG tablet Take 1 tablet by mouth once daily 30 tablet 5    JARDIANCE 10 MG tablet TAKE 1 TABLET BY MOUTH EVERY DAY 30 tablet 5    glimepiride (AMARYL) 4 MG tablet TAKE 1 TABLET BY MOUTH TWICE DAILY 180 tablet 3    blood glucose monitor strips Test before meals and at bedtime. DX: DM, on insulin 100 strip 11    insulin glargine (LANTUS SOLOSTAR) 100 UNIT/ML injection pen Inject 65 Units into the skin daily 15 pen 12    omeprazole (PRILOSEC) 20 MG delayed release capsule Take 1 capsule by mouth Daily 90 capsule 3    levothyroxine (SYNTHROID) 50 MCG tablet TAKE 1 TABLET BY MOUTH EVERY DAY 30 tablet 10    miconazole (ZEASORB-AF) 2 % powder Apply topically 2 times daily. 45 g 1    blood glucose monitor kit and supplies Dispense sufficient amount for indicated testing frequency plus additional to accommodate PRN testing needs. Dispense all needed supplies to include: monitor, strips, lancing device, lancets, control solutions, alcohol swabs. 1 kit 0    sucralfate (CARAFATE) 1 GM tablet Take 1 tablet by mouth daily 180 tablet 1    magnesium hydroxide (MILK OF MAGNESIA) 400 MG/5ML suspension Take 30 mLs by mouth daily as needed for Constipation 900 mL 0    nitroGLYCERIN (NITROSTAT) 0.4 MG SL tablet up to max of 3 total doses. If no relief after 1 dose, call 911. 25 tablet 3    Handicap Placard MISC Is a permanent condition. DX: M19.90 1 each 0    Insulin Pen Needle (B-D UF III MINI PEN NEEDLES) 31G X 5 MM MISC USE 1 PEN NEEDLE DAILY 50 each 1    [employer] Lancets Thin MISC Test before meals and at bedtime. DX: DM, on insulin 100 each 11    MULTIPLE VITAMIN PO   Take 2 tablets by mouth daily DAILY      Alcohol Swabs (ALCOHOL PREP PADS) by Other route 2 times daily. No current facility-administered medications on file prior to encounter.         REVIEW OF SYSTEMS    Constitutional: negative  Eyes: negative  Ears, nose, mouth, throat, and face: negative  Respiratory: negative  Cardiovascular: negative except for lower extremity edema   Gastrointestinal: negative  Genitourinary:negative  Integument/breast: negative except for right lower leg / foot wounds  Hematologic/lymphatic: negative  Musculoskeletal:negative  Neurological: negative except for paresthesia  Behavioral/Psych: negative except for anxiety  Endocrine: negative  Allergic/Immunologic: negative    Objective:      BP (!) 156/70   Pulse 61   Temp 98 °F (36.7 °C) (Tympanic)   Resp 19   Ht 4' 10\" (1.473 m)   Wt 240 lb (108.9 kg)   BMI 50.16 kg/m²     Wt Readings from Last 3 Encounters:   03/17/21 240 lb (108.9 kg)   03/10/21 240 lb (108.9 kg)   03/03/21 240 lb (108.9 kg)       PHYSICAL EXAM    General Appearance: alert and oriented to person, place and time, well-developed and obese, in no acute distress  Skin: warm and dry, no rash or erythema, right lower leg / foot wounds  Head: normocephalic and atraumatic  Eyes: pupils equal, round, extraocular eye movements intact, and conjunctivae normal  Pulmonary/Chest: normal air movement, no respiratory distress  Extremities: no cyanosis and no clubbing  Musculoskeletal: no joint swelling, deformity or tenderness  Neurologic: gait, coordination normal and speech normal      Assessment:     Problem List Items Addressed This Visit     Morbid obesity with BMI of 45.0-49.9, adult (HCC)    Relevant Orders    Supply: Wound Cleanser    Supply: Wound Dressings    Supply: Cover and Secure    Supply: Edema Control    Neuropathy (Chronic)    Relevant Orders    Supply: Wound Cleanser    Supply: Wound Dressings    Supply: Cover and Secure    Supply: Edema Control    Open wound of right foot    Relevant Orders    Supply: Wound Cleanser    Supply: Wound Dressings    Supply: Cover and Secure    Supply: Edema Control    Skin ulcer of right calf with fat layer exposed ([facility] [address] - Primary    Relevant Orders    Supply: Wound Cleanser    Supply: Wound Dressings    Supply: 1311   Drainage Amount Moderate 03/17/21 1311   Drainage Description Serosanguinous 03/17/21 1311   Odor None 03/17/21 1311   Jeannie-wound Assessment Blanchable erythema;Fragile 03/17/21 1311   Margins Defined edges 03/17/21 1311   Wound Thickness Description not for Pressure Injury Full thickness 03/17/21 1311   Number of days: 20       Wound 02/24/21 Leg Right; Lower;Medial #2 cluster (Active)   Wound Image   02/24/21 1456   Wound Etiology Traumatic 03/17/21 1311   Dressing Status Old drainage noted;New drainage noted 03/17/21 1311   Wound Cleansed Cleansed with saline 03/17/21 1311   Dressing/Treatment Other (comment) 03/17/21 1352   Wound Length (cm) 2.3 cm 03/17/21 1311   Wound Width (cm) 1 cm 03/17/21 1311   Wound Depth (cm) 0.2 cm 03/17/21 1311   Wound Surface Area (cm^2) 2.3 cm^2 03/17/21 1311   Change in Wound Size % (l*w) -19.79 03/17/21 1311   Wound Volume (cm^3) 0.46 cm^3 03/17/21 1311   Wound Healing % -142 03/17/21 1311   Post-Procedure Length (cm) 2.3 cm 03/17/21 1311   Post-Procedure Width (cm) 1 cm 03/17/21 1311   Post-Procedure Depth (cm) 0.2 cm 03/17/21 1311   Post-Procedure Surface Area (cm^2) 2.3 cm^2 03/17/21 1311   Post-Procedure Volume (cm^3) 0.46 cm^3 03/17/21 1311   Wound Assessment Pink/red;Slough 03/17/21 1311   Drainage Amount Moderate 03/17/21 1311   Drainage Description Serosanguinous 03/17/21 1311   Odor None 03/17/21 1311   Jeannie-wound Assessment Fragile; Maceration;Blanchable erythema 03/17/21 1311   Margins Defined edges 03/17/21 1311   Wound Thickness Description not for Pressure Injury Full thickness 03/17/21 1311   Number of days: 20       Wound 02/24/21 Ankle Right;Medial #3 (Active)   Wound Image   02/24/21 1456   Wound Etiology Traumatic 03/17/21 1311   Dressing Status Old drainage noted;New drainage noted 03/17/21 1311   Wound Cleansed Cleansed with saline 03/17/21 1311   Dressing/Treatment Other (comment) 03/17/21 1352   Wound Length (cm) 0.3 cm 03/17/21 1311   Wound Width (cm) 0.2 cm 03/17/21 1311   Wound Depth (cm) 0.1 cm 03/17/21 1311   Wound Surface Area (cm^2) 0.06 cm^2 03/17/21 1311   Change in Wound Size % (l*w) 70 03/17/21 1311   Wound Volume (cm^3) 0.01 cm^3 03/17/21 1311   Wound Healing % 50 03/17/21 1311   Post-Procedure Length (cm) 0.3 cm 03/17/21 1311   Post-Procedure Width (cm) 0.2 cm 03/17/21 1311   Post-Procedure Depth (cm) 0.1 cm 03/17/21 1311   Post-Procedure Surface Area (cm^2) 0.06 cm^2 03/17/21 1311   Post-Procedure Volume (cm^3) 0.01 cm^3 03/17/21 1311   Wound Assessment Pink/red 03/17/21 1311   Drainage Amount Moderate 03/17/21 1311   Drainage Description Serosanguinous 03/17/21 1311   Odor None 03/17/21 1311   Jeannie-wound Assessment Blanchable erythema; Maceration 03/17/21 1311   Margins Defined edges 03/17/21 1311   Wound Thickness Description not for Pressure Injury Full thickness 03/17/21 1311   Number of days: 20          Percent of Wound(s)/Ulcer(s) Debrided: 100%    Total Surface Area Debrided:  15.38 sq cm       Diabetic/Pressure/Non Pressure Ulcers only:  Ulcer: Non-Pressure ulcer, fat layer exposed      Estimated Blood Loss:  Minimal    Hemostasis Achieved:  by pressure    Procedural Pain:  2  / 10     Post Procedural Pain:  2 / 10     Response to treatment:  Well tolerated by patient. Plan:     Treatment Note please see attached Discharge Instructions    Written patient dismissal instructions given to patient and signed by patient or POA.          Discharge Instructions          [facility] -Phone: [phone] Fax: [phone]             Visit Teresita Instructions / Physician Orders     DATE: 3/17/2021     Home Care: NA     SUPPLIES ORDERED THRU: [facility]-ordered 2/24/2021     Wound Location:  Right Lower Leg X3     Cleanse with: Keep Dry and Intact     Dressing Orders: Promogran, Silvercel, Calamine Unna Boot      Frequency:  Keep Dry and Intact     Additional Orders: Increase protein to diet (meat, cheese, eggs, fish, peanut butter, nuts and beans)  Multivitamin daily  ELEVATE LEGS AS MUCH AS POSSIBLE     Your next appointment with [address] [place] [address] is in 1 week                                                                                                    ROOM TYPE   []? ?? CHAIR     [x]? ?? STRETCHER  []??? EITHER             (Please note your next appointment above and if you are unable to keep, kindly give a 24 hour notice. Thank you.)     If you experience any of the following, please call the [address] [place] [address] during business hours:  [phone]  Your Phone call may be forwarded to [phone] during business hours that [facility] is closed.     * Increase in Pain  * Temperature over 101  * Increase in drainage from your wound  * Drainage with a foul odor  * Bleeding  * Increase in swelling  * Need for compression bandage changes due to slippage, breakthrough drainage.     If you need medical attention outside of the business hours of the [address] [place] [address] please contact your PCP or go to the nearest emergency room.     The information contained in the After Visit Summary has been reviewed with me, the patient and/or responsible adult, by my health care provider(s). I had the opportunity to ask questions regarding this information. I have elected to receive;      []? ? ? After Visit Summary  [x]? ?? Comprehensive Discharge Instruction        Patient signature______________________________________Date:________  Electronically signed by Dannie Pleitez RN on 3/17/2021 at 1:46 PM  Electronically signed by HAO Caballero CNP on 3/17/2021 at 1:49 PM          Electronically signed by HAO Caballero CNP on 3/17/2021 at 1:54 PM

## 2021-03-17 NOTE — PROGRESS NOTES
[place] Application   Below Knee    NAME:  Sharon Henry  YOB: 1955  MEDICAL RECORD NUMBER:  [id number]  DATE:  3/17/2021    Ardath Leslie boot: Applied moisturizing agent to dry skin as needed. Appied primary and secondary dressing as ordered. Applied Unna roll from toes to knee overlapping each time. Applied ace wrap or coban from toes to below the knee. Secured with tape and/or metal clips covered with tape. Instructed patient/caregiver to keep dressing dry and intact. DO NOT REMOVE DRESSING. Instructed pt/family/caregiver to report excessive draining, loose bandage, wet dressing, severe pain or tingling in toes. Applied [place] dressing below the knee to right lower leg. Promogram, silvercel, calamine unna boot. Unna Boot(s) were applied per  Guidelines.      Electronically signed by Don England RN on 3/17/2021 at 1:53 PM

## 2021-03-23 ENCOUNTER — HOSPITAL ENCOUNTER (OUTPATIENT)
Dept: WOUND CARE | Age: 66
Discharge: HOME | End: 2021-03-23
Payer: COMMERCIAL

## 2021-03-23 VITALS
SYSTOLIC BLOOD PRESSURE: 138 MMHG | HEART RATE: 69 BPM | BODY MASS INDEX: 50.38 KG/M2 | RESPIRATION RATE: 20 BRPM | WEIGHT: 240 LBS | DIASTOLIC BLOOD PRESSURE: 62 MMHG | TEMPERATURE: 97.8 F | HEIGHT: 58 IN

## 2021-03-23 DIAGNOSIS — E11.59 TYPE 2 DIABETES MELLITUS WITH OTHER CIRCULATORY COMPLICATION, WITH LONG-TERM CURRENT USE OF INSULIN (HCC): ICD-10-CM

## 2021-03-23 DIAGNOSIS — G62.9 NEUROPATHY: ICD-10-CM

## 2021-03-23 DIAGNOSIS — S50.812A ABRASION OF LEFT FOREARM, INITIAL ENCOUNTER: ICD-10-CM

## 2021-03-23 DIAGNOSIS — E66.01 MORBID OBESITY WITH BMI OF 45.0-49.9, ADULT (HCC): ICD-10-CM

## 2021-03-23 DIAGNOSIS — S91.301D OPEN WOUND OF RIGHT FOOT, SUBSEQUENT ENCOUNTER: ICD-10-CM

## 2021-03-23 DIAGNOSIS — Z79.4 TYPE 2 DIABETES MELLITUS WITH OTHER CIRCULATORY COMPLICATION, WITH LONG-TERM CURRENT USE OF INSULIN (HCC): ICD-10-CM

## 2021-03-23 DIAGNOSIS — L97.212 SKIN ULCER OF RIGHT CALF WITH FAT LAYER EXPOSED (HCC): Primary | ICD-10-CM

## 2021-03-23 PROCEDURE — 11042 DBRDMT SUBQ TIS 1ST 20SQCM/<: CPT

## 2021-03-23 PROCEDURE — 11042 DBRDMT SUBQ TIS 1ST 20SQCM/<: CPT | Performed by: NURSE PRACTITIONER

## 2021-03-23 RX ORDER — LIDOCAINE HYDROCHLORIDE 20 MG/ML
JELLY TOPICAL ONCE
Status: COMPLETED | OUTPATIENT
Start: 2021-03-23 | End: 2021-03-23

## 2021-03-23 RX ORDER — LIDOCAINE HYDROCHLORIDE 20 MG/ML
JELLY TOPICAL ONCE
Status: CANCELLED | OUTPATIENT
Start: 2021-03-23 | End: 2021-03-23

## 2021-03-23 RX ORDER — LIDOCAINE 50 MG/G
OINTMENT TOPICAL ONCE
Status: CANCELLED | OUTPATIENT
Start: 2021-03-23 | End: 2021-03-23

## 2021-03-23 RX ORDER — LIDOCAINE HYDROCHLORIDE 40 MG/ML
SOLUTION TOPICAL ONCE
Status: CANCELLED | OUTPATIENT
Start: 2021-03-23 | End: 2021-03-23

## 2021-03-23 RX ORDER — LIDOCAINE 40 MG/G
CREAM TOPICAL ONCE
Status: CANCELLED | OUTPATIENT
Start: 2021-03-23 | End: 2021-03-23

## 2021-03-23 RX ADMIN — LIDOCAINE HYDROCHLORIDE 6 ML: 20 JELLY TOPICAL at 09:00

## 2021-03-23 ASSESSMENT — PAIN DESCRIPTION - LOCATION: LOCATION: LEG

## 2021-03-23 ASSESSMENT — PAIN DESCRIPTION - DESCRIPTORS: DESCRIPTORS: STABBING;THROBBING

## 2021-03-23 ASSESSMENT — PAIN DESCRIPTION - PAIN TYPE: TYPE: ACUTE PAIN;CHRONIC PAIN

## 2021-03-23 ASSESSMENT — PAIN DESCRIPTION - ORIENTATION: ORIENTATION: RIGHT

## 2021-03-23 ASSESSMENT — PAIN SCALES - GENERAL: PAINLEVEL_OUTOF10: 6

## 2021-03-23 NOTE — PROGRESS NOTES
[address]   Progress Note and Procedure Note      [facility] RECORD NUMBER:  [id number]  AGE: 60 y.o. GENDER: female  : 1955  EPISODE DATE:  3/23/2021    Subjective:     Chief Complaint   Patient presents with    Wound Check     right lower leg         HISTORY of PRESENT ILLNESS HPI     Evie Mclean is a 60 y.o. female who presents today for wound/ulcer evaluation. History of Wound Context: here for follow up on right lower leg / foot wounds. Right foot wound is closed. Has new abrasion to left forearm. She is doing well with unna boot.    Wound/Ulcer Pain Timing/Severity: constant  Quality of pain: sharp  Severity:  2 / 10   Modifying Factors: Pain worsens with touching / debridement  Associated Signs/Symptoms: erythema and pain    Ulcer Identification:  Ulcer Type: traumatic  Contributing Factors: edema, diabetes, decreased mobility and obesity    Wound: N/A        PAST MEDICAL HISTORY        Diagnosis Date    Anxiety     Borderline hypertension     Depression 2020    GERD (gastroesophageal reflux disease)     Heart attack ([facility] 2020    Hypothyroidism     Neuropathy     Obesity     morbid    Osteoarthritis     Other cirrhosis of liver ([facility] 2020    Sleep apnea     possible    Type II or unspecified type diabetes mellitus without mention of complication, not stated as uncontrolled        PAST SURGICAL HISTORY    Past Surgical History:   Procedure Laterality Date    APPENDECTOMY      COLONOSCOPY      DILATION AND CURETTAGE OF UTERUS      HIATAL HERNIA REPAIR      HYSTERECTOMY      THROAT SURGERY      POLYPS REMOVED FROM VOCAL CORD    TOTAL KNEE ARTHROPLASTY Right 2015    TOTAL KNEE ARTHROPLASTY Left 5/26/15    UPPER GASTROINTESTINAL ENDOSCOPY         FAMILY HISTORY    Family History   Problem Relation Age of Onset    Heart Disease Mother     Arthritis Mother     Heart Disease Father     Arthritis Father  Cancer Father         \"oral\"    Diabetes Sister         gestational       SOCIAL HISTORY    Social History     Tobacco Use    Smoking status: Never Smoker    Smokeless tobacco: Never Used   Substance Use Topics    Alcohol use: Yes     Comment: once a month    Drug use: No       ALLERGIES    Allergies   Allergen Reactions    Shellfish-Derived Products Nausea Only    Morphine Other (See Comments)     HALLUCINATIONS      Tape [Adhesive Tape] Rash       MEDICATIONS    Current Outpatient Medications on File Prior to Encounter   Medication Sig Dispense Refill    citalopram (CELEXA) 40 MG tablet TAKE 1 TABLET BY MOUTH EVERY DAY 30 tablet 10    gabapentin (NEURONTIN) 400 MG capsule TAKE 1 CAPSULE BY MOUTH THREE TIMES DAILY *EMERGENCY REFILL* 90 capsule 2    ASPIRIN LOW DOSE 81 MG EC tablet TAKE 1 TABLET BY MOUTH DAILY 30 tablet 10    busPIRone (BUSPAR) 10 MG tablet TAKE 1 TABLET BY MOUTH TWICE DAILY 60 tablet 10    acetaminophen (TYLENOL) 500 MG tablet Take 2 tablets by mouth 3 times daily as needed for Pain 180 tablet 0    miconazole (MICOTIN) 2 % cream Apply topically 2 times daily.  30 g 2    lidocaine (XYLOCAINE) 2 % jelly Apply topically with dressing changes every 3 days 1 Tube 1    ibuprofen (ADVIL;MOTRIN) 800 MG tablet Take 1 tablet by mouth 4 times daily as needed for Pain 120 tablet 0    melatonin 10 MG CAPS capsule Take 1 capsule by mouth nightly as needed (sleep) 30 capsule 2    amLODIPine (NORVASC) 5 MG tablet TAKE 1 TABLET BY MOUTH EVERY DAY 30 tablet 3    atorvastatin (LIPITOR) 40 MG tablet TAKE 1 TABLET BY MOUTH NIGHTLY 30 tablet 3    carvedilol (COREG) 6.25 MG tablet TAKE 1 TABLET BY MOUTH 2 TIMES DAILY WITH MEALS 60 tablet 3    isosorbide mononitrate (IMDUR) 60 MG extended release tablet TAKE 1 TABLET BY MOUTH DAILY 30 tablet 3    clopidogrel (PLAVIX) 75 MG tablet TAKE 1 TABLET BY MOUTH DAILY 30 tablet 3    lisinopril (PRINIVIL;ZESTRIL) 10 MG tablet Take 1 tablet by mouth once daily 30 tablet 5    JARDIANCE 10 MG tablet TAKE 1 TABLET BY MOUTH EVERY DAY 30 tablet 5    glimepiride (AMARYL) 4 MG tablet TAKE 1 TABLET BY MOUTH TWICE DAILY 180 tablet 3    blood glucose monitor strips Test before meals and at bedtime. DX: DM, on insulin 100 strip 11    insulin glargine (LANTUS SOLOSTAR) 100 UNIT/ML injection pen Inject 65 Units into the skin daily 15 pen 12    omeprazole (PRILOSEC) 20 MG delayed release capsule Take 1 capsule by mouth Daily 90 capsule 3    levothyroxine (SYNTHROID) 50 MCG tablet TAKE 1 TABLET BY MOUTH EVERY DAY 30 tablet 10    miconazole (ZEASORB-AF) 2 % powder Apply topically 2 times daily. 45 g 1    blood glucose monitor kit and supplies Dispense sufficient amount for indicated testing frequency plus additional to accommodate PRN testing needs. Dispense all needed supplies to include: monitor, strips, lancing device, lancets, control solutions, alcohol swabs. 1 kit 0    sucralfate (CARAFATE) 1 GM tablet Take 1 tablet by mouth daily 180 tablet 1    magnesium hydroxide (MILK OF MAGNESIA) 400 MG/5ML suspension Take 30 mLs by mouth daily as needed for Constipation 900 mL 0    nitroGLYCERIN (NITROSTAT) 0.4 MG SL tablet up to max of 3 total doses. If no relief after 1 dose, call 911. 25 tablet 3    Handicap Placard MISC Is a permanent condition. DX: M19.90 1 each 0    Insulin Pen Needle (B-D UF III MINI PEN NEEDLES) 31G X 5 MM MISC USE 1 PEN NEEDLE DAILY 50 each 1    [employer] Lancets Thin MISC Test before meals and at bedtime. DX: DM, on insulin 100 each 11    MULTIPLE VITAMIN PO   Take 2 tablets by mouth daily DAILY      Alcohol Swabs (ALCOHOL PREP PADS) by Other route 2 times daily. No current facility-administered medications on file prior to encounter.         REVIEW OF SYSTEMS    Constitutional: negative  Eyes: negative  Ears, nose, mouth, throat, and face: negative  Respiratory: negative  Cardiovascular: negative except for lower extremity edema  Gastrointestinal: negative  Genitourinary:negative  Integument/breast: negative except for right lower leg and left forearm wounds  Hematologic/lymphatic: negative  Musculoskeletal:negative  Neurological: negative except for paresthesia  Behavioral/Psych: negative except for anxiety  Endocrine: negative  Allergic/Immunologic: negative    Objective:      /62   Pulse 69   Temp 97.8 °F (36.6 °C) (Tympanic)   Resp 20   Ht 4' 10\" (1.473 m)   Wt 240 lb (108.9 kg)   BMI 50.16 kg/m²     Wt Readings from Last 3 Encounters:   03/23/21 240 lb (108.9 kg)   03/17/21 240 lb (108.9 kg)   03/10/21 240 lb (108.9 kg)       PHYSICAL EXAM    General Appearance: alert and oriented to person, place and time, well-developed and obese, in no acute distress  Skin: warm and dry, no rash or erythema, right lower leg and left forearm wounds  Head: normocephalic and atraumatic  Eyes: pupils equal, round, extraocular eye movements intact, and conjunctivae normal  Pulmonary/Chest: normal air movement, no respiratory distress  Extremities: no cyanosis and no clubbing  Musculoskeletal: no joint swelling, deformity or tenderness  Neurologic: gait, coordination normal and speech normal      Assessment:     Problem List Items Addressed This Visit     Abrasion of left forearm    Morbid obesity with BMI of 45.0-49.9, adult (HCC)    Relevant Orders    Supply: Wound Cleanser    Supply: Wound Dressings    Supply: Cover and Secure    Supply: Edema Control    Neuropathy (Chronic)    Relevant Orders    Supply: Wound Cleanser    Supply: Wound Dressings    Supply: Cover and Secure    Supply: Edema Control    Open wound of right foot    Relevant Orders    Supply: Wound Cleanser    Supply: Wound Dressings    Supply: Cover and Secure    Supply: Edema Control    Skin ulcer of right calf with fat layer exposed ([facility] - Primary    Relevant Orders    Supply: Wound Cleanser    Supply: Wound Dressings    Supply: Cover and Secure    Supply: Edema Control    Type 2 diabetes mellitus, with long-term current use of insulin ([facility])    Relevant Orders    Supply: Wound Cleanser    Supply: Wound Dressings    Supply: Cover and Secure    Supply: Edema Control           Procedure Note  Indications:  Based on my examination of this patient's wound(s)/ulcer(s) today, debridement is required to promote healing and evaluate the wound base. Performed by: HAO Ibarra CNP    Consent obtained:  Yes    Time out taken:  Yes    Pain Control: Anesthetic  Anesthetic: 2% Lidocaine Gel Topical       Debridement:Excisional Debridement    Using curette the wound(s)/ulcer(s) was/were sharply debrided down through and including the removal of subcutaneous tissue. Devitalized Tissue Debrided:  fibrin, biofilm and slough    Pre Debridement Measurements:  Are located in the Wound/Ulcer Documentation Flow Sheet    Wound/Ulcer #: 1 and 2    Post Debridement Measurements:  Wound/Ulcer Descriptions are Pre Debridement except measurements:    Wound 02/24/21 Leg Lower; Posterior;Right #1 (Active)   Wound Image   03/23/21 0900   Wound Etiology Traumatic 03/23/21 0900   Dressing Status New drainage noted; Old drainage noted 03/23/21 0900   Wound Cleansed Cleansed with saline 03/23/21 0900   Dressing/Treatment Other (comment) 03/17/21 1352   Wound Length (cm) 4.1 cm 03/23/21 0900   Wound Width (cm) 3.1 cm 03/23/21 0900   Wound Depth (cm) 0.3 cm 03/23/21 0900   Wound Surface Area (cm^2) 12.71 cm^2 03/23/21 0900   Change in Wound Size % (l*w) 6.54 03/23/21 0900   Wound Volume (cm^3) 3.81 cm^3 03/23/21 0900   Wound Healing % -180 03/23/21 0900   Post-Procedure Length (cm) 4.1 cm 03/23/21 0900   Post-Procedure Width (cm) 3.1 cm 03/23/21 0900   Post-Procedure Depth (cm) 0.3 cm 03/23/21 0900   Post-Procedure Surface Area (cm^2) 12.71 cm^2 03/23/21 0900   Post-Procedure Volume (cm^3) 3.81 cm^3 03/23/21 0900   Wound Assessment Pink/red;Slough;Bleeding 03/23/21 0900   Drainage Amount Moderate 03/23/21 0900   Drainage Description Serosanguinous 03/23/21 0900   Odor None 03/23/21 0900   Jeannie-wound Assessment Blanchable erythema;Fragile;Edematous 03/23/21 0900   Margins Defined edges 03/23/21 0900   Wound Thickness Description not for Pressure Injury Full thickness 03/23/21 0900   Number of days: 26       Wound 02/24/21 Leg Right; Lower;Medial #2 cluster (Active)   Wound Image   03/23/21 0900   Wound Etiology Traumatic 03/23/21 0900   Dressing Status Old drainage noted;New drainage noted 03/23/21 0900   Wound Cleansed Cleansed with saline 03/23/21 0900   Dressing/Treatment Other (comment) 03/17/21 1352   Wound Length (cm) 2 cm 03/23/21 0900   Wound Width (cm) 0.5 cm 03/23/21 0900   Wound Depth (cm) 0.2 cm 03/23/21 0900   Wound Surface Area (cm^2) 1 cm^2 03/23/21 0900   Change in Wound Size % (l*w) 47.92 03/23/21 0900   Wound Volume (cm^3) 0.2 cm^3 03/23/21 0900   Wound Healing % -5 03/23/21 0900   Post-Procedure Length (cm) 2 cm 03/23/21 0900   Post-Procedure Width (cm) 0.5 cm 03/23/21 0900   Post-Procedure Depth (cm) 0.2 cm 03/23/21 0900   Post-Procedure Surface Area (cm^2) 1 cm^2 03/23/21 0900   Post-Procedure Volume (cm^3) 0.2 cm^3 03/23/21 0900   Wound Assessment Pink/red;Slough 03/23/21 0900   Drainage Amount Moderate 03/23/21 0900   Drainage Description Serosanguinous 03/23/21 0900   Odor None 03/23/21 0900   Jeannie-wound Assessment Blanchable erythema;Dry/flaky 03/23/21 0900   Margins Defined edges 03/23/21 0900   Wound Thickness Description not for Pressure Injury Full thickness 03/23/21 0900   Number of days: 26       Wound 02/24/21 Ankle Right;Medial #3 (Active)   Wound Image   03/23/21 0900   Wound Etiology Traumatic 03/17/21 1311   Dressing Status Old drainage noted;New drainage noted 03/17/21 1311   Wound Cleansed Cleansed with saline 03/17/21 1311   Dressing/Treatment Other (comment) 03/17/21 1352   Wound Length (cm) 0 cm 03/23/21 0900   Wound Width (cm) 0 cm 03/23/21 0900   Wound Depth (cm) 0 cm 03/23/21 0900   Wound Surface Area (cm^2) 0 cm^2 03/23/21 0900   Change in Wound Size % (l*w) 100 03/23/21 0900   Wound Volume (cm^3) 0 cm^3 03/23/21 0900   Wound Healing % 100 03/23/21 0900   Post-Procedure Length (cm) 0 cm 03/23/21 0900   Post-Procedure Width (cm) 0 cm 03/23/21 0900   Post-Procedure Depth (cm) 0 cm 03/23/21 0900   Post-Procedure Surface Area (cm^2) 0 cm^2 03/23/21 0900   Post-Procedure Volume (cm^3) 0 cm^3 03/23/21 0900   Wound Assessment Pink/red 03/17/21 1311   Drainage Amount Moderate 03/17/21 1311   Drainage Description Serosanguinous 03/17/21 1311   Odor None 03/17/21 1311   Jeannie-wound Assessment Blanchable erythema; Maceration 03/17/21 1311   Margins Defined edges 03/17/21 1311   Wound Thickness Description not for Pressure Injury Full thickness 03/17/21 1311   Number of days: 26          Percent of Wound(s)/Ulcer(s) Debrided: 100%    Total Surface Area Debrided:  13.71 sq cm       Diabetic/Pressure/Non Pressure Ulcers only:  Ulcer: Non-Pressure ulcer, fat layer exposed      Estimated Blood Loss:  Minimal    Hemostasis Achieved:  by pressure    Procedural Pain:  2  / 10     Post Procedural Pain:  2 / 10     Response to treatment:  Well tolerated by patient. Plan:     Treatment Note please see attached Discharge Instructions    Written patient dismissal instructions given to patient and signed by patient or POA.          Discharge Instructions          [facility] -Phone: [phone] Fax: [phone]             Visit  Discharge Instructions / Physician Orders     DATE: 3/23/2021     Home Care: NA     SUPPLIES ORDERED THRU: [employer]     Wound Location:  Right Lower Leg X2     Cleanse with: Keep Dry and Intact     Dressing Orders: Promogran, Silvercel, Calamine Unna Boot                                  Left Arm: Britt to wound, Mepilex Border-change every other day     Frequency:  Keep Dry and Intact     Additional Orders: Increase protein to diet (meat, cheese, eggs, fish, peanut butter, nuts and beans)  Multivitamin daily  ELEVATE LEGS AS MUCH AS POSSIBLE     Your next appointment with [facility] is in 1 week on Wednesday with Teresa                                                                                                   ROOM TYPE   []???? CHAIR     [x]? ??? STRETCHER  []???? EITHER             (Please note your next appointment above and if you are unable to keep, kindly give a 24 hour notice. Thank you.)     If you experience any of the following, please call the [facility] during business hours:  [phone]  Your Phone call may be forwarded to [employer] during business hours that [facility] is closed.     * Increase in Pain  * Temperature over 101  * Increase in drainage from your wound  * Drainage with a foul odor  * Bleeding  * Increase in swelling  * Need for compression bandage changes due to slippage, breakthrough drainage.     If you need medical attention outside of the business hours of the [facility] please contact your PCP or go to the nearest emergency room.     The information contained in the After Visit Summary has been reviewed with me, the patient and/or responsible adult, by my health care provider(s). I had the opportunity to ask questions regarding this information. I have elected to receive;      []?? ?? After Visit Summary  [x]????Comprehensive Discharge Instruction        Patient signature______________________________________Date:________  Electronically signed by Karna Severs, RN on 3/23/2021 at 9:19 AM  Electronically signed by HAO Ron CNP on 3/23/2021 at 9:20 AM          Electronically signed by HAO Ron CNP on 3/23/2021 at 9:26 AM

## 2021-03-23 NOTE — PROGRESS NOTES
[place] Application   Below Knee    NAME:  Khoa Roberts  YOB: 1955  MEDICAL RECORD NUMBER:  [id number]  DATE:  3/23/2021     [x] Applied moisturizing agent to dry skin as needed.  [x] Appied primary and secondary dressing as ordered     [x] Applied Unna roll from toes to knee overlapping each time.  [x] Applied ace wrap or coban from toes to below the knee.  [x] Secured with tape and/or metal clips covered with tape.  [x] Instructed patient/caregiver to keep dressing dry and intact. DO NOT REMOVE DRESSING.  [x] Instructed pt/family/caregiver to report excessive draining, loose bandage, wet dressing, severe pain or tingling in toes.  [x] Applied [place] dressing below the knee to Right lower leg(s)        Unna Boot(s) were applied per  Guidelines.      Electronically signed by Arley Soto RN on 3/23/2021 at 9:23 AM

## 2021-03-25 DIAGNOSIS — T14.8XXA WOUND, OPEN: ICD-10-CM

## 2021-03-25 RX ORDER — ACETAMINOPHEN 500 MG
1000 TABLET ORAL 3 TIMES DAILY PRN
Qty: 180 TABLET | Refills: 0 | Status: SHIPPED | OUTPATIENT
Start: 2021-03-25 | End: 2021-08-05 | Stop reason: SDUPTHER

## 2021-03-25 NOTE — TELEPHONE ENCOUNTER
Last visit: 2-23-21  Last Med refill: 2-25-21  Does patient have enough medication for 72 hours: No     Next Visit Date:  Future Appointments   Date Time Provider Osmar Hutchinson   3/31/2021  1:45 PM HAO Sherwood CNP  [facility] Maintenance   Topic Date Due    COVID-19 Vaccine (1) Never done    Cervical cancer screen  Never done    Shingles Vaccine (1 of 2) Never done    Diabetic retinal exam  01/01/2014    Hepatitis A vaccine (2 of 2 - Risk 2-dose series) 02/17/2021    Diabetic microalbuminuria test  07/14/2021    Lipid screen  07/28/2021    Diabetic foot exam  08/17/2021    A1C test (Diabetic or Prediabetic)  02/04/2022    TSH testing  02/04/2022    Potassium monitoring  02/04/2022    Creatinine monitoring  02/04/2022    Breast cancer screen  08/20/2022    DTaP/Tdap/Td vaccine (6 - Td) 06/27/2024    Colon cancer screen colonoscopy  08/01/2024    Flu vaccine  Completed    Pneumococcal 0-64 years Vaccine  Completed    Hepatitis C screen  Completed    HIV screen  Completed    Hib vaccine  Aged Out    Meningococcal (ACWY) vaccine  Aged Out       Hemoglobin A1C (%)   Date Value   02/04/2021 7.5   08/17/2020 9.0   07/14/2020 10.7 (H)             ( goal A1C is < 7)   Microalb/Crt.  Ratio (mcg/mg creat)   Date Value   07/14/2020 34 (H)     LDL Cholesterol (mg/dL)   Date Value   07/28/2020 60   07/14/2020 120       (goal LDL is <100)   AST (U/L)   Date Value   07/29/2020 27     ALT (U/L)   Date Value   07/29/2020 21     BUN (mg/dL)   Date Value   02/04/2021 10     BP Readings from Last 3 Encounters:   03/23/21 138/62   03/17/21 (!) 156/70   03/10/21 135/72          (goal 120/80)    All Future Testing planned in [facility]  Lab Frequency Next Occurrence   Hemoglobin A1C Once 09/25/2021   RADHA Digital Screen Bilateral [[id number]] Once 08/17/2021   6 Minute Walk Test Once 09/28/2020   CBC With Auto Differential Once 02/11/2022   Reflux study/Reflux Venous Insufficiency Bilateral Once 02/24/2022               Patient Active Problem List:     Anxiety     GERD (gastroesophageal reflux disease)     OA (osteoarthritis)     Neuropathy     Right knee DJD     Hypothyroidism     S/P left total knee arthroplasty     Chest pain     Ischemic heart disease     Essential hypertension     Hypertensive urgency     NSTEMI (non-ST elevated myocardial infarction) ([address]     Other cirrhosis of liver (HCC)     Abdominal pain     Elevated lipase     Depression     Cystitis     Type 2 diabetes mellitus, with long-term current use of insulin (HCC)     SHERITA (obstructive sleep apnea)     Morbid obesity with BMI of 45.0-49.9, adult ([address]     Skin ulcer of right calf with fat layer exposed ([address]     Open wound of right foot     PVD (peripheral vascular disease) ([address]     Bilateral edema of lower extremity     Abrasion of left forearm

## 2021-03-31 ENCOUNTER — HOSPITAL ENCOUNTER (OUTPATIENT)
Dept: WOUND CARE | Age: 66
Discharge: HOME | End: 2021-03-31
Payer: COMMERCIAL

## 2021-03-31 VITALS
TEMPERATURE: 97.5 F | DIASTOLIC BLOOD PRESSURE: 61 MMHG | WEIGHT: 240 LBS | RESPIRATION RATE: 19 BRPM | BODY MASS INDEX: 50.38 KG/M2 | HEART RATE: 75 BPM | HEIGHT: 58 IN | SYSTOLIC BLOOD PRESSURE: 128 MMHG

## 2021-03-31 DIAGNOSIS — E11.59 TYPE 2 DIABETES MELLITUS WITH OTHER CIRCULATORY COMPLICATION, WITH LONG-TERM CURRENT USE OF INSULIN (HCC): ICD-10-CM

## 2021-03-31 DIAGNOSIS — S50.812D ABRASION OF LEFT FOREARM, SUBSEQUENT ENCOUNTER: ICD-10-CM

## 2021-03-31 DIAGNOSIS — G62.9 NEUROPATHY: ICD-10-CM

## 2021-03-31 DIAGNOSIS — E66.01 MORBID OBESITY WITH BMI OF 45.0-49.9, ADULT (HCC): ICD-10-CM

## 2021-03-31 DIAGNOSIS — L97.212 SKIN ULCER OF RIGHT CALF WITH FAT LAYER EXPOSED (HCC): Primary | ICD-10-CM

## 2021-03-31 DIAGNOSIS — Z79.4 TYPE 2 DIABETES MELLITUS WITH OTHER CIRCULATORY COMPLICATION, WITH LONG-TERM CURRENT USE OF INSULIN (HCC): ICD-10-CM

## 2021-03-31 PROBLEM — S91.301A OPEN WOUND OF RIGHT FOOT: Status: RESOLVED | Noted: 2021-02-24 | Resolved: 2021-03-31

## 2021-03-31 PROBLEM — S50.812A ABRASION OF LEFT FOREARM: Status: RESOLVED | Noted: 2021-03-23 | Resolved: 2021-03-31

## 2021-03-31 PROCEDURE — 11042 DBRDMT SUBQ TIS 1ST 20SQCM/<: CPT | Performed by: NURSE PRACTITIONER

## 2021-03-31 PROCEDURE — 11042 DBRDMT SUBQ TIS 1ST 20SQCM/<: CPT

## 2021-03-31 RX ORDER — LIDOCAINE HYDROCHLORIDE 20 MG/ML
JELLY TOPICAL ONCE
Status: CANCELLED | OUTPATIENT
Start: 2021-03-31 | End: 2021-03-31

## 2021-03-31 RX ORDER — LIDOCAINE HYDROCHLORIDE 40 MG/ML
SOLUTION TOPICAL ONCE
Status: CANCELLED | OUTPATIENT
Start: 2021-03-31 | End: 2021-03-31

## 2021-03-31 RX ORDER — LIDOCAINE 40 MG/G
CREAM TOPICAL ONCE
Status: CANCELLED | OUTPATIENT
Start: 2021-03-31 | End: 2021-03-31

## 2021-03-31 RX ORDER — LIDOCAINE 50 MG/G
OINTMENT TOPICAL ONCE
Status: CANCELLED | OUTPATIENT
Start: 2021-03-31 | End: 2021-03-31

## 2021-03-31 RX ORDER — LIDOCAINE HYDROCHLORIDE 20 MG/ML
JELLY TOPICAL ONCE
Status: COMPLETED | OUTPATIENT
Start: 2021-03-31 | End: 2021-03-31

## 2021-03-31 RX ADMIN — LIDOCAINE HYDROCHLORIDE 6 ML: 20 JELLY TOPICAL at 13:52

## 2021-03-31 NOTE — PROGRESS NOTES
[address]   Progress Note and Procedure Note      [address] [facility] RECORD NUMBER:  [id number]  AGE: 60 y.o. GENDER: female  : 1955  EPISODE DATE:  3/31/2021    Subjective:     Chief Complaint   Patient presents with    Wound Check     right lower leg         HISTORY of PRESENT ILLNESS HPI     Nori Jennings is a 60 y.o. female who presents today for wound/ulcer evaluation. History of Wound Context: here to follow up on right lower leg wounds. All wounds are closed except posterior right leg. Abrasion on left forearm is closed.  Complains of irritation and itching with calamine unna boot, will change to zinc.   Wound/Ulcer Pain Timing/Severity: constant  Quality of pain: sharp  Severity:  2 / 10   Modifying Factors: Pain worsens with touching / debridement  Associated Signs/Symptoms: erythema and pain    Ulcer Identification:  Ulcer Type: traumatic  Contributing Factors: edema, diabetes, decreased mobility and obesity    Wound: N/A        PAST MEDICAL HISTORY        Diagnosis Date    Anxiety     Borderline hypertension     Depression 2020    GERD (gastroesophageal reflux disease)     Heart attack ([facility] 2020    Hypothyroidism     Neuropathy     Obesity     morbid    Osteoarthritis     Other cirrhosis of liver ([facility] 2020    Sleep apnea     possible    Type II or unspecified type diabetes mellitus without mention of complication, not stated as uncontrolled        PAST SURGICAL HISTORY    Past Surgical History:   Procedure Laterality Date    APPENDECTOMY      COLONOSCOPY      DILATION AND CURETTAGE OF UTERUS      HIATAL HERNIA REPAIR      HYSTERECTOMY      THROAT SURGERY      POLYPS REMOVED FROM VOCAL CORD    TOTAL KNEE ARTHROPLASTY Right 2015    TOTAL KNEE ARTHROPLASTY Left 5/26/15    UPPER GASTROINTESTINAL ENDOSCOPY         FAMILY HISTORY    Family History   Problem Relation Age of Onset    Heart Disease Mother    [facility] Arthritis Mother     Heart Disease Father     Arthritis Father     Cancer Father         \"oral\"    Diabetes Sister         gestational       SOCIAL HISTORY    Social History     Tobacco Use    Smoking status: Never Smoker    Smokeless tobacco: Never Used   Substance Use Topics    Alcohol use: Yes     Comment: once a month    Drug use: No       ALLERGIES    Allergies   Allergen Reactions    Shellfish-Derived Products Nausea Only    Morphine Other (See Comments)     HALLUCINATIONS      Tape [Adhesive Tape] Rash       MEDICATIONS    Current Outpatient Medications on File Prior to Encounter   Medication Sig Dispense Refill    acetaminophen (TYLENOL) 500 MG tablet Take 2 tablets by mouth 3 times daily as needed for Pain 180 tablet 0    citalopram (CELEXA) 40 MG tablet TAKE 1 TABLET BY MOUTH EVERY DAY 30 tablet 10    gabapentin (NEURONTIN) 400 MG capsule TAKE 1 CAPSULE BY MOUTH THREE TIMES DAILY *EMERGENCY REFILL* 90 capsule 2    ASPIRIN LOW DOSE 81 MG EC tablet TAKE 1 TABLET BY MOUTH DAILY 30 tablet 10    busPIRone (BUSPAR) 10 MG tablet TAKE 1 TABLET BY MOUTH TWICE DAILY 60 tablet 10    miconazole (MICOTIN) 2 % cream Apply topically 2 times daily.  30 g 2    lidocaine (XYLOCAINE) 2 % jelly Apply topically with dressing changes every 3 days 1 Tube 1    ibuprofen (ADVIL;MOTRIN) 800 MG tablet Take 1 tablet by mouth 4 times daily as needed for Pain 120 tablet 0    melatonin 10 MG CAPS capsule Take 1 capsule by mouth nightly as needed (sleep) 30 capsule 2    amLODIPine (NORVASC) 5 MG tablet TAKE 1 TABLET BY MOUTH EVERY DAY 30 tablet 3    atorvastatin (LIPITOR) 40 MG tablet TAKE 1 TABLET BY MOUTH NIGHTLY 30 tablet 3    carvedilol (COREG) 6.25 MG tablet TAKE 1 TABLET BY MOUTH 2 TIMES DAILY WITH MEALS 60 tablet 3    isosorbide mononitrate (IMDUR) 60 MG extended release tablet TAKE 1 TABLET BY MOUTH DAILY 30 tablet 3    clopidogrel (PLAVIX) 75 MG tablet TAKE 1 TABLET BY MOUTH DAILY 30 tablet 3    negative  Cardiovascular: negative except for lower extremity edema  Gastrointestinal: negative  Genitourinary:negative  Integument/breast: negative except for right posterior leg wound  Hematologic/lymphatic: negative  Musculoskeletal:negative  Neurological: negative except for paresthesia  Behavioral/Psych: negative except for anxiety  Endocrine: negative  Allergic/Immunologic: negative    Objective:      /61   Pulse 75   Temp 97.5 °F (36.4 °C) (Tympanic)   Resp 19   Ht 4' 10\" (1.473 m)   Wt 240 lb (108.9 kg)   BMI 50.16 kg/m²     Wt Readings from Last 3 Encounters:   03/31/21 240 lb (108.9 kg)   03/23/21 240 lb (108.9 kg)   03/17/21 240 lb (108.9 kg)       PHYSICAL EXAM    General Appearance: alert and oriented to person, place and time, well-developed and obese, in no acute distress  Skin: warm and dry, no rash or erythema, right posterior leg wound  Head: normocephalic and atraumatic  Eyes: pupils equal, round, extraocular eye movements intact, and conjunctivae normal  Pulmonary/Chest: normal air movement, no respiratory distress  Extremities: no cyanosis and no clubbing  Musculoskeletal: no joint swelling, deformity or tenderness  Neurologic: gait, coordination normal and speech normal      Assessment:     Problem List Items Addressed This Visit     Abrasion of left forearm    Relevant Orders    Supply: Wound Cleanser    Supply: Wound Dressings    Supply: Cover and Secure    Supply: Edema Control    Morbid obesity with BMI of 45.0-49.9, adult (HCC)    Relevant Orders    Supply: Wound Cleanser    Supply: Wound Dressings    Supply: Cover and Secure    Supply: Edema Control    Neuropathy (Chronic)    Relevant Orders    Supply: Wound Cleanser    Supply: Wound Dressings    Supply: Cover and Secure    Supply: Edema Control    Skin ulcer of right calf with fat layer exposed ([facility] [address] - Primary    Relevant Orders    Supply: Wound Cleanser    Supply: Wound Dressings    Supply: Cover and Secure    Supply: Edema Control    Type 2 diabetes mellitus, with long-term current use of insulin ([facility])    Relevant Orders    Supply: Wound Cleanser    Supply: Wound Dressings    Supply: Cover and Secure    Supply: Edema Control           Procedure Note  Indications:  Based on my examination of this patient's wound(s)/ulcer(s) today, debridement is required to promote healing and evaluate the wound base. Performed by: HAO Churchill CNP    Consent obtained:  Yes    Time out taken:  Yes    Pain Control: Anesthetic  Anesthetic: 2% Lidocaine Gel Topical       Debridement:Excisional Debridement    Using curette the wound(s)/ulcer(s) was/were sharply debrided down through and including the removal of subcutaneous tissue. Devitalized Tissue Debrided:  fibrin, biofilm and slough    Pre Debridement Measurements:  Are located in the [place]  Documentation Flow Sheet    Wound/Ulcer #: 1    Post Debridement Measurements:  Wound/Ulcer Descriptions are Pre Debridement except measurements:    Wound 02/24/21 Leg Lower; Posterior;Right #1 (Active)   Wound Image   03/23/21 0900   Wound Etiology Traumatic 03/31/21 1345   Dressing Status New drainage noted; Old drainage noted 03/31/21 1345   Wound Cleansed Soap and water 03/31/21 1345   Dressing/Treatment Other (comment) 03/17/21 1352   Wound Length (cm) 4.3 cm 03/31/21 1345   Wound Width (cm) 2.2 cm 03/31/21 1345   Wound Depth (cm) 0.3 cm 03/31/21 1345   Wound Surface Area (cm^2) 9.46 cm^2 03/31/21 1345   Change in Wound Size % (l*w) 30.44 03/31/21 1345   Wound Volume (cm^3) 2.84 cm^3 03/31/21 1345   Wound Healing % -109 03/31/21 1345   Post-Procedure Length (cm) 4.3 cm 03/31/21 1345   Post-Procedure Width (cm) 2.2 cm 03/31/21 1345   Post-Procedure Depth (cm) 0.3 cm 03/31/21 1345   Post-Procedure Surface Area (cm^2) 9.46 cm^2 03/31/21 1345   Post-Procedure Volume (cm^3) 2.84 cm^3 03/31/21 1345   Wound Assessment Pink/red;Slough;Bleeding 03/31/21 1345   Drainage Amount Moderate 03/31/21 1345   Drainage Description Serosanguinous 03/31/21 1345   Odor None 03/31/21 1345   Jeannie-wound Assessment Blanchable erythema;Fragile;Edematous; Maceration 03/31/21 1345   Margins Defined edges 03/31/21 1345   Wound Thickness Description not for Pressure Injury Full thickness 03/31/21 1345   Number of days: 34       Wound 02/24/21 Leg Right; Lower;Medial #2 cluster (Active)   Wound Image   03/23/21 0900   Wound Etiology Traumatic 03/23/21 0900   Dressing Status Old drainage noted;New drainage noted 03/23/21 0900   Wound Cleansed Cleansed with saline 03/23/21 0900   Dressing/Treatment Other (comment) 03/17/21 1352   Wound Length (cm) 0 cm 03/31/21 1345   Wound Width (cm) 0 cm 03/31/21 1345   Wound Depth (cm) 0 cm 03/31/21 1345   Wound Surface Area (cm^2) 0 cm^2 03/31/21 1345   Change in Wound Size % (l*w) 100 03/31/21 1345   Wound Volume (cm^3) 0 cm^3 03/31/21 1345   Wound Healing % 100 03/31/21 1345   Post-Procedure Length (cm) 0 cm 03/31/21 1345   Post-Procedure Width (cm) 0 cm 03/31/21 1345   Post-Procedure Depth (cm) 0 cm 03/31/21 1345   Post-Procedure Surface Area (cm^2) 0 cm^2 03/31/21 1345   Post-Procedure Volume (cm^3) 0 cm^3 03/31/21 1345   Wound Assessment Pink/red;Slough 03/23/21 0900   Drainage Amount Moderate 03/23/21 0900   Drainage Description Serosanguinous 03/23/21 0900   Odor None 03/23/21 0900   Jeannie-wound Assessment Blanchable erythema;Dry/flaky 03/23/21 0900   Margins Defined edges 03/23/21 0900   Wound Thickness Description not for Pressure Injury Full thickness 03/23/21 0900   Number of days: 34       Wound 03/31/21 Foot Medial;Right wound #3 (Active)   Wound Image   03/31/21 1345   Wound Etiology Venous 03/31/21 1345   Dressing Status New drainage noted; Old drainage noted 03/31/21 1345   Wound Cleansed Soap and water 03/31/21 1345   Wound Length (cm) 0.5 cm 03/31/21 1345   Wound Width (cm) 0.5 cm 03/31/21 1345   Wound Depth (cm) 0.2 cm 03/31/21 1345   Wound Surface Area (cm^2) 0.25 cm^2 03/31/21 1345   Wound Volume (cm^3) 0.05 cm^3 03/31/21 1345   Post-Procedure Length (cm) 0 cm 03/31/21 1345   Post-Procedure Width (cm) 0 cm 03/31/21 1345   Post-Procedure Depth (cm) 0 cm 03/31/21 1345   Post-Procedure Surface Area (cm^2) 0 cm^2 03/31/21 1345   Post-Procedure Volume (cm^3) 0 cm^3 03/31/21 1345   Wound Assessment Slough 03/31/21 1345   Drainage Amount Moderate 03/31/21 1345   Drainage Description Serosanguinous 03/31/21 1345   Odor None 03/31/21 1345   Jeannie-wound Assessment Blanchable erythema 03/31/21 1345   Margins Defined edges 03/31/21 1345   Wound Thickness Description not for Pressure Injury Full thickness 03/31/21 1345   Number of days: 0          Percent of Wound(s)/Ulcer(s) Debrided: 100%    Total Surface Area Debrided:  9.46 sq cm       Diabetic/Pressure/Non Pressure Ulcers only:  Ulcer: Non-Pressure ulcer, fat layer exposed      Estimated Blood Loss:  Minimal    Hemostasis Achieved:  by pressure    Procedural Pain:  2  / 10     Post Procedural Pain:  2 / 10     Response to treatment:  Well tolerated by patient. Plan:     Treatment Note please see attached Discharge Instructions    Written patient dismissal instructions given to patient and signed by patient or POA.          Discharge Instructions          [facility] -Phone: [phone] Fax: [phone]             Visit Kody Hester Instructions / Physician Orders     DATE: 3/31/2021     Home Care: NA     SUPPLIES ORDERED THRU: [facility]     Wound Location:  Right Lower Leg     Cleanse with: Keep Dry and Intact     Dressing Orders: Promogran, Silvercel, Zinc Unna Boot      Frequency:  Keep Dry and Intact     Additional Orders: Increase protein to diet (meat, cheese, eggs, fish, peanut butter, nuts and beans)  Multivitamin daily  ELEVATE LEGS AS MUCH AS POSSIBLE     Your next appointment with [address] [place] [address] is in 1 week                                                                                                  ROOM TYPE   []????? CHAIR     [x]????? STRETCHER  []????? EITHER             (Please note your next appointment above and if you are unable to keep, kindly give a 24 hour notice. Thank you.)     If you experience any of the following, please call the [address] [place] [address] Road during business hours:  [phone]  Your Phone call may be forwarded to [address] during business hours that [facility] is closed.     * Increase in Pain  * Temperature over 101  * Increase in drainage from your wound  * Drainage with a foul odor  * Bleeding  * Increase in swelling  * Need for compression bandage changes due to slippage, breakthrough drainage.     If you need medical attention outside of the business hours of the [address] [place] [address] Road please contact your PCP or go to the nearest emergency room.     The information contained in the After Visit Summary has been reviewed with me, the patient and/or responsible adult, by my health care provider(s). I had the opportunity to ask questions regarding this information. I have elected to receive;      []??? ? ? After Visit Summary  [x]??? ? ? Comprehensive Discharge Instruction        Patient signature______________________________________Date:________  Electronically signed by Louis Zavala RN on 3/31/2021 at 2:24 PM  Electronically signed by HAO Solis CNP on 3/31/2021 at 2:26 PM          Electronically signed by HAO Solis CNP on 3/31/2021 at 2:31 PM

## 2021-03-31 NOTE — PROGRESS NOTES
[employer] Application   Below Knee    NAME:  Gideon Amador  YOB: 1955  MEDICAL RECORD NUMBER:  [id number]  DATE:  3/31/2021     [x] Applied moisturizing agent to dry skin as needed.  [x] Appied primary and secondary dressing as ordered     [x] Applied Unna roll from toes to knee overlapping each time.  [x] Applied ace wrap or coban from toes to below the knee.  [x] Secured with tape and/or metal clips covered with tape.  [x] Instructed patient/caregiver to keep dressing dry and intact. DO NOT REMOVE DRESSING.  [x] Instructed pt/family/caregiver to report excessive draining, loose bandage, wet dressing, severe pain or tingling in toes.  [x] Applied [place] dressing below the knee to RIGHT   lower leg(s)        Unna Boot(s) were applied per  Guidelines.      Electronically signed by Breann Emanuel RN on 3/31/2021 at 2:28 PM

## 2021-04-06 ENCOUNTER — HOSPITAL ENCOUNTER (OUTPATIENT)
Dept: WOUND CARE | Age: 66
Discharge: HOME | End: 2021-04-06
Payer: COMMERCIAL

## 2021-04-06 VITALS
HEIGHT: 58 IN | BODY MASS INDEX: 50.38 KG/M2 | HEART RATE: 63 BPM | SYSTOLIC BLOOD PRESSURE: 154 MMHG | TEMPERATURE: 96 F | DIASTOLIC BLOOD PRESSURE: 74 MMHG | RESPIRATION RATE: 18 BRPM | WEIGHT: 240 LBS

## 2021-04-06 DIAGNOSIS — Z79.4 TYPE 2 DIABETES MELLITUS WITH OTHER CIRCULATORY COMPLICATION, WITH LONG-TERM CURRENT USE OF INSULIN (HCC): ICD-10-CM

## 2021-04-06 DIAGNOSIS — E66.01 MORBID OBESITY WITH BMI OF 45.0-49.9, ADULT (HCC): ICD-10-CM

## 2021-04-06 DIAGNOSIS — E11.59 TYPE 2 DIABETES MELLITUS WITH OTHER CIRCULATORY COMPLICATION, WITH LONG-TERM CURRENT USE OF INSULIN (HCC): ICD-10-CM

## 2021-04-06 DIAGNOSIS — G62.9 NEUROPATHY: ICD-10-CM

## 2021-04-06 DIAGNOSIS — L97.212 SKIN ULCER OF RIGHT CALF WITH FAT LAYER EXPOSED (HCC): Primary | ICD-10-CM

## 2021-04-06 PROCEDURE — 11042 DBRDMT SUBQ TIS 1ST 20SQCM/<: CPT | Performed by: NURSE PRACTITIONER

## 2021-04-06 PROCEDURE — 11042 DBRDMT SUBQ TIS 1ST 20SQCM/<: CPT

## 2021-04-06 RX ORDER — LIDOCAINE 50 MG/G
OINTMENT TOPICAL ONCE
Status: CANCELLED | OUTPATIENT
Start: 2021-04-06 | End: 2021-04-06

## 2021-04-06 RX ORDER — LIDOCAINE HYDROCHLORIDE 20 MG/ML
JELLY TOPICAL ONCE
Status: CANCELLED | OUTPATIENT
Start: 2021-04-06 | End: 2021-04-06

## 2021-04-06 RX ORDER — LIDOCAINE 40 MG/G
CREAM TOPICAL ONCE
Status: CANCELLED | OUTPATIENT
Start: 2021-04-06 | End: 2021-04-06

## 2021-04-06 RX ORDER — LIDOCAINE HYDROCHLORIDE 40 MG/ML
SOLUTION TOPICAL ONCE
Status: CANCELLED | OUTPATIENT
Start: 2021-04-06 | End: 2021-04-06

## 2021-04-06 RX ORDER — LIDOCAINE HYDROCHLORIDE 20 MG/ML
JELLY TOPICAL ONCE
Status: COMPLETED | OUTPATIENT
Start: 2021-04-06 | End: 2021-04-06

## 2021-04-06 RX ADMIN — LIDOCAINE HYDROCHLORIDE 6 ML: 20 JELLY TOPICAL at 09:06

## 2021-04-06 ASSESSMENT — PAIN - FUNCTIONAL ASSESSMENT: PAIN_FUNCTIONAL_ASSESSMENT: ACTIVITIES ARE NOT PREVENTED

## 2021-04-06 ASSESSMENT — PAIN DESCRIPTION - DESCRIPTORS: DESCRIPTORS: ACHING

## 2021-04-06 ASSESSMENT — PAIN DESCRIPTION - ORIENTATION: ORIENTATION: RIGHT;LOWER

## 2021-04-06 ASSESSMENT — PAIN DESCRIPTION - PAIN TYPE: TYPE: CHRONIC PAIN

## 2021-04-06 ASSESSMENT — PAIN DESCRIPTION - LOCATION: LOCATION: LEG

## 2021-04-06 ASSESSMENT — PAIN DESCRIPTION - FREQUENCY: FREQUENCY: INTERMITTENT

## 2021-04-06 ASSESSMENT — PAIN DESCRIPTION - ONSET: ONSET: ON-GOING

## 2021-04-06 NOTE — PROGRESS NOTES
[employer] Application   Below Knee    NAME:  Nori Jennings  YOB: 1955  MEDICAL RECORD NUMBER:  [id number]  DATE:  4/6/2021     [x] Applied moisturizing agent to dry skin as needed.  [x] Appied primary and secondary dressing as ordered     [x] Applied Unna roll from toes to knee overlapping each time.  [x] Applied ace wrap or coban from toes to below the knee.  [x] Secured with tape and/or metal clips covered with tape.  [x] Instructed patient/caregiver to keep dressing dry and intact. DO NOT REMOVE DRESSING.  [x] Instructed pt/family/caregiver to report excessive draining, loose bandage, wet dressing, severe pain or tingling in toes.  [x] Applied [place] dressing below the knee to Right lower leg(s)        Unna Boot(s) were applied per  Guidelines.      Electronically signed by Trista Carrillo RN on 4/6/2021 at 9:29 AM

## 2021-04-06 NOTE — PROGRESS NOTES
[address]   Progress Note and Procedure Note      [address] [facility] RECORD NUMBER:  [id number]  AGE: 60 y.o. GENDER: female  : 1955  EPISODE DATE:  2021    Subjective:     Chief Complaint   Patient presents with    Wound Check     right lower extremity         HISTORY of PRESENT ILLNESS HPI     Solomon Treadwell is a 60 y.o. female who presents today for wound/ulcer evaluation. History of Wound Context: here for follow up on posterior right lower leg wound. Zinc unna boot worked well for her.    Wound/Ulcer Pain Timing/Severity: constant  Quality of pain: aching  Severity:  2 / 10   Modifying Factors: Pain worsens with touching / debridement  Associated Signs/Symptoms: none    Ulcer Identification:  Ulcer Type: traumatic  Contributing Factors: edema, diabetes, decreased mobility and obesity    Wound: N/A        PAST MEDICAL HISTORY        Diagnosis Date    Anxiety     Borderline hypertension     Depression 2020    GERD (gastroesophageal reflux disease)     Heart attack ([facility] [address] 2020    Hypothyroidism     Neuropathy     Obesity     morbid    Osteoarthritis     Other cirrhosis of liver ([facility] [address] 2020    Sleep apnea     possible    Type II or unspecified type diabetes mellitus without mention of complication, not stated as uncontrolled        PAST SURGICAL HISTORY    Past Surgical History:   Procedure Laterality Date    APPENDECTOMY      COLONOSCOPY      DILATION AND CURETTAGE OF UTERUS      HIATAL HERNIA REPAIR      HYSTERECTOMY      THROAT SURGERY      POLYPS REMOVED FROM VOCAL CORD    TOTAL KNEE ARTHROPLASTY Right 2015    TOTAL KNEE ARTHROPLASTY Left 5/26/15    UPPER GASTROINTESTINAL ENDOSCOPY         FAMILY HISTORY    Family History   Problem Relation Age of Onset    Heart Disease Mother     Arthritis Mother     Heart Disease Father     Arthritis Father     Cancer Father         \"oral\"    Diabetes Sister MOUTH EVERY DAY 30 tablet 5    glimepiride (AMARYL) 4 MG tablet TAKE 1 TABLET BY MOUTH TWICE DAILY 180 tablet 3    blood glucose monitor strips Test before meals and at bedtime. DX: DM, on insulin 100 strip 11    insulin glargine (LANTUS SOLOSTAR) 100 UNIT/ML injection pen Inject 65 Units into the skin daily 15 pen 12    omeprazole (PRILOSEC) 20 MG delayed release capsule Take 1 capsule by mouth Daily 90 capsule 3    levothyroxine (SYNTHROID) 50 MCG tablet TAKE 1 TABLET BY MOUTH EVERY DAY 30 tablet 10    miconazole (ZEASORB-AF) 2 % powder Apply topically 2 times daily. 45 g 1    blood glucose monitor kit and supplies Dispense sufficient amount for indicated testing frequency plus additional to accommodate PRN testing needs. Dispense all needed supplies to include: monitor, strips, lancing device, lancets, control solutions, alcohol swabs. 1 kit 0    sucralfate (CARAFATE) 1 GM tablet Take 1 tablet by mouth daily 180 tablet 1    magnesium hydroxide (MILK OF MAGNESIA) 400 MG/5ML suspension Take 30 mLs by mouth daily as needed for Constipation 900 mL 0    nitroGLYCERIN (NITROSTAT) 0.4 MG SL tablet up to max of 3 total doses. If no relief after 1 dose, call 911. 25 tablet 3    Handicap Placard MISC Is a permanent condition. DX: M19.90 1 each 0    Insulin Pen Needle (B-D UF III MINI PEN NEEDLES) 31G X 5 MM MISC USE 1 PEN NEEDLE DAILY 50 each 1    [employer] Lancets Thin MISC Test before meals and at bedtime. DX: DM, on insulin 100 each 11    MULTIPLE VITAMIN PO   Take 2 tablets by mouth daily DAILY      Alcohol Swabs (ALCOHOL PREP PADS) by Other route 2 times daily. No current facility-administered medications on file prior to encounter.         REVIEW OF SYSTEMS    Constitutional: negative  Eyes: negative  Ears, nose, mouth, throat, and face: negative  Respiratory: negative  Cardiovascular: negative except for lower extremity edema  Gastrointestinal: negative  Genitourinary:negative  Integument/breast: negative except for right posterior leg wound  Hematologic/lymphatic: negative  Musculoskeletal:negative  Neurological: negative except for paresthesia  Behavioral/Psych: negative  Endocrine: negative  Allergic/Immunologic: negative    Objective:      BP (!) 154/74   Pulse 63   Temp 96 °F (35.6 °C) (Tympanic)   Resp 18   Ht 4' 10\" (1.473 m)   Wt 240 lb (108.9 kg)   BMI 50.16 kg/m²     Wt Readings from Last 3 Encounters:   04/06/21 240 lb (108.9 kg)   03/31/21 240 lb (108.9 kg)   03/23/21 240 lb (108.9 kg)       PHYSICAL EXAM    General Appearance: alert and oriented to person, place and time, well developed and obese, in no acute distress  Skin: warm and dry, no rash or erythema, right posterior leg ulcer   Head: normocephalic and atraumatic  Eyes: pupils equal, round, extraocular eye movements intact, and conjunctivae normal  Pulmonary/Chest: normal air movement, no respiratory distress  Extremities: no cyanosis, clubbing or edema  Musculoskeletal: no joint swelling, deformity or tenderness  Neurologic: gait, coordination and speech normal      Assessment:     Problem List Items Addressed This Visit     Morbid obesity with BMI of 45.0-49.9, adult (HCC)    Relevant Orders    Supply: Wound Cleanser    Neuropathy (Chronic)    Relevant Orders    Supply: Wound Cleanser    Skin ulcer of right calf with fat layer exposed ([facility] [address] - Primary    Relevant Orders    Supply: Wound Cleanser    Type 2 diabetes mellitus, with long-term current use of insulin ([facility])    Relevant Orders    Supply: Wound Cleanser           Procedure Note  Indications:  Based on my examination of this patient's wound(s)/ulcer(s) today, debridement is required to promote healing and evaluate the wound base.     Performed by: HAO Cortes - CNP    Consent obtained:  Yes    Time out taken:  Yes    Pain Control: Anesthetic  Anesthetic: 2% Lidocaine Gel Topical       Debridement:Excisional Debridement    Using curette the wound(s)/ulcer(s) was/were sharply debrided down through and including the removal of subcutaneous tissue. Devitalized Tissue Debrided:  fibrin, biofilm and slough    Pre Debridement Measurements:  Are located in the [facility]  Documentation Flow Sheet    Wound/Ulcer #: 1    Post Debridement Measurements:  Wound/Ulcer Descriptions are Pre Debridement except measurements:    Wound 02/24/21 Leg Lower; Posterior;Right #1 (Active)   Wound Image   03/23/21 0900   Wound Etiology Traumatic 04/06/21 0904   Dressing Status New drainage noted; Old drainage noted 04/06/21 0904   Wound Cleansed Soap and water 04/06/21 0904   Dressing/Treatment Other (comment) 03/17/21 1352   Wound Length (cm) 4.2 cm 04/06/21 0904   Wound Width (cm) 2.5 cm 04/06/21 0904   Wound Depth (cm) 0.3 cm 04/06/21 0904   Wound Surface Area (cm^2) 10.5 cm^2 04/06/21 0904   Change in Wound Size % (l*w) 22.79 04/06/21 0904   Wound Volume (cm^3) 3.15 cm^3 04/06/21 0904   Wound Healing % -132 04/06/21 0904   Post-Procedure Length (cm) 4.2 cm 04/06/21 0904   Post-Procedure Width (cm) 2.5 cm 04/06/21 0904   Post-Procedure Depth (cm) 0.3 cm 04/06/21 0904   Post-Procedure Surface Area (cm^2) 10.5 cm^2 04/06/21 0904   Post-Procedure Volume (cm^3) 3.15 cm^3 04/06/21 0904   Wound Assessment Pink/red;Slough 04/06/21 0904   Drainage Amount Moderate 04/06/21 0904   Drainage Description Serosanguinous 04/06/21 0904   Odor None 04/06/21 0904   Jeannie-wound Assessment Blanchable erythema;Edematous 04/06/21 0904   Margins Defined edges 04/06/21 0904   Wound Thickness Description not for Pressure Injury Full thickness 04/06/21 0904   Number of days: 40          Percent of Wound(s)/Ulcer(s) Debrided: 100%    Total Surface Area Debrided:  10.50 sq cm       Diabetic/Pressure/Non Pressure Ulcers only:  Ulcer: Non-Pressure ulcer, fat layer exposed      Estimated Blood Loss:  Minimal    Hemostasis Achieved:  by pressure    Procedural Pain:  2  / 10     Post Procedural Pain:  2 / 10     Response to treatment:  Well tolerated by patient. Plan:     Treatment Note please see attached Discharge Instructions    Written patient dismissal instructions given to patient and signed by patient or POA. Discharge Instructions          [facility] [place] -Phone: [phone] Fax: [phone]             Visit Teresita Instructions / Physician Orders     DATE: 4/6/2021     Home Care: NA     SUPPLIES ORDERED THRU: [facility]     Wound Location:  Right Lower Leg     Cleanse with: Keep Dry and Intact     Dressing Orders: Promogran, Silvercel, Zinc Unna Boot      Frequency:  Keep Dry and Intact     Additional Orders: Increase protein to diet (meat, cheese, eggs, fish, peanut butter, nuts and beans)  Multivitamin daily  ELEVATE LEGS AS MUCH AS POSSIBLE     Your next appointment with [facility] is in 1 week                                                                                                    ROOM TYPE   []?????? CHAIR     [x]?????? STRETCHER  []?????? EITHER             (Please note your next appointment above and if you are unable to keep, kindly give a 24 hour notice. Thank you.)     If you experience any of the following, please call the [facility] during business hours:  [phone]  Your Phone call may be forwarded to [email or web address] during business hours that [facility] is closed.     * Increase in Pain  * Temperature over 101  * Increase in drainage from your wound  * Drainage with a foul odor  * Bleeding  * Increase in swelling  * Need for compression bandage changes due to slippage, breakthrough drainage.     If you need medical attention outside of the business hours of the [facility] please contact your PCP or go to the nearest emergency room.     The information contained in the After Visit Summary has been reviewed with me, the patient and/or responsible adult, by my health care provider(s).  I had the opportunity to ask questions regarding this information. I have elected to receive;      []???? ?? After Visit Summary  [x]???? ?? Comprehensive Discharge Instruction        Patient signature______________________________________Date:________  Electronically signed by Halle Medellin RN on 4/6/2021 at 9:23 AM  Electronically signed by HAO Stevenson CNP on 4/6/2021 at 9:24 AM          Electronically signed by HAO Stevenson CNP on 4/6/2021 at 9:29 AM

## 2021-04-13 ENCOUNTER — HOSPITAL ENCOUNTER (OUTPATIENT)
Dept: WOUND CARE | Age: 66
Discharge: HOME | End: 2021-04-13
Payer: COMMERCIAL

## 2021-04-13 DIAGNOSIS — E66.01 MORBID OBESITY WITH BMI OF 45.0-49.9, ADULT (HCC): ICD-10-CM

## 2021-04-13 DIAGNOSIS — L97.212 SKIN ULCER OF RIGHT CALF WITH FAT LAYER EXPOSED (HCC): Primary | ICD-10-CM

## 2021-04-13 DIAGNOSIS — G62.9 NEUROPATHY: ICD-10-CM

## 2021-04-13 DIAGNOSIS — E11.59 TYPE 2 DIABETES MELLITUS WITH OTHER CIRCULATORY COMPLICATION, WITH LONG-TERM CURRENT USE OF INSULIN (HCC): ICD-10-CM

## 2021-04-13 DIAGNOSIS — Z79.4 TYPE 2 DIABETES MELLITUS WITH OTHER CIRCULATORY COMPLICATION, WITH LONG-TERM CURRENT USE OF INSULIN (HCC): ICD-10-CM

## 2021-04-13 PROCEDURE — 11042 DBRDMT SUBQ TIS 1ST 20SQCM/<: CPT

## 2021-04-13 PROCEDURE — 11042 DBRDMT SUBQ TIS 1ST 20SQCM/<: CPT | Performed by: NURSE PRACTITIONER

## 2021-04-13 RX ORDER — LIDOCAINE 40 MG/G
CREAM TOPICAL ONCE
Status: CANCELLED | OUTPATIENT
Start: 2021-04-13 | End: 2021-04-13

## 2021-04-13 RX ORDER — LIDOCAINE HYDROCHLORIDE 20 MG/ML
JELLY TOPICAL ONCE
Status: CANCELLED | OUTPATIENT
Start: 2021-04-13 | End: 2021-04-13

## 2021-04-13 RX ORDER — LIDOCAINE HYDROCHLORIDE 40 MG/ML
SOLUTION TOPICAL ONCE
Status: CANCELLED | OUTPATIENT
Start: 2021-04-13 | End: 2021-04-13

## 2021-04-13 RX ORDER — LIDOCAINE HYDROCHLORIDE 20 MG/ML
JELLY TOPICAL ONCE
Status: COMPLETED | OUTPATIENT
Start: 2021-04-13 | End: 2021-04-13

## 2021-04-13 RX ORDER — LIDOCAINE 50 MG/G
OINTMENT TOPICAL ONCE
Status: CANCELLED | OUTPATIENT
Start: 2021-04-13 | End: 2021-04-13

## 2021-04-13 RX ADMIN — LIDOCAINE HYDROCHLORIDE: 20 JELLY TOPICAL at 09:12

## 2021-04-13 NOTE — PROGRESS NOTES
[address]   Progress Note and Procedure Note      145 [facility] RECORD NUMBER:  [id number]  AGE: 60 y.o. GENDER: female  : 1955  EPISODE DATE:  2021    Subjective:     Chief Complaint   Patient presents with    Wound Check     RLE         HISTORY of PRESENT ILLNESS HPI     Rito Garcia is a 60 y.o. female who presents today for wound/ulcer evaluation. History of Wound Context: here for follow up on posterior right lower leg wound. Doing well with unna boot.    Wound/Ulcer Pain Timing/Severity: constant  Quality of pain: aching  Severity:  2 / 10   Modifying Factors: Pain worsens with touching / debridement  Associated Signs/Symptoms: none    Ulcer Identification:  Ulcer Type: traumatic  Contributing Factors: edema, diabetes, decreased mobility and obesity    Wound: N/A        PAST MEDICAL HISTORY        Diagnosis Date    Anxiety     Borderline hypertension     Depression 2020    GERD (gastroesophageal reflux disease)     Heart attack ([facility] [address] 2020    Hypothyroidism     Neuropathy     Obesity     morbid    Osteoarthritis     Other cirrhosis of liver ([facility] [address] 2020    Sleep apnea     possible    Type II or unspecified type diabetes mellitus without mention of complication, not stated as uncontrolled        PAST SURGICAL HISTORY    Past Surgical History:   Procedure Laterality Date    APPENDECTOMY      COLONOSCOPY      DILATION AND CURETTAGE OF UTERUS      HIATAL HERNIA REPAIR      HYSTERECTOMY      THROAT SURGERY      POLYPS REMOVED FROM VOCAL CORD    TOTAL KNEE ARTHROPLASTY Right 2015    TOTAL KNEE ARTHROPLASTY Left 5/26/15    UPPER GASTROINTESTINAL ENDOSCOPY         FAMILY HISTORY    Family History   Problem Relation Age of Onset    Heart Disease Mother     Arthritis Mother     Heart Disease Father     Arthritis Father     Cancer Father         \"oral\"    Diabetes Sister         gestational       SOCIAL HISTORY    Social History     Tobacco Use    Smoking status: Never Smoker    Smokeless tobacco: Never Used   Substance Use Topics    Alcohol use: Yes     Comment: once a month    Drug use: No       ALLERGIES    Allergies   Allergen Reactions    Shellfish-Derived Products Nausea Only    Morphine Other (See Comments)     HALLUCINATIONS      Tape Cosimo Anya Tape] Rash       MEDICATIONS    Current Outpatient Medications on File Prior to Encounter   Medication Sig Dispense Refill    acetaminophen (TYLENOL) 500 MG tablet Take 2 tablets by mouth 3 times daily as needed for Pain 180 tablet 0    citalopram (CELEXA) 40 MG tablet TAKE 1 TABLET BY MOUTH EVERY DAY 30 tablet 10    gabapentin (NEURONTIN) 400 MG capsule TAKE 1 CAPSULE BY MOUTH THREE TIMES DAILY *EMERGENCY REFILL* 90 capsule 2    ASPIRIN LOW DOSE 81 MG EC tablet TAKE 1 TABLET BY MOUTH DAILY 30 tablet 10    busPIRone (BUSPAR) 10 MG tablet TAKE 1 TABLET BY MOUTH TWICE DAILY 60 tablet 10    miconazole (MICOTIN) 2 % cream Apply topically 2 times daily.  30 g 2    lidocaine (XYLOCAINE) 2 % jelly Apply topically with dressing changes every 3 days 1 Tube 1    ibuprofen (ADVIL;MOTRIN) 800 MG tablet Take 1 tablet by mouth 4 times daily as needed for Pain 120 tablet 0    melatonin 10 MG CAPS capsule Take 1 capsule by mouth nightly as needed (sleep) 30 capsule 2    amLODIPine (NORVASC) 5 MG tablet TAKE 1 TABLET BY MOUTH EVERY DAY 30 tablet 3    atorvastatin (LIPITOR) 40 MG tablet TAKE 1 TABLET BY MOUTH NIGHTLY 30 tablet 3    carvedilol (COREG) 6.25 MG tablet TAKE 1 TABLET BY MOUTH 2 TIMES DAILY WITH MEALS 60 tablet 3    isosorbide mononitrate (IMDUR) 60 MG extended release tablet TAKE 1 TABLET BY MOUTH DAILY 30 tablet 3    clopidogrel (PLAVIX) 75 MG tablet TAKE 1 TABLET BY MOUTH DAILY 30 tablet 3    lisinopril (PRINIVIL;ZESTRIL) 10 MG tablet Take 1 tablet by mouth once daily 30 tablet 5    JARDIANCE 10 MG tablet TAKE 1 TABLET BY MOUTH EVERY DAY 30 tablet 5  glimepiride (AMARYL) 4 MG tablet TAKE 1 TABLET BY MOUTH TWICE DAILY 180 tablet 3    blood glucose monitor strips Test before meals and at bedtime. DX: DM, on insulin 100 strip 11    insulin glargine (LANTUS SOLOSTAR) 100 UNIT/ML injection pen Inject 65 Units into the skin daily 15 pen 12    omeprazole (PRILOSEC) 20 MG delayed release capsule Take 1 capsule by mouth Daily 90 capsule 3    levothyroxine (SYNTHROID) 50 MCG tablet TAKE 1 TABLET BY MOUTH EVERY DAY 30 tablet 10    miconazole (ZEASORB-AF) 2 % powder Apply topically 2 times daily. 45 g 1    blood glucose monitor kit and supplies Dispense sufficient amount for indicated testing frequency plus additional to accommodate PRN testing needs. Dispense all needed supplies to include: monitor, strips, lancing device, lancets, control solutions, alcohol swabs. 1 kit 0    sucralfate (CARAFATE) 1 GM tablet Take 1 tablet by mouth daily 180 tablet 1    magnesium hydroxide (MILK OF MAGNESIA) 400 MG/5ML suspension Take 30 mLs by mouth daily as needed for Constipation 900 mL 0    nitroGLYCERIN (NITROSTAT) 0.4 MG SL tablet up to max of 3 total doses. If no relief after 1 dose, call 911. 25 tablet 3    Handicap Placard MISC Is a permanent condition. DX: M19.90 1 each 0    Insulin Pen Needle (B-D UF III MINI PEN NEEDLES) 31G X 5 MM MISC USE 1 PEN NEEDLE DAILY 50 each 1    [employer] Lancets Thin MISC Test before meals and at bedtime. DX: DM, on insulin 100 each 11    MULTIPLE VITAMIN PO   Take 2 tablets by mouth daily DAILY      Alcohol Swabs (ALCOHOL PREP PADS) by Other route 2 times daily. No current facility-administered medications on file prior to encounter.         REVIEW OF SYSTEMS    Constitutional: negative  Eyes: negative  Ears, nose, mouth, throat, and face: negative  Respiratory: negative  Cardiovascular: negative except for lower extremity edema  Gastrointestinal: negative  Genitourinary:negative  Integument/breast: negative except for right posterior leg ulcer  Hematologic/lymphatic: negative  Musculoskeletal:negative  Neurological: negative except for paresthesia  Behavioral/Psych: negative  Endocrine: negative  Allergic/Immunologic: negative    Objective: There were no vitals taken for this visit. Wt Readings from Last 3 Encounters:   04/06/21 240 lb (108.9 kg)   03/31/21 240 lb (108.9 kg)   03/23/21 240 lb (108.9 kg)       PHYSICAL EXAM    General Appearance: alert and oriented to person, place and time, well-developed and well-nourished, in no acute distress  Skin: warm and dry, no rash or erythema, right posterior leg ulcer   Head: normocephalic and atraumatic  Eyes: pupils equal, round, extraocular eye movements intact, and conjunctivae normal  Pulmonary/Chest: normal air movement, no respiratory distress  Extremities: no cyanosis and no clubbing or edema   Musculoskeletal: no joint swelling, deformity or tenderness  Neurologic: gait, coordination normal and speech normal      Assessment:     Problem List Items Addressed This Visit     Morbid obesity with BMI of 45.0-49.9, adult ([facility])    Relevant Orders    Supply: Wound Cleanser    Supply: Wound Dressings    Supply: Cover and Secure    Supply: Edema Control    Neuropathy (Chronic)    Relevant Orders    Supply: Wound Cleanser    Supply: Wound Dressings    Supply: Cover and Secure    Supply: Edema Control    Skin ulcer of right calf with fat layer exposed ([address] - Primary    Relevant Orders    Supply: Wound Cleanser    Supply: Wound Dressings    Supply: Cover and Secure    Supply: Edema Control    Type 2 diabetes mellitus, with long-term current use of insulin ([facility])    Relevant Orders    Supply: Wound Cleanser    Supply: Wound Dressings    Supply: Cover and Secure    Supply: Edema Control           Procedure Note  Indications:  Based on my examination of this patient's wound(s)/ulcer(s) today, debridement is required to promote healing and evaluate the wound base.     Performed by: Sammy Amaro HAO LIMON CNP    Consent obtained:  Yes    Time out taken:  Yes    Pain Control: Anesthetic  Anesthetic: 2% Lidocaine Gel Topical       Debridement:Excisional Debridement    Using curette the wound(s)/ulcer(s) was/were sharply debrided down through and including the removal of subcutaneous tissue. Devitalized Tissue Debrided:  fibrin, biofilm and slough    Pre Debridement Measurements:  Are located in the [facility]  Documentation Flow Sheet    Wound/Ulcer #: 1    Post Debridement Measurements:  Wound/Ulcer Descriptions are Pre Debridement except measurements:    Wound 02/24/21 Leg Lower; Posterior;Right #1 (Active)   Wound Image   03/23/21 0900   Wound Etiology Traumatic 04/13/21 0910   Dressing Status New drainage noted; Old drainage noted 04/13/21 0910   Wound Cleansed Soap and water 04/13/21 0910   Dressing/Treatment Other (comment) 03/17/21 1352   Wound Length (cm) 4 cm 04/13/21 0910   Wound Width (cm) 3 cm 04/13/21 0910   Wound Depth (cm) 0.2 cm 04/13/21 0910   Wound Surface Area (cm^2) 12 cm^2 04/13/21 0910   Change in Wound Size % (l*w) 11.76 04/13/21 0910   Wound Volume (cm^3) 2.4 cm^3 04/13/21 0910   Wound Healing % -76 04/13/21 0910   Post-Procedure Length (cm) 4 cm 04/13/21 0910   Post-Procedure Width (cm) 3 cm 04/13/21 0910   Post-Procedure Depth (cm) 0.2 cm 04/13/21 0910   Post-Procedure Surface Area (cm^2) 12 cm^2 04/13/21 0910   Post-Procedure Volume (cm^3) 2.4 cm^3 04/13/21 0910   Wound Assessment Bleeding;Pink/red;Slough 04/13/21 0910   Drainage Amount Moderate 04/13/21 0910   Drainage Description Serosanguinous 04/13/21 0910   Odor None 04/13/21 0910   Jeannie-wound Assessment Blanchable erythema;Edematous; Maceration 04/13/21 0910   Margins Defined edges 04/13/21 0910   Wound Thickness Description not for Pressure Injury Full thickness 04/13/21 0910   Number of days: 47          Percent of Wound(s)/Ulcer(s) Debrided: 100%    Total Surface Area Debrided:  12.00 sq cm Diabetic/Pressure/Non Pressure Ulcers only:  Ulcer: Non-Pressure ulcer, fat layer exposed      Estimated Blood Loss:  Minimal    Hemostasis Achieved:  by pressure    Procedural Pain:  2  / 10     Post Procedural Pain:  2 / 10     Response to treatment:  Well tolerated by patient. Plan:     Treatment Note please see attached Discharge Instructions    Written patient dismissal instructions given to patient and signed by patient or POA. Discharge Instructions          [facility] -Phone: [phone] Fax: [phone]             Visit [facility] Instructions / Physician Orders     DATE: 4/13/2021     Home Care: NA     SUPPLIES ORDERED THRU: [facility]     Wound Location:  Right Lower Leg     Cleanse with: Keep Dry and Intact     Dressing Orders: Promogran, Silvercel, Zinc Unna Boot      Frequency:  Keep Dry and Intact     Additional Orders: Increase protein to diet (meat, cheese, eggs, fish, peanut butter, nuts and beans)  Multivitamin daily  ELEVATE LEGS AS MUCH AS POSSIBLE     Your next appointment with [facility] is in 1 week                                                                                                    ROOM TYPE   []??????? CHAIR     [x]??????? STRETCHER  []??????? EITHER             (Please note your next appointment above and if you are unable to keep, kindly give a 24 hour notice.  Thank you.)     If you experience any of the following, please call the [facility] during business hours:  [phone]  Your Phone call may be forwarded to [employer] during business hours that [facility] is closed.     * Increase in Pain  * Temperature over 101  * Increase in drainage from your wound  * Drainage with a foul odor  * Bleeding  * Increase in swelling  * Need for compression bandage changes due to slippage, breakthrough drainage.     If you need medical attention outside of the business hours of the [facility] please contact your PCP or go to the nearest emergency room.     The information contained in the After Visit Summary has been reviewed with me, the patient and/or responsible adult, by my health care provider(s). I had the opportunity to ask questions regarding this information. I have elected to receive;      []????? ?? After Visit Summary  [x]??????? Comprehensive Discharge Instruction        Patient signature______________________________________Date:________  Electronically signed by Dannielle Robles RN on 4/13/2021 at 9:27 AM  Electronically signed by HAO Spear CNP on 4/13/2021 at 9:32 AM          Electronically signed by HAO Spear CNP on 4/13/2021 at 9:38 AM

## 2021-04-13 NOTE — PROGRESS NOTES
[place] Application   Below Knee    NAME:  Liliana Reynoso  YOB: 1955  MEDICAL RECORD NUMBER:  [id number]  DATE:  4/13/2021     [x] Applied moisturizing agent to dry skin as needed.  [x] Appied primary and secondary dressing as ordered     [x] Applied Unna roll from toes to knee overlapping each time.  [x] Applied ace wrap or coban from toes to below the knee.  [x] Secured with tape and/or metal clips covered with tape.  [x] Instructed patient/caregiver to keep dressing dry and intact. DO NOT REMOVE DRESSING.  [x] Instructed pt/family/caregiver to report excessive draining, loose bandage, wet dressing, severe pain or tingling in toes.  [x] Applied [place] dressing below the knee to Right lower leg(s)        Unna Boot(s) were applied per  Guidelines.      Electronically signed by Jeannie Roy RN on 4/13/2021 at 9:38 AM

## 2021-04-16 ENCOUNTER — TELEPHONE (OUTPATIENT)
Dept: FAMILY MEDICINE CLINIC | Age: 66
End: 2021-04-16

## 2021-04-16 NOTE — TELEPHONE ENCOUNTER
Patient called to inform still following wound care unable to take off dressing that's why not able to angelita appt for the wound check, wanted to make you informed. Please advise, thank you.

## 2021-04-20 ENCOUNTER — HOSPITAL ENCOUNTER (OUTPATIENT)
Dept: WOUND CARE | Age: 66
Discharge: HOME | End: 2021-04-20
Payer: COMMERCIAL

## 2021-04-20 VITALS
RESPIRATION RATE: 17 BRPM | BODY MASS INDEX: 50.38 KG/M2 | DIASTOLIC BLOOD PRESSURE: 83 MMHG | HEIGHT: 58 IN | SYSTOLIC BLOOD PRESSURE: 155 MMHG | TEMPERATURE: 97.4 F | HEART RATE: 67 BPM | WEIGHT: 240 LBS

## 2021-04-20 DIAGNOSIS — Z79.4 TYPE 2 DIABETES MELLITUS WITH OTHER CIRCULATORY COMPLICATION, WITH LONG-TERM CURRENT USE OF INSULIN (HCC): ICD-10-CM

## 2021-04-20 DIAGNOSIS — E66.01 MORBID OBESITY WITH BMI OF 45.0-49.9, ADULT (HCC): ICD-10-CM

## 2021-04-20 DIAGNOSIS — L03.012 PARONYCHIA OF LEFT MIDDLE FINGER: ICD-10-CM

## 2021-04-20 DIAGNOSIS — E11.59 TYPE 2 DIABETES MELLITUS WITH OTHER CIRCULATORY COMPLICATION, WITH LONG-TERM CURRENT USE OF INSULIN (HCC): ICD-10-CM

## 2021-04-20 DIAGNOSIS — G62.9 NEUROPATHY: ICD-10-CM

## 2021-04-20 DIAGNOSIS — L97.212 SKIN ULCER OF RIGHT CALF WITH FAT LAYER EXPOSED (HCC): Primary | ICD-10-CM

## 2021-04-20 PROCEDURE — 11042 DBRDMT SUBQ TIS 1ST 20SQCM/<: CPT

## 2021-04-20 PROCEDURE — 11042 DBRDMT SUBQ TIS 1ST 20SQCM/<: CPT | Performed by: NURSE PRACTITIONER

## 2021-04-20 RX ORDER — LIDOCAINE HYDROCHLORIDE 20 MG/ML
JELLY TOPICAL ONCE
Status: CANCELLED | OUTPATIENT
Start: 2021-04-20 | End: 2021-04-20

## 2021-04-20 RX ORDER — LIDOCAINE 40 MG/G
CREAM TOPICAL ONCE
Status: CANCELLED | OUTPATIENT
Start: 2021-04-20 | End: 2021-04-20

## 2021-04-20 RX ORDER — LIDOCAINE 50 MG/G
OINTMENT TOPICAL ONCE
Status: CANCELLED | OUTPATIENT
Start: 2021-04-20 | End: 2021-04-20

## 2021-04-20 RX ORDER — LIDOCAINE HYDROCHLORIDE 40 MG/ML
SOLUTION TOPICAL ONCE
Status: CANCELLED | OUTPATIENT
Start: 2021-04-20 | End: 2021-04-20

## 2021-04-20 RX ORDER — LIDOCAINE HYDROCHLORIDE 20 MG/ML
JELLY TOPICAL ONCE
Status: COMPLETED | OUTPATIENT
Start: 2021-04-20 | End: 2021-04-20

## 2021-04-20 RX ADMIN — LIDOCAINE HYDROCHLORIDE 6 ML: 20 JELLY TOPICAL at 09:10

## 2021-04-20 ASSESSMENT — PAIN DESCRIPTION - FREQUENCY: FREQUENCY: INTERMITTENT

## 2021-04-20 ASSESSMENT — PAIN - FUNCTIONAL ASSESSMENT: PAIN_FUNCTIONAL_ASSESSMENT: ACTIVITIES ARE NOT PREVENTED

## 2021-04-20 ASSESSMENT — PAIN DESCRIPTION - ONSET: ONSET: ON-GOING

## 2021-04-20 ASSESSMENT — PAIN DESCRIPTION - PROGRESSION: CLINICAL_PROGRESSION: NOT CHANGED

## 2021-04-20 ASSESSMENT — PAIN DESCRIPTION - DESCRIPTORS: DESCRIPTORS: ACHING

## 2021-04-20 ASSESSMENT — PAIN DESCRIPTION - LOCATION: LOCATION: LEG

## 2021-04-20 NOTE — PROGRESS NOTES
[place] Application   Below Knee    NAME:  Liliana Reynoso  YOB: 1955  MEDICAL RECORD NUMBER:  [id number]  DATE:  4/20/2021     [x] Applied moisturizing agent to dry skin as needed.  [x] Appied primary and secondary dressing as ordered     [x] Applied Unna roll from toes to knee overlapping each time.  [x] Applied ace wrap or coban from toes to below the knee.  [x] Secured with tape and/or metal clips covered with tape.  [x] Instructed patient/caregiver to keep dressing dry and intact. DO NOT REMOVE DRESSING.  [x] Instructed pt/family/caregiver to report excessive draining, loose bandage, wet dressing, severe pain or tingling in toes.  [x] Applied [place] dressing below the knee to Right lower leg(s)        Unna Boot(s) were applied per  Guidelines.      Electronically signed by Jeannie Roy RN on 4/20/2021 at 9:23 AM

## 2021-04-20 NOTE — PROGRESS NOTES
[address]   Progress Note and Procedure Note      [facility] RECORD NUMBER:  [id number]  AGE: 60 y.o. GENDER: female  : 1955  EPISODE DATE:  2021    Subjective:     Chief Complaint   Patient presents with    Wound Check     right lower extremity         HISTORY of PRESENT ILLNESS HPI     Karey Mohs is a 60 y.o. female who presents today for wound/ulcer evaluation. History of Wound Context: here for follow up on posterior right lower leg ulcer. Continues to do well with unna boot. Paronychia to left middle finger. Encouraged epsom salt soaks 2-3 times per day with warm water.  Will send Bactroban ointment   Wound/Ulcer Pain Timing/Severity: constant  Quality of pain: aching  Severity:  2 / 10   Modifying Factors: Pain worsens with debridement  Associated Signs/Symptoms: none    Ulcer Identification:  Ulcer Type: traumatic  Contributing Factors: edema, diabetes, decreased mobility and obesity    Wound: N/A        PAST MEDICAL HISTORY        Diagnosis Date    Anxiety     Borderline hypertension     Depression 2020    GERD (gastroesophageal reflux disease)     Heart attack ([facility] [address] 2020    Hypothyroidism     Neuropathy     Obesity     morbid    Osteoarthritis     Other cirrhosis of liver ([facility] [address] 2020    Sleep apnea     possible    Type II or unspecified type diabetes mellitus without mention of complication, not stated as uncontrolled        PAST SURGICAL HISTORY    Past Surgical History:   Procedure Laterality Date    APPENDECTOMY      COLONOSCOPY      DILATION AND CURETTAGE OF UTERUS      HIATAL HERNIA REPAIR      HYSTERECTOMY      THROAT SURGERY      POLYPS REMOVED FROM VOCAL CORD    TOTAL KNEE ARTHROPLASTY Right 2015    TOTAL KNEE ARTHROPLASTY Left 5/26/15    UPPER GASTROINTESTINAL ENDOSCOPY         FAMILY HISTORY    Family History   Problem Relation Age of Onset    Heart Disease Mother     Arthritis Mother     Heart Disease Father     Arthritis Father     Cancer Father         \"oral\"    Diabetes Sister         gestational       SOCIAL HISTORY    Social History     Tobacco Use    Smoking status: Never Smoker    Smokeless tobacco: Never Used   Substance Use Topics    Alcohol use: Yes     Comment: once a month    Drug use: No       ALLERGIES    Allergies   Allergen Reactions    Shellfish-Derived Products Nausea Only    Morphine Other (See Comments)     HALLUCINATIONS      Tape [Adhesive Tape] Rash       MEDICATIONS    Current Outpatient Medications on File Prior to Encounter   Medication Sig Dispense Refill    acetaminophen (TYLENOL) 500 MG tablet Take 2 tablets by mouth 3 times daily as needed for Pain 180 tablet 0    citalopram (CELEXA) 40 MG tablet TAKE 1 TABLET BY MOUTH EVERY DAY 30 tablet 10    gabapentin (NEURONTIN) 400 MG capsule TAKE 1 CAPSULE BY MOUTH THREE TIMES DAILY *EMERGENCY REFILL* 90 capsule 2    ASPIRIN LOW DOSE 81 MG EC tablet TAKE 1 TABLET BY MOUTH DAILY 30 tablet 10    busPIRone (BUSPAR) 10 MG tablet TAKE 1 TABLET BY MOUTH TWICE DAILY 60 tablet 10    miconazole (MICOTIN) 2 % cream Apply topically 2 times daily.  30 g 2    lidocaine (XYLOCAINE) 2 % jelly Apply topically with dressing changes every 3 days 1 Tube 1    ibuprofen (ADVIL;MOTRIN) 800 MG tablet Take 1 tablet by mouth 4 times daily as needed for Pain 120 tablet 0    melatonin 10 MG CAPS capsule Take 1 capsule by mouth nightly as needed (sleep) 30 capsule 2    amLODIPine (NORVASC) 5 MG tablet TAKE 1 TABLET BY MOUTH EVERY DAY 30 tablet 3    atorvastatin (LIPITOR) 40 MG tablet TAKE 1 TABLET BY MOUTH NIGHTLY 30 tablet 3    carvedilol (COREG) 6.25 MG tablet TAKE 1 TABLET BY MOUTH 2 TIMES DAILY WITH MEALS 60 tablet 3    isosorbide mononitrate (IMDUR) 60 MG extended release tablet TAKE 1 TABLET BY MOUTH DAILY 30 tablet 3    clopidogrel (PLAVIX) 75 MG tablet TAKE 1 TABLET BY MOUTH DAILY 30 tablet 3    lisinopril negative  Cardiovascular: negative except for lower extremity edema  Gastrointestinal: negative  Genitourinary:negative  Integument/breast: negative except for right posterior leg ulcer  Hematologic/lymphatic: negative  Musculoskeletal:negative  Neurological: negative except for paresthesia  Behavioral/Psych: negative  Endocrine: negative  Allergic/Immunologic: negative    Objective:      BP (!) 155/83   Pulse 67   Temp 97.4 °F (36.3 °C) (Tympanic)   Resp 17   Ht 4' 10\" (1.473 m)   Wt 240 lb (108.9 kg)   BMI 50.16 kg/m²     Wt Readings from Last 3 Encounters:   04/20/21 240 lb (108.9 kg)   04/06/21 240 lb (108.9 kg)   03/31/21 240 lb (108.9 kg)       PHYSICAL EXAM    General Appearance: alert and oriented to person, place and time, well-developed and obese, in no acute distress  Skin: warm and dry or no rash, right posterior leg ulcer, erythema to left middle finger nailbed  Head: normocephalic and atraumatic  Eyes: pupils equal, round, extraocular eye movements intact, and conjunctivae normal  Pulmonary/Chest: normal air movement, no respiratory distress  Extremities: no cyanosis and no clubbing  Musculoskeletal: no joint swelling, deformity or tenderness  Neurologic: gait, coordination normal and speech normal      Assessment:     Problem List Items Addressed This Visit     Morbid obesity with BMI of 45.0-49.9, adult ([facility])    Relevant Orders    Supply: Wound Cleanser    Supply: Wound Dressings    Supply: Cover and Secure    Supply: Edema Control    Neuropathy (Chronic)    Relevant Orders    Supply: Wound Cleanser    Supply: Wound Dressings    Supply: Cover and Secure    Supply: Edema Control    Paronychia of left middle finger    Skin ulcer of right calf with fat layer exposed ([facility] [address] - Primary    Relevant Orders    Supply: Wound Cleanser    Supply: Wound Dressings    Supply: Cover and Secure    Supply: Edema Control    Type 2 diabetes mellitus, with long-term current use of insulin ([facility])    Relevant Orders Blanchable erythema;Edematous 04/20/21 0906   Margins Defined edges 04/20/21 0906   Wound Thickness Description not for Pressure Injury Full thickness 04/20/21 0906   Number of days: 54          Percent of Wound(s)/Ulcer(s) Debrided: 100%    Total Surface Area Debrided:  6.00 sq cm       Diabetic/Pressure/Non Pressure Ulcers only:  Ulcer: Non-Pressure ulcer, fat layer exposed      Estimated Blood Loss:  Minimal    Hemostasis Achieved:  by pressure    Procedural Pain:  2  / 10     Post Procedural Pain:  2 / 10     Response to treatment:  Well tolerated by patient. Plan:     Treatment Note please see attached Discharge Instructions    Written patient dismissal instructions given to patient and signed by patient or POA. Discharge Instructions          [facility] [place] -Phone: [phone] Fax: [phone]             Visit Annie Ulisesmohit Instructions / Physician Orders     DATE: 4/20/2021     Home Care: NA     SUPPLIES ORDERED THRU: Medline     Wound Location:  Right Lower Leg     Cleanse with: Keep Dry and Intact     Dressing Orders: Promogran, Silvercel, Zinc Unna Boot      Frequency:  Keep Dry and Intact     Additional Orders: Increase protein to diet (meat, cheese, eggs, fish, peanut butter, nuts and beans)  Multivitamin daily  ELEVATE LEGS AS MUCH AS POSSIBLE     Your next appointment with [address] [place] [address] is in 1 week                                                                                                    ROOM TYPE   []???????? CHAIR     [x]???????? STRETCHER  []???????? EITHER             (Please note your next appointment above and if you are unable to keep, kindly give a 24 hour notice.  Thank you.)     If you experience any of the following, please call the [address] [place] [address] during business hours:  [phone]  Your Phone call may be forwarded to [facility] during business hours that [facility] is closed.     * Increase in Pain  * Temperature over 101  * Increase in drainage from your wound  * Drainage with a foul odor  * Bleeding  * Increase in swelling  * Need for compression bandage changes due to slippage, breakthrough drainage.     If you need medical attention outside of the business hours of the [address] [place] [address] Road please contact your PCP or go to the nearest emergency room.     The information contained in the After Visit Summary has been reviewed with me, the patient and/or responsible adult, by my health care provider(s). I had the opportunity to ask questions regarding this information. I have elected to receive;      []?????? ?? After Visit Summary  [x]???????? Comprehensive Discharge Instruction        Patient signature______________________________________Date:________  Electronically signed by Alicia Garcia RN on 4/20/2021 at 9:18 AM  Electronically signed by HAO Tee CNP on 4/20/2021 at 9:23 AM          Electronically signed by HAO Tee CNP on 4/20/2021 at 9:31 AM

## 2021-04-27 ENCOUNTER — HOSPITAL ENCOUNTER (OUTPATIENT)
Dept: WOUND CARE | Age: 66
Discharge: HOME | End: 2021-04-27
Payer: COMMERCIAL

## 2021-04-27 VITALS
DIASTOLIC BLOOD PRESSURE: 68 MMHG | TEMPERATURE: 98.6 F | BODY MASS INDEX: 50.38 KG/M2 | HEART RATE: 72 BPM | RESPIRATION RATE: 18 BRPM | HEIGHT: 58 IN | WEIGHT: 240 LBS | SYSTOLIC BLOOD PRESSURE: 116 MMHG

## 2021-04-27 DIAGNOSIS — E66.01 MORBID OBESITY WITH BMI OF 45.0-49.9, ADULT (HCC): ICD-10-CM

## 2021-04-27 DIAGNOSIS — Z79.4 TYPE 2 DIABETES MELLITUS WITH OTHER CIRCULATORY COMPLICATION, WITH LONG-TERM CURRENT USE OF INSULIN (HCC): ICD-10-CM

## 2021-04-27 DIAGNOSIS — E11.59 TYPE 2 DIABETES MELLITUS WITH OTHER CIRCULATORY COMPLICATION, WITH LONG-TERM CURRENT USE OF INSULIN (HCC): ICD-10-CM

## 2021-04-27 DIAGNOSIS — G62.9 NEUROPATHY: ICD-10-CM

## 2021-04-27 DIAGNOSIS — L97.212 SKIN ULCER OF RIGHT CALF WITH FAT LAYER EXPOSED (HCC): Primary | ICD-10-CM

## 2021-04-27 PROCEDURE — 11042 DBRDMT SUBQ TIS 1ST 20SQCM/<: CPT

## 2021-04-27 PROCEDURE — 11042 DBRDMT SUBQ TIS 1ST 20SQCM/<: CPT | Performed by: NURSE PRACTITIONER

## 2021-04-27 RX ORDER — LIDOCAINE HYDROCHLORIDE 40 MG/ML
SOLUTION TOPICAL ONCE
Status: CANCELLED | OUTPATIENT
Start: 2021-04-27 | End: 2021-04-27

## 2021-04-27 RX ORDER — LIDOCAINE HYDROCHLORIDE 20 MG/ML
JELLY TOPICAL ONCE
Status: COMPLETED | OUTPATIENT
Start: 2021-04-27 | End: 2021-04-27

## 2021-04-27 RX ORDER — LIDOCAINE 50 MG/G
OINTMENT TOPICAL ONCE
Status: CANCELLED | OUTPATIENT
Start: 2021-04-27 | End: 2021-04-27

## 2021-04-27 RX ORDER — LIDOCAINE 40 MG/G
CREAM TOPICAL ONCE
Status: CANCELLED | OUTPATIENT
Start: 2021-04-27 | End: 2021-04-27

## 2021-04-27 RX ORDER — LIDOCAINE HYDROCHLORIDE 20 MG/ML
JELLY TOPICAL ONCE
Status: CANCELLED | OUTPATIENT
Start: 2021-04-27 | End: 2021-04-27

## 2021-04-27 RX ADMIN — LIDOCAINE HYDROCHLORIDE 6 ML: 20 JELLY TOPICAL at 10:54

## 2021-04-27 ASSESSMENT — PAIN DESCRIPTION - PAIN TYPE: TYPE: CHRONIC PAIN

## 2021-04-27 ASSESSMENT — PAIN DESCRIPTION - LOCATION: LOCATION: LEG

## 2021-04-27 ASSESSMENT — PAIN DESCRIPTION - ORIENTATION: ORIENTATION: RIGHT;LOWER

## 2021-04-27 ASSESSMENT — PAIN DESCRIPTION - DESCRIPTORS: DESCRIPTORS: ITCHING

## 2021-04-27 ASSESSMENT — PAIN DESCRIPTION - FREQUENCY: FREQUENCY: INTERMITTENT

## 2021-04-27 NOTE — PROGRESS NOTES
[address]   Progress Note and Procedure Note      [address] [facility] RECORD NUMBER:  [id number]  AGE: 60 y.o. GENDER: female  : 1955  EPISODE DATE:  2021    Subjective:     Chief Complaint   Patient presents with    Wound Check     right lower leg         HISTORY of PRESENT ILLNESS HPI     Dov Montiel is a 60 y.o. female who presents today for wound/ulcer evaluation. History of Wound Context: here to follow up on posterior right lower leg ulcer. Continues to do well with unna boot. Left middle finger is doing much better.    Wound/Ulcer Pain Timing/Severity: constant  Quality of pain: aching  Severity:  2 / 10   Modifying Factors: Pain worsens with debridement  Associated Signs/Symptoms: none    Ulcer Identification:  Ulcer Type: traumatic  Contributing Factors: edema, diabetes, decreased mobility and obesity    Wound: N/A        PAST MEDICAL HISTORY        Diagnosis Date    Anxiety     Borderline hypertension     Depression 2020    GERD (gastroesophageal reflux disease)     Heart attack ([facility] [address] 2020    Hypothyroidism     Neuropathy     Obesity     morbid    Osteoarthritis     Other cirrhosis of liver ([facility] [address] 2020    Sleep apnea     possible    Type II or unspecified type diabetes mellitus without mention of complication, not stated as uncontrolled        PAST SURGICAL HISTORY    Past Surgical History:   Procedure Laterality Date    APPENDECTOMY      COLONOSCOPY      DILATION AND CURETTAGE OF UTERUS      HIATAL HERNIA REPAIR      HYSTERECTOMY      THROAT SURGERY      POLYPS REMOVED FROM VOCAL CORD    TOTAL KNEE ARTHROPLASTY Right 2015    TOTAL KNEE ARTHROPLASTY Left 5/26/15    UPPER GASTROINTESTINAL ENDOSCOPY         FAMILY HISTORY    Family History   Problem Relation Age of Onset    Heart Disease Mother     Arthritis Mother     Heart Disease Father     Arthritis Father     Cancer Father         \"oral\"    Diabetes Sister         gestational       SOCIAL HISTORY    Social History     Tobacco Use    Smoking status: Never Smoker    Smokeless tobacco: Never Used   Substance Use Topics    Alcohol use: Yes     Comment: once a month    Drug use: No       ALLERGIES    Allergies   Allergen Reactions    Shellfish-Derived Products Nausea Only    Morphine Other (See Comments)     HALLUCINATIONS      Tape [Adhesive Tape] Rash       MEDICATIONS    Current Outpatient Medications on File Prior to Encounter   Medication Sig Dispense Refill    mupirocin (BACTROBAN) 2 % ointment Apply 2 times daily. 1 Tube 0    acetaminophen (TYLENOL) 500 MG tablet Take 2 tablets by mouth 3 times daily as needed for Pain 180 tablet 0    citalopram (CELEXA) 40 MG tablet TAKE 1 TABLET BY MOUTH EVERY DAY 30 tablet 10    gabapentin (NEURONTIN) 400 MG capsule TAKE 1 CAPSULE BY MOUTH THREE TIMES DAILY *EMERGENCY REFILL* 90 capsule 2    ASPIRIN LOW DOSE 81 MG EC tablet TAKE 1 TABLET BY MOUTH DAILY 30 tablet 10    busPIRone (BUSPAR) 10 MG tablet TAKE 1 TABLET BY MOUTH TWICE DAILY 60 tablet 10    miconazole (MICOTIN) 2 % cream Apply topically 2 times daily.  30 g 2    lidocaine (XYLOCAINE) 2 % jelly Apply topically with dressing changes every 3 days 1 Tube 1    ibuprofen (ADVIL;MOTRIN) 800 MG tablet Take 1 tablet by mouth 4 times daily as needed for Pain 120 tablet 0    melatonin 10 MG CAPS capsule Take 1 capsule by mouth nightly as needed (sleep) 30 capsule 2    amLODIPine (NORVASC) 5 MG tablet TAKE 1 TABLET BY MOUTH EVERY DAY 30 tablet 3    atorvastatin (LIPITOR) 40 MG tablet TAKE 1 TABLET BY MOUTH NIGHTLY 30 tablet 3    carvedilol (COREG) 6.25 MG tablet TAKE 1 TABLET BY MOUTH 2 TIMES DAILY WITH MEALS 60 tablet 3    isosorbide mononitrate (IMDUR) 60 MG extended release tablet TAKE 1 TABLET BY MOUTH DAILY 30 tablet 3    clopidogrel (PLAVIX) 75 MG tablet TAKE 1 TABLET BY MOUTH DAILY 30 tablet 3    lisinopril (PRINIVIL;ZESTRIL) 10 MG tablet Take 1 tablet by mouth once daily 30 tablet 5    JARDIANCE 10 MG tablet TAKE 1 TABLET BY MOUTH EVERY DAY 30 tablet 5    glimepiride (AMARYL) 4 MG tablet TAKE 1 TABLET BY MOUTH TWICE DAILY 180 tablet 3    blood glucose monitor strips Test before meals and at bedtime. DX: DM, on insulin 100 strip 11    insulin glargine (LANTUS SOLOSTAR) 100 UNIT/ML injection pen Inject 65 Units into the skin daily 15 pen 12    omeprazole (PRILOSEC) 20 MG delayed release capsule Take 1 capsule by mouth Daily 90 capsule 3    levothyroxine (SYNTHROID) 50 MCG tablet TAKE 1 TABLET BY MOUTH EVERY DAY 30 tablet 10    miconazole (ZEASORB-AF) 2 % powder Apply topically 2 times daily. 45 g 1    blood glucose monitor kit and supplies Dispense sufficient amount for indicated testing frequency plus additional to accommodate PRN testing needs. Dispense all needed supplies to include: monitor, strips, lancing device, lancets, control solutions, alcohol swabs. 1 kit 0    sucralfate (CARAFATE) 1 GM tablet Take 1 tablet by mouth daily 180 tablet 1    magnesium hydroxide (MILK OF MAGNESIA) 400 MG/5ML suspension Take 30 mLs by mouth daily as needed for Constipation 900 mL 0    nitroGLYCERIN (NITROSTAT) 0.4 MG SL tablet up to max of 3 total doses. If no relief after 1 dose, call 911. 25 tablet 3    Handicap Placard MISC Is a permanent condition. DX: M19.90 1 each 0    Insulin Pen Needle (B-D UF III MINI PEN NEEDLES) 31G X 5 MM MISC USE 1 PEN NEEDLE DAILY 50 each 1    [employer] Lancets Thin MISC Test before meals and at bedtime. DX: DM, on insulin 100 each 11    MULTIPLE VITAMIN PO   Take 2 tablets by mouth daily DAILY      Alcohol Swabs (ALCOHOL PREP PADS) by Other route 2 times daily. No current facility-administered medications on file prior to encounter.         REVIEW OF SYSTEMS    Constitutional: negative  Eyes: negative  Ears, nose, mouth, throat, and face: negative  Respiratory: negative  Cardiovascular: negative except for lower extremity edema  Gastrointestinal: negative  Genitourinary:negative  Integument/breast: negative except for right posterior leg ulcer   Hematologic/lymphatic: negative  Musculoskeletal:negative  Neurological: negative except for paresthesia  Behavioral/Psych: negative  Endocrine: negative  Allergic/Immunologic: negative    Objective:      /68   Pulse 72   Temp 98.6 °F (37 °C) (Tympanic)   Resp 18   Ht 4' 10\" (1.473 m)   Wt 240 lb (108.9 kg)   BMI 50.16 kg/m²     Wt Readings from Last 3 Encounters:   04/27/21 240 lb (108.9 kg)   04/20/21 240 lb (108.9 kg)   04/06/21 240 lb (108.9 kg)       PHYSICAL EXAM    General Appearance: alert and oriented to person, place and time, well-developed and obese, in no acute distress  Skin: warm and dry, no rash or erythema, right posterior leg ulcer   Head: normocephalic and atraumatic  Eyes: pupils equal, round, extraocular eye movements intact, and conjunctivae normal  Pulmonary/Chest: normal air movement, no respiratory distress  Extremities: no cyanosis and no clubbing.  Bilateral lower leg edema   Musculoskeletal: no joint swelling, deformity or tenderness  Neurologic: gait, coordination normal and speech normal      Assessment:     Problem List Items Addressed This Visit     Morbid obesity with BMI of 45.0-49.9, adult ([facility])    Relevant Orders    Supply: Wound Cleanser    Supply: Wound Dressings    Supply: Cover and Secure    Supply: Edema Control    Neuropathy (Chronic)    Relevant Orders    Supply: Wound Cleanser    Supply: Wound Dressings    Supply: Cover and Secure    Supply: Edema Control    Skin ulcer of right calf with fat layer exposed ([facility] [address] - Primary    Relevant Orders    Supply: Wound Cleanser    Supply: Wound Dressings    Supply: Cover and Secure    Supply: Edema Control    Type 2 diabetes mellitus, with long-term current use of insulin ([facility])    Relevant Orders    Supply: Wound Cleanser    Supply: Wound Dressings    Supply: Cover and Secure Supply: Edema Control           Procedure Note  Indications:  Based on my examination of this patient's wound(s)/ulcer(s) today, debridement is required to promote healing and evaluate the wound base. Performed by: HAO Pena CNP    Consent obtained:  Yes    Time out taken:  Yes    Pain Control: Anesthetic  Anesthetic: 2% Lidocaine Gel Topical       Debridement:Excisional Debridement    Using curette the wound(s)/ulcer(s) was/were sharply debrided down through and including the removal of subcutaneous tissue. Devitalized Tissue Debrided:  fibrin, biofilm and slough    Pre Debridement Measurements:  Are located in the [facility]  Documentation Flow Sheet    Wound/Ulcer #: 1    Post Debridement Measurements:  Wound/Ulcer Descriptions are Pre Debridement except measurements:    Wound 02/24/21 Leg Lower; Posterior;Right #1 (Active)   Wound Image   03/23/21 0900   Wound Etiology Traumatic 04/27/21 1054   Dressing Status New drainage noted; Old drainage noted 04/27/21 1054   Wound Cleansed Soap and water 04/27/21 1054   Dressing/Treatment Other (comment) 04/27/21 1119   Wound Length (cm) 3.8 cm 04/27/21 1054   Wound Width (cm) 1.3 cm 04/27/21 1054   Wound Depth (cm) 0.2 cm 04/27/21 1054   Wound Surface Area (cm^2) 4.94 cm^2 04/27/21 1054   Change in Wound Size % (l*w) 63.68 04/27/21 1054   Wound Volume (cm^3) 0.99 cm^3 04/27/21 1054   Wound Healing % 27 04/27/21 1054   Post-Procedure Length (cm) 3.8 cm 04/27/21 1054   Post-Procedure Width (cm) 1.3 cm 04/27/21 1054   Post-Procedure Depth (cm) 0.2 cm 04/27/21 1054   Post-Procedure Surface Area (cm^2) 4.94 cm^2 04/27/21 1054   Post-Procedure Volume (cm^3) 0.99 cm^3 04/27/21 1054   Wound Assessment Bleeding;Pink/red 04/27/21 1054   Drainage Amount Moderate 04/27/21 1054   Drainage Description Serosanguinous 04/27/21 1054   Odor None 04/27/21 1054   Jeannie-wound Assessment Blanchable erythema 04/27/21 1054   Margins Defined edges 04/27/21 1054   Wound Thickness Description not for Pressure Injury Full thickness 04/27/21 1054   Number of days: 61          Percent of Wound(s)/Ulcer(s) Debrided: 100%    Total Surface Area Debrided:  4.94 sq cm     Diabetic/Pressure/Non Pressure Ulcers only:  Ulcer: Non-Pressure ulcer, fat layer exposed    Estimated Blood Loss:  Minimal    Hemostasis Achieved:  by pressure    Procedural Pain:  2  / 10     Post Procedural Pain:  2 / 10     Response to treatment:  Well tolerated by patient. Plan:     Treatment Note please see attached Discharge Instructions    Written patient dismissal instructions given to patient and signed by patient or POA. Discharge Instructions          [facility] -Phone: [phone] Fax: [phone]             Visit [facility] Instructions / Physician Orders     DATE: 4/27/2021     Home Care: NA     SUPPLIES ORDERED THRU: [facility]     Wound Location:  Right Lower Leg     Cleanse with: Keep Dry and Intact     Dressing Orders: Promogran, Silvercel, Zinc Unna Boot      Frequency:  Keep Dry and Intact     Additional Orders: Increase protein to diet (meat, cheese, eggs, fish, peanut butter, nuts and beans)  Multivitamin daily  ELEVATE LEGS AS MUCH AS POSSIBLE     [facility] week                                                                                                    ROOM TYPE   []????????? CHAIR     [x]????????? STRETCHER  []????????? EITHER             (Please note your next appointment above and if you are unable to keep, kindly give a 24 hour notice.  Thank you.)     If you experience any of the following, please call the [address] [place] [address] during business hours:  [phone]  Your Phone call may be forwarded to [employer] during business hours that [facility] is closed.     * Increase in Pain  * Temperature over 101  * Increase in drainage from your wound  * Drainage with a foul odor  * Bleeding  * Increase in swelling  * Need for compression bandage changes due to slippage, breakthrough drainage.     If you need medical attention outside of the business hours of the [address] [place] [address] Road please contact your PCP or go to the nearest emergency room.     The information contained in the After Visit Summary has been reviewed with me, the patient and/or responsible adult, by my health care provider(s). I had the opportunity to ask questions regarding this information. I have elected to receive;      []??????? ?? After Visit Summary  [x]????????? Comprehensive Discharge Instruction        Patient signature______________________________________Date:________  Electronically signed by Pantera Mejia RN on 4/27/2021 at 11:17 AM  Electronically signed by HAO Catalan CNP on 4/27/2021 at 11:18 AM          Electronically signed by HAO Catalan CNP on 4/27/2021 at 11:25 AM

## 2021-05-04 ENCOUNTER — HOSPITAL ENCOUNTER (OUTPATIENT)
Dept: WOUND CARE | Age: 66
Discharge: HOME | End: 2021-05-04
Payer: MEDICARE

## 2021-05-04 VITALS
WEIGHT: 240 LBS | BODY MASS INDEX: 50.38 KG/M2 | DIASTOLIC BLOOD PRESSURE: 70 MMHG | TEMPERATURE: 96.5 F | HEART RATE: 65 BPM | HEIGHT: 58 IN | SYSTOLIC BLOOD PRESSURE: 136 MMHG | RESPIRATION RATE: 18 BRPM

## 2021-05-04 DIAGNOSIS — E66.01 MORBID OBESITY WITH BMI OF 45.0-49.9, ADULT (HCC): ICD-10-CM

## 2021-05-04 DIAGNOSIS — Z79.4 TYPE 2 DIABETES MELLITUS WITH OTHER CIRCULATORY COMPLICATION, WITH LONG-TERM CURRENT USE OF INSULIN (HCC): ICD-10-CM

## 2021-05-04 DIAGNOSIS — L97.212 SKIN ULCER OF RIGHT CALF WITH FAT LAYER EXPOSED (HCC): Primary | ICD-10-CM

## 2021-05-04 DIAGNOSIS — G62.9 NEUROPATHY: ICD-10-CM

## 2021-05-04 DIAGNOSIS — E11.59 TYPE 2 DIABETES MELLITUS WITH OTHER CIRCULATORY COMPLICATION, WITH LONG-TERM CURRENT USE OF INSULIN (HCC): ICD-10-CM

## 2021-05-04 PROCEDURE — 11042 DBRDMT SUBQ TIS 1ST 20SQCM/<: CPT

## 2021-05-04 PROCEDURE — 11042 DBRDMT SUBQ TIS 1ST 20SQCM/<: CPT | Performed by: NURSE PRACTITIONER

## 2021-05-04 RX ORDER — LIDOCAINE HYDROCHLORIDE 20 MG/ML
JELLY TOPICAL ONCE
Status: CANCELLED | OUTPATIENT
Start: 2021-05-04 | End: 2021-05-04

## 2021-05-04 RX ORDER — LIDOCAINE HYDROCHLORIDE 40 MG/ML
SOLUTION TOPICAL ONCE
Status: CANCELLED | OUTPATIENT
Start: 2021-05-04 | End: 2021-05-04

## 2021-05-04 RX ORDER — LIDOCAINE 40 MG/G
CREAM TOPICAL ONCE
Status: CANCELLED | OUTPATIENT
Start: 2021-05-04 | End: 2021-05-04

## 2021-05-04 RX ORDER — LIDOCAINE 50 MG/G
OINTMENT TOPICAL ONCE
Status: CANCELLED | OUTPATIENT
Start: 2021-05-04 | End: 2021-05-04

## 2021-05-04 RX ORDER — LIDOCAINE HYDROCHLORIDE 20 MG/ML
JELLY TOPICAL ONCE
Status: COMPLETED | OUTPATIENT
Start: 2021-05-04 | End: 2021-05-04

## 2021-05-04 RX ADMIN — LIDOCAINE HYDROCHLORIDE 6 ML: 20 JELLY TOPICAL at 09:46

## 2021-05-04 NOTE — PROGRESS NOTES
[address]   Progress Note and Procedure Note      [address] [facility] RECORD NUMBER:  [id number]  AGE: 60 y.o. GENDER: female  : 1955  EPISODE DATE:  2021    Subjective:     Chief Complaint   Patient presents with    Wound Check     right lower leg         HISTORY of PRESENT ILLNESS JUDY Zarco is a 60 y.o. female who presents today for wound/ulcer evaluation. History of Wound Context: here to follow up on posterior right lower leg ulcer. Continues to do well with unna boot.    Wound/Ulcer Pain Timing/Severity: constant  Quality of pain: aching  Severity:  2 / 10   Modifying Factors: Pain worsens with debridement  Associated Signs/Symptoms: none    Ulcer Identification:  Ulcer Type: traumatic  Contributing Factors: edema, diabetes, decreased mobility and obesity    Wound: N/A        PAST MEDICAL HISTORY        Diagnosis Date    Anxiety     Borderline hypertension     Depression 2020    GERD (gastroesophageal reflux disease)     Heart attack ([facility] 2020    Hypothyroidism     Neuropathy     Obesity     morbid    Osteoarthritis     Other cirrhosis of liver ([facility] 2020    Sleep apnea     possible    Type II or unspecified type diabetes mellitus without mention of complication, not stated as uncontrolled        PAST SURGICAL HISTORY    Past Surgical History:   Procedure Laterality Date    APPENDECTOMY      COLONOSCOPY      DILATION AND CURETTAGE OF UTERUS      HIATAL HERNIA REPAIR      HYSTERECTOMY      THROAT SURGERY      POLYPS REMOVED FROM VOCAL CORD    TOTAL KNEE ARTHROPLASTY Right 2015    TOTAL KNEE ARTHROPLASTY Left 5/26/15    UPPER GASTROINTESTINAL ENDOSCOPY         FAMILY HISTORY    Family History   Problem Relation Age of Onset    Heart Disease Mother     Arthritis Mother     Heart Disease Father     Arthritis Father     Cancer Father         \"oral\"    Diabetes Sister         gestational SOCIAL HISTORY    Social History     Tobacco Use    Smoking status: Never Smoker    Smokeless tobacco: Never Used   Substance Use Topics    Alcohol use: Yes     Comment: once a month    Drug use: No       ALLERGIES    Allergies   Allergen Reactions    Shellfish-Derived Products Nausea Only    Morphine Other (See Comments)     HALLUCINATIONS      Tape Cosimo Anya Tape] Rash       MEDICATIONS    Current Outpatient Medications on File Prior to Encounter   Medication Sig Dispense Refill    acetaminophen (TYLENOL) 500 MG tablet Take 2 tablets by mouth 3 times daily as needed for Pain 180 tablet 0    citalopram (CELEXA) 40 MG tablet TAKE 1 TABLET BY MOUTH EVERY DAY 30 tablet 10    gabapentin (NEURONTIN) 400 MG capsule TAKE 1 CAPSULE BY MOUTH THREE TIMES DAILY *EMERGENCY REFILL* 90 capsule 2    ASPIRIN LOW DOSE 81 MG EC tablet TAKE 1 TABLET BY MOUTH DAILY 30 tablet 10    busPIRone (BUSPAR) 10 MG tablet TAKE 1 TABLET BY MOUTH TWICE DAILY 60 tablet 10    miconazole (MICOTIN) 2 % cream Apply topically 2 times daily.  30 g 2    lidocaine (XYLOCAINE) 2 % jelly Apply topically with dressing changes every 3 days 1 Tube 1    ibuprofen (ADVIL;MOTRIN) 800 MG tablet Take 1 tablet by mouth 4 times daily as needed for Pain 120 tablet 0    melatonin 10 MG CAPS capsule Take 1 capsule by mouth nightly as needed (sleep) 30 capsule 2    amLODIPine (NORVASC) 5 MG tablet TAKE 1 TABLET BY MOUTH EVERY DAY 30 tablet 3    atorvastatin (LIPITOR) 40 MG tablet TAKE 1 TABLET BY MOUTH NIGHTLY 30 tablet 3    carvedilol (COREG) 6.25 MG tablet TAKE 1 TABLET BY MOUTH 2 TIMES DAILY WITH MEALS 60 tablet 3    isosorbide mononitrate (IMDUR) 60 MG extended release tablet TAKE 1 TABLET BY MOUTH DAILY 30 tablet 3    clopidogrel (PLAVIX) 75 MG tablet TAKE 1 TABLET BY MOUTH DAILY 30 tablet 3    lisinopril (PRINIVIL;ZESTRIL) 10 MG tablet Take 1 tablet by mouth once daily 30 tablet 5    JARDIANCE 10 MG tablet TAKE 1 TABLET BY MOUTH EVERY DAY 30 tablet 5    glimepiride (AMARYL) 4 MG tablet TAKE 1 TABLET BY MOUTH TWICE DAILY 180 tablet 3    blood glucose monitor strips Test before meals and at bedtime. DX: DM, on insulin 100 strip 11    insulin glargine (LANTUS SOLOSTAR) 100 UNIT/ML injection pen Inject 65 Units into the skin daily 15 pen 12    omeprazole (PRILOSEC) 20 MG delayed release capsule Take 1 capsule by mouth Daily 90 capsule 3    levothyroxine (SYNTHROID) 50 MCG tablet TAKE 1 TABLET BY MOUTH EVERY DAY 30 tablet 10    miconazole (ZEASORB-AF) 2 % powder Apply topically 2 times daily. 45 g 1    blood glucose monitor kit and supplies Dispense sufficient amount for indicated testing frequency plus additional to accommodate PRN testing needs. Dispense all needed supplies to include: monitor, strips, lancing device, lancets, control solutions, alcohol swabs. 1 kit 0    sucralfate (CARAFATE) 1 GM tablet Take 1 tablet by mouth daily 180 tablet 1    magnesium hydroxide (MILK OF MAGNESIA) 400 MG/5ML suspension Take 30 mLs by mouth daily as needed for Constipation 900 mL 0    nitroGLYCERIN (NITROSTAT) 0.4 MG SL tablet up to max of 3 total doses. If no relief after 1 dose, call 911. 25 tablet 3    Handicap Placard MISC Is a permanent condition. DX: M19.90 1 each 0    Insulin Pen Needle (B-D UF III MINI PEN NEEDLES) 31G X 5 MM MISC USE 1 PEN NEEDLE DAILY 50 each 1    [employer] Lancets Thin MISC Test before meals and at bedtime. DX: DM, on insulin 100 each 11    MULTIPLE VITAMIN PO   Take 2 tablets by mouth daily DAILY      Alcohol Swabs (ALCOHOL PREP PADS) by Other route 2 times daily. No current facility-administered medications on file prior to encounter.         REVIEW OF SYSTEMS    Constitutional: negative  Eyes: negative  Ears, nose, mouth, throat, and face: negative  Respiratory: negative  Cardiovascular: negative except for lower extremity edema  Gastrointestinal: negative  Genitourinary:negative  Integument/breast: negative except for right posterior leg ulcer   Hematologic/lymphatic: negative  Musculoskeletal:negative  Neurological: negative except for paresthesia  Behavioral/Psych: negative  Endocrine: negative  Allergic/Immunologic: negative    Objective:      /70   Pulse 65   Temp 96.5 °F (35.8 °C) (Tympanic)   Resp 18   Ht 4' 10\" (1.473 m)   Wt 240 lb (108.9 kg)   BMI 50.16 kg/m²     Wt Readings from Last 3 Encounters:   05/04/21 240 lb (108.9 kg)   04/27/21 240 lb (108.9 kg)   04/20/21 240 lb (108.9 kg)       PHYSICAL EXAM    General Appearance: alert and oriented to person, place and time, well-developed and obese, in no acute distress  Skin: warm and dry, no rash or erythema, right posterior leg ulcer   Head: normocephalic and atraumatic  Eyes: pupils equal, round, extraocular eye movements intact, and conjunctivae normal  Pulmonary/Chest: normal air movement, no respiratory distress  Extremities: no cyanosis and no clubbing   Musculoskeletal: no joint swelling, deformity or tenderness  Neurologic: gait, coordination normal and speech normal      Assessment:     Problem List Items Addressed This Visit     Morbid obesity with BMI of 45.0-49.9, adult ([facility])    Relevant Orders    Supply: Wound Cleanser    Supply: Wound Dressings    Supply: Cover and Secure    Supply: Edema Control    Neuropathy (Chronic)    Relevant Orders    Supply: Wound Cleanser    Supply: Wound Dressings    Supply: Cover and Secure    Supply: Edema Control    Skin ulcer of right calf with fat layer exposed ([address] - Primary    Relevant Orders    Supply: Wound Cleanser    Supply: Wound Dressings    Supply: Cover and Secure    Supply: Edema Control    Type 2 diabetes mellitus, with long-term current use of insulin ([facility])    Relevant Orders    Supply: Wound Cleanser    Supply: Wound Dressings    Supply: Cover and Secure    Supply: Edema Control           Procedure Note  Indications:  Based on my examination of this patient's wound(s)/ulcer(s) today, debridement is required to promote healing and evaluate the wound base. Performed by: HAO Moore CNP    Consent obtained:  Yes    Time out taken:  Yes    Pain Control: Anesthetic  Anesthetic: 2% Lidocaine Gel Topical       Debridement:Excisional Debridement    Using curette the wound(s)/ulcer(s) was/were sharply debrided down through and including the removal of subcutaneous tissue. Devitalized Tissue Debrided:  fibrin, biofilm and slough    Pre Debridement Measurements:  Are located in the [facility]  Documentation Flow Sheet    Wound/Ulcer #: 1    Post Debridement Measurements:  Wound/Ulcer Descriptions are Pre Debridement except measurements:    Wound 02/24/21 Leg Lower; Posterior;Right #1 (Active)   Wound Image   05/04/21 0947   Wound Etiology Traumatic 05/04/21 0947   Dressing Status New drainage noted; Old drainage noted 05/04/21 0947   Wound Cleansed Soap and water 05/04/21 0947   Dressing/Treatment Other (comment) 04/27/21 1119   Wound Length (cm) 3 cm 05/04/21 0947   Wound Width (cm) 0.8 cm 05/04/21 0947   Wound Depth (cm) 0.1 cm 05/04/21 0947   Wound Surface Area (cm^2) 2.4 cm^2 05/04/21 0947   Change in Wound Size % (l*w) 82.35 05/04/21 0947   Wound Volume (cm^3) 0.24 cm^3 05/04/21 0947   Wound Healing % 82 05/04/21 0947   Post-Procedure Length (cm) 3 cm 05/04/21 0947   Post-Procedure Width (cm) 0.8 cm 05/04/21 0947   Post-Procedure Depth (cm) 0.1 cm 05/04/21 0947   Post-Procedure Surface Area (cm^2) 2.4 cm^2 05/04/21 0947   Post-Procedure Volume (cm^3) 0.24 cm^3 05/04/21 0947   Wound Assessment Bleeding;Pink/red 05/04/21 0947   Drainage Amount Moderate 05/04/21 0947   Drainage Description Serosanguinous 05/04/21 0947   Odor None 05/04/21 0947   Jeannie-wound Assessment Blanchable erythema 05/04/21 0947   Margins Defined edges 05/04/21 0947   Wound Thickness Description not for Pressure Injury Full thickness 05/04/21 0947   Number of days: 68          Percent of Wound(s)/Ulcer(s) Debrided: 100%    Total Surface Area Debrided:  2.40 sq cm     Diabetic/Pressure/Non Pressure Ulcers only:  Ulcer: Non-Pressure ulcer, fat layer exposed      Estimated Blood Loss:  Minimal    Hemostasis Achieved:  by pressure    Procedural Pain:  2  / 10     Post Procedural Pain:  0 / 10     Response to treatment:  Well tolerated by patient. Plan:     Treatment Note please see attached Discharge Instructions    Written patient dismissal instructions given to patient and signed by patient or POA. Discharge Instructions          [facility] -Phone: [phone] Fax: [phone]             Visit [facility] Instructions / Physician Orders     DATE: 5/4/2021     Home Care: NA     SUPPLIES ORDERED THRU: [employer]     Wound Location:  Right Lower Leg     Cleanse with: Keep Dry and Intact     Dressing Orders: Promogran, Zinc Unna Boot      Frequency:  Keep Dry and Intact     Additional Orders: Increase protein to diet (meat, cheese, eggs, fish, peanut butter, nuts and beans)  Multivitamin daily  ELEVATE LEGS AS MUCH AS POSSIBLE     Your next appointment with [address] [place] [address] is in 1 week                                                                                                    ROOM TYPE   []?????????? CHAIR     [x]?????????? STRETCHER  []?????????? EITHER             (Please note your next appointment above and if you are unable to keep, kindly give a 24 hour notice.  Thank you.)     If you experience any of the following, please call the [address] [place] [address] during business hours:  [phone]  Your Phone call may be forwarded to [phone] during business hours that [facility] is closed.     * Increase in Pain  * Temperature over 101  * Increase in drainage from your wound  * Drainage with a foul odor  * Bleeding  * Increase in swelling  * Need for compression bandage changes due to slippage, breakthrough drainage.     If you need medical attention outside of the

## 2021-05-04 NOTE — PLAN OF CARE
Problem: Wound:  Goal: Will show signs of wound healing; wound closure and no evidence of infection  Description: Will show signs of wound healing; wound closure and no evidence of infection  Outcome: Ongoing     Problem: Falls - Risk of:  Goal: Will remain free from falls  Description: Will remain free from falls  Outcome: Ongoing     Problem: Venous:  Goal: Signs of wound healing will improve  Description: Signs of wound healing will improve  Outcome: Ongoing     Problem: Compression therapy:  Goal: Will be free from complications associated with compression therapy  Description: Will be free from complications associated with compression therapy  Outcome: Ongoing

## 2021-05-04 NOTE — PROGRESS NOTES
[facility] Application   Below Knee    NAME:  Clemencia Reddy  YOB: 1955  MEDICAL RECORD NUMBER:  [id number]  DATE:  5/4/2021     [x] Applied moisturizing agent to dry skin as needed.  [x] Appied primary and secondary dressing as ordered     [x] Applied Unna roll from toes to knee overlapping each time.  [x] Applied ace wrap or coban from toes to below the knee.  [x] Secured with tape and/or metal clips covered with tape.  [x] Instructed patient/caregiver to keep dressing dry and intact. DO NOT REMOVE DRESSING.  [x] Instructed pt/family/caregiver to report excessive draining, loose bandage, wet dressing, severe pain or tingling in toes.  [x] Applied Roxene Donovan dressing below the knee to Right lower leg(s)        Unna Boot(s) were applied per  Guidelines.      Electronically signed by Alison Urbano RN on 5/4/2021 at 10:03 AM

## 2021-05-11 ENCOUNTER — HOSPITAL ENCOUNTER (OUTPATIENT)
Dept: WOUND CARE | Age: 66
Discharge: HOME | End: 2021-05-11
Payer: MEDICARE

## 2021-05-11 VITALS
HEART RATE: 65 BPM | HEIGHT: 58 IN | TEMPERATURE: 97.8 F | WEIGHT: 240 LBS | SYSTOLIC BLOOD PRESSURE: 142 MMHG | DIASTOLIC BLOOD PRESSURE: 67 MMHG | RESPIRATION RATE: 17 BRPM | BODY MASS INDEX: 50.38 KG/M2

## 2021-05-11 DIAGNOSIS — L97.212 SKIN ULCER OF RIGHT CALF WITH FAT LAYER EXPOSED (HCC): Primary | ICD-10-CM

## 2021-05-11 DIAGNOSIS — E66.01 MORBID OBESITY WITH BMI OF 45.0-49.9, ADULT (HCC): ICD-10-CM

## 2021-05-11 DIAGNOSIS — E11.59 TYPE 2 DIABETES MELLITUS WITH OTHER CIRCULATORY COMPLICATION, WITH LONG-TERM CURRENT USE OF INSULIN (HCC): ICD-10-CM

## 2021-05-11 DIAGNOSIS — G62.9 NEUROPATHY: ICD-10-CM

## 2021-05-11 DIAGNOSIS — Z79.4 TYPE 2 DIABETES MELLITUS WITH OTHER CIRCULATORY COMPLICATION, WITH LONG-TERM CURRENT USE OF INSULIN (HCC): ICD-10-CM

## 2021-05-11 PROCEDURE — 11042 DBRDMT SUBQ TIS 1ST 20SQCM/<: CPT | Performed by: NURSE PRACTITIONER

## 2021-05-11 PROCEDURE — 11042 DBRDMT SUBQ TIS 1ST 20SQCM/<: CPT

## 2021-05-11 RX ORDER — LIDOCAINE 50 MG/G
OINTMENT TOPICAL ONCE
Status: CANCELLED | OUTPATIENT
Start: 2021-05-11 | End: 2021-05-11

## 2021-05-11 RX ORDER — LIDOCAINE HYDROCHLORIDE 40 MG/ML
SOLUTION TOPICAL ONCE
Status: CANCELLED | OUTPATIENT
Start: 2021-05-11 | End: 2021-05-11

## 2021-05-11 RX ORDER — LIDOCAINE HYDROCHLORIDE 20 MG/ML
JELLY TOPICAL ONCE
Status: CANCELLED | OUTPATIENT
Start: 2021-05-11 | End: 2021-05-11

## 2021-05-11 RX ORDER — LIDOCAINE 40 MG/G
CREAM TOPICAL ONCE
Status: CANCELLED | OUTPATIENT
Start: 2021-05-11 | End: 2021-05-11

## 2021-05-11 RX ORDER — LIDOCAINE HYDROCHLORIDE 20 MG/ML
JELLY TOPICAL ONCE
Status: COMPLETED | OUTPATIENT
Start: 2021-05-11 | End: 2021-05-11

## 2021-05-11 RX ADMIN — LIDOCAINE HYDROCHLORIDE 6 ML: 20 JELLY TOPICAL at 10:10

## 2021-05-11 ASSESSMENT — PAIN - FUNCTIONAL ASSESSMENT: PAIN_FUNCTIONAL_ASSESSMENT: ACTIVITIES ARE NOT PREVENTED

## 2021-05-11 ASSESSMENT — PAIN DESCRIPTION - DESCRIPTORS: DESCRIPTORS: ITCHING;ACHING

## 2021-05-11 ASSESSMENT — PAIN DESCRIPTION - PAIN TYPE: TYPE: CHRONIC PAIN

## 2021-05-11 ASSESSMENT — PAIN DESCRIPTION - PROGRESSION: CLINICAL_PROGRESSION: NOT CHANGED

## 2021-05-11 ASSESSMENT — PAIN DESCRIPTION - ORIENTATION: ORIENTATION: RIGHT;LOWER

## 2021-05-11 NOTE — PROGRESS NOTES
[place] Application   Below Knee    NAME:  Db Jurado  YOB: 1955  MEDICAL RECORD NUMBER:  [id number]  DATE:  5/11/2021     [x] Applied moisturizing agent to dry skin as needed.  [x] Appied primary and secondary dressing as ordered     [x] Applied Unna roll from toes to knee overlapping each time.  [x] Applied ace wrap or coban from toes to below the knee.  [x] Secured with tape and/or metal clips covered with tape.  [x] Instructed patient/caregiver to keep dressing dry and intact. DO NOT REMOVE DRESSING.  [x] Instructed pt/family/caregiver to report excessive draining, loose bandage, wet dressing, severe pain or tingling in toes.  [x] Applied [place] dressing below the knee to RIGHT lower leg(s)        Unna Boot(s) were applied per  Guidelines.      Electronically signed by Aimee Napier RN on 5/11/2021 at 10:19 AM

## 2021-05-11 NOTE — PROGRESS NOTES
SOCIAL HISTORY    Social History     Tobacco Use    Smoking status: Never Smoker    Smokeless tobacco: Never Used   Substance Use Topics    Alcohol use: Yes     Comment: once a month    Drug use: No       ALLERGIES    Allergies   Allergen Reactions    Shellfish-Derived Products Nausea Only    Morphine Other (See Comments)     HALLUCINATIONS      Tape Ezella Aquas Tape] Rash       MEDICATIONS    Current Outpatient Medications on File Prior to Encounter   Medication Sig Dispense Refill    acetaminophen (TYLENOL) 500 MG tablet Take 2 tablets by mouth 3 times daily as needed for Pain 180 tablet 0    citalopram (CELEXA) 40 MG tablet TAKE 1 TABLET BY MOUTH EVERY DAY 30 tablet 10    gabapentin (NEURONTIN) 400 MG capsule TAKE 1 CAPSULE BY MOUTH THREE TIMES DAILY *EMERGENCY REFILL* 90 capsule 2    ASPIRIN LOW DOSE 81 MG EC tablet TAKE 1 TABLET BY MOUTH DAILY 30 tablet 10    busPIRone (BUSPAR) 10 MG tablet TAKE 1 TABLET BY MOUTH TWICE DAILY 60 tablet 10    miconazole (MICOTIN) 2 % cream Apply topically 2 times daily.  30 g 2    lidocaine (XYLOCAINE) 2 % jelly Apply topically with dressing changes every 3 days 1 Tube 1    ibuprofen (ADVIL;MOTRIN) 800 MG tablet Take 1 tablet by mouth 4 times daily as needed for Pain 120 tablet 0    melatonin 10 MG CAPS capsule Take 1 capsule by mouth nightly as needed (sleep) 30 capsule 2    amLODIPine (NORVASC) 5 MG tablet TAKE 1 TABLET BY MOUTH EVERY DAY 30 tablet 3    atorvastatin (LIPITOR) 40 MG tablet TAKE 1 TABLET BY MOUTH NIGHTLY 30 tablet 3    carvedilol (COREG) 6.25 MG tablet TAKE 1 TABLET BY MOUTH 2 TIMES DAILY WITH MEALS 60 tablet 3    isosorbide mononitrate (IMDUR) 60 MG extended release tablet TAKE 1 TABLET BY MOUTH DAILY 30 tablet 3    clopidogrel (PLAVIX) 75 MG tablet TAKE 1 TABLET BY MOUTH DAILY 30 tablet 3    lisinopril (PRINIVIL;ZESTRIL) 10 MG tablet Take 1 tablet by mouth once daily 30 tablet 5    JARDIANCE 10 MG tablet TAKE 1 TABLET BY MOUTH EVERY DAY 30 tablet 5    glimepiride (AMARYL) 4 MG tablet TAKE 1 TABLET BY MOUTH TWICE DAILY 180 tablet 3    blood glucose monitor strips Test before meals and at bedtime. DX: DM, on insulin 100 strip 11    insulin glargine (LANTUS SOLOSTAR) 100 UNIT/ML injection pen Inject 65 Units into the skin daily 15 pen 12    omeprazole (PRILOSEC) 20 MG delayed release capsule Take 1 capsule by mouth Daily 90 capsule 3    levothyroxine (SYNTHROID) 50 MCG tablet TAKE 1 TABLET BY MOUTH EVERY DAY 30 tablet 10    miconazole (ZEASORB-AF) 2 % powder Apply topically 2 times daily. 45 g 1    blood glucose monitor kit and supplies Dispense sufficient amount for indicated testing frequency plus additional to accommodate PRN testing needs. Dispense all needed supplies to include: monitor, strips, lancing device, lancets, control solutions, alcohol swabs. 1 kit 0    sucralfate (CARAFATE) 1 GM tablet Take 1 tablet by mouth daily 180 tablet 1    magnesium hydroxide (MILK OF MAGNESIA) 400 MG/5ML suspension Take 30 mLs by mouth daily as needed for Constipation 900 mL 0    nitroGLYCERIN (NITROSTAT) 0.4 MG SL tablet up to max of 3 total doses. If no relief after 1 dose, call 911. 25 tablet 3    Handicap Placard MISC Is a permanent condition. DX: M19.90 1 each 0    Insulin Pen Needle (B-D UF III MINI PEN NEEDLES) 31G X 5 MM MISC USE 1 PEN NEEDLE DAILY 50 each 1    [employer] Lancets Thin MISC Test before meals and at bedtime. DX: DM, on insulin 100 each 11    MULTIPLE VITAMIN PO   Take 2 tablets by mouth daily DAILY      Alcohol Swabs (ALCOHOL PREP PADS) by Other route 2 times daily. No current facility-administered medications on file prior to encounter.         REVIEW OF SYSTEMS    Constitutional: negative  Eyes: negative  Ears, nose, mouth, throat, and face: negative  Respiratory: negative  Cardiovascular: negative except for lower extremity edema  Gastrointestinal: negative  Genitourinary:negative  Integument/breast: negative except for right posterior leg ulcer  Hematologic/lymphatic: negative  Musculoskeletal:negative  Neurological: negative except for paresthesia  Behavioral/Psych: negative  Endocrine: negative  Allergic/Immunologic: negative    Objective:      BP (!) 142/67   Pulse 65   Temp 97.8 °F (36.6 °C) (Tympanic)   Resp 17   Ht 4' 10\" (1.473 m)   Wt 240 lb (108.9 kg)   BMI 50.16 kg/m²     Wt Readings from Last 3 Encounters:   05/11/21 240 lb (108.9 kg)   05/04/21 240 lb (108.9 kg)   04/27/21 240 lb (108.9 kg)       PHYSICAL EXAM    General Appearance: alert and oriented to person, place and time, well-developed and obese, in no acute distress  Skin: warm and dry, no rash or erythema, right posterior leg ulcer   Head: normocephalic and atraumatic  Eyes: pupils equal, round, extraocular eye movements intact, and conjunctivae normal  Pulmonary/Chest: normal air movement, no respiratory distress  Extremities: no cyanosis and no clubbing  Musculoskeletal: no joint swelling, deformity or tenderness  Neurologic: gait, coordination normal and speech normal      Assessment:     Problem List Items Addressed This Visit     Morbid obesity with BMI of 45.0-49.9, adult ([facility])    Relevant Orders    Supply: Wound Cleanser    Supply: Wound Dressings    Supply: Cover and Secure    Supply: Edema Control    Neuropathy (Chronic)    Relevant Orders    Supply: Wound Cleanser    Supply: Wound Dressings    Supply: Cover and Secure    Supply: Edema Control    Skin ulcer of right calf with fat layer exposed ([address] - Primary    Relevant Orders    Supply: Wound Cleanser    Supply: Wound Dressings    Supply: Cover and Secure    Supply: Edema Control    Type 2 diabetes mellitus, with long-term current use of insulin ([facility])    Relevant Orders    Supply: Wound Cleanser    Supply: Wound Dressings    Supply: Cover and Secure    Supply: Edema Control           Procedure Note  Indications:  Based on my examination of this patient's wound(s)/ulcer(s) today, debridement is required to promote healing and evaluate the wound base. Performed by: HAO Bowen CNP    Consent obtained:  Yes    Time out taken:  Yes    Pain Control: Anesthetic  Anesthetic: 2% Lidocaine Gel Topical       Debridement:Excisional Debridement    Using curette the wound(s)/ulcer(s) was/were sharply debrided down through and including the removal of subcutaneous tissue. Devitalized Tissue Debrided:  fibrin, biofilm and slough    Pre Debridement Measurements:  Are located in the [place]  Documentation Flow Sheet    Wound/Ulcer #: 1    Post Debridement Measurements:  Wound/Ulcer Descriptions are Pre Debridement except measurements:    Wound 02/24/21 Leg Lower; Posterior;Right #1 (Active)   Wound Image   05/11/21 0959   Wound Etiology Traumatic 05/11/21 0959   Dressing Status New drainage noted; Old drainage noted 05/11/21 0959   Wound Cleansed Soap and water 05/11/21 0959   Dressing/Treatment Other (comment) 04/27/21 1119   Wound Length (cm) 0.9 cm 05/11/21 0959   Wound Width (cm) 0.5 cm 05/11/21 0959   Wound Depth (cm) 0.1 cm 05/11/21 0959   Wound Surface Area (cm^2) 0.45 cm^2 05/11/21 0959   Change in Wound Size % (l*w) 96.69 05/11/21 0959   Wound Volume (cm^3) 0.04 cm^3 05/11/21 0959   Wound Healing % 97 05/11/21 0959   Post-Procedure Length (cm) 0.9 cm 05/11/21 0959   Post-Procedure Width (cm) 0.5 cm 05/11/21 0959   Post-Procedure Depth (cm) 0.1 cm 05/11/21 0959   Post-Procedure Surface Area (cm^2) 0.45 cm^2 05/11/21 0959   Post-Procedure Volume (cm^3) 0.04 cm^3 05/11/21 0959   Wound Assessment Bleeding;Pink/red 05/11/21 0959   Drainage Amount Moderate 05/11/21 0959   Drainage Description Serosanguinous 05/11/21 0959   Odor None 05/11/21 0959   Jeannie-wound Assessment Blanchable erythema; Intact 05/11/21 0959   Margins Defined edges 05/11/21 0959   Wound Thickness Description not for Pressure Injury Full thickness 05/11/21 0959   Number of days: 75          Percent of Wound(s)/Ulcer(s) Debrided: 100%    Total Surface Area Debrided:  0.45 sq cm     Diabetic/Pressure/Non Pressure Ulcers only:  Ulcer: Non-Pressure ulcer, fat layer exposed      Estimated Blood Loss:  Minimal    Hemostasis Achieved:  by pressure    Procedural Pain:  2  / 10     Post Procedural Pain:  0 / 10     Response to treatment:  Well tolerated by patient. Plan:     Treatment Note please see attached Discharge Instructions    Written patient dismissal instructions given to patient and signed by patient or POA. Discharge Instructions          [facility] -Phone: [phone] Fax: [phone]             Visit Teresita Instructions / Physician Orders     DATE: 5/11/2021     Home Care: NA     SUPPLIES ORDERED THRU: [facility]     Wound Location:  Right Lower Leg     Cleanse with: Keep Dry and Intact     Dressing Orders: Promogran, Zinc Unna Boot      Frequency:  Keep Dry and Intact     Additional Orders: Increase protein to diet (meat, cheese, eggs, fish, peanut butter, nuts and beans)  Multivitamin daily  ELEVATE LEGS AS MUCH AS POSSIBLE     Your next appointment with [address] [place] [address] is in 1 week                                                                                                    ROOM TYPE   []??????????? CHAIR     [x]??????????? STRETCHER  []??????????? EITHER             (Please note your next appointment above and if you are unable to keep, kindly give a 24 hour notice.  Thank you.)     If you experience any of the following, please call the [address] [place] [address] during business hours:  [phone]  Your Phone call may be forwarded to [address] during business hours that [facility] is closed.     * Increase in Pain  * Temperature over 101  * Increase in drainage from your wound  * Drainage with a foul odor  * Bleeding  * Increase in swelling  * Need for compression bandage changes due to slippage, breakthrough drainage.     If you need medical attention outside of the business hours of the [address] [place] [address] Road please contact your PCP or go to the nearest emergency room.     The information contained in the After Visit Summary has been reviewed with me, the patient and/or responsible adult, by my health care provider(s). I had the opportunity to ask questions regarding this information. I have elected to receive;      []????????? ?? After Visit Summary  [x]??????????? Comprehensive Discharge Instruction        Patient signature______________________________________Date:________  Electronically signed by Ananya Cuevas RN on 5/11/2021 at 10:16 AM  Electronically signed by HAO Reno CNP on 5/11/2021 at 10:18 AM          Electronically signed by HAO Reno CNP on 5/11/2021 at 10:21 AM

## 2021-05-12 DIAGNOSIS — B37.2 INTERTRIGINOUS CANDIDIASIS: ICD-10-CM

## 2021-05-12 RX ORDER — CARVEDILOL 6.25 MG/1
TABLET ORAL
Qty: 60 TABLET | Refills: 3 | Status: SHIPPED | OUTPATIENT
Start: 2021-05-12 | End: 2021-08-05 | Stop reason: SDUPTHER

## 2021-05-12 RX ORDER — ISOSORBIDE MONONITRATE 60 MG/1
TABLET, EXTENDED RELEASE ORAL
Qty: 30 TABLET | Refills: 3 | Status: SHIPPED | OUTPATIENT
Start: 2021-05-12 | End: 2021-08-05 | Stop reason: SDUPTHER

## 2021-05-12 RX ORDER — CLOPIDOGREL BISULFATE 75 MG/1
TABLET ORAL
Qty: 30 TABLET | Refills: 3 | Status: SHIPPED | OUTPATIENT
Start: 2021-05-12 | End: 2021-08-05 | Stop reason: SDUPTHER

## 2021-05-12 RX ORDER — AMLODIPINE BESYLATE 5 MG/1
TABLET ORAL
Qty: 30 TABLET | Refills: 3 | Status: SHIPPED | OUTPATIENT
Start: 2021-05-12 | End: 2021-08-05 | Stop reason: SDUPTHER

## 2021-05-12 RX ORDER — ATORVASTATIN CALCIUM 40 MG/1
40 TABLET, FILM COATED ORAL NIGHTLY
Qty: 30 TABLET | Refills: 3 | Status: SHIPPED | OUTPATIENT
Start: 2021-05-12 | End: 2021-08-05 | Stop reason: SDUPTHER

## 2021-05-12 NOTE — TELEPHONE ENCOUNTER
Multiple medications pending for refill     Health Maintenance   Topic Date Due    Cervical cancer screen  Never done    Shingles Vaccine (1 of 2) Never done    Diabetic retinal exam  01/01/2014    Hepatitis A vaccine (2 of 2 - Risk 2-dose series) 02/17/2021    COVID-19 Vaccine (2 - Moderna 2-dose series) 05/13/2021    Diabetic microalbuminuria test  07/14/2021    Lipid screen  07/28/2021    Diabetic foot exam  08/17/2021    A1C test (Diabetic or Prediabetic)  02/04/2022    TSH testing  02/04/2022    Potassium monitoring  02/04/2022    Creatinine monitoring  02/04/2022    Breast cancer screen  08/20/2022    DTaP/Tdap/Td vaccine (6 - Td) 06/27/2024    Colon cancer screen colonoscopy  08/01/2024    Flu vaccine  Completed    Pneumococcal 0-64 years Vaccine  Completed    Hepatitis C screen  Completed    HIV screen  Completed    Hib vaccine  Aged Out    Meningococcal (ACWY) vaccine  Aged Out             (applicable per patient's age: Cancer Screenings, Depression Screening, Fall Risk Screening, Immunizations)    Hemoglobin A1C (%)   Date Value   02/04/2021 7.5   08/17/2020 9.0   07/14/2020 10.7 (H)     Microalb/Crt.  Ratio (mcg/mg creat)   Date Value   07/14/2020 34 (H)     LDL Cholesterol (mg/dL)   Date Value   07/28/2020 60     AST (U/L)   Date Value   07/29/2020 27     ALT (U/L)   Date Value   07/29/2020 21     BUN (mg/dL)   Date Value   02/04/2021 10      (goal A1C is < 7)   (goal LDL is <100) need 30-50% reduction from baseline     BP Readings from Last 3 Encounters:   05/11/21 (!) 142/67   05/04/21 136/70   04/27/21 116/68    (goal /80)      All Future Testing planned in [facility]:  Lab Frequency Next Occurrence   Hemoglobin A1C Once 09/25/2021   RADHA Digital Screen Bilateral [[id number]] Once 08/17/2021   6 Minute Walk Test Once 09/28/2020   CBC With Auto Differential Once 02/11/2022   Reflux study/Reflux Venous Insufficiency Bilateral Once 02/24/2022       Next Visit Date:  Future Appointments   Date Time Provider Osmar Hutchinson   5/18/2021 10:00 AM HAO Ba - CNP [facility] Zari Molina            Patient Active Problem List:     Anxiety     GERD (gastroesophageal reflux disease)     OA (osteoarthritis)     Neuropathy     Right knee DJD     Hypothyroidism     S/P left total knee arthroplasty     Chest pain     Ischemic heart disease     Essential hypertension     Hypertensive urgency     NSTEMI (non-ST elevated myocardial infarction) ([address]     Other cirrhosis of liver (HCC)     Abdominal pain     Elevated lipase     Depression     Cystitis     Type 2 diabetes mellitus, with long-term current use of insulin (HCC)     SHERITA (obstructive sleep apnea)     Morbid obesity with BMI of 45.0-49.9, adult ([address]     Skin ulcer of right calf with fat layer exposed ([address]     PVD (peripheral vascular disease) ([address]     Bilateral edema of lower extremity     Paronychia of left middle finger

## 2021-05-12 NOTE — TELEPHONE ENCOUNTER
E-scribe request for miconazole 2% cream. Please review and e-scribe if applicable. Last Visit Date: 2/23/2021  Next Visit Date:  Visit date not found    Hemoglobin A1C (%)   Date Value   02/04/2021 7.5   08/17/2020 9.0   07/14/2020 10.7 (H)             ( goal A1C is < 7)   Microalb/Crt.  Ratio (mcg/mg creat)   Date Value   07/14/2020 34 (H)     LDL Cholesterol (mg/dL)   Date Value   07/28/2020 60       (goal LDL is <100)   AST (U/L)   Date Value   07/29/2020 27     ALT (U/L)   Date Value   07/29/2020 21     BUN (mg/dL)   Date Value   02/04/2021 10     BP Readings from Last 3 Encounters:   05/11/21 (!) 142/67   05/04/21 136/70   04/27/21 116/68          (goal 120/80)        Patient Active Problem List:     Anxiety     GERD (gastroesophageal reflux disease)     OA (osteoarthritis)     Neuropathy     Right knee DJD     Hypothyroidism     S/P left total knee arthroplasty     Chest pain     Ischemic heart disease     Essential hypertension     Hypertensive urgency     NSTEMI (non-ST elevated myocardial infarction) ([address]     Other cirrhosis of liver (HCC)     Abdominal pain     Elevated lipase     Depression     Cystitis     Type 2 diabetes mellitus, with long-term current use of insulin (HCC)     SHERITA (obstructive sleep apnea)     Morbid obesity with BMI of 45.0-49.9, adult ([address]     Skin ulcer of right calf with fat layer exposed ([address]     PVD (peripheral vascular disease) ([address]     Bilateral edema of lower extremity     Paronychia of left middle finger
not applicable

## 2021-05-18 ENCOUNTER — HOSPITAL ENCOUNTER (OUTPATIENT)
Dept: WOUND CARE | Age: 66
Discharge: HOME | End: 2021-05-18
Payer: MEDICARE

## 2021-05-18 VITALS
HEIGHT: 58 IN | SYSTOLIC BLOOD PRESSURE: 125 MMHG | TEMPERATURE: 98.1 F | BODY MASS INDEX: 50.38 KG/M2 | HEART RATE: 64 BPM | RESPIRATION RATE: 18 BRPM | WEIGHT: 240 LBS | DIASTOLIC BLOOD PRESSURE: 64 MMHG

## 2021-05-18 DIAGNOSIS — L97.212 SKIN ULCER OF RIGHT CALF WITH FAT LAYER EXPOSED (HCC): Primary | ICD-10-CM

## 2021-05-18 DIAGNOSIS — E66.01 MORBID OBESITY WITH BMI OF 45.0-49.9, ADULT (HCC): ICD-10-CM

## 2021-05-18 DIAGNOSIS — Z79.4 TYPE 2 DIABETES MELLITUS WITH OTHER CIRCULATORY COMPLICATION, WITH LONG-TERM CURRENT USE OF INSULIN (HCC): ICD-10-CM

## 2021-05-18 DIAGNOSIS — G62.9 NEUROPATHY: ICD-10-CM

## 2021-05-18 DIAGNOSIS — E11.59 TYPE 2 DIABETES MELLITUS WITH OTHER CIRCULATORY COMPLICATION, WITH LONG-TERM CURRENT USE OF INSULIN (HCC): ICD-10-CM

## 2021-05-18 PROCEDURE — 99213 OFFICE O/P EST LOW 20 MIN: CPT | Performed by: NURSE PRACTITIONER

## 2021-05-18 PROCEDURE — 29580 STRAPPING UNNA BOOT: CPT

## 2021-05-18 RX ORDER — LIDOCAINE 50 MG/G
OINTMENT TOPICAL ONCE
Status: CANCELLED | OUTPATIENT
Start: 2021-05-18 | End: 2021-05-18

## 2021-05-18 RX ORDER — LIDOCAINE HYDROCHLORIDE 20 MG/ML
JELLY TOPICAL ONCE
Status: CANCELLED | OUTPATIENT
Start: 2021-05-18 | End: 2021-05-18

## 2021-05-18 RX ORDER — LIDOCAINE HYDROCHLORIDE 20 MG/ML
JELLY TOPICAL ONCE
Status: COMPLETED | OUTPATIENT
Start: 2021-05-18 | End: 2021-05-18

## 2021-05-18 RX ORDER — LIDOCAINE HYDROCHLORIDE 40 MG/ML
SOLUTION TOPICAL ONCE
Status: CANCELLED | OUTPATIENT
Start: 2021-05-18 | End: 2021-05-18

## 2021-05-18 RX ORDER — LIDOCAINE 40 MG/G
CREAM TOPICAL ONCE
Status: CANCELLED | OUTPATIENT
Start: 2021-05-18 | End: 2021-05-18

## 2021-05-18 RX ADMIN — LIDOCAINE HYDROCHLORIDE 6 ML: 20 JELLY TOPICAL at 09:58

## 2021-05-18 NOTE — PROGRESS NOTES
[address]   Progress Note and Procedure Note      [facility] RECORD NUMBER:  [id number]  AGE: 73 y.o. GENDER: female  : 1955  EPISODE DATE:  2021    Subjective:     Chief Complaint   Patient presents with    Wound Check     right lower leg         HISTORY of PRESENT ILLNESS HPI     Ramses Yu is a 73 y.o. female who presents today for wound/ulcer evaluation. History of Wound Context: here to follow up on posterior right lower leg ulcer that is now closed. Has done well with unna boot - will wrap one more week.    Wound/Ulcer Pain Timing/Severity: none  Quality of pain: N/A  Severity:  0 / 10   Modifying Factors: None  Associated Signs/Symptoms: none    Ulcer Identification:  Ulcer Type: traumatic  Contributing Factors: edema, diabetes, decreased mobility and obesity    Wound: N/A        PAST MEDICAL HISTORY        Diagnosis Date    Anxiety     Borderline hypertension     Depression 2020    GERD (gastroesophageal reflux disease)     Heart attack ([facility] 2020    Hypothyroidism     Neuropathy     Obesity     morbid    Osteoarthritis     Other cirrhosis of liver ([facility] 2020    Sleep apnea     possible    Type II or unspecified type diabetes mellitus without mention of complication, not stated as uncontrolled        PAST SURGICAL HISTORY    Past Surgical History:   Procedure Laterality Date    APPENDECTOMY      COLONOSCOPY      DILATION AND CURETTAGE OF UTERUS      HIATAL HERNIA REPAIR      HYSTERECTOMY      THROAT SURGERY      POLYPS REMOVED FROM VOCAL CORD    TOTAL KNEE ARTHROPLASTY Right 2015    TOTAL KNEE ARTHROPLASTY Left 5/26/15    UPPER GASTROINTESTINAL ENDOSCOPY         FAMILY HISTORY    Family History   Problem Relation Age of Onset    Heart Disease Mother     Arthritis Mother     Heart Disease Father     Arthritis Father     Cancer Father         \"oral\"    Diabetes Sister         gestational SOCIAL HISTORY    Social History     Tobacco Use    Smoking status: Never Smoker    Smokeless tobacco: Never Used   Vaping Use    Vaping Use: Never used   Substance Use Topics    Alcohol use: Yes     Comment: once a month    Drug use: No       ALLERGIES    Allergies   Allergen Reactions    Shellfish-Derived Products Nausea Only    Morphine Other (See Comments)     HALLUCINATIONS      Tape Gabi Brass Tape] Rash       MEDICATIONS    Current Outpatient Medications on File Prior to Encounter   Medication Sig Dispense Refill    miconazole (MICOTIN) 2 % cream APPLY TO AFFECTED AREA TWICE DAILY 30 g 2    carvedilol (COREG) 6.25 MG tablet TAKE ONE (1) TABLET BY MOUTH TWICE DAILY WITH MEALS 60 tablet 3    clopidogrel (PLAVIX) 75 MG tablet TAKE (1) TABLET BY MOUTH DAILY 30 tablet 3    isosorbide mononitrate (IMDUR) 60 MG extended release tablet TAKE (1) TABLET BY MOUTH DAILY 30 tablet 3    atorvastatin (LIPITOR) 40 MG tablet TAKE 1 TABLET BY MOUTH NIGHTLY 30 tablet 3    amLODIPine (NORVASC) 5 MG tablet TAKE 1 TABLET BY MOUTH EVERY DAY 30 tablet 3    acetaminophen (TYLENOL) 500 MG tablet Take 2 tablets by mouth 3 times daily as needed for Pain 180 tablet 0    citalopram (CELEXA) 40 MG tablet TAKE 1 TABLET BY MOUTH EVERY DAY 30 tablet 10    gabapentin (NEURONTIN) 400 MG capsule TAKE 1 CAPSULE BY MOUTH THREE TIMES DAILY *EMERGENCY REFILL* 90 capsule 2    ASPIRIN LOW DOSE 81 MG EC tablet TAKE 1 TABLET BY MOUTH DAILY 30 tablet 10    busPIRone (BUSPAR) 10 MG tablet TAKE 1 TABLET BY MOUTH TWICE DAILY 60 tablet 10    lidocaine (XYLOCAINE) 2 % jelly Apply topically with dressing changes every 3 days 1 Tube 1    ibuprofen (ADVIL;MOTRIN) 800 MG tablet Take 1 tablet by mouth 4 times daily as needed for Pain 120 tablet 0    melatonin 10 MG CAPS capsule Take 1 capsule by mouth nightly as needed (sleep) 30 capsule 2    lisinopril (PRINIVIL;ZESTRIL) 10 MG tablet Take 1 tablet by mouth once daily 30 tablet 5    negative  Genitourinary:negative  Integument/breast: negative  Hematologic/lymphatic: negative  Musculoskeletal:negative  Neurological: negative except for paresthesia  Behavioral/Psych: negative  Endocrine: negative  Allergic/Immunologic: negative    Objective:      /64   Pulse 64   Temp 98.1 °F (36.7 °C) (Tympanic)   Resp 18   Ht 4' 10\" (1.473 m)   Wt 240 lb (108.9 kg)   BMI 50.16 kg/m²     Wt Readings from Last 3 Encounters:   05/18/21 240 lb (108.9 kg)   05/11/21 240 lb (108.9 kg)   05/04/21 240 lb (108.9 kg)       PHYSICAL EXAM    General Appearance: alert and oriented to person, place and time, well-developed and obese, in no acute distress  Skin: warm and dry, no rash or erythema  Head: normocephalic and atraumatic  Eyes: pupils equal, round, extraocular eye movements intact, and conjunctivae normal  Pulmonary/Chest: normal air movement, no respiratory distress  Extremities: no cyanosis and no clubbing  Musculoskeletal: no joint swelling, deformity or tenderness  Neurologic: gait, coordination normal and speech normal      Assessment:      Problem List Items Addressed This Visit     Morbid obesity with BMI of 45.0-49.9, adult (HCC)    Relevant Orders    Supply: Wound Cleanser    Supply: Cover and Secure    Neuropathy (Chronic)    Relevant Orders    Supply: Wound Cleanser    Supply: Cover and Secure    Skin ulcer of right calf with fat layer exposed ([address] - Primary    Relevant Orders    Supply: Wound Cleanser    Supply: Cover and Secure    Type 2 diabetes mellitus, with long-term current use of insulin ([address]    Relevant Orders    Supply: Wound Cleanser    Supply: Cover and Secure           Wound 02/24/21 Leg Lower; Posterior;Right #1 (Active)   Wound Image   05/18/21 0956   Wound Etiology Traumatic 05/11/21 0959   Dressing Status New drainage noted; Old drainage noted 05/11/21 0959   Wound Cleansed Soap and water 05/11/21 0959   Dressing/Treatment Other (comment) 04/27/21 1119   Wound Length (cm) 0 you are unable to keep, kindly give a 24 hour notice. Thank you.)     If you experience any of the following, please call the [address] [place] [address] Road during business hours:  [phone]  Your Phone call may be forwarded to [address] during business hours that [facility] is closed.     * Increase in Pain  * Temperature over 101  * Increase in drainage from your wound  * Drainage with a foul odor  * Bleeding  * Increase in swelling  * Need for compression bandage changes due to slippage, breakthrough drainage.     If you need medical attention outside of the business hours of the [address] [place] [address] Road please contact your PCP or go to the nearest emergency room.     The information contained in the After Visit Summary has been reviewed with me, the patient and/or responsible adult, by my health care provider(s). I had the opportunity to ask questions regarding this information. I have elected to receive;      []?????????? ?? After Visit Summary  [x]???????????? Comprehensive Discharge Instruction        Patient signature______________________________________Date:________  Electronically signed by Cale Urias RN on 5/18/2021 at 10:09 AM  Electronically signed by HAO Rios CNP on 5/18/2021 at 10:11 AM          Electronically signed by HAO Rios CNP on 5/18/2021 at 10:14 AM

## 2021-05-18 NOTE — PROGRESS NOTES
[employer] Application   Below Knee    NAME:  Liliana Reynoso  YOB: 1955  MEDICAL RECORD NUMBER:  [id number]  DATE:  5/18/2021     [x] Applied moisturizing agent to dry skin as needed.  [x] Appied primary and secondary dressing as ordered     [x] Applied Unna roll from toes to knee overlapping each time.  [x] Applied ace wrap or coban from toes to below the knee.  [x] Secured with tape and/or metal clips covered with tape.  [x] Instructed patient/caregiver to keep dressing dry and intact. DO NOT REMOVE DRESSING.  [x] Instructed pt/family/caregiver to report excessive draining, loose bandage, wet dressing, severe pain or tingling in toes.  [x] Applied [employer] dressing below the knee to Right lower leg(s)        Unna Boot(s) were applied per  Guidelines.      Electronically signed by Jeannie Roy RN on 5/18/2021 at 10:13 AM

## 2021-05-25 ENCOUNTER — HOSPITAL ENCOUNTER (OUTPATIENT)
Dept: WOUND CARE | Age: 66
Discharge: HOME | End: 2021-05-25
Payer: MEDICARE

## 2021-05-25 VITALS
HEIGHT: 58 IN | BODY MASS INDEX: 50.38 KG/M2 | TEMPERATURE: 98 F | DIASTOLIC BLOOD PRESSURE: 53 MMHG | HEART RATE: 70 BPM | SYSTOLIC BLOOD PRESSURE: 124 MMHG | WEIGHT: 240 LBS

## 2021-05-25 DIAGNOSIS — Z79.4 TYPE 2 DIABETES MELLITUS WITH OTHER CIRCULATORY COMPLICATION, WITH LONG-TERM CURRENT USE OF INSULIN (HCC): ICD-10-CM

## 2021-05-25 DIAGNOSIS — G62.9 NEUROPATHY: ICD-10-CM

## 2021-05-25 DIAGNOSIS — E11.59 TYPE 2 DIABETES MELLITUS WITH OTHER CIRCULATORY COMPLICATION, WITH LONG-TERM CURRENT USE OF INSULIN (HCC): ICD-10-CM

## 2021-05-25 DIAGNOSIS — E66.01 MORBID OBESITY WITH BMI OF 45.0-49.9, ADULT (HCC): Primary | ICD-10-CM

## 2021-05-25 PROCEDURE — 99212 OFFICE O/P EST SF 10 MIN: CPT

## 2021-05-25 PROCEDURE — 99213 OFFICE O/P EST LOW 20 MIN: CPT | Performed by: NURSE PRACTITIONER

## 2021-05-25 RX ORDER — LIDOCAINE 50 MG/G
OINTMENT TOPICAL ONCE
Status: CANCELLED | OUTPATIENT
Start: 2021-05-25 | End: 2021-05-25

## 2021-05-25 RX ORDER — LIDOCAINE HYDROCHLORIDE 40 MG/ML
SOLUTION TOPICAL ONCE
Status: CANCELLED | OUTPATIENT
Start: 2021-05-25 | End: 2021-05-25

## 2021-05-25 RX ORDER — LIDOCAINE HYDROCHLORIDE 20 MG/ML
JELLY TOPICAL ONCE
Status: CANCELLED | OUTPATIENT
Start: 2021-05-25 | End: 2021-05-25

## 2021-05-25 RX ORDER — LIDOCAINE 40 MG/G
CREAM TOPICAL ONCE
Status: CANCELLED | OUTPATIENT
Start: 2021-05-25 | End: 2021-05-25

## 2021-05-25 NOTE — PROGRESS NOTES
status: Never Smoker    Smokeless tobacco: Never Used   Vaping Use    Vaping Use: Never used   Substance Use Topics    Alcohol use: Yes     Comment: once a month    Drug use: No       ALLERGIES    Allergies   Allergen Reactions    Shellfish-Derived Products Nausea Only    Morphine Other (See Comments)     HALLUCINATIONS      Tape Lajune Gitelman Tape] Rash       MEDICATIONS    Current Outpatient Medications on File Prior to Encounter   Medication Sig Dispense Refill    miconazole (MICOTIN) 2 % cream APPLY TO AFFECTED AREA TWICE DAILY 30 g 2    carvedilol (COREG) 6.25 MG tablet TAKE ONE (1) TABLET BY MOUTH TWICE DAILY WITH MEALS 60 tablet 3    clopidogrel (PLAVIX) 75 MG tablet TAKE (1) TABLET BY MOUTH DAILY 30 tablet 3    isosorbide mononitrate (IMDUR) 60 MG extended release tablet TAKE (1) TABLET BY MOUTH DAILY 30 tablet 3    atorvastatin (LIPITOR) 40 MG tablet TAKE 1 TABLET BY MOUTH NIGHTLY 30 tablet 3    amLODIPine (NORVASC) 5 MG tablet TAKE 1 TABLET BY MOUTH EVERY DAY 30 tablet 3    acetaminophen (TYLENOL) 500 MG tablet Take 2 tablets by mouth 3 times daily as needed for Pain 180 tablet 0    citalopram (CELEXA) 40 MG tablet TAKE 1 TABLET BY MOUTH EVERY DAY 30 tablet 10    gabapentin (NEURONTIN) 400 MG capsule TAKE 1 CAPSULE BY MOUTH THREE TIMES DAILY *EMERGENCY REFILL* 90 capsule 2    ASPIRIN LOW DOSE 81 MG EC tablet TAKE 1 TABLET BY MOUTH DAILY 30 tablet 10    busPIRone (BUSPAR) 10 MG tablet TAKE 1 TABLET BY MOUTH TWICE DAILY 60 tablet 10    lidocaine (XYLOCAINE) 2 % jelly Apply topically with dressing changes every 3 days 1 Tube 1    ibuprofen (ADVIL;MOTRIN) 800 MG tablet Take 1 tablet by mouth 4 times daily as needed for Pain 120 tablet 0    melatonin 10 MG CAPS capsule Take 1 capsule by mouth nightly as needed (sleep) 30 capsule 2    lisinopril (PRINIVIL;ZESTRIL) 10 MG tablet Take 1 tablet by mouth once daily 30 tablet 5    JARDIANCE 10 MG tablet TAKE 1 TABLET BY MOUTH EVERY DAY 30 tablet 5  glimepiride (AMARYL) 4 MG tablet TAKE 1 TABLET BY MOUTH TWICE DAILY 180 tablet 3    blood glucose monitor strips Test before meals and at bedtime. DX: DM, on insulin 100 strip 11    insulin glargine (LANTUS SOLOSTAR) 100 UNIT/ML injection pen Inject 65 Units into the skin daily 15 pen 12    omeprazole (PRILOSEC) 20 MG delayed release capsule Take 1 capsule by mouth Daily 90 capsule 3    levothyroxine (SYNTHROID) 50 MCG tablet TAKE 1 TABLET BY MOUTH EVERY DAY 30 tablet 10    miconazole (ZEASORB-AF) 2 % powder Apply topically 2 times daily. 45 g 1    blood glucose monitor kit and supplies Dispense sufficient amount for indicated testing frequency plus additional to accommodate PRN testing needs. Dispense all needed supplies to include: monitor, strips, lancing device, lancets, control solutions, alcohol swabs. 1 kit 0    sucralfate (CARAFATE) 1 GM tablet Take 1 tablet by mouth daily 180 tablet 1    magnesium hydroxide (MILK OF MAGNESIA) 400 MG/5ML suspension Take 30 mLs by mouth daily as needed for Constipation 900 mL 0    nitroGLYCERIN (NITROSTAT) 0.4 MG SL tablet up to max of 3 total doses. If no relief after 1 dose, call 911. 25 tablet 3    Handicap Placard MISC Is a permanent condition. DX: M19.90 1 each 0    Insulin Pen Needle (B-D UF III MINI PEN NEEDLES) 31G X 5 MM MISC USE 1 PEN NEEDLE DAILY 50 each 1    [employer] Lancets Thin MISC Test before meals and at bedtime. DX: DM, on insulin 100 each 11    MULTIPLE VITAMIN PO   Take 2 tablets by mouth daily DAILY      Alcohol Swabs (ALCOHOL PREP PADS) by Other route 2 times daily. No current facility-administered medications on file prior to encounter.        REVIEW OF SYSTEMS    Constitutional: negative  Eyes: negative  Ears, nose, mouth, throat, and face: negative  Respiratory: negative  Cardiovascular: negative except for lower extremity edema  Gastrointestinal: negative  Genitourinary:negative  Integument/breast: negative  Hematologic/lymphatic: negative  Musculoskeletal:negative  Neurological: negative except for paresthesia  Behavioral/Psych: negative  Endocrine: negative  Allergic/Immunologic: negative    Objective:      BP (!) 124/53   Pulse 70   Temp 98 °F (36.7 °C) (Tympanic)   Ht 4' 10\" (1.473 m)   Wt 240 lb (108.9 kg)   BMI 50.16 kg/m²     Wt Readings from Last 3 Encounters:   05/25/21 240 lb (108.9 kg)   05/18/21 240 lb (108.9 kg)   05/11/21 240 lb (108.9 kg)       PHYSICAL EXAM    General Appearance: alert and oriented to person, place and time, well-developed and obese, in no acute distress  Skin: warm and dry, no rash or erythema  Head: normocephalic and atraumatic  Eyes: pupils equal, round, extraocular eye movements intact, and conjunctivae normal  Pulmonary/Chest: normal air movement, no respiratory distress  Extremities: no cyanosis and no clubbing  Musculoskeletal: no joint swelling, deformity or tenderness  Neurologic: gait, coordination normal and speech normal      Assessment:      Problem List Items Addressed This Visit     Morbid obesity with BMI of 45.0-49.9, adult (HCC) - Primary    Relevant Orders    Supply: Wound Cleanser    Neuropathy (Chronic)    Relevant Orders    Supply: Wound Cleanser    Type 2 diabetes mellitus, with long-term current use of insulin (HCC)    Relevant Orders    Supply: Wound Cleanser                   Wound is healed    Plan:     Treatment Note please see attached Discharge Instructions    Written patient dismissal instructions given to patient and signed by patient or POA.          Discharge Instructions          [address]: [phone] Fax: [phone]             Visit Teresita Instructions / Physician Orders     DATE: 5/25/2021     Home Care: NA     SUPPLIES ORDERED THRU: [facility]     Wound Location:  Right Lower Leg-healed     Cleanse with: as normal     Dressing Orders:       Frequency:       Additional Orders: Increase protein to diet (meat, cheese, eggs, fish, peanut butter, nuts and beans)  Multivitamin daily  ELEVATE LEGS AS MUCH AS POSSIBLE     Your next appointment with [address] [place] [address] is-call if needed                                                                                                   ROOM TYPE   []????????????? CHAIR     []????????????? STRETCHER  []????????????? EITHER             (Please note your next appointment above and if you are unable to keep, kindly give a 24 hour notice. Thank you.)     If you experience any of the following, please call the [address] [place] [address] during business hours:  [phone]  Your Phone call may be forwarded to [phone] during business hours that [facility] is closed.     * Increase in Pain  * Temperature over 101  * Increase in drainage from your wound  * Drainage with a foul odor  * Bleeding  * Increase in swelling  * Need for compression bandage changes due to slippage, breakthrough drainage.     If you need medical attention outside of the business hours of the [address] [place] [address] please contact your PCP or go to the nearest emergency room.     The information contained in the After Visit Summary has been reviewed with me, the patient and/or responsible adult, by my health care provider(s). I had the opportunity to ask questions regarding this information. I have elected to receive;      []??????????? ?? After Visit Summary  [x]????????????? Comprehensive Discharge Instruction        Patient signature______________________________________Date:________  Electronically signed by Vaishali Disla RN on 5/25/2021 at 9:17 AM  Electronically signed by HAO Lam CNP on 5/25/2021 at 9:16 AM          Electronically signed by HAO Lam CNP on 5/25/2021 at 9:18 AM

## 2021-06-08 DIAGNOSIS — Z79.4 TYPE 2 DIABETES MELLITUS WITH OTHER CIRCULATORY COMPLICATION, WITH LONG-TERM CURRENT USE OF INSULIN (HCC): ICD-10-CM

## 2021-06-08 DIAGNOSIS — F51.01 PRIMARY INSOMNIA: ICD-10-CM

## 2021-06-08 DIAGNOSIS — I10 ESSENTIAL HYPERTENSION: ICD-10-CM

## 2021-06-08 DIAGNOSIS — E11.59 TYPE 2 DIABETES MELLITUS WITH OTHER CIRCULATORY COMPLICATION, WITH LONG-TERM CURRENT USE OF INSULIN (HCC): ICD-10-CM

## 2021-06-08 RX ORDER — SUCRALFATE 1 G/1
TABLET ORAL
Qty: 30 TABLET | Refills: 9 | Status: SHIPPED | OUTPATIENT
Start: 2021-06-08 | End: 2021-08-05 | Stop reason: SDUPTHER

## 2021-06-08 RX ORDER — MELATONIN 10 MG
CAPSULE ORAL
Qty: 30 CAPSULE | Refills: 2 | Status: SHIPPED | OUTPATIENT
Start: 2021-06-08 | End: 2021-08-05 | Stop reason: SDUPTHER

## 2021-06-08 RX ORDER — LISINOPRIL 10 MG/1
TABLET ORAL
Qty: 30 TABLET | Refills: 5 | Status: SHIPPED | OUTPATIENT
Start: 2021-06-08 | End: 2021-08-05 | Stop reason: SDUPTHER

## 2021-06-08 RX ORDER — GABAPENTIN 400 MG/1
400 CAPSULE ORAL 3 TIMES DAILY
Qty: 90 CAPSULE | Refills: 2 | Status: SHIPPED | OUTPATIENT
Start: 2021-06-08 | End: 2021-08-05 | Stop reason: SDUPTHER

## 2021-06-08 NOTE — TELEPHONE ENCOUNTER
E-scribe request for sucralfate. Please review and e-scribe if applicable. Last Visit Date: 2/23/2021  Next Visit Date:  Visit date not found    Hemoglobin A1C (%)   Date Value   02/04/2021 7.5   08/17/2020 9.0   07/14/2020 10.7 (H)             ( goal A1C is < 7)   Microalb/Crt.  Ratio (mcg/mg creat)   Date Value   07/14/2020 34 (H)     LDL Cholesterol (mg/dL)   Date Value   07/28/2020 60       (goal LDL is <100)   AST (U/L)   Date Value   07/29/2020 27     ALT (U/L)   Date Value   07/29/2020 21     BUN (mg/dL)   Date Value   02/04/2021 10     BP Readings from Last 3 Encounters:   05/25/21 (!) 124/53   05/18/21 125/64   05/11/21 (!) 142/67          (goal 120/80)        Patient Active Problem List:     Anxiety     GERD (gastroesophageal reflux disease)     OA (osteoarthritis)     Neuropathy     Right knee DJD     Hypothyroidism     S/P left total knee arthroplasty     Chest pain     Ischemic heart disease     Essential hypertension     Hypertensive urgency     NSTEMI (non-ST elevated myocardial infarction) ([facility] [address]     Other cirrhosis of liver (HCC)     Abdominal pain     Elevated lipase     Depression     Cystitis     Type 2 diabetes mellitus, with long-term current use of insulin (HCC)     SHERITA (obstructive sleep apnea)     Morbid obesity with BMI of 45.0-49.9, adult ([facility] [address]     PVD (peripheral vascular disease) ([facility] [address]     Bilateral edema of lower extremity     Paronychia of left middle finger

## 2021-06-08 NOTE — TELEPHONE ENCOUNTER
E-scribe request for melatonin 10 mg. Please review and e-scribe if applicable. Last Visit Date:  04/16/2021  Next Visit Date:  Visit date not found    Hemoglobin A1C (%)   Date Value   02/04/2021 7.5   08/17/2020 9.0   07/14/2020 10.7 (H)             ( goal A1C is < 7)   Microalb/Crt.  Ratio (mcg/mg creat)   Date Value   07/14/2020 34 (H)     LDL Cholesterol (mg/dL)   Date Value   07/28/2020 60       (goal LDL is <100)   AST (U/L)   Date Value   07/29/2020 27     ALT (U/L)   Date Value   07/29/2020 21     BUN (mg/dL)   Date Value   02/04/2021 10     BP Readings from Last 3 Encounters:   05/25/21 (!) 124/53   05/18/21 125/64   05/11/21 (!) 142/67          (goal 120/80)        Patient Active Problem List:     Anxiety     GERD (gastroesophageal reflux disease)     OA (osteoarthritis)     Neuropathy     Right knee DJD     Hypothyroidism     S/P left total knee arthroplasty     Chest pain     Ischemic heart disease     Essential hypertension     Hypertensive urgency     NSTEMI (non-ST elevated myocardial infarction) ([facility] [address]     Other cirrhosis of liver (HCC)     Abdominal pain     Elevated lipase     Depression     Cystitis     Type 2 diabetes mellitus, with long-term current use of insulin (HCC)     SHERITA (obstructive sleep apnea)     Morbid obesity with BMI of 45.0-49.9, adult ([facility] [address]     PVD (peripheral vascular disease) ([facility] [address]     Bilateral edema of lower extremity     Paronychia of left middle finger      ----[place] Knock

## 2021-06-17 RX ORDER — INSULIN GLARGINE 100 [IU]/ML
INJECTION, SOLUTION SUBCUTANEOUS
Qty: 15 ML | Refills: 10 | Status: SHIPPED | OUTPATIENT
Start: 2021-06-17 | End: 2021-07-26 | Stop reason: SDUPTHER

## 2021-06-17 NOTE — TELEPHONE ENCOUNTER
E-scribe request for lantus solbelinda. Please review and e-scribe if applicable. Last Visit Date:  04/16/2021  Next Visit Date:  Visit date not found    Hemoglobin A1C (%)   Date Value   02/04/2021 7.5   08/17/2020 9.0   07/14/2020 10.7 (H)             ( goal A1C is < 7)   Microalb/Crt.  Ratio (mcg/mg creat)   Date Value   07/14/2020 34 (H)     LDL Cholesterol (mg/dL)   Date Value   07/28/2020 60       (goal LDL is <100)   AST (U/L)   Date Value   07/29/2020 27     ALT (U/L)   Date Value   07/29/2020 21     BUN (mg/dL)   Date Value   02/04/2021 10     BP Readings from Last 3 Encounters:   05/25/21 (!) 124/53   05/18/21 125/64   05/11/21 (!) 142/67          (goal 120/80)        Patient Active Problem List:     Anxiety     GERD (gastroesophageal reflux disease)     OA (osteoarthritis)     Neuropathy     Right knee DJD     Hypothyroidism     S/P left total knee arthroplasty     Chest pain     Ischemic heart disease     Essential hypertension     Hypertensive urgency     NSTEMI (non-ST elevated myocardial infarction) ([facility] [address]     Other cirrhosis of liver (HCC)     Abdominal pain     Elevated lipase     Depression     Cystitis     Type 2 diabetes mellitus, with long-term current use of insulin (HCC)     SHERITA (obstructive sleep apnea)     Morbid obesity with BMI of 45.0-49.9, adult ([address]     PVD (peripheral vascular disease) ([facility] [address]     Bilateral edema of lower extremity     Paronychia of left middle finger      ----Enrique Servin

## 2021-07-26 RX ORDER — INSULIN GLARGINE 100 [IU]/ML
INJECTION, SOLUTION SUBCUTANEOUS
Qty: 15 ML | Refills: 10 | Status: SHIPPED | OUTPATIENT
Start: 2021-07-26 | End: 2021-08-05 | Stop reason: SDUPTHER

## 2021-07-26 NOTE — TELEPHONE ENCOUNTER
Patient calling office because she will be out of Lantus in a few days but her refills are under Dr. Osiel Portillo name. The pharmacy told her they will not be able to fill the refills due to Dr. Hinson Knows no longer being at [facility]. Patient needs a new script sent to [facility], please advise.

## 2021-08-05 DIAGNOSIS — F51.01 PRIMARY INSOMNIA: ICD-10-CM

## 2021-08-05 DIAGNOSIS — E11.59 TYPE 2 DIABETES MELLITUS WITH OTHER CIRCULATORY COMPLICATION, WITH LONG-TERM CURRENT USE OF INSULIN (HCC): ICD-10-CM

## 2021-08-05 DIAGNOSIS — T14.8XXA WOUND, OPEN: ICD-10-CM

## 2021-08-05 DIAGNOSIS — B37.2 INTERTRIGINOUS CANDIDIASIS: ICD-10-CM

## 2021-08-05 DIAGNOSIS — Z79.4 TYPE 2 DIABETES MELLITUS WITH OTHER CIRCULATORY COMPLICATION, WITH LONG-TERM CURRENT USE OF INSULIN (HCC): ICD-10-CM

## 2021-08-05 DIAGNOSIS — I10 ESSENTIAL HYPERTENSION: ICD-10-CM

## 2021-08-05 DIAGNOSIS — F41.9 ANXIETY: Chronic | ICD-10-CM

## 2021-08-05 RX ORDER — MELATONIN 10 MG
CAPSULE ORAL
Qty: 30 CAPSULE | Refills: 2 | Status: SHIPPED | OUTPATIENT
Start: 2021-08-05

## 2021-08-05 RX ORDER — ACETAMINOPHEN 500 MG
1000 TABLET ORAL 3 TIMES DAILY PRN
Qty: 180 TABLET | Refills: 0 | Status: SHIPPED | OUTPATIENT
Start: 2021-08-05 | End: 2021-08-18

## 2021-08-05 RX ORDER — SUCRALFATE 1 G/1
TABLET ORAL
Qty: 30 TABLET | Refills: 9 | Status: SHIPPED | OUTPATIENT
Start: 2021-08-05 | End: 2022-04-20

## 2021-08-05 RX ORDER — BUSPIRONE HYDROCHLORIDE 10 MG/1
TABLET ORAL
Qty: 60 TABLET | Refills: 10 | Status: SHIPPED | OUTPATIENT
Start: 2021-08-05 | End: 2022-02-16 | Stop reason: SDUPTHER

## 2021-08-05 RX ORDER — EMPAGLIFLOZIN 10 MG/1
TABLET, FILM COATED ORAL
Qty: 30 TABLET | Refills: 5 | Status: SHIPPED | OUTPATIENT
Start: 2021-08-05 | End: 2021-12-10

## 2021-08-05 RX ORDER — AMLODIPINE BESYLATE 5 MG/1
TABLET ORAL
Qty: 30 TABLET | Refills: 3 | Status: SHIPPED | OUTPATIENT
Start: 2021-08-05 | End: 2021-09-17

## 2021-08-05 RX ORDER — INSULIN GLARGINE 100 [IU]/ML
INJECTION, SOLUTION SUBCUTANEOUS
Qty: 15 ML | Refills: 10 | Status: SHIPPED | OUTPATIENT
Start: 2021-08-05 | End: 2021-09-15 | Stop reason: CLARIF

## 2021-08-05 RX ORDER — CLOPIDOGREL BISULFATE 75 MG/1
TABLET ORAL
Qty: 30 TABLET | Refills: 3 | Status: SHIPPED | OUTPATIENT
Start: 2021-08-05 | End: 2021-09-17

## 2021-08-05 RX ORDER — ISOSORBIDE MONONITRATE 60 MG/1
TABLET, EXTENDED RELEASE ORAL
Qty: 30 TABLET | Refills: 3 | Status: SHIPPED | OUTPATIENT
Start: 2021-08-05 | End: 2021-09-17

## 2021-08-05 RX ORDER — ASPIRIN 81 MG/1
TABLET ORAL
Qty: 30 TABLET | Refills: 10 | Status: SHIPPED | OUTPATIENT
Start: 2021-08-05 | End: 2022-02-04

## 2021-08-05 RX ORDER — IBUPROFEN 800 MG/1
800 TABLET ORAL 4 TIMES DAILY PRN
Qty: 120 TABLET | Refills: 0 | Status: SHIPPED | OUTPATIENT
Start: 2021-08-05 | End: 2022-09-29

## 2021-08-05 RX ORDER — LISINOPRIL 10 MG/1
TABLET ORAL
Qty: 30 TABLET | Refills: 5 | Status: SHIPPED | OUTPATIENT
Start: 2021-08-05 | End: 2021-12-10

## 2021-08-05 RX ORDER — GABAPENTIN 400 MG/1
400 CAPSULE ORAL 3 TIMES DAILY
Qty: 90 CAPSULE | Refills: 2 | Status: SHIPPED | OUTPATIENT
Start: 2021-08-05 | End: 2021-09-17

## 2021-08-05 RX ORDER — CITALOPRAM 40 MG/1
TABLET ORAL
Qty: 30 TABLET | Refills: 10 | Status: SHIPPED | OUTPATIENT
Start: 2021-08-05 | End: 2022-02-16 | Stop reason: SDUPTHER

## 2021-08-05 RX ORDER — CARVEDILOL 6.25 MG/1
TABLET ORAL
Qty: 60 TABLET | Refills: 3 | Status: SHIPPED | OUTPATIENT
Start: 2021-08-05 | End: 2021-09-17

## 2021-08-05 RX ORDER — ATORVASTATIN CALCIUM 40 MG/1
40 TABLET, FILM COATED ORAL NIGHTLY
Qty: 30 TABLET | Refills: 3 | Status: SHIPPED | OUTPATIENT
Start: 2021-08-05 | End: 2021-09-17

## 2021-08-05 NOTE — TELEPHONE ENCOUNTER
Patient contacting because she has a new pharmacy and needs her medications sent over. All medications pending to new pharmacy, please advise.

## 2021-08-17 DIAGNOSIS — B37.2 INTERTRIGINOUS CANDIDIASIS: ICD-10-CM

## 2021-08-17 NOTE — TELEPHONE ENCOUNTER
E-scribe request for miconazole. Please review and e-scribe if applicable. Last Visit Date:  02/23/2021  Next Visit Date:  Visit date not found    Hemoglobin A1C (%)   Date Value   02/04/2021 7.5   08/17/2020 9.0   07/14/2020 10.7 (H)             ( goal A1C is < 7)   Microalb/Crt.  Ratio (mcg/mg creat)   Date Value   07/14/2020 34 (H)     LDL Cholesterol (mg/dL)   Date Value   07/28/2020 60       (goal LDL is <100)   AST (U/L)   Date Value   07/29/2020 27     ALT (U/L)   Date Value   07/29/2020 21     BUN (mg/dL)   Date Value   02/04/2021 10     BP Readings from Last 3 Encounters:   05/25/21 (!) 124/53   05/18/21 125/64   05/11/21 (!) 142/67          (goal 120/80)        Patient Active Problem List:     Anxiety     GERD (gastroesophageal reflux disease)     OA (osteoarthritis)     Neuropathy     Right knee DJD     Hypothyroidism     S/P left total knee arthroplasty     Chest pain     Ischemic heart disease     Essential hypertension     Hypertensive urgency     NSTEMI (non-ST elevated myocardial infarction) ([facility] [address]     Other cirrhosis of liver (HCC)     Abdominal pain     Elevated lipase     Depression     Cystitis     Type 2 diabetes mellitus, with long-term current use of insulin (HCC)     SHERITA (obstructive sleep apnea)     Morbid obesity with BMI of 45.0-49.9, adult ([facility] [address]     PVD (peripheral vascular disease) ([facility] [address]     Bilateral edema of lower extremity     Paronychia of left middle finger      ----Jo Anthony

## 2021-08-18 DIAGNOSIS — T14.8XXA WOUND, OPEN: ICD-10-CM

## 2021-08-18 RX ORDER — ACETAMINOPHEN 500 MG
TABLET ORAL
Qty: 180 TABLET | Refills: 0 | Status: SHIPPED | OUTPATIENT
Start: 2021-08-18 | End: 2021-10-21 | Stop reason: SDUPTHER

## 2021-08-18 NOTE — TELEPHONE ENCOUNTER
Last visit: 08/23/21  Last Med refill: 03/25/21  Does patient have enough medication for 72 hours: Yes    Next Visit Date:  No future appointments. Health Maintenance   Topic Date Due    Cervical cancer screen  Never done    Shingles Vaccine (1 of 2) Never done    DEXA (modify frequency per FRAX score)  Never done    Diabetic retinal exam  01/01/2014    Hepatitis A vaccine (2 of 2 - Risk 2-dose series) 02/17/2021    Pneumococcal 65+ years Vaccine (1 of 1 - PPSV23) 05/14/2021    Diabetic microalbuminuria test  07/14/2021    Diabetic foot exam  08/17/2021    Lipid screen  07/28/2021    Flu vaccine (1) 09/01/2021    A1C test (Diabetic or Prediabetic)  02/04/2022    TSH testing  02/04/2022    Potassium monitoring  02/04/2022    Creatinine monitoring  02/04/2022    Breast cancer screen  08/20/2022    DTaP/Tdap/Td vaccine (6 - Td or Tdap) 06/27/2024    Colon cancer screen colonoscopy  08/01/2024    COVID-19 Vaccine  Completed    Hepatitis C screen  Completed    HIV screen  Completed    Hib vaccine  Aged Out    Meningococcal (ACWY) vaccine  Aged Out       Hemoglobin A1C (%)   Date Value   02/04/2021 7.5   08/17/2020 9.0   07/14/2020 10.7 (H)             ( goal A1C is < 7)   Microalb/Crt.  Ratio (mcg/mg creat)   Date Value   07/14/2020 34 (H)     LDL Cholesterol (mg/dL)   Date Value   07/28/2020 60   07/14/2020 120       (goal LDL is <100)   AST (U/L)   Date Value   07/29/2020 27     ALT (U/L)   Date Value   07/29/2020 21     BUN (mg/dL)   Date Value   02/04/2021 10     BP Readings from Last 3 Encounters:   05/25/21 (!) 124/53   05/18/21 125/64   05/11/21 (!) 142/67          (goal 120/80)    All Future Testing planned in [facility]  Lab Frequency Next Occurrence   Hemoglobin A1C Once 09/25/2021   6 Minute Walk Test Once 09/28/2020   CBC With Auto Differential Once 02/11/2022   Reflux study/Reflux Venous Insufficiency Bilateral Once 02/24/2022               Patient Active Problem List:     Anxiety GERD (gastroesophageal reflux disease)     OA (osteoarthritis)     Neuropathy     Right knee DJD     Hypothyroidism     S/P left total knee arthroplasty     Chest pain     Ischemic heart disease     Essential hypertension     Hypertensive urgency     NSTEMI (non-ST elevated myocardial infarction) (HCC)     Other cirrhosis of liver (HCC)     Abdominal pain     Elevated lipase     Depression     Cystitis     Type 2 diabetes mellitus, with long-term current use of insulin (HCC)     SHERITA (obstructive sleep apnea)     Morbid obesity with BMI of 45.0-49.9, adult ([facility] [address]     PVD (peripheral vascular disease) (HCC)     Bilateral edema of lower extremity     Paronychia of left middle finger

## 2021-08-26 RX ORDER — LEVOTHYROXINE SODIUM 0.05 MG/1
TABLET ORAL
Qty: 30 TABLET | Refills: 5 | Status: SHIPPED | OUTPATIENT
Start: 2021-08-26 | End: 2022-02-04

## 2021-08-26 NOTE — TELEPHONE ENCOUNTER
Please address the medication refill and close the encounter. If I can be of assistance, please route to the applicable pool. Thank you. Last visit: 2-23-21  Last Med refill: 10/02/2020  Does patient have enough medication for 72 hours: No:     Next Visit Date:  No future appointments. Health Maintenance   Topic Date Due    Cervical cancer screen  Never done    Shingles Vaccine (1 of 2) Never done    DEXA (modify frequency per FRAX score)  Never done    Diabetic retinal exam  01/01/2014    Hepatitis A vaccine (2 of 2 - Risk 2-dose series) 02/17/2021    Pneumococcal 65+ years Vaccine (1 of 1 - PPSV23) 05/14/2021    Diabetic microalbuminuria test  07/14/2021    Diabetic foot exam  08/17/2021    Lipid screen  07/28/2021    Flu vaccine (1) 09/01/2021    A1C test (Diabetic or Prediabetic)  02/04/2022    TSH testing  02/04/2022    Potassium monitoring  02/04/2022    Creatinine monitoring  02/04/2022    Breast cancer screen  08/20/2022    DTaP/Tdap/Td vaccine (6 - Td or Tdap) 06/27/2024    Colon cancer screen colonoscopy  08/01/2024    COVID-19 Vaccine  Completed    Hepatitis C screen  Completed    HIV screen  Completed    Hib vaccine  Aged Out    Meningococcal (ACWY) vaccine  Aged Out       Hemoglobin A1C (%)   Date Value   02/04/2021 7.5   08/17/2020 9.0   07/14/2020 10.7 (H)             ( goal A1C is < 7)   Microalb/Crt.  Ratio (mcg/mg creat)   Date Value   07/14/2020 34 (H)     LDL Cholesterol (mg/dL)   Date Value   07/28/2020 60   07/14/2020 120       (goal LDL is <100)   AST (U/L)   Date Value   07/29/2020 27     ALT (U/L)   Date Value   07/29/2020 21     BUN (mg/dL)   Date Value   02/04/2021 10     BP Readings from Last 3 Encounters:   05/25/21 (!) 124/53   05/18/21 125/64   05/11/21 (!) 142/67          (goal 120/80)    All Future Testing planned in [facility]  Lab Frequency Next Occurrence   Hemoglobin A1C Once 09/25/2021   6 Minute Walk Test Once 09/28/2020   CBC With Auto Differential Once 02/11/2022   Reflux study/Reflux Venous Insufficiency Bilateral Once 02/24/2022               Patient Active Problem List:     Anxiety     GERD (gastroesophageal reflux disease)     OA (osteoarthritis)     Neuropathy     Right knee DJD     Hypothyroidism     S/P left total knee arthroplasty     Chest pain     Ischemic heart disease     Essential hypertension     Hypertensive urgency     NSTEMI (non-ST elevated myocardial infarction) ([facility] [address]     Other cirrhosis of liver (HCC)     Abdominal pain     Elevated lipase     Depression     Cystitis     Type 2 diabetes mellitus, with long-term current use of insulin (HCC)     SHERITA (obstructive sleep apnea)     Morbid obesity with BMI of 45.0-49.9, adult ([facility] [address]     PVD (peripheral vascular disease) ([facility] [address]     Bilateral edema of lower extremity     Paronychia of left middle finger

## 2021-08-31 ENCOUNTER — TELEPHONE (OUTPATIENT)
Dept: FAMILY MEDICINE CLINIC | Age: 66
End: 2021-08-31

## 2021-08-31 DIAGNOSIS — H91.8X9 OTHER SPECIFIED FORMS OF HEARING LOSS, UNSPECIFIED LATERALITY: Primary | ICD-10-CM

## 2021-08-31 NOTE — TELEPHONE ENCOUNTER
----- Message from [employer] sent at 8/30/2021  3:05 PM EDT -----  Subject: Referral Request    QUESTIONS   Reason for referral request? needs to get a order for a hearing test   Has the physician seen you for this condition before? No   Preferred Specialist (if applicable)? Do you already have an appointment scheduled? Yes  Select Scheduled Date? 2021-09-01  Select Scheduled Physician? Additional Information for Provider? [facility], please fax to   [phone] Needs to be sent ASA her test is Wednesday at 11am If    answers just let him know per pt his cell number is [phone]  ---------------------------------------------------------------------------  --------------  CALL BACK INFO  What is the best way for the office to contact you? OK to leave message on   voicemail  Preferred Call Back Phone Number?  [phone]

## 2021-09-15 ENCOUNTER — TELEPHONE (OUTPATIENT)
Dept: FAMILY MEDICINE CLINIC | Age: 66
End: 2021-09-15

## 2021-09-15 DIAGNOSIS — Z79.4 TYPE 2 DIABETES MELLITUS WITH OTHER CIRCULATORY COMPLICATION, WITH LONG-TERM CURRENT USE OF INSULIN (HCC): Primary | ICD-10-CM

## 2021-09-15 DIAGNOSIS — E11.59 TYPE 2 DIABETES MELLITUS WITH OTHER CIRCULATORY COMPLICATION, WITH LONG-TERM CURRENT USE OF INSULIN (HCC): Primary | ICD-10-CM

## 2021-09-15 RX ORDER — INSULIN GLARGINE 100 [IU]/ML
INJECTION, SOLUTION SUBCUTANEOUS
Qty: 15 PEN | Refills: 3 | Status: SHIPPED | OUTPATIENT
Start: 2021-09-15 | End: 2021-12-10 | Stop reason: SDUPTHER

## 2021-09-15 NOTE — TELEPHONE ENCOUNTER
[facility] pharmacy called to say Lantus is no longer covered under the patients insurance plan. Please change to Basaglar or Levemir. They are also requesting a 90 day supply be sent. Please advise.

## 2021-09-16 RX ORDER — OMEPRAZOLE 20 MG/1
20 CAPSULE, DELAYED RELEASE ORAL DAILY
Qty: 30 CAPSULE | Refills: 3 | OUTPATIENT
Start: 2021-09-16

## 2021-09-16 RX ORDER — GLIMEPIRIDE 4 MG/1
TABLET ORAL
Qty: 30 TABLET | Refills: 0 | Status: SHIPPED | OUTPATIENT
Start: 2021-09-16 | End: 2021-10-15

## 2021-09-16 NOTE — TELEPHONE ENCOUNTER
Health Maintenance   Topic Date Due    Shingles Vaccine (1 of 2) Never done    DEXA (modify frequency per FRAX score)  Never done    Diabetic retinal exam  01/01/2014    Hepatitis A vaccine (2 of 2 - Risk 2-dose series) 02/17/2021    Pneumococcal 65+ years Vaccine (1 of 1 - PPSV23) 05/14/2021    Diabetic microalbuminuria test  07/14/2021    Lipid screen  07/28/2021    Diabetic foot exam  08/17/2021    Flu vaccine (1) 09/01/2021    A1C test (Diabetic or Prediabetic)  02/04/2022    TSH testing  02/04/2022    Potassium monitoring  02/04/2022    Creatinine monitoring  02/04/2022    Breast cancer screen  08/20/2022    DTaP/Tdap/Td vaccine (6 - Td or Tdap) 06/27/2024    Colon cancer screen colonoscopy  08/01/2024    COVID-19 Vaccine  Completed    Hepatitis C screen  Completed    HIV screen  Completed    Hib vaccine  Aged Out    Meningococcal (ACWY) vaccine  Aged Out             (applicable per patient's age: Cancer Screenings, Depression Screening, Fall Risk Screening, Immunizations)    Hemoglobin A1C (%)   Date Value   02/04/2021 7.5   08/17/2020 9.0   07/14/2020 10.7 (H)     Microalb/Crt. Ratio (mcg/mg creat)   Date Value   07/14/2020 34 (H)     LDL Cholesterol (mg/dL)   Date Value   07/28/2020 60     AST (U/L)   Date Value   07/29/2020 27     ALT (U/L)   Date Value   07/29/2020 21     BUN (mg/dL)   Date Value   02/04/2021 10      (goal A1C is < 7)   (goal LDL is <100) need 30-50% reduction from baseline     BP Readings from Last 3 Encounters:   05/25/21 (!) 124/53   05/18/21 125/64   05/11/21 (!) 142/67    (goal /80)      All Future Testing planned in [facility]:  Lab Frequency Next Occurrence   Hemoglobin A1C Once 09/25/2021   CBC With Auto Differential Once 02/11/2022   Reflux study/Reflux Venous Insufficiency Bilateral Once 02/24/2022       Next Visit Date:  No future appointments.          Patient Active Problem List:     Anxiety     GERD (gastroesophageal reflux disease)     OA

## 2021-09-17 DIAGNOSIS — E11.59 TYPE 2 DIABETES MELLITUS WITH OTHER CIRCULATORY COMPLICATION, WITH LONG-TERM CURRENT USE OF INSULIN (HCC): ICD-10-CM

## 2021-09-17 DIAGNOSIS — Z79.4 TYPE 2 DIABETES MELLITUS WITH OTHER CIRCULATORY COMPLICATION, WITH LONG-TERM CURRENT USE OF INSULIN (HCC): ICD-10-CM

## 2021-09-17 RX ORDER — CARVEDILOL 6.25 MG/1
TABLET ORAL
Qty: 60 TABLET | Refills: 0 | Status: SHIPPED | OUTPATIENT
Start: 2021-09-17 | End: 2021-10-25

## 2021-09-17 RX ORDER — ATORVASTATIN CALCIUM 40 MG/1
40 TABLET, FILM COATED ORAL NIGHTLY
Qty: 30 TABLET | Refills: 0 | Status: SHIPPED | OUTPATIENT
Start: 2021-09-17 | End: 2021-10-15 | Stop reason: SDUPTHER

## 2021-09-17 RX ORDER — CLOPIDOGREL BISULFATE 75 MG/1
TABLET ORAL
Qty: 30 TABLET | Refills: 0 | Status: SHIPPED | OUTPATIENT
Start: 2021-09-17 | End: 2021-10-15 | Stop reason: SDUPTHER

## 2021-09-17 RX ORDER — ISOSORBIDE MONONITRATE 60 MG/1
TABLET, EXTENDED RELEASE ORAL
Qty: 30 TABLET | Refills: 0 | Status: SHIPPED | OUTPATIENT
Start: 2021-09-17 | End: 2021-10-15 | Stop reason: SDUPTHER

## 2021-09-17 RX ORDER — AMLODIPINE BESYLATE 5 MG/1
TABLET ORAL
Qty: 30 TABLET | Refills: 0 | Status: SHIPPED | OUTPATIENT
Start: 2021-09-17 | End: 2021-10-15 | Stop reason: SDUPTHER

## 2021-09-17 RX ORDER — GABAPENTIN 400 MG/1
CAPSULE ORAL
Qty: 90 CAPSULE | Refills: 2 | Status: SHIPPED | OUTPATIENT
Start: 2021-09-17 | End: 2022-01-06

## 2021-09-17 NOTE — TELEPHONE ENCOUNTER
Last visit: 2/23/21  Last Med refill: 8/27/21  Does patient have enough medication for 72 hours: Yes    Next Visit Date:  No future appointments. Health Maintenance   Topic Date Due    Shingles Vaccine (1 of 2) Never done    DEXA (modify frequency per FRAX score)  Never done    Diabetic retinal exam  01/01/2014    Hepatitis A vaccine (2 of 2 - Risk 2-dose series) 02/17/2021    Pneumococcal 65+ years Vaccine (1 of 1 - PPSV23) 05/14/2021    Diabetic microalbuminuria test  07/14/2021    Lipid screen  07/28/2021    Diabetic foot exam  08/17/2021    Flu vaccine (1) 09/01/2021    A1C test (Diabetic or Prediabetic)  02/04/2022    TSH testing  02/04/2022    Potassium monitoring  02/04/2022    Creatinine monitoring  02/04/2022    Breast cancer screen  08/20/2022    DTaP/Tdap/Td vaccine (6 - Td or Tdap) 06/27/2024    Colon cancer screen colonoscopy  08/01/2024    COVID-19 Vaccine  Completed    Hepatitis C screen  Completed    HIV screen  Completed    Hib vaccine  Aged Out    Meningococcal (ACWY) vaccine  Aged Out       Hemoglobin A1C (%)   Date Value   02/04/2021 7.5   08/17/2020 9.0   07/14/2020 10.7 (H)             ( goal A1C is < 7)   Microalb/Crt.  Ratio (mcg/mg creat)   Date Value   07/14/2020 34 (H)     LDL Cholesterol (mg/dL)   Date Value   07/28/2020 60   07/14/2020 120       (goal LDL is <100)   AST (U/L)   Date Value   07/29/2020 27     ALT (U/L)   Date Value   07/29/2020 21     BUN (mg/dL)   Date Value   02/04/2021 10     BP Readings from Last 3 Encounters:   05/25/21 (!) 124/53   05/18/21 125/64   05/11/21 (!) 142/67          (goal 120/80)    All Future Testing planned in [facility]  Lab Frequency Next Occurrence   Hemoglobin A1C Once 09/25/2021   CBC With Auto Differential Once 02/11/2022   Reflux study/Reflux Venous Insufficiency Bilateral Once 02/24/2022               Patient Active Problem List:     Anxiety     GERD (gastroesophageal reflux disease)     OA (osteoarthritis)     Neuropathy Right knee DJD     Hypothyroidism     S/P left total knee arthroplasty     Chest pain     Ischemic heart disease     Essential hypertension     Hypertensive urgency     NSTEMI (non-ST elevated myocardial infarction) (HCC)     Other cirrhosis of liver (HCC)     Abdominal pain     Elevated lipase     Depression     Cystitis     Type 2 diabetes mellitus, with long-term current use of insulin (HCC)     HSERITA (obstructive sleep apnea)     Morbid obesity with BMI of 45.0-49.9, adult ([facility] [address]     PVD (peripheral vascular disease) (HCC)     Bilateral edema of lower extremity     Paronychia of left middle finger

## 2021-09-22 RX ORDER — OMEPRAZOLE 20 MG/1
20 CAPSULE, DELAYED RELEASE ORAL DAILY
Qty: 30 CAPSULE | Refills: 3 | Status: SHIPPED | OUTPATIENT
Start: 2021-09-22 | End: 2022-02-16 | Stop reason: SDUPTHER

## 2021-09-22 NOTE — TELEPHONE ENCOUNTER
Patient Active Problem List:     Anxiety     GERD (gastroesophageal reflux disease)     OA (osteoarthritis)     Neuropathy     Right knee DJD     Hypothyroidism     S/P left total knee arthroplasty     Chest pain     Ischemic heart disease     Essential hypertension     Hypertensive urgency     NSTEMI (non-ST elevated myocardial infarction) ([facility] [address]     Other cirrhosis of liver (HCC)     Abdominal pain     Elevated lipase     Depression     Cystitis     Type 2 diabetes mellitus, with long-term current use of insulin (HCC)     SHERITA (obstructive sleep apnea)     Morbid obesity with BMI of 45.0-49.9, adult ([facility] [address]     [facility]     Bilateral edema of lower extremity     Paronychia of left middle finger

## 2021-10-21 DIAGNOSIS — T14.8XXA WOUND, OPEN: ICD-10-CM

## 2021-10-21 RX ORDER — ACETAMINOPHEN 500 MG
TABLET ORAL
Qty: 180 TABLET | Refills: 0 | Status: SHIPPED | OUTPATIENT
Start: 2021-10-21

## 2021-10-21 NOTE — TELEPHONE ENCOUNTER
Attempted to schedule appointment no openings for Dr. Jose R Garland pt does not want to see any one else. Please address the medication refill and close the encounter. If I can be of assistance, please route to the applicable pool. Thank you. Last visit: 2/23/2021  Last Med refill: 8/18/2021  Does patient have enough medication for 72 hours: No:     Next Visit Date:  No future appointments. Health Maintenance   Topic Date Due    Shingles Vaccine (1 of 2) Never done    DEXA (modify frequency per FRAX score)  Never done    Diabetic retinal exam  01/01/2014    Hepatitis A vaccine (2 of 2 - Risk 2-dose series) 02/17/2021    Pneumococcal 65+ years Vaccine (1 of 1 - PPSV23) 05/14/2021    Diabetic microalbuminuria test  07/14/2021    Lipid screen  07/28/2021    Diabetic foot exam  08/17/2021    Flu vaccine (1) 09/01/2021    A1C test (Diabetic or Prediabetic)  02/04/2022    TSH testing  02/04/2022    Potassium monitoring  02/04/2022    Creatinine monitoring  02/04/2022    Breast cancer screen  08/20/2022    DTaP/Tdap/Td vaccine (6 - Td or Tdap) 06/27/2024    Colon cancer screen colonoscopy  08/01/2024    COVID-19 Vaccine  Completed    Hepatitis C screen  Completed    HIV screen  Completed    Hib vaccine  Aged Out    Meningococcal (ACWY) vaccine  Aged Out       Hemoglobin A1C (%)   Date Value   02/04/2021 7.5   08/17/2020 9.0   07/14/2020 10.7 (H)             ( goal A1C is < 7)   Microalb/Crt.  Ratio (mcg/mg creat)   Date Value   07/14/2020 34 (H)     LDL Cholesterol (mg/dL)   Date Value   07/28/2020 60   07/14/2020 120       (goal LDL is <100)   AST (U/L)   Date Value   07/29/2020 27     ALT (U/L)   Date Value   07/29/2020 21     BUN (mg/dL)   Date Value   02/04/2021 10     BP Readings from Last 3 Encounters:   05/25/21 (!) 124/53   05/18/21 125/64   05/11/21 (!) 142/67          (goal 120/80)    All Future Testing planned in [facility]  Lab Frequency Next Occurrence   CBC With Auto Differential Once 02/11/2022   Reflux study/Reflux Venous Insufficiency Bilateral Once 02/24/2022               Patient Active Problem List:     Anxiety     GERD (gastroesophageal reflux disease)     OA (osteoarthritis)     Neuropathy     Right knee DJD     Hypothyroidism     S/P left total knee arthroplasty     Chest pain     Ischemic heart disease     Essential hypertension     Hypertensive urgency     NSTEMI (non-ST elevated myocardial infarction) ([facility] [address]     Other cirrhosis of liver (HCC)     Abdominal pain     Elevated lipase     Depression     Cystitis     Type 2 diabetes mellitus, with long-term current use of insulin (HCC)     SHERITA (obstructive sleep apnea)     Morbid obesity with BMI of 45.0-49.9, adult ([facility] [address]     PVD (peripheral vascular disease) ([facility] [address]     Bilateral edema of lower extremity     Paronychia of left middle finger

## 2021-10-25 RX ORDER — CARVEDILOL 6.25 MG/1
TABLET ORAL
Qty: 60 TABLET | Refills: 3 | Status: SHIPPED | OUTPATIENT
Start: 2021-10-25 | End: 2022-02-16 | Stop reason: SDUPTHER

## 2021-10-25 NOTE — TELEPHONE ENCOUNTER
Spoke to patient attempted to schedule appointment for patient, no openings for Dr. Luis Donovan only wants to see her. Advise patient to contact office 2nd week of November to see if any openings. Please address the medication refill and close the encounter. If I can be of assistance, please route to the applicable pool. Thank you. Last visit: 2-  Last Med refill: 9-  Does patient have enough medication for 72 hours: No:     Next Visit Date:  No future appointments. Health Maintenance   Topic Date Due    Shingles Vaccine (1 of 2) Never done    DEXA (modify frequency per FRAX score)  Never done    Diabetic retinal exam  01/01/2014    Hepatitis A vaccine (2 of 2 - Risk 2-dose series) 02/17/2021    Pneumococcal 65+ years Vaccine (1 of 1 - PPSV23) 05/14/2021    Diabetic microalbuminuria test  07/14/2021    Lipid screen  07/28/2021    Diabetic foot exam  08/17/2021    Flu vaccine (1) 09/01/2021    A1C test (Diabetic or Prediabetic)  02/04/2022    TSH testing  02/04/2022    Potassium monitoring  02/04/2022    Creatinine monitoring  02/04/2022    Breast cancer screen  08/20/2022    DTaP/Tdap/Td vaccine (6 - Td or Tdap) 06/27/2024    Colon cancer screen colonoscopy  08/01/2024    COVID-19 Vaccine  Completed    Hepatitis C screen  Completed    HIV screen  Completed    Hib vaccine  Aged Out    Meningococcal (ACWY) vaccine  Aged Out       Hemoglobin A1C (%)   Date Value   02/04/2021 7.5   08/17/2020 9.0   07/14/2020 10.7 (H)             ( goal A1C is < 7)   Microalb/Crt.  Ratio (mcg/mg creat)   Date Value   07/14/2020 34 (H)     LDL Cholesterol (mg/dL)   Date Value   07/28/2020 60   07/14/2020 120       (goal LDL is <100)   AST (U/L)   Date Value   07/29/2020 27     ALT (U/L)   Date Value   07/29/2020 21     BUN (mg/dL)   Date Value   02/04/2021 10     BP Readings from Last 3 Encounters:   05/25/21 (!) 124/53   05/18/21 125/64   05/11/21 (!) 142/67          (goal 120/80)    All Future Testing planned in [facility]  Lab Frequency Next Occurrence   CBC With Auto Differential Once 02/11/2022   Reflux study/Reflux Venous Insufficiency Bilateral Once 02/24/2022               Patient Active Problem List:     Anxiety     GERD (gastroesophageal reflux disease)     OA (osteoarthritis)     Neuropathy     Right knee DJD     Hypothyroidism     S/P left total knee arthroplasty     Chest pain     Ischemic heart disease     Essential hypertension     Hypertensive urgency     NSTEMI (non-ST elevated myocardial infarction) ([address]     Other cirrhosis of liver (HCC)     Abdominal pain     Elevated lipase     Depression     Cystitis     Type 2 diabetes mellitus, with long-term current use of insulin (HCC)     SHERITA (obstructive sleep apnea)     Morbid obesity with BMI of 45.0-49.9, adult ([address]     PVD (peripheral vascular disease) ([address]     Bilateral edema of lower extremity     Paronychia of left middle finger

## 2021-11-11 DIAGNOSIS — B37.2 INTERTRIGINOUS CANDIDIASIS: ICD-10-CM

## 2021-11-11 NOTE — TELEPHONE ENCOUNTER
Please address the medication refill and close the encounter. If I can be of assistance, please route to the applicable pool. Thank you. Last visit: 2-  Last Med refill: 8/17/2021  Does patient have enough medication for 72 hours: No:     Next Visit Date:  No future appointments. Health Maintenance   Topic Date Due    Shingles Vaccine (1 of 2) Never done    DEXA (modify frequency per FRAX score)  Never done    Diabetic retinal exam  01/01/2014    Hepatitis A vaccine (2 of 2 - Risk 2-dose series) 02/17/2021    Pneumococcal 65+ years Vaccine (1 of 1 - PPSV23) 05/14/2021    Diabetic microalbuminuria test  07/14/2021    Lipid screen  07/28/2021    Diabetic foot exam  08/17/2021    Flu vaccine (1) 09/01/2021    COVID-19 Vaccine (3 - Booster for Moderna series) 11/13/2021    A1C test (Diabetic or Prediabetic)  02/04/2022    TSH testing  02/04/2022    Potassium monitoring  02/04/2022    Creatinine monitoring  02/04/2022    Breast cancer screen  08/20/2022    DTaP/Tdap/Td vaccine (6 - Td or Tdap) 06/27/2024    Colon cancer screen colonoscopy  08/01/2024    Hepatitis C screen  Completed    HIV screen  Completed    Hib vaccine  Aged Out    Meningococcal (ACWY) vaccine  Aged Out       Hemoglobin A1C (%)   Date Value   02/04/2021 7.5   08/17/2020 9.0   07/14/2020 10.7 (H)             ( goal A1C is < 7)   Microalb/Crt.  Ratio (mcg/mg creat)   Date Value   07/14/2020 34 (H)     LDL Cholesterol (mg/dL)   Date Value   07/28/2020 60   07/14/2020 120       (goal LDL is <100)   AST (U/L)   Date Value   07/29/2020 27     ALT (U/L)   Date Value   07/29/2020 21     BUN (mg/dL)   Date Value   02/04/2021 10     BP Readings from Last 3 Encounters:   05/25/21 (!) 124/53   05/18/21 125/64   05/11/21 (!) 142/67          (goal 120/80)    All Future Testing planned in [facility]  Lab Frequency Next Occurrence   CBC With Auto Differential Once 02/11/2022   Reflux study/Reflux Venous Insufficiency Bilateral Once 02/24/2022               Patient Active Problem List:     Anxiety     GERD (gastroesophageal reflux disease)     OA (osteoarthritis)     Neuropathy     Right knee DJD     Hypothyroidism     S/P left total knee arthroplasty     Chest pain     Ischemic heart disease     Essential hypertension     Hypertensive urgency     NSTEMI (non-ST elevated myocardial infarction) ([facility] [address]     Other cirrhosis of liver (HCC)     Abdominal pain     Elevated lipase     Depression     Cystitis     Type 2 diabetes mellitus, with long-term current use of insulin (HCC)     SHERITA (obstructive sleep apnea)     Morbid obesity with BMI of 45.0-49.9, adult ([facility] [address]     PVD (peripheral vascular disease) ([facility] [address]     Bilateral edema of lower extremity     Paronychia of left middle finger

## 2021-12-09 DIAGNOSIS — E11.59 TYPE 2 DIABETES MELLITUS WITH OTHER CIRCULATORY COMPLICATION, WITH LONG-TERM CURRENT USE OF INSULIN (HCC): ICD-10-CM

## 2021-12-09 DIAGNOSIS — Z79.4 TYPE 2 DIABETES MELLITUS WITH OTHER CIRCULATORY COMPLICATION, WITH LONG-TERM CURRENT USE OF INSULIN (HCC): ICD-10-CM

## 2021-12-09 RX ORDER — ATORVASTATIN CALCIUM 40 MG/1
40 TABLET, FILM COATED ORAL NIGHTLY
Qty: 90 TABLET | Refills: 1 | Status: SHIPPED | OUTPATIENT
Start: 2021-12-09 | End: 2021-12-10 | Stop reason: SDUPTHER

## 2021-12-09 RX ORDER — GABAPENTIN 400 MG/1
CAPSULE ORAL
Qty: 90 CAPSULE | Refills: 2 | OUTPATIENT
Start: 2021-12-09 | End: 2022-01-08

## 2021-12-09 NOTE — TELEPHONE ENCOUNTER
Patient calling stating that she has completely ran out of Atorvastatin and will need a refill     Medication is pending     Health Maintenance   Topic Date Due    Shingles Vaccine (1 of 2) Never done    DEXA (modify frequency per FRAX score)  Never done    Diabetic retinal exam  01/01/2014    Hepatitis A vaccine (2 of 2 - Risk 2-dose series) 02/17/2021    Pneumococcal 65+ years Vaccine (1 of 1 - PPSV23) 05/14/2021    Diabetic microalbuminuria test  07/14/2021    Lipid screen  07/28/2021    Diabetic foot exam  08/17/2021    Flu vaccine (1) 09/01/2021    COVID-19 Vaccine (3 - Booster for Moderna series) 11/13/2021    A1C test (Diabetic or Prediabetic)  02/04/2022    TSH testing  02/04/2022    Potassium monitoring  02/04/2022    Creatinine monitoring  02/04/2022    Breast cancer screen  08/20/2022    DTaP/Tdap/Td vaccine (6 - Td or Tdap) 06/27/2024    Colon cancer screen colonoscopy  08/01/2024    Hepatitis C screen  Completed    HIV screen  Completed    Hib vaccine  Aged Out    Meningococcal (ACWY) vaccine  Aged Out             (applicable per patient's age: Cancer Screenings, Depression Screening, Fall Risk Screening, Immunizations)    Hemoglobin A1C (%)   Date Value   02/04/2021 7.5   08/17/2020 9.0   07/14/2020 10.7 (H)     Microalb/Crt.  Ratio (mcg/mg creat)   Date Value   07/14/2020 34 (H)     LDL Cholesterol (mg/dL)   Date Value   07/28/2020 60     AST (U/L)   Date Value   07/29/2020 27     ALT (U/L)   Date Value   07/29/2020 21     BUN (mg/dL)   Date Value   02/04/2021 10      (goal A1C is < 7)   (goal LDL is <100) need 30-50% reduction from baseline     BP Readings from Last 3 Encounters:   05/25/21 (!) 124/53   05/18/21 125/64   05/11/21 (!) 142/67    (goal /80)      All Future Testing planned in [facility]:  Lab Frequency Next Occurrence   CBC With Auto Differential Once 02/11/2022   Reflux study/Reflux Venous Insufficiency Bilateral Once 02/24/2022       Next Visit Date:  No future appointments.          Patient Active Problem List:     Anxiety     GERD (gastroesophageal reflux disease)     OA (osteoarthritis)     Neuropathy     Right knee DJD     Hypothyroidism     S/P left total knee arthroplasty     Chest pain     Ischemic heart disease     Essential hypertension     Hypertensive urgency     NSTEMI (non-ST elevated myocardial infarction) ([facility] [address]     Other cirrhosis of liver (HCC)     Abdominal pain     Elevated lipase     Depression     Cystitis     Type 2 diabetes mellitus, with long-term current use of insulin (HCC)     SHERITA (obstructive sleep apnea)     Morbid obesity with BMI of 45.0-49.9, adult ([facility] [address]     [facility]per[facility]     Bilateral edema of lower extremity     Paronychia of left middle finger

## 2021-12-10 DIAGNOSIS — I10 ESSENTIAL HYPERTENSION: ICD-10-CM

## 2021-12-10 DIAGNOSIS — Z79.4 TYPE 2 DIABETES MELLITUS WITH OTHER CIRCULATORY COMPLICATION, WITH LONG-TERM CURRENT USE OF INSULIN (HCC): ICD-10-CM

## 2021-12-10 DIAGNOSIS — E11.59 TYPE 2 DIABETES MELLITUS WITH OTHER CIRCULATORY COMPLICATION, WITH LONG-TERM CURRENT USE OF INSULIN (HCC): ICD-10-CM

## 2021-12-10 RX ORDER — LISINOPRIL 10 MG/1
TABLET ORAL
Qty: 30 TABLET | Refills: 5 | Status: SHIPPED | OUTPATIENT
Start: 2021-12-10 | End: 2022-06-08

## 2021-12-10 RX ORDER — ATORVASTATIN CALCIUM 40 MG/1
40 TABLET, FILM COATED ORAL NIGHTLY
Qty: 90 TABLET | Refills: 1 | Status: SHIPPED | OUTPATIENT
Start: 2021-12-10

## 2021-12-10 RX ORDER — EMPAGLIFLOZIN 10 MG/1
TABLET, FILM COATED ORAL
Qty: 30 TABLET | Refills: 5 | Status: SHIPPED | OUTPATIENT
Start: 2021-12-10 | End: 2022-06-08

## 2021-12-10 RX ORDER — INSULIN GLARGINE 100 [IU]/ML
INJECTION, SOLUTION SUBCUTANEOUS
Qty: 15 PEN | Refills: 3 | Status: SHIPPED | OUTPATIENT
Start: 2021-12-10 | End: 2022-03-01 | Stop reason: SDUPTHER

## 2021-12-10 NOTE — TELEPHONE ENCOUNTER
Please address the medication refill and close the encounter. If I can be of assistance, please route to the applicable pool. Thank you. Last visit: 02/23/21  Last Med refill:   Does patient have enough medication for 72 hours: No:     Next Visit Date:  No future appointments. Health Maintenance   Topic Date Due    Shingles Vaccine (1 of 2) Never done    DEXA (modify frequency per FRAX score)  Never done    Diabetic retinal exam  01/01/2014    Hepatitis A vaccine (2 of 2 - Risk 2-dose series) 02/17/2021    Pneumococcal 65+ years Vaccine (1 of 1 - PPSV23) 05/14/2021    Diabetic microalbuminuria test  07/14/2021    Lipid screen  07/28/2021    Diabetic foot exam  08/17/2021    Flu vaccine (1) 09/01/2021    COVID-19 Vaccine (3 - Booster for Moderna series) 11/13/2021    A1C test (Diabetic or Prediabetic)  02/04/2022    TSH testing  02/04/2022    Potassium monitoring  02/04/2022    Creatinine monitoring  02/04/2022    Breast cancer screen  08/20/2022    DTaP/Tdap/Td vaccine (6 - Td or Tdap) 06/27/2024    Colon cancer screen colonoscopy  08/01/2024    Hepatitis C screen  Completed    HIV screen  Completed    Hib vaccine  Aged Out    Meningococcal (ACWY) vaccine  Aged Out       Hemoglobin A1C (%)   Date Value   02/04/2021 7.5   08/17/2020 9.0   07/14/2020 10.7 (H)             ( goal A1C is < 7)   Microalb/Crt.  Ratio (mcg/mg creat)   Date Value   07/14/2020 34 (H)     LDL Cholesterol (mg/dL)   Date Value   07/28/2020 60   07/14/2020 120       (goal LDL is <100)   AST (U/L)   Date Value   07/29/2020 27     ALT (U/L)   Date Value   07/29/2020 21     BUN (mg/dL)   Date Value   02/04/2021 10     BP Readings from Last 3 Encounters:   05/25/21 (!) 124/53   05/18/21 125/64   05/11/21 (!) 142/67          (goal 120/80)    All Future Testing planned in [facility]  Lab Frequency Next Occurrence   CBC With Auto Differential Once 02/11/2022   Reflux study/Reflux Venous Insufficiency Bilateral Once 02/24/2022 Patient Active Problem List:     Anxiety     GERD (gastroesophageal reflux disease)     OA (osteoarthritis)     Neuropathy     Right knee DJD     Hypothyroidism     S/P left total knee arthroplasty     Chest pain     Ischemic heart disease     Essential hypertension     Hypertensive urgency     NSTEMI (non-ST elevated myocardial infarction) ([facility] [address]     Other cirrhosis of liver (HCC)     Abdominal pain     Elevated lipase     Depression     Cystitis     Type 2 diabetes mellitus, with long-term current use of insulin (HCC)     SHERITA (obstructive sleep apnea)     Morbid obesity with BMI of 45.0-49.9, adult ([facility] [address]     [facility]per[facility]     Bilateral edema of lower extremity     Paronychia of left middle finger

## 2021-12-10 NOTE — TELEPHONE ENCOUNTER
Last visit:   Last Med refill:   Does patient have enough medication for 72 hours: No:     Next Visit Date:  No future appointments. Health Maintenance   Topic Date Due    Shingles Vaccine (1 of 2) Never done    DEXA (modify frequency per FRAX score)  Never done    Diabetic retinal exam  01/01/2014    Hepatitis A vaccine (2 of 2 - Risk 2-dose series) 02/17/2021    Pneumococcal 65+ years Vaccine (1 of 1 - PPSV23) 05/14/2021    Diabetic microalbuminuria test  07/14/2021    Lipid screen  07/28/2021    Diabetic foot exam  08/17/2021    Flu vaccine (1) 09/01/2021    COVID-19 Vaccine (3 - Booster for Moderna series) 11/13/2021    A1C test (Diabetic or Prediabetic)  02/04/2022    TSH testing  02/04/2022    Potassium monitoring  02/04/2022    Creatinine monitoring  02/04/2022    Breast cancer screen  08/20/2022    DTaP/Tdap/Td vaccine (6 - Td or Tdap) 06/27/2024    Colon cancer screen colonoscopy  08/01/2024    Hepatitis C screen  Completed    HIV screen  Completed    Hib vaccine  Aged Out    Meningococcal (ACWY) vaccine  Aged Out       Hemoglobin A1C (%)   Date Value   02/04/2021 7.5   08/17/2020 9.0   07/14/2020 10.7 (H)             ( goal A1C is < 7)   Microalb/Crt.  Ratio (mcg/mg creat)   Date Value   07/14/2020 34 (H)     LDL Cholesterol (mg/dL)   Date Value   07/28/2020 60   07/14/2020 120       (goal LDL is <100)   AST (U/L)   Date Value   07/29/2020 27     ALT (U/L)   Date Value   07/29/2020 21     BUN (mg/dL)   Date Value   02/04/2021 10     BP Readings from Last 3 Encounters:   05/25/21 (!) 124/53   05/18/21 125/64   05/11/21 (!) 142/67          (goal 120/80)    All Future Testing planned in [facility]  Lab Frequency Next Occurrence   CBC With Auto Differential Once 02/11/2022   Reflux study/Reflux Venous Insufficiency Bilateral Once 02/24/2022               Patient Active Problem List:     Anxiety     GERD (gastroesophageal reflux disease)     OA (osteoarthritis)     Neuropathy     Right knee DJD     Hypothyroidism     S/P left total knee arthroplasty     Chest pain     Ischemic heart disease     Essential hypertension     Hypertensive urgency     NSTEMI (non-ST elevated myocardial infarction) (HCC)     Other cirrhosis of liver (HCC)     Abdominal pain     Elevated lipase     Depression     Cystitis     Type 2 diabetes mellitus, with long-term current use of insulin (HCC)     SHERITA (obstructive sleep apnea)     Morbid obesity with BMI of 45.0-49.9, adult ([facility] [address]     PVD (peripheral vascular disease) (HCC)     Bilateral edema of lower extremity     Paronychia of left middle finger           Please address the medication refill and close the encounter. If I can be of assistance, please route to the applicable pool. Thank you.

## 2022-01-05 DIAGNOSIS — Z79.4 TYPE 2 DIABETES MELLITUS WITH OTHER CIRCULATORY COMPLICATION, WITH LONG-TERM CURRENT USE OF INSULIN (HCC): ICD-10-CM

## 2022-01-05 DIAGNOSIS — E11.59 TYPE 2 DIABETES MELLITUS WITH OTHER CIRCULATORY COMPLICATION, WITH LONG-TERM CURRENT USE OF INSULIN (HCC): ICD-10-CM

## 2022-01-05 NOTE — TELEPHONE ENCOUNTER
Please address the medication refill and close the encounter. If I can be of assistance, please route to the applicable pool. Thank you. Last visit: 2-23-21  Last Med refill: 9-17-21  Does patient have enough medication for 72 hours: No:     Next Visit Date:  Future Appointments   Date Time Provider Osmar Hutchinson   2/1/2022  1:30 PM Hussain Caraballo MD [address] [place] [address] Maintenance   Topic Date Due    Depression Monitoring  Never done    Shingles Vaccine (1 of 2) Never done    DEXA (modify frequency per FRAX score)  Never done    Diabetic retinal exam  01/01/2014    Hepatitis A vaccine (2 of 2 - Risk 2-dose series) 02/17/2021    Pneumococcal 65+ years Vaccine (1 of 1 - PPSV23) 05/14/2021    Diabetic microalbuminuria test  07/14/2021    Lipid screen  07/28/2021    Diabetic foot exam  08/17/2021    Flu vaccine (1) 09/01/2021    COVID-19 Vaccine (3 - Booster for Moderna series) 11/13/2021    A1C test (Diabetic or Prediabetic)  02/04/2022    TSH testing  02/04/2022    Potassium monitoring  02/04/2022    Creatinine monitoring  02/04/2022    Breast cancer screen  08/20/2022    DTaP/Tdap/Td vaccine (6 - Td or Tdap) 06/27/2024    Colon cancer screen colonoscopy  08/01/2024    Hepatitis C screen  Completed    HIV screen  Completed    Hib vaccine  Aged Out    Meningococcal (ACWY) vaccine  Aged Out       Hemoglobin A1C (%)   Date Value   02/04/2021 7.5   08/17/2020 9.0   07/14/2020 10.7 (H)             ( goal A1C is < 7)   Microalb/Crt.  Ratio (mcg/mg creat)   Date Value   07/14/2020 34 (H)     LDL Cholesterol (mg/dL)   Date Value   07/28/2020 60   07/14/2020 120       (goal LDL is <100)   AST (U/L)   Date Value   07/29/2020 27     ALT (U/L)   Date Value   07/29/2020 21     BUN (mg/dL)   Date Value   02/04/2021 10     BP Readings from Last 3 Encounters:   05/25/21 (!) 124/53   05/18/21 125/64   05/11/21 (!) 142/67          (goal 120/80)    All Future Testing planned in [facility]  Lab Frequency Next Occurrence   CBC With Auto Differential Once 02/11/2022   Reflux study/Reflux Venous Insufficiency Bilateral Once 02/24/2022               Patient Active Problem List:     Anxiety     GERD (gastroesophageal reflux disease)     OA (osteoarthritis)     Neuropathy     Right knee DJD     Hypothyroidism     S/P left total knee arthroplasty     Chest pain     Ischemic heart disease     Essential hypertension     Hypertensive urgency     NSTEMI (non-ST elevated myocardial infarction) ([facility]     Other cirrhosis of liver (HCC)     Abdominal pain     Elevated lipase     Depression     Cystitis     Type 2 diabetes mellitus, with long-term current use of insulin (HCC)     SHERITA (obstructive sleep apnea)     Morbid obesity with BMI of 45.0-49.9, adult ([facility]     PVD (peripheral vascular disease) ([facility]     Bilateral edema of lower extremity     Paronychia of left middle finger

## 2022-01-06 RX ORDER — GABAPENTIN 400 MG/1
CAPSULE ORAL
Qty: 90 CAPSULE | Refills: 2 | Status: SHIPPED | OUTPATIENT
Start: 2022-01-06 | End: 2022-04-20

## 2022-02-01 ENCOUNTER — OFFICE VISIT (OUTPATIENT)
Dept: FAMILY MEDICINE CLINIC | Age: 67
End: 2022-02-01
Payer: MEDICARE

## 2022-02-01 VITALS
SYSTOLIC BLOOD PRESSURE: 137 MMHG | WEIGHT: 249 LBS | DIASTOLIC BLOOD PRESSURE: 64 MMHG | BODY MASS INDEX: 52.04 KG/M2 | HEART RATE: 72 BPM

## 2022-02-01 DIAGNOSIS — Z79.4 TYPE 2 DIABETES MELLITUS WITH OTHER CIRCULATORY COMPLICATION, WITH LONG-TERM CURRENT USE OF INSULIN (HCC): Primary | ICD-10-CM

## 2022-02-01 DIAGNOSIS — Z78.0 POST-MENOPAUSAL: ICD-10-CM

## 2022-02-01 DIAGNOSIS — R20.2 NUMBNESS AND TINGLING OF BOTH FEET: ICD-10-CM

## 2022-02-01 DIAGNOSIS — R20.0 NUMBNESS AND TINGLING OF BOTH FEET: ICD-10-CM

## 2022-02-01 DIAGNOSIS — E11.59 TYPE 2 DIABETES MELLITUS WITH OTHER CIRCULATORY COMPLICATION, WITH LONG-TERM CURRENT USE OF INSULIN (HCC): Primary | ICD-10-CM

## 2022-02-01 DIAGNOSIS — E03.8 OTHER SPECIFIED HYPOTHYROIDISM: ICD-10-CM

## 2022-02-01 DIAGNOSIS — I10 ESSENTIAL HYPERTENSION: ICD-10-CM

## 2022-02-01 DIAGNOSIS — Z13.220 SCREENING FOR HYPERLIPIDEMIA: ICD-10-CM

## 2022-02-01 LAB — HBA1C MFR BLD: 6.7 %

## 2022-02-01 PROCEDURE — 4040F PNEUMOC VAC/ADMIN/RCVD: CPT | Performed by: STUDENT IN AN ORGANIZED HEALTH CARE EDUCATION/TRAINING PROGRAM

## 2022-02-01 PROCEDURE — 2022F DILAT RTA XM EVC RTNOPTHY: CPT | Performed by: STUDENT IN AN ORGANIZED HEALTH CARE EDUCATION/TRAINING PROGRAM

## 2022-02-01 PROCEDURE — G8400 PT W/DXA NO RESULTS DOC: HCPCS | Performed by: STUDENT IN AN ORGANIZED HEALTH CARE EDUCATION/TRAINING PROGRAM

## 2022-02-01 PROCEDURE — 99213 OFFICE O/P EST LOW 20 MIN: CPT | Performed by: STUDENT IN AN ORGANIZED HEALTH CARE EDUCATION/TRAINING PROGRAM

## 2022-02-01 PROCEDURE — G8417 CALC BMI ABV UP PARAM F/U: HCPCS | Performed by: STUDENT IN AN ORGANIZED HEALTH CARE EDUCATION/TRAINING PROGRAM

## 2022-02-01 PROCEDURE — 3017F COLORECTAL CA SCREEN DOC REV: CPT | Performed by: STUDENT IN AN ORGANIZED HEALTH CARE EDUCATION/TRAINING PROGRAM

## 2022-02-01 PROCEDURE — 3044F HG A1C LEVEL LT 7.0%: CPT | Performed by: STUDENT IN AN ORGANIZED HEALTH CARE EDUCATION/TRAINING PROGRAM

## 2022-02-01 PROCEDURE — 1090F PRES/ABSN URINE INCON ASSESS: CPT | Performed by: STUDENT IN AN ORGANIZED HEALTH CARE EDUCATION/TRAINING PROGRAM

## 2022-02-01 PROCEDURE — G8427 DOCREV CUR MEDS BY ELIG CLIN: HCPCS | Performed by: STUDENT IN AN ORGANIZED HEALTH CARE EDUCATION/TRAINING PROGRAM

## 2022-02-01 PROCEDURE — 83036 HEMOGLOBIN GLYCOSYLATED A1C: CPT | Performed by: STUDENT IN AN ORGANIZED HEALTH CARE EDUCATION/TRAINING PROGRAM

## 2022-02-01 PROCEDURE — 1036F TOBACCO NON-USER: CPT | Performed by: STUDENT IN AN ORGANIZED HEALTH CARE EDUCATION/TRAINING PROGRAM

## 2022-02-01 PROCEDURE — G8484 FLU IMMUNIZE NO ADMIN: HCPCS | Performed by: STUDENT IN AN ORGANIZED HEALTH CARE EDUCATION/TRAINING PROGRAM

## 2022-02-01 PROCEDURE — 1123F ACP DISCUSS/DSCN MKR DOCD: CPT | Performed by: STUDENT IN AN ORGANIZED HEALTH CARE EDUCATION/TRAINING PROGRAM

## 2022-02-01 ASSESSMENT — ENCOUNTER SYMPTOMS
SINUS PAIN: 0
COLOR CHANGE: 0
ABDOMINAL DISTENTION: 0
COUGH: 0
CHEST TIGHTNESS: 0
WHEEZING: 0
NAUSEA: 0
SINUS PRESSURE: 0
ABDOMINAL PAIN: 0
SORE THROAT: 0
DIARRHEA: 0
SHORTNESS OF BREATH: 0
ANAL BLEEDING: 0

## 2022-02-01 ASSESSMENT — PATIENT HEALTH QUESTIONNAIRE - PHQ9
SUM OF ALL RESPONSES TO PHQ QUESTIONS 1-9: 0
6. FEELING BAD ABOUT YOURSELF - OR THAT YOU ARE A FAILURE OR HAVE LET YOURSELF OR YOUR FAMILY DOWN: 0
8. MOVING OR SPEAKING SO SLOWLY THAT OTHER PEOPLE COULD HAVE NOTICED. OR THE OPPOSITE, BEING SO FIGETY OR RESTLESS THAT YOU HAVE BEEN MOVING AROUND A LOT MORE THAN USUAL: 0
SUM OF ALL RESPONSES TO PHQ QUESTIONS 1-9: 0
1. LITTLE INTEREST OR PLEASURE IN DOING THINGS: 0
SUM OF ALL RESPONSES TO PHQ QUESTIONS 1-9: 0
4. FEELING TIRED OR HAVING LITTLE ENERGY: 0
7. TROUBLE CONCENTRATING ON THINGS, SUCH AS READING THE NEWSPAPER OR WATCHING TELEVISION: 0
SUM OF ALL RESPONSES TO PHQ9 QUESTIONS 1 & 2: 0
2. FEELING DOWN, DEPRESSED OR HOPELESS: 0
3. TROUBLE FALLING OR STAYING ASLEEP: 0
9. THOUGHTS THAT YOU WOULD BE BETTER OFF DEAD, OR OF HURTING YOURSELF: 0
SUM OF ALL RESPONSES TO PHQ QUESTIONS 1-9: 0
10. IF YOU CHECKED OFF ANY PROBLEMS, HOW DIFFICULT HAVE THESE PROBLEMS MADE IT FOR YOU TO DO YOUR WORK, TAKE CARE OF THINGS AT HOME, OR GET ALONG WITH OTHER PEOPLE: 0
5. POOR APPETITE OR OVEREATING: 0

## 2022-02-01 NOTE — PROGRESS NOTES
Attending Physician Statement  I have discussed the care of [facility], including pertinent history and exam findings,  with the resident. I have reviewed the key elements of all parts of the encounter with the resident. I agree with the assessment, plan and orders as documented by the resident.   (Jaxjenn Emery)    Jose Luis Goodman MD

## 2022-02-01 NOTE — PROGRESS NOTES
Diabetic visit information    BP Readings from Last 3 Encounters:   05/25/21 (!) 124/53   05/18/21 125/64   05/11/21 (!) 142/67       Hemoglobin A1C (%)   Date Value   02/04/2021 7.5   08/17/2020 9.0   07/14/2020 10.7 (H)     Microalb/Crt. Ratio (mcg/mg creat)   Date Value   07/14/2020 34 (H)     LDL Cholesterol (mg/dL)   Date Value   07/28/2020 60               Have you changed or started any medications since your last visit including any over-the-counter medicines, vitamins, or herbal medicines? no   Have you stopped taking any of your medications? Is so, why? -  no  Are you having any side effects from any of your medications? - no    Have you seen any other physician or provider since your last visit?  no   Have you had any other diagnostic tests since your last visit?  no   Have you been seen in the emergency room and/or had an admission in a hospital since we last saw you?  no     Have you had your annual diabetic retinal (eye) exam? No   (ensure copy of exam is in the chart)    Have you had your routine dental cleaning in the past 6 months? no    Do you have an active MyChart account? If not, what are your barriers? Yes    Patient Care Team:  Flynn Montes MD as PCP - General (Family Medicine)  Flynn Montes MD [facility]  Jimmy Yost MD as Surgeon (Orthopedic Surgery)    Medical history Review  Past Medical, Family, and Social History reviewed and does not contribute to the patient presenting condition.     Health Maintenance   Topic Date Due    Depression Monitoring  Never done    Shingles Vaccine (1 of 2) Never done    DEXA (modify frequency per FRAX score)  Never done    Diabetic retinal exam  01/01/2014    Hepatitis A vaccine (2 of 2 - Risk 2-dose series) 02/17/2021    COVID-19 Vaccine (2 - Moderna 3-dose series) 05/13/2021    Pneumococcal 65+ years Vaccine (1 of 1 - PPSV23) 05/14/2021    Diabetic microalbuminuria test  07/14/2021    Lipid screen 07/28/2021    Diabetic foot exam  08/17/2021    A1C test (Diabetic or Prediabetic)  02/04/2022    TSH testing  02/04/2022    Potassium monitoring  02/04/2022    Creatinine monitoring  02/04/2022    Breast cancer screen  08/20/2022    DTaP/Tdap/Td vaccine (6 - Td or Tdap) 06/27/2024    Colon cancer screen colonoscopy  08/01/2024    Flu vaccine  Completed    Hepatitis C screen  Completed    HIV screen  Completed    Hib vaccine  Aged Out    Meningococcal (ACWY) vaccine  Aged Out

## 2022-02-01 NOTE — PATIENT INSTRUCTIONS
Thank you for letting us take care of you today. We hope all your questions were addressed. If a question was overlooked or something else comes to mind after you return home, please contact a member of your Care Team listed below. Your Care Team at [place] [address] is Team #4  Kendal Cooper MD (Faculty)  Maurilio Robertson MD (Resident)  Gayathri Lang MD (Resident)  Mino Toro MD (Resident)  James Thomas MD (Resident)  GIUSEPPE Gould., LUIS Rocha., Ladi Castellanos., [facility])  Cande Ng (Clinical Practice Manager)  Conner Byers, [address] [place] [address] (Clinical Pharmacist)       Office phone number: [phone]    If you need to get in right away due to illness, please be advised we have \"Same Day\" appointments available Monday-Friday. Please call us at [phone] option #[phone] to schedule your \"Same Day\" appointment.

## 2022-02-01 NOTE — PROGRESS NOTES
Subjective:    Lanette Pereira is a 73 y.o. female with  has a past medical history of Anxiety, Borderline hypertension, Depression, GERD (gastroesophageal reflux disease), Heart attack ([address] Hypothyroidism, Neuropathy, Obesity, Osteoarthritis, Other cirrhosis of liver ([address] Sleep apnea, and Type II or unspecified type diabetes mellitus without mention of complication, not stated as uncontrolled. Presented to the office today for:  Chief Complaint   Patient presents with    Diabetes       HPI    Patient is a 74 yo F with pmhx of CAD, HTN, PVD, T2DM. Presenting for a follow up today. She has the following complaints. T2DM  - uses basaglar once daily 65 units, amaryl   - requesting refills  -Woman A1c February 20 20-6.7      Left ear  - feels like there is water in the left ear. -Usually just 1    Right eye cataract  - right eye vision foggy    Neuropathy  - seeking pain management     Review of Systems   Constitutional: Negative for fatigue and fever. HENT: Negative for sinus pressure, sinus pain, sore throat and tinnitus. Left ear fullness   Eyes: Negative for visual disturbance. Respiratory: Negative for cough, chest tightness, shortness of breath and wheezing. Cardiovascular: Negative for chest pain, palpitations and leg swelling. Gastrointestinal: Negative for abdominal distention, abdominal pain, anal bleeding, diarrhea and nausea. Genitourinary: Negative for enuresis, frequency and urgency. Musculoskeletal: Negative for arthralgias, gait problem and neck stiffness. Skin: Negative for color change and rash. Neurological: Negative for dizziness and headaches. Psychiatric/Behavioral: Negative for agitation and confusion.                  The patient has a   Family History   Problem Relation Age of Onset    Heart Disease Mother     Arthritis Mother     Heart Disease Father     Arthritis Father     Cancer Father         \"oral\"    Diabetes Sister         gestational Objective:    /64 (Site: Left Upper Arm, Position: Sitting, Cuff Size: Medium Adult)   Pulse 72   Wt 249 lb (112.9 kg)   BMI 52.04 kg/m²    BP Readings from Last 3 Encounters:   02/01/22 137/64   05/25/21 (!) 124/53   05/18/21 125/64       Physical Exam  Constitutional:       Appearance: Normal appearance. HENT:      Head: Atraumatic. Right Ear: There is no impacted cerumen. Left Ear: Tympanic membrane normal. There is no impacted cerumen. Ears:      Comments: Wax present in the right ear, fluid appreciated behind bilateral tympanic membranes. No signs of erythema or infection noted. Mouth/Throat:      Mouth: Mucous membranes are moist.      Pharynx: No oropharyngeal exudate or posterior oropharyngeal erythema. Eyes:      Extraocular Movements: Extraocular movements intact. Cardiovascular:      Rate and Rhythm: Normal rate and regular rhythm. Heart sounds: No murmur heard. No friction rub. No gallop. Pulmonary:      Effort: Pulmonary effort is normal.      Breath sounds: No wheezing, rhonchi or rales. Abdominal:      General: Abdomen is flat. Tenderness: There is no abdominal tenderness. There is no guarding. Hernia: No hernia is present. Musculoskeletal:         General: No swelling or tenderness. Normal range of motion. Cervical back: Normal range of motion. Skin:     General: Skin is warm. Capillary Refill: Capillary refill takes less than 2 seconds. Coloration: Skin is not jaundiced. Findings: No bruising. Neurological:      Mental Status: She is alert and oriented to person, place, and time.          Lab Results   Component Value Date    WBC 11.7 (H) 02/04/2021    HGB 14.9 02/04/2021    HCT 49.2 (H) 02/04/2021     02/04/2021    CHOL 119 07/28/2020    TRIG 111 07/28/2020    HDL 37 (L) 07/28/2020    ALT 21 07/29/2020    AST 27 07/29/2020     (H) 02/04/2021    K 3.7 02/04/2021     02/04/2021    CREATININE 0.80 02/04/2021    BUN 10 02/04/2021    CO2 22 02/04/2021    TSH 3.95 02/04/2021    INR 1.0 07/21/2020    LABA1C 6.7 02/01/2022    LABMICR 34 (H) 07/14/2020     Lab Results   Component Value Date    CALCIUM 9.3 02/04/2021     Lab Results   Component Value Date    LDLCHOLESTEROL 60 07/28/2020       Assessment and Plan:    1. Type 2 diabetes mellitus with other circulatory complication, with long-term current use of insulin (HCC)  - POCT glycosylated hemoglobin (Hb A1C) -  6.7  - Microalbumin, Ur; Future  -Continue with current medications    2. Essential hypertension  -Controlled  - CBC With Auto Differential; Future  - Basic Metabolic Panel; Future    3. Numbness and tingling of both feet  - Abundio oNlan MD, Pain Management, [place]    4. Other specified hypothyroidism  - TSH With Reflex Ft4; Future    5. Post-menopausal  - DEXA Bone Density Axial Skeleton; Future    6. Screening for hyperlipidemia  - Lipid Panel; Future          Requested Prescriptions      No prescriptions requested or ordered in this encounter       There are no discontinued medications. Gi Elena received counseling on the following healthy behaviors: nutrition, exercise and medication adherence    Discussed use,benefit, and side effects of prescribed medications. Barriers to medication compliance addressed. All patient questions answered. Pt voiced understanding. Return in about 4 weeks (around 3/1/2022) for f/u lab results. Disclaimer: Some orall of this note was transcribed using voice-recognition software. This may cause typographical errors occasionally. Although all effort is made to fix these errors, please do not hesitate to contact our office if there Norris Adler concern with the understanding of this note.

## 2022-02-04 RX ORDER — ASPIRIN 81 MG/1
TABLET ORAL
Qty: 30 TABLET | Refills: 10 | Status: SHIPPED | OUTPATIENT
Start: 2022-02-04

## 2022-02-04 RX ORDER — GLIMEPIRIDE 4 MG/1
TABLET ORAL
Qty: 90 TABLET | Refills: 5 | Status: SHIPPED | OUTPATIENT
Start: 2022-02-04 | End: 2022-03-14

## 2022-02-04 RX ORDER — LEVOTHYROXINE SODIUM 0.05 MG/1
TABLET ORAL
Qty: 30 TABLET | Refills: 5 | Status: SHIPPED | OUTPATIENT
Start: 2022-02-04 | End: 2022-08-17 | Stop reason: SDUPTHER

## 2022-02-04 NOTE — TELEPHONE ENCOUNTER
Axial Skeleton Once 03/01/2022   Microalbumin, Ur Once 03/03/2022   Lipid Panel Once 02/01/2022   CBC With Auto Differential Once 92/45/9110   Basic Metabolic Panel Once 89/46/3239   TSH With Reflex Ft4 Once 02/01/2022               Patient Active Problem List:     Anxiety     GERD (gastroesophageal reflux disease)     OA (osteoarthritis)     Neuropathy     Right knee DJD     Hypothyroidism     S/P left total knee arthroplasty     Chest pain     Ischemic heart disease     Essential hypertension     Hypertensive urgency     NSTEMI (non-ST elevated myocardial infarction) [facility]     Other cirrhosis of liver (HCC)     Abdominal pain     Elevated lipase     Depression     Cystitis     Type 2 diabetes mellitus, with long-term current use of insulin (HCC)     SHERITA (obstructive sleep apnea)     Morbid obesity with BMI of 45.0-49.9, adult [facility]     PVD (peripheral vascular disease) ([facility])     Bilateral edema of lower extremity     Paronychia of left middle finger     Post-menopausal     Screening for hyperlipidemia     Numbness and tingling of both feet

## 2022-02-04 NOTE — TELEPHONE ENCOUNTER
Last visit: 2/1/22  Last Med refill:   Does patient have enough medication for 72 hours: Yes    Next Visit Date:  Future Appointments   Date Time Provider Osmar Evangelina   3/1/2022  2:30 PM Kailyn Mina MD [address] [place] [address] Maintenance   Topic Date Due    Shingles Vaccine (1 of 2) Never done    DEXA (modify frequency per FRAX score)  Never done    Diabetic retinal exam  01/01/2014    Hepatitis A vaccine (2 of 2 - Risk 2-dose series) 02/17/2021    COVID-19 Vaccine (2 - Moderna 3-dose series) 05/13/2021    Pneumococcal 65+ years Vaccine (1 of 1 - PPSV23) 05/14/2021    Diabetic microalbuminuria test  07/14/2021    Lipid screen  07/28/2021    Diabetic foot exam  08/17/2021    TSH testing  02/04/2022    Potassium monitoring  02/04/2022    Creatinine monitoring  02/04/2022    Breast cancer screen  08/20/2022    A1C test (Diabetic or Prediabetic)  02/01/2023    Depression Monitoring  02/01/2023    DTaP/Tdap/Td vaccine (6 - Td or Tdap) 06/27/2024    Colon cancer screen colonoscopy  08/01/2024    Flu vaccine  Completed    Hepatitis C screen  Completed    HIV screen  Completed    Hib vaccine  Aged Out    Meningococcal (ACWY) vaccine  Aged Out       Hemoglobin A1C (%)   Date Value   02/01/2022 6.7   02/04/2021 7.5   08/17/2020 9.0             ( goal A1C is < 7)   Microalb/Crt.  Ratio (mcg/mg creat)   Date Value   07/14/2020 34 (H)     LDL Cholesterol (mg/dL)   Date Value   07/28/2020 60   07/14/2020 120       (goal LDL is <100)   AST (U/L)   Date Value   07/29/2020 27     ALT (U/L)   Date Value   07/29/2020 21     BUN (mg/dL)   Date Value   02/04/2021 10     BP Readings from Last 3 Encounters:   02/01/22 137/64   05/25/21 (!) 124/53   05/18/21 125/64          (goal 120/80)    All Future Testing planned in [facility]  Lab Frequency Next Occurrence   CBC With Auto Differential Once 02/11/2022   Reflux study/Reflux Venous Insufficiency Bilateral Once 02/24/2022   DEXA Bone Density Axial Skeleton Once 03/01/2022   Microalbumin, Ur Once 03/03/2022   Lipid Panel Once 02/01/2022   CBC With Auto Differential Once 73/98/7356   Basic Metabolic Panel Once 16/53/3696   TSH With Reflex Ft4 Once 02/01/2022               Patient Active Problem List:     Anxiety     GERD (gastroesophageal reflux disease)     OA (osteoarthritis)     Neuropathy     Right knee DJD     Hypothyroidism     S/P left total knee arthroplasty     Chest pain     Ischemic heart disease     Essential hypertension     Hypertensive urgency     NSTEMI (non-ST elevated myocardial infarction) ([facility]     Other cirrhosis of liver (HCC)     Abdominal pain     Elevated lipase     Depression     Cystitis     Type 2 diabetes mellitus, with long-term current use of insulin (HCC)     SHERITA (obstructive sleep apnea)     Morbid obesity with BMI of 45.0-49.9, adult ([facility]     PVD (peripheral vascular disease) ([facility]     Bilateral edema of lower extremity     Paronychia of left middle finger     Post-menopausal     Screening for hyperlipidemia     Numbness and tingling of both feet

## 2022-02-11 DIAGNOSIS — F41.9 ANXIETY: Chronic | ICD-10-CM

## 2022-02-11 NOTE — TELEPHONE ENCOUNTER
----- Message from Rich Granda sent at 2/10/2022  5:55 PM EST -----  Subject: Refill Request    QUESTIONS  Name of Medication? busPIRone (BUSPAR) 10 MG tablet  Patient-reported dosage and instructions? 1 tablet by madelin 3x dailty  How many days do you have left? 14  Preferred Pharmacy? [facility] phone number (if available)? [phone]  ---------------------------------------------------------------------------  --------------,  Name of Medication? citalopram (CELEXA) 40 MG tablet  Patient-reported dosage and instructions? 1 tab 1 x day  How many days do you have left? 14  Preferred Pharmacy? [facility] phone number (if available)? [phone]  ---------------------------------------------------------------------------  --------------,  Name of Medication? carvedilol (COREG) 6.25 MG tablet  Patient-reported dosage and instructions? 1 tab 2x day  How many days do you have left? 14  Preferred Pharmacy? [facility] phone number (if available)? [phone]  ---------------------------------------------------------------------------  --------------,  Name of Medication? glimepiride (AMARYL) 4 MG tablet  Patient-reported dosage and instructions? 1 tab 2x a day  How many days do you have left? 14  Preferred Pharmacy? [facility] phone number (if available)? [phone]  ---------------------------------------------------------------------------  --------------,  Name of Medication? omeprazole (PRILOSEC) 20 MG delayed release capsule  Patient-reported dosage and instructions? 1 cap 1 x day  How many days do you have left? 14  Preferred Pharmacy? [facility] phone number (if available)? [phone]  ---------------------------------------------------------------------------  --------------  CALL BACK INFO  What is the best way for the office to contact you?  OK to leave message on   voicemail  Preferred Call Back Phone Number?  [phone]

## 2022-02-11 NOTE — TELEPHONE ENCOUNTER
Last visit: 2/1/2022  Last Med refill: 1/22/2022  Does patient have enough medication for 72 hours: Yes    Next Visit Date:  Future Appointments   Date Time Provider Osmar Hutchinson   3/1/2022  2:30 PM Moy Patel MD [address] [place] [address] Maintenance   Topic Date Due    Shingles Vaccine (1 of 2) Never done    DEXA (modify frequency per FRAX score)  Never done    Diabetic retinal exam  01/01/2014    Hepatitis A vaccine (2 of 2 - Risk 2-dose series) 02/17/2021    COVID-19 Vaccine (2 - Moderna 3-dose series) 05/13/2021    Pneumococcal 65+ years Vaccine (1 of 1 - PPSV23) 05/14/2021    Diabetic microalbuminuria test  07/14/2021    Lipid screen  07/28/2021    Diabetic foot exam  08/17/2021    TSH testing  02/04/2022    Potassium monitoring  02/04/2022    Creatinine monitoring  02/04/2022    Breast cancer screen  08/20/2022    A1C test (Diabetic or Prediabetic)  02/01/2023    Depression Monitoring  02/01/2023    DTaP/Tdap/Td vaccine (6 - Td or Tdap) 06/27/2024    Colon cancer screen colonoscopy  08/01/2024    Flu vaccine  Completed    Hepatitis C screen  Completed    HIV screen  Completed    Hib vaccine  Aged Out    Meningococcal (ACWY) vaccine  Aged Out       Hemoglobin A1C (%)   Date Value   02/01/2022 6.7   02/04/2021 7.5   08/17/2020 9.0             ( goal A1C is < 7)   Microalb/Crt.  Ratio (mcg/mg creat)   Date Value   07/14/2020 34 (H)     LDL Cholesterol (mg/dL)   Date Value   07/28/2020 60   07/14/2020 120       (goal LDL is <100)   AST (U/L)   Date Value   07/29/2020 27     ALT (U/L)   Date Value   07/29/2020 21     BUN (mg/dL)   Date Value   02/04/2021 10     BP Readings from Last 3 Encounters:   02/01/22 137/64   05/25/21 (!) 124/53   05/18/21 125/64          (goal 120/80)    All Future Testing planned in [facility]  Lab Frequency Next Occurrence   CBC With Auto Differential Once 02/11/2022   Reflux study/Reflux Venous Insufficiency Bilateral Once 02/24/2022   DEXA Bone Density Axial Skeleton Once 03/01/2022   Microalbumin, Ur Once 03/03/2022   Lipid Panel Once 02/01/2023   CBC With Auto Differential Once 79/81/7864   Basic Metabolic Panel Once 64/85/8139   TSH With Reflex Ft4 Once 02/01/2023               Patient Active Problem List:     Anxiety     GERD (gastroesophageal reflux disease)     OA (osteoarthritis)     Neuropathy     Right knee DJD     Hypothyroidism     S/P left total knee arthroplasty     Chest pain     Ischemic heart disease     Essential hypertension     Hypertensive urgency     [facility]non-[facility]     Other cirrhosis of liver (HCC)     Abdominal pain     Elevated lipase     Depression     Cystitis     Type 2 diabetes mellitus, with long-term current use of insulin (HCC)     SHERITA (obstructive sleep apnea)     Morbid obesity with BMI of 45.0-49.9, adult ([facility]     PVD (peripheral vascular disease) (HCC)     Bilateral edema of lower extremity     Paronychia of left middle finger     Post-menopausal     Screening for hyperlipidemia     Numbness and tingling of both feet

## 2022-02-14 ENCOUNTER — TELEPHONE (OUTPATIENT)
Dept: FAMILY MEDICINE CLINIC | Age: 67
End: 2022-02-14

## 2022-02-14 DIAGNOSIS — Z79.4 TYPE 2 DIABETES MELLITUS WITH OTHER CIRCULATORY COMPLICATION, WITH LONG-TERM CURRENT USE OF INSULIN (HCC): ICD-10-CM

## 2022-02-14 DIAGNOSIS — E11.59 TYPE 2 DIABETES MELLITUS WITH OTHER CIRCULATORY COMPLICATION, WITH LONG-TERM CURRENT USE OF INSULIN (HCC): ICD-10-CM

## 2022-02-14 NOTE — TELEPHONE ENCOUNTER
Writer spoke with patient who would like her [address] sent to the mail order pharmacy that she uses.

## 2022-02-16 RX ORDER — CITALOPRAM 40 MG/1
TABLET ORAL
Qty: 30 TABLET | Refills: 10 | Status: SHIPPED | OUTPATIENT
Start: 2022-02-16 | End: 2022-03-14

## 2022-02-16 RX ORDER — OMEPRAZOLE 20 MG/1
20 CAPSULE, DELAYED RELEASE ORAL DAILY
Qty: 30 CAPSULE | Refills: 3 | Status: SHIPPED | OUTPATIENT
Start: 2022-02-16 | End: 2022-07-07

## 2022-02-16 RX ORDER — BUSPIRONE HYDROCHLORIDE 10 MG/1
TABLET ORAL
Qty: 60 TABLET | Refills: 10 | Status: SHIPPED | OUTPATIENT
Start: 2022-02-16 | End: 2022-03-11

## 2022-02-16 RX ORDER — CARVEDILOL 6.25 MG/1
TABLET ORAL
Qty: 60 TABLET | Refills: 3 | Status: SHIPPED | OUTPATIENT
Start: 2022-02-16 | End: 2022-03-11

## 2022-02-17 ENCOUNTER — HOSPITAL ENCOUNTER (OUTPATIENT)
Age: 67
Setting detail: SPECIMEN
Discharge: HOME | End: 2022-02-17

## 2022-02-17 DIAGNOSIS — Z79.4 TYPE 2 DIABETES MELLITUS WITH OTHER CIRCULATORY COMPLICATION, WITH LONG-TERM CURRENT USE OF INSULIN (HCC): ICD-10-CM

## 2022-02-17 DIAGNOSIS — E03.8 OTHER SPECIFIED HYPOTHYROIDISM: ICD-10-CM

## 2022-02-17 DIAGNOSIS — Z13.220 SCREENING FOR HYPERLIPIDEMIA: ICD-10-CM

## 2022-02-17 DIAGNOSIS — I10 ESSENTIAL HYPERTENSION: ICD-10-CM

## 2022-02-17 DIAGNOSIS — E11.59 TYPE 2 DIABETES MELLITUS WITH OTHER CIRCULATORY COMPLICATION, WITH LONG-TERM CURRENT USE OF INSULIN (HCC): ICD-10-CM

## 2022-02-17 LAB
ABSOLUTE EOS #: 0.28 K/UL (ref 0–0.44)
ABSOLUTE IMMATURE GRANULOCYTE: 0.07 K/UL (ref 0–0.3)
ABSOLUTE LYMPH #: 1.22 K/UL (ref 1.1–3.7)
ABSOLUTE MONO #: 0.88 K/UL (ref 0.1–1.2)
ANION GAP SERPL CALCULATED.3IONS-SCNC: 16 MMOL/L (ref 9–17)
BASOPHILS # BLD: 0 % (ref 0–2)
BASOPHILS ABSOLUTE: 0.03 K/UL (ref 0–0.2)
BUN BLDV-MCNC: 7 MG/DL (ref 8–23)
BUN/CREAT BLD: ABNORMAL (ref 9–20)
CALCIUM SERPL-MCNC: 9.2 MG/DL (ref 8.6–10.4)
CHLORIDE BLD-SCNC: 101 MMOL/L (ref 98–107)
CHOLESTEROL/HDL RATIO: 2.3
CHOLESTEROL: 110 MG/DL
CO2: 26 MMOL/L (ref 20–31)
CREAT SERPL-MCNC: 0.57 MG/DL (ref 0.5–0.9)
CREATININE URINE: 154.5 MG/DL (ref 28–217)
DIFFERENTIAL TYPE: ABNORMAL
EOSINOPHILS RELATIVE PERCENT: 4 % (ref 1–4)
GFR AFRICAN AMERICAN: >60 ML/MIN
GFR NON-AFRICAN AMERICAN: >60 ML/MIN
GFR SERPL CREATININE-BSD FRML MDRD: ABNORMAL ML/MIN/{1.73_M2}
GFR SERPL CREATININE-BSD FRML MDRD: ABNORMAL ML/MIN/{1.73_M2}
GLUCOSE BLD-MCNC: 59 MG/DL (ref 70–99)
HCT VFR BLD CALC: 47.4 % (ref 36.3–47.1)
HDLC SERPL-MCNC: 47 MG/DL
HEMOGLOBIN: 14.8 G/DL (ref 11.9–15.1)
IMMATURE GRANULOCYTES: 1 %
LDL CHOLESTEROL: 45 MG/DL (ref 0–130)
LYMPHOCYTES # BLD: 16 % (ref 24–43)
MCH RBC QN AUTO: 29.2 PG (ref 25.2–33.5)
MCHC RBC AUTO-ENTMCNC: 31.2 G/DL (ref 28.4–34.8)
MCV RBC AUTO: 93.5 FL (ref 82.6–102.9)
MICROALBUMIN/CREAT 24H UR: 60 MG/L
MICROALBUMIN/CREAT UR-RTO: 39 MCG/MG CREAT
MONOCYTES # BLD: 11 % (ref 3–12)
NRBC AUTOMATED: 0 PER 100 WBC
PDW BLD-RTO: 12.8 % (ref 11.8–14.4)
PLATELET # BLD: 189 K/UL (ref 138–453)
PLATELET ESTIMATE: ABNORMAL
PMV BLD AUTO: 11.3 FL (ref 8.1–13.5)
POTASSIUM SERPL-SCNC: 3.9 MMOL/L (ref 3.7–5.3)
RBC # BLD: 5.07 M/UL (ref 3.95–5.11)
RBC # BLD: ABNORMAL 10*6/UL
SEG NEUTROPHILS: 68 % (ref 36–65)
SEGMENTED NEUTROPHILS ABSOLUTE COUNT: 5.38 K/UL (ref 1.5–8.1)
SODIUM BLD-SCNC: 143 MMOL/L (ref 135–144)
TRIGL SERPL-MCNC: 88 MG/DL
TSH SERPL DL<=0.05 MIU/L-ACNC: 2.49 MIU/L (ref 0.3–5)
VLDLC SERPL CALC-MCNC: NORMAL MG/DL (ref 1–30)
WBC # BLD: 7.9 K/UL (ref 3.5–11.3)
WBC # BLD: ABNORMAL 10*3/UL

## 2022-03-01 ENCOUNTER — TELEMEDICINE (OUTPATIENT)
Dept: FAMILY MEDICINE CLINIC | Age: 67
End: 2022-03-01
Payer: MEDICARE

## 2022-03-01 ENCOUNTER — INITIAL CONSULT (OUTPATIENT)
Dept: PAIN MANAGEMENT | Age: 67
End: 2022-03-01
Payer: MEDICARE

## 2022-03-01 VITALS
SYSTOLIC BLOOD PRESSURE: 118 MMHG | DIASTOLIC BLOOD PRESSURE: 64 MMHG | OXYGEN SATURATION: 96 % | HEART RATE: 69 BPM | BODY MASS INDEX: 51.49 KG/M2 | HEIGHT: 59 IN | WEIGHT: 255.4 LBS

## 2022-03-01 DIAGNOSIS — R80.9 MICROALBUMINURIA: ICD-10-CM

## 2022-03-01 DIAGNOSIS — E11.40 PAINFUL DIABETIC NEUROPATHY (HCC): Primary | ICD-10-CM

## 2022-03-01 DIAGNOSIS — L60.8 NAIL DEFORMITY: ICD-10-CM

## 2022-03-01 DIAGNOSIS — Z79.02 LONG TERM (CURRENT) USE OF ANTITHROMBOTICS/ANTIPLATELETS: ICD-10-CM

## 2022-03-01 DIAGNOSIS — E11.59 TYPE 2 DIABETES MELLITUS WITH OTHER CIRCULATORY COMPLICATION, WITH LONG-TERM CURRENT USE OF INSULIN (HCC): Primary | ICD-10-CM

## 2022-03-01 DIAGNOSIS — H53.9 VISUAL DISTURBANCE: ICD-10-CM

## 2022-03-01 DIAGNOSIS — G47.33 OSA (OBSTRUCTIVE SLEEP APNEA): ICD-10-CM

## 2022-03-01 DIAGNOSIS — E66.01 MORBID OBESITY WITH BMI OF 45.0-49.9, ADULT (HCC): ICD-10-CM

## 2022-03-01 DIAGNOSIS — R60.0 BILATERAL EDEMA OF LOWER EXTREMITY: ICD-10-CM

## 2022-03-01 DIAGNOSIS — Z79.4 TYPE 2 DIABETES MELLITUS WITH OTHER CIRCULATORY COMPLICATION, WITH LONG-TERM CURRENT USE OF INSULIN (HCC): Primary | ICD-10-CM

## 2022-03-01 PROCEDURE — 99442 PR PHYS/QHP TELEPHONE EVALUATION 11-20 MIN: CPT | Performed by: FAMILY MEDICINE

## 2022-03-01 PROCEDURE — 99204 OFFICE O/P NEW MOD 45 MIN: CPT | Performed by: ANESTHESIOLOGY

## 2022-03-01 RX ORDER — INSULIN GLARGINE 100 [IU]/ML
INJECTION, SOLUTION SUBCUTANEOUS
Qty: 15 PEN | Refills: 3 | Status: SHIPPED | OUTPATIENT
Start: 2022-03-01

## 2022-03-01 ASSESSMENT — ENCOUNTER SYMPTOMS
COUGH: 0
CONSTIPATION: 1
SHORTNESS OF BREATH: 1
VOMITING: 0
WHEEZING: 0
NAUSEA: 1
DIARRHEA: 1
BACK PAIN: 1

## 2022-03-01 NOTE — PROGRESS NOTES
The patient is a 73 y. o. Non- / non  female. Chief Complaint   Patient presents with    Consultation    Foot Pain    Hand Pain    Leg Pain         HPI   Requesting physician for the evaluation of Debbie Mi 1955: yannick santos md    Pain History  She is a very pleasant 30-year-old retired   She is seen today for history of chronic pain involving both hands and feet  Pain is chronic onset many years ago progressively worsened  Worst part involves lower extremities extending from toes over feet ankle towards the knee  Describes it as constant aching burning tingling shooting electric sensation  She feels like her feet are constantly on fire  Walking feels like walking on glass  Weather changes and activity aggravates the pain  Nothing seems to alleviate the pain    Past history significant for morbid obesity with BMI above 50, insulin-dependent diabetes with improving blood glucose control and obstructive sleep apnea, on long-term antiplatelet therapy with Plavix    She is diagnosed with painful diabetic neuropathy  Patient of tried several different medications including gabapentin, antidepressant anticonvulsant anti-inflammatory muscle relaxant    None of these medication provide her any significant relief  She has tried physical therapy multiple times in the past    Symptoms are severe markedly affecting her quality of life  Pain score today  8  1. Location: knees legs hands and feet   2. Radiation: yes knees to feet   3. Character: burning   5. Duration: years   6. Onset: 7-8 years ago   7. Did an injury cause pain: no  8. Aggravating factors: movement   9. Alleviating factors nothing   10. Associated symptoms (numbness / tingling / weakness) yes    -Where at: knees   -Down into finger tips or toes (specify which finger or toes): toes   -constant or intermitting: constant  11.  Red Flags: (weight loss / chills / loss of bladder or bowel control): no    Previous management history  1. Previous diagnostic workup: (Imaging/EMG)   CT, MRI, or Xray:  Xray MRI  What part of the body: hands feet and legs   [facility]  What year or specific date: N/A  EMG:  no    2. Previous non interventional treatments tried:  chiropractor or physical therapy: chiropractor   What part of the body: back   What facility was it done at:  N/a  How long ago was it last tried:years  Did it work: Yes and helped a little   Did they complete it:Yes    3. Previous Medications tried  NSAID's: yes  Neurontin: yes  Lyrica: yes  Trycyclic antidepressant (Ellavil / Pamelor ): yes  Cymbalta: no  Opioids (Ultram / Vicodin / Percocet / Morphine / Dilaudid / Oramorph/ Fentanyl etc.): OxyContin   Last Pain medication taken (name of med and date):    4. Previous Interventional pain procedures tried:  What kind of injection: no  Who did the injection: n/a  did the injection help: na  Last time injection was done:N/A    5.  Previous surgeries for pain  What part of the body did they have the surgery:both knees  What physician did the surgery: [facility] did they have the surgery done: [facility]  Date of Surgery:N/A    Social History:  Marital status:   Employment History: retired   Working  No  Full time Or Part time: n/a  Disability  No   Legal Issues related to pain complaint: No     Pain Disability Index score : n/a    Lab Results   Component Value Date    LABA1C 6.7 02/01/2022     Lab Results   Component Value Date     07/14/2020         Informant: patient      Pain is a # 8   Pain has been there for years   Pain is in the legs feet and hands   What aggravates the pain movement    What alleviates the pain nothing   Is the pain knees and legs constant hands and feet intermitting         Past Medical History:   Diagnosis Date    Anxiety     Borderline hypertension     Depression 7/28/2020    GERD (gastroesophageal reflux disease)     Heart attack ([facility] 07/18/2020    Hypothyroidism     Neuropathy     Obesity     morbid    Osteoarthritis     Other cirrhosis of liver ([facility] 7/27/2020    Sleep apnea     possible    Type II or unspecified type diabetes mellitus without mention of complication, not stated as uncontrolled         Past Surgical History:   Procedure Laterality Date    APPENDECTOMY      COLONOSCOPY      DILATION AND CURETTAGE OF UTERUS      HIATAL HERNIA REPAIR      HYSTERECTOMY      THROAT SURGERY      POLYPS REMOVED FROM VOCAL CORD    TOTAL KNEE ARTHROPLASTY Right 01/06/2015    TOTAL KNEE ARTHROPLASTY Left 5/26/15    UPPER GASTROINTESTINAL ENDOSCOPY         Social History     Socioeconomic History    Marital status:      Spouse name: Junior Mcmillan Number of children: 0    Years of education: None    Highest education level: None   Occupational History    Occupation: Retired      Employer: [employer]   Tobacco Use    Smoking status: Never Smoker    Smokeless tobacco: Never Used   Vaping Use    Vaping Use: Never used   Substance and Sexual Activity    Alcohol use: Yes     Comment: once a month    Drug use: No    Sexual activity: Yes     Partners: Male   Other Topics Concern    None   Social History Narrative    None     Social Determinants of Health     Financial Resource Strain:     Difficulty of Paying Living Expenses: Not on file   Food Insecurity:     Worried About Running Out of Food in the Last Year: Not on file    Kei of Food in the Last Year: Not on file   Transportation Needs:     Lack of Transportation (Medical): Not on file    Lack of Transportation (Non-Medical):  Not on file   Physical Activity:     Days of Exercise per Week: Not on file    Minutes of Exercise per Session: Not on file   Stress:     Feeling of Stress : Not on file   Social Connections:     Frequency of Communication with Friends and Family: Not on file    Frequency of Social Gatherings with 10 MG tablet TAKE 1 TABLET BY MOUTH EVERY DAY 30 tablet 5    atorvastatin (LIPITOR) 40 MG tablet Take 1 tablet by mouth nightly 90 tablet 1    insulin glargine (BASAGLAR KWIKPEN) 100 UNIT/ML injection pen Inject 65 units subcutaneously once daily. 15 pen 3    isosorbide mononitrate (IMDUR) 60 MG extended release tablet TAKE 1 TABLET BY MOUTH ONCE DAILY 90 tablet 1    amLODIPine (NORVASC) 5 MG tablet TAKE 1 TABLET BY MOUTH EVERY DAY 90 tablet 1    clopidogrel (PLAVIX) 75 MG tablet TAKE 1 TABLET BY MOUTH ONCE DAILY 90 tablet 1    acetaminophen (TYLENOL) 500 MG tablet TAKE TWO TABLETS BY MOUTH THREE TIMES A DAY AS NEEDED FOR PAIN 180 tablet 0    ibuprofen (ADVIL;MOTRIN) 800 MG tablet Take 1 tablet by mouth 4 times daily as needed for Pain 120 tablet 0    lidocaine (XYLOCAINE) 2 % jelly Apply topically with dressing changes every 3 days 1 Tube 1    melatonin 10 MG CAPS capsule TAKE 1 CAPSULE BY MOUTH NIGHTLY AS NEEDED FOR SLEEP 30 capsule 2    sucralfate (CARAFATE) 1 GM tablet TAKE 1 TABLET BY MOUTH EVERY DAY 30 tablet 9    blood glucose monitor strips Test before meals and at bedtime. DX: DM, on insulin 100 strip 11    blood glucose monitor kit and supplies Dispense sufficient amount for indicated testing frequency plus additional to accommodate PRN testing needs. Dispense all needed supplies to include: monitor, strips, lancing device, lancets, control solutions, alcohol swabs. 1 kit 0    nitroGLYCERIN (NITROSTAT) 0.4 MG SL tablet up to max of 3 total doses. If no relief after 1 dose, call 911. 25 tablet 3    Handicap Placard MISC Is a permanent condition. DX: M19.90 1 each 0    Insulin Pen Needle (B-D UF III MINI PEN NEEDLES) 31G X 5 MM MISC USE 1 PEN NEEDLE DAILY 50 each 1    [employer] Lancets Thin MISC Test before meals and at bedtime. DX: DM, on insulin 100 each 11    MULTIPLE VITAMIN PO   Take 2 tablets by mouth daily DAILY      Alcohol Swabs (ALCOHOL PREP PADS) by Other route 2 times daily.  miconazole (MICOTIN) 2 % cream APPLY TO AFFECTED AREA TWICE DAILY (Patient not taking: Reported on 3/1/2022) 1 each 1    miconazole (ZEASORB-AF) 2 % powder Apply topically 2 times daily. (Patient not taking: Reported on 3/1/2022) 45 g 1    magnesium hydroxide (MILK OF MAGNESIA) 400 MG/5ML suspension Take 30 mLs by mouth daily as needed for Constipation (Patient not taking: Reported on 3/1/2022) 900 mL 0     No current facility-administered medications for this visit. Review of Systems   Constitutional: Positive for fatigue. Negative for chills and fever. Respiratory: Positive for shortness of breath. Negative for cough and wheezing. Gastrointestinal: Positive for constipation, diarrhea and nausea. Negative for vomiting. Musculoskeletal: Positive for back pain, neck pain and neck stiffness. Neurological: Positive for weakness and numbness. Negative for dizziness and headaches. Objective:  General Appearance:  Uncomfortable and in pain. Vital signs: (most recent): Blood pressure 118/64, pulse 69, height 4' 11\" (1.499 m), weight 255 lb 6.4 oz (115.8 kg), SpO2 96 %. Vital signs are normal.  No fever. Output: Producing urine and producing stool. HEENT: Normal HEENT exam.    Lungs:  Normal effort and normal respiratory rate. She is not in respiratory distress. Heart: Normal rate. Extremities: Normal range of motion. There is no deformity. Neurological: Patient is alert and oriented to person, place and time. Normal strength. Patient has normal coordination. Pupils:  Pupils are equal, round, and reactive to light. Pupils are equal.   Skin:  Warm and dry. No rash or cyanosis.    Bilateral lower extremity examination  Pitting edema noticeable  Range of motion in both feet is normal  Sensitivity to touch    Assessment & Plan   Pain History  She is a very pleasant 79-year-old retired   She is seen today for history of chronic pain involving both hands and feet  Pain is chronic onset many years ago progressively worsened  Worst part involves lower extremities extending from toes over feet ankle towards the knee  Describes it as constant aching burning tingling shooting electric sensation  She feels like her feet are constantly on fire  Walking feels like walking on glass  Weather changes and activity aggravates the pain  Nothing seems to alleviate the pain    Past history significant for morbid obesity with BMI above 50, insulin-dependent diabetes with improving blood glucose control and obstructive sleep apnea, on long-term antiplatelet therapy with Plavix    She is diagnosed with painful diabetic neuropathy  Patient of tried several different medications including gabapentin, antidepressant anticonvulsant anti-inflammatory muscle relaxant    None of these medication provide her any significant relief  She has tried physical therapy multiple times in the past    Symptoms are severe markedly affecting her quality of life  Pain score today  8  1. Painful diabetic neuropathy ([facility]    2. Morbid obesity with BMI of 45.0-49.9, adult ([facility]    3. SHERITA (obstructive sleep apnea)    4. Bilateral edema of lower extremity    5.  Long term (current) use of antithrombotics/antiplatelets        Patient presents with bilateral lower extremity burning pain sensation associated with severe paresthesia refractory to medication management affecting quality of life and activity level in the setting of insulin-dependent diabetes  Her diagnosis will be painful diabetic neuropathy  We will obtain EMG nerve testing to confirm the etiology  Considering her severe symptoms and limited treatment option I think neuromodulation trial appears to be the best option forward  Randomized trials have shown benefit of HF 10 therapy for diabetic peripheral neuropathy  We will obtain psychological evaluation  Procedure discussed with patient  She is very interested to proceed  Information material provided  We will submit for prior authorization after psychological evaluation is completed  We will obtain MRI lumbar spine for possible surgical planning and stimulator trial to rule out any surgical pathology that can prohibit stimulation trial    Consultation note sent to the referring physician  Orders Placed This Encounter   Procedures    MRI LUMBAR SPINE WO CONTRAST    External Referral To Psychology    EMG      No orders of the defined types were placed in this encounter.            Electronically signed by Kelly Linares MD on 3/1/2022 at 9:26 AM

## 2022-03-01 NOTE — PROGRESS NOTES
Visit Information    Have you changed or started any medications since your last visit including any over-the-counter medicines, vitamins, or herbal medicines? no   Have you stopped taking any of your medications? Is so, why? -  no  Are you having any side effects from any of your medications? - no    Have you seen any other physician or provider since your last visit?  no   Have you had any other diagnostic tests since your last visit? yes - in media   Have you been seen in the emergency room and/or had an admission in a hospital since we last saw you?  no   Have you had your routine dental cleaning in the past 6 months?  no     Do you have an active MyChart account? If no, what is the barrier?   Yes    Patient Care Team:  Ana Conway MD as PCP - General (Family Medicine)  Ana Conway MD as PCP - [facility]  Amy Galvez MD as Surgeon (Orthopedic Surgery)    Medical History Review  Past Medical, Family, and Social History reviewed and does not contribute to the patient presenting condition    Health Maintenance   Topic Date Due    Shingles Vaccine (1 of 2) Never done    DEXA (modify frequency per FRAX score)  Never done    Diabetic retinal exam  01/01/2014    Hepatitis A vaccine (2 of 2 - Risk 2-dose series) 02/17/2021    COVID-19 Vaccine (2 - Moderna 3-dose series) 05/13/2021    Pneumococcal 65+ years Vaccine (1 of 1 - PPSV23) 05/14/2021    Diabetic foot exam  08/17/2021    Breast cancer screen  08/20/2022    A1C test (Diabetic or Prediabetic)  02/01/2023    Depression Monitoring  02/01/2023    Diabetic microalbuminuria test  02/17/2023    Lipid screen  02/17/2023    TSH testing  02/17/2023    Potassium monitoring  02/17/2023    Creatinine monitoring  02/17/2023    DTaP/Tdap/Td vaccine (6 - Td or Tdap) 06/27/2024    Colorectal Cancer Screen  08/01/2024    Flu vaccine  Completed    Hepatitis C screen  Completed    HIV screen  Completed    Hib vaccine  Aged Out  Meningococcal (ACWY) vaccine  Aged Out

## 2022-03-01 NOTE — PATIENT INSTRUCTIONS
Thank you for letting us take care of you today. We hope all your questions were addressed. If a question was overlooked or something else comes to mind after you return home, please contact a member of your Care Team listed below. Your Care Team at [place] [address] is Team #4  Virgen Hassan MD (Faculty)  Chago Sheikh MD (Resident)  Mya Case MD (Resident)  Criss Greenfield MD (Resident)  Akira Lawson MD (Resident)  GI[employer] Gould., LUIS Daivdson., Javi Gilman., [facility] office)  Brooke Moon, [address] (Clinical Practice Manager)  Karen Lomas [facility] (Clinical Pharmacist)       Office phone number: [phone]    If you need to get in right away due to illness, please be advised we have \"Same Day\" appointments available Monday-Friday. Please call us at [phone] option #3 to schedule your \"Same Day\" appointment.

## 2022-03-01 NOTE — PROGRESS NOTES
SAW PAIN DOCTOR TODAY. NEURO STIMULATOR planned  [facility]    Eye exam -       Sridhar De La O is a 73 y.o. female evaluated via telephone on 3/1/2022. Consent:  She and/or health care decision maker is aware that that she may receive a bill for this telephone service, which includes applicable co-pays, depending on her insurance coverage, and has provided verbal consent to proceed. Documentation:  I communicated with the patient and/or health care decision maker about DM - med refill, deformed nails, visual distubance. Details of this discussion including any medical advice provided:   Chary Cabello and I meet by phone today to discuss several issues. 1.  She does need refills on her Basaglar insulin and that was done   2 she describes that she had a brief visual disturbance in 1 or both eyes. She describes it as a cloudiness and lasted an indeterminate amount of time with no eye pain. It did resolve. She states she was inclined to not worry about it unless it happened again. However we did discuss that with diabetes and always in general we have to be very careful with her eyes. She did agree to accept a referral to an ophthalmologist and that referral to Dr. Mar Irizarry was placed. I also counseled her that if the changes occur again to let me know and she agreed to do so.    3 she also brought up that she was having thickened deformed nails of the big toenail of both feet and would be appreciative of a podiatry referral.  That was done. 4. She agreed to an in person follow up in 4 months. She was encouraged to contact me sooner for follow up for any questions or problems. Diagnosis Orders   1. Type 2 diabetes mellitus with other circulatory complication, with long-term current use of insulin (HCC)  insulin glargine (BASAGLAR KWIKPEN) 100 UNIT/ML injection pen    External Referral To Ophthalmology. Dr. Mar Irizarry   2.  Nail deformity  [facility]. [place]   3. Visual disturbance  External Referral To Ophthalmology   4. Microalbuminuria           I affirm this is a Patient Initiated Episode with a Patient who has not had a related appointment within my department in the past 7 days or scheduled within the next 24 hours. Patient identification was verified at the start of the visit: Yes    Total Time: minutes: 11-20 minutes    aRe Emanuel was evaluated through a synchronous (real-time) audio encounter. The patient was located at home in a state where the provider was licensed to provide care.     Note: not billable if this call serves to triage the patient into an appointment for the relevant concern      Flynn Montes MD

## 2022-03-03 PROBLEM — R07.9 CHEST PAIN: Status: RESOLVED | Noted: 2019-01-10 | Resolved: 2022-03-03

## 2022-03-03 PROBLEM — L03.012 PARONYCHIA OF LEFT MIDDLE FINGER: Status: RESOLVED | Noted: 2021-04-20 | Resolved: 2022-03-03

## 2022-03-03 PROBLEM — Z78.0 POST-MENOPAUSAL: Status: RESOLVED | Noted: 2022-02-01 | Resolved: 2022-03-03

## 2022-03-03 PROBLEM — I16.0 HYPERTENSIVE URGENCY: Status: RESOLVED | Noted: 2020-07-20 | Resolved: 2022-03-03

## 2022-03-03 PROBLEM — Z13.220 SCREENING FOR HYPERLIPIDEMIA: Status: RESOLVED | Noted: 2022-02-01 | Resolved: 2022-03-03

## 2022-03-03 PROBLEM — N30.90 CYSTITIS: Status: RESOLVED | Noted: 2020-07-28 | Resolved: 2022-03-03

## 2022-03-03 PROBLEM — R10.9 ABDOMINAL PAIN: Status: RESOLVED | Noted: 2020-07-28 | Resolved: 2022-03-03

## 2022-03-09 ENCOUNTER — HOSPITAL ENCOUNTER (OUTPATIENT)
Dept: MRI IMAGING | Age: 67
Discharge: HOME | End: 2022-03-11
Payer: MEDICARE

## 2022-03-09 DIAGNOSIS — R60.0 BILATERAL EDEMA OF LOWER EXTREMITY: ICD-10-CM

## 2022-03-09 DIAGNOSIS — E11.40 PAINFUL DIABETIC NEUROPATHY (HCC): ICD-10-CM

## 2022-03-09 PROCEDURE — 72148 MRI LUMBAR SPINE W/O DYE: CPT

## 2022-03-11 DIAGNOSIS — F41.9 ANXIETY: Chronic | ICD-10-CM

## 2022-03-11 RX ORDER — CITALOPRAM 40 MG/1
TABLET ORAL
Qty: 30 TABLET | Refills: 0 | OUTPATIENT
Start: 2022-03-11

## 2022-03-11 RX ORDER — BUSPIRONE HYDROCHLORIDE 10 MG/1
TABLET ORAL
Qty: 60 TABLET | Refills: 10 | Status: SHIPPED | OUTPATIENT
Start: 2022-03-11

## 2022-03-11 RX ORDER — GLIMEPIRIDE 4 MG/1
TABLET ORAL
Qty: 60 TABLET | Refills: 0 | OUTPATIENT
Start: 2022-03-11

## 2022-03-11 RX ORDER — CARVEDILOL 6.25 MG/1
TABLET ORAL
Qty: 60 TABLET | Refills: 0 | Status: SHIPPED | OUTPATIENT
Start: 2022-03-11 | End: 2022-04-20 | Stop reason: SDUPTHER

## 2022-03-11 NOTE — TELEPHONE ENCOUNTER
E-scribe request for med refill. Please review and e-scribe if applicable. Last Visit Date:  02/01/2022  Next Visit Date:  Visit date not found    Hemoglobin A1C (%)   Date Value   02/01/2022 6.7   02/04/2021 7.5   08/17/2020 9.0             ( goal A1C is < 7)   Microalb/Crt.  Ratio (mcg/mg creat)   Date Value   02/17/2022 39 (H)     LDL Cholesterol (mg/dL)   Date Value   02/17/2022 45       (goal LDL is <100)   AST (U/L)   Date Value   07/29/2020 27     ALT (U/L)   Date Value   07/29/2020 21     BUN (mg/dL)   Date Value   02/17/2022 7 (L)     BP Readings from Last 3 Encounters:   03/01/22 118/64   02/01/22 137/64   05/25/21 (!) 124/53          (goal 120/80)        Patient Active Problem List:     Anxiety     GERD (gastroesophageal reflux disease)     OA (osteoarthritis)     Neuropathy     Right knee DJD     Hypothyroidism     S/P left total knee arthroplasty     Ischemic heart disease     Essential hypertension     NSTEMI (non-ST elevated myocardial infarction) ([facility]     Other cirrhosis of liver (HCC)     Elevated lipase     Depression     Type 2 diabetes mellitus, with long-term current use of insulin (HCC)     SHERITA (obstructive sleep apnea)     Morbid obesity with BMI of 45.0-49.9, adult ([facility]     PVD (peripheral vascular disease) ([facility])     Numbness and tingling of both feet     Long term (current) use of antithrombotics/antiplatelets     Painful diabetic neuropathy ([facility]      ----Nagi Pierson

## 2022-03-11 NOTE — TELEPHONE ENCOUNTER
E-scribe request for med refills. Please review and e-scribe if applicable. Last Visit Date:  3/1/22  Next Visit Date:  Visit date not found    Hemoglobin A1C (%)   Date Value   02/01/2022 6.7   02/04/2021 7.5   08/17/2020 9.0             ( goal A1C is < 7)   Microalb/Crt.  Ratio (mcg/mg creat)   Date Value   02/17/2022 39 (H)     LDL Cholesterol (mg/dL)   Date Value   02/17/2022 45       (goal LDL is <100)   AST (U/L)   Date Value   07/29/2020 27     ALT (U/L)   Date Value   07/29/2020 21     BUN (mg/dL)   Date Value   02/17/2022 7 (L)     BP Readings from Last 3 Encounters:   03/01/22 118/64   02/01/22 137/64   05/25/21 (!) 124/53          (goal 120/80)        Patient Active Problem List:     Anxiety     GERD (gastroesophageal reflux disease)     OA (osteoarthritis)     Neuropathy     Right knee DJD     Hypothyroidism     S/P left total knee arthroplasty     Ischemic heart disease     Essential hypertension     NSTEMI (non-ST elevated myocardial infarction) ([facility]     Other cirrhosis of liver (HCC)     Elevated lipase     Depression     Type 2 diabetes mellitus, with long-term current use of insulin (HCC)     SHERITA (obstructive sleep apnea)     Morbid obesity with BMI of 45.0-49.9, adult ([facility]     PVD (peripheral vascular disease) ([facility])     Numbness and tingling of both feet     Long term (current) use of antithrombotics/antiplatelets     Painful diabetic neuropathy ([facility]      ----Janine Augustine

## 2022-03-11 NOTE — TELEPHONE ENCOUNTER
E-scribe request for med refills. Please review and e-scribe if applicable. Last Visit Date:  3/1/22  Next Visit Date:  Visit date not found    Hemoglobin A1C (%)   Date Value   02/01/2022 6.7   02/04/2021 7.5   08/17/2020 9.0             ( goal A1C is < 7)   Microalb/Crt.  Ratio (mcg/mg creat)   Date Value   02/17/2022 39 (H)     LDL Cholesterol (mg/dL)   Date Value   02/17/2022 45       (goal LDL is <100)   AST (U/L)   Date Value   07/29/2020 27     ALT (U/L)   Date Value   07/29/2020 21     BUN (mg/dL)   Date Value   02/17/2022 7 (L)     BP Readings from Last 3 Encounters:   03/01/22 118/64   02/01/22 137/64   05/25/21 (!) 124/53          (goal 120/80)        Patient Active Problem List:     Anxiety     GERD (gastroesophageal reflux disease)     OA (osteoarthritis)     Neuropathy     Right knee DJD     Hypothyroidism     S/P left total knee arthroplasty     Ischemic heart disease     Essential hypertension     NSTEMI (non-ST elevated myocardial infarction) ([facility]     Other cirrhosis of liver (HCC)     Elevated lipase     Depression     Type 2 diabetes mellitus, with long-term current use of insulin (HCC)     SHERITA (obstructive sleep apnea)     Morbid obesity with BMI of 45.0-49.9, adult ([facility]     PVD (peripheral vascular disease) ([facility])     Numbness and tingling of both feet     Long term (current) use of antithrombotics/antiplatelets     Painful diabetic neuropathy ([facility]      ----Vicki Evans

## 2022-03-14 DIAGNOSIS — F41.9 ANXIETY: Chronic | ICD-10-CM

## 2022-03-14 RX ORDER — CITALOPRAM 40 MG/1
TABLET ORAL
Qty: 30 TABLET | Refills: 10 | OUTPATIENT
Start: 2022-03-14

## 2022-03-14 RX ORDER — GLIMEPIRIDE 4 MG/1
TABLET ORAL
Qty: 60 TABLET | Refills: 5 | Status: SHIPPED | OUTPATIENT
Start: 2022-03-14 | End: 2022-09-29 | Stop reason: SDUPTHER

## 2022-03-14 RX ORDER — CITALOPRAM 40 MG/1
TABLET ORAL
Qty: 30 TABLET | Refills: 5 | Status: SHIPPED | OUTPATIENT
Start: 2022-03-14 | End: 2022-09-29 | Stop reason: SDUPTHER

## 2022-03-14 RX ORDER — GLIMEPIRIDE 4 MG/1
TABLET ORAL
Qty: 90 TABLET | Refills: 5 | OUTPATIENT
Start: 2022-03-14

## 2022-03-14 RX ORDER — OMEPRAZOLE 20 MG/1
CAPSULE, DELAYED RELEASE ORAL
Qty: 30 CAPSULE | Refills: 0 | OUTPATIENT
Start: 2022-03-14

## 2022-03-14 NOTE — TELEPHONE ENCOUNTER
celexa and amaryl pending for refill     Health Maintenance   Topic Date Due    Shingles Vaccine (1 of 2) Never done    DEXA (modify frequency per FRAX score)  Never done    Diabetic retinal exam  01/01/2014    Hepatitis A vaccine (2 of 2 - Risk 2-dose series) 02/17/2021    COVID-19 Vaccine (2 - Moderna 3-dose series) 05/13/2021    Pneumococcal 65+ years Vaccine (1 of 1 - PPSV23) 05/14/2021    Diabetic foot exam  08/17/2021    Annual Wellness Visit (AWV)  03/02/2022    Breast cancer screen  08/20/2022    A1C test (Diabetic or Prediabetic)  02/01/2023    Depression Monitoring  02/01/2023    Diabetic microalbuminuria test  02/17/2023    Lipid screen  02/17/2023    TSH testing  02/17/2023    Potassium monitoring  02/17/2023    Creatinine monitoring  02/17/2023    DTaP/Tdap/Td vaccine (6 - Td or Tdap) 06/27/2024    Colorectal Cancer Screen  08/01/2024    Flu vaccine  Completed    Hepatitis C screen  Completed    HIV screen  Completed    Hib vaccine  Aged Out    Meningococcal (ACWY) vaccine  Aged Out             (applicable per patient's age: Cancer Screenings, Depression Screening, Fall Risk Screening, Immunizations)    Hemoglobin A1C (%)   Date Value   02/01/2022 6.7   02/04/2021 7.5   08/17/2020 9.0     Microalb/Crt.  Ratio (mcg/mg creat)   Date Value   02/17/2022 39 (H)     LDL Cholesterol (mg/dL)   Date Value   02/17/2022 45     AST (U/L)   Date Value   07/29/2020 27     ALT (U/L)   Date Value   07/29/2020 21     BUN (mg/dL)   Date Value   02/17/2022 7 (L)      (goal A1C is < 7)   (goal LDL is <100) need 30-50% reduction from baseline     BP Readings from Last 3 Encounters:   03/01/22 118/64   02/01/22 137/64   05/25/21 (!) 124/53    (goal /80)      All Future Testing planned in [facility]:  Lab Frequency Next Occurrence   DEXA Bone Density Axial Skeleton Once 03/01/2022   EMG Once 03/01/2022       Next Visit Date:  Future Appointments   Date Time Provider Osmar Hutchinson   3/28/2022 12:45 PM Emi Lepe DPM [facility]   3/31/2022  8:40 AM HAO Cohen - NEELAM [facility]   4/27/2022  2:00 PM [facility] EMG  [facility] EMG [facility]   4/27/2022  2:00 PM Greta Sanchez MD [address]            Patient Active Problem List:     Anxiety     GERD (gastroesophageal reflux disease)     OA (osteoarthritis)     Neuropathy     Right knee DJD     Hypothyroidism     S/P left total knee arthroplasty     Ischemic heart disease     Essential hypertension     NSTEMI (non-ST elevated myocardial infarction) ([facility]     Other cirrhosis of liver (HCC)     Elevated lipase     Depression     Type 2 diabetes mellitus, with long-term current use of insulin (HCC)     SHERITA (obstructive sleep apnea)     Morbid obesity with BMI of 45.0-49.9, adult ([address]     PVD (peripheral vascular disease) ([address]     Numbness and tingling of both feet     Long term (current) use of antithrombotics/antiplatelets     Painful diabetic neuropathy ([address]

## 2022-03-21 ENCOUNTER — TELEPHONE (OUTPATIENT)
Dept: PAIN MANAGEMENT | Age: 67
End: 2022-03-21

## 2022-03-21 NOTE — TELEPHONE ENCOUNTER
----- Message from Sowmya Moser MD sent at 3/18/2022 11:22 PM EDT -----  Please move her follow up appointment to me to review mri sofia

## 2022-03-21 NOTE — PROGRESS NOTES
Patient instructed to remove shoes and socks and instructed to sit in exam chair. Current PCP is Junior Joyce MD and date of last visit was 3/1/22. Do you have a follow up visit scheduled? No  If yes, the date is     Diabetic visit information    Blood pressure (Control is BP <140/90)  BP Readings from Last 3 Encounters:   03/01/22 118/64   02/01/22 137/64   05/25/21 (!) 124/53       BP taken with correct size cuff? - Yes   Repeated if > 140/90 Yes      Tobacco use:  Patient  reports that she has never smoked. She has never used smokeless tobacco.  If Smoker - Cessation materials given? - Yes       Diabetic Health Maintenance Items due  Diabetes Management   Topic Date Due    Diabetic retinal exam  01/01/2014    Diabetic foot exam  08/17/2021       Diabetic retinal exam done in last year? - Yes   If No: remind patient that it is due and they should schedule an exam    Medications  Is patient taking any medications for diabetes? -   Yes  Have blood sugars been controlled? Fasting blood sugars under 120   -   Yes   Random home sugars or today's POCT glucose is under 180 -   Yes   []  If No to the above then patient should schedule appt with PCP. Diabetic Plan    A1C Plan  Lab Results   Component Value Date    LABA1C 6.7 02/01/2022    LABA1C 7.5 02/04/2021    LABA1C 9.0 08/17/2020      []  If A1C over 8 and last result >3 months ago - Order A1C and refer to PCP   []  If last A1C over 6 months ago - Order A1C and refer to PCP for follow up   []  If elevated blood sugars > 180 - refer to PCP for follow up    []  Blood sugar controlled - A1C under 8 and last check was < 6 months      Cholesterol Plan   Lab Results   Component Value Date    LDLCHOLESTEROL 45 02/17/2022      []  If LDL > 100 and last result >3 months ago - order Fasting lipids and refer to PCP for follow up   []  If LDL < 100 and over 1 year ago - Order Fasting lipids and refer to PCP for follow up   [] LDL is controlled.   LDL < 100 and checked within the last year     Blood Pressure  BP Readings from Last 3 Encounters:   03/01/22 118/64   02/01/22 137/64   05/25/21 (!) 124/53      []  If SBP >140 mmhg - refer to PCP for follow up   []  If DBP > 90 mmhg - refer to PCP for follow up   [] BP is controlled <140/90     Order labs as PCP ordered.   (ie: Lipids, A1C, CMP)

## 2022-03-23 NOTE — TELEPHONE ENCOUNTER
prilosec pending for refill     Health Maintenance   Topic Date Due    Shingles Vaccine (1 of 2) Never done    DEXA (modify frequency per FRAX score)  Never done    Diabetic retinal exam  01/01/2014    Hepatitis A vaccine (2 of 2 - Risk 2-dose series) 02/17/2021    COVID-19 Vaccine (2 - Moderna 3-dose series) 05/13/2021    Pneumococcal 65+ years Vaccine (1 of 1 - PPSV23) 05/14/2021    Diabetic foot exam  08/17/2021    Annual Wellness Visit (AWV)  03/02/2022    Breast cancer screen  08/20/2022    A1C test (Diabetic or Prediabetic)  02/01/2023    Depression Monitoring  02/01/2023    Diabetic microalbuminuria test  02/17/2023    Lipid screen  02/17/2023    TSH testing  02/17/2023    Potassium monitoring  02/17/2023    Creatinine monitoring  02/17/2023    DTaP/Tdap/Td vaccine (6 - Td or Tdap) 06/27/2024    Colorectal Cancer Screen  08/01/2024    Flu vaccine  Completed    Hepatitis C screen  Completed    HIV screen  Completed    Hib vaccine  Aged Out    Meningococcal (ACWY) vaccine  Aged Out             (applicable per patient's age: Cancer Screenings, Depression Screening, Fall Risk Screening, Immunizations)    Hemoglobin A1C (%)   Date Value   02/01/2022 6.7   02/04/2021 7.5   08/17/2020 9.0     Microalb/Crt.  Ratio (mcg/mg creat)   Date Value   02/17/2022 39 (H)     LDL Cholesterol (mg/dL)   Date Value   02/17/2022 45     AST (U/L)   Date Value   07/29/2020 27     ALT (U/L)   Date Value   07/29/2020 21     BUN (mg/dL)   Date Value   02/17/2022 7 (L)      (goal A1C is < 7)   (goal LDL is <100) need 30-50% reduction from baseline     BP Readings from Last 3 Encounters:   03/01/22 118/64   02/01/22 137/64   05/25/21 (!) 124/53    (goal /80)      All Future Testing planned in [facility]:  Lab Frequency Next Occurrence   DEXA Bone Density Axial Skeleton Once 02/01/2023   EMG Once 03/01/2022       Next Visit Date:  Future Appointments   Date Time Provider Osmar Hutchinson   3/28/2022 12:45 PM Brett Carey DPM [facility]   3/29/2022  3:10 PM Nasir Jaime MD [facility]   4/27/2022  2:00 PM [facility] EMG  [facility] EMG [facility]   4/27/2022  2:00 PM Joselito Boothe MD [address]            Patient Active Problem List:     Anxiety     GERD (gastroesophageal reflux disease)     OA (osteoarthritis)     Neuropathy     Right knee DJD     Hypothyroidism     S/P left total knee arthroplasty     Ischemic heart disease     Essential hypertension     NSTEMI (non-ST elevated myocardial infarction) ([facility] [address]     Other cirrhosis of liver (HCC)     Elevated lipase     Depression     Type 2 diabetes mellitus, with long-term current use of insulin (HCC)     SHERITA (obstructive sleep apnea)     Morbid obesity with BMI of 45.0-49.9, adult ([address]     PVD (peripheral vascular disease) ([facility])     Numbness and tingling of both feet     Long term (current) use of antithrombotics/antiplatelets     Painful diabetic neuropathy ([address]

## 2022-03-28 ENCOUNTER — OFFICE VISIT (OUTPATIENT)
Dept: PODIATRY | Age: 67
End: 2022-03-28
Payer: MEDICARE

## 2022-03-28 VITALS
HEIGHT: 59 IN | HEART RATE: 92 BPM | WEIGHT: 256 LBS | SYSTOLIC BLOOD PRESSURE: 138 MMHG | BODY MASS INDEX: 51.61 KG/M2 | DIASTOLIC BLOOD PRESSURE: 80 MMHG

## 2022-03-28 DIAGNOSIS — M79.609 PAIN DUE TO ONYCHOMYCOSIS OF NAIL: Primary | ICD-10-CM

## 2022-03-28 DIAGNOSIS — E11.42 DIABETIC POLYNEUROPATHY ASSOCIATED WITH TYPE 2 DIABETES MELLITUS (HCC): ICD-10-CM

## 2022-03-28 DIAGNOSIS — I73.9 PVD (PERIPHERAL VASCULAR DISEASE) (HCC): ICD-10-CM

## 2022-03-28 DIAGNOSIS — B35.1 PAIN DUE TO ONYCHOMYCOSIS OF NAIL: Primary | ICD-10-CM

## 2022-03-28 PROCEDURE — 11721 DEBRIDE NAIL 6 OR MORE: CPT | Performed by: PODIATRIST

## 2022-03-28 PROCEDURE — 99203 OFFICE O/P NEW LOW 30 MIN: CPT | Performed by: PODIATRIST

## 2022-03-28 PROCEDURE — 3044F HG A1C LEVEL LT 7.0%: CPT | Performed by: PODIATRIST

## 2022-03-28 NOTE — PROGRESS NOTES
[address],  [address] [place]  Tel: [phone]   Fax: [phone]    Subjective     CC: Diabetic foot exam and painful and elongated toe nails    HPI:  Madhu Merino is a 73y.o. year old female who presents to clinic today for diabetic foot examination and also complaining of painful and elongated toenails. The patient is a diabetic, and they're last HgbA1c was 6.7% in (2/1/22). The patient states their digits are painful when the toenails are elongated, causing rubbing in shoe gear. The patient admits occasional tingling and numbness in the lower extremities. Patient denies any rest pain or claudication symptoms. The patient denies any history of acute trauma. The patient denies any other pedal complaints. Primary care physician is Flynn Marx MD.    ROS:    Constitutional: Denies nausea, vomiting, fever, chills. Neurologic: admits numbness, tingling, and burning in the feet. Vascular: Denies symptoms of lower extremity claudication. Skin: Denies open wounds. Otherwise negative except as noted in the HPI.      PMH:  Past Medical History:   Diagnosis Date    Anxiety     Borderline hypertension     Depression 7/28/2020    GERD (gastroesophageal reflux disease)     Heart attack ([facility] [address] 07/18/2020    Hypothyroidism     Neuropathy     Obesity     morbid    Osteoarthritis     Other cirrhosis of liver ([facility] [address] 7/27/2020    Sleep apnea     possible    Type II or unspecified type diabetes mellitus without mention of complication, not stated as uncontrolled        Surgical History:   Past Surgical History:   Procedure Laterality Date    APPENDECTOMY      COLONOSCOPY      DILATION AND CURETTAGE OF UTERUS      HIATAL HERNIA REPAIR      HYSTERECTOMY      THROAT SURGERY      POLYPS REMOVED FROM VOCAL CORD    TOTAL KNEE ARTHROPLASTY Right 01/06/2015    TOTAL KNEE ARTHROPLASTY Left 5/26/15    UPPER GASTROINTESTINAL ENDOSCOPY         Social History:  Social History     Tobacco Use    Smoking status: Never Smoker    Smokeless tobacco: Never Used   Vaping Use    Vaping Use: Never used   Substance Use Topics    Alcohol use: Yes     Comment: once a month    Drug use: No       Medications:  Prior to Admission medications    Medication Sig Start Date End Date Taking? Authorizing Provider   glimepiride (AMARYL) 4 MG tablet TAKE 1 TABLET BY MOUTH TWICE DAILY *EMERGENCY REFILL* 3/14/22  Yes Kaia Hirsch MD   citalopram (CELEXA) 40 MG tablet TAKE 1 TABLET BY MOUTH ONCE DAILY *EMERGENCY REFILL* 3/14/22  Yes Kaia Hirsch MD   busPIRone (BUSPAR) 10 MG tablet TAKE ONE (1) TABLET BY MOUTH TWICE DAILY 3/11/22  Yes Kaia Hirsch MD   carvedilol (COREG) 6.25 MG tablet TAKE 1 TABLET BY MOUTH TWICE DAILY WITH MEALS *EMERGENCY REFILL* 3/11/22  Yes Kali Manzo MD   insulin glargine (BASAGLAR KWIKPEN) 100 UNIT/ML injection pen Inject 65 units subcutaneously once daily.  3/1/22  Yes Kaia Hirsch MD   omeprazole (PRILOSEC) 20 MG delayed release capsule Take 1 capsule by mouth Daily 2/16/22  Yes Kaia Hirsch MD   aspirin (ASPIRIN LOW DOSE) 81 MG EC tablet TAKE 1 TABLET BY MOUTH ONCE DAILY 2/4/22  Yes Kaia Hirsch MD   levothyroxine (SYNTHROID) 50 MCG tablet TAKE 1 TABLET BY MOUTH EVERY DAY 2/4/22  Yes Kaia Hirsch MD   lisinopril (PRINIVIL;ZESTRIL) 10 MG tablet TAKE 1 TABLET BY MOUTH EVERY DAY 12/10/21  Yes Kaia Hirsch MD   JARDIANCE 10 MG tablet TAKE 1 TABLET BY MOUTH EVERY DAY 12/10/21  Yes Kaia Hirsch MD   atorvastatin (LIPITOR) 40 MG tablet Take 1 tablet by mouth nightly 12/10/21  Yes Kaia Hirsch MD   miconazole (MICOTIN) 2 % cream APPLY TO AFFECTED AREA TWICE DAILY 11/12/21  Yes Kaia Hirsch MD   isosorbide mononitrate (IMDUR) 60 MG extended release tablet TAKE 1 TABLET BY MOUTH ONCE DAILY 11/12/21  Yes Kaia Hirsch MD   amLODIPine (NORVASC) 5 MG tablet TAKE 1 TABLET BY MOUTH EVERY DAY mouth daily DAILY   Yes Historical Provider, MD   Alcohol Swabs (ALCOHOL PREP PADS) by Other route 2 times daily     Yes Historical Provider, MD   gabapentin (NEURONTIN) 400 MG capsule TAKE 1 CAPSULE BY MOUTH THREE TIMES DAILY 1/6/22 3/1/22  Ezio Da Silva MD       Objective     Vitals:    03/28/22 1248   BP: 138/80   Pulse: 92       Lab Results   Component Value Date    LABA1C 6.7 02/01/2022       Physical Exam:  General:  Alert and oriented x3. In no acute distress. Lower Extremity Physical Exam:    Vascular: DP and PT pulses are non palpable, signal detected through doppler Bilateral. CFT <5 seconds to all digits, Bilateral.  Moderate +2 non-pitting edema, Bilateral.  Hair growth is absent to the level of the digits, Bilateral.     Neuro: Saph/sural/SP/DP/plantar sensation diminished to light touch. Protective sensation is intact to 5/10 sites as tested with a 5.07g SWMF, Bilateral.     Musculoskeletal: EHL/FHL/GS/TA gross motor intact. TTP to distal digits b/l. Gross deformity is absent, Bilateral.     Dermatologic: No open lesions, Bilateral.  Interdigital maceration absent, Bilateral.  Nails 1-10 are thickened with subungual debris noted, in addition to being elongated and dystrophic. Nail 1 on the left was noted to be loosened and incurvated. Varicosities noted to bilateral lower extremities.      Class A Findings (Q7) - One Class A finding  [] Non traumatic amputation of foot or integral skeletal portion thereof  Class B Findings (Q8) - Two Class B findings  [x]Absent posterior tibial pulse   [x]Absent dorsalis pedis pulse   []Advanced trophic changes; three of the following are required:   [x]hair growth (decrease or absence)   [x]nail changes (thickening)   []pigmentary changes (discoloration)   []skin texture (thin, shiny)   []skin color (rubor or redness)  Class C Findings (Q9) - One Class B and two Class C findings  []Claudication   []Temperature changes   []Edema   [x]Paresthesia [x]Burning    Q Modifier Met: Q8: Two Class B findings    Assessment   Criselda Medina is a 73 y.o. female with     Diagnosis Orders   1. Pain due to onychomycosis of nail     2. Diabetic polyneuropathy associated with type 2 diabetes mellitus ([facility]     3. PVD (peripheral vascular disease) ([facility]          Plan   · Patient examined and evaluated. · Diagnosis and treatment options discussed in detail. · Nails 1-10 debrided sharply with sterile nail nippers without incident. · Patient was educated on the utmost importance of tight glycemic control. · Patient was advised to continue daily foot checks, in addition to not ambulating barefoot. · Patient to RTC in 3 months. · Please, call the office with any questions or concerns     No orders of the defined types were placed in this encounter. No orders of the defined types were placed in this encounter.       Brook Buenrostro DPM   Podiatric Medicine & Surgery   3/28/2022 at 5:18 PM

## 2022-03-29 ENCOUNTER — OFFICE VISIT (OUTPATIENT)
Dept: PAIN MANAGEMENT | Age: 67
End: 2022-03-29
Payer: MEDICARE

## 2022-03-29 VITALS
SYSTOLIC BLOOD PRESSURE: 131 MMHG | DIASTOLIC BLOOD PRESSURE: 70 MMHG | HEIGHT: 59 IN | HEART RATE: 72 BPM | WEIGHT: 268.4 LBS | BODY MASS INDEX: 54.11 KG/M2 | OXYGEN SATURATION: 99 %

## 2022-03-29 DIAGNOSIS — M54.51 VERTEBROGENIC LOW BACK PAIN: ICD-10-CM

## 2022-03-29 DIAGNOSIS — E66.01 MORBID OBESITY WITH BMI OF 45.0-49.9, ADULT (HCC): ICD-10-CM

## 2022-03-29 DIAGNOSIS — R93.7 ABNORMAL MRI, LUMBAR SPINE: ICD-10-CM

## 2022-03-29 DIAGNOSIS — E11.40 PAINFUL DIABETIC NEUROPATHY (HCC): ICD-10-CM

## 2022-03-29 DIAGNOSIS — Z79.02 LONG TERM (CURRENT) USE OF ANTITHROMBOTICS/ANTIPLATELETS: ICD-10-CM

## 2022-03-29 DIAGNOSIS — M48.062 SPINAL STENOSIS OF LUMBAR REGION WITH NEUROGENIC CLAUDICATION: Primary | ICD-10-CM

## 2022-03-29 DIAGNOSIS — I73.9 PVD (PERIPHERAL VASCULAR DISEASE) (HCC): ICD-10-CM

## 2022-03-29 PROCEDURE — 99214 OFFICE O/P EST MOD 30 MIN: CPT | Performed by: ANESTHESIOLOGY

## 2022-03-29 PROCEDURE — 3044F HG A1C LEVEL LT 7.0%: CPT | Performed by: ANESTHESIOLOGY

## 2022-03-29 ASSESSMENT — ENCOUNTER SYMPTOMS
BACK PAIN: 1
SHORTNESS OF BREATH: 0
SORE THROAT: 0
COUGH: 0
WHEEZING: 0

## 2022-03-29 NOTE — PROGRESS NOTES
The patient is a 73 y. o. Non- / non  female.     Chief Complaint   Patient presents with    1 Month Follow-Up    Pain        HPI    Chief complaint: legs to hand and feet, MRI results, EMG results  Pain level:7  Pain Location and Duration: constant  Aggravating: walking or any movement  Alleviating: nothing  73-year-old woman  Chronic back pain  Located in the lumbar area  Describes it as constant aching throbbing sensation  Pain aggravated with routine activity  Reports radiation of pain down both legs with standing and walking  Leg pain improves with sitting and rest  No loss of bladder or bowel control  No previous lumbar spine surgical history  No previous lumbar spine injection history    Symptoms progressively worsened over time  She failed conservative measures with therapy and NSAIDs  Recently completed diagnostic work-up with MRI and EMG  Today here for review of the result    Associated symptoms include numbness and tingling involving both lower extremities from knee down to the toes  Medical history significant for diabetes  Past Medical History:   Diagnosis Date    Anxiety     Borderline hypertension     Depression 7/28/2020    GERD (gastroesophageal reflux disease)     Heart attack ([facility] [address] 07/18/2020    Hypothyroidism     Neuropathy     Obesity     morbid    Osteoarthritis     Other cirrhosis of liver ([facility] [address] 7/27/2020    Sleep apnea     possible    Type II or unspecified type diabetes mellitus without mention of complication, not stated as uncontrolled     Vertebrogenic low back pain 3/29/2022        Past Surgical History:   Procedure Laterality Date    APPENDECTOMY      COLONOSCOPY      DILATION AND CURETTAGE OF UTERUS      HIATAL HERNIA REPAIR      HYSTERECTOMY      THROAT SURGERY      POLYPS REMOVED FROM VOCAL CORD    TOTAL KNEE ARTHROPLASTY Right 01/06/2015    TOTAL KNEE ARTHROPLASTY Left 5/26/15    UPPER GASTROINTESTINAL ENDOSCOPY         Social History Socioeconomic History    Marital status:      Spouse name: Joan Hughes Number of children: 0    Years of education: None    Highest education level: None   Occupational History    Occupation: Retired      Employer: [employer]   Tobacco Use    Smoking status: Never Smoker    Smokeless tobacco: Never Used   Vaping Use    Vaping Use: Never used   Substance and Sexual Activity    Alcohol use: Yes     Comment: once a month    Drug use: No    Sexual activity: Yes     Partners: Male   Other Topics Concern    None   Social History Narrative    None     Social Determinants of Health     Financial Resource Strain:     Difficulty of Paying Living Expenses: Not on file   Food Insecurity:     Worried About Running Out of Food in the Last Year: Not on file    Kei of Food in the Last Year: Not on file   Transportation Needs:     Lack of Transportation (Medical): Not on file    Lack of Transportation (Non-Medical):  Not on file   Physical Activity:     Days of Exercise per Week: Not on file    Minutes of Exercise per Session: Not on file   Stress:     Feeling of Stress : Not on file   Social Connections:     Frequency of Communication with Friends and Family: Not on file    Frequency of Social Gatherings with Friends and Family: Not on file    Attends Anabaptist Services: Not on file    Active Member of [address] [place] [address] [employer] or Organizations: Not on file    Attends Club or Organization Meetings: Not on file    Marital Status: Not on file   Intimate Partner Violence:     Fear of Current or Ex-Partner: Not on file    Emotionally Abused: Not on file    Physically Abused: Not on file    Sexually Abused: Not on file   Housing Stability:     Unable to Pay for Housing in the Last Year: Not on file    Number of [place] in the Last Year: Not on file    Unstable Housing in the Last Year: Not on file       Family History   Problem Relation Age of Onset    Heart Disease Mother  Arthritis Mother     Heart Disease Father     Arthritis Father     Cancer Father         \"oral\"    Diabetes Sister         gestational       Allergies   Allergen Reactions    Shellfish-Derived Products Nausea Only    Morphine Other (See Comments)     HALLUCINATIONS      Tape [Adhesive Tape] Rash       Vitals:    03/29/22 1052   BP: 131/70   Pulse: 72   SpO2: 99%       Current Outpatient Medications   Medication Sig Dispense Refill    glimepiride (AMARYL) 4 MG tablet TAKE 1 TABLET BY MOUTH TWICE DAILY *EMERGENCY REFILL* 60 tablet 5    citalopram (CELEXA) 40 MG tablet TAKE 1 TABLET BY MOUTH ONCE DAILY *EMERGENCY REFILL* 30 tablet 5    busPIRone (BUSPAR) 10 MG tablet TAKE ONE (1) TABLET BY MOUTH TWICE DAILY 60 tablet 10    carvedilol (COREG) 6.25 MG tablet TAKE 1 TABLET BY MOUTH TWICE DAILY WITH MEALS *EMERGENCY REFILL* 60 tablet 0    insulin glargine (BASAGLAR KWIKPEN) 100 UNIT/ML injection pen Inject 65 units subcutaneously once daily.  15 pen 3    omeprazole (PRILOSEC) 20 MG delayed release capsule Take 1 capsule by mouth Daily 30 capsule 3    aspirin (ASPIRIN LOW DOSE) 81 MG EC tablet TAKE 1 TABLET BY MOUTH ONCE DAILY 30 tablet 10    levothyroxine (SYNTHROID) 50 MCG tablet TAKE 1 TABLET BY MOUTH EVERY DAY 30 tablet 5    gabapentin (NEURONTIN) 400 MG capsule TAKE 1 CAPSULE BY MOUTH THREE TIMES DAILY 90 capsule 2    lisinopril (PRINIVIL;ZESTRIL) 10 MG tablet TAKE 1 TABLET BY MOUTH EVERY DAY 30 tablet 5    JARDIANCE 10 MG tablet TAKE 1 TABLET BY MOUTH EVERY DAY 30 tablet 5    atorvastatin (LIPITOR) 40 MG tablet Take 1 tablet by mouth nightly 90 tablet 1    miconazole (MICOTIN) 2 % cream APPLY TO AFFECTED AREA TWICE DAILY 1 each 1    isosorbide mononitrate (IMDUR) 60 MG extended release tablet TAKE 1 TABLET BY MOUTH ONCE DAILY 90 tablet 1    amLODIPine (NORVASC) 5 MG tablet TAKE 1 TABLET BY MOUTH EVERY DAY 90 tablet 1    clopidogrel (PLAVIX) 75 MG tablet TAKE 1 TABLET BY MOUTH ONCE DAILY 90 tablet 1    acetaminophen (TYLENOL) 500 MG tablet TAKE TWO TABLETS BY MOUTH THREE TIMES A DAY AS NEEDED FOR PAIN 180 tablet 0    ibuprofen (ADVIL;MOTRIN) 800 MG tablet Take 1 tablet by mouth 4 times daily as needed for Pain 120 tablet 0    lidocaine (XYLOCAINE) 2 % jelly Apply topically with dressing changes every 3 days 1 Tube 1    melatonin 10 MG CAPS capsule TAKE 1 CAPSULE BY MOUTH NIGHTLY AS NEEDED FOR SLEEP 30 capsule 2    sucralfate (CARAFATE) 1 GM tablet TAKE 1 TABLET BY MOUTH EVERY DAY 30 tablet 9    blood glucose monitor strips Test before meals and at bedtime. DX: DM, on insulin 100 strip 11    miconazole (ZEASORB-AF) 2 % powder Apply topically 2 times daily. 45 g 1    blood glucose monitor kit and supplies Dispense sufficient amount for indicated testing frequency plus additional to accommodate PRN testing needs. Dispense all needed supplies to include: monitor, strips, lancing device, lancets, control solutions, alcohol swabs. 1 kit 0    magnesium hydroxide (MILK OF MAGNESIA) 400 MG/5ML suspension Take 30 mLs by mouth daily as needed for Constipation 900 mL 0    nitroGLYCERIN (NITROSTAT) 0.4 MG SL tablet up to max of 3 total doses. If no relief after 1 dose, call 911. 25 tablet 3    Handicap Placard MISC Is a permanent condition. DX: M19.90 1 each 0    Insulin Pen Needle (B-D UF III MINI PEN NEEDLES) 31G X 5 MM MISC USE 1 PEN NEEDLE DAILY 50 each 1    [employer] Lancets Thin MISC Test before meals and at bedtime. DX: DM, on insulin 100 each 11    MULTIPLE VITAMIN PO Take 2 tablets by mouth daily DAILY      Alcohol Swabs (ALCOHOL PREP PADS) by Other route 2 times daily         No current facility-administered medications for this visit. Review of Systems   Constitutional: Negative for chills and fever. HENT: Positive for congestion. Negative for sore throat. Respiratory: Negative for cough, shortness of breath and wheezing.     Musculoskeletal: Positive for back pain and gait problem. Objective:  General Appearance:  Well-appearing and in no acute distress. Vital signs: (most recent): Height 4' 11\" (1.499 m), weight 268 lb 6.4 oz (121.7 kg). No fever. Output: Producing urine and producing stool. HEENT: (No visible masses in neck  Range of motion appears normal on cervical spine  External ears appears normal)    Lungs:  Normal effort. She is not in respiratory distress. Neurological: Patient is alert and oriented to person, place and time.  (Able to follow command    Psych  Mood is good  Affect is normal). Skin:  No rash or cyanosis. Assessment & Plan     - Brachial Pressure:Right: 146. Left:155.    - IAM:Right: 1.22. Left: 1.06. EXAMINATION: MRI OF THE LUMBAR SPINE WITHOUT CONTRAST, 3/9/2022 4:22 pm    Impression 1. Multilevel degenerative change with severe spinal canal stenosis at L3-4 and moderate to severe spinal canal stenosis at L2-3. There is mild spinal canal narrowing at L1-2 and L4-5. 2. Multilevel lateral recess and neural foraminal stenosis as above. 1. Spinal stenosis of lumbar region with neurogenic claudication    2. Long term (current) use of antithrombotics/antiplatelets    3. Morbid obesity with BMI of 45.0-49.9, adult ([address]    4. Painful diabetic neuropathy ([address]    5. PVD (peripheral vascular disease) ([address]    6. Abnormal MRI, lumbar spine    7.  Vertebrogenic low back pain        MRI lumbar spine  Report is reviewed  Images reviewed independently  Findings discussed in detail with patient  Severe spinal stenosis at L2-3 and L3 3-4  Multilevel Modic changes involving L3-L4-L5 and S1 vertebrae        Back pain that goes down both legs aggravated the standing and walking is likely related to severe spinal stenosis  I will recommend for lumbar epidural steroid injection  If that do not provide good long-term relief then consider for either open surgical decompression or a minimally invasive lumbar decompression using mild device    With regard to her axial back pain related to Modic changes will consider for basivertebral nerve ablation in future using intraseptal procedure    Orders Placed This Encounter   Procedures    NV NJX DX/THER SBST INTRLMNR LMBR/SAC W/IMG GDN      No orders of the defined types were placed in this encounter.            Electronically signed by Yehuda Gonzalez MD on 3/30/2022 at 4:33 PM

## 2022-04-05 RX ORDER — OMEPRAZOLE 20 MG/1
CAPSULE, DELAYED RELEASE ORAL
Qty: 30 CAPSULE | Refills: 1 | OUTPATIENT
Start: 2022-04-05

## 2022-04-05 NOTE — TELEPHONE ENCOUNTER
Left message for patient informing her that the request for the medication refill will be denied due to PCP not being comfortable with filling it at this time due to patient's blood thinner. Patient was instructed to call office and schedule an appointment with any resident to discuss further treatment.

## 2022-04-13 ENCOUNTER — HOSPITAL ENCOUNTER (OUTPATIENT)
Dept: PAIN MANAGEMENT | Facility: CLINIC | Age: 67
Discharge: HOME | End: 2022-04-13
Payer: MEDICARE

## 2022-04-13 VITALS
TEMPERATURE: 97.9 F | HEART RATE: 71 BPM | SYSTOLIC BLOOD PRESSURE: 164 MMHG | OXYGEN SATURATION: 96 % | DIASTOLIC BLOOD PRESSURE: 71 MMHG | RESPIRATION RATE: 16 BRPM

## 2022-04-13 DIAGNOSIS — R52 PAIN MANAGEMENT: ICD-10-CM

## 2022-04-13 LAB — GLUCOSE BLD-MCNC: 83 MG/DL (ref 65–105)

## 2022-04-13 PROCEDURE — 82947 ASSAY GLUCOSE BLOOD QUANT: CPT

## 2022-04-13 ASSESSMENT — PAIN - FUNCTIONAL ASSESSMENT
PAIN_FUNCTIONAL_ASSESSMENT: PREVENTS OR INTERFERES SOME ACTIVE ACTIVITIES AND ADLS
PAIN_FUNCTIONAL_ASSESSMENT: 0-10

## 2022-04-13 ASSESSMENT — PAIN DESCRIPTION - DESCRIPTORS: DESCRIPTORS: ACHING;SHARP

## 2022-04-18 ENCOUNTER — HOSPITAL ENCOUNTER (OUTPATIENT)
Dept: MAMMOGRAPHY | Age: 67
Discharge: HOME | End: 2022-04-20
Payer: MEDICARE

## 2022-04-18 DIAGNOSIS — Z79.4 TYPE 2 DIABETES MELLITUS WITH OTHER CIRCULATORY COMPLICATION, WITH LONG-TERM CURRENT USE OF INSULIN (HCC): ICD-10-CM

## 2022-04-18 DIAGNOSIS — Z78.0 POST-MENOPAUSAL: ICD-10-CM

## 2022-04-18 DIAGNOSIS — E11.59 TYPE 2 DIABETES MELLITUS WITH OTHER CIRCULATORY COMPLICATION, WITH LONG-TERM CURRENT USE OF INSULIN (HCC): ICD-10-CM

## 2022-04-18 PROCEDURE — 77080 DXA BONE DENSITY AXIAL: CPT

## 2022-04-19 RX ORDER — CARVEDILOL 6.25 MG/1
TABLET ORAL
Qty: 60 TABLET | Refills: 10 | OUTPATIENT
Start: 2022-04-19

## 2022-04-19 RX ORDER — OMEPRAZOLE 20 MG/1
CAPSULE, DELAYED RELEASE ORAL
Qty: 30 CAPSULE | Refills: 10 | OUTPATIENT
Start: 2022-04-19

## 2022-04-19 NOTE — TELEPHONE ENCOUNTER
Last visit: 3/1/2022  Last Med refill: 2/2022  Does patient have enough medication for 72 hours: No:     Next Visit Date:  Future Appointments   Date Time Provider Osmar Hutchinson   4/25/2022  4:00 PM HAO Rivera CNP [facility]   4/27/2022  2:00 PM [facility] EMG  [facility]   4/27/2022  2:00 PM Joselito Boothe MD [facility] Maintenance   Topic Date Due    Shingles Vaccine (1 of 2) Never done    Diabetic retinal exam  01/01/2014    Pneumococcal 65+ years Vaccine (2 - PCV) 01/07/2016    Hepatitis A vaccine (2 of 2 - Risk 2-dose series) 02/17/2021    COVID-19 Vaccine (2 - Moderna 3-dose series) 05/13/2021    Diabetic foot exam  08/17/2021    Annual Wellness Visit (AWV)  Never done    Breast cancer screen  08/20/2022    A1C test (Diabetic or Prediabetic)  02/01/2023    Depression Monitoring  02/01/2023    Diabetic microalbuminuria test  02/17/2023    Lipid screen  02/17/2023    TSH testing  02/17/2023    Potassium monitoring  02/17/2023    Creatinine monitoring  02/17/2023    DTaP/Tdap/Td vaccine (6 - Td or Tdap) 06/27/2024    Colorectal Cancer Screen  08/01/2024    DEXA (modify frequency per FRAX score)  Completed    Flu vaccine  Completed    Hepatitis C screen  Completed    HIV screen  Completed    Hib vaccine  Aged Out    Meningococcal (ACWY) vaccine  Aged Out       Hemoglobin A1C (%)   Date Value   02/01/2022 6.7   02/04/2021 7.5   08/17/2020 9.0             ( goal A1C is < 7)   Microalb/Crt.  Ratio (mcg/mg creat)   Date Value   02/17/2022 39 (H)     LDL Cholesterol (mg/dL)   Date Value   02/17/2022 45   07/28/2020 60       (goal LDL is <100)   AST (U/L)   Date Value   07/29/2020 27     ALT (U/L)   Date Value   07/29/2020 21     BUN (mg/dL)   Date Value   02/17/2022 7 (L)     BP Readings from Last 3 Encounters:   04/13/22 (!) 164/71   03/29/22 131/70   03/28/22 138/80          (goal 120/80)    All Future Testing planned in [facility]  Lab Frequency Next Occurrence   EMG Once 03/01/2022               Patient Active Problem List:     Anxiety     GERD (gastroesophageal reflux disease)     OA (osteoarthritis)     Neuropathy     Right knee DJD     Hypothyroidism     S/P left total knee arthroplasty     Ischemic heart disease     Essential hypertension     NSTEMI (non-ST elevated myocardial infarction) ([facility] [address]     Other cirrhosis of liver (HCC)     Elevated lipase     Depression     Type 2 diabetes mellitus, with long-term current use of insulin ([facility])     SHERITA (obstructive sleep apnea)     Morbid obesity with BMI of 45.0-49.9, adult ([facility] [address]     PVD (peripheral vascular disease) ([facility])     Numbness and tingling of both feet     Long term (current) use of antithrombotics/antiplatelets     Painful diabetic neuropathy ([facility] [address]     Vertebrogenic low back pain     Spinal stenosis of lumbar region with neurogenic claudication

## 2022-04-20 DIAGNOSIS — Z79.4 TYPE 2 DIABETES MELLITUS WITH OTHER CIRCULATORY COMPLICATION, WITH LONG-TERM CURRENT USE OF INSULIN (HCC): ICD-10-CM

## 2022-04-20 DIAGNOSIS — E11.59 TYPE 2 DIABETES MELLITUS WITH OTHER CIRCULATORY COMPLICATION, WITH LONG-TERM CURRENT USE OF INSULIN (HCC): ICD-10-CM

## 2022-04-20 RX ORDER — SUCRALFATE 1 G/1
TABLET ORAL
Qty: 30 TABLET | Refills: 10 | Status: SHIPPED | OUTPATIENT
Start: 2022-04-20

## 2022-04-20 RX ORDER — CARVEDILOL 6.25 MG/1
TABLET ORAL
Qty: 60 TABLET | Refills: 0 | Status: SHIPPED | OUTPATIENT
Start: 2022-04-20 | End: 2022-04-22 | Stop reason: SDUPTHER

## 2022-04-20 RX ORDER — OMEPRAZOLE 20 MG/1
20 CAPSULE, DELAYED RELEASE ORAL DAILY
Qty: 30 CAPSULE | Refills: 3 | OUTPATIENT
Start: 2022-04-20

## 2022-04-20 RX ORDER — GABAPENTIN 400 MG/1
CAPSULE ORAL
Qty: 90 CAPSULE | Refills: 10 | Status: SHIPPED | OUTPATIENT
Start: 2022-04-20 | End: 2022-04-20 | Stop reason: SDUPTHER

## 2022-04-20 NOTE — TELEPHONE ENCOUNTER
Last visit: 3/1/2022  Last Med refill:   Does patient have enough medication for 72 hours: Yes    Next Visit Date:  Future Appointments   Date Time Provider Osmar Hutchinson   4/25/2022  4:00 PM HAO Siddiqi CNP [facility]   4/27/2022  2:00 PM [facility] EMG  [facility]   4/27/2022  2:00 PM [facility]   Topic Date Due    Shingles Vaccine (1 of 2) Never done    Diabetic retinal exam  01/01/2014    Pneumococcal 65+ years Vaccine (2 - PCV) 01/07/2016    Hepatitis A vaccine (2 of 2 - Risk 2-dose series) 02/17/2021    COVID-19 Vaccine (2 - Moderna 3-dose series) 05/13/2021    Diabetic foot exam  08/17/2021    Annual Wellness Visit (AWV)  Never done    Breast cancer screen  08/20/2022    A1C test (Diabetic or Prediabetic)  02/01/2023    Depression Monitoring  02/01/2023    Diabetic microalbuminuria test  02/17/2023    Lipids  02/17/2023    TSH  02/17/2023    Potassium  02/17/2023    Creatinine  02/17/2023    DTaP/Tdap/Td vaccine (6 - Td or Tdap) 06/27/2024    Colorectal Cancer Screen  08/01/2024    DEXA (modify frequency per FRAX score)  Completed    Flu vaccine  Completed    Hepatitis C screen  Completed    HIV screen  Completed    Hib vaccine  Aged Out    Meningococcal (ACWY) vaccine  Aged Out       Hemoglobin A1C (%)   Date Value   02/01/2022 6.7   02/04/2021 7.5   08/17/2020 9.0             ( goal A1C is < 7)   Microalb/Crt.  Ratio (mcg/mg creat)   Date Value   02/17/2022 39 (H)     LDL Cholesterol (mg/dL)   Date Value   02/17/2022 45   07/28/2020 60       (goal LDL is <100)   AST (U/L)   Date Value   07/29/2020 27     ALT (U/L)   Date Value   07/29/2020 21     BUN (mg/dL)   Date Value   02/17/2022 7 (L)     BP Readings from Last 3 Encounters:   04/13/22 (!) 164/71   03/29/22 131/70   03/28/22 138/80          (goal 120/80)    All Future Testing planned in [facility]  Lab Frequency Next Occurrence   EMG Once 03/01/2022 Patient Active Problem List:     Anxiety     GERD (gastroesophageal reflux disease)     OA (osteoarthritis)     Neuropathy     Right knee DJD     Hypothyroidism     S/P left total knee arthroplasty     Ischemic heart disease     Essential hypertension     NSTEMI (non-ST elevated myocardial infarction) ([facility] [address]     Other cirrhosis of liver (HCC)     Elevated lipase     Depression     Type 2 diabetes mellitus, with long-term current use of insulin (HCC)     SHERITA (obstructive sleep apnea)     Morbid obesity with BMI of 45.0-49.9, adult ([facility] [address]     PVD (peripheral vascular disease) ([facility])     Numbness and tingling of both feet     Long term (current) use of antithrombotics/antiplatelets     Painful diabetic neuropathy ([facility] [address]     Vertebrogenic low back pain     Spinal stenosis of lumbar region with neurogenic claudication

## 2022-04-22 RX ORDER — CARVEDILOL 6.25 MG/1
TABLET ORAL
Qty: 60 TABLET | Refills: 3 | Status: SHIPPED | OUTPATIENT
Start: 2022-04-22 | End: 2022-09-29 | Stop reason: SDUPTHER

## 2022-04-22 RX ORDER — GABAPENTIN 400 MG/1
CAPSULE ORAL
Qty: 90 CAPSULE | Refills: 0 | Status: SHIPPED | OUTPATIENT
Start: 2022-04-22 | End: 2022-11-01

## 2022-04-28 ENCOUNTER — TELEPHONE (OUTPATIENT)
Dept: PAIN MANAGEMENT | Age: 67
End: 2022-04-28

## 2022-04-28 ENCOUNTER — TELEPHONE (OUTPATIENT)
Dept: FAMILY MEDICINE CLINIC | Age: 67
End: 2022-04-28

## 2022-04-28 NOTE — TELEPHONE ENCOUNTER
----- Message from Pepe Ochoa sent at 4/28/2022 11:16 AM EDT -----  Subject: Referral Request    QUESTIONS   Reason for referral request? Lumbar Injection in lower spine. Has the physician seen you for this condition before? No   Preferred Specialist (if applicable)? Miya Morton  Do you already have an appointment scheduled? No  Additional Information for Provider? Doctor Arleen Marino sent letter to Doctor   Aneesh Salas & patient wants letter signed so she can get the lumbar   injection scheduled. Once letter is signed & sent back please call   patient. home # [phone]   ---------------------------------------------------------------------------  --------------  JaviBonzerDarg INFO  What is the best way for the office to contact you? OK to leave message on   voicemail  Preferred Call Back Phone Number? [phone]  ---------------------------------------------------------------------------  --------------  SCRIPT ANSWERS  Relationship to Patient?  Self

## 2022-04-28 NOTE — TELEPHONE ENCOUNTER
----- Message from Mahendra Cheatham sent at 4/28/2022 11:16 AM EDT -----  Subject: Referral Request    QUESTIONS   Reason for referral request? Lumbar Injection in lower spine. Has the physician seen you for this condition before? No   Preferred Specialist (if applicable)? Karthik Bains  Do you already have an appointment scheduled? No  Additional Information for Provider? Doctor Ct Chamberlain sent letter to Doctor   Janna Lassiter & patient wants letter signed so she can get the lumbar   injection scheduled. Once letter is signed & sent back please call   patient. home # [phone]   ---------------------------------------------------------------------------  --------------  Ava Grass INFO  What is the best way for the office to contact you? OK to leave message on   voicemail  Preferred Call Back Phone Number? [phone]  ---------------------------------------------------------------------------  --------------  SCRIPT ANSWERS  Relationship to Patient?  Self

## 2022-05-02 NOTE — TELEPHONE ENCOUNTER
I'm not sure what this is about? I'm not seeing a letter anywhere? She probably needs at least a phone appointment with someone to get her needs met. I thought she already saw pain management.   Electronically signed by Jackie De Paz MD on 5/2/2022 at 4:24 PM

## 2022-05-03 DIAGNOSIS — I25.9 ISCHEMIC HEART DISEASE: ICD-10-CM

## 2022-05-03 DIAGNOSIS — I21.4 NSTEMI (NON-ST ELEVATED MYOCARDIAL INFARCTION) (HCC): Primary | ICD-10-CM

## 2022-05-03 DIAGNOSIS — Z79.02 LONG TERM (CURRENT) USE OF ANTITHROMBOTICS/ANTIPLATELETS: ICD-10-CM

## 2022-05-03 NOTE — TELEPHONE ENCOUNTER
Spoke with patient in regards to previously mentioned letter. Letter was printed out and placed in Dr. Gilma Gill. Will speak with Dr. Mai Rivera today when she comes in this afternoon.

## 2022-05-12 RX ORDER — AMLODIPINE BESYLATE 5 MG/1
TABLET ORAL
Qty: 30 TABLET | Refills: 10 | Status: SHIPPED | OUTPATIENT
Start: 2022-05-12

## 2022-05-12 RX ORDER — CLOPIDOGREL BISULFATE 75 MG/1
TABLET ORAL
Qty: 30 TABLET | Refills: 10 | Status: SHIPPED | OUTPATIENT
Start: 2022-05-12 | End: 2022-10-04 | Stop reason: SDUPTHER

## 2022-05-12 RX ORDER — ISOSORBIDE MONONITRATE 60 MG/1
TABLET, EXTENDED RELEASE ORAL
Qty: 30 TABLET | Refills: 10 | Status: SHIPPED | OUTPATIENT
Start: 2022-05-12

## 2022-05-12 NOTE — TELEPHONE ENCOUNTER
Please address the medication refill and close the encounter. If I can be of assistance, please route to the applicable pool. Thank you. Last visit: 3-1-22  Last Med refill: 11/12/2021  Does patient have enough medication for 72 hours: No:     Next Visit Date:  No future appointments. Health Maintenance   Topic Date Due    Annual Wellness Visit (AWV)  Never done    Shingles vaccine (1 of 2) Never done    Diabetic retinal exam  01/01/2014    Pneumococcal 65+ years Vaccine (2 - PCV) 01/07/2016    Hepatitis A vaccine (2 of 2 - Risk 2-dose series) 02/17/2021    COVID-19 Vaccine (2 - Moderna 3-dose series) 05/13/2021    Diabetic foot exam  08/17/2021    Breast cancer screen  08/20/2022    A1C test (Diabetic or Prediabetic)  02/01/2023    Depression Monitoring  02/01/2023    Diabetic microalbuminuria test  02/17/2023    Lipids  02/17/2023    DTaP/Tdap/Td vaccine (6 - Td or Tdap) 06/27/2024    Colorectal Cancer Screen  08/01/2024    DEXA (modify frequency per FRAX score)  Completed    Flu vaccine  Completed    Hepatitis C screen  Completed    HIV screen  Completed    Hib vaccine  Aged Out    Meningococcal (ACWY) vaccine  Aged Out       Hemoglobin A1C (%)   Date Value   02/01/2022 6.7   02/04/2021 7.5   08/17/2020 9.0             ( goal A1C is < 7)   Microalb/Crt.  Ratio (mcg/mg creat)   Date Value   02/17/2022 39 (H)     LDL Cholesterol (mg/dL)   Date Value   02/17/2022 45   07/28/2020 60       (goal LDL is <100)   AST (U/L)   Date Value   07/29/2020 27     ALT (U/L)   Date Value   07/29/2020 21     BUN (mg/dL)   Date Value   02/17/2022 7 (L)     BP Readings from Last 3 Encounters:   04/13/22 (!) 164/71   03/29/22 131/70   03/28/22 138/80          (goal 120/80)    All Future Testing planned in [facility]  Lab Frequency Next Occurrence               Patient Active Problem List:     Anxiety     GERD (gastroesophageal reflux disease)     OA (osteoarthritis)     Neuropathy     Right knee DJD

## 2022-05-13 RX ORDER — OMEPRAZOLE 20 MG/1
CAPSULE, DELAYED RELEASE ORAL
Qty: 30 CAPSULE | Refills: 10 | OUTPATIENT
Start: 2022-05-13

## 2022-05-13 NOTE — TELEPHONE ENCOUNTER
Please address the medication refill and close the encounter. If I can be of assistance, please route to the applicable pool. Thank you. Last visit: 2-1-22  Last Med refill: 2-16-22  Does patient have enough medication for 72 hours: No:     Next Visit Date:  No future appointments. Health Maintenance   Topic Date Due    Annual Wellness Visit (AWV)  Never done    Shingles vaccine (1 of 2) Never done    Diabetic retinal exam  01/01/2014    Pneumococcal 65+ years Vaccine (2 - PCV) 01/07/2016    Hepatitis A vaccine (2 of 2 - Risk 2-dose series) 02/17/2021    COVID-19 Vaccine (2 - Moderna 3-dose series) 05/13/2021    Diabetic foot exam  08/17/2021    Breast cancer screen  08/20/2022    A1C test (Diabetic or Prediabetic)  02/01/2023    Depression Monitoring  02/01/2023    Diabetic microalbuminuria test  02/17/2023    Lipids  02/17/2023    DTaP/Tdap/Td vaccine (6 - Td or Tdap) 06/27/2024    Colorectal Cancer Screen  08/01/2024    DEXA (modify frequency per FRAX score)  Completed    Flu vaccine  Completed    Hepatitis C screen  Completed    HIV screen  Completed    Hib vaccine  Aged Out    Meningococcal (ACWY) vaccine  Aged Out       Hemoglobin A1C (%)   Date Value   02/01/2022 6.7   02/04/2021 7.5   08/17/2020 9.0             ( goal A1C is < 7)   Microalb/Crt.  Ratio (mcg/mg creat)   Date Value   02/17/2022 39 (H)     LDL Cholesterol (mg/dL)   Date Value   02/17/2022 45   07/28/2020 60       (goal LDL is <100)   AST (U/L)   Date Value   07/29/2020 27     ALT (U/L)   Date Value   07/29/2020 21     BUN (mg/dL)   Date Value   02/17/2022 7 (L)     BP Readings from Last 3 Encounters:   04/13/22 (!) 164/71   03/29/22 131/70   03/28/22 138/80          (goal 120/80)    All Future Testing planned in [facility]  Lab Frequency Next Occurrence               Patient Active Problem List:     Anxiety     GERD (gastroesophageal reflux disease)     OA (osteoarthritis)     Neuropathy     Right knee DJD Hypothyroidism     S/P left total knee arthroplasty     Ischemic heart disease     Essential hypertension     NSTEMI (non-ST elevated myocardial infarction) ([facility] [address]     Other cirrhosis of liver (HCC)     Elevated lipase     Depression     Type 2 diabetes mellitus, with long-term current use of insulin (HCC)     SHERITA (obstructive sleep apnea)     Morbid obesity with BMI of 45.0-49.9, adult (HCC)     PVD (peripheral vascular disease) (HCC)     Numbness and tingling of both feet     Long term (current) use of antithrombotics/antiplatelets     Painful diabetic neuropathy (HCC)     Vertebrogenic low back pain     Spinal stenosis of lumbar region with neurogenic claudication

## 2022-06-01 ENCOUNTER — TELEPHONE (OUTPATIENT)
Dept: PAIN MANAGEMENT | Age: 67
End: 2022-06-01

## 2022-06-01 NOTE — TELEPHONE ENCOUNTER
Patient is asking for a return call to schedule her procedure with Dr Libertad Avalos. Please contact at earliest convenience using Mobile number on file ([phone])  Thank you.

## 2022-06-03 ENCOUNTER — TELEPHONE (OUTPATIENT)
Dept: PAIN MANAGEMENT | Age: 67
End: 2022-06-03

## 2022-06-03 NOTE — TELEPHONE ENCOUNTER
Pt stated she wants to r/s her procedure do to no longer being on blood thinner.  Pt stated she went in to get it done but it was not completed do to not having a clearance for blood thinner but again pt is not longer on them and it looks to me like procedure was done it don't say cancelled in appointment guide  please advise

## 2022-06-03 NOTE — TELEPHONE ENCOUNTER
Please schedule for a lumbar DEXTER  Order can be used from 03/29  Also ov with me 2-3 weeks after injection

## 2022-06-08 ENCOUNTER — HOSPITAL ENCOUNTER (OUTPATIENT)
Dept: PAIN MANAGEMENT | Facility: CLINIC | Age: 67
Discharge: HOME | End: 2022-06-08
Payer: MEDICARE

## 2022-06-08 ENCOUNTER — TELEPHONE (OUTPATIENT)
Dept: FAMILY MEDICINE CLINIC | Age: 67
End: 2022-06-08

## 2022-06-08 VITALS
DIASTOLIC BLOOD PRESSURE: 42 MMHG | TEMPERATURE: 97 F | BODY MASS INDEX: 52.48 KG/M2 | HEART RATE: 71 BPM | SYSTOLIC BLOOD PRESSURE: 113 MMHG | WEIGHT: 250 LBS | OXYGEN SATURATION: 96 % | RESPIRATION RATE: 14 BRPM | HEIGHT: 58 IN

## 2022-06-08 DIAGNOSIS — E11.59 TYPE 2 DIABETES MELLITUS WITH OTHER CIRCULATORY COMPLICATION, WITH LONG-TERM CURRENT USE OF INSULIN (HCC): Primary | ICD-10-CM

## 2022-06-08 DIAGNOSIS — R52 PAIN MANAGEMENT: ICD-10-CM

## 2022-06-08 DIAGNOSIS — Z79.4 TYPE 2 DIABETES MELLITUS WITH OTHER CIRCULATORY COMPLICATION, WITH LONG-TERM CURRENT USE OF INSULIN (HCC): Primary | ICD-10-CM

## 2022-06-08 DIAGNOSIS — I10 ESSENTIAL HYPERTENSION: ICD-10-CM

## 2022-06-08 DIAGNOSIS — M48.062 SPINAL STENOSIS OF LUMBAR REGION WITH NEUROGENIC CLAUDICATION: Primary | ICD-10-CM

## 2022-06-08 LAB — GLUCOSE BLD-MCNC: 76 MG/DL (ref 65–105)

## 2022-06-08 PROCEDURE — 82947 ASSAY GLUCOSE BLOOD QUANT: CPT

## 2022-06-08 PROCEDURE — 62323 NJX INTERLAMINAR LMBR/SAC: CPT

## 2022-06-08 PROCEDURE — 99152 MOD SED SAME PHYS/QHP 5/>YRS: CPT | Performed by: ANESTHESIOLOGY

## 2022-06-08 PROCEDURE — 62323 NJX INTERLAMINAR LMBR/SAC: CPT | Performed by: ANESTHESIOLOGY

## 2022-06-08 PROCEDURE — 6360000002 HC RX W HCPCS: Performed by: ANESTHESIOLOGY

## 2022-06-08 PROCEDURE — 2500000003 HC RX 250 WO HCPCS: Performed by: ANESTHESIOLOGY

## 2022-06-08 PROCEDURE — 6360000004 HC RX CONTRAST MEDICATION: Performed by: ANESTHESIOLOGY

## 2022-06-08 PROCEDURE — 2580000003 HC RX 258: Performed by: ANESTHESIOLOGY

## 2022-06-08 RX ORDER — DEXAMETHASONE SODIUM PHOSPHATE 10 MG/ML
INJECTION, SOLUTION INTRAMUSCULAR; INTRAVENOUS
Status: COMPLETED | OUTPATIENT
Start: 2022-06-08 | End: 2022-06-08

## 2022-06-08 RX ORDER — LISINOPRIL 10 MG/1
TABLET ORAL
Qty: 30 TABLET | Refills: 2 | Status: SHIPPED | OUTPATIENT
Start: 2022-06-08 | End: 2022-10-04 | Stop reason: SDUPTHER

## 2022-06-08 RX ORDER — MIDAZOLAM HYDROCHLORIDE 2 MG/2ML
INJECTION, SOLUTION INTRAMUSCULAR; INTRAVENOUS
Status: COMPLETED | OUTPATIENT
Start: 2022-06-08 | End: 2022-06-08

## 2022-06-08 RX ORDER — LIDOCAINE HYDROCHLORIDE 10 MG/ML
INJECTION, SOLUTION EPIDURAL; INFILTRATION; INTRACAUDAL; PERINEURAL
Status: COMPLETED | OUTPATIENT
Start: 2022-06-08 | End: 2022-06-08

## 2022-06-08 RX ORDER — EMPAGLIFLOZIN 10 MG/1
TABLET, FILM COATED ORAL
Qty: 30 TABLET | Refills: 2 | Status: SHIPPED | OUTPATIENT
Start: 2022-06-08 | End: 2022-10-04 | Stop reason: SDUPTHER

## 2022-06-08 RX ORDER — SODIUM CHLORIDE 0.9 % (FLUSH) 0.9 %
SYRINGE (ML) INJECTION
Status: COMPLETED | OUTPATIENT
Start: 2022-06-08 | End: 2022-06-08

## 2022-06-08 RX ADMIN — LIDOCAINE HYDROCHLORIDE 5 ML: 10 INJECTION, SOLUTION EPIDURAL; INFILTRATION; INTRACAUDAL at 12:36

## 2022-06-08 RX ADMIN — IOHEXOL 0.6 ML: 180 INJECTION INTRAVENOUS at 12:38

## 2022-06-08 RX ADMIN — MIDAZOLAM HYDROCHLORIDE 1 MG: 1 INJECTION, SOLUTION INTRAMUSCULAR; INTRAVENOUS at 12:36

## 2022-06-08 RX ADMIN — Medication 3 ML: at 12:36

## 2022-06-08 RX ADMIN — DEXAMETHASONE SODIUM PHOSPHATE 10 MG: 10 INJECTION, SOLUTION INTRAMUSCULAR; INTRAVENOUS at 12:38

## 2022-06-08 ASSESSMENT — PAIN - FUNCTIONAL ASSESSMENT
PAIN_FUNCTIONAL_ASSESSMENT: NONE - DENIES PAIN
PAIN_FUNCTIONAL_ASSESSMENT: NONE - DENIES PAIN
PAIN_FUNCTIONAL_ASSESSMENT: 0-10

## 2022-06-08 ASSESSMENT — PAIN DESCRIPTION - DESCRIPTORS: DESCRIPTORS: ACHING;DISCOMFORT;PINS AND NEEDLES

## 2022-06-08 NOTE — TELEPHONE ENCOUNTER
Last visit: 3/1/22  Last Med refill: 5/23/22  Does patient have enough medication for 72 hours: Yes    Next Visit Date:  No future appointments. Health Maintenance   Topic Date Due    Annual Wellness Visit (AWV)  Never done    Shingles vaccine (1 of 2) Never done    Diabetic retinal exam  01/01/2014    Pneumococcal 65+ years Vaccine (2 - PCV) 01/07/2016    Hepatitis A vaccine (2 of 2 - Risk 2-dose series) 02/17/2021    COVID-19 Vaccine (2 - Moderna 3-dose series) 05/13/2021    Diabetic foot exam  08/17/2021    Breast cancer screen  08/20/2022    A1C test (Diabetic or Prediabetic)  02/01/2023    Depression Monitoring  02/01/2023    Diabetic microalbuminuria test  02/17/2023    Lipids  02/17/2023    DTaP/Tdap/Td vaccine (6 - Td or Tdap) 06/27/2024    Colorectal Cancer Screen  08/01/2024    DEXA (modify frequency per FRAX score)  Completed    Flu vaccine  Completed    Hepatitis C screen  Completed    Hib vaccine  Aged Out    Meningococcal (ACWY) vaccine  Aged Out       Hemoglobin A1C (%)   Date Value   02/01/2022 6.7   02/04/2021 7.5   08/17/2020 9.0             ( goal A1C is < 7)   Microalb/Crt.  Ratio (mcg/mg creat)   Date Value   02/17/2022 39 (H)     LDL Cholesterol (mg/dL)   Date Value   02/17/2022 45   07/28/2020 60       (goal LDL is <100)   AST (U/L)   Date Value   07/29/2020 27     ALT (U/L)   Date Value   07/29/2020 21     BUN (mg/dL)   Date Value   02/17/2022 7 (L)     BP Readings from Last 3 Encounters:   06/08/22 (!) 113/42   04/13/22 (!) 164/71   03/29/22 131/70          (goal 120/80)    All Future Testing planned in [facility]  Lab Frequency Next Occurrence               Patient Active Problem List:     Anxiety     GERD (gastroesophageal reflux disease)     OA (osteoarthritis)     Neuropathy     Right knee DJD     Hypothyroidism     S/P left total knee arthroplasty     Ischemic heart disease     Essential hypertension     NSTEMI (non-ST elevated myocardial infarction) ([facility] [address]     Other cirrhosis of liver (HCC)     Elevated lipase     Depression     Type 2 diabetes mellitus, with long-term current use of insulin (HCC)     SHERITA (obstructive sleep apnea)     Morbid obesity with BMI of 45.0-49.9, adult (HCC)     PVD (peripheral vascular disease) (HCC)     Numbness and tingling of both feet     Long term (current) use of antithrombotics/antiplatelets     Painful diabetic neuropathy (HCC)     Vertebrogenic low back pain     Spinal stenosis of lumbar region with neurogenic claudication

## 2022-06-08 NOTE — OP NOTE
patient was independently monitored by a Registered Nurse assigned to the Procedure Room  Monitoring included automated blood pressure, continuous EKG, Capnography and continuous pulse oximetry. The detailed Conscious Record [facility] [place] [id number]      The following is the conscious sedation record;  Start Time:  1230  End times:  1241  Duration:  11 MINUTES  MEDS GIVEN 1 MG VERSED AND 0 MCG FENTANYL

## 2022-06-08 NOTE — H&P
Pain Pre-Op H&P Note    Prabhjot Young MD    HPI: Cl Bauer  presents with   Chronic low back pain with radiation down both legs  aggravates with standing and alleviates with rest  Affecting qol  -ve red flags    Past Medical History:   Diagnosis Date    Anxiety     Borderline hypertension     Depression 7/28/2020    GERD (gastroesophageal reflux disease)     Heart attack ([facility] [address] 07/18/2020    Hypothyroidism     Neuropathy     Obesity     morbid    Osteoarthritis     Other cirrhosis of liver ([facility] [address] 7/27/2020    Sleep apnea     possible    Type II or unspecified type diabetes mellitus without mention of complication, not stated as uncontrolled     Vertebrogenic low back pain 3/29/2022       Past Surgical History:   Procedure Laterality Date    APPENDECTOMY      COLONOSCOPY      CORONARY ANGIOPLASTY WITH STENT PLACEMENT  07/2020    DILATION AND CURETTAGE OF UTERUS      HIATAL HERNIA REPAIR      HYSTERECTOMY      THROAT SURGERY      POLYPS REMOVED FROM VOCAL CORD    TOTAL KNEE ARTHROPLASTY Right 01/06/2015    TOTAL KNEE ARTHROPLASTY Left 5/26/15    UPPER GASTROINTESTINAL ENDOSCOPY         Family History   Problem Relation Age of Onset    Heart Disease Mother     Arthritis Mother     Heart Disease Father     Arthritis Father     Cancer Father         \"oral\"    Diabetes Sister         gestational       Allergies   Allergen Reactions    Shellfish-Derived Products Nausea Only    Morphine Other (See Comments)     HALLUCINATIONS      Tape [place] [employer] Tape] Rash         Current Outpatient Medications:     clopidogrel (PLAVIX) 75 MG tablet, TAKE 1 TABLET BY MOUTH ONCE DAILY, Disp: 30 tablet, Rfl: 10    isosorbide mononitrate (IMDUR) 60 MG extended release tablet, TAKE 1 TABLET BY MOUTH ONCE DAILY, Disp: 30 tablet, Rfl: 10    amLODIPine (NORVASC) 5 MG tablet, TAKE 1 TABLET BY MOUTH EVERY DAY, Disp: 30 tablet, Rfl: 10    gabapentin (NEURONTIN) 400 MG capsule, TAKE 1 CAPSULE BY MOUTH THREE TIMES DAILY, Disp: 90 capsule, Rfl: 0    carvedilol (COREG) 6.25 MG tablet, TAKE 1 TABLET BY MOUTH TWICE DAILY WITH MEALS *EMERGENCY REFILL*, Disp: 60 tablet, Rfl: 3    sucralfate (CARAFATE) 1 GM tablet, TAKE 1 TABLET BY MOUTH EVERY DAY, Disp: 30 tablet, Rfl: 10    glimepiride (AMARYL) 4 MG tablet, TAKE 1 TABLET BY MOUTH TWICE DAILY *EMERGENCY REFILL*, Disp: 60 tablet, Rfl: 5    citalopram (CELEXA) 40 MG tablet, TAKE 1 TABLET BY MOUTH ONCE DAILY *EMERGENCY REFILL*, Disp: 30 tablet, Rfl: 5    busPIRone (BUSPAR) 10 MG tablet, TAKE ONE (1) TABLET BY MOUTH TWICE DAILY (Patient not taking: Reported on 4/13/2022), Disp: 60 tablet, Rfl: 10    insulin glargine (BASAGLAR KWIKPEN) 100 UNIT/ML injection pen, Inject 65 units subcutaneously once daily. , Disp: 15 pen, Rfl: 3    omeprazole (PRILOSEC) 20 MG delayed release capsule, Take 1 capsule by mouth Daily, Disp: 30 capsule, Rfl: 3    aspirin (ASPIRIN LOW DOSE) 81 MG EC tablet, TAKE 1 TABLET BY MOUTH ONCE DAILY, Disp: 30 tablet, Rfl: 10    levothyroxine (SYNTHROID) 50 MCG tablet, TAKE 1 TABLET BY MOUTH EVERY DAY, Disp: 30 tablet, Rfl: 5    lisinopril (PRINIVIL;ZESTRIL) 10 MG tablet, TAKE 1 TABLET BY MOUTH EVERY DAY, Disp: 30 tablet, Rfl: 5    JARDIANCE 10 MG tablet, TAKE 1 TABLET BY MOUTH EVERY DAY, Disp: 30 tablet, Rfl: 5    atorvastatin (LIPITOR) 40 MG tablet, Take 1 tablet by mouth nightly, Disp: 90 tablet, Rfl: 1    miconazole (MICOTIN) 2 % cream, APPLY TO AFFECTED AREA TWICE DAILY, Disp: 1 each, Rfl: 1    acetaminophen (TYLENOL) 500 MG tablet, TAKE TWO TABLETS BY MOUTH THREE TIMES A DAY AS NEEDED FOR PAIN, Disp: 180 tablet, Rfl: 0    ibuprofen (ADVIL;MOTRIN) 800 MG tablet, Take 1 tablet by mouth 4 times daily as needed for Pain, Disp: 120 tablet, Rfl: 0    lidocaine (XYLOCAINE) 2 % jelly, Apply topically with dressing changes every 3 days, Disp: 1 Tube, Rfl: 1    melatonin 10 MG CAPS capsule, TAKE 1 CAPSULE BY MOUTH NIGHTLY AS NEEDED FOR SLEEP, Disp: 30 capsule, Rfl: 2    blood glucose monitor strips, Test before meals and at bedtime. DX: DM, on insulin, Disp: 100 strip, Rfl: 11    miconazole (ZEASORB-AF) 2 % powder, Apply topically 2 times daily. , Disp: 45 g, Rfl: 1    blood glucose monitor kit and supplies, Dispense sufficient amount for indicated testing frequency plus additional to accommodate PRN testing needs. Dispense all needed supplies to include: monitor, strips, lancing device, lancets, control solutions, alcohol swabs., Disp: 1 kit, Rfl: 0    magnesium hydroxide (MILK OF MAGNESIA) 400 MG/5ML suspension, Take 30 mLs by mouth daily as needed for Constipation, Disp: 900 mL, Rfl: 0    nitroGLYCERIN (NITROSTAT) 0.4 MG SL tablet, up to max of 3 total doses. If no relief after 1 dose, call 911., Disp: 25 tablet, Rfl: 3    Handicap Placard MISC, Is a permanent condition. DX: M19.90, Disp: 1 each, Rfl: 0    Insulin Pen Needle (B-D UF III MINI PEN NEEDLES) 31G X 5 MM MISC, USE 1 PEN NEEDLE DAILY, Disp: 50 each, Rfl: 1    [employer] Lancets Thin MISC, Test before meals and at bedtime. DX: DM, on insulin, Disp: 100 each, Rfl: 11    MULTIPLE VITAMIN PO, Take 2 tablets by mouth daily DAILY, Disp: , Rfl:     Alcohol Swabs (ALCOHOL PREP PADS), by Other route 2 times daily  , Disp: , Rfl:     Social History     Tobacco Use    Smoking status: Never Smoker    Smokeless tobacco: Never Used   Substance Use Topics    Alcohol use: Yes     Comment: once a month       Review of Systems:   Focused review of systems was performed, and negative as pertinent to diagnosis, except as stated in HPI. Physical Exam  Constitutional:       Appearance: Normal appearance. Pulmonary:      Effort: Pulmonary effort is normal.   Neurological:      Mental Status: alert. Psychiatric:         Attention and Perception: Attention and perception normal.         Mood and Affect: Mood and affect normal.   Cardiovascular:      Rate: Normal rate.          ASA: 3          Mallampati: 3 Patient's current physical status, medications, medical history, and HPI have been reviewed and updated as appropriate on this date: 06/08/22    Risk/Benefit(s): The risks, benefits, alternatives, and potential complications have been discussed with the patient/family and informed consent has been obtained for the procedure/sedation. Diagnosis:   1.  Spinal stenosis of lumbar region with neurogenic claudication            Plan:   Velma Khalil MD

## 2022-06-09 NOTE — TELEPHONE ENCOUNTER
Please address the medication refill and close the encounter. If I can be of assistance, please route to the applicable pool. Thank you. Last visit: 3-1-22  Last Med refill: 2-16-22  Does patient have enough medication for 72 hours: No:     Next Visit Date:  No future appointments. Health Maintenance   Topic Date Due    Annual Wellness Visit (AWV)  Never done    Shingles vaccine (1 of 2) Never done    Diabetic retinal exam  01/01/2014    Pneumococcal 65+ years Vaccine (2 - PCV) 01/07/2016    Hepatitis A vaccine (2 of 2 - Risk 2-dose series) 02/17/2021    COVID-19 Vaccine (2 - Moderna 3-dose series) 05/13/2021    Diabetic foot exam  08/17/2021    Breast cancer screen  08/20/2022    A1C test (Diabetic or Prediabetic)  02/01/2023    Depression Monitoring  02/01/2023    Diabetic microalbuminuria test  02/17/2023    Lipids  02/17/2023    DTaP/Tdap/Td vaccine (6 - Td or Tdap) 06/27/2024    Colorectal Cancer Screen  08/01/2024    DEXA (modify frequency per FRAX score)  Completed    Flu vaccine  Completed    Hepatitis C screen  Completed    Hib vaccine  Aged Out    Meningococcal (ACWY) vaccine  Aged Out       Hemoglobin A1C (%)   Date Value   02/01/2022 6.7   02/04/2021 7.5   08/17/2020 9.0             ( goal A1C is < 7)   Microalb/Crt.  Ratio (mcg/mg creat)   Date Value   02/17/2022 39 (H)     LDL Cholesterol (mg/dL)   Date Value   02/17/2022 45   07/28/2020 60       (goal LDL is <100)   AST (U/L)   Date Value   07/29/2020 27     ALT (U/L)   Date Value   07/29/2020 21     BUN (mg/dL)   Date Value   02/17/2022 7 (L)     BP Readings from Last 3 Encounters:   06/08/22 (!) 113/42   04/13/22 (!) 164/71   03/29/22 131/70          (goal 120/80)    All Future Testing planned in [facility]  Lab Frequency Next Occurrence               Patient Active Problem List:     Anxiety     GERD (gastroesophageal reflux disease)     OA (osteoarthritis)     Neuropathy     Right knee DJD     Hypothyroidism     S/P left total knee arthroplasty     Ischemic heart disease     Essential hypertension     NSTEMI (non-ST elevated myocardial infarction) ([facility] [address]     Other cirrhosis of liver (HCC)     Elevated lipase     Depression     Type 2 diabetes mellitus, with long-term current use of insulin (HCC)     SHERITA (obstructive sleep apnea)     Morbid obesity with BMI of 45.0-49.9, adult (HCC)     PVD (peripheral vascular disease) (HCC)     Numbness and tingling of both feet     Long term (current) use of antithrombotics/antiplatelets     Painful diabetic neuropathy (HCC)     Vertebrogenic low back pain     Spinal stenosis of lumbar region with neurogenic claudication

## 2022-06-09 NOTE — TELEPHONE ENCOUNTER
Resident called patient in regards to elevated blood sugar. Patient reports a blood sugar of 399. Patient states she underwent a lumbar epidural steroid injection by Dr. Charity Arguelles today. Patient states she took her nighttime lantus 65 units around 7:30 pm with a reported blood sugar of 399. Patient states she checked her sugar again which was reportedly 399 around 9:00 pm. Resident instructed patient to take an additional 10 units lantus tonight. Patient voices understanding and is in agreement. All patient questions answered. Denies any fever, chill, headaches, blurred vision, dizziness, chest pain, SOB , palpitations, abdominal pain, nausea, vomiting, polyuria or polydipsia.      Electronically signed by Kelly Graham MD on 6/8/2022 at 9:59 PM

## 2022-06-09 NOTE — TELEPHONE ENCOUNTER
Resident Received [address] in regards to patient's reported elevated blood sugar. Reportedly, Pt's blood sugar is 399. Resident attempted to call pt several times and she not .     Electronically signed by Marilee Cortes MD on 6/8/2022 at 9:41 PM

## 2022-06-15 RX ORDER — OMEPRAZOLE 20 MG/1
CAPSULE, DELAYED RELEASE ORAL
Qty: 30 CAPSULE | Refills: 10 | OUTPATIENT
Start: 2022-06-15

## 2022-07-07 RX ORDER — OMEPRAZOLE 20 MG/1
CAPSULE, DELAYED RELEASE ORAL
Qty: 30 CAPSULE | Refills: 2 | Status: SHIPPED | OUTPATIENT
Start: 2022-07-07 | End: 2022-09-22

## 2022-07-07 NOTE — TELEPHONE ENCOUNTER
E-scribe request for med refill. Please review and e-scribe if applicable. Last Visit Date:  03/01/2022  Next Visit Date:  Visit date not found    Hemoglobin A1C (%)   Date Value   02/01/2022 6.7   02/04/2021 7.5   08/17/2020 9.0             ( goal A1C is < 7)   Microalb/Crt.  Ratio (mcg/mg creat)   Date Value   02/17/2022 39 (H)     LDL Cholesterol (mg/dL)   Date Value   02/17/2022 45       (goal LDL is <100)   AST (U/L)   Date Value   07/29/2020 27     ALT (U/L)   Date Value   07/29/2020 21     BUN (mg/dL)   Date Value   02/17/2022 7 (L)     BP Readings from Last 3 Encounters:   06/08/22 (!) 113/42   04/13/22 (!) 164/71   03/29/22 131/70          (goal 120/80)        Patient Active Problem List:     Anxiety     GERD (gastroesophageal reflux disease)     OA (osteoarthritis)     Neuropathy     Right knee DJD     Hypothyroidism     S/P left total knee arthroplasty     Ischemic heart disease     Essential hypertension     NSTEMI (non-ST elevated myocardial infarction) ([facility] [address]     Other cirrhosis of liver (HCC)     Elevated lipase     Depression     Type 2 diabetes mellitus, with long-term current use of insulin (HCC)     SHERITA (obstructive sleep apnea)     Morbid obesity with BMI of 45.0-49.9, adult ([address]     PVD (peripheral vascular disease) (HCC)     Numbness and tingling of both feet     Long term (current) use of antithrombotics/antiplatelets     Painful diabetic neuropathy ([facility] [address]     Vertebrogenic low back pain     Spinal stenosis of lumbar region with neurogenic claudication      ----Orquidea Rodríguez

## 2022-08-17 RX ORDER — LEVOTHYROXINE SODIUM 0.05 MG/1
TABLET ORAL
Qty: 60 TABLET | Refills: 2 | Status: SHIPPED | OUTPATIENT
Start: 2022-08-17 | End: 2022-10-04 | Stop reason: SDUPTHER

## 2022-08-17 NOTE — TELEPHONE ENCOUNTER
Please address the medication refill and close the encounter. If I can be of assistance, please route to the applicable pool. Thank you. Last visit: 2-1-22  Last Med refill: 2-4-22  Does patient have enough medication for 72 hours: No:       Next Visit Date:  Future Appointments   Date Time Provider Osmar Hutchinson   8/26/2022  3:00 PM Shantal Kilgore MD [address] [place] [address] Maintenance   Topic Date Due    Annual Wellness Visit (AWV)  Never done    Shingles vaccine (1 of 2) Never done    Diabetic retinal exam  01/01/2014    Pneumococcal 65+ years Vaccine (2 - PCV) 01/07/2016    Hepatitis A vaccine (2 of 2 - Risk 2-dose series) 02/17/2021    COVID-19 Vaccine (2 - Moderna series) 05/13/2021    Diabetic foot exam  08/17/2021    Breast cancer screen  08/20/2022    Flu vaccine (1) 09/01/2022    A1C test (Diabetic or Prediabetic)  02/01/2023    Depression Monitoring  02/01/2023    Diabetic microalbuminuria test  02/17/2023    Lipids  02/17/2023    DTaP/Tdap/Td vaccine (6 - Td or Tdap) 06/27/2024    Colorectal Cancer Screen  08/01/2024    DEXA (modify frequency per FRAX score)  Completed    Hepatitis C screen  Completed    Hib vaccine  Aged Out    Meningococcal (ACWY) vaccine  Aged Out       Hemoglobin A1C (%)   Date Value   02/01/2022 6.7   02/04/2021 7.5   08/17/2020 9.0             ( goal A1C is < 7)   Microalb/Crt.  Ratio (mcg/mg creat)   Date Value   02/17/2022 39 (H)     LDL Cholesterol (mg/dL)   Date Value   02/17/2022 45   07/28/2020 60       (goal LDL is <100)   AST (U/L)   Date Value   07/29/2020 27     ALT (U/L)   Date Value   07/29/2020 21     BUN (mg/dL)   Date Value   02/17/2022 7 (L)     BP Readings from Last 3 Encounters:   06/08/22 (!) 113/42   04/13/22 (!) 164/71   03/29/22 131/70          (goal 120/80)    All Future Testing planned in [facility]  Lab Frequency Next Occurrence               Patient Active Problem List:     Anxiety     GERD (gastroesophageal reflux disease)     OA (osteoarthritis)     Neuropathy     Right knee DJD     Hypothyroidism     S/P left total knee arthroplasty     Ischemic heart disease     Essential hypertension     NSTEMI (non-ST elevated myocardial infarction) (HCC)     Other cirrhosis of liver (HCC)     Elevated lipase     Depression     Type 2 diabetes mellitus, with long-term current use of insulin (HCC)     SHERITA (obstructive sleep apnea)     Morbid obesity with BMI of 45.0-49.9, adult (HCC)     PVD (peripheral vascular disease) (HCC)     Numbness and tingling of both feet     Long term (current) use of antithrombotics/antiplatelets     Painful diabetic neuropathy (HCC)     Vertebrogenic low back pain     Spinal stenosis of lumbar region with neurogenic claudication

## 2022-08-26 ENCOUNTER — OFFICE VISIT (OUTPATIENT)
Dept: FAMILY MEDICINE CLINIC | Age: 67
End: 2022-08-26
Payer: MEDICARE

## 2022-08-26 VITALS
BODY MASS INDEX: 54.37 KG/M2 | DIASTOLIC BLOOD PRESSURE: 78 MMHG | HEART RATE: 74 BPM | SYSTOLIC BLOOD PRESSURE: 138 MMHG | HEIGHT: 58 IN | TEMPERATURE: 98.1 F | WEIGHT: 259 LBS

## 2022-08-26 DIAGNOSIS — I10 ESSENTIAL HYPERTENSION: ICD-10-CM

## 2022-08-26 DIAGNOSIS — Z79.4 TYPE 2 DIABETES MELLITUS WITH OTHER CIRCULATORY COMPLICATION, WITH LONG-TERM CURRENT USE OF INSULIN (HCC): Primary | ICD-10-CM

## 2022-08-26 DIAGNOSIS — E11.59 TYPE 2 DIABETES MELLITUS WITH OTHER CIRCULATORY COMPLICATION, WITH LONG-TERM CURRENT USE OF INSULIN (HCC): Primary | ICD-10-CM

## 2022-08-26 LAB — HBA1C MFR BLD: 7.3 %

## 2022-08-26 PROCEDURE — 99213 OFFICE O/P EST LOW 20 MIN: CPT

## 2022-08-26 PROCEDURE — 83036 HEMOGLOBIN GLYCOSYLATED A1C: CPT

## 2022-08-26 PROCEDURE — 3051F HG A1C>EQUAL 7.0%<8.0%: CPT

## 2022-08-26 PROCEDURE — 1123F ACP DISCUSS/DSCN MKR DOCD: CPT

## 2022-08-26 NOTE — PATIENT INSTRUCTIONS
Thank you for letting us take care of you today. We hope all your questions were addressed. If a question was overlooked or something else comes to mind after you return home, please contact a member of your Care Team listed below. Your Care Team at [place] [address] is Team #4  Alena Aguilera MD (Faculty)  Prashant Linton MD (Resident)  Ellen Sanchez MD (Resident)  Remy Grayson MD (Resident)  Bria Miller MD (Resident)  [employer] Gould., LUIS Obregon., Rebekah Miranda., Radha [facility] office)  Osiel Chawla, [address] (Clinical Practice Manager)  [place], [facility] (Clinical Pharmacist)       Office phone number: [phone]    If you need to get in right away due to illness, please be advised we have \"Same Day\" appointments available Monday-Friday. Please call us at [phone] option #3 to schedule your \"Same Day\" appointment.

## 2022-08-26 NOTE — PROGRESS NOTES
Pulmonary effort is normal.      Breath sounds: Normal breath sounds. Lab Results   Component Value Date    WBC 7.9 02/17/2022    HGB 14.8 02/17/2022    HCT 47.4 (H) 02/17/2022     02/17/2022    CHOL 110 02/17/2022    TRIG 88 02/17/2022    HDL 47 02/17/2022    ALT 21 07/29/2020    AST 27 07/29/2020     02/17/2022    K 3.9 02/17/2022     02/17/2022    CREATININE 0.57 02/17/2022    BUN 7 (L) 02/17/2022    CO2 26 02/17/2022    TSH 2.49 02/17/2022    INR 1.0 07/21/2020    LABA1C 7.3 08/26/2022    LABMICR 39 (H) 02/17/2022     Lab Results   Component Value Date    CALCIUM 9.2 02/17/2022     Lab Results   Component Value Date    LDLCHOLESTEROL 45 02/17/2022       Assessment and Plan:    1. Type 2 diabetes mellitus with other circulatory complication, with long-term current use of insulin (HCC)  - POCT glycosylated hemoglobin (Hb A1C) 7.3, up from 6.7  - Continue same regimen     2. Essential hypertension  - BP today 165/71, repeat 138/78  -Controlled  -Continue Norvasc and Lisinopril     Will follow up in 3 months       Requested Prescriptions      No prescriptions requested or ordered in this encounter       There are no discontinued medications. Meera John received counseling on the following healthy behaviors: nutrition, exercise and medication adherence    Discussed use,benefit, and side effects of prescribed medications. Barriers to medication compliance addressed. All patient questions answered. Pt voiced understanding. Return in about 3 months (around 11/26/2022) for DM and HTN . Disclaimer: Some orall of this note was transcribed using voice-recognition software. This may cause typographical errors occasionally. Although all effort is made to fix these errors, please do not hesitate to contact our office if there [place] concern with the understanding of this note.

## 2022-08-26 NOTE — PROGRESS NOTES
HYPERTENSION visit     BP Readings from Last 3 Encounters:   06/08/22 (!) 113/42   04/13/22 (!) 164/71   03/29/22 131/70       LDL Cholesterol (mg/dL)   Date Value   02/17/2022 45     HDL (mg/dL)   Date Value   02/17/2022 47     BUN (mg/dL)   Date Value   02/17/2022 7 (L)     Creatinine (mg/dL)   Date Value   02/17/2022 0.57     Glucose (mg/dL)   Date Value   02/17/2022 59 (L)   03/30/2012 250 (H)              Have you changed or started any medications since your last visit including any over-the-counter medicines, vitamins, or herbal medicines? no   Have you stopped taking any of your medications? Is so, why? -  no  Are you having any side effects from any of your medications? - no  How often do you miss doses of your medication? rare      Have you seen any other physician or provider since your last visit? yes - Pain Management, Podiatry   Have you had any other diagnostic tests since your last visit? yes - [place], MRI   Have you been seen in the emergency room and/or had an admission in a hospital since we last saw you?  no   Have you had your routine dental cleaning in the past 6 months?  no     Do you have an active MyChart account? If no, what is the barrier?   Yes    Patient Care Team:  Ajay Allen MD as PCP - General (Family Medicine)  Lio Kim MD as Surgeon (Orthopedic Surgery)    Medical History Review  Past Medical, Family, and Social History reviewed and does contribute to the patient presenting condition    Health Maintenance   Topic Date Due    Annual Wellness Visit (AWV)  Never done    Shingles vaccine (1 of 2) Never done    Diabetic retinal exam  01/01/2014    Pneumococcal 65+ years Vaccine (2 - PCV) 01/07/2016    Hepatitis A vaccine (2 of 2 - Risk 2-dose series) 02/17/2021    COVID-19 Vaccine (2 - [address] series) 05/13/2021    Diabetic foot exam  08/17/2021    Breast cancer screen  08/20/2022    Flu vaccine (1) 09/01/2022    A1C test (Diabetic or Prediabetic)  02/01/2023 Depression Monitoring  02/01/2023    Diabetic microalbuminuria test  02/17/2023    Lipids  02/17/2023    DTaP/Tdap/Td vaccine (6 - Td or Tdap) 06/27/2024    Colorectal Cancer Screen  08/01/2024    DEXA (modify frequency per FRAX score)  Completed    Hepatitis C screen  Completed    Hib vaccine  Aged Out    Meningococcal (ACWY) vaccine  Aged Out

## 2022-08-26 NOTE — PROGRESS NOTES
Attending Physician Statement  I  have discussed the care of [facility] including pertinent history and exam findings with the resident. I agree with the assessment, plan and orders as documented by the resident. /78   Pulse 74   Temp 98.1 °F (36.7 °C) (Temporal)   Ht 4' 9.99\" (1.473 m)   Wt 259 lb (117.5 kg)   BMI 54.15 kg/m²    BP Readings from Last 3 Encounters:   08/26/22 138/78   06/08/22 (!) 113/42   04/13/22 (!) 164/71     Wt Readings from Last 3 Encounters:   08/26/22 259 lb (117.5 kg)   06/08/22 250 lb (113.4 kg)   03/29/22 268 lb 6.4 oz (121.7 kg)          Diagnosis Orders   1. Type 2 diabetes mellitus with other circulatory complication, with long-term current use of insulin (HCC)  POCT glycosylated hemoglobin (Hb A1C)      2.  Essential hypertension            Deng Hummel DO 8/26/2022 5:06 PM

## 2022-09-22 RX ORDER — OMEPRAZOLE 20 MG/1
CAPSULE, DELAYED RELEASE ORAL
Qty: 30 CAPSULE | Refills: 10 | Status: SHIPPED | OUTPATIENT
Start: 2022-09-22

## 2022-09-22 NOTE — TELEPHONE ENCOUNTER
Last visit: 8/26/22  Last Med refill: 9/7/22  Does patient have enough medication for 72 hours: Yes    Next Visit Date:  No future appointments. Health Maintenance   Topic Date Due    Shingles vaccine (1 of 2) Never done    Diabetic retinal exam  01/01/2014    Pneumococcal 65+ years Vaccine (2 - PCV) 01/07/2016    Hepatitis A vaccine (2 of 2 - Risk 2-dose series) 02/17/2021    COVID-19 Vaccine (2 - [place] series) 05/13/2021    Diabetic foot exam  08/17/2021    Annual Wellness Visit (AWV)  Never done    Breast cancer screen  08/20/2022    Flu vaccine (1) 09/01/2022    Depression Monitoring  02/01/2023    Diabetic microalbuminuria test  02/17/2023    Lipids  02/17/2023    A1C test (Diabetic or Prediabetic)  08/26/2023    DTaP/Tdap/Td vaccine (6 - Td or Tdap) 06/27/2024    Colorectal Cancer Screen  08/01/2024    DEXA (modify frequency per FRAX score)  Completed    Hepatitis C screen  Completed    Hib vaccine  Aged Out    Meningococcal (ACWY) vaccine  Aged Out       Hemoglobin A1C (%)   Date Value   08/26/2022 7.3   02/01/2022 6.7   02/04/2021 7.5             ( goal A1C is < 7)   Microalb/Crt.  Ratio (mcg/mg creat)   Date Value   02/17/2022 39 (H)     LDL Cholesterol (mg/dL)   Date Value   02/17/2022 45   07/28/2020 60       (goal LDL is <100)   AST (U/L)   Date Value   07/29/2020 27     ALT (U/L)   Date Value   07/29/2020 21     BUN (mg/dL)   Date Value   02/17/2022 7 (L)     BP Readings from Last 3 Encounters:   08/26/22 138/78   06/08/22 (!) 113/42   04/13/22 (!) 164/71          (goal 120/80)    All Future Testing planned in [facility]  Lab Frequency Next Occurrence               Patient Active Problem List:     Anxiety     GERD (gastroesophageal reflux disease)     OA (osteoarthritis)     Neuropathy     Right knee DJD     Hypothyroidism     S/P left total knee arthroplasty     Ischemic heart disease     Essential hypertension     NSTEMI (non-ST elevated myocardial infarction) (HCC)     Other cirrhosis of liver ([facility] [address]     Elevated lipase     Depression     Type 2 diabetes mellitus, with long-term current use of insulin (HCC)     SHERITA (obstructive sleep apnea)     Morbid obesity with BMI of 45.0-49.9, adult (HCC)     PVD (peripheral vascular disease) (HCC)     Numbness and tingling of both feet     Long term (current) use of antithrombotics/antiplatelets     Painful diabetic neuropathy (HCC)     Vertebrogenic low back pain     Spinal stenosis of lumbar region with neurogenic claudication

## 2022-09-29 DIAGNOSIS — F41.9 ANXIETY: Chronic | ICD-10-CM

## 2022-09-29 RX ORDER — CITALOPRAM 40 MG/1
TABLET ORAL
Qty: 30 TABLET | Refills: 5 | Status: SHIPPED | OUTPATIENT
Start: 2022-09-29

## 2022-09-29 RX ORDER — CARVEDILOL 6.25 MG/1
TABLET ORAL
Qty: 60 TABLET | Refills: 3 | Status: SHIPPED | OUTPATIENT
Start: 2022-09-29

## 2022-09-29 RX ORDER — GLIMEPIRIDE 4 MG/1
TABLET ORAL
Qty: 60 TABLET | Refills: 5 | Status: SHIPPED | OUTPATIENT
Start: 2022-09-29

## 2022-09-29 NOTE — TELEPHONE ENCOUNTER
Last visit: 8/26/22  Last Med refill: 8/14/22  Does patient have enough medication for 72 hours: No:     Next Visit Date:  No future appointments. Health Maintenance   Topic Date Due    Shingles vaccine (1 of 2) Never done    Diabetic retinal exam  01/01/2014    Pneumococcal 65+ years Vaccine (2 - PCV) 01/07/2016    Hepatitis A vaccine (2 of 2 - Risk 2-dose series) 02/17/2021    COVID-19 Vaccine (2 - [place] series) 05/13/2021    Diabetic foot exam  08/17/2021    Annual Wellness Visit (AWV)  Never done    Flu vaccine (1) 08/01/2022    Breast cancer screen  08/20/2022    Depression Monitoring  02/01/2023    Diabetic microalbuminuria test  02/17/2023    Lipids  02/17/2023    A1C test (Diabetic or Prediabetic)  08/26/2023    DTaP/Tdap/Td vaccine (6 - Td or Tdap) 06/27/2024    Colorectal Cancer Screen  08/01/2024    DEXA (modify frequency per FRAX score)  Completed    Hepatitis C screen  Completed    Hib vaccine  Aged Out    Meningococcal (ACWY) vaccine  Aged Out       Hemoglobin A1C (%)   Date Value   08/26/2022 7.3   02/01/2022 6.7   02/04/2021 7.5             ( goal A1C is < 7)   Microalb/Crt.  Ratio (mcg/mg creat)   Date Value   02/17/2022 39 (H)     LDL Cholesterol (mg/dL)   Date Value   02/17/2022 45   07/28/2020 60       (goal LDL is <100)   AST (U/L)   Date Value   07/29/2020 27     ALT (U/L)   Date Value   07/29/2020 21     BUN (mg/dL)   Date Value   02/17/2022 7 (L)     BP Readings from Last 3 Encounters:   08/26/22 138/78   06/08/22 (!) 113/42   04/13/22 (!) 164/71          (goal 120/80)    All Future Testing planned in [facility]  Lab Frequency Next Occurrence               Patient Active Problem List:     Anxiety     GERD (gastroesophageal reflux disease)     OA (osteoarthritis)     Neuropathy     Right knee DJD     Hypothyroidism     S/P left total knee arthroplasty     Ischemic heart disease     Essential hypertension     NSTEMI (non-ST elevated myocardial infarction) (HCC)     Other cirrhosis of liver ([facility] [address]     Elevated lipase     Depression     Type 2 diabetes mellitus, with long-term current use of insulin (HCC)     SHERITA (obstructive sleep apnea)     Morbid obesity with BMI of 45.0-49.9, adult (HCC)     PVD (peripheral vascular disease) (HCC)     Numbness and tingling of both feet     Long term (current) use of antithrombotics/antiplatelets     Painful diabetic neuropathy (HCC)     Vertebrogenic low back pain     Spinal stenosis of lumbar region with neurogenic claudication

## 2022-10-04 DIAGNOSIS — I10 ESSENTIAL HYPERTENSION: ICD-10-CM

## 2022-10-04 RX ORDER — CLOPIDOGREL BISULFATE 75 MG/1
TABLET ORAL
Qty: 30 TABLET | Refills: 10 | Status: SHIPPED | OUTPATIENT
Start: 2022-10-04 | End: 2022-10-22 | Stop reason: SDUPTHER

## 2022-10-04 RX ORDER — LISINOPRIL 10 MG/1
TABLET ORAL
Qty: 30 TABLET | Refills: 2 | Status: SHIPPED | OUTPATIENT
Start: 2022-10-04 | End: 2022-10-22 | Stop reason: SDUPTHER

## 2022-10-04 RX ORDER — LEVOTHYROXINE SODIUM 0.05 MG/1
TABLET ORAL
Qty: 60 TABLET | Refills: 2 | Status: SHIPPED | OUTPATIENT
Start: 2022-10-04 | End: 2022-10-22 | Stop reason: SDUPTHER

## 2022-10-04 NOTE — TELEPHONE ENCOUNTER
Last visit: 8/26/22  Last Med refill: 9/17/22  Does patient have enough medication for 72 hours: Yes    Next Visit Date:  No future appointments. Health Maintenance   Topic Date Due    Shingles vaccine (1 of 2) Never done    Diabetic retinal exam  01/01/2014    Pneumococcal 65+ years Vaccine (2 - PCV) 01/07/2016    Hepatitis A vaccine (2 of 2 - Risk 2-dose series) 02/17/2021    COVID-19 Vaccine (2 - [place] series) 05/13/2021    Diabetic foot exam  08/17/2021    Annual Wellness Visit (AWV)  Never done    Flu vaccine (1) 08/01/2022    Breast cancer screen  08/20/2022    Depression Monitoring  02/01/2023    Diabetic microalbuminuria test  02/17/2023    Lipids  02/17/2023    A1C test (Diabetic or Prediabetic)  08/26/2023    DTaP/Tdap/Td vaccine (6 - Td or Tdap) 06/27/2024    Colorectal Cancer Screen  08/01/2024    DEXA (modify frequency per FRAX score)  Completed    Hepatitis C screen  Completed    Hib vaccine  Aged Out    Meningococcal (ACWY) vaccine  Aged Out       Hemoglobin A1C (%)   Date Value   08/26/2022 7.3   02/01/2022 6.7   02/04/2021 7.5             ( goal A1C is < 7)   Microalb/Crt.  Ratio (mcg/mg creat)   Date Value   02/17/2022 39 (H)     LDL Cholesterol (mg/dL)   Date Value   02/17/2022 45   07/28/2020 60       (goal LDL is <100)   AST (U/L)   Date Value   07/29/2020 27     ALT (U/L)   Date Value   07/29/2020 21     BUN (mg/dL)   Date Value   02/17/2022 7 (L)     BP Readings from Last 3 Encounters:   08/26/22 138/78   06/08/22 (!) 113/42   04/13/22 (!) 164/71          (goal 120/80)    All Future Testing planned in [facility]  Lab Frequency Next Occurrence               Patient Active Problem List:     Anxiety     GERD (gastroesophageal reflux disease)     OA (osteoarthritis)     Neuropathy     Right knee DJD     Hypothyroidism     S/P left total knee arthroplasty     Ischemic heart disease     Essential hypertension     NSTEMI (non-ST elevated myocardial infarction) (HCC)     Other cirrhosis of liver ([facility] [address]     Elevated lipase     Depression     Type 2 diabetes mellitus, with long-term current use of insulin (HCC)     SHERITA (obstructive sleep apnea)     Morbid obesity with BMI of 45.0-49.9, adult (HCC)     PVD (peripheral vascular disease) (HCC)     Numbness and tingling of both feet     Long term (current) use of antithrombotics/antiplatelets     Painful diabetic neuropathy (HCC)     Vertebrogenic low back pain     Spinal stenosis of lumbar region with neurogenic claudication

## 2022-10-04 NOTE — TELEPHONE ENCOUNTER
Patient calling for refills to be sent to [address] [place] [address] for immediate fill and the refills to [facility] for packaging. Please advise.

## 2022-10-19 DIAGNOSIS — I10 ESSENTIAL HYPERTENSION: ICD-10-CM

## 2022-10-19 NOTE — TELEPHONE ENCOUNTER
Pt stated all medications are to be sent to [address] [place] [address] unless needed right at the moment and those ones would need to be sent to [facility]. When medications was sent to [facility] had refills and pt wanted those refills to be sent to [facility]. Writer contacted [facility] to have the refills to be sent to [facility], per [facility] Blood at [facility] transfer to an mail order being a retail location would need to call in all new RX. Writer informed patient. Please refill medication and send to [facility] pharmacy thank you.

## 2022-10-22 RX ORDER — LISINOPRIL 10 MG/1
TABLET ORAL
Qty: 30 TABLET | Refills: 2 | Status: SHIPPED | OUTPATIENT
Start: 2022-10-22

## 2022-10-22 RX ORDER — LEVOTHYROXINE SODIUM 0.05 MG/1
TABLET ORAL
Qty: 60 TABLET | Refills: 2 | Status: SHIPPED | OUTPATIENT
Start: 2022-10-22

## 2022-10-22 RX ORDER — CLOPIDOGREL BISULFATE 75 MG/1
TABLET ORAL
Qty: 30 TABLET | Refills: 10 | Status: SHIPPED | OUTPATIENT
Start: 2022-10-22

## 2022-10-26 ENCOUNTER — HOSPITAL ENCOUNTER (OUTPATIENT)
Age: 67
Discharge: HOME | End: 2022-10-26
Payer: MEDICARE

## 2022-10-26 LAB
ANION GAP SERPL CALCULATED.3IONS-SCNC: 12 MMOL/L (ref 9–17)
BUN BLDV-MCNC: 14 MG/DL (ref 8–23)
CALCIUM SERPL-MCNC: 8.9 MG/DL (ref 8.6–10.4)
CHLORIDE BLD-SCNC: 107 MMOL/L (ref 98–107)
CO2: 23 MMOL/L (ref 20–31)
CREAT SERPL-MCNC: 0.72 MG/DL (ref 0.5–0.9)
GFR SERPL CREATININE-BSD FRML MDRD: >60 ML/MIN/1.73M2
GLUCOSE BLD-MCNC: 118 MG/DL (ref 70–99)
HCT VFR BLD CALC: 48.2 % (ref 36.3–47.1)
HEMOGLOBIN: 14.4 G/DL (ref 11.9–15.1)
MCH RBC QN AUTO: 28.4 PG (ref 25.2–33.5)
MCHC RBC AUTO-ENTMCNC: 29.9 G/DL (ref 28.4–34.8)
MCV RBC AUTO: 95.1 FL (ref 82.6–102.9)
NRBC AUTOMATED: 0 PER 100 WBC
PDW BLD-RTO: 13.4 % (ref 11.8–14.4)
PLATELET # BLD: 169 K/UL (ref 138–453)
PMV BLD AUTO: 10.8 FL (ref 8.1–13.5)
POTASSIUM SERPL-SCNC: 4.2 MMOL/L (ref 3.7–5.3)
RBC # BLD: 5.07 M/UL (ref 3.95–5.11)
SODIUM BLD-SCNC: 142 MMOL/L (ref 135–144)
WBC # BLD: 7.6 K/UL (ref 3.5–11.3)

## 2022-10-26 PROCEDURE — 80048 BASIC METABOLIC PNL TOTAL CA: CPT

## 2022-10-26 PROCEDURE — 85027 COMPLETE CBC AUTOMATED: CPT

## 2022-10-26 PROCEDURE — 36415 COLL VENOUS BLD VENIPUNCTURE: CPT

## 2022-11-01 ENCOUNTER — HOSPITAL ENCOUNTER (OUTPATIENT)
Dept: CARDIAC CATH/INVASIVE PROCEDURES | Age: 67
Discharge: HOME | End: 2022-11-01
Payer: MEDICARE

## 2022-11-01 VITALS
TEMPERATURE: 98.5 F | OXYGEN SATURATION: 91 % | RESPIRATION RATE: 14 BRPM | DIASTOLIC BLOOD PRESSURE: 52 MMHG | BODY MASS INDEX: 55.23 KG/M2 | HEART RATE: 75 BPM | SYSTOLIC BLOOD PRESSURE: 130 MMHG | HEIGHT: 57 IN | WEIGHT: 256 LBS

## 2022-11-01 DIAGNOSIS — I25.10 CAD, MULTIPLE VESSEL: Primary | ICD-10-CM

## 2022-11-01 LAB
GLUCOSE BLD-MCNC: 116 MG/DL (ref 65–105)
LV EF: 55 %
LVEF MODALITY: NORMAL

## 2022-11-01 PROCEDURE — C1769 GUIDE WIRE: HCPCS

## 2022-11-01 PROCEDURE — C1894 INTRO/SHEATH, NON-LASER: HCPCS

## 2022-11-01 PROCEDURE — 6360000002 HC RX W HCPCS

## 2022-11-01 PROCEDURE — 6360000004 HC RX CONTRAST MEDICATION

## 2022-11-01 PROCEDURE — 82947 ASSAY GLUCOSE BLOOD QUANT: CPT

## 2022-11-01 PROCEDURE — 7100000000 HC PACU RECOVERY - FIRST 15 MIN

## 2022-11-01 PROCEDURE — C1887 CATHETER, GUIDING: HCPCS

## 2022-11-01 PROCEDURE — 7100000001 HC PACU RECOVERY - ADDTL 15 MIN

## 2022-11-01 PROCEDURE — 93458 L HRT ARTERY/VENTRICLE ANGIO: CPT

## 2022-11-01 PROCEDURE — 2709999900 HC NON-CHARGEABLE SUPPLY

## 2022-11-01 RX ORDER — DIPHENHYDRAMINE HYDROCHLORIDE 50 MG/ML
50 INJECTION INTRAMUSCULAR; INTRAVENOUS ONCE
Status: COMPLETED | OUTPATIENT
Start: 2022-11-01 | End: 2022-11-01

## 2022-11-01 RX ORDER — SODIUM CHLORIDE 9 MG/ML
INJECTION, SOLUTION INTRAVENOUS PRN
Status: DISCONTINUED | OUTPATIENT
Start: 2022-11-01 | End: 2022-11-02 | Stop reason: HOSPADM

## 2022-11-01 RX ORDER — SODIUM CHLORIDE 0.9 % (FLUSH) 0.9 %
5-40 SYRINGE (ML) INJECTION EVERY 12 HOURS SCHEDULED
Status: DISCONTINUED | OUTPATIENT
Start: 2022-11-01 | End: 2022-11-02 | Stop reason: HOSPADM

## 2022-11-01 RX ORDER — SODIUM CHLORIDE 0.9 % (FLUSH) 0.9 %
5-40 SYRINGE (ML) INJECTION PRN
Status: DISCONTINUED | OUTPATIENT
Start: 2022-11-01 | End: 2022-11-02 | Stop reason: HOSPADM

## 2022-11-01 RX ORDER — METHYLPREDNISOLONE SODIUM SUCCINATE 125 MG/2ML
125 INJECTION, POWDER, LYOPHILIZED, FOR SOLUTION INTRAMUSCULAR; INTRAVENOUS ONCE
Status: COMPLETED | OUTPATIENT
Start: 2022-11-01 | End: 2022-11-01

## 2022-11-01 RX ADMIN — METHYLPREDNISOLONE SODIUM SUCCINATE 125 MG: 125 INJECTION, POWDER, LYOPHILIZED, FOR SOLUTION INTRAMUSCULAR; INTRAVENOUS at 10:28

## 2022-11-01 RX ADMIN — DIPHENHYDRAMINE HYDROCHLORIDE 50 MG: 50 INJECTION INTRAMUSCULAR; INTRAVENOUS at 10:27

## 2022-11-01 NOTE — PROGRESS NOTES
Patient admitted, consent signed, all questions answered. Pt ready for procedure. Bed in low position, call light to reach with side rails up 2 of 2. Groins  clipped. Katherine Mckinley RN present. Family  at bedside with patient.

## 2022-11-01 NOTE — PROGRESS NOTES
Vasc band removed and DSD to wrist. Discharge teaching completed with  present. IV removed. Ambulated to [facility] use of walker. Tolerated well.

## 2022-11-01 NOTE — OP NOTE
[facility]    CARDIAC CATHETERIZATION    Date:   11/1/2022  Patient name:  Ava Corbin  Date of admission:  11/1/2022  8:25 AM  MRN:   [id number]  YOB: 1955    Operators:  Primary:   ROLO Staples MD (Attending Physician)    Assistant/CV fellow:  Natalia See MD      Procedure performed:    [x] Left Heart Catheterization. [] Graft Angiography. [x] Left Ventriculography. [] Right Heart Catheterization. [x] Coronary Angiography. [] Aortic Valve Studies. [] PCI:      [] Other:       Pre Procedure Conscious Sedation Data:  ASA Class:    [] I [x] II [] III [] IV    Mallampati Class:  [x] I [] II [] III [] IV      Indication:  [] STEMI      [x] + Stress test  [] ACS      [] + EKG Changes  [] Non Q MI       [] Significant Risk Factors  [] Recurrent Angina             [] Diabetes Mellitus    [] New LBBB      [] Uncontrolled HTN. [] CHF / Low EF changes     [] Abnormal CTA / Ca Score      Procedure:  Access:  [x] Femoral  [] Radial  artery       [] Right  [] Left    Procedure: After informed consent was obtained with explanation of the risks and benefits, patient was brought to the cath lab. The access area was prepped and draped in sterile fashion. 1% lidocaine was used for local block. The artery was cannulated with 6  Fr sheath with brisk arterial blood return. The side port was frequently flushed and aspirated with normal saline. Findings:   Angiographic Findings        Cardiac Arteries and Lesion Findings       LMCA: has distal 20% stenosis. LAD: has mid 85% stenosis. The D1 has 30% stenosis. LCx: has ostial eccentric 80% stenosis. The OM1 has proximal patent stent. RCA: has patent mid stent. There is distal 70% stenosis. Ramus: has proximal 80% stenosis.       Coronary Tree        Dominance: Right       LV Analysis   LV function assessed as:Normal.   Ejection Fraction   +----------------------------------------------------------------------+---+ !Method                                                                ! EF%! +----------------------------------------------------------------------+---+   ! LV gram                                                               !55 !   +----------------------------------------------------------------------+---+      LV Segment Contractility        Procedure Summary        Three vessel coronary artery disease. Patient has very tortuous arteries. Normal LV systolic function; LVEF 19%. Recommendations        Medical therapy as needed. Risk factor modification. Elective CV surgical consultation. Estimated Blood Loss: 10 mL      ____________________________________________________________________    History and Risk Factors    [x] Hypertension     [] Family history of CAD  [x] Hyperlipidemia     [] Cerebrovascular Disease   [] Prior MI       [] Peripheral Vascular disease   [x] Prior PCI              [] Diabetes Mellitus    [] Left Main PCI. [] Currently on Dialysis. [] Prior CABG. [] Currently smoker. [] Cardiac Arrest outside of healthcare facility. [] Yes    [x] No        Witnessed     [] Yes   [] No     Arrest after arrival of EMS  [] Yes   [] No     [] Cardiac Arrest at other Facility. [] Yes   [] No    Pre-Procedure Information. Heart Failure       [] Yes    [x] No        Class  [] I      [] II  [] III    [] IV. New Diagnosis    [] Yes  [] No    HF Type      [] Systolic   [] Diastolic          [] Unknown. Diagnostic Test:   EKG       [] Normal   [x] Abnormal    New antiarrhythmia medications:    [] Yes   [] No   New onset atrial fibrillation / Flutter     [] Yes   [] No   ECG Abnormalities:      [] V. Fib   [] Sol V. Tach           [] NS V. T   [] New LBBB           [] T.  Inv  []  ST dev > 0.5 mm         [] PVC's freq  [] PVC's infrequent    Stress Test Performed:      [x] Yes    [] No     Type:     [] Stress Echo   [] Exercise Stress Test (no imaging)      [x] Stress Nuclear  [] Stress Imaging     Results   [] Negative   [x] Positive        [] Indeterminate  [] Unavailable     If Positive/ Risk / Extent of Ischemia:       [] Low  [] Intermediate         [x] High  [] Unavailable      Cardiac CTA Performed:     [] Yes    [] No      Results   [] CAD   [] Non obstructive CAD      [] No CAD   [] Uncertain      [] Unknown   [] Structural Disease. Pre Procedure Medications:   [x] Yes    [] No         [x] ASA   [] Beta Blockers      [] Nitrate   [] Ca Channel Blockers      [] Ranolazine   [] Statin       [] Plavix/Others antiplatelets      Electronically signed on 11/1/2022 at 12:57 PM by:    Eleanor Medina MD  Fellow, [address]    I was present during entire procedure and performed all critical elements of the procedure.     Hortensia Crawford MD

## 2022-11-01 NOTE — PROGRESS NOTES
Received post cardiac catheterization procedure to [facility] room 02. Assessment obtained. Restrictions reviewed with patient. Post procedure pathway initiated. Rt. radial site soft , No hematoma noted. Family at side. Patient without complaints.

## 2022-11-01 NOTE — H&P
Attestation signed by      Attending Physician Statement:    I have discussed the care of  [employer] , including pertinent history and exam findings, with the Cardiology fellow/resident. I have seen and examined the patient and the key elements of all parts of the encounter have been performed by me. I agree with the assessment, plan and orders as documented by the fellow/resident, after I modified exam findings and plan of treatments, and the final version is my approved version of the assessment. Additional Comments: Chest pain, abnormal ECG. Proceed with cardiac cath. Consent obtained. Yamel Doty MD       [facility]    Consult / H&P               Today's Date: 11/1/2022  Patient Name: Jose Hinds  Date of admission: No admission date for patient encounter. Patient's age: 78 y.o., 1955  Admission Dx: No admission diagnoses are documented for this encounter. Reason for Consult:  Cardiac evaluation    Requesting Physician: No admitting provider for patient encounter. CHIEF COMPLAINT:  Elective cath     History Obtained From:  patient    HISTORY OF PRESENT ILLNESS:      The patient is a 78 y.o. is here for elective cardiac cath   She has h/o CAD s/p PCI as below. She had episodes of chest pain and had stress test done and it was abnormal as below. Past Medical History:   has a past medical history of Anxiety, Borderline hypertension, Depression, GERD (gastroesophageal reflux disease), Heart attack ([address] Hypothyroidism, Neuropathy, Obesity, Osteoarthritis, Other cirrhosis of liver ([address] Sleep apnea, Type II or unspecified type diabetes mellitus without mention of complication, not stated as uncontrolled, and Vertebrogenic low back pain. Past Surgical History:   has a past surgical history that includes Hysterectomy; Colonoscopy; Upper gastrointestinal endoscopy; Dilation and curettage of uterus; hiatal hernia repair;  Throat surgery; Appendectomy; Total knee arthroplasty (Right, 01/06/2015); Total knee arthroplasty (Left, 5/26/15); and Coronary angioplasty with stent (07/2020). Home Medications:    Prior to Admission medications    Medication Sig Start Date End Date Taking? Authorizing Provider   lisinopril (PRINIVIL;ZESTRIL) 10 MG tablet TAKE 1 TABLET BY MOUTH EVERY DAY 10/22/22   Vitaly Sheikh MD   empagliflozin (JARDIANCE) 10 MG tablet TAKE 1 TABLET BY MOUTH EVERY DAY 10/22/22   Vitaly Sheikh MD   clopidogrel (PLAVIX) 75 MG tablet TAKE 1 TABLET BY MOUTH ONCE DAILY 10/22/22   Vitaly Sheikh MD   levothyroxine (SYNTHROID) 50 MCG tablet TAKE 1 TABLET BY MOUTH EVERY DAY 10/22/22   Vitaly Sheikh MD   carvedilol (COREG) 6.25 MG tablet TAKE 1 TABLET BY MOUTH TWICE DAILY WITH MEALS *EMERGENCY REFILL* 9/29/22   Vitaly Sheikh MD   citalopram (CELEXA) 40 MG tablet TAKE 1 TABLET BY MOUTH ONCE DAILY *EMERGENCY REFILL* 9/29/22   Vitaly Sheikh MD   glimepiride (AMARYL) 4 MG tablet TAKE 1 TABLET BY MOUTH TWICE DAILY *EMERGENCY REFILL* 9/29/22   Vitaly Sheikh MD   omeprazole (PRILOSEC) 20 MG delayed release capsule TAKE 1 CAPSULE BY MOUTH ONCE DAILY 9/22/22   Paulette Turner DO   isosorbide mononitrate (IMDUR) 60 MG extended release tablet TAKE 1 TABLET BY MOUTH ONCE DAILY 5/12/22   Rene Mcdaniel MD   amLODIPine (NORVASC) 5 MG tablet TAKE 1 TABLET BY MOUTH EVERY DAY 5/12/22   Rene Mcdaniel MD   gabapentin (NEURONTIN) 400 MG capsule TAKE 1 CAPSULE BY MOUTH THREE TIMES DAILY 4/22/22 5/22/22  Rene Mcdaniel MD   sucralfate (CARAFATE) 1 GM tablet TAKE 1 TABLET BY MOUTH EVERY DAY 4/20/22   Rene Mcdaniel MD   busPIRone (BUSPAR) 10 MG tablet TAKE ONE (1) TABLET BY MOUTH TWICE DAILY 3/11/22   Rene Mcdaniel MD   insulin glargine (BASAGLAR KWIKPEN) 100 UNIT/ML injection pen Inject 65 units subcutaneously once daily.  3/1/22   Rene Mcdaniel MD   aspirin (ASPIRIN LOW DOSE) 81 MG EC tablet TAKE 1 TABLET BY MOUTH ONCE DAILY 2/4/22   Blessing Santiago MD   atorvastatin (LIPITOR) 40 MG tablet Take 1 tablet by mouth nightly 12/10/21   Blessing Santiago MD   miconazole (MICOTIN) 2 % cream APPLY TO AFFECTED AREA TWICE DAILY 11/12/21   Blessing Santiago MD   acetaminophen (TYLENOL) 500 MG tablet TAKE TWO TABLETS BY MOUTH THREE TIMES A DAY AS NEEDED FOR PAIN 10/21/21   Jessi Lopez MD   lidocaine (XYLOCAINE) 2 % jelly Apply topically with dressing changes every 3 days 8/5/21   Blessing Santiago MD   melatonin 10 MG CAPS capsule TAKE 1 CAPSULE BY MOUTH NIGHTLY AS NEEDED FOR SLEEP 8/5/21   Blessing Santiago MD   blood glucose monitor strips Test before meals and at bedtime. DX: DM, on insulin 10/8/20   Blessing Santiago MD   miconazole (ZEASORB-AF) 2 % powder Apply topically 2 times daily. 10/1/20   Blessing Santiago MD   blood glucose monitor kit and supplies Dispense sufficient amount for indicated testing frequency plus additional to accommodate PRN testing needs. Dispense all needed supplies to include: monitor, strips, lancing device, lancets, control solutions, alcohol swabs. 8/17/20   Bandar Chase MD   magnesium hydroxide (MILK OF MAGNESIA) 400 MG/5ML suspension Take 30 mLs by mouth daily as needed for Constipation 7/29/20   Bandar Chase MD   nitroGLYCERIN (NITROSTAT) 0.4 MG SL tablet up to max of 3 total doses. If no relief after 1 dose, call 911. 7/21/20   Orchauncey Nur APRN - CNP   Handicap Placard MISC Is a permanent condition. DX: M19.90 5/14/19   Blessing Santiago MD   Insulin Pen Needle (B-D UF III MINI PEN NEEDLES) 31G X 5 MM MISC USE 1 PEN NEEDLE DAILY 3/8/18   Blessing Santiago MD   [employer] Lancets Thin MISC Test before meals and at bedtime.  DX: DM, on insulin 4/22/14   Corey Desai MD   MULTIPLE VITAMIN PO Take 2 tablets by mouth daily DAILY    Historical Provider, MD   Alcohol Swabs (ALCOHOL PREP PADS) by Other route 2 times daily      Historical Provider, MD      No current facility-administered medications for this encounter. Allergies:  Shellfish-derived products, Morphine, and Tape [adhesive tape]    Social History:   reports that she has never smoked. She has never used smokeless tobacco. She reports current alcohol use. She reports that she does not use drugs. Family History: family history includes Arthritis in her father and mother; Cancer in her father; Diabetes in her sister; Heart Disease in her father and mother. No h/o sudden cardiac death. No for premature CAD    REVIEW OF SYSTEMS:    Constitutional: there has been no unanticipated weight loss. There's been No change in energy level, No change in activity level. Eyes: No visual changes or diplopia. No scleral icterus. ENT: No Headaches  Cardiovascular: No cardiac history  Respiratory: No previous pulmonary problems, No cough  Gastrointestinal: No abdominal pain. No change in bowel or bladder habits. Genitourinary: No dysuria, trouble voiding, or hematuria. Musculoskeletal:  No gait disturbance, No weakness or joint complaints. Integumentary: No rash or pruritis. Neurological: No headache, diplopia, change in muscle strength, numbness or tingling. No change in gait, balance, coordination, mood, affect, memory, mentation, behavior. Psychiatric: No anxiety, or depression. Endocrine: No temperature intolerance. No excessive thirst, fluid intake, or urination. No tremor. Hematologic/Lymphatic: No abnormal bruising or bleeding, blood clots or swollen lymph nodes. Allergic/Immunologic: No nasal congestion or hives. PHYSICAL EXAM:      There were no vitals taken for this visit. Constitutional and General Appearance: alert, cooperative, no distress and appears stated age  [de-identified]: PERRL, no cervical lymphadenopathy. No masses palpable. Normal oral mucosa  Respiratory:  Normal excursion and expansion without use of accessory muscles  Resp Auscultation: Good respiratory effort.  No for increased work of breathing. On auscultation: clear to auscultation bilaterally  Cardiovascular: The apical impulse is not displaced  Heart tones are crisp and normal. regular S1 and S2.  Jugular venous pulsation Normal  The carotid upstroke is normal in amplitude and contour without delay or bruit  Peripheral pulses are symmetrical and full   Abdomen:   No masses or tenderness  Bowel sounds present  Extremities:   No Cyanosis or Clubbing   Lower extremity edema: No   Skin: Warm and dry  Neurological:  Alert and oriented. Moves all extremities well  No abnormalities of mood, affect, memory, mentation, or behavior are noted    DATA:        CBC: No results for input(s): WBC, HGB, HCT, PLT in the last 72 hours. BMP: No results for input(s): NA, K, CO2, BUN, CREATININE, LABGLOM, GLUCOSE in the last 72 hours. BNP: No results for input(s): BNP in the last 72 hours. PT/INR: No results for input(s): PROTIME, INR in the last 72 hours. APTT:No results for input(s): APTT in the last 72 hours. CARDIAC ENZYMES:No results for input(s): CKTOTAL, CKMB, CKMBINDEX, TROPONINI in the last 72 hours. FASTING LIPID PANEL:  Lab Results   Component Value Date/Time    HDL 47 02/17/2022 09:30 AM    TRIG 88 02/17/2022 09:30 AM     LIVER PROFILE:No results for input(s): AST, ALT, LABALBU in the last 72 hours.     IMPRESSION:    Patient Active Problem List   Diagnosis    Anxiety    GERD (gastroesophageal reflux disease)    OA (osteoarthritis)    Neuropathy    Right knee DJD    Hypothyroidism    S/P left total knee arthroplasty    Ischemic heart disease    Essential hypertension    NSTEMI (non-ST elevated myocardial infarction) ([facility]    Other cirrhosis of liver (HCC)    Elevated lipase    Depression    Type 2 diabetes mellitus, with long-term current use of insulin (HCC)    SHERITA (obstructive sleep apnea)    Morbid obesity with BMI of 45.0-49.9, adult ([facility]    PVD (peripheral vascular disease) ([facility])    Numbness and tingling of both feet    Long term (current) use of antithrombotics/antiplatelets    Painful diabetic neuropathy (HCC)    Vertebrogenic low back pain    Spinal stenosis of lumbar region with neurogenic claudication     09/16/2022   High Risk (>3% annual death or MI)  Impression  Large size moderate intensity anterior and lateral ischemia. No infarction. GI uptake. Normal LV systolic function; LVE U35%      Chest pain with abnormal nuclear stress test   H/o CAD s/p PCI to RCA and LCx (CATH 7/20/2020 LM 0%, LAD 30% mid, D1 25%, LCx ostial 40%, Mid 90% SHAKIR, OM 1 minimal, RCA 80% SHAKIR, LVEF 55%)  HTN   DM     RECOMMENDATIONS:  Proceed with planned procedure  Follow post cath orders. Pre Procedure Conscious Sedation Data:  ASA Class:    [] I [x] II [] III [] IV    Mallampati Class:  [] I [] II [] III [] IV        Discussed with patient and Nurse.     Electronically signed by Vega Ingram MD on 11/1/2022 at 7:29 [address] [place] [address] [facility]

## 2022-11-01 NOTE — DISCHARGE INSTRUCTIONS
DISCHARGE INSTRUCTIONS / ARM CARE POST CATHERIZATION        ENCOURAGE FLUIDS    NO STRENUOUS LIFTING WITH AFFECTED ARM FOR 3 DAYS ANYTHING HEAVIER THAN 8 TO 10 POUNDS    REMOVE BAND-AID/PRESSURE DRESSING THE FOLLOWING DAY AND DO NOT APPLY ANY FURTHER BAND-AIDS    KEEP INCISION CLEAN DRY AND OPEN TO THE AIR / NO HAND LOTION NEAR PUNCTURE SITE    WATCH FOR SIGNS OF INFECTION /  REDNESS / SWELLING / DRAINAGE / Cordelia Lambert / TEMPERATURE GREATER THAN 101    IF BLEEDING OCCURS HOLD MANUAL PRESSURE DIRECTLY OVER SITE  (YOU WILL FEEL PULSATION OF ARTERY) AND IF BLEEDING DOES NOT STOP AFTER 2 MINUTES CALL 911    OK TO SHOWER THE NEXT DAY, NO TUB BATHING OR HOT TUBS/SWIMMING FOR 7 DAYS    IF AREA BECOMES HARD AND SWOLLEN AND IF YOU ARE AT ALL CONCERNED SEEK HELP IMMEDIATELY    SEEK HELP IMMEDIATELY IF AFFECTED ARM BECOMES COLD / Rolene Satinder / SEVERE PAIN / NAILBEDS TURN BLUE     IF ON METFORMIN / GLUCOPHAGE DO NOT RESTART MEDICATION FOR 48 HOURS    PLEASE PRACTICE GOOD HAND WASHING AND INCLUDE PUNCTURE SITE ESPECIALLY AFTER USING THE RESTROOM    AVOID USING ALCOHOL BASED HAND SANITIZERS FOR ONE WEEK      CALL 911 if you have symptoms including:   Drooping facial muscles   Changes in vision or speech   Difficulty walking or using your limbs   Change in sensation to affected leg, including numbness, feeling cold, or change in color   Extreme sweating, nausea or vomiting   Dizziness or lightheadedness   Chest pain   Rapid, irregular heartbeat   Palpitations   Cough, shortness of breath, or difficulty breathing   Weakness or fainting   If you think you have an emergency, CALL 911 . SEDATION / ANALGESIA INFORMATION / Katharine Joyner 86 have received the sedation/analgesia medication during your visit    Sedation/analgesia is used during short medical procedures under controlled supervision. The medication will produce a strong relaxation.  You will be able to hear, speak and follow instructions, but your memory and alertness will be decreased. You will be able to swallow and breathe on your own. During sedation/analgesia your blood pressure, heart and breathing will be watched closely. After the procedure, you may not remember what was said or done. You may have the following effects from the medication. \" Drowsiness, dizziness, sleepiness or confusion. \" Difficulty remembering or delayed reaction times. \" Loss of fine muscle control or difficulty with your balance especially while walking. \" Difficulty focusing or blurred vision. You may not be aware of slight changes in your behavior and/or your reaction time because of the medication used during the procedure. Therefore you should follow these instructions. \" Have someone responsible help you with your care. \" Do not drive for 24 hours. \" Do not operate equipment for 24 hours (lawnmowers, power tools, kitchen accessories, stove). \" Do not drink any alcoholic beverages for a minimum of 24 hours. \" Do not make important personal, legal or business decisions for 24 hours. \" You may experience dizziness or lightheadedness. Move slowly and carefully, do not make sudden position changes. \" Drink extra amounts of fluids today. \" Increase your diet as tolerated (unless you have received specific instructions from your doctor). \" If you feel nauseated, continue with liquids until the nausea is gone. \" Notify your physician if you have not urinated within 8 hours after the procedure. \" Resume your medications unless otherwise instructed. Coronary artery disease (CAD) occurs when plaque builds up in the arteries that bring oxygen-rich blood to your heart. Plaque is a fatty substance made of cholesterol, calcium, and other substances in the blood. This process is called hardening of the arteries, or atherosclerosis. What happens when you have coronary artery disease? Plaque may narrow the coronary arteries. Narrowed arteries cause poor blood flow.  This can lead to angina symptoms such as chest pain or discomfort. If blood flow is completely blocked, you could have a heart attack. You can slow CAD and reduce the risk of future problems by making changes in your lifestyle. These include quitting smoking and eating heart-healthy foods. Treatments for CAD, along with changes in your lifestyle, can help you live a longer and healthier life. How can you prevent coronary artery disease? Do not smoke. It may be the best thing you can do to prevent heart disease. If you need help quitting, talk to your doctor about stop-smoking programs and medicines. These can increase your chances of quitting for good. Be active. Get at least 30 minutes of exercise on most days of the week. Walking is a good choice. You also may want to do other activities, such as running, swimming, cycling, or playing tennis or team sports. Eat heart-healthy foods. Eat more fruits and vegetables and less foods that contain saturated and trans fats. Limit alcohol, sodium, and sweets. Stay at a healthy weight. Lose weight if you need to. Manage other health problems such as diabetes, high blood pressure, and high cholesterol. Talk to your doctor about taking a daily aspirin. Manage stress. Stress can hurt your heart. To keep stress low, talk about your problems and feelings. Don't keep your feelings hidden. How is coronary artery disease treated? Your doctor will suggest that you make lifestyle changes. For example, your doctor may ask you to eat healthy foods, quit smoking, lose extra weight, and be more active. You will have to take medicines. Your doctor may suggest a procedure to open narrowed or blocked arteries. This is called angioplasty. Or your doctor may suggest using healthy blood vessels to create detours around narrowed or blocked arteries. This is called bypass surgery. Follow-up care is a key part of your treatment and safety.  Be sure to make and go to all appointments, and call your doctor if you are having problems. It's also a good idea to know your test results and keep a list of the medicines you take. Where can you learn more? Go to https://fide.[employer] org and sign in to your [employer] account. Enter [phone] in the [employer] box to learn more about Learning About Coronary Artery Disease (CAD).     If you do not have an account, please click on the Sign Up Now link.

## 2022-11-14 ENCOUNTER — HOSPITAL ENCOUNTER (OUTPATIENT)
Dept: CT IMAGING | Age: 67
Discharge: HOME | End: 2022-11-16
Payer: MEDICARE

## 2022-11-14 ENCOUNTER — HOSPITAL ENCOUNTER (OUTPATIENT)
Dept: NON INVASIVE DIAGNOSTICS | Age: 67
Discharge: HOME | End: 2022-11-14
Payer: MEDICARE

## 2022-11-14 ENCOUNTER — HOSPITAL ENCOUNTER (OUTPATIENT)
Dept: VASCULAR LAB | Age: 67
Discharge: HOME | End: 2022-11-14
Payer: MEDICARE

## 2022-11-14 DIAGNOSIS — I25.10 CAD, MULTIPLE VESSEL: ICD-10-CM

## 2022-11-14 LAB
LV EF: 55 %
LVEF MODALITY: NORMAL

## 2022-11-14 PROCEDURE — 93306 TTE W/DOPPLER COMPLETE: CPT

## 2022-11-14 PROCEDURE — 93880 EXTRACRANIAL BILAT STUDY: CPT

## 2022-11-14 PROCEDURE — 93931 UPPER EXTREMITY STUDY: CPT

## 2022-11-14 PROCEDURE — 93970 EXTREMITY STUDY: CPT

## 2022-11-14 PROCEDURE — 71250 CT THORAX DX C-: CPT

## 2022-11-16 RX ORDER — ATORVASTATIN CALCIUM 40 MG/1
40 TABLET, FILM COATED ORAL NIGHTLY
Qty: 90 TABLET | Refills: 1 | Status: ON HOLD | OUTPATIENT
Start: 2022-11-16

## 2022-11-16 NOTE — TELEPHONE ENCOUNTER
Please address the medication refill and close the encounter. If I can be of assistance, please route to the applicable pool. Thank you. Last visit: 8-26-22  Last Med refill: 12-10-21  Does patient have enough medication for 72 hours: No:     Next Visit Date:  Future Appointments   Date Time Provider Osmar Hutchinson   11/17/2022  9:45 AM Madhavi Fulton MD [facility] [address] Maintenance   Topic Date Due    Hepatitis B vaccine (1 of 3 - Risk 3-dose series) Never done    Shingles vaccine (1 of 2) Never done    Diabetic retinal exam  01/01/2014    Pneumococcal 65+ years Vaccine (2 - PCV) 01/07/2016    Hepatitis A vaccine (2 of 2 - Risk 2-dose series) 02/17/2021    COVID-19 Vaccine (2 - Wadie Smith series) 05/13/2021    Diabetic foot exam  08/17/2021    Annual Wellness Visit (AWV)  Never done    Flu vaccine (1) 08/01/2022    Breast cancer screen  08/20/2022    Depression Monitoring  02/01/2023    Diabetic microalbuminuria test  02/17/2023    Lipids  02/17/2023    A1C test (Diabetic or Prediabetic)  08/26/2023    DTaP/Tdap/Td vaccine (6 - Td or Tdap) 06/27/2024    Colorectal Cancer Screen  08/01/2024    DEXA (modify frequency per FRAX score)  Completed    Hepatitis C screen  Completed    Hib vaccine  Aged Out    Meningococcal (ACWY) vaccine  Aged Out       Hemoglobin A1C (%)   Date Value   08/26/2022 7.3   02/01/2022 6.7   02/04/2021 7.5             ( goal A1C is < 7)   Microalb/Crt.  Ratio (mcg/mg creat)   Date Value   02/17/2022 39 (H)     LDL Cholesterol (mg/dL)   Date Value   02/17/2022 45   07/28/2020 60       (goal LDL is <100)   AST (U/L)   Date Value   07/29/2020 27     ALT (U/L)   Date Value   07/29/2020 21     BUN (mg/dL)   Date Value   10/26/2022 14     BP Readings from Last 3 Encounters:   11/01/22 (!) 130/52   08/26/22 138/78   06/08/22 (!) 113/42          (goal 120/80)    All Future Testing planned in [facility]  Lab Frequency Next Occurrence   Initiate PAT Protocol Once 11/15/2022 Patient Active Problem List:     Anxiety     GERD (gastroesophageal reflux disease)     OA (osteoarthritis)     Neuropathy     Right knee DJD     Hypothyroidism     S/P left total knee arthroplasty     Ischemic heart disease     Essential hypertension     NSTEMI (non-ST elevated myocardial infarction) ([facility]     Other cirrhosis of liver (HCC)     Elevated lipase     Depression     Type 2 diabetes mellitus, with long-term current use of insulin (HCC)     SHERITA (obstructive sleep apnea)     Morbid obesity with BMI of 45.0-49.9, adult ([facility]     PVD (peripheral vascular disease) ([facility])     Numbness and tingling of both feet     Long term (current) use of antithrombotics/antiplatelets     Painful diabetic neuropathy ([facility]     Vertebrogenic low back pain     Spinal stenosis of lumbar region with neurogenic claudication

## 2022-11-17 ENCOUNTER — INITIAL CONSULT (OUTPATIENT)
Dept: CARDIOTHORACIC SURGERY | Age: 67
End: 2022-11-17
Payer: MEDICARE

## 2022-11-17 ENCOUNTER — TELEPHONE (OUTPATIENT)
Dept: FAMILY MEDICINE CLINIC | Age: 67
End: 2022-11-17

## 2022-11-17 VITALS
HEIGHT: 57 IN | BODY MASS INDEX: 55.23 KG/M2 | WEIGHT: 256 LBS | HEART RATE: 73 BPM | OXYGEN SATURATION: 99 % | TEMPERATURE: 97.2 F | DIASTOLIC BLOOD PRESSURE: 70 MMHG | SYSTOLIC BLOOD PRESSURE: 139 MMHG

## 2022-11-17 DIAGNOSIS — I25.10 CAD, MULTIPLE VESSEL: Primary | ICD-10-CM

## 2022-11-17 PROCEDURE — 3074F SYST BP LT 130 MM HG: CPT | Performed by: NURSE PRACTITIONER

## 2022-11-17 PROCEDURE — 1123F ACP DISCUSS/DSCN MKR DOCD: CPT | Performed by: NURSE PRACTITIONER

## 2022-11-17 PROCEDURE — 99204 OFFICE O/P NEW MOD 45 MIN: CPT | Performed by: NURSE PRACTITIONER

## 2022-11-17 PROCEDURE — 3078F DIAST BP <80 MM HG: CPT | Performed by: NURSE PRACTITIONER

## 2022-11-17 RX ORDER — SODIUM CHLORIDE, SODIUM LACTATE, POTASSIUM CHLORIDE, CALCIUM CHLORIDE 600; 310; 30; 20 MG/100ML; MG/100ML; MG/100ML; MG/100ML
1000 INJECTION, SOLUTION INTRAVENOUS CONTINUOUS
Status: CANCELLED | OUTPATIENT
Start: 2022-11-17

## 2022-11-17 NOTE — DISCHARGE INSTRUCTIONS
Preoperative Instructions:    Stop eating solid foods at MIDNIGHT the night prior to surgery. (This includes gum, mints, chewing tobacco). Please stop smoking 48 hours prior to surgery. Stop drinking clear liquids at MIDNIGHT the night prior to surgery. Arrive at the [address] [facility] center on 11/22/2022 as directed by your surgeon's office, 5:30 AM    Any arrival scheduled BEFORE 6 am goes straight to [address] which is the first floor (to not be confused with the MAIN floor). When you get off the elevator on the first floor, there will be a large sign on the locked doors to push the button and someone will ask you \"May I help you\". You are to tell them your name and that you are there for surgery. Any patient arriving at 6 am and after go to MAIN level registration in the heart center. PLEASE FOLLOW ALL MEDICATION INSTRUCTIONS (WHAT TO HOLD, etc) AS DIRECTED BY YOUR SURGEON! Surgeon's office has directed to discontinue Plavix x 5 days, and Lisinopril 1 day before    If applicable:  -If you have been given a blood band, you must bring it with you the day of surgery, unclasped.  -Please use routine inhalers and bring inhalers the day of surgery.   -Bring C-Pap/Bi-pap with you morning of surgery if planning on staying in the hospital overnight.  -Please do not take diabetic medications on the day of surgery. - Please do not wear any jewelry or body piercings day of surgery. 11/18/22  9:11 AM      ___________________  _______________________  Signature (Provider)              Signature (Patient)     Day of Surgery/Procedure    As a patient at [facility] you can expect quality medical and nursing care that is centered on your individual needs. Our goal is to make your surgical experience as comfortable as possible  . Directions to the [address] [place] [address])  [facility] located at [address] [facility], [facility], [address] [facility]   The [address] [place] [address]) is across the street from the [facility] hospital. You may park at the [address] [facility] [place] parking garage, or if handicapped there are closer spots in the surface lot directly in front of the [address] [place] [address]. The patients that arrive at 5:30 am need to report to the 1st floor nurse's station, otherwise please report to the main floor registration desk. Transportation after your procedure - If applicable  You will need a friend or family member to drive you home after your procedure. Your  must be 25years of age or older and able to sign off on your discharge instructions. Taxi cabs or any form of public transportation is not acceptable. It is preferable that the friend or family member stay at the hospital throughout your procedure. Someone must remain with you for the first 24 hours after your surgery if you receive anesthesia or medication. If you do not have someone to stay with you, your procedure may be cancelled. Patient Instructions    If you are having any type of anesthesia you are to have nothing to eat or drink after midnight the night before your surgery. This includes gum, mints, water or smoking or chewing tobacco.  The only exception to this is a small sip of water to take with any morning dose of heart, blood pressure, or seizure medications. Bring a list of all medications you take, along with the dose of the medications and how often you take it. If more convenient bring the pharmacy bottles in a zip lock bag. Please shower the night before and the morning of surgery with an antibacterial soap. Please use the wipes given to you the night before your surgery after your shower. Unless otherwise told by your physician, please do not shave legs or any part of your body below your neck the night before or day of your surgery. You may shave your face or neck.     Brush your teeth but do not swallow water.      Bring your inhaler if you are currently using one. Bring your eyeglasses and case with you. No contacts are to be worn the day of surgery. You also may bring your hearing aids. Bring your blood band if one has been given to you. Please do not close the clasp. If you are on C-PAP or Bi-PAP at home and plan on staying in the hospital overnight for your surgery please bring the machine with you. Do not wear any jewelry or body piercings day of surgery. Also, NO lotion, perfume or deodorant to be used the day of surgery. Do not bring any valuables, such as jewelry, cash or credit cards. If you are staying overnight with us, please bring a SMALL bag of personal items. We cannot accommodate large items, like suitcases. Please wear loose, comfortable clothing. If you are potentially going to have a cast or brace bring clothing that will fit over them. In case of illness - If you have cold or flu like symptoms (high fever, runny nose, sore throat, cough, etc.) rash, nausea, vomiting, loose stools, and/or recent contact with someone who has a contagious disease (chicken pox, measles, etc.) Please call your doctor before coming to the hospital.      If your child is having surgery please make arrangements for any other children to be cared for at home on the day of surgery. Other children are not permitted in recovery room and we want you to be able to spend time with the patient. If other arrangements are not available then we suggest that you have a second adult to stay in the waiting room.       If you have any other questions regarding your procedure or the day of surgery, please call [phone], or [phone]

## 2022-11-17 NOTE — PROGRESS NOTES
Anesthesia Focused Assessment    Hx of anesthesia complications:  no, but pt request something for nausea just in case  Family hx of anesthesia complications:  no    METS functional capacity:   -Poor <4 : unable to walk >/=2 blocks on level ground without stopping due to symptoms)    Prior + Covid-19 test? no  Patient vaccinated: yes        SHERITA:                                             yes  If yes, machine?:                          cpap- instructed to bring DOS, states does not always use    Type 1 DM:                                   no  T2DM:                                           yes    Coronary Artery Disease:             yes  Hypertension:                               yes  Defib / AICD / Pacemaker:           no    Cardiac cath: 11/1/22:   Conclusions     Procedure Summary     Three vessel coronary artery disease. Patient has very tortuous arteries. Normal LV systolic function; LVEF 54%. Recommendations      Medical therapy as needed. Risk factor modification. Elective CV surgical consultation. Signature    ----------------------------------------------------------------   Electronically signed by Khadijah Kerr Physician)   on 11/01/2022 13:43    Renal Failure:                               no  If yes, on dialysis? :                           Active smoker:                              no  Drinks Alcohol:                              rare  Illicit drugs:                                    no    Dentition:                                      benign     Past Medical History:   Diagnosis Date    Ambulates with cane     or walker    Anxiety     Borderline hypertension     CAD (coronary artery disease)     stents x 2 in 2020    Depression 07/28/2020    GERD (gastroesophageal reflux disease)     Heart attack ([facility] 07/18/2020    Hypertension     Hypothyroidism     Neuropathy     Obesity     morbid    Osteoarthritis     Other cirrhosis of liver ([facility] 07/27/2020    pt denies    Sleep apnea     possible    Type II or unspecified type diabetes mellitus without mention of complication, not stated as uncontrolled     Under care of service provider 11/18/2022    [facility]-last visit aug 2022    Under care of service provider 11/18/2022    cardiology-[facility]-last visit nov 2022    Vertebrogenic low back pain 03/29/2022     Patient was evaluated in PAT & anesthesia guidelines were applied. NPO guidelines and scheduled arrival time were reviewed with patient. Medication directions per surgeon. I advised patient/patient family to please contact surgeons office, ahead of time if possible, if any new signs or symptoms of illness, infection, rash, etc. Patient/ patient family verbalize understanding.                                                                                                                       Anesthesia contacted:   no    Medical or cardiac clearance ordered: no    HAO NEWSOME CNP  Electronically signed 11/18/2022 at 11:04 AM

## 2022-11-17 NOTE — PROGRESS NOTES
[facility] Cardiothoracic Surgery  Consult    Patient's Name/Date of Birth: Clarita Marcano / [birth year] (11 y.o.)    Date: November 17, 2022     Chief Complaint: MVCAD    HPI: Clarita Marcano is a 78 y.o.  female who . Presented back to cardiothoracic surgery after diagnosis of multivessel CAD was diagnosed in cardiac catheterization recently. Today she follows up for review and scheduling of CABG surgery. She states having a lot of anxiety regarding the surgery. Mild 2 out of 10 chest pain that comes and goes when her anxiety is bad. She is trying to be as active as she can around the house. Uses a walker due to bad osteoarthritis. Does not drink smoke or use any drugs. Family history of heart disease. History of family member having recent bypass surgery. Is patient on any AC or AP medication prior to CTS consult?   Plavix    ROS:   CONSTITUTIONAL: Anxious  Respiratory: negative  Cardiovascular: Mild chest pain when she is very anxious  Gastrointestinal: negative  Genitourinary:negative  Hematologic/lymphatic: negative  Musculoskeletal:negative  Neurological: negative  Endocrine: negative  Psychiatric: negative  Past Medical History:   Diagnosis Date    Anxiety     Borderline hypertension     Depression 7/28/2020    GERD (gastroesophageal reflux disease)     Heart attack ([facility] 07/18/2020    Hypothyroidism     Neuropathy     Obesity     morbid    Osteoarthritis     Other cirrhosis of liver ([facility] 7/27/2020    Sleep apnea     possible    Type II or unspecified type diabetes mellitus without mention of complication, not stated as uncontrolled     Vertebrogenic low back pain 3/29/2022     Past Surgical History:   Procedure Laterality Date    APPENDECTOMY      CARDIAC CATHETERIZATION      COLONOSCOPY      CORONARY ANGIOPLASTY WITH STENT PLACEMENT  07/2020    DILATION AND CURETTAGE OF UTERUS      HIATAL HERNIA REPAIR      HYSTERECTOMY (CERVIX STATUS UNKNOWN)      THROAT SURGERY      POLYPS REMOVED FROM VOCAL CORD    TOTAL KNEE ARTHROPLASTY Right 01/06/2015    TOTAL KNEE ARTHROPLASTY Left 05/26/2015    UPPER GASTROINTESTINAL ENDOSCOPY       Allergies   Allergen Reactions    Shellfish-Derived Products Nausea Only    Morphine Other (See Comments)     HALLUCINATIONS      Tape Ramses Pedro Luis Tape] Rash     Family History   Problem Relation Age of Onset    Heart Disease Mother     Arthritis Mother     Heart Disease Father     Arthritis Father     Cancer Father         \"oral\"    Diabetes Sister         gestational     Social History     Socioeconomic History    Marital status:      Spouse name: Vincent Chacon    Number of children: 0    Years of education: Not on file    Highest education level: Not on file   Occupational History    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     Employer: [employer]   Tobacco Use    Smoking status: Never    Smokeless tobacco: Never   Vaping Use    Vaping Use: Never used   Substance and Sexual Activity    Alcohol use: Yes     Comment: once a month    Drug use: No    Sexual activity: Yes     Partners: Male   Other Topics Concern    Not on file   Social History Narrative    Not on file     Social Determinants of Health     Financial Resource Strain: Not on file   Food Insecurity: Not on file   Transportation Needs: Not on file   Physical Activity: Not on file   Stress: Not on file   Social Connections: Not on file   Intimate Partner Violence: Not on file   Housing Stability: Not on file       Current Outpatient Medications   Medication Sig Dispense Refill    atorvastatin (LIPITOR) 40 MG tablet Take 1 tablet by mouth nightly 90 tablet 1    APPLE CIDER VINEGAR PO Take by mouth      lisinopril (PRINIVIL;ZESTRIL) 10 MG tablet TAKE 1 TABLET BY MOUTH EVERY DAY 30 tablet 2    empagliflozin (JARDIANCE) 10 MG tablet TAKE 1 TABLET BY MOUTH EVERY DAY 30 tablet 2    clopidogrel (PLAVIX) 75 MG tablet TAKE 1 TABLET BY MOUTH ONCE DAILY 30 tablet 10    levothyroxine (SYNTHROID) 50 MCG tablet TAKE 1 TABLET BY MOUTH EVERY DAY 60 tablet 2    carvedilol (COREG) 6.25 MG tablet TAKE 1 TABLET BY MOUTH TWICE DAILY WITH MEALS *EMERGENCY REFILL* 60 tablet 3    citalopram (CELEXA) 40 MG tablet TAKE 1 TABLET BY MOUTH ONCE DAILY *EMERGENCY REFILL* 30 tablet 5    glimepiride (AMARYL) 4 MG tablet TAKE 1 TABLET BY MOUTH TWICE DAILY *EMERGENCY REFILL* 60 tablet 5    omeprazole (PRILOSEC) 20 MG delayed release capsule TAKE 1 CAPSULE BY MOUTH ONCE DAILY 30 capsule 10    isosorbide mononitrate (IMDUR) 60 MG extended release tablet TAKE 1 TABLET BY MOUTH ONCE DAILY 30 tablet 10    amLODIPine (NORVASC) 5 MG tablet TAKE 1 TABLET BY MOUTH EVERY DAY 30 tablet 10    sucralfate (CARAFATE) 1 GM tablet TAKE 1 TABLET BY MOUTH EVERY DAY 30 tablet 10    busPIRone (BUSPAR) 10 MG tablet TAKE ONE (1) TABLET BY MOUTH TWICE DAILY 60 tablet 10    insulin glargine (BASAGLAR KWIKPEN) 100 UNIT/ML injection pen Inject 65 units subcutaneously once daily. 15 pen 3    aspirin (ASPIRIN LOW DOSE) 81 MG EC tablet TAKE 1 TABLET BY MOUTH ONCE DAILY 30 tablet 10    miconazole (MICOTIN) 2 % cream APPLY TO AFFECTED AREA TWICE DAILY 1 each 1    acetaminophen (TYLENOL) 500 MG tablet TAKE TWO TABLETS BY MOUTH THREE TIMES A DAY AS NEEDED FOR PAIN 180 tablet 0    lidocaine (XYLOCAINE) 2 % jelly Apply topically with dressing changes every 3 days 1 Tube 1    melatonin 10 MG CAPS capsule TAKE 1 CAPSULE BY MOUTH NIGHTLY AS NEEDED FOR SLEEP 30 capsule 2    blood glucose monitor strips Test before meals and at bedtime. DX: DM, on insulin 100 strip 11    miconazole (ZEASORB-AF) 2 % powder Apply topically 2 times daily. 45 g 1    blood glucose monitor kit and supplies Dispense sufficient amount for indicated testing frequency plus additional to accommodate PRN testing needs. Dispense all needed supplies to include: monitor, strips, lancing device, lancets, control solutions, alcohol swabs.  1 kit 0    magnesium hydroxide (MILK OF MAGNESIA) 400 MG/5ML suspension Take 30 mLs by mouth daily as needed for Constipation 900 mL 0    nitroGLYCERIN (NITROSTAT) 0.4 MG SL tablet up to max of 3 total doses. If no relief after 1 dose, call 911. 25 tablet 3    Handicap Placard MISC Is a permanent condition. DX: M19.90 1 each 0    Insulin Pen Needle (B-D UF III MINI PEN NEEDLES) 31G X 5 MM MISC USE 1 PEN NEEDLE DAILY 50 each 1    [employer] Lancets Thin MISC Test before meals and at bedtime. DX: DM, on insulin 100 each 11    MULTIPLE VITAMIN PO Take 2 tablets by mouth daily DAILY      Alcohol Swabs (ALCOHOL PREP PADS) by Other route 2 times daily        gabapentin (NEURONTIN) 400 MG capsule TAKE 1 CAPSULE BY MOUTH THREE TIMES DAILY 90 capsule 0     No current facility-administered medications for this visit. Physical Exam:  Vitals:    11/17/22 1023   BP: 139/70   Pulse: 73   Temp: 97.2 °F (36.2 °C)   SpO2: 99%     Weight: Weight: 256 lb (116.1 kg)    Weight: 256 lb (116.1 kg)        General: Alert and Oriented x3. Sitting up in bed. No apparent distress. Morbid obese  HEENT:  Normocephalic and atraumatic. PERRL. EOMI. Lips and oral mucosa moist and without lesions. Neck:  Supple. Trachea midline. Chest:  No abnormality. Equal and symmetric expansion with respiration. Lungs:  Clear to auscultation. Cardiac:  Regular rate and rhythm without murmurs, rubs or gallops. Abdomen:  Soft, non-tender, normoactive bowel sounds. No masses or organomegaly. Extremities:  No cyanosis, clubbing, or edema. Intact pulses in all four extremities. Musculoskeletal:  Intact range of motion of peripheral joints. Normal muscular strength. Neurologic:  Cranial nerves are grossly intact. Non-focal sensory deficits on exam.  Psychiatric: Mood and affect are appropriate. Imaging Studies:    Cardiac Cath:  LMCA: has distal 20% stenosis. LAD: has mid 85% stenosis. The D1 has 30% stenosis. LCx: has ostial eccentric 80% stenosis. The OM1 has proximal patent stent. RCA: has patent mid stent.  There is distal 70% stenosis. Ramus: has proximal 80% stenosis.      Echo:Normal LV size, wall thickness and wall motions  EF > 55%  Normal RV size and function  Normal size LA and RA  AV is sclerotic opens well, mean gradient 7 mmHg, no AR  MAC noted, no MS no MR  Normal aortic root dimensions  No significant pericardial effusion seen  Normal IC diameter normal respiratory variations suggestive of normal RA  filling pressure       CT:  reviewed with no concerns noted    Prior Labs reviewed:   No labs of concerns noted     Assessment   Patient Active Problem List   Diagnosis    Anxiety    GERD (gastroesophageal reflux disease)    OA (osteoarthritis)    Neuropathy    Right knee DJD    Hypothyroidism    S/P left total knee arthroplasty    Ischemic heart disease    Essential hypertension    NSTEMI (non-ST elevated myocardial infarction) (HCC)    Other cirrhosis of liver (HCC)    Elevated lipase    Depression    Type 2 diabetes mellitus, with long-term current use of insulin (HCC)    SHERITA (obstructive sleep apnea)    Morbid obesity with BMI of 45.0-49.9, adult (HCC)    PVD (peripheral vascular disease) ([facility])    Numbness and tingling of both feet    Long term (current) use of antithrombotics/antiplatelets    Painful diabetic neuropathy (HCC)    Vertebrogenic low back pain    Spinal stenosis of lumbar region with neurogenic claudication       Risks Reviewed w/pt  - Risk for stroke: Yes  - Risk for bleeding:Yes  - Risk for cardiac arrythmias: Yes  - Small risk of death: Yes  - Risks of pneumonia from ventilator: Yes  - Risk for infection: Yes    PLAN:  All imaging reviewed and completed  Hold lisinopril 24 hrs before surgery  Stop plavix 5 days before surgery   CABGx3 with Dr Anna Chavez Tuesday 11-22-22 at 830AM  All imaging completed  Needs PAT lab work    30 min spent    [address] [facility], HAO - NP, CNP  Phone: [phone]

## 2022-11-18 ENCOUNTER — HOSPITAL ENCOUNTER (OUTPATIENT)
Dept: PREADMISSION TESTING | Age: 67
Discharge: HOME | End: 2022-11-22
Payer: MEDICARE

## 2022-11-18 ENCOUNTER — HOSPITAL ENCOUNTER (OUTPATIENT)
Dept: GENERAL RADIOLOGY | Age: 67
Discharge: HOME | End: 2022-11-20
Payer: MEDICARE

## 2022-11-18 VITALS
SYSTOLIC BLOOD PRESSURE: 144 MMHG | DIASTOLIC BLOOD PRESSURE: 81 MMHG | HEIGHT: 59 IN | WEIGHT: 256 LBS | BODY MASS INDEX: 51.61 KG/M2 | HEART RATE: 76 BPM | OXYGEN SATURATION: 97 % | RESPIRATION RATE: 18 BRPM | TEMPERATURE: 96.8 F

## 2022-11-18 LAB
ABSOLUTE EOS #: 0.22 K/UL (ref 0–0.44)
ABSOLUTE IMMATURE GRANULOCYTE: 0.03 K/UL (ref 0–0.3)
ABSOLUTE LYMPH #: 0.91 K/UL (ref 1.1–3.7)
ABSOLUTE MONO #: 0.65 K/UL (ref 0.1–1.2)
ALLEN TEST: POSITIVE
ANION GAP SERPL CALCULATED.3IONS-SCNC: 17 MMOL/L (ref 9–17)
BASOPHILS # BLD: 0 % (ref 0–2)
BASOPHILS ABSOLUTE: <0.03 K/UL (ref 0–0.2)
BILIRUBIN URINE: NEGATIVE
BUN BLDV-MCNC: 10 MG/DL (ref 8–23)
CALCIUM SERPL-MCNC: 9.4 MG/DL (ref 8.6–10.4)
CHLORIDE BLD-SCNC: 107 MMOL/L (ref 98–107)
CO2: 22 MMOL/L (ref 20–31)
COLOR: YELLOW
COMMENT UA: ABNORMAL
CREAT SERPL-MCNC: 0.65 MG/DL (ref 0.5–0.9)
EKG ATRIAL RATE: 67 BPM
EKG P AXIS: 44 DEGREES
EKG P-R INTERVAL: 178 MS
EKG Q-T INTERVAL: 444 MS
EKG QRS DURATION: 80 MS
EKG QTC CALCULATION (BAZETT): 469 MS
EKG R AXIS: 0 DEGREES
EKG T AXIS: 35 DEGREES
EKG VENTRICULAR RATE: 67 BPM
EOSINOPHILS RELATIVE PERCENT: 3 % (ref 1–4)
ESTIMATED AVERAGE GLUCOSE: 169 MG/DL
FIO2: 21
GFR SERPL CREATININE-BSD FRML MDRD: >60 ML/MIN/1.73M2
GLUCOSE BLD-MCNC: 155 MG/DL (ref 70–99)
GLUCOSE URINE: ABNORMAL
HBA1C MFR BLD: 7.5 % (ref 4–6)
HCT VFR BLD CALC: 45.7 % (ref 36.3–47.1)
HEMOGLOBIN: 14.4 G/DL (ref 11.9–15.1)
IMMATURE GRANULOCYTES: 1 %
INR BLD: 1
KETONES, URINE: NEGATIVE
LEUKOCYTE ESTERASE, URINE: NEGATIVE
LYMPHOCYTES # BLD: 14 % (ref 24–43)
MCH RBC QN AUTO: 28.6 PG (ref 25.2–33.5)
MCHC RBC AUTO-ENTMCNC: 31.5 G/DL (ref 28.4–34.8)
MCV RBC AUTO: 90.9 FL (ref 82.6–102.9)
MONOCYTES # BLD: 10 % (ref 3–12)
NITRITE, URINE: NEGATIVE
NRBC AUTOMATED: 0 PER 100 WBC
O2 DEVICE/FLOW/%: ABNORMAL
PARTIAL THROMBOPLASTIN TIME: 23.2 SEC (ref 20.5–30.5)
PDW BLD-RTO: 13.4 % (ref 11.8–14.4)
PH UA: 6 (ref 5–8)
PLATELET # BLD: ABNORMAL K/UL (ref 138–453)
PLATELET, FLUORESCENCE: 166 K/UL (ref 138–453)
PLATELET, IMMATURE FRACTION: 3.5 % (ref 1.1–10.3)
POC HCO3: 24.5 MMOL/L (ref 21–28)
POC LACTIC ACID: 1.3 MMOL/L (ref 0.56–1.39)
POC O2 SATURATION: 98 % (ref 94–98)
POC PCO2: 34.9 MM HG (ref 35–48)
POC PH: 7.46 (ref 7.35–7.45)
POC PO2: 95.7 MM HG (ref 83–108)
POSITIVE BASE EXCESS, ART: 1 (ref 0–3)
POTASSIUM SERPL-SCNC: 4.1 MMOL/L (ref 3.7–5.3)
PROTEIN UA: NEGATIVE
PROTHROMBIN TIME: 10.8 SEC (ref 9.1–12.3)
RBC # BLD: 5.03 M/UL (ref 3.95–5.11)
SAMPLE SITE: ABNORMAL
SEG NEUTROPHILS: 72 % (ref 36–65)
SEGMENTED NEUTROPHILS ABSOLUTE COUNT: 4.73 K/UL (ref 1.5–8.1)
SODIUM BLD-SCNC: 146 MMOL/L (ref 135–144)
SPECIFIC GRAVITY UA: 1.03 (ref 1–1.03)
TURBIDITY: CLEAR
URINE HGB: NEGATIVE
UROBILINOGEN, URINE: NORMAL
WBC # BLD: 6.6 K/UL (ref 3.5–11.3)

## 2022-11-18 PROCEDURE — 83036 HEMOGLOBIN GLYCOSYLATED A1C: CPT

## 2022-11-18 PROCEDURE — 71046 X-RAY EXAM CHEST 2 VIEWS: CPT

## 2022-11-18 PROCEDURE — 86900 BLOOD TYPING SEROLOGIC ABO: CPT

## 2022-11-18 PROCEDURE — 82803 BLOOD GASES ANY COMBINATION: CPT

## 2022-11-18 PROCEDURE — 83605 ASSAY OF LACTIC ACID: CPT

## 2022-11-18 PROCEDURE — 85730 THROMBOPLASTIN TIME PARTIAL: CPT

## 2022-11-18 PROCEDURE — 85610 PROTHROMBIN TIME: CPT

## 2022-11-18 PROCEDURE — 86850 RBC ANTIBODY SCREEN: CPT

## 2022-11-18 PROCEDURE — 80048 BASIC METABOLIC PNL TOTAL CA: CPT

## 2022-11-18 PROCEDURE — 93005 ELECTROCARDIOGRAM TRACING: CPT | Performed by: THORACIC SURGERY (CARDIOTHORACIC VASCULAR SURGERY)

## 2022-11-18 PROCEDURE — 87641 MR-STAPH DNA AMP PROBE: CPT

## 2022-11-18 PROCEDURE — 86901 BLOOD TYPING SEROLOGIC RH(D): CPT

## 2022-11-18 PROCEDURE — 87086 URINE CULTURE/COLONY COUNT: CPT

## 2022-11-18 PROCEDURE — 85055 RETICULATED PLATELET ASSAY: CPT

## 2022-11-18 PROCEDURE — 86920 COMPATIBILITY TEST SPIN: CPT

## 2022-11-18 PROCEDURE — 85025 COMPLETE CBC W/AUTO DIFF WBC: CPT

## 2022-11-18 PROCEDURE — 81003 URINALYSIS AUTO W/O SCOPE: CPT

## 2022-11-18 PROCEDURE — 36415 COLL VENOUS BLD VENIPUNCTURE: CPT

## 2022-11-18 PROCEDURE — 36600 WITHDRAWAL OF ARTERIAL BLOOD: CPT

## 2022-11-18 PROCEDURE — 93010 ELECTROCARDIOGRAM REPORT: CPT | Performed by: INTERNAL MEDICINE

## 2022-11-18 ASSESSMENT — PAIN DESCRIPTION - ONSET: ONSET: ON-GOING

## 2022-11-18 ASSESSMENT — PAIN DESCRIPTION - DESCRIPTORS: DESCRIPTORS: OTHER (COMMENT)

## 2022-11-18 ASSESSMENT — PAIN DESCRIPTION - FREQUENCY: FREQUENCY: CONTINUOUS

## 2022-11-18 ASSESSMENT — PAIN SCALES - GENERAL: PAINLEVEL_OUTOF10: 6

## 2022-11-18 ASSESSMENT — PAIN DESCRIPTION - PAIN TYPE: TYPE: CHRONIC PAIN

## 2022-11-18 ASSESSMENT — PAIN DESCRIPTION - ORIENTATION: ORIENTATION: LEFT

## 2022-11-18 ASSESSMENT — PAIN DESCRIPTION - LOCATION: LOCATION: KNEE

## 2022-11-19 LAB
CULTURE: NORMAL
MRSA, DNA, NASAL: NEGATIVE
SPECIMEN DESCRIPTION: NORMAL
SPECIMEN DESCRIPTION: NORMAL

## 2022-11-21 PROCEDURE — 99024 POSTOP FOLLOW-UP VISIT: CPT | Performed by: PHYSICIAN ASSISTANT

## 2022-11-21 NOTE — H&P
HPI: Jahaira Elizabeth is a 78 y.o.  female who . Presented back to cardiothoracic surgery after diagnosis of multivessel CAD was diagnosed in cardiac catheterization recently. Today she follows up for review and scheduling of CABG surgery. She states having a lot of anxiety regarding the surgery. Mild 2 out of 10 chest pain that comes and goes when her anxiety is bad. She is trying to be as active as she can around the house. Uses a walker due to bad osteoarthritis. Does not drink smoke or use any drugs. Family history of heart disease. History of family member having recent bypass surgery. Is patient on any AC or AP medication prior to CTS consult?   Plavix     ROS:   CONSTITUTIONAL: Anxious  Respiratory: negative  Cardiovascular: Mild chest pain when she is very anxious  Gastrointestinal: negative  Genitourinary:negative  Hematologic/lymphatic: negative  Musculoskeletal:negative  Neurological: negative  Endocrine: negative  Psychiatric: negative  Past Medical History        Past Medical History:   Diagnosis Date    Anxiety      Borderline hypertension      Depression 7/28/2020    GERD (gastroesophageal reflux disease)      Heart attack ([facility] [place] [address] 07/18/2020    Hypothyroidism      Neuropathy      Obesity       morbid    Osteoarthritis      Other cirrhosis of liver ([facility] [place] [address] 7/27/2020    Sleep apnea       possible    Type II or unspecified type diabetes mellitus without mention of complication, not stated as uncontrolled      Vertebrogenic low back pain 3/29/2022         Past Surgical History         Past Surgical History:   Procedure Laterality Date    APPENDECTOMY        CARDIAC CATHETERIZATION        COLONOSCOPY        CORONARY ANGIOPLASTY WITH STENT PLACEMENT   07/2020    DILATION AND CURETTAGE OF UTERUS        HIATAL HERNIA REPAIR        HYSTERECTOMY (CERVIX STATUS UNKNOWN)        THROAT SURGERY         POLYPS REMOVED FROM VOCAL CORD    TOTAL KNEE ARTHROPLASTY Right 01/06/2015    TOTAL KNEE ARTHROPLASTY Left 05/26/2015    UPPER GASTROINTESTINAL ENDOSCOPY                   Allergies   Allergen Reactions    Shellfish-Derived Products Nausea Only    Morphine Other (See Comments)       HALLUCINATIONS       Tape Chon Silk Tape] Rash      Family History         Family History   Problem Relation Age of Onset    Heart Disease Mother      Arthritis Mother      Heart Disease Father      Arthritis Father      Cancer Father           \"oral\"    Diabetes Sister           gestational         Social History               Socioeconomic History    Marital status:        Spouse name: Shelley Jacob    Number of children: 0    Years of education: Not on file    Highest education level: Not on file   Occupational History    Occupation: Retired [employer] teacher       Employer: [employer]   Tobacco Use    Smoking status: Never    Smokeless tobacco: Never   Vaping Use    Vaping Use: Never used   Substance and Sexual Activity    Alcohol use: Yes       Comment: once a month    Drug use: No    Sexual activity: Yes       Partners: Male   Other Topics Concern    Not on file   Social History Narrative    Not on file      Social Determinants of Health      Financial Resource Strain: Not on file   Food Insecurity: Not on file   Transportation Needs: Not on file   Physical Activity: Not on file   Stress: Not on file   Social Connections: Not on file   Intimate Partner Violence: Not on file   Housing Stability: Not on file            Current Facility-Administered Medications          Current Outpatient Medications   Medication Sig Dispense Refill    atorvastatin (LIPITOR) 40 MG tablet Take 1 tablet by mouth nightly 90 tablet 1    APPLE CIDER VINEGAR PO Take by mouth        lisinopril (PRINIVIL;ZESTRIL) 10 MG tablet TAKE 1 TABLET BY MOUTH EVERY DAY 30 tablet 2    empagliflozin (JARDIANCE) 10 MG tablet TAKE 1 TABLET BY MOUTH EVERY DAY 30 tablet 2    clopidogrel (PLAVIX) 75 MG tablet TAKE 1 TABLET BY MOUTH ONCE DAILY 30 tablet 10    levothyroxine (SYNTHROID) 50 MCG tablet TAKE 1 TABLET BY MOUTH EVERY DAY 60 tablet 2    carvedilol (COREG) 6.25 MG tablet TAKE 1 TABLET BY MOUTH TWICE DAILY WITH MEALS *EMERGENCY REFILL* 60 tablet 3    citalopram (CELEXA) 40 MG tablet TAKE 1 TABLET BY MOUTH ONCE DAILY *EMERGENCY REFILL* 30 tablet 5    glimepiride (AMARYL) 4 MG tablet TAKE 1 TABLET BY MOUTH TWICE DAILY *EMERGENCY REFILL* 60 tablet 5    omeprazole (PRILOSEC) 20 MG delayed release capsule TAKE 1 CAPSULE BY MOUTH ONCE DAILY 30 capsule 10    isosorbide mononitrate (IMDUR) 60 MG extended release tablet TAKE 1 TABLET BY MOUTH ONCE DAILY 30 tablet 10    amLODIPine (NORVASC) 5 MG tablet TAKE 1 TABLET BY MOUTH EVERY DAY 30 tablet 10    sucralfate (CARAFATE) 1 GM tablet TAKE 1 TABLET BY MOUTH EVERY DAY 30 tablet 10    busPIRone (BUSPAR) 10 MG tablet TAKE ONE (1) TABLET BY MOUTH TWICE DAILY 60 tablet 10    insulin glargine (BASAGLAR KWIKPEN) 100 UNIT/ML injection pen Inject 65 units subcutaneously once daily. 15 pen 3    aspirin (ASPIRIN LOW DOSE) 81 MG EC tablet TAKE 1 TABLET BY MOUTH ONCE DAILY 30 tablet 10    miconazole (MICOTIN) 2 % cream APPLY TO AFFECTED AREA TWICE DAILY 1 each 1    acetaminophen (TYLENOL) 500 MG tablet TAKE TWO TABLETS BY MOUTH THREE TIMES A DAY AS NEEDED FOR PAIN 180 tablet 0    lidocaine (XYLOCAINE) 2 % jelly Apply topically with dressing changes every 3 days 1 Tube 1    melatonin 10 MG CAPS capsule TAKE 1 CAPSULE BY MOUTH NIGHTLY AS NEEDED FOR SLEEP 30 capsule 2    blood glucose monitor strips Test before meals and at bedtime. DX: DM, on insulin 100 strip 11    miconazole (ZEASORB-AF) 2 % powder Apply topically 2 times daily. 45 g 1    blood glucose monitor kit and supplies Dispense sufficient amount for indicated testing frequency plus additional to accommodate PRN testing needs. Dispense all needed supplies to include: monitor, strips, lancing device, lancets, control solutions, alcohol swabs.  1 kit 0    magnesium hydroxide (MILK OF MAGNESIA) 400 MG/5ML suspension Take 30 mLs by mouth daily as needed for Constipation 900 mL 0    nitroGLYCERIN (NITROSTAT) 0.4 MG SL tablet up to max of 3 total doses. If no relief after 1 dose, call 911. 25 tablet 3    Handicap Placard MISC Is a permanent condition. DX: M19.90 1 each 0    Insulin Pen Needle (B-D UF III MINI PEN NEEDLES) 31G X 5 MM MISC USE 1 PEN NEEDLE DAILY 50 each 1    [employer] Lancets Thin MISC Test before meals and at bedtime. DX: DM, on insulin 100 each 11    MULTIPLE VITAMIN PO Take 2 tablets by mouth daily DAILY        Alcohol Swabs (ALCOHOL PREP PADS) by Other route 2 times daily          gabapentin (NEURONTIN) 400 MG capsule TAKE 1 CAPSULE BY MOUTH THREE TIMES DAILY 90 capsule 0      No current facility-administered medications for this visit. Physical Exam:      Vitals:     11/17/22 1023   BP: 139/70   Pulse: 73   Temp: 97.2 °F (36.2 °C)   SpO2: 99%      Weight: Weight: 256 lb (116.1 kg)    Weight: 256 lb (116.1 kg)                           General: Alert and Oriented x3. Sitting up in bed. No apparent distress. Morbid obese  HEENT:  Normocephalic and atraumatic. PERRL. EOMI. Lips and oral mucosa moist and without lesions. Neck:  Supple. Trachea midline. Chest:  No abnormality. Equal and symmetric expansion with respiration. Lungs:  Clear to auscultation. Cardiac:  Regular rate and rhythm without murmurs, rubs or gallops. Abdomen:  Soft, non-tender, normoactive bowel sounds. No masses or organomegaly. Extremities:  No cyanosis, clubbing, or edema. Intact pulses in all four extremities. Musculoskeletal:  Intact range of motion of peripheral joints. Normal muscular strength. Neurologic:  Cranial nerves are grossly intact. Non-focal sensory deficits on exam.  Psychiatric: Mood and affect are appropriate. Imaging Studies:    Cardiac Cath:  LMCA: has distal 20% stenosis. LAD: has mid 85% stenosis. The D1 has 30% stenosis.      LCx: has ostial eccentric 80% stenosis. The OM1 has proximal patent stent. RCA: has patent mid stent. There is distal 70% stenosis. Ramus: has proximal 80% stenosis. Echo:Normal LV size, wall thickness and wall motions  EF > 55%  Normal RV size and function  Normal size LA and RA  AV is sclerotic opens well, mean gradient 7 mmHg, no AR  MAC noted, no MS no MR  Normal aortic root dimensions  No significant pericardial effusion seen  Normal IC diameter normal respiratory variations suggestive of normal RA  filling pressure        CT:  reviewed with no concerns noted     Prior Labs reviewed:   No labs of concerns noted      Assessment       Patient Active Problem List   Diagnosis    Anxiety    GERD (gastroesophageal reflux disease)    OA (osteoarthritis)    Neuropathy    Right knee DJD    Hypothyroidism    S/P left total knee arthroplasty    Ischemic heart disease    Essential hypertension    NSTEMI (non-ST elevated myocardial infarction) (HCC)    Other cirrhosis of liver (HCC)    Elevated lipase    Depression    Type 2 diabetes mellitus, with long-term current use of insulin (HCC)    SHERITA (obstructive sleep apnea)    Morbid obesity with BMI of 45.0-49.9, adult (HCC)    PVD (peripheral vascular disease) (HCC)    Numbness and tingling of both feet    Long term (current) use of antithrombotics/antiplatelets    Painful diabetic neuropathy (HCC)    Vertebrogenic low back pain    Spinal stenosis of lumbar region with neurogenic claudication         Risks Reviewed w/pt  - Risk for stroke: Yes  - Risk for bleeding:Yes  - Risk for cardiac arrythmias: Yes  - Small risk of death: Yes  - Risks of pneumonia from ventilator: Yes  - Risk for infection: Yes     PLAN:  CABG this morning  Consent was signed in clinic and is stored under media tab of EMR.     QUENTIN Guardado      Attending Physician Statement:     I have discussed the care of this patient , including pertinent history and exam findings, with the mid-level and resident. I have seen and examined the patient and the key elements of all parts of the encounter have been performed by me. I agree with the assessment, plan and orders as documented by the Mid-level/resident, after I modified exam findings and plan of treatments, and the final version is my approved version of the assessment.

## 2022-11-23 NOTE — TELEPHONE ENCOUNTER
scheduled patient in December. Had US in ED which showed an elongated but thin fluid collection identified in the subcutaneous tissues of the left buttock at the site of dog bite.  This could represent an early abscess or  hemorrhage. Started on IV clinda for MRSA coverage and Unasyn to cover Pasturella given dog bite. Parents to bring proof of tetanus shot.   Peds surgery consulted, no drainage recommended at this time, agree with medical treatment.

## 2022-11-27 ENCOUNTER — ANESTHESIA EVENT (OUTPATIENT)
Dept: OPERATING ROOM | Age: 67
End: 2022-11-27
Payer: MEDICARE

## 2022-11-28 ENCOUNTER — APPOINTMENT (OUTPATIENT)
Dept: GENERAL RADIOLOGY | Age: 67
DRG: 236 | End: 2022-11-28
Attending: THORACIC SURGERY (CARDIOTHORACIC VASCULAR SURGERY)
Payer: MEDICARE

## 2022-11-28 ENCOUNTER — ANESTHESIA (OUTPATIENT)
Dept: OPERATING ROOM | Age: 67
End: 2022-11-28
Payer: MEDICARE

## 2022-11-28 ENCOUNTER — HOSPITAL ENCOUNTER (INPATIENT)
Age: 67
LOS: 7 days | Discharge: NURSING, REHAB OR LONG-TERM CARE | DRG: 236 | End: 2022-12-05
Attending: THORACIC SURGERY (CARDIOTHORACIC VASCULAR SURGERY) | Admitting: THORACIC SURGERY (CARDIOTHORACIC VASCULAR SURGERY)
Payer: MEDICARE

## 2022-11-28 DIAGNOSIS — I25.10 CAD IN NATIVE ARTERY: Primary | ICD-10-CM

## 2022-11-28 LAB
ANGLE TEG: 62.1 DEG (ref 63–78)
ANGLE TEG: 73 DEG (ref 63–78)
ANION GAP SERPL CALCULATED.3IONS-SCNC: 9 MMOL/L (ref 9–17)
ANION GAP: 11 MMOL/L (ref 7–16)
ANION GAP: 13 MMOL/L (ref 7–16)
ANION GAP: 13 MMOL/L (ref 7–16)
BUN BLDV-MCNC: 11 MG/DL (ref 8–23)
CALCIUM IONIZED: 1.23 MMOL/L (ref 1.13–1.33)
CALCIUM SERPL-MCNC: 8.7 MG/DL (ref 8.6–10.4)
CHLORIDE BLD-SCNC: 107 MMOL/L (ref 98–107)
CO2: 24 MMOL/L (ref 20–31)
CREAT SERPL-MCNC: 0.53 MG/DL (ref 0.5–0.9)
EGFR, POC: >60 ML/MIN/1.73M2
EGFR, POC: >60 ML/MIN/1.73M2
FIBRINOGEN, FUNCTIONAL TEG: 23.5 MM (ref 15–32)
FIBRINOGEN, FUNCTIONAL TEG: 23.6 MM (ref 15–32)
FIO2: 100
FIO2: 40
FIO2: 50
GFR SERPL CREATININE-BSD FRML MDRD: >60 ML/MIN/1.73M2
GLUCOSE BLD-MCNC: 105 MG/DL (ref 74–100)
GLUCOSE BLD-MCNC: 124 MG/DL (ref 74–100)
GLUCOSE BLD-MCNC: 129 MG/DL (ref 74–100)
GLUCOSE BLD-MCNC: 155 MG/DL (ref 74–100)
GLUCOSE BLD-MCNC: 161 MG/DL (ref 65–105)
GLUCOSE BLD-MCNC: 166 MG/DL (ref 65–105)
GLUCOSE BLD-MCNC: 166 MG/DL (ref 74–100)
GLUCOSE BLD-MCNC: 172 MG/DL (ref 65–105)
GLUCOSE BLD-MCNC: 180 MG/DL (ref 74–100)
GLUCOSE BLD-MCNC: 183 MG/DL (ref 65–105)
GLUCOSE BLD-MCNC: 183 MG/DL (ref 74–100)
GLUCOSE BLD-MCNC: 184 MG/DL (ref 65–105)
GLUCOSE BLD-MCNC: 184 MG/DL (ref 65–105)
GLUCOSE BLD-MCNC: 184 MG/DL (ref 74–100)
GLUCOSE BLD-MCNC: 191 MG/DL (ref 70–99)
HCO3 VENOUS: 25.3 MMOL/L (ref 22–29)
HCT VFR BLD CALC: 40.2 % (ref 36.3–47.1)
HEMOGLOBIN: 13 G/DL (ref 11.9–15.1)
INR BLD: 1.2
KINETICS TEG: 1.3 MIN (ref 0.8–2.1)
KINETICS TEG: 1.9 MIN (ref 0.8–2.1)
MA (MAX CLOT) TEG: 61.8 MM (ref 52–69)
MA (MAX CLOT) TEG: 62 MM (ref 52–69)
MA(MAX CLOT) RAPID TEG: 61.8 MM (ref 52–70)
MA(MAX CLOT) RAPID TEG: 62.5 MM (ref 52–70)
MAGNESIUM: 2.6 MG/DL (ref 1.6–2.6)
MCH RBC QN AUTO: 29 PG (ref 25.2–33.5)
MCHC RBC AUTO-ENTMCNC: 32.3 G/DL (ref 28.4–34.8)
MCV RBC AUTO: 89.7 FL (ref 82.6–102.9)
MODE: ABNORMAL
MODE: ABNORMAL
NEGATIVE BASE EXCESS, ART: 1 (ref 0–2)
NEGATIVE BASE EXCESS, ART: 3 (ref 0–2)
NRBC AUTOMATED: 0 PER 100 WBC
O2 DEVICE/FLOW/%: ABNORMAL
O2 DEVICE/FLOW/%: ABNORMAL
O2 SAT, VEN: 99 % (ref 60–85)
PARTIAL THROMBOPLASTIN TIME: 23.6 SEC (ref 20.5–30.5)
PCO2, VEN: 41.2 MM HG (ref 41–51)
PDW BLD-RTO: 13.2 % (ref 11.8–14.4)
PERFORMING LOCATION: ABNORMAL
PERFORMING LOCATION: NORMAL
PH VENOUS: 7.4 (ref 7.32–7.43)
PLATELET # BLD: 164 K/UL (ref 138–453)
PMV BLD AUTO: 11 FL (ref 8.1–13.5)
PO2, VEN: 151.7 MM HG (ref 30–50)
POC BUN: 10 MG/DL (ref 8–26)
POC CHLORIDE: 103 MMOL/L (ref 98–107)
POC CHLORIDE: 104 MMOL/L (ref 98–107)
POC CHLORIDE: 109 MMOL/L (ref 98–107)
POC CREATININE: 0.62 MG/DL (ref 0.51–1.19)
POC CREATININE: 0.67 MG/DL (ref 0.51–1.19)
POC HCO3: 24.9 MMOL/L (ref 21–28)
POC HCO3: 25.4 MMOL/L (ref 21–28)
POC HCO3: 25.5 MMOL/L (ref 21–28)
POC HCO3: 25.9 MMOL/L (ref 21–28)
POC HCO3: 28 MMOL/L (ref 21–28)
POC HCO3: 28.4 MMOL/L (ref 21–28)
POC HCO3: 28.7 MMOL/L (ref 21–28)
POC HEMATOCRIT: 27 % (ref 36–46)
POC HEMATOCRIT: 28 % (ref 36–46)
POC HEMATOCRIT: 29 % (ref 36–46)
POC HEMATOCRIT: 30 % (ref 36–46)
POC HEMATOCRIT: 40 % (ref 36–46)
POC HEMATOCRIT: 41 % (ref 36–46)
POC HEMOGLOBIN: 10.2 G/DL (ref 12–16)
POC HEMOGLOBIN: 13.5 G/DL (ref 12–16)
POC HEMOGLOBIN: 13.9 G/DL (ref 12–16)
POC HEMOGLOBIN: 9.3 G/DL (ref 12–16)
POC HEMOGLOBIN: 9.6 G/DL (ref 12–16)
POC HEMOGLOBIN: 9.8 G/DL (ref 12–16)
POC IONIZED CALCIUM: 1.07 MMOL/L (ref 1.15–1.33)
POC IONIZED CALCIUM: 1.09 MMOL/L (ref 1.15–1.33)
POC IONIZED CALCIUM: 1.19 MMOL/L (ref 1.15–1.33)
POC IONIZED CALCIUM: 1.27 MMOL/L (ref 1.15–1.33)
POC IONIZED CALCIUM: 1.35 MMOL/L (ref 1.15–1.33)
POC IONIZED CALCIUM: 1.53 MMOL/L (ref 1.15–1.33)
POC LACTIC ACID: 1.97 MMOL/L (ref 0.56–1.39)
POC O2 SATURATION: 100 % (ref 94–98)
POC O2 SATURATION: 96 % (ref 94–98)
POC O2 SATURATION: 97 % (ref 94–98)
POC PCO2: 38.3 MM HG (ref 35–48)
POC PCO2: 38.5 MM HG (ref 35–48)
POC PCO2: 41.3 MM HG (ref 35–48)
POC PCO2: 42.6 MM HG (ref 35–48)
POC PCO2: 43.5 MM HG (ref 35–48)
POC PCO2: 48.2 MM HG (ref 35–48)
POC PCO2: 52.6 MM HG (ref 35–48)
POC PH: 7.28 (ref 7.35–7.45)
POC PH: 7.33 (ref 7.35–7.45)
POC PH: 7.42 (ref 7.35–7.45)
POC PH: 7.43 (ref 7.35–7.45)
POC PH: 7.44 (ref 7.35–7.45)
POC PO2: 102.5 MM HG (ref 83–108)
POC PO2: 251.6 MM HG (ref 83–108)
POC PO2: 323.8 MM HG (ref 83–108)
POC PO2: 336.4 MM HG (ref 83–108)
POC PO2: 354.9 MM HG (ref 83–108)
POC PO2: 374.4 MM HG (ref 83–108)
POC PO2: 90.9 MM HG (ref 83–108)
POC POTASSIUM: 3.7 MMOL/L (ref 3.5–4.5)
POC POTASSIUM: 3.9 MMOL/L (ref 3.5–4.5)
POC POTASSIUM: 4.3 MMOL/L (ref 3.5–4.5)
POC POTASSIUM: 4.3 MMOL/L (ref 3.5–4.5)
POC POTASSIUM: 4.4 MMOL/L (ref 3.5–4.5)
POC POTASSIUM: 4.5 MMOL/L (ref 3.5–4.5)
POC SODIUM: 142 MMOL/L (ref 138–146)
POC SODIUM: 143 MMOL/L (ref 138–146)
POC SODIUM: 143 MMOL/L (ref 138–146)
POC TCO2: 25 MMOL/L (ref 22–30)
POC TCO2: 25 MMOL/L (ref 22–30)
POC TCO2: 27 MMOL/L (ref 22–30)
POC TCO2: 28 MMOL/L (ref 22–30)
POC TCO2: 28 MMOL/L (ref 22–30)
POSITIVE BASE EXCESS, ART: 1 (ref 0–3)
POSITIVE BASE EXCESS, ART: 2 (ref 0–3)
POSITIVE BASE EXCESS, ART: 3 (ref 0–3)
POSITIVE BASE EXCESS, ART: 4 (ref 0–3)
POSITIVE BASE EXCESS, ART: 4 (ref 0–3)
POSITIVE BASE EXCESS, VEN: 0 (ref 0–3)
POTASSIUM SERPL-SCNC: 4.5 MMOL/L (ref 3.7–5.3)
POTASSIUM SERPL-SCNC: 4.8 MMOL/L (ref 3.7–5.3)
PROTHROMBIN TIME: 12.5 SEC (ref 9.1–12.3)
RBC # BLD: 4.48 M/UL (ref 3.95–5.11)
REACTION TIME TEG W HEPARIN: 7 MIN (ref 4.3–8.3)
REACTION TIME TEG W HEPARIN: 8.8 MIN (ref 4.3–8.3)
REACTION TIME TEG: 10.7 MIN (ref 4.6–9.1)
REACTION TIME TEG: 6.5 MIN (ref 4.6–9.1)
SAMPLE SITE: ABNORMAL
SODIUM BLD-SCNC: 140 MMOL/L (ref 135–144)
WBC # BLD: 22.4 K/UL (ref 3.5–11.3)

## 2022-11-28 PROCEDURE — 2720000010 HC SURG SUPPLY STERILE: Performed by: THORACIC SURGERY (CARDIOTHORACIC VASCULAR SURGERY)

## 2022-11-28 PROCEDURE — 83735 ASSAY OF MAGNESIUM: CPT

## 2022-11-28 PROCEDURE — 6370000000 HC RX 637 (ALT 250 FOR IP): Performed by: THORACIC SURGERY (CARDIOTHORACIC VASCULAR SURGERY)

## 2022-11-28 PROCEDURE — 33518 CABG ARTERY-VEIN TWO: CPT | Performed by: THORACIC SURGERY (CARDIOTHORACIC VASCULAR SURGERY)

## 2022-11-28 PROCEDURE — 3700000001 HC ADD 15 MINUTES (ANESTHESIA): Performed by: THORACIC SURGERY (CARDIOTHORACIC VASCULAR SURGERY)

## 2022-11-28 PROCEDURE — P9047 ALBUMIN (HUMAN), 25%, 50ML: HCPCS

## 2022-11-28 PROCEDURE — C1729 CATH, DRAINAGE: HCPCS | Performed by: THORACIC SURGERY (CARDIOTHORACIC VASCULAR SURGERY)

## 2022-11-28 PROCEDURE — 33508 ENDOSCOPIC VEIN HARVEST: CPT | Performed by: THORACIC SURGERY (CARDIOTHORACIC VASCULAR SURGERY)

## 2022-11-28 PROCEDURE — 71045 X-RAY EXAM CHEST 1 VIEW: CPT

## 2022-11-28 PROCEDURE — 80048 BASIC METABOLIC PNL TOTAL CA: CPT

## 2022-11-28 PROCEDURE — 85384 FIBRINOGEN ACTIVITY: CPT

## 2022-11-28 PROCEDURE — 94002 VENT MGMT INPAT INIT DAY: CPT

## 2022-11-28 PROCEDURE — 33533 CABG ARTERIAL SINGLE: CPT | Performed by: THORACIC SURGERY (CARDIOTHORACIC VASCULAR SURGERY)

## 2022-11-28 PROCEDURE — 2580000003 HC RX 258: Performed by: ANESTHESIOLOGY

## 2022-11-28 PROCEDURE — 82803 BLOOD GASES ANY COMBINATION: CPT

## 2022-11-28 PROCEDURE — 84520 ASSAY OF UREA NITROGEN: CPT

## 2022-11-28 PROCEDURE — C9113 INJ PANTOPRAZOLE SODIUM, VIA: HCPCS | Performed by: PHYSICIAN ASSISTANT

## 2022-11-28 PROCEDURE — 7100000001 HC PACU RECOVERY - ADDTL 15 MIN

## 2022-11-28 PROCEDURE — 06BQ4ZZ EXCISION OF LEFT SAPHENOUS VEIN, PERCUTANEOUS ENDOSCOPIC APPROACH: ICD-10-PCS | Performed by: THORACIC SURGERY (CARDIOTHORACIC VASCULAR SURGERY)

## 2022-11-28 PROCEDURE — 84295 ASSAY OF SERUM SODIUM: CPT

## 2022-11-28 PROCEDURE — 85347 COAGULATION TIME ACTIVATED: CPT

## 2022-11-28 PROCEDURE — 6370000000 HC RX 637 (ALT 250 FOR IP): Performed by: PHYSICIAN ASSISTANT

## 2022-11-28 PROCEDURE — 37799 UNLISTED PX VASCULAR SURGERY: CPT

## 2022-11-28 PROCEDURE — 3600000018 HC SURGERY OHS ADDTL 15MIN: Performed by: THORACIC SURGERY (CARDIOTHORACIC VASCULAR SURGERY)

## 2022-11-28 PROCEDURE — 80051 ELECTROLYTE PANEL: CPT

## 2022-11-28 PROCEDURE — 02100Z9 BYPASS CORONARY ARTERY, ONE ARTERY FROM LEFT INTERNAL MAMMARY, OPEN APPROACH: ICD-10-PCS | Performed by: THORACIC SURGERY (CARDIOTHORACIC VASCULAR SURGERY)

## 2022-11-28 PROCEDURE — 82374 ASSAY BLOOD CARBON DIOXIDE: CPT

## 2022-11-28 PROCEDURE — 82947 ASSAY GLUCOSE BLOOD QUANT: CPT

## 2022-11-28 PROCEDURE — 2700000000 HC OXYGEN THERAPY PER DAY

## 2022-11-28 PROCEDURE — 021109W BYPASS CORONARY ARTERY, TWO ARTERIES FROM AORTA WITH AUTOLOGOUS VENOUS TISSUE, OPEN APPROACH: ICD-10-PCS | Performed by: THORACIC SURGERY (CARDIOTHORACIC VASCULAR SURGERY)

## 2022-11-28 PROCEDURE — 6360000002 HC RX W HCPCS: Performed by: THORACIC SURGERY (CARDIOTHORACIC VASCULAR SURGERY)

## 2022-11-28 PROCEDURE — 2580000003 HC RX 258: Performed by: PHYSICIAN ASSISTANT

## 2022-11-28 PROCEDURE — 94640 AIRWAY INHALATION TREATMENT: CPT

## 2022-11-28 PROCEDURE — 6360000002 HC RX W HCPCS: Performed by: PHYSICIAN ASSISTANT

## 2022-11-28 PROCEDURE — 6360000002 HC RX W HCPCS

## 2022-11-28 PROCEDURE — 6360000002 HC RX W HCPCS: Performed by: ANESTHESIOLOGY

## 2022-11-28 PROCEDURE — 2500000003 HC RX 250 WO HCPCS: Performed by: ANESTHESIOLOGY

## 2022-11-28 PROCEDURE — 82565 ASSAY OF CREATININE: CPT

## 2022-11-28 PROCEDURE — 94660 CPAP INITIATION&MGMT: CPT

## 2022-11-28 PROCEDURE — 2100000001 HC CVICU R&B

## 2022-11-28 PROCEDURE — 85027 COMPLETE CBC AUTOMATED: CPT

## 2022-11-28 PROCEDURE — 82330 ASSAY OF CALCIUM: CPT

## 2022-11-28 PROCEDURE — 2500000003 HC RX 250 WO HCPCS: Performed by: PHYSICIAN ASSISTANT

## 2022-11-28 PROCEDURE — 84132 ASSAY OF SERUM POTASSIUM: CPT

## 2022-11-28 PROCEDURE — 93005 ELECTROCARDIOGRAM TRACING: CPT | Performed by: PHYSICIAN ASSISTANT

## 2022-11-28 PROCEDURE — 6370000000 HC RX 637 (ALT 250 FOR IP): Performed by: ANESTHESIOLOGY

## 2022-11-28 PROCEDURE — 85610 PROTHROMBIN TIME: CPT

## 2022-11-28 PROCEDURE — 2580000003 HC RX 258

## 2022-11-28 PROCEDURE — 85730 THROMBOPLASTIN TIME PARTIAL: CPT

## 2022-11-28 PROCEDURE — 85014 HEMATOCRIT: CPT

## 2022-11-28 PROCEDURE — 2709999900 HC NON-CHARGEABLE SUPPLY: Performed by: THORACIC SURGERY (CARDIOTHORACIC VASCULAR SURGERY)

## 2022-11-28 PROCEDURE — 5A1221Z PERFORMANCE OF CARDIAC OUTPUT, CONTINUOUS: ICD-10-PCS | Performed by: THORACIC SURGERY (CARDIOTHORACIC VASCULAR SURGERY)

## 2022-11-28 PROCEDURE — 7100000000 HC PACU RECOVERY - FIRST 15 MIN

## 2022-11-28 PROCEDURE — 3600000008 HC SURGERY OHS BASE: Performed by: THORACIC SURGERY (CARDIOTHORACIC VASCULAR SURGERY)

## 2022-11-28 PROCEDURE — 83605 ASSAY OF LACTIC ACID: CPT

## 2022-11-28 PROCEDURE — 94761 N-INVAS EAR/PLS OXIMETRY MLT: CPT

## 2022-11-28 PROCEDURE — 2500000003 HC RX 250 WO HCPCS

## 2022-11-28 PROCEDURE — 2580000003 HC RX 258: Performed by: THORACIC SURGERY (CARDIOTHORACIC VASCULAR SURGERY)

## 2022-11-28 PROCEDURE — 85576 BLOOD PLATELET AGGREGATION: CPT

## 2022-11-28 PROCEDURE — 3700000000 HC ANESTHESIA ATTENDED CARE: Performed by: THORACIC SURGERY (CARDIOTHORACIC VASCULAR SURGERY)

## 2022-11-28 RX ORDER — FENTANYL CITRATE 50 UG/ML
25 INJECTION, SOLUTION INTRAMUSCULAR; INTRAVENOUS
Status: DISCONTINUED | OUTPATIENT
Start: 2022-11-28 | End: 2022-11-30

## 2022-11-28 RX ORDER — PROTAMINE SULFATE 10 MG/ML
INJECTION, SOLUTION INTRAVENOUS
Status: DISCONTINUED
Start: 2022-11-28 | End: 2022-11-28

## 2022-11-28 RX ORDER — DEXTROSE MONOHYDRATE 100 MG/ML
INJECTION, SOLUTION INTRAVENOUS CONTINUOUS PRN
Status: DISCONTINUED | OUTPATIENT
Start: 2022-11-28 | End: 2022-12-05 | Stop reason: HOSPADM

## 2022-11-28 RX ORDER — CHLORHEXIDINE GLUCONATE 0.12 MG/ML
15 RINSE ORAL ONCE
Status: DISCONTINUED | OUTPATIENT
Start: 2022-11-28 | End: 2022-11-28 | Stop reason: HOSPADM

## 2022-11-28 RX ORDER — AMIODARONE HYDROCHLORIDE 200 MG/1
200 TABLET ORAL 3 TIMES DAILY
Status: DISCONTINUED | OUTPATIENT
Start: 2022-11-28 | End: 2022-12-05 | Stop reason: HOSPADM

## 2022-11-28 RX ORDER — SODIUM CHLORIDE, SODIUM LACTATE, POTASSIUM CHLORIDE, CALCIUM CHLORIDE 600; 310; 30; 20 MG/100ML; MG/100ML; MG/100ML; MG/100ML
1000 INJECTION, SOLUTION INTRAVENOUS CONTINUOUS
Status: DISCONTINUED | OUTPATIENT
Start: 2022-11-28 | End: 2022-11-28 | Stop reason: SDUPTHER

## 2022-11-28 RX ORDER — ALBUMIN (HUMAN) 12.5 G/50ML
SOLUTION INTRAVENOUS
Status: COMPLETED
Start: 2022-11-28 | End: 2022-11-28

## 2022-11-28 RX ORDER — AMIODARONE HYDROCHLORIDE 200 MG/1
200 TABLET ORAL ONCE
Status: COMPLETED | OUTPATIENT
Start: 2022-11-28 | End: 2022-11-28

## 2022-11-28 RX ORDER — SODIUM CHLORIDE 0.9 % (FLUSH) 0.9 %
5-40 SYRINGE (ML) INJECTION EVERY 12 HOURS SCHEDULED
Status: DISCONTINUED | OUTPATIENT
Start: 2022-11-28 | End: 2022-12-05 | Stop reason: HOSPADM

## 2022-11-28 RX ORDER — SODIUM CHLORIDE, SODIUM LACTATE, POTASSIUM CHLORIDE, CALCIUM CHLORIDE 600; 310; 30; 20 MG/100ML; MG/100ML; MG/100ML; MG/100ML
INJECTION, SOLUTION INTRAVENOUS CONTINUOUS
Status: DISCONTINUED | OUTPATIENT
Start: 2022-11-28 | End: 2022-11-28

## 2022-11-28 RX ORDER — PANTOPRAZOLE SODIUM 40 MG/1
40 TABLET, DELAYED RELEASE ORAL DAILY
Status: DISCONTINUED | OUTPATIENT
Start: 2022-11-28 | End: 2022-12-05 | Stop reason: HOSPADM

## 2022-11-28 RX ORDER — METOPROLOL TARTRATE 5 MG/5ML
2.5 INJECTION INTRAVENOUS EVERY 10 MIN PRN
Status: DISCONTINUED | OUTPATIENT
Start: 2022-11-28 | End: 2022-12-05 | Stop reason: HOSPADM

## 2022-11-28 RX ORDER — SODIUM CHLORIDE 9 MG/ML
INJECTION, SOLUTION INTRAVENOUS PRN
Status: DISCONTINUED | OUTPATIENT
Start: 2022-11-28 | End: 2022-12-05 | Stop reason: HOSPADM

## 2022-11-28 RX ORDER — MAGNESIUM HYDROXIDE 1200 MG/15ML
LIQUID ORAL CONTINUOUS PRN
Status: COMPLETED | OUTPATIENT
Start: 2022-11-28 | End: 2022-11-28

## 2022-11-28 RX ORDER — ALBUMIN, HUMAN INJ 5% 5 %
25 SOLUTION INTRAVENOUS PRN
Status: DISCONTINUED | OUTPATIENT
Start: 2022-11-28 | End: 2022-12-05 | Stop reason: HOSPADM

## 2022-11-28 RX ORDER — DIPHENHYDRAMINE HCL 25 MG
25 TABLET ORAL NIGHTLY PRN
Status: DISCONTINUED | OUTPATIENT
Start: 2022-11-29 | End: 2022-12-05 | Stop reason: HOSPADM

## 2022-11-28 RX ORDER — HYDRALAZINE HYDROCHLORIDE 20 MG/ML
5 INJECTION INTRAMUSCULAR; INTRAVENOUS EVERY 5 MIN PRN
Status: DISCONTINUED | OUTPATIENT
Start: 2022-11-28 | End: 2022-12-05 | Stop reason: HOSPADM

## 2022-11-28 RX ORDER — MAGNESIUM SULFATE 1 G/100ML
2000 INJECTION INTRAVENOUS PRN
Status: DISCONTINUED | OUTPATIENT
Start: 2022-11-28 | End: 2022-12-05 | Stop reason: HOSPADM

## 2022-11-28 RX ORDER — DEXTROSE MONOHYDRATE 25 G/50ML
INJECTION, SOLUTION INTRAVENOUS
Status: DISCONTINUED
Start: 2022-11-28 | End: 2022-11-28

## 2022-11-28 RX ORDER — PROTAMINE SULFATE 10 MG/ML
50 INJECTION, SOLUTION INTRAVENOUS
Status: ACTIVE | OUTPATIENT
Start: 2022-11-28 | End: 2022-11-29

## 2022-11-28 RX ORDER — ASPIRIN 81 MG/1
81 TABLET ORAL DAILY
Status: DISCONTINUED | OUTPATIENT
Start: 2022-11-28 | End: 2022-12-05 | Stop reason: HOSPADM

## 2022-11-28 RX ORDER — HEPARIN SODIUM 1000 [USP'U]/ML
INJECTION, SOLUTION INTRAVENOUS; SUBCUTANEOUS
Status: DISCONTINUED
Start: 2022-11-28 | End: 2022-11-28

## 2022-11-28 RX ORDER — IPRATROPIUM BROMIDE AND ALBUTEROL SULFATE 2.5; .5 MG/3ML; MG/3ML
1 SOLUTION RESPIRATORY (INHALATION)
Status: DISCONTINUED | OUTPATIENT
Start: 2022-11-28 | End: 2022-12-04

## 2022-11-28 RX ORDER — PAPAVERINE HYDROCHLORIDE 30 MG/ML
INJECTION INTRAMUSCULAR; INTRAVENOUS
Status: DISCONTINUED
Start: 2022-11-28 | End: 2022-11-28

## 2022-11-28 RX ORDER — NICARDIPINE HYDROCHLORIDE 0.1 MG/ML
2.5-15 INJECTION INTRAVENOUS CONTINUOUS
Status: DISCONTINUED | OUTPATIENT
Start: 2022-11-28 | End: 2022-11-30

## 2022-11-28 RX ORDER — SODIUM CHLORIDE, SODIUM LACTATE, POTASSIUM CHLORIDE, CALCIUM CHLORIDE 600; 310; 30; 20 MG/100ML; MG/100ML; MG/100ML; MG/100ML
INJECTION, SOLUTION INTRAVENOUS CONTINUOUS PRN
Status: DISCONTINUED | OUTPATIENT
Start: 2022-11-28 | End: 2022-11-28 | Stop reason: SDUPTHER

## 2022-11-28 RX ORDER — SENNOSIDES 8.8 MG/5ML
5 LIQUID ORAL 2 TIMES DAILY PRN
Status: DISCONTINUED | OUTPATIENT
Start: 2022-11-28 | End: 2022-12-05 | Stop reason: HOSPADM

## 2022-11-28 RX ORDER — ASPIRIN 81 MG/1
81 TABLET ORAL
Status: DISCONTINUED | OUTPATIENT
Start: 2022-11-28 | End: 2022-11-28

## 2022-11-28 RX ORDER — SODIUM CHLORIDE, SODIUM LACTATE, POTASSIUM CHLORIDE, CALCIUM CHLORIDE 600; 310; 30; 20 MG/100ML; MG/100ML; MG/100ML; MG/100ML
INJECTION, SOLUTION INTRAVENOUS CONTINUOUS
Status: DISCONTINUED | OUTPATIENT
Start: 2022-11-28 | End: 2022-11-30

## 2022-11-28 RX ORDER — TRANEXAMIC ACID 10 MG/ML
INJECTION, SOLUTION INTRAVENOUS PRN
Status: DISCONTINUED | OUTPATIENT
Start: 2022-11-28 | End: 2022-11-28 | Stop reason: SDUPTHER

## 2022-11-28 RX ORDER — FUROSEMIDE 10 MG/ML
20 INJECTION INTRAMUSCULAR; INTRAVENOUS ONCE
Status: COMPLETED | OUTPATIENT
Start: 2022-11-28 | End: 2022-11-28

## 2022-11-28 RX ORDER — TRANEXAMIC ACID 10 MG/ML
INJECTION, SOLUTION INTRAVENOUS
Status: COMPLETED
Start: 2022-11-28 | End: 2022-11-28

## 2022-11-28 RX ORDER — PROPOFOL 10 MG/ML
INJECTION, EMULSION INTRAVENOUS PRN
Status: DISCONTINUED | OUTPATIENT
Start: 2022-11-28 | End: 2022-11-28 | Stop reason: SDUPTHER

## 2022-11-28 RX ORDER — FENTANYL CITRATE 50 UG/ML
50 INJECTION, SOLUTION INTRAMUSCULAR; INTRAVENOUS
Status: DISCONTINUED | OUTPATIENT
Start: 2022-11-28 | End: 2022-11-30

## 2022-11-28 RX ORDER — SODIUM CHLORIDE 0.9 % (FLUSH) 0.9 %
5-40 SYRINGE (ML) INJECTION PRN
Status: DISCONTINUED | OUTPATIENT
Start: 2022-11-28 | End: 2022-12-05 | Stop reason: HOSPADM

## 2022-11-28 RX ORDER — LIDOCAINE HYDROCHLORIDE 10 MG/ML
INJECTION, SOLUTION EPIDURAL; INFILTRATION; INTRACAUDAL; PERINEURAL PRN
Status: DISCONTINUED | OUTPATIENT
Start: 2022-11-28 | End: 2022-11-28 | Stop reason: SDUPTHER

## 2022-11-28 RX ORDER — SODIUM CHLORIDE 9 MG/ML
INJECTION, SOLUTION INTRAVENOUS PRN
Status: DISCONTINUED | OUTPATIENT
Start: 2022-11-28 | End: 2022-11-28

## 2022-11-28 RX ORDER — LORAZEPAM 1 MG/1
1 TABLET ORAL
Status: COMPLETED | OUTPATIENT
Start: 2022-11-28 | End: 2022-11-28

## 2022-11-28 RX ORDER — CHLORHEXIDINE GLUCONATE 0.12 MG/ML
15 RINSE ORAL ONCE
Status: COMPLETED | OUTPATIENT
Start: 2022-11-28 | End: 2022-11-28

## 2022-11-28 RX ORDER — ATORVASTATIN CALCIUM 20 MG/1
20 TABLET, FILM COATED ORAL NIGHTLY
Status: DISCONTINUED | OUTPATIENT
Start: 2022-11-29 | End: 2022-12-05 | Stop reason: HOSPADM

## 2022-11-28 RX ORDER — SODIUM CHLORIDE 0.9 % (FLUSH) 0.9 %
10 SYRINGE (ML) INJECTION PRN
Status: DISCONTINUED | OUTPATIENT
Start: 2022-11-28 | End: 2022-11-28

## 2022-11-28 RX ORDER — CLOPIDOGREL BISULFATE 75 MG/1
75 TABLET ORAL DAILY
Status: DISCONTINUED | OUTPATIENT
Start: 2022-11-29 | End: 2022-12-05 | Stop reason: HOSPADM

## 2022-11-28 RX ORDER — MEPERIDINE HYDROCHLORIDE 50 MG/ML
25 INJECTION INTRAMUSCULAR; INTRAVENOUS; SUBCUTANEOUS
Status: ACTIVE | OUTPATIENT
Start: 2022-11-28 | End: 2022-11-29

## 2022-11-28 RX ORDER — CHLORHEXIDINE GLUCONATE 4 G/100ML
SOLUTION TOPICAL SEE ADMIN INSTRUCTIONS
Status: DISCONTINUED | OUTPATIENT
Start: 2022-11-28 | End: 2022-11-28 | Stop reason: HOSPADM

## 2022-11-28 RX ORDER — ONDANSETRON 2 MG/ML
4 INJECTION INTRAMUSCULAR; INTRAVENOUS EVERY 6 HOURS PRN
Status: DISCONTINUED | OUTPATIENT
Start: 2022-11-28 | End: 2022-12-05 | Stop reason: HOSPADM

## 2022-11-28 RX ORDER — MIDAZOLAM HYDROCHLORIDE 2 MG/2ML
1 INJECTION, SOLUTION INTRAMUSCULAR; INTRAVENOUS EVERY 10 MIN PRN
Status: DISCONTINUED | OUTPATIENT
Start: 2022-11-28 | End: 2022-12-05 | Stop reason: HOSPADM

## 2022-11-28 RX ORDER — PROPOFOL 10 MG/ML
10 INJECTION, EMULSION INTRAVENOUS CONTINUOUS
Status: DISCONTINUED | OUTPATIENT
Start: 2022-11-28 | End: 2022-11-29

## 2022-11-28 RX ORDER — ONDANSETRON 4 MG/1
4 TABLET, ORALLY DISINTEGRATING ORAL EVERY 8 HOURS PRN
Status: DISCONTINUED | OUTPATIENT
Start: 2022-11-28 | End: 2022-12-05 | Stop reason: HOSPADM

## 2022-11-28 RX ORDER — NITROGLYCERIN 20 MG/100ML
INJECTION INTRAVENOUS CONTINUOUS PRN
Status: DISCONTINUED | OUTPATIENT
Start: 2022-11-28 | End: 2022-11-28 | Stop reason: SDUPTHER

## 2022-11-28 RX ORDER — OXYCODONE HYDROCHLORIDE AND ACETAMINOPHEN 5; 325 MG/1; MG/1
2 TABLET ORAL EVERY 4 HOURS PRN
Status: DISCONTINUED | OUTPATIENT
Start: 2022-11-28 | End: 2022-11-30

## 2022-11-28 RX ORDER — PROPOFOL 10 MG/ML
INJECTION, EMULSION INTRAVENOUS
Status: DISCONTINUED
Start: 2022-11-28 | End: 2022-11-28

## 2022-11-28 RX ORDER — FENTANYL CITRATE 0.05 MG/ML
INJECTION, SOLUTION INTRAMUSCULAR; INTRAVENOUS PRN
Status: DISCONTINUED | OUTPATIENT
Start: 2022-11-28 | End: 2022-11-28 | Stop reason: SDUPTHER

## 2022-11-28 RX ORDER — SODIUM CHLORIDE 9 MG/ML
INJECTION, SOLUTION INTRAVENOUS CONTINUOUS PRN
Status: DISCONTINUED | OUTPATIENT
Start: 2022-11-28 | End: 2022-11-28 | Stop reason: SDUPTHER

## 2022-11-28 RX ORDER — NOREPINEPHRINE BIT/0.9 % NACL 16MG/250ML
.01-3.3 INFUSION BOTTLE (ML) INTRAVENOUS CONTINUOUS PRN
Status: DISCONTINUED | OUTPATIENT
Start: 2022-11-28 | End: 2022-12-05 | Stop reason: HOSPADM

## 2022-11-28 RX ORDER — SODIUM CHLORIDE 0.9 % (FLUSH) 0.9 %
5-40 SYRINGE (ML) INJECTION EVERY 12 HOURS SCHEDULED
Status: DISCONTINUED | OUTPATIENT
Start: 2022-11-28 | End: 2022-11-28

## 2022-11-28 RX ORDER — OXYCODONE HYDROCHLORIDE AND ACETAMINOPHEN 5; 325 MG/1; MG/1
1 TABLET ORAL EVERY 4 HOURS PRN
Status: DISCONTINUED | OUTPATIENT
Start: 2022-11-28 | End: 2022-11-30

## 2022-11-28 RX ORDER — FUROSEMIDE 10 MG/ML
INJECTION INTRAMUSCULAR; INTRAVENOUS
Status: COMPLETED
Start: 2022-11-28 | End: 2022-11-28

## 2022-11-28 RX ORDER — INSULIN LISPRO 100 [IU]/ML
0-6 INJECTION, SOLUTION INTRAVENOUS; SUBCUTANEOUS
Status: DISCONTINUED | OUTPATIENT
Start: 2022-11-28 | End: 2022-12-05 | Stop reason: HOSPADM

## 2022-11-28 RX ORDER — PROTAMINE SULFATE 10 MG/ML
INJECTION, SOLUTION INTRAVENOUS PRN
Status: DISCONTINUED | OUTPATIENT
Start: 2022-11-28 | End: 2022-11-28 | Stop reason: SDUPTHER

## 2022-11-28 RX ORDER — HEPARIN SODIUM 1000 [USP'U]/ML
INJECTION, SOLUTION INTRAVENOUS; SUBCUTANEOUS PRN
Status: DISCONTINUED | OUTPATIENT
Start: 2022-11-28 | End: 2022-11-28 | Stop reason: HOSPADM

## 2022-11-28 RX ORDER — PROPOFOL 10 MG/ML
INJECTION, EMULSION INTRAVENOUS CONTINUOUS PRN
Status: DISCONTINUED | OUTPATIENT
Start: 2022-11-28 | End: 2022-11-28 | Stop reason: SDUPTHER

## 2022-11-28 RX ORDER — NITROGLYCERIN 20 MG/100ML
5-200 INJECTION INTRAVENOUS CONTINUOUS
Status: DISCONTINUED | OUTPATIENT
Start: 2022-11-28 | End: 2022-11-30

## 2022-11-28 RX ORDER — MIDAZOLAM HYDROCHLORIDE 1 MG/ML
INJECTION INTRAMUSCULAR; INTRAVENOUS PRN
Status: DISCONTINUED | OUTPATIENT
Start: 2022-11-28 | End: 2022-11-28 | Stop reason: SDUPTHER

## 2022-11-28 RX ORDER — POTASSIUM CHLORIDE 29.8 MG/ML
20 INJECTION INTRAVENOUS PRN
Status: DISCONTINUED | OUTPATIENT
Start: 2022-11-28 | End: 2022-12-05 | Stop reason: HOSPADM

## 2022-11-28 RX ORDER — INSULIN LISPRO 100 [IU]/ML
0-3 INJECTION, SOLUTION INTRAVENOUS; SUBCUTANEOUS NIGHTLY
Status: DISCONTINUED | OUTPATIENT
Start: 2022-11-28 | End: 2022-12-05 | Stop reason: HOSPADM

## 2022-11-28 RX ORDER — ROCURONIUM BROMIDE 10 MG/ML
INJECTION, SOLUTION INTRAVENOUS PRN
Status: DISCONTINUED | OUTPATIENT
Start: 2022-11-28 | End: 2022-11-28 | Stop reason: SDUPTHER

## 2022-11-28 RX ORDER — ISOFLURANE 1 ML/ML
LIQUID RESPIRATORY (INHALATION)
Status: DISCONTINUED
Start: 2022-11-28 | End: 2022-11-28

## 2022-11-28 RX ADMIN — Medication 1000 MG: at 08:43

## 2022-11-28 RX ADMIN — Medication 2 G: at 09:07

## 2022-11-28 RX ADMIN — FENTANYL CITRATE 250 MCG: 50 INJECTION INTRAVENOUS at 09:47

## 2022-11-28 RX ADMIN — NITROGLYCERIN 40 MCG: 20 INJECTION INTRAVENOUS at 09:45

## 2022-11-28 RX ADMIN — PROPOFOL 15 MCG/KG/MIN: 10 INJECTION, EMULSION INTRAVENOUS at 14:09

## 2022-11-28 RX ADMIN — PROPOFOL 150 MG: 10 INJECTION, EMULSION INTRAVENOUS at 08:13

## 2022-11-28 RX ADMIN — EPINEPHRINE 0.04 MCG/KG/MIN: 1 INJECTION PARENTERAL at 12:55

## 2022-11-28 RX ADMIN — SODIUM BICARBONATE 50 MEQ: 84 INJECTION INTRAVENOUS at 21:05

## 2022-11-28 RX ADMIN — FUROSEMIDE 20 MG: 10 INJECTION, SOLUTION INTRAMUSCULAR; INTRAVENOUS at 21:06

## 2022-11-28 RX ADMIN — OXYCODONE HYDROCHLORIDE AND ACETAMINOPHEN 2 TABLET: 5; 325 TABLET ORAL at 18:44

## 2022-11-28 RX ADMIN — ROCURONIUM BROMIDE 30 MG: 10 INJECTION, SOLUTION INTRAVENOUS at 08:44

## 2022-11-28 RX ADMIN — CHLORHEXIDINE GLUCONATE 15 ML: 1.2 SOLUTION ORAL at 07:42

## 2022-11-28 RX ADMIN — FENTANYL CITRATE 50 MCG: 50 INJECTION, SOLUTION INTRAMUSCULAR; INTRAVENOUS at 18:45

## 2022-11-28 RX ADMIN — ROCURONIUM BROMIDE 50 MG: 10 INJECTION, SOLUTION INTRAVENOUS at 12:54

## 2022-11-28 RX ADMIN — NITROGLYCERIN 40 MCG: 20 INJECTION INTRAVENOUS at 09:51

## 2022-11-28 RX ADMIN — SODIUM CHLORIDE 3.72 UNITS/HR: 9 INJECTION, SOLUTION INTRAVENOUS at 15:15

## 2022-11-28 RX ADMIN — SODIUM CHLORIDE, PRESERVATIVE FREE 40 MG: 5 INJECTION INTRAVENOUS at 17:20

## 2022-11-28 RX ADMIN — Medication 2 G: at 12:55

## 2022-11-28 RX ADMIN — ROCURONIUM BROMIDE 50 MG: 10 INJECTION, SOLUTION INTRAVENOUS at 08:13

## 2022-11-28 RX ADMIN — MUPIROCIN: 20 OINTMENT TOPICAL at 07:42

## 2022-11-28 RX ADMIN — TRANEXAMIC ACID 1 G: 10 INJECTION, SOLUTION INTRAVENOUS at 08:40

## 2022-11-28 RX ADMIN — FENTANYL CITRATE 25 MCG: 50 INJECTION, SOLUTION INTRAMUSCULAR; INTRAVENOUS at 21:30

## 2022-11-28 RX ADMIN — TRANEXAMIC ACID 1 G: 10 INJECTION, SOLUTION INTRAVENOUS at 08:52

## 2022-11-28 RX ADMIN — ROCURONIUM BROMIDE 20 MG: 10 INJECTION, SOLUTION INTRAVENOUS at 09:05

## 2022-11-28 RX ADMIN — FENTANYL CITRATE 250 MCG: 50 INJECTION INTRAVENOUS at 08:13

## 2022-11-28 RX ADMIN — ASPIRIN 81 MG: 81 TABLET, COATED ORAL at 07:42

## 2022-11-28 RX ADMIN — PROPOFOL 25 MCG/KG/MIN: 10 INJECTION, EMULSION INTRAVENOUS at 16:12

## 2022-11-28 RX ADMIN — HYDRALAZINE HYDROCHLORIDE 5 MG: 20 INJECTION INTRAMUSCULAR; INTRAVENOUS at 18:43

## 2022-11-28 RX ADMIN — SODIUM CHLORIDE, POTASSIUM CHLORIDE, SODIUM LACTATE AND CALCIUM CHLORIDE: 600; 310; 30; 20 INJECTION, SOLUTION INTRAVENOUS at 08:02

## 2022-11-28 RX ADMIN — SODIUM CHLORIDE, POTASSIUM CHLORIDE, SODIUM LACTATE AND CALCIUM CHLORIDE: 600; 310; 30; 20 INJECTION, SOLUTION INTRAVENOUS at 13:25

## 2022-11-28 RX ADMIN — METOPROLOL TARTRATE 12.5 MG: 25 TABLET, FILM COATED ORAL at 07:42

## 2022-11-28 RX ADMIN — ALBUMIN (HUMAN): 12.5 SOLUTION INTRAVENOUS at 16:16

## 2022-11-28 RX ADMIN — FENTANYL CITRATE 250 MCG: 50 INJECTION INTRAVENOUS at 08:45

## 2022-11-28 RX ADMIN — NITROGLYCERIN 40 MCG: 20 INJECTION INTRAVENOUS at 13:40

## 2022-11-28 RX ADMIN — NITROGLYCERIN 40 MCG: 20 INJECTION INTRAVENOUS at 10:41

## 2022-11-28 RX ADMIN — LIDOCAINE HYDROCHLORIDE 50 MG: 10 SOLUTION INTRAVENOUS at 08:13

## 2022-11-28 RX ADMIN — EPINEPHRINE 0.01 MCG/KG/MIN: 1 INJECTION PARENTERAL at 16:13

## 2022-11-28 RX ADMIN — HEPARIN SODIUM 5000 UNITS: 1000 INJECTION INTRAVENOUS; SUBCUTANEOUS at 10:44

## 2022-11-28 RX ADMIN — ROCURONIUM BROMIDE 50 MG: 10 INJECTION, SOLUTION INTRAVENOUS at 10:14

## 2022-11-28 RX ADMIN — Medication 1500 MG: at 17:20

## 2022-11-28 RX ADMIN — NITROGLYCERIN 40 MCG: 20 INJECTION INTRAVENOUS at 11:16

## 2022-11-28 RX ADMIN — FENTANYL CITRATE 250 MCG: 50 INJECTION INTRAVENOUS at 09:39

## 2022-11-28 RX ADMIN — MIDAZOLAM 2 MG: 1 INJECTION INTRAMUSCULAR; INTRAVENOUS at 08:03

## 2022-11-28 RX ADMIN — SODIUM CHLORIDE, POTASSIUM CHLORIDE, SODIUM LACTATE AND CALCIUM CHLORIDE: 600; 310; 30; 20 INJECTION, SOLUTION INTRAVENOUS at 11:20

## 2022-11-28 RX ADMIN — MIDAZOLAM 2 MG: 1 INJECTION INTRAMUSCULAR; INTRAVENOUS at 12:54

## 2022-11-28 RX ADMIN — HEPARIN SODIUM 30000 UNITS: 1000 INJECTION INTRAVENOUS; SUBCUTANEOUS at 11:09

## 2022-11-28 RX ADMIN — NITROGLYCERIN 20 MCG: 20 INJECTION INTRAVENOUS at 13:36

## 2022-11-28 RX ADMIN — PROTAMINE SULFATE 300 MG: 10 INJECTION, SOLUTION INTRAVENOUS at 13:21

## 2022-11-28 RX ADMIN — LORAZEPAM 1 MG: 1 TABLET ORAL at 07:57

## 2022-11-28 RX ADMIN — SODIUM CHLORIDE 2 UNITS/HR: 9 INJECTION, SOLUTION INTRAVENOUS at 13:15

## 2022-11-28 RX ADMIN — AMIODARONE HYDROCHLORIDE 200 MG: 200 TABLET ORAL at 17:26

## 2022-11-28 RX ADMIN — AMIODARONE HYDROCHLORIDE 200 MG: 200 TABLET ORAL at 07:50

## 2022-11-28 RX ADMIN — NITROGLYCERIN 30 MCG/MIN: 20 INJECTION INTRAVENOUS at 09:51

## 2022-11-28 RX ADMIN — Medication 2000 MG: at 22:01

## 2022-11-28 RX ADMIN — NITROGLYCERIN 40 MCG: 20 INJECTION INTRAVENOUS at 11:12

## 2022-11-28 RX ADMIN — IPRATROPIUM BROMIDE AND ALBUTEROL SULFATE 1 AMPULE: .5; 3 SOLUTION RESPIRATORY (INHALATION) at 20:00

## 2022-11-28 RX ADMIN — FENTANYL CITRATE 250 MCG: 50 INJECTION INTRAVENOUS at 09:05

## 2022-11-28 RX ADMIN — FENTANYL CITRATE 250 MCG: 50 INJECTION INTRAVENOUS at 10:16

## 2022-11-28 RX ADMIN — MUPIROCIN: 20 OINTMENT TOPICAL at 21:07

## 2022-11-28 RX ADMIN — PROPOFOL 20 MG: 10 INJECTION, EMULSION INTRAVENOUS at 14:15

## 2022-11-28 RX ADMIN — FUROSEMIDE 20 MG: 10 INJECTION INTRAMUSCULAR; INTRAVENOUS at 21:06

## 2022-11-28 RX ADMIN — FENTANYL CITRATE 50 MCG: 50 INJECTION, SOLUTION INTRAMUSCULAR; INTRAVENOUS at 16:03

## 2022-11-28 RX ADMIN — ROCURONIUM BROMIDE 50 MG: 10 INJECTION, SOLUTION INTRAVENOUS at 11:17

## 2022-11-28 RX ADMIN — SODIUM CHLORIDE, PRESERVATIVE FREE 10 ML: 5 INJECTION INTRAVENOUS at 07:42

## 2022-11-28 RX ADMIN — PROPOFOL 20 MG: 10 INJECTION, EMULSION INTRAVENOUS at 14:12

## 2022-11-28 RX ADMIN — SODIUM CHLORIDE: 9 INJECTION, SOLUTION INTRAVENOUS at 08:40

## 2022-11-28 ASSESSMENT — PAIN DESCRIPTION - ORIENTATION: ORIENTATION: MID

## 2022-11-28 ASSESSMENT — PULMONARY FUNCTION TESTS: PIF_VALUE: 26

## 2022-11-28 ASSESSMENT — PAIN SCALES - GENERAL
PAINLEVEL_OUTOF10: 2
PAINLEVEL_OUTOF10: 9
PAINLEVEL_OUTOF10: 9

## 2022-11-28 ASSESSMENT — PAIN DESCRIPTION - LOCATION
LOCATION: CHEST
LOCATION: CHEST;HIP

## 2022-11-28 ASSESSMENT — PAIN DESCRIPTION - DESCRIPTORS: DESCRIPTORS: ACHING;DULL;DISCOMFORT

## 2022-11-28 NOTE — BRIEF OP NOTE
Brief Postoperative Note      Patient: Fabiola Whitehead  YOB: 1955  MRN: [id number]    Date of Procedure: 11/28/2022    Pre-Op Diagnosis: MULTI-VESSEL CORONARY ARTERY DISEASE    Post-Op Diagnosis: Same       Procedure(s):  CABG CORONARY ARTERY BYPASS X3 ON PUMP , ATA, ENDO VEIN HARVEST    Surgeon(s):  Erik Shah MD    Assistant:  Surgical Assistant: [email or web address]  First Assistant: Claudia Max RN    Anesthesia: General    Estimated Blood Loss (mL): 916    Complications: None    Specimens:   * No specimens in log *    Implants:  * No implants in log *      Drains:   Chest Tube Right Pericardial 1 (Active)       Chest Tube Left Pericardial 2 (Active)       Chest Tube Right Mediastinal 3 (Active)       Urinary Catheter 11/28/22 Erazo-Temperature (Active)       Findings: Radha Jarvis EF    Electronically signed by Marquis Antoine MD on 11/28/2022 at 2:16 PM

## 2022-11-28 NOTE — PROGRESS NOTES
[facility] [address]  PROGRESS NOTE    Shift date: 11/27/2022  Shift day: Sunday   Shift # 3    Room # 8147  Name: Lorry Severe                Yazdanism: T[place]garrett   Place of Hoahaoism: Unknown    Referral:  Pre-Op Visit    Admit Date & Time: 11/28/2022  5:56 AM    Assessment:  Lorry Severe is a 78 y.o. female in the hospital because of undergoing \"Open Heart Surgery. \" Upon entering the room writer observes patient sitting in recliner, speaking with OR staff. Intervention:   visited patient per nurse request, as patient is undergoing surgery this morning and would like prayer. Writer introduced self and title as .  learned that surgery had been scheduled and rescheduled previously. Patient  was tearful and expressed feelings of fear and anxiety regarding her upcoming procedure.  offered prayer during visit and offered hospitality to spouse in room. Outcome:  Patient thanked  for visit. Plan:  Chaplains will remain available to offer spiritual and emotional support as needed. 11/28/22 0707   Encounter Summary   Service Provided For: Patient and family together   Referral/Consult From: Patient;Nurse   Support System Spouse; Family members   Last Encounter  11/27/22   Complexity of Encounter Moderate   Begin Time 0707   End Time  0729   Total Time Calculated 22 min   Encounter    Type Pre-Procedural   Spiritual/Emotional needs   Type Spiritual Support   Assessment/Intervention/Outcome   Assessment Fearful;Tearful   Intervention Active listening;Discussed belief system/Voodoo practices/pattie;Discussed illness injury and its impact; Explored/Affirmed feelings, thoughts, concerns;Explored Coping Skills/Resources;Prayer (assurance of)/Pauline;Sustaining Presence/Ministry of presence   Outcome Comfort;Coping;Receptive   Plan and Referrals   Plan/Referrals Continue to visit, (comment)  (as needed)     Electronically signed by Nay Roblero Meghan Mora, on 11/28/2022 at 8:38 AM.  [facility]  [phone]

## 2022-11-28 NOTE — PLAN OF CARE
Problem: Discharge Planning  Goal: Discharge to home or other facility with appropriate resources  Outcome: Progressing     Problem: Skin/Tissue Integrity  Goal: Absence of new skin breakdown  Description: 1. Monitor for areas of redness and/or skin breakdown  2. Assess vascular access sites hourly  3. Every 4-6 hours minimum:  Change oxygen saturation probe site  4. Every 4-6 hours:  If on nasal continuous positive airway pressure, respiratory therapy assess nares and determine need for appliance change or resting period.   Outcome: Progressing     Problem: Chronic Conditions and Co-morbidities  Goal: Patient's chronic conditions and co-morbidity symptoms are monitored and maintained or improved  Outcome: Progressing     Problem: ABCDS Injury Assessment  Goal: Absence of physical injury  Outcome: Progressing

## 2022-11-28 NOTE — PROGRESS NOTES
Accompanied per CVOR RN x 2, CVOR CRNA, and perfusion. Pt transported via bed, pt is unresponsive, sedated on propofol,  ambu, RRT placed on mechanical ventilator. All lines and invasive monitors connected and transduced per protocol. VSS and documented on record. Patient tolerated procedure well, Care transferred to CVICU RN and handoff completed at bedside. Will monitor patient and update team as needed.

## 2022-11-28 NOTE — PROGRESS NOTES
[facility]  Pre-op Note    11/28/2022    Juan Cooper is scheduled for surgery today. All of the pertinent studies have been reviewed and patient is NPO. I have discussed the procedure with the patient and family, and they have been given opportunity to ask questions. The attendant risks, benefits, and possible outcomes have been discussed with them. They understand and have signed informed consent.       Vincent Palm MD

## 2022-11-28 NOTE — ANESTHESIA PROCEDURE NOTES
Arterial Line:    An arterial line was placed using ultrasound guidance, in the OR for the following indication(s): continuous blood pressure monitoring and blood sampling needed. A 20 gauge (size), 1 and 3/4 inch (length), Arrow (type) catheter was placed, Seldinger technique used, into the right radial artery, secured by tape and Tegaderm. Anesthesia type: General    Events:  patient tolerated procedure well with no complications. Other anesthesia staff: Audrey Meyer RN  Performed:  Other anesthesia staff   Preanesthetic Checklist  Completed: patient identified, IV checked, site marked, risks and benefits discussed, surgical/procedural consents, equipment checked, pre-op evaluation, timeout performed, anesthesia consent given, oxygen available and monitors applied/VS acknowledged

## 2022-11-28 NOTE — ANESTHESIA PRE PROCEDURE
Department of Anesthesiology  Preprocedure Note       Name:  Keith Douglas   Age:  78 y.o.  :  1955                                          MRN:  [id number]         Date:  2022      Surgeon: Shamar Lin):  Rena Rea MD    Procedure: Procedure(s):  CABG CORONARY ARTERY BYPASS X3 ON PUMP SWAN, ATA, ENDO VEIN HARVEST    Medications prior to admission:   Prior to Admission medications    Medication Sig Start Date End Date Taking?  Authorizing Provider   GARLIC PO Take 1 tablet by mouth daily    Historical Provider, MD   atorvastatin (LIPITOR) 40 MG tablet Take 1 tablet by mouth nightly 22   Jessi Lopez MD   APPLE CIDER VINEGAR PO Take 2 tablets by mouth daily    Historical Provider, MD   lisinopril (PRINIVIL;ZESTRIL) 10 MG tablet TAKE 1 TABLET BY MOUTH EVERY DAY 10/22/22   Jeovanny Yancey MD   empagliflozin (JARDIANCE) 10 MG tablet TAKE 1 TABLET BY MOUTH EVERY DAY 10/22/22   Jeovanny Yancey MD   clopidogrel (PLAVIX) 75 MG tablet TAKE 1 TABLET BY MOUTH ONCE DAILY 10/22/22   Jeovanny Yancey MD   levothyroxine (SYNTHROID) 50 MCG tablet TAKE 1 TABLET BY MOUTH EVERY DAY 10/22/22   Jeovanny Yancey MD   carvedilol (COREG) 6.25 MG tablet TAKE 1 TABLET BY MOUTH TWICE DAILY WITH MEALS *EMERGENCY REFILL* 22   Jeovanny Yancey MD   citalopram (CELEXA) 40 MG tablet TAKE 1 TABLET BY MOUTH ONCE DAILY *EMERGENCY REFILL* 22   Jeovanny Yancey MD   glimepiride (AMARYL) 4 MG tablet TAKE 1 TABLET BY MOUTH TWICE DAILY *EMERGENCY REFILL* 22   Jeovanny Yancey MD   omeprazole (PRILOSEC) 20 MG delayed release capsule TAKE 1 CAPSULE BY MOUTH ONCE DAILY 22   Paulette Turner DO   isosorbide mononitrate (IMDUR) 60 MG extended release tablet TAKE 1 TABLET BY MOUTH ONCE DAILY 22   Blessing Santiago MD   amLODIPine (NORVASC) 5 MG tablet TAKE 1 TABLET BY MOUTH EVERY DAY 22   Blessing Santiago MD   gabapentin (NEURONTIN) 400 MG capsule TAKE 1 CAPSULE BY MOUTH THREE TIMES DAILY 4/22/22 11/18/22  Enrique Guerrero MD   sucralfate (CARAFATE) 1 GM tablet TAKE 1 TABLET BY MOUTH EVERY DAY 4/20/22   Enrique Guerrero MD   busPIRone (BUSPAR) 10 MG tablet TAKE ONE (1) TABLET BY MOUTH TWICE DAILY 3/11/22   Enrique Guerrero MD   insulin glargine [facility] AUTHORITY KWIKPEN) 100 UNIT/ML injection pen Inject 65 units subcutaneously once daily. 3/1/22   Enrique Guerrero MD   aspirin (ASPIRIN LOW DOSE) 81 MG EC tablet TAKE 1 TABLET BY MOUTH ONCE DAILY 2/4/22   Enrique Guerrero MD   miconazole (MICOTIN) 2 % cream APPLY TO AFFECTED AREA TWICE DAILY 11/12/21   Enrique Guerrero MD   acetaminophen (TYLENOL) 500 MG tablet TAKE TWO TABLETS BY MOUTH THREE TIMES A DAY AS NEEDED FOR PAIN 10/21/21   Nathan Grover MD   lidocaine (XYLOCAINE) 2 % jelly Apply topically with dressing changes every 3 days 8/5/21   Enrique Guerrero MD   melatonin 10 MG CAPS capsule TAKE 1 CAPSULE BY MOUTH NIGHTLY AS NEEDED FOR SLEEP 8/5/21   Enrique Guerrero MD   blood glucose monitor strips Test before meals and at bedtime. DX: DM, on insulin 10/8/20   Enrique Guerrero MD   miconazole (ZEASORB-AF) 2 % powder Apply topically 2 times daily. 10/1/20   Enrique Guerrero MD   blood glucose monitor kit and supplies Dispense sufficient amount for indicated testing frequency plus additional to accommodate PRN testing needs. Dispense all needed supplies to include: monitor, strips, lancing device, lancets, control solutions, alcohol swabs. 8/17/20   Olga Aguilar MD   magnesium hydroxide (MILK OF MAGNESIA) 400 MG/5ML suspension Take 30 mLs by mouth daily as needed for Constipation 7/29/20   Olga Aguilar MD   nitroGLYCERIN (NITROSTAT) 0.4 MG SL tablet up to max of 3 total doses. If no relief after 1 dose, call 911. 7/21/20   HAO Edward - NEELAM   Handicap Placard MISC Is a permanent condition.   DX: M19.90 5/14/19   Enrique Guerrero MD   Insulin Pen Needle (B-D UF III MINI PEN NEEDLES) 31G X 5 MM MISC USE 1 PEN NEEDLE DAILY 3/8/18   Sandrine Escobar MD   [employer] Lancets Thin MISC Test before meals and at bedtime. DX: DM, on insulin 4/22/14   Kitty Collado MD   MULTIPLE VITAMIN PO Take 2 tablets by mouth daily DAILY    Historical Provider, MD   Alcohol Swabs (ALCOHOL PREP PADS) by Other route 2 times daily      Historical Provider, MD       Current medications:    Current Facility-Administered Medications   Medication Dose Route Frequency Provider Last Rate Last Admin    lactated ringers infusion   IntraVENous Continuous QUENTIN Almeida        sodium chloride flush 0.9 % injection 5-40 mL  5-40 mL IntraVENous 2 times per day QUENTIN Almeida   10 mL at 11/28/22 0742    sodium chloride flush 0.9 % injection 10 mL  10 mL IntraVENous PRN QUENTIN Almeida        0.9 % sodium chloride infusion   IntraVENous PRN QUENTIN Almeida        aspirin EC tablet 81 mg  81 mg Oral 60 Min Pre-Op QUENTIN Almeida   81 mg at 11/28/22 6333    sodium bicarbonate 8.4 % injection             isoflurane inhalation liquid             dextrose 50 % solution             protamine 10 MG/ML injection             propofol 1000 MG/100ML injection             tranexamic acid-NaCl 1000-0.7 MG/100ML-% IVPB premix             gelatin adsorbable (GELFOAM) 100 sponge             heparin (porcine) 1000 UNIT/ML injection             papaverine 30 MG/ML injection             ceFAZolin (ANCEF) 2000 mg in sterile water 20 mL IV syringe  2,000 mg IntraVENous Q8H Smith Wong MD        chlorhexidine gluconate (ANTISEPTIC SKIN CLEANSER) 4 % solution   Topical Once Smith Wong MD        mupirocin (BACTROBAN) 2 % ointment   Topical Daily Smith Wong MD   Given at 11/28/22 2712    amiodarone (CORDARONE) tablet 200 mg  200 mg Oral Once Smith Wong MD           Allergies:     Allergies   Allergen Reactions    Morphine Other (See Comments)     HALLUCINATIONS      Shellfish-Derived Products Nausea Only    Tape Paulino Abo Tape] Rash       Problem List:    Patient Active Problem List   Diagnosis Code    Anxiety F41.9    GERD (gastroesophageal reflux disease) K21.9    OA (osteoarthritis) M19.90    Neuropathy G62.9    Right knee DJD M17.11    Hypothyroidism E03.9    S/P left total knee arthroplasty Z96.659    Ischemic heart disease I25.9    Essential hypertension I10    NSTEMI (non-ST elevated myocardial infarction) ([facility] [address] I21.4    Other cirrhosis of liver ([facility] K74.69    Elevated lipase R74.8    Depression F32. A    Type 2 diabetes mellitus, with long-term current use of insulin ([facility]) E11.9, Z79.4    SHERITA (obstructive sleep apnea) G47.33    Morbid obesity with BMI of 45.0-49.9, adult ([facility]) E66.01, Z68.42    PVD (peripheral vascular disease) ([facility]) I73.9    Numbness and tingling of both feet R20.0, R20.2    Long term (current) use of antithrombotics/antiplatelets T91.85    Painful diabetic neuropathy ([facility]) E11.40    Vertebrogenic low back pain M54.51    Spinal stenosis of lumbar region with neurogenic claudication M48.062    CAD in native artery I25.10       Past Medical History:        Diagnosis Date    Ambulates with cane     or walker    Anxiety     Borderline hypertension     CAD (coronary artery disease)     stents x 2 in 2020    Depression 07/28/2020    GERD (gastroesophageal reflux disease)     Heart attack ([facility] 07/18/2020    Hypertension     Hypothyroidism     Neuropathy     Obesity     morbid    Osteoarthritis     Other cirrhosis of liver ([facility] 07/27/2020    pt denies    Sleep apnea     possible    Type II or unspecified type diabetes mellitus without mention of complication, not stated as uncontrolled     Under care of service provider 11/18/2022    meg-Kggidthh-ltqtyHernan owen-last visit aug 2022    Under care of service provider 11/18/2022    cardiology-ali-last visit nov 2022    Vertebrogenic low back pain 03/29/2022       Past Surgical History:        Procedure Laterality Date  APPENDECTOMY      CARDIAC CATHETERIZATION      2 stents    CARDIAC CATHETERIZATION  11/01/2022    COLONOSCOPY      CORONARY ANGIOPLASTY WITH STENT PLACEMENT  07/2020    DILATION AND CURETTAGE OF UTERUS      HIATAL HERNIA REPAIR      HYSTERECTOMY (CERVIX STATUS UNKNOWN)      THROAT SURGERY      POLYPS REMOVED FROM VOCAL CORD    TOTAL KNEE ARTHROPLASTY Right 01/06/2015    TOTAL KNEE ARTHROPLASTY Left 05/26/2015    UPPER GASTROINTESTINAL ENDOSCOPY         Social History:    Social History     Tobacco Use    Smoking status: Never    Smokeless tobacco: Never   Substance Use Topics    Alcohol use: Yes     Comment: once a month                                Counseling given: Not Answered      Vital Signs (Current):   Vitals:    11/28/22 0552   Weight: 256 lb 2.8 oz (116.2 kg)                                              BP Readings from Last 3 Encounters:   11/18/22 (!) 144/81   11/17/22 139/70   11/01/22 (!) 130/52       NPO Status: Time of last liquid consumption: 1600                        Time of last solid consumption: 1600                        Date of last liquid consumption: 11/27/22                        Date of last solid food consumption: 11/27/22    BMI:   Wt Readings from Last 3 Encounters:   11/28/22 256 lb 2.8 oz (116.2 kg)   11/18/22 256 lb (116.1 kg)   11/17/22 256 lb (116.1 kg)     Body mass index is 51.74 kg/m².     CBC:   Lab Results   Component Value Date/Time    WBC 6.6 11/18/2022 09:53 AM    RBC 5.03 11/18/2022 09:53 AM    HGB 14.4 11/18/2022 09:53 AM    HCT 45.7 11/18/2022 09:53 AM    MCV 90.9 11/18/2022 09:53 AM    RDW 13.4 11/18/2022 09:53 AM    PLT See Reflexed IPF Result 11/18/2022 09:53 AM       CMP:   Lab Results   Component Value Date/Time     11/18/2022 09:53 AM    K 4.1 11/18/2022 09:53 AM     11/18/2022 09:53 AM    CO2 22 11/18/2022 09:53 AM    BUN 10 11/18/2022 09:53 AM    CREATININE 0.65 11/18/2022 09:53 AM    GFRAA >60 02/17/2022 09:30 AM    LABGLOM >60 11/18/2022 09:53 AM    GLUCOSE 155 11/18/2022 09:53 AM    GLUCOSE 250 03/30/2012 03:15 PM    PROT 5.8 07/29/2020 05:11 AM    CALCIUM 9.4 11/18/2022 09:53 AM    BILITOT 0.31 07/29/2020 05:11 AM    ALKPHOS 110 07/29/2020 05:11 AM    AST 27 07/29/2020 05:11 AM    ALT 21 07/29/2020 05:11 AM       POC Tests: No results for input(s): POCGLU, POCNA, POCK, POCCL, POCBUN, POCHEMO, POCHCT in the last 72 hours. Coags:   Lab Results   Component Value Date/Time    PROTIME 10.8 11/18/2022 09:53 AM    INR 1.0 11/18/2022 09:53 AM    APTT 23.2 11/18/2022 09:53 AM       HCG (If Applicable): No results found for: PREGTESTUR, PREGSERUM, HCG, HCGQUANT     ABGs: No results found for: PHART, PO2ART, YEU3PFC, HZP1IKQ, BEART, V0JSIAFG     Type & Screen (If Applicable):  No results found for: LABABO, LABRH    Drug/Infectious Status (If Applicable):  Lab Results   Component Value Date/Time    HEPCAB NONREACTIVE 07/27/2020 10:10 PM       COVID-19 Screening (If Applicable):   Lab Results   Component Value Date/Time    COVID19 Not Detected 10/14/2020 10:20 AM    COVID19 Not Detected 10/03/2020 02:20 PM       Cardiac Cath  11/1/2022: LMCA: has distal 20% stenosis. LAD: has mid 85% stenosis. The D1 has 30% stenosis. LCx: has ostial eccentric 80% stenosis. The OM1 has proximal patent stent. RCA: has patent mid stent. There is distal 70% stenosis. Ramus: has proximal 80% stenosis.    EF 55%      TTE 11/14/2022  Normal LV size, wall thickness and wall motions  EF> 55%  Normal RV size and function  Normal size LA and RA  AV is sclerotic opens well, mean gradient 7 mmHg, no AR  MAC noted, no MS no MR  Normal aortic root dimensions  No significant pericardial effusion seen  Normal IC diameter normal respiratory variations suggestive of normal RA  filling pressure    Anesthesia Evaluation    Airway: Mallampati: III  TM distance: >3 FB   Neck ROM: full  Mouth opening: > = 3 FB   Dental:    (+) other      Pulmonary:   (+) sleep apnea: on noncompliant,  decreased breath sounds                            Cardiovascular:    (+) hypertension:, past MI:, CAD:, CABG/stent:,                   Neuro/Psych:   (+) psychiatric history:depression/anxiety             GI/Hepatic/Renal:   (+) GERD:, liver disease:, morbid obesity          Endo/Other:    (+) DiabetesType II DM, poorly controlled, using insulin, hypothyroidism: arthritis: OA., .                 Abdominal:   (+) obese,           Vascular: negative vascular ROS. Other Findings:           Anesthesia Plan      general     ASA 4       Induction: intravenous. arterial line, central line, continuous noninvasive hemodynamic monitor, BIS, CVP and ATA  MIPS: Postoperative ventilation. Anesthetic plan and risks discussed with patient and spouse. Use of blood products discussed with patient whom consented to blood products. Plan discussed with CRNA.                     Meagan Denise MD   11/28/2022

## 2022-11-28 NOTE — ANESTHESIA PROCEDURE NOTES
Procedure Performed: ATA       Start Time:  11/28/2022 8:42 AM       End Time:      Preanesthesia Checklist:  Patient identified, IV assessed, risks and benefits discussed, monitors and equipment assessed, procedure being performed at surgeon's request and anesthesia consent obtained. General Procedure Information  Diagnostic Indications for Echo:  hemodynamic monitoring and assessment of valve function  Physician Requesting Echo: Gerald Felix MD  Location performed:  OR  Intubated  Heart visualized  Probe Type:  3D  Modalities:  2D only, continuous wave Doppler and M-mode                 Name:  Clarita Marcano                                         Age:  78 y.o. MRN:  [id number]           Requested by Surgeon: Dr. Lars Nageotte  Performed by Dr. Yoselin Johnson: Transesophageal Echo    Patient seen and examined. History and Physical reviewed. Labs reviewed. Structures:    LA: Normal  RA: Normal  RV: Normal size and function  LV: Normal size and function. Estimated LVEF 50 %  LV apex: No LV apical thrombus identified  Aorta: Mild atheromatous disease Asc Aorta, arch and descending Aorta  Percardium: No pericardial effusion  SANJIV: No appendage thrombus identified  Septum: No intracardiac shunt via color Doppler. Valves:    Mitral Valve: Structurally normal. none regurgitation is identified. Aortic Valve: The aortic valve is trileaflet and opens adequately. none stenosis is identified. none regurgitiation is identified. Tricuspid valve: Structurally normal. none regurgitation is identified. Pulmonary valve: Normal. No significant regurgitation  No valvular vegetations or thrombus identified. Summary:     1. A ATA was performed without complications. 2. LVEF 50 % preop, no RWMA. 3. Pre-op Valvular abnormalities Trivial tricuspid regurgitation  4. No Aortic dissection  5. Significant findings were communicated to CTS.     After separation from CPB the following findings were obtained:   LV contractility appears to be significantly improved, with EF estimated at 60%. There is mild tricuspid regurgitation. No aortic dissection. Concentric hypertrophy of LV is noted. These findings were shared with CTS.     Electronically signed by Halle Aleman MD on 11/28/2022 at 9:13 AM

## 2022-11-28 NOTE — H&P
HPI: Ayse Clement is a 78 y.o.  female who . Presented back to cardiothoracic surgery after diagnosis of multivessel CAD was diagnosed in cardiac catheterization recently. Today she follows up for review and scheduling of CABG surgery. She states having a lot of anxiety regarding the surgery. Mild 2 out of 10 chest pain that comes and goes when her anxiety is bad. She is trying to be as active as she can around the house. Uses a walker due to bad osteoarthritis. Does not drink smoke or use any drugs. Family history of heart disease. History of family member having recent bypass surgery. Is patient on any AC or AP medication prior to CTS consult?   Plavix     ROS:   CONSTITUTIONAL: Anxious  Respiratory: negative  Cardiovascular: Mild chest pain when she is very anxious  Gastrointestinal: negative  Genitourinary:negative  Hematologic/lymphatic: negative  Musculoskeletal:negative  Neurological: negative  Endocrine: negative  Psychiatric: negative  Past Medical History           Past Medical History:   Diagnosis Date    Anxiety      Borderline hypertension      Depression 7/28/2020    GERD (gastroesophageal reflux disease)      Heart attack ([facility] [address] 07/18/2020    Hypothyroidism      Neuropathy      Obesity       morbid    Osteoarthritis      Other cirrhosis of liver ([facility] [address] 7/27/2020    Sleep apnea       possible    Type II or unspecified type diabetes mellitus without mention of complication, not stated as uncontrolled      Vertebrogenic low back pain 3/29/2022         Past Surgical History             Past Surgical History:   Procedure Laterality Date    APPENDECTOMY        CARDIAC CATHETERIZATION        COLONOSCOPY        CORONARY ANGIOPLASTY WITH STENT PLACEMENT   07/2020    DILATION AND CURETTAGE OF UTERUS        HIATAL HERNIA REPAIR        HYSTERECTOMY (CERVIX STATUS UNKNOWN)        THROAT SURGERY         POLYPS REMOVED FROM VOCAL CORD    TOTAL KNEE ARTHROPLASTY Right 01/06/2015    TOTAL KNEE ARTHROPLASTY Left 05/26/2015    UPPER GASTROINTESTINAL ENDOSCOPY                       Allergies   Allergen Reactions    Shellfish-Derived Products Nausea Only    Morphine Other (See Comments)       HALLUCINATIONS       Tape Jacqulin Keto Tape] Rash      Family History             Family History   Problem Relation Age of Onset    Heart Disease Mother      Arthritis Mother      Heart Disease Father      Arthritis Father      Cancer Father           \"oral\"    Diabetes Sister           gestational             Social History                   Socioeconomic History    Marital status:        Spouse name: Hasmukh Elaine    Number of children: 0    Years of education: Not on file    Highest education level: Not on file   Occupational History    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       Employer: [employer] [id number]   Tobacco Use    Smoking status: Never    Smokeless tobacco: Never   Vaping Use    Vaping Use: Never used   Substance and Sexual Activity    Alcohol use: Yes       Comment: once a month    Drug use: No    Sexual activity: Yes       Partners: Male   Other Topics Concern    Not on file   Social History Narrative    Not on file      Social Determinants of Health      Financial Resource Strain: Not on file   Food Insecurity: Not on file   Transportation Needs: Not on file   Physical Activity: Not on file   Stress: Not on file   Social Connections: Not on file   Intimate Partner Violence: Not on file   Housing Stability: Not on file             Current Facility-Administered Medications               Current Outpatient Medications   Medication Sig Dispense Refill    atorvastatin (LIPITOR) 40 MG tablet Take 1 tablet by mouth nightly 90 tablet 1    APPLE CIDER VINEGAR PO Take by mouth        lisinopril (PRINIVIL;ZESTRIL) 10 MG tablet TAKE 1 TABLET BY MOUTH EVERY DAY 30 tablet 2    empagliflozin (JARDIANCE) 10 MG tablet TAKE 1 TABLET BY MOUTH EVERY DAY 30 tablet 2    clopidogrel (PLAVIX) 75 MG tablet TAKE 1 TABLET BY MOUTH ONCE DAILY 30 tablet 10    levothyroxine (SYNTHROID) 50 MCG tablet TAKE 1 TABLET BY MOUTH EVERY DAY 60 tablet 2    carvedilol (COREG) 6.25 MG tablet TAKE 1 TABLET BY MOUTH TWICE DAILY WITH MEALS *EMERGENCY REFILL* 60 tablet 3    citalopram (CELEXA) 40 MG tablet TAKE 1 TABLET BY MOUTH ONCE DAILY *EMERGENCY REFILL* 30 tablet 5    glimepiride (AMARYL) 4 MG tablet TAKE 1 TABLET BY MOUTH TWICE DAILY *EMERGENCY REFILL* 60 tablet 5    omeprazole (PRILOSEC) 20 MG delayed release capsule TAKE 1 CAPSULE BY MOUTH ONCE DAILY 30 capsule 10    isosorbide mononitrate (IMDUR) 60 MG extended release tablet TAKE 1 TABLET BY MOUTH ONCE DAILY 30 tablet 10    amLODIPine (NORVASC) 5 MG tablet TAKE 1 TABLET BY MOUTH EVERY DAY 30 tablet 10    sucralfate (CARAFATE) 1 GM tablet TAKE 1 TABLET BY MOUTH EVERY DAY 30 tablet 10    busPIRone (BUSPAR) 10 MG tablet TAKE ONE (1) TABLET BY MOUTH TWICE DAILY 60 tablet 10    insulin glargine (BASAGLAR KWIKPEN) 100 UNIT/ML injection pen Inject 65 units subcutaneously once daily. 15 pen 3    aspirin (ASPIRIN LOW DOSE) 81 MG EC tablet TAKE 1 TABLET BY MOUTH ONCE DAILY 30 tablet 10    miconazole (MICOTIN) 2 % cream APPLY TO AFFECTED AREA TWICE DAILY 1 each 1    acetaminophen (TYLENOL) 500 MG tablet TAKE TWO TABLETS BY MOUTH THREE TIMES A DAY AS NEEDED FOR PAIN 180 tablet 0    lidocaine (XYLOCAINE) 2 % jelly Apply topically with dressing changes every 3 days 1 Tube 1    melatonin 10 MG CAPS capsule TAKE 1 CAPSULE BY MOUTH NIGHTLY AS NEEDED FOR SLEEP 30 capsule 2    blood glucose monitor strips Test before meals and at bedtime. DX: DM, on insulin 100 strip 11    miconazole (ZEASORB-AF) 2 % powder Apply topically 2 times daily. 45 g 1    blood glucose monitor kit and supplies Dispense sufficient amount for indicated testing frequency plus additional to accommodate PRN testing needs. Dispense all needed supplies to include: monitor, strips, lancing device, lancets, control solutions, alcohol swabs.  1 kit 0    magnesium hydroxide (MILK OF MAGNESIA) 400 MG/5ML suspension Take 30 mLs by mouth daily as needed for Constipation 900 mL 0    nitroGLYCERIN (NITROSTAT) 0.4 MG SL tablet up to max of 3 total doses. If no relief after 1 dose, call 911. 25 tablet 3    Handicap Placard MISC Is a permanent condition. DX: M19.90 1 each 0    Insulin Pen Needle (B-D UF III MINI PEN NEEDLES) 31G X 5 MM MISC USE 1 PEN NEEDLE DAILY 50 each 1    [employer] Lancets Thin MISC Test before meals and at bedtime. DX: DM, on insulin 100 each 11    MULTIPLE VITAMIN PO Take 2 tablets by mouth daily DAILY        Alcohol Swabs (ALCOHOL PREP PADS) by Other route 2 times daily          gabapentin (NEURONTIN) 400 MG capsule TAKE 1 CAPSULE BY MOUTH THREE TIMES DAILY 90 capsule 0      No current facility-administered medications for this visit. Physical Exam:        Vitals:     11/17/22 1023   BP: 139/70   Pulse: 73   Temp: 97.2 °F (36.2 °C)   SpO2: 99%      Weight: Weight: 256 lb (116.1 kg)    Weight: 256 lb (116.1 kg)                           General: Alert and Oriented x3. Sitting up in bed. No apparent distress. Morbid obese  HEENT:  Normocephalic and atraumatic. PERRL. EOMI. Lips and oral mucosa moist and without lesions. Neck:  Supple. Trachea midline. Chest:  No abnormality. Equal and symmetric expansion with respiration. Lungs:  Clear to auscultation. Cardiac:  Regular rate and rhythm without murmurs, rubs or gallops. Abdomen:  Soft, non-tender, normoactive bowel sounds. No masses or organomegaly. Extremities:  No cyanosis, clubbing, or edema. Intact pulses in all four extremities. Musculoskeletal:  Intact range of motion of peripheral joints. Normal muscular strength. Neurologic:  Cranial nerves are grossly intact. Non-focal sensory deficits on exam.  Psychiatric: Mood and affect are appropriate. Imaging Studies:    Cardiac Cath:  LMCA: has distal 20% stenosis. LAD: has mid 85% stenosis. The D1 has 30% stenosis. LCx: has ostial eccentric 80% stenosis. The OM1 has proximal patent stent. RCA: has patent mid stent. There is distal 70% stenosis. Ramus: has proximal 80% stenosis.       Echo:Normal LV size, wall thickness and wall motions  EF > 55%  Normal RV size and function  Normal size LA and RA  AV is sclerotic opens well, mean gradient 7 mmHg, no AR  MAC noted, no MS no MR  Normal aortic root dimensions  No significant pericardial effusion seen  Normal IC diameter normal respiratory variations suggestive of normal RA  filling pressure        CT:  reviewed with no concerns noted     Prior Labs reviewed:   No labs of concerns noted      Assessment         Patient Active Problem List   Diagnosis    Anxiety    GERD (gastroesophageal reflux disease)    OA (osteoarthritis)    Neuropathy    Right knee DJD    Hypothyroidism    S/P left total knee arthroplasty    Ischemic heart disease    Essential hypertension    NSTEMI (non-ST elevated myocardial infarction) (HCC)    Other cirrhosis of liver (HCC)    Elevated lipase    Depression    Type 2 diabetes mellitus, with long-term current use of insulin (HCC)    SHERITA (obstructive sleep apnea)    Morbid obesity with BMI of 45.0-49.9, adult (HCC)    PVD (peripheral vascular disease) (HCC)    Numbness and tingling of both feet    Long term (current) use of antithrombotics/antiplatelets    Painful diabetic neuropathy (HCC)    Vertebrogenic low back pain    Spinal stenosis of lumbar region with neurogenic claudication         Risks Reviewed w/pt  - Risk for stroke: Yes  - Risk for bleeding:Yes  - Risk for cardiac arrythmias: Yes  - Small risk of death: Yes  - Risks of pneumonia from ventilator: Yes  - Risk for infection: Yes     PLAN:  CABG this morning

## 2022-11-28 NOTE — CARE COORDINATION
Pt had CABG x3 today, is intubated, no family present, will attempt initial assessment at another time.

## 2022-11-28 NOTE — ANESTHESIA PROCEDURE NOTES
Central Venous Line:    A central venous line was placed in the OR for the following indication(s): central venous access and CVP monitoring. 11/28/2022 8:26 AM11/28/2022 8:37 AM    Sterility preparation included the following: hand hygiene performed prior to procedure, maximum sterile barriers used and sterile technique used to drape from head to toe. The patient was placed in Trendelenburg position. The right internal jugular vein was prepped. The site was prepped with Chloraprep. A 9 Fr (size), 10 (length), introducer SLIC was placed. During the procedure, the following specific steps were taken: target vein identified, needle advanced into vein and blood aspirated and guidewire advanced into vein. Intravenous verification was obtained by ultrasound and venous blood return. Post insertion care included: all ports aspirated, all ports flushed easily, guidewire removed intact, Biopatch applied, line sutured in place and dressing applied. During the procedure the patient experienced: patient tolerated procedure well with no complications.       Insertion site scrubbed per usage guidelines?: Yes  Skin prep agent dried for 3 minutes prior to procedure?:yes  Outcomes: uncomplicated and patient tolerated procedure wellno  Anesthesia type: general..No  Staffing  Performed: Anesthesiologist and Other   Anesthesiologist: Mely Muniz MD  Resident/CRNA: Rachel Oglesby RN  Preanesthetic Checklist  Completed: patient identified, IV checked, site marked, risks and benefits discussed, surgical/procedural consents, equipment checked, pre-op evaluation, timeout performed, anesthesia consent given, oxygen available, monitors applied/VS acknowledged, fire risk safety assessment completed and verbalized and blood product R/B/A discussed and consented

## 2022-11-28 NOTE — PROGRESS NOTES
Patient taken to CVOR by 2 OR RN. Medications administered, VSS and recorded.  All teams seen patient this AM.

## 2022-11-29 ENCOUNTER — APPOINTMENT (OUTPATIENT)
Dept: GENERAL RADIOLOGY | Age: 67
DRG: 236 | End: 2022-11-29
Attending: THORACIC SURGERY (CARDIOTHORACIC VASCULAR SURGERY)
Payer: MEDICARE

## 2022-11-29 LAB
ANION GAP SERPL CALCULATED.3IONS-SCNC: 5 MMOL/L (ref 9–17)
BUN BLDV-MCNC: 14 MG/DL (ref 8–23)
CALCIUM SERPL-MCNC: 8.4 MG/DL (ref 8.6–10.4)
CHLORIDE BLD-SCNC: 108 MMOL/L (ref 98–107)
CO2: 23 MMOL/L (ref 20–31)
CREAT SERPL-MCNC: 0.75 MG/DL (ref 0.5–0.9)
EKG ATRIAL RATE: 79 BPM
EKG P AXIS: 34 DEGREES
EKG P-R INTERVAL: 200 MS
EKG Q-T INTERVAL: 448 MS
EKG QRS DURATION: 80 MS
EKG QTC CALCULATION (BAZETT): 513 MS
EKG R AXIS: -10 DEGREES
EKG T AXIS: 13 DEGREES
EKG VENTRICULAR RATE: 79 BPM
GFR SERPL CREATININE-BSD FRML MDRD: >60 ML/MIN/1.73M2
GLUCOSE BLD-MCNC: 101 MG/DL (ref 65–105)
GLUCOSE BLD-MCNC: 106 MG/DL (ref 65–105)
GLUCOSE BLD-MCNC: 114 MG/DL (ref 65–105)
GLUCOSE BLD-MCNC: 116 MG/DL (ref 65–105)
GLUCOSE BLD-MCNC: 122 MG/DL (ref 65–105)
GLUCOSE BLD-MCNC: 127 MG/DL (ref 65–105)
GLUCOSE BLD-MCNC: 132 MG/DL (ref 65–105)
GLUCOSE BLD-MCNC: 133 MG/DL (ref 70–99)
GLUCOSE BLD-MCNC: 152 MG/DL (ref 65–105)
GLUCOSE BLD-MCNC: 173 MG/DL (ref 65–105)
GLUCOSE BLD-MCNC: 79 MG/DL (ref 65–105)
GLUCOSE BLD-MCNC: 82 MG/DL (ref 65–105)
GLUCOSE BLD-MCNC: 97 MG/DL (ref 65–105)
HCT VFR BLD CALC: 45.5 % (ref 36.3–47.1)
HEMOGLOBIN: 14.2 G/DL (ref 11.9–15.1)
INR BLD: 1.1
MAGNESIUM: 2.2 MG/DL (ref 1.6–2.6)
MCH RBC QN AUTO: 28.8 PG (ref 25.2–33.5)
MCHC RBC AUTO-ENTMCNC: 31.2 G/DL (ref 28.4–34.8)
MCV RBC AUTO: 92.3 FL (ref 82.6–102.9)
NRBC AUTOMATED: 0 PER 100 WBC
PDW BLD-RTO: 13.7 % (ref 11.8–14.4)
PLATELET # BLD: 192 K/UL (ref 138–453)
PMV BLD AUTO: 11 FL (ref 8.1–13.5)
POTASSIUM SERPL-SCNC: 4.5 MMOL/L (ref 3.7–5.3)
PROTHROMBIN TIME: 12 SEC (ref 9.1–12.3)
RBC # BLD: 4.93 M/UL (ref 3.95–5.11)
SODIUM BLD-SCNC: 136 MMOL/L (ref 135–144)
WBC # BLD: 23 K/UL (ref 3.5–11.3)

## 2022-11-29 PROCEDURE — 6370000000 HC RX 637 (ALT 250 FOR IP): Performed by: PHYSICIAN ASSISTANT

## 2022-11-29 PROCEDURE — 97530 THERAPEUTIC ACTIVITIES: CPT

## 2022-11-29 PROCEDURE — 6360000002 HC RX W HCPCS

## 2022-11-29 PROCEDURE — 97162 PT EVAL MOD COMPLEX 30 MIN: CPT

## 2022-11-29 PROCEDURE — 97166 OT EVAL MOD COMPLEX 45 MIN: CPT

## 2022-11-29 PROCEDURE — 71045 X-RAY EXAM CHEST 1 VIEW: CPT

## 2022-11-29 PROCEDURE — 2500000003 HC RX 250 WO HCPCS: Performed by: PHYSICIAN ASSISTANT

## 2022-11-29 PROCEDURE — 2100000001 HC CVICU R&B

## 2022-11-29 PROCEDURE — 82947 ASSAY GLUCOSE BLOOD QUANT: CPT

## 2022-11-29 PROCEDURE — 6360000002 HC RX W HCPCS: Performed by: PHYSICIAN ASSISTANT

## 2022-11-29 PROCEDURE — 97535 SELF CARE MNGMENT TRAINING: CPT

## 2022-11-29 PROCEDURE — 2700000000 HC OXYGEN THERAPY PER DAY

## 2022-11-29 PROCEDURE — 94640 AIRWAY INHALATION TREATMENT: CPT

## 2022-11-29 PROCEDURE — 85027 COMPLETE CBC AUTOMATED: CPT

## 2022-11-29 PROCEDURE — 85610 PROTHROMBIN TIME: CPT

## 2022-11-29 PROCEDURE — 2580000003 HC RX 258: Performed by: PHYSICIAN ASSISTANT

## 2022-11-29 PROCEDURE — 94761 N-INVAS EAR/PLS OXIMETRY MLT: CPT

## 2022-11-29 PROCEDURE — 80048 BASIC METABOLIC PNL TOTAL CA: CPT

## 2022-11-29 PROCEDURE — 83735 ASSAY OF MAGNESIUM: CPT

## 2022-11-29 RX ORDER — KETOROLAC TROMETHAMINE 15 MG/ML
15 INJECTION, SOLUTION INTRAMUSCULAR; INTRAVENOUS EVERY 6 HOURS
Status: DISPENSED | OUTPATIENT
Start: 2022-11-29 | End: 2022-12-01

## 2022-11-29 RX ORDER — FUROSEMIDE 10 MG/ML
20 INJECTION INTRAMUSCULAR; INTRAVENOUS 2 TIMES DAILY
Status: DISCONTINUED | OUTPATIENT
Start: 2022-11-29 | End: 2022-12-05 | Stop reason: SDUPTHER

## 2022-11-29 RX ORDER — FUROSEMIDE 10 MG/ML
INJECTION INTRAMUSCULAR; INTRAVENOUS
Status: COMPLETED
Start: 2022-11-29 | End: 2022-11-29

## 2022-11-29 RX ORDER — KETOROLAC TROMETHAMINE 30 MG/ML
INJECTION, SOLUTION INTRAMUSCULAR; INTRAVENOUS
Status: COMPLETED
Start: 2022-11-29 | End: 2022-11-29

## 2022-11-29 RX ADMIN — MUPIROCIN: 20 OINTMENT TOPICAL at 08:56

## 2022-11-29 RX ADMIN — METOPROLOL TARTRATE 25 MG: 25 TABLET ORAL at 08:56

## 2022-11-29 RX ADMIN — FENTANYL CITRATE 25 MCG: 50 INJECTION, SOLUTION INTRAMUSCULAR; INTRAVENOUS at 08:51

## 2022-11-29 RX ADMIN — OXYCODONE HYDROCHLORIDE AND ACETAMINOPHEN 2 TABLET: 5; 325 TABLET ORAL at 18:27

## 2022-11-29 RX ADMIN — SODIUM CHLORIDE 6.48 UNITS/HR: 9 INJECTION, SOLUTION INTRAVENOUS at 06:57

## 2022-11-29 RX ADMIN — AMIODARONE HYDROCHLORIDE 200 MG: 200 TABLET ORAL at 08:56

## 2022-11-29 RX ADMIN — KETOROLAC TROMETHAMINE 15 MG: 15 INJECTION, SOLUTION INTRAMUSCULAR; INTRAVENOUS at 18:25

## 2022-11-29 RX ADMIN — ATORVASTATIN CALCIUM 20 MG: 20 TABLET, FILM COATED ORAL at 20:30

## 2022-11-29 RX ADMIN — FENTANYL CITRATE 25 MCG: 50 INJECTION, SOLUTION INTRAMUSCULAR; INTRAVENOUS at 05:54

## 2022-11-29 RX ADMIN — IPRATROPIUM BROMIDE AND ALBUTEROL SULFATE 1 AMPULE: .5; 3 SOLUTION RESPIRATORY (INHALATION) at 19:47

## 2022-11-29 RX ADMIN — FUROSEMIDE 20 MG: 10 INJECTION, SOLUTION INTRAMUSCULAR; INTRAVENOUS at 08:57

## 2022-11-29 RX ADMIN — CLOPIDOGREL 75 MG: 75 TABLET, FILM COATED ORAL at 08:56

## 2022-11-29 RX ADMIN — AMIODARONE HYDROCHLORIDE 200 MG: 200 TABLET ORAL at 20:40

## 2022-11-29 RX ADMIN — PANTOPRAZOLE SODIUM 40 MG: 40 TABLET, DELAYED RELEASE ORAL at 08:56

## 2022-11-29 RX ADMIN — Medication 81 MG: at 08:56

## 2022-11-29 RX ADMIN — Medication 2000 MG: at 07:22

## 2022-11-29 RX ADMIN — INSULIN LISPRO 1 UNITS: 100 INJECTION, SOLUTION INTRAVENOUS; SUBCUTANEOUS at 20:30

## 2022-11-29 RX ADMIN — AMIODARONE HYDROCHLORIDE 200 MG: 200 TABLET ORAL at 14:06

## 2022-11-29 RX ADMIN — KETOROLAC TROMETHAMINE 15 MG: 30 INJECTION, SOLUTION INTRAMUSCULAR at 09:44

## 2022-11-29 RX ADMIN — OXYCODONE HYDROCHLORIDE AND ACETAMINOPHEN 2 TABLET: 5; 325 TABLET ORAL at 14:06

## 2022-11-29 RX ADMIN — IPRATROPIUM BROMIDE AND ALBUTEROL SULFATE 1 AMPULE: .5; 3 SOLUTION RESPIRATORY (INHALATION) at 08:05

## 2022-11-29 RX ADMIN — ONDANSETRON 4 MG: 2 INJECTION INTRAMUSCULAR; INTRAVENOUS at 08:48

## 2022-11-29 RX ADMIN — Medication 2000 MG: at 20:30

## 2022-11-29 RX ADMIN — Medication 0.02 MCG/KG/MIN: at 10:11

## 2022-11-29 RX ADMIN — MUPIROCIN: 20 OINTMENT TOPICAL at 20:30

## 2022-11-29 RX ADMIN — IPRATROPIUM BROMIDE AND ALBUTEROL SULFATE 1 AMPULE: .5; 3 SOLUTION RESPIRATORY (INHALATION) at 12:19

## 2022-11-29 RX ADMIN — SODIUM CHLORIDE, PRESERVATIVE FREE 10 ML: 5 INJECTION INTRAVENOUS at 08:50

## 2022-11-29 RX ADMIN — Medication 2000 MG: at 12:24

## 2022-11-29 RX ADMIN — IPRATROPIUM BROMIDE AND ALBUTEROL SULFATE 1 AMPULE: .5; 3 SOLUTION RESPIRATORY (INHALATION) at 16:43

## 2022-11-29 RX ADMIN — FUROSEMIDE 20 MG: 10 INJECTION, SOLUTION INTRAMUSCULAR; INTRAVENOUS at 18:31

## 2022-11-29 RX ADMIN — Medication 1500 MG: at 03:18

## 2022-11-29 RX ADMIN — Medication 1500 MG: at 16:40

## 2022-11-29 RX ADMIN — OXYCODONE HYDROCHLORIDE AND ACETAMINOPHEN 1 TABLET: 5; 325 TABLET ORAL at 04:14

## 2022-11-29 ASSESSMENT — PAIN DESCRIPTION - DESCRIPTORS: DESCRIPTORS: ACHING

## 2022-11-29 ASSESSMENT — PAIN DESCRIPTION - LOCATION
LOCATION: CHEST
LOCATION: CHEST

## 2022-11-29 ASSESSMENT — PAIN SCALES - GENERAL
PAINLEVEL_OUTOF10: 8
PAINLEVEL_OUTOF10: 5
PAINLEVEL_OUTOF10: 5
PAINLEVEL_OUTOF10: 7
PAINLEVEL_OUTOF10: 6

## 2022-11-29 NOTE — PROGRESS NOTES
Physical Therapy  Facility/Department: [facility]- SICU  Physical Therapy Initial Assessment    Name: Viv Fiore  :   MRN: [id number]  Date of Service: 2022    No chief complaint on file. Discharge Recommendations:    Further therapy recommended at discharge. PT Equipment Recommendations  Other: CTA, Pt requires RW to safetly attempt amb with assistance      Patient Diagnosis(es): There were no encounter diagnoses. Past Medical History:  has a past medical history of Ambulates with cane, Anxiety, Borderline hypertension, CAD (coronary artery disease), Depression, GERD (gastroesophageal reflux disease), Heart attack ([facility] [address] Hypertension, Hypothyroidism, Neuropathy, Obesity, Osteoarthritis, Other cirrhosis of liver ([facility] [address] Sleep apnea, Type II or unspecified type diabetes mellitus without mention of complication, not stated as uncontrolled, Under care of service provider, Under care of service provider, and Vertebrogenic low back pain. Past Surgical History:  has a past surgical history that includes Hysterectomy; Colonoscopy; Upper gastrointestinal endoscopy; Dilation and curettage of uterus; hiatal hernia repair; Throat surgery; Appendectomy; Total knee arthroplasty (Right, 2015); Total knee arthroplasty (Left, 2015); Coronary angioplasty with stent (2020); Cardiac catheterization; Cardiac catheterization (2022); and Coronary artery bypass graft (N/A, 2022). Assessment   Body Structures, Functions, Activity Limitations Requiring Skilled Therapeutic Intervention: Decreased functional mobility ; Decreased strength;Decreased endurance;Decreased balance;Decreased coordination;Decreased cognition  Assessment: Pt minAx2 to stand, CGA standing ~20 seconds, fatigue limiting. Pt would benefit from continued acute PT to address deficits.   Therapy Prognosis: Good  Decision Making: Medium Complexity  Requires PT Follow-Up: Yes  Activity Tolerance  Activity Tolerance: Patient tolerated treatment well;Patient limited by fatigue;Patient limited by endurance;Treatment limited secondary to decreased cognition     Plan   Physcial Therapy Plan  General Plan: 6-7 times per week (1-2x/day)  Current Treatment Recommendations: Strengthening, Balance training, Gait training, Functional mobility training, Stair training, Transfer training, Endurance training, Home exercise program, Safety education & training, Patient/Caregiver education & training, Equipment evaluation, education, & procurement, Therapeutic activities  Safety Devices  Type of Devices: Call light within reach, Gait belt, Nurse notified, Patient at risk for falls, Left in chair, All fall risk precautions in place  Restraints  Restraints Initially in Place: No     Restrictions  Restrictions/Precautions  Restrictions/Precautions: General Precautions  Required Braces or Orthoses?: Yes  Required Braces or Orthoses  Other: Heart Hugger Brace  Position Activity Restriction  Sternal Precautions: No Pushing, No Pulling, 5# Lifting Restrictions  Other position/activity restrictions: amb pt, up in chair, s/p cabgx3 11/28     Subjective   General  Chart Reviewed: Yes  Patient assessed for rehabilitation services?: Yes  Response To Previous Treatment: Not applicable  Family / Caregiver Present: Yes ()  Follows Commands: Within Functional Limits  General Comment  Comments: RN and pt agreeable to PT. Pt alert in chair upon arrival. OT co-eval  Subjective  Subjective: Pt c/o 10/10 pain, very drowsy/ slowed during session. Pt denies any numbness or tingling.          Social/Functional History  Social/Functional History  Lives With: Spouse  Type of Home: House  Home Layout: One level  Home Access: Stairs to enter with rails  Entrance Stairs - Number of Steps: 5  Entrance Stairs - Rails: Both  Bathroom Shower/Tub: Walk-in shower  Bathroom Toilet: Standard  Bathroom Equipment: Built-in shower seat, Grab bars in shower, Grab bars around toilet (grab bar outside shower is right in front of toilet and also usable for toilet transfer)  Bathroom Accessibility: Accessible  Home Equipment: Debby Manifold, 4 wheeled, Rollator, Walker, rolling, Cane  Has the patient had two or more falls in the past year or any fall with injury in the past year?: Yes (diffiulty with steps, spouse helps pt navigate baseline.  feel on steps and in living room.)  ADL Assistance: Independent (with supervision from spouse)  Homemaking Assistance: Needs assistance (spouse performs)  Homemaking Responsibilities: No (spouse performs)  Ambulation Assistance: Independent  Transfer Assistance: Independent  Active : Yes  Mode of Transportation: [facility]  Occupation: Retired  Leisure & Hobbies: swim/ walk in water, play on [employer],  [employer] [address]: Impaired  Vision Exceptions: Wears glasses for reading  Hearing  Hearing: Within functional limits    Cognition   Orientation  Overall Orientation Status: Impaired  Orientation Level:  (increased time, drowsy throughout, recent pain meds)  Cognition  Overall Cognitive Status: WFL     Objective     AROM RLE (degrees)  RLE AROM: WFL  AROM LLE (degrees)  LLE AROM : WFL  AROM RUE (degrees)  RUE General AROM: see OT  AROM LUE (degrees)  LUE General AROM: see OT  Strength RLE  Comment: Pt grossly 3+/5 MMT, questionable effort, pt drowsy, no buckling with standing  Strength LLE  Comment: Pt grossly 3+/5 MMT, questionable effort, pt drowsy, no buckling with standing  Strength RUE  Comment: antigravity observed, see OT  Strength LUE  Comment: antigravity observed, see OT           Bed mobility  Bed Mobility Comments: Pt in chair upon arrival and exit  Transfers  Sit to Stand: Minimal Assistance;2 Person Assistance  Stand to Sit: Minimal Assistance;2 Person Assistance        Balance  Posture: Fair  Sitting - Static: Good;-  Sitting - Dynamic: Fair;+  Standing - Static: Fair  Comments: RW used while assessing standing balance  Exercise Treatment: Pt stood ~20 seconds CGA, fatigues, drowsy. AM-PAC Score  AM-PAC Inpatient Mobility Raw Score : 11 (11/29/22 1531)  AM-PAC Inpatient T-Scale Score : 33.86 (11/29/22 1531)  Mobility Inpatient CMS 0-100% Score: 72.57 (11/29/22 1531)  Mobility Inpatient CMS G-Code Modifier : CL (11/29/22 1531)        Goals  Short Term Goals  Time Frame for Short Term Goals: 14 visits  Short Term Goal 1: Pt will be Mary bed mobility  Short Term Goal 2: Pt will be Mary transfers  Short Term Goal 3: Pt will be Mary amb 250' RW or least restrictive AD  Short Term Goal 4: Pt will navigate 6 steps Mary either or B rail use       Education  Patient Education  Education Given To: Patient; Family  Education Provided: Role of Therapy;Plan of Care  Education Method: Demonstration;Verbal  Barriers to Learning: Cognition; Other (Comment) (drowsy)  Education Outcome: Verbalized understanding;Demonstrated understanding      Therapy Time   Individual Concurrent Group Co-treatment   Time In 1024         Time Out 1053         Minutes 29         Timed Code Treatment Minutes: 9 Minutes       Rebeca Omalley, PT

## 2022-11-29 NOTE — CONSULTS
[facility] Cardiology Cardiology    Consult / H&P               Today's Date: 11/29/2022  Patient Name: Kirk Barker  Date of admission: 11/28/2022  5:56 AM  Patient's age: 78 y.o., 1955  Admission Dx: Multiple vessel coronary artery disease [I25.10]  CAD in native artery [I25.10]    Reason for Consult:  Cardiac evaluation    Requesting Physician: Marty Weiss MD    REASON FOR CONSULT:  Post Op CABG    History Obtained From:  Patient, Electronic medical record    HISTORY OF PRESENT ILLNESS:      The patient is a 78 y.o. female who was admitted for coronary artery bypass surgery after she was found to have multivessel CAD on cardiac cath. Patient initially was seen by cardiology after a positive stress test and had a cardiac cath as below. She was subsequently seen by CT surgery and discharged on medical therapy with plan for follow-up CABG which was done yesterday. Cardiology was consulted for post-op cardiac management. Past Medical History:   has a past medical history of Ambulates with cane, Anxiety, Borderline hypertension, CAD (coronary artery disease), Depression, GERD (gastroesophageal reflux disease), Heart attack ([address] Hypertension, Hypothyroidism, Neuropathy, Obesity, Osteoarthritis, Other cirrhosis of liver ([address] Sleep apnea, Type II or unspecified type diabetes mellitus without mention of complication, not stated as uncontrolled, Under care of service provider, Under care of service provider, and Vertebrogenic low back pain. Past Surgical History:   has a past surgical history that includes Hysterectomy; Colonoscopy; Upper gastrointestinal endoscopy; Dilation and curettage of uterus; hiatal hernia repair; Throat surgery; Appendectomy; Total knee arthroplasty (Right, 01/06/2015); Total knee arthroplasty (Left, 05/26/2015); Coronary angioplasty with stent (07/2020); Cardiac catheterization; and Cardiac catheterization (11/01/2022).      Home Medications:    Prior to Admission medications    Medication Sig Start Date End Date Taking? Authorizing Provider   GARLIC PO Take 1 tablet by mouth daily    Historical Provider, MD   atorvastatin (LIPITOR) 40 MG tablet Take 1 tablet by mouth nightly 11/16/22   Greta Shah MD   APPLE CIDER VINEGAR PO Take 2 tablets by mouth daily    Historical Provider, MD   lisinopril (PRINIVIL;ZESTRIL) 10 MG tablet TAKE 1 TABLET BY MOUTH EVERY DAY 10/22/22   Amisha Zimmerman MD   empagliflozin (JARDIANCE) 10 MG tablet TAKE 1 TABLET BY MOUTH EVERY DAY 10/22/22   Amisha Zimmerman MD   clopidogrel (PLAVIX) 75 MG tablet TAKE 1 TABLET BY MOUTH ONCE DAILY 10/22/22   Amisha Zimmerman MD   levothyroxine (SYNTHROID) 50 MCG tablet TAKE 1 TABLET BY MOUTH EVERY DAY 10/22/22   Amisha Zimmerman MD   carvedilol (COREG) 6.25 MG tablet TAKE 1 TABLET BY MOUTH TWICE DAILY WITH MEALS *EMERGENCY REFILL* 9/29/22   Amisha Zimmerman MD   citalopram (CELEXA) 40 MG tablet TAKE 1 TABLET BY MOUTH ONCE DAILY *EMERGENCY REFILL* 9/29/22   Amisha Zimmerman MD   glimepiride (AMARYL) 4 MG tablet TAKE 1 TABLET BY MOUTH TWICE DAILY *EMERGENCY REFILL* 9/29/22   Amisha Zimmerman MD   omeprazole (PRILOSEC) 20 MG delayed release capsule TAKE 1 CAPSULE BY MOUTH ONCE DAILY 9/22/22   Paulette Turner DO   isosorbide mononitrate (IMDUR) 60 MG extended release tablet TAKE 1 TABLET BY MOUTH ONCE DAILY 5/12/22   Dalton Alvarado MD   amLODIPine (NORVASC) 5 MG tablet TAKE 1 TABLET BY MOUTH EVERY DAY 5/12/22   Dalton Alvarado MD   gabapentin (NEURONTIN) 400 MG capsule TAKE 1 CAPSULE BY MOUTH THREE TIMES DAILY 4/22/22 11/18/22  Dalton Alvarado MD   sucralfate (CARAFATE) 1 GM tablet TAKE 1 TABLET BY MOUTH EVERY DAY 4/20/22   Dalton Alvarado MD   busPIRone (BUSPAR) 10 MG tablet TAKE ONE (1) TABLET BY MOUTH TWICE DAILY 3/11/22   Dalton Alvarado MD   insulin glargine (BASAGLAR KWIKPEN) 100 UNIT/ML injection pen Inject 65 units subcutaneously once daily.  3/1/22   Dalton Alvarado MD aspirin (ASPIRIN LOW DOSE) 81 MG EC tablet TAKE 1 TABLET BY MOUTH ONCE DAILY 2/4/22   Ervin Duran MD   miconazole (MICOTIN) 2 % cream APPLY TO AFFECTED AREA TWICE DAILY 11/12/21   Ervin Duran MD   acetaminophen (TYLENOL) 500 MG tablet TAKE TWO TABLETS BY MOUTH THREE TIMES A DAY AS NEEDED FOR PAIN 10/21/21   Elijah Sol MD   lidocaine (XYLOCAINE) 2 % jelly Apply topically with dressing changes every 3 days 8/5/21   Ervin Duran MD   melatonin 10 MG CAPS capsule TAKE 1 CAPSULE BY MOUTH NIGHTLY AS NEEDED FOR SLEEP 8/5/21   Ervin Duran MD   blood glucose monitor strips Test before meals and at bedtime. DX: DM, on insulin 10/8/20   Ervin Duran MD   miconazole (ZEASORB-AF) 2 % powder Apply topically 2 times daily. 10/1/20   Ervin Duran MD   blood glucose monitor kit and supplies Dispense sufficient amount for indicated testing frequency plus additional to accommodate PRN testing needs. Dispense all needed supplies to include: monitor, strips, lancing device, lancets, control solutions, alcohol swabs. 8/17/20   Bruce Reed MD   magnesium hydroxide (MILK OF MAGNESIA) 400 MG/5ML suspension Take 30 mLs by mouth daily as needed for Constipation 7/29/20   Bruce Reed MD   nitroGLYCERIN (NITROSTAT) 0.4 MG SL tablet up to max of 3 total doses. If no relief after 1 dose, call 911. 7/21/20   HAO Christian - NEELAM   Handicap Placard MISC Is a permanent condition. DX: M19.90 5/14/19   Ervin Duran MD   Insulin Pen Needle (B-D UF III MINI PEN NEEDLES) 31G X 5 MM MISC USE 1 PEN NEEDLE DAILY 3/8/18   Ervin Duran MD   [employer] Lancets Thin MISC Test before meals and at bedtime.  DX: DM, on insulin 4/22/14   Mariela Prasad MD   MULTIPLE VITAMIN PO Take 2 tablets by mouth daily DAILY    Historical Provider, MD   Alcohol Swabs (ALCOHOL PREP PADS) by Other route 2 times daily      Historical Provider, MD      Current Facility-Administered Medications: lactated ringers infusion, , IntraVENous, Continuous  sodium chloride flush 0.9 % injection 5-40 mL, 5-40 mL, IntraVENous, 2 times per day  sodium chloride flush 0.9 % injection 5-40 mL, 5-40 mL, IntraVENous, PRN  0.9 % sodium chloride infusion, , IntraVENous, PRN  midazolam PF (VERSED) injection 1 mg, 1 mg, IntraVENous, Q10 Min PRN  protamine injection 50 mg, 50 mg, IntraVENous, Once PRN  sodium chloride flush 0.9 % injection 5-40 mL, 5-40 mL, IntraVENous, 2 times per day  sodium chloride flush 0.9 % injection 5-40 mL, 5-40 mL, IntraVENous, PRN  0.9 % sodium chloride infusion, , IntraVENous, PRN  ondansetron (ZOFRAN-ODT) disintegrating tablet 4 mg, 4 mg, Oral, Q8H PRN **OR** ondansetron (ZOFRAN) injection 4 mg, 4 mg, IntraVENous, Q6H PRN  aspirin EC tablet 81 mg, 81 mg, Oral, Daily  clopidogrel (PLAVIX) tablet 75 mg, 75 mg, Oral, Daily  oxyCODONE-acetaminophen (PERCOCET) 5-325 MG per tablet 1 tablet, 1 tablet, Oral, Q4H PRN **OR** oxyCODONE-acetaminophen (PERCOCET) 5-325 MG per tablet 2 tablet, 2 tablet, Oral, Q4H PRN  fentaNYL (SUBLIMAZE) injection 25 mcg, 25 mcg, IntraVENous, Q1H PRN **OR** fentaNYL (SUBLIMAZE) injection 50 mcg, 50 mcg, IntraVENous, Q1H PRN  meperidine (DEMEROL) injection 25 mg, 25 mg, IntraVENous, Once PRN  amiodarone (CORDARONE) tablet 200 mg, 200 mg, Oral, TID  hydrALAZINE (APRESOLINE) injection 5 mg, 5 mg, IntraVENous, Q5 Min PRN  metoprolol (LOPRESSOR) injection 2.5 mg, 2.5 mg, IntraVENous, Q10 Min PRN  mupirocin (BACTROBAN) 2 % ointment, , Nasal, BID  propofol injection, 10 mcg/kg/min, IntraVENous, Continuous  sodium bicarbonate 8.4 % injection 50 mEq, 50 mEq, IntraVENous, Q30 Min PRN  diphenhydrAMINE (BENADRYL) tablet 25 mg, 25 mg, Oral, Nightly PRN  magnesium hydroxide (MILK OF MAGNESIA) 400 MG/5ML suspension 30 mL, 30 mL, Oral, Daily PRN  senna (SENOKOT) 8.8 MG/5ML syrup 8.8 mg, 5 mL, Oral, BID PRN  metoprolol tartrate (LOPRESSOR) tablet 25 mg, 25 mg, Oral, BID  atorvastatin (LIPITOR) tablet 20 mg, 20 mg, Oral, Nightly  pantoprazole (PROTONIX) tablet 40 mg, 40 mg, Oral, Daily  pantoprazole (PROTONIX) 40 mg in sodium chloride (PF) 0.9 % 10 mL injection, 40 mg, IntraVENous, Daily  ceFAZolin (ANCEF) 2000 mg in sterile water 20 mL IV syringe, 2,000 mg, IntraVENous, Q8H  vancomycin (VANCOCIN) 1500 mg in sodium chloride 0.9 % 250 mL IVPB, 1,500 mg, IntraVENous, Q12H  potassium chloride 20 mEq/50 mL IVPB (Central Line), 20 mEq, IntraVENous, PRN  magnesium sulfate 1000 mg in dextrose 5% 100 mL IVPB, 2,000 mg, IntraVENous, PRN  calcium chloride 1,000 mg in sodium chloride 0.9 % 100 mL IVPB, 1,000 mg, IntraVENous, PRN **OR** calcium chloride 2,000 mg in sodium chloride 0.9 % 100 mL IVPB, 2,000 mg, IntraVENous, PRN  ipratropium-albuterol (DUONEB) nebulizer solution 1 ampule, 1 ampule, Inhalation, Q4H WA  albumin human 5 % IV solution 25 g, 25 g, IntraVENous, PRN  norepinephrine (LEVOPHED) 16 mg in sodium chloride 0.9 % 250 mL infusion, 0.01-3.3 mcg/kg/min, IntraVENous, Continuous PRN  EPINEPHrine (EPINEPHrine HCL) 5 mg in dextrose 5 % 250 mL infusion, 0.01-0.08 mcg/kg/min, IntraVENous, Continuous PRN  insulin regular (HUMULIN R;NOVOLIN R) 100 Units in sodium chloride 0.9 % 100 mL infusion, 1-50 Units/hr, IntraVENous, Continuous  insulin lispro (HUMALOG) injection vial 0-6 Units, 0-6 Units, SubCUTAneous, TID WC  insulin lispro (HUMALOG) injection vial 0-3 Units, 0-3 Units, SubCUTAneous, Nightly  glucose chewable tablet 16 g, 4 tablet, Oral, PRN  dextrose bolus 10% 125 mL, 125 mL, IntraVENous, PRN **OR** dextrose bolus 10% 250 mL, 250 mL, IntraVENous, PRN  glucagon (rDNA) injection 1 mg, 1 mg, IntraMUSCular, PRN  dextrose 10 % infusion, , IntraVENous, Continuous PRN  nitroGLYCERIN 50 mg in dextrose 5% 250 mL infusion, 5-200 mcg/min, IntraVENous, Continuous  niCARdipine (CARDENE) 20 mg in 0.9 % sodium chloride 200 mL solution, 2.5-15 mg/hr, IntraVENous, Continuous    Allergies:  Morphine, Shellfish-derived products, and Tape Deric Everett tape]    Social History:   reports that she has never smoked. She has never used smokeless tobacco. She reports current alcohol use. She reports that she does not use drugs. Family History: family history includes Arthritis in her father and mother; Cancer in her father; Diabetes in her sister; Heart Disease in her father and mother. N      REVIEW OF SYSTEMS:    Constitutional: there has been no unanticipated weight loss. There's been No change in energy level, No change in activity level. Eyes: No visual changes or diplopia. No scleral icterus. ENT: No Headaches  Cardiovascular: As above. Respiratory: No previous pulmonary problems, No cough  Gastrointestinal: No abdominal pain. No change in bowel or bladder habits. Genitourinary: No dysuria, trouble voiding, or hematuria. Musculoskeletal:  No gait disturbance, No weakness or joint complaints. Integumentary: No rash or pruritis. Neurological: No headache, diplopia, change in muscle strength, numbness or tingling. No change in gait, balance, coordination, mood, affect, memory, mentation, behavior. Psychiatric: No anxiety, or depression. Endocrine: No temperature intolerance. No excessive thirst, fluid intake, or urination. No tremor. Hematologic/Lymphatic: No abnormal bruising or bleeding, blood clots or swollen lymph nodes. Allergic/Immunologic: No nasal congestion or hives. PHYSICAL EXAM:      /63   Pulse (!) 101   Temp 99.7 °F (37.6 °C) (Bladder)   Resp 20   Wt 256 lb 2.8 oz (116.2 kg)   SpO2 97%   BMI 51.74 kg/m²    Constitutional and General Appearance: alert, cooperative, no distress and appears stated age  HEENT: PERRL, no cervical lymphadenopathy. No masses palpable. Normal oral mucosa  Respiratory:  Normal excursion and expansion without use of accessory muscles  Resp Auscultation: Good respiratory effort. No for increased work of breathing.  On auscultation: clear to auscultation bilaterally  Cardiovascular: The apical impulse is not displaced  Heart tones are crisp and normal. regular S1 and S2.  Jugular venous pulsation Normal  The carotid upstroke is normal in amplitude and contour without delay or bruit  Peripheral pulses are symmetrical and full   Abdomen:   No masses or tenderness  Bowel sounds present  Extremities:   No Cyanosis or Clubbing   Lower extremity edema: No   Skin: Warm and dry  Neurological:  Alert and oriented. Moves all extremities well  No abnormalities of mood, affect, memory, mentation, or behavior are noted      Labs:     CBC:   Recent Labs     11/28/22 1526 11/29/22  0403   WBC 22.4* 23.0*   HGB 13.0 14.2   HCT 40.2 45.5    192     BMP:   Recent Labs     11/28/22 1526 11/28/22 2126 11/29/22  0403     --  136   K 4.5 4.8 4.5   CO2 24  --  23   BUN 11  --  14   CREATININE 0.53  --  0.75   LABGLOM >60  --  >60   GLUCOSE 191*  --  133*     BNP: No results for input(s): BNP in the last 72 hours. PT/INR:   Recent Labs     11/28/22 1526 11/29/22  0403   PROTIME 12.5* 12.0   INR 1.2 1.1     APTT:  Recent Labs     11/28/22 1526   APTT 23.6     CARDIAC ENZYMES:No results for input(s): CKTOTAL, CKMB, CKMBINDEX, TROPONINI in the last 72 hours. FASTING LIPID PANEL:  Lab Results   Component Value Date/Time    HDL 47 02/17/2022 09:30 AM    TRIG 88 02/17/2022 09:30 AM     LIVER PROFILE:No results for input(s): AST, ALT, LABALBU in the last 72 hours. DATA:    Cardiac Cath 11/2022: LMCA: has distal 20% stenosis. LAD: has mid 85% stenosis. The D1 has 30% stenosis. LCx: has ostial eccentric 80% stenosis. The OM1 has proximal patent stent. RCA: has patent mid stent. There is distal 70% stenosis. Ramus: has proximal 80% stenosis. Coronary Tree      Dominance: Right     LV Analysis  LV function assessed as:Normal.  Ejection Fraction  +----------------------------------------------------------------------+---+  ! Method !EF%!  +----------------------------------------------------------------------+---+  ! LV gram                                                               !55 !  +----------------------------------------------------------------------+---+      Echo 11/2022  Normal LV size, wall thickness and wall motions  EF > 55%  Normal RV size and function  Normal size LA and RA  AV is sclerotic opens well, mean gradient 7 mmHg, no AR  MAC noted, no MS no MR  Normal aortic root dimensions  No significant pericardial effusion seen  Normal IC diameter normal respiratory variations suggestive of normal RA  filling pressure    IMPRESSION:    Multivessel CAD status post CABG X3 with LIMA to LAD, SVG to ramus intermedius, SVG to OM1 11/28/2022. Preserved LVEF on echocardiogram.  H/o CAD s/p PCI to RCA and LCx (CATH 7/20/2020 LM 0%, LAD 30% mid, D1 25%, LCx ostial 40%, Mid 90% SHAKIR, OM 1 minimal, RCA 80% SHAKIR, LVEF 55%). HTN   DM   SHERITA. Morbid obesity. Peripheral vascular disease. Elevated white count. RECOMMENDATIONS:  Continue postop medical management. Patient extubated overnight. Continue aspirin, Plavix, statin, amiodarone, beta-blocker. Patient given one dose of Lasix as per primary team.   PT OT. Incentive spirometry. Discussed with patient and Nurse. Ila Cuevas MD       Cardiovascular Fellow    Attending note. The patient was seen and examined agree with above. S/p CABG. Preserved EF. Extubated off pressors. Continue current medications PT OT and routine post surgery orders.

## 2022-11-29 NOTE — PLAN OF CARE
Order obtained for extubation. SpO2 of 100 on 40% FiO2. Patient extubated and placed on 6 liters/min via nasal cannula. Post extubation SpO2 is 95% with HR  105 bpm and RR 25 breaths/min. Patient had strong cough that was non-productive. Extubation Well tolerated by patient. .   Breath Sounds: clear    Delicia Lesser, RCP   7:04 PM

## 2022-11-29 NOTE — PROGRESS NOTES
Occupational Therapy  Facility/Department: [facility] CAR 1- [facility]  Occupational Therapy Initial Assessment    Name: Clarita Marcano  : 1955  MRN: [id number]  Date of Service: 2022    Discharge Recommendations:  Patient would benefit from continued therapy after discharge        Patient Diagnosis(es): There were no encounter diagnoses. Past Medical History:  has a past medical history of Ambulates with cane, Anxiety, Borderline hypertension, CAD (coronary artery disease), Depression, GERD (gastroesophageal reflux disease), Heart attack ([facility] [address] Hypertension, Hypothyroidism, Neuropathy, Obesity, Osteoarthritis, Other cirrhosis of liver ([facility] [address] Sleep apnea, Type II or unspecified type diabetes mellitus without mention of complication, not stated as uncontrolled, Under care of service provider, Under care of service provider, and Vertebrogenic low back pain. Past Surgical History:  has a past surgical history that includes Hysterectomy; Colonoscopy; Upper gastrointestinal endoscopy; Dilation and curettage of uterus; hiatal hernia repair; Throat surgery; Appendectomy; Total knee arthroplasty (Right, 2015); Total knee arthroplasty (Left, 2015); Coronary angioplasty with stent (2020); Cardiac catheterization; Cardiac catheterization (2022); and Coronary artery bypass graft (N/A, 2022). Assessment   Performance deficits / Impairments: Decreased functional mobility ; Decreased endurance;Decreased ADL status; Decreased balance;Decreased high-level IADLs;Decreased safe awareness;Decreased cognition;Decreased strength;Decreased coordination;Decreased posture  Prognosis: Good  Decision Making: Medium Complexity  REQUIRES OT FOLLOW-UP: Yes  Activity Tolerance  Activity Tolerance: Patient limited by fatigue;Patient limited by pain;Treatment limited secondary to decreased cognition        Plan   Occupational Therapy Plan  Times Per Week: 4-6x (CABG) Restrictions  Restrictions/Precautions  Restrictions/Precautions: General Precautions, Fall Risk  Required Braces or Orthoses?: Yes  Required Braces or Orthoses  Other: Heart Hugger Brace  Position Activity Restriction  Sternal Precautions: 5# Lifting Restrictions  Other position/activity restrictions: amb pt, up in chair, s/p cabgx3 11/28, 6L O2    Subjective   General  Patient assessed for rehabilitation services?: Yes  Family / Caregiver Present: Yes (Spouse)  Diagnosis: CABGx3 on 11/28, LVEF 50%  Subjective  Subjective: 10/10 pain states pt despite often fall asleep during conversation, acute/sx chest pain  General Comment  Comments: RN approved OT moises this am, pt cooperative for most of session     Social/Functional History  Social/Functional History  Lives With: Spouse  Type of Home: House  Home Layout: One level  Home Access: Stairs to enter with rails  Entrance Stairs - Number of Steps: 5  Entrance Stairs - Rails: Both  Bathroom Shower/Tub: Walk-in shower  Bathroom Toilet: Standard  Bathroom Equipment: Built-in shower seat, Grab bars in shower, Grab bars around toilet (grab bar outside shower is right in front of toilet and also usable for toilet transfer)  Bathroom Accessibility: Accessible  Home Equipment: Elayne Landsman, 4 wheeled, Rollator, Walker, rolling, Cane  Has the patient had two or more falls in the past year or any fall with injury in the past year?: Yes (diffiulty with steps, spouse helps pt navigate baseline.  feel on steps and in living room.)  ADL Assistance: Independent (with supervision from spouse)  Homemaking Assistance: Needs assistance (spouse performs)  Homemaking Responsibilities: No (spouse performs)  Ambulation Assistance: Independent  Transfer Assistance: Independent  Active : Yes  Mode of Transportation: [facility]  Occupation: Retired  Leisure & Hobbies: swim/ walk in water, play on ipad,       Objective   Heart Rate: 88  [facility]: Monitor  BP: 109/76  MAP (Calculated): 87  Resp: 15  SpO2: 95 %  O2 Device: Nasal cannula          Safety Devices  Type of Devices: Call light within reach;Gait belt;Nurse notified; Patient at risk for falls; Left in chair  Restraints  Restraints Initially in Place: No  Bed Mobility Training  Bed Mobility Training: No  Supine to Sit: Other (comment) (FLORECITA as pt sitting in recliner upon arrival/exit this date, reported that SS was used to transfer pt to recliner)  Scooting: Stand-by assistance (In chair)  Balance  Sitting: With support (SBA sitting unsupported in recliner for ~8 min total)  Standing: Impaired (Mod A required as pt stood sinkside, 20 seconds total, major fall risk, high pain and fatigue)  Transfer Training  Transfer Training: Yes  Sit to Stand: Minimum assistance;Assist X2  Stand to Sit: Minimum assistance;Assist X2  Gait  Overall Level of Assistance: Other (comment) (FLORCEITA d/t pain/fatigue, only able to stand chairside, RN informed to use SS)     AROM: Generally decreased, functional (Shoulder flexion limited to ~80 degrees at [facility], hands/elbows at [facility] Bilaterally, no major UB edema observed)  PROM: Within functional limits  Strength: Generally decreased, functional (Shoulders/elbows not tested d/t sternal precautions, hands 4+/5 grossly)  Coordination: Generally decreased, functional (Decreased speed, slow to touch nose especially with R index digit)  Tone: Normal  Sensation: Intact  ADL  Feeding: Stand by assistance;Setup; Increased time to complete;Verbal cueing  Grooming: Contact guard assistance;Setup; Increased time to complete;Verbal cueing  UE Bathing: Setup;Verbal cueing; Increased time to complete; Moderate assistance  LE Bathing: Setup;Verbal cueing; Increased time to complete;Maximum assistance  UE Dressing: Setup;Verbal cueing; Increased time to complete; Moderate assistance  LE Dressing: Setup;Verbal cueing; Increased time to complete;Maximum assistance  Toileting: Setup; Increased time to complete;Maximum assistance  Additional Comments: Pt very lethargic and confused this date, limited by pain, able to reach down and touch L sock but would require at least mod A to doff./don it, as well as max A for pants/underwear without AE, pt would strongly benefit from reacher and sock-aid use d/t chest pain and high BMI, pt with poor standing tolerance and unable to demo func mob     Activity Tolerance  Activity Tolerance: Patient tolerated treatment well;Patient limited by fatigue;Patient limited by endurance;Treatment limited secondary to decreased cognition        Vision  Vision: Impaired  Vision Exceptions: Wears glasses for reading  Hearing  Hearing: Within functional limits  Cognition  Overall Cognitive Status: Exceptions  Following Commands: Follows one step commands with increased time; Follows one step commands with repetition  Attention Span: Difficulty attending to directions; Difficulty dividing attention  Memory: Decreased recall of recent events;Decreased recall of precautions  Safety Judgement: Decreased awareness of need for assistance;Decreased awareness of need for safety  Problem Solving: Decreased awareness of errors;Assistance required to generate solutions;Assistance required to correct errors made;Assistance required to identify errors made  Insights: Decreased awareness of deficits  Initiation: Requires cues for some  Sequencing: Requires cues for some  Cognition Comment: Pt lethargic and also \"dazed\" for most of session, just received heavy pain meds, pt asking \"What is happening to me? \" repeatedly throughout session, slow to answer simple questions  Orientation  Overall Orientation Status: Impaired  Orientation Level: Oriented to person;Oriented to time;Oriented to place; Disoriented to situation                  Education Given To: Patient  Education Provided: Role of Therapy;Plan of Care;ADL Adaptive Strategies;Transfer Training;Equipment;Orientation  Education Provided Comments: Re-oriented to situation, sternal prec.   Education Method: Verbal;Demonstration  Barriers to Learning: Cognition;Vision  Education Outcome: Continued education needed                                       AM-PAC Score        AM-PAC Inpatient Daily Activity Raw Score: 14 (11/29/22 1636)  AM-PAC Inpatient ADL T-Scale Score : 33.39 (11/29/22 1636)  ADL Inpatient CMS 0-100% Score: 59.67 (11/29/22 1636)  ADL Inpatient CMS G-Code Modifier : CK (11/29/22 1636)    Goals  Short Term Goals  Time Frame for Short Term Goals: Pt will by discharge  Short Term Goal 1: demo good safety awareness during func mob around room using LRD PRN and min A  Short Term Goal 2: demo ADL UB bathing/dressing activity at SBA and increased time  Short Term Goal 3: demo ADL LB bathing/dressing activity at [address] A, AE PRN, and increased time  Short Term Goal 4: identify/maintain all sternal precautions with 1 cue  Short Term Goal 5: demo activitty tolerance for 35 min+       Therapy Time   Individual Concurrent Group Co-treatment   Time In 1018         Time Out 1053         Minutes 35         Timed Code Treatment Minutes: 23 Minutes       Yasmani Briggs, OTR/L

## 2022-11-29 NOTE — CARE COORDINATION
11/29/22 8475   Service Assessment   Patient Orientation Other (see comment)  (Pt resting in chair, had pain medication.)   Cognition Other (see comment)  (Not assessed.)   History Provided By [facility]   Primary Caregiver Self   Support Systems Spouse/Significant Other;Family Members   Patient's Healthcare Decision Maker is: Legal Next of Kin   PCP Verified by CM Yes   Last Visit to PCP Within last 3 months   Prior Functional Level Independent in ADLs/IADLs   Current Functional Level Assistance with the following:;Cooking;Housework; Shopping   Can patient return to prior living arrangement Unknown at present   Ability to make needs known: Other (see comment)  (Not assessed)   Family able to assist with home care needs: No   Would you like for me to discuss the discharge plan with any other family members/significant others, and if so, who? No   Financial Resources Medicaid; Medicare   Discharge Planning   Type of Residence House   Living Arrangements Spouse/Significant Other   Current Services Prior To Admission Durable Medical Equipment   Current DME Prior to Arrival Other (Comment)  (4-wheeled walker, rollator, \"taller walker,\" canes, shower bench, grab bars inside and outside of shower.)   Potential Assistance Needed Other (Comment)  (ARU or SNF)   DME Ordered? No   Potential Assistance Purchasing Medications No   Patient expects to be discharged to: Acute rehab   One/Two Story Residence One story   History of falls? 0   Services At/After Discharge   Transition of Care Consult (CM Consult) Discharge Planning   Services At/After Discharge   (ARU vs SNF)   Confirm Follow Up Transport Other (see comment)  (Family, may need transportation arranged.)   Condition of Participation: Discharge Planning   The Plan for Transition of Care is related to the following treatment goals: Pt's goal is to get well. The Patient and/or Patient Representative was provided with a Choice of Provider?  Patient Representative   Name of the Patient Representative who was provided with the Choice of Provider and agrees with the Discharge Plan? Marcus Bold   The Patient and/Or Patient Representative agree with the Discharge Plan? Yes   Freedom of Choice list was provided with basic dialogue that supports the patient's individualized plan of care/goals, treatment preferences, and shares the quality data associated with the providers? Yes     Pt's plan is SNF vs ARU. Pt's , Ronni Galvan, states that they have a personal experience with the [facility], he was there as a pt and Gt Pod it,\" requested referral there. Writer also discussed the possibility of SNF. Writer provided hospital SNF list as well as SNF list printed from Virtual Web site. Post Acute Facility/Agency List     Provided spouse with the following list, the list includes the overall star ratings obtained from CMS per the Medicare Web site (www.Medicare.gov):     [] [address]  [] Acute Inpatient Rehabilitation Facilities  [x] Skilled Nursing Facilities  [] Home Care    Provided verbal instructions on how to utilize the QR Code to obtain additional detailed star ratings from www. Medicare. gov - Per spouse, he does not use.  offered to print and provide the detailed list:    []Accepted   [x]Declined    Received SNF choice of [facility] as spouse states that this is close to them. [phone] - Received call from [facility], they are able to accept pt.    805/026 García Mcfadden with Padma at [facility], they will follow.

## 2022-11-29 NOTE — PROGRESS NOTES
[facility] Cardiothoracic Surgery  Daily Progress Note    Surgeon:  Dr. Davin Chavis:  Ms. Tamara Fuentes is a 78y.o. year-old female status post CABG x3 (LIMA -LAD, SVG-Ramus Intermedius, SVG-OM1) on 11/28/22. Patient was seen and examined at bedside this morning with c/o incisional pain and generalized discomfort. Vital Signs: BP 86/61   Pulse (!) 103   Temp 99.7 °F (37.6 °C) (Bladder)   Resp 23   Wt 256 lb 2.8 oz (116.2 kg)   SpO2 96%   BMI 51.74 kg/m²  O2 Flow Rate (L/min): (S) 4 L/min   Admit Weight: Weight: 256 lb 2.8 oz (116.2 kg)   WEIGHTWeight: 256 lb 2.8 oz (116.2 kg)     I/O's:  I/O last 3 completed shifts: In: 2539.7 [I.V.:1398.3; Blood:700; IV Piggyback:441.4]  Out: 2290 [Urine:1125; Emesis/NG output:50; Blood:775; Chest Tube:340]    Data:    CBC:   Recent Labs     11/28/22  1526 11/29/22  0403   WBC 22.4* 23.0*   HGB 13.0 14.2   HCT 40.2 45.5   MCV 89.7 92.3    192     BMP:   Recent Labs     11/28/22  1520 11/28/22  1526 11/28/22  2126 11/29/22  0403   NA  --  140  --  136   K  --  4.5 4.8 4.5   CL  --  107  --  108*   CO2  --  24  --  23   BUN  --  11  --  14   CREATININE 0.62 0.53  --  0.75     PT/INR:   Recent Labs     11/28/22  1526 11/29/22  0403   PROTIME 12.5* 12.0   INR 1.2 1.1     APTT:   Recent Labs     11/28/22  1526   APTT 23.6       Chest X-Ray: 11/29/22  FINDINGS:   The heart is enlarged. Median sternotomy wires are noted with vascular   calcifications along the aortic arch. There is a right internal jugular vein   catheter as well as a mediastinal drain and left-sided chest tube, stable. Pulmonary vascular congestion is stable with low lung volumes. There may be   small bilateral pleural effusions with associated bibasilar atelectasis, left   side greater than right, stable. No pneumothorax. The visualized osseous structures are unremarkable. Impression   1.  Stable cardiomegaly with pulmonary vascular congestion and suspected small   bilateral pleural effusions with associated bibasilar atelectasis, left side   greater than right. Scheduled Meds:    furosemide  20 mg IntraVENous BID    ketorolac  15 mg IntraVENous Q6H    sodium chloride flush  5-40 mL IntraVENous 2 times per day    sodium chloride flush  5-40 mL IntraVENous 2 times per day    aspirin  81 mg Oral Daily    clopidogrel  75 mg Oral Daily    amiodarone  200 mg Oral TID    mupirocin   Nasal BID    metoprolol tartrate  25 mg Oral BID    atorvastatin  20 mg Oral Nightly    pantoprazole  40 mg Oral Daily    pantoprazole (PROTONIX) 40 mg injection  40 mg IntraVENous Daily    ceFAZolin (ANCEF) IVPB  2,000 mg IntraVENous Q8H    vancomycin (VANCOCIN) IV  1,500 mg IntraVENous Q12H    ipratropium-albuterol  1 ampule Inhalation Q4H WA    insulin lispro  0-6 Units SubCUTAneous TID WC    insulin lispro  0-3 Units SubCUTAneous Nightly     Continuous Infusions:    lactated ringers      sodium chloride      sodium chloride      norepinephrine 0.02 mcg/kg/min (11/29/22 1011)    EPINEPHrine Stopped (11/28/22 1739)    insulin 3.33 Units/hr (11/29/22 0925)    dextrose      nitroGLYCERIN 15 mcg/min (11/28/22 1830)    niCARdipine         Physical Exam:    General: Obese, A&O x 3, NAD noted  Heart: Normal S1, S2, RRR, No murmurs noted   Sternum: Prevena in place   Lungs: Diminished BS bilaterally at the bases. CT's in place to [facility]. No air leak noted   Abdomen: Obese, soft, non tender, non distended, Hypoactive BS x 4   : Erazo in place   Extremities: No edema noted bilateral LE. EVH sites: CDI / SCD's in place bilateral LE       Assessment & Plan:    Ms. Connors is a 78y.o. year-old female status post CABG x3 (LIMA -LAD, SVG-Ramus Intermedius, SVG-OM1) on 11/28/22 POD# 1.  No issues or events noted with the patient overnight.    - Continue current care and meds  - Follow-up labs, CXR   - DVT prophylaxis with SCD's  - Begin Lasix 20 mg IV BID   - Toradol 15 mg IV q 6 hrs x 8 doses for pian management   - Lopressor decreased to 12.5 mg PO BID  - GI Prophylaxis  - Cardiac diet  - OOB to chair - Ambulation with PT 3x daily  - Incentive Spirometry / Pulmonary hygiene  - d/c matson  - Continue CT's to LWS  - Pain management  - Spoke with Erika regarding SNF placement when appropriate for d/c   - Further recommendations as per Dr. Colletta Prosper      The above recommendations including medications and orders were discussed and agreed upon with Dr. Que Chanel.        Chris Wilkinson MBA, PA-C

## 2022-11-30 ENCOUNTER — APPOINTMENT (OUTPATIENT)
Dept: GENERAL RADIOLOGY | Age: 67
DRG: 236 | End: 2022-11-30
Attending: THORACIC SURGERY (CARDIOTHORACIC VASCULAR SURGERY)
Payer: MEDICARE

## 2022-11-30 LAB
ANION GAP SERPL CALCULATED.3IONS-SCNC: 14 MMOL/L (ref 9–17)
ANION GAP SERPL CALCULATED.3IONS-SCNC: 6 MMOL/L (ref 9–17)
BUN BLDV-MCNC: 24 MG/DL (ref 8–23)
BUN BLDV-MCNC: 28 MG/DL (ref 8–23)
CALCIUM IONIZED: 1.16 MMOL/L (ref 1.13–1.33)
CALCIUM SERPL-MCNC: 8.6 MG/DL (ref 8.6–10.4)
CALCIUM SERPL-MCNC: 8.7 MG/DL (ref 8.6–10.4)
CHLORIDE BLD-SCNC: 102 MMOL/L (ref 98–107)
CHLORIDE BLD-SCNC: 104 MMOL/L (ref 98–107)
CO2: 22 MMOL/L (ref 20–31)
CO2: 29 MMOL/L (ref 20–31)
CREAT SERPL-MCNC: 1.08 MG/DL (ref 0.5–0.9)
CREAT SERPL-MCNC: 1.1 MG/DL (ref 0.5–0.9)
GFR SERPL CREATININE-BSD FRML MDRD: 55 ML/MIN/1.73M2
GFR SERPL CREATININE-BSD FRML MDRD: 57 ML/MIN/1.73M2
GLUCOSE BLD-MCNC: 168 MG/DL (ref 65–105)
GLUCOSE BLD-MCNC: 172 MG/DL (ref 65–105)
GLUCOSE BLD-MCNC: 190 MG/DL (ref 65–105)
GLUCOSE BLD-MCNC: 190 MG/DL (ref 70–99)
GLUCOSE BLD-MCNC: 192 MG/DL (ref 65–105)
GLUCOSE BLD-MCNC: 200 MG/DL (ref 70–99)
HCT VFR BLD CALC: 41.8 % (ref 36.3–47.1)
HEMOGLOBIN: 12.2 G/DL (ref 11.9–15.1)
INR BLD: 1.1
MAGNESIUM: 2.6 MG/DL (ref 1.6–2.6)
MCH RBC QN AUTO: 28.9 PG (ref 25.2–33.5)
MCHC RBC AUTO-ENTMCNC: 29.2 G/DL (ref 28.4–34.8)
MCV RBC AUTO: 99.1 FL (ref 82.6–102.9)
NRBC AUTOMATED: 0 PER 100 WBC
PDW BLD-RTO: 14.4 % (ref 11.8–14.4)
PLATELET # BLD: 167 K/UL (ref 138–453)
PMV BLD AUTO: 11.2 FL (ref 8.1–13.5)
POTASSIUM SERPL-SCNC: 4.3 MMOL/L (ref 3.7–5.3)
POTASSIUM SERPL-SCNC: 4.8 MMOL/L (ref 3.7–5.3)
PROTHROMBIN TIME: 11.7 SEC (ref 9.1–12.3)
RBC # BLD: 4.22 M/UL (ref 3.95–5.11)
SODIUM BLD-SCNC: 137 MMOL/L (ref 135–144)
SODIUM BLD-SCNC: 140 MMOL/L (ref 135–144)
WBC # BLD: 22.4 K/UL (ref 3.5–11.3)

## 2022-11-30 PROCEDURE — 6370000000 HC RX 637 (ALT 250 FOR IP): Performed by: PHYSICIAN ASSISTANT

## 2022-11-30 PROCEDURE — 94640 AIRWAY INHALATION TREATMENT: CPT

## 2022-11-30 PROCEDURE — 80048 BASIC METABOLIC PNL TOTAL CA: CPT

## 2022-11-30 PROCEDURE — 85610 PROTHROMBIN TIME: CPT

## 2022-11-30 PROCEDURE — 6360000002 HC RX W HCPCS: Performed by: PHYSICIAN ASSISTANT

## 2022-11-30 PROCEDURE — 2580000003 HC RX 258: Performed by: ANESTHESIOLOGY

## 2022-11-30 PROCEDURE — 83735 ASSAY OF MAGNESIUM: CPT

## 2022-11-30 PROCEDURE — 2100000001 HC CVICU R&B

## 2022-11-30 PROCEDURE — 85027 COMPLETE CBC AUTOMATED: CPT

## 2022-11-30 PROCEDURE — 51798 US URINE CAPACITY MEASURE: CPT

## 2022-11-30 PROCEDURE — 82947 ASSAY GLUCOSE BLOOD QUANT: CPT

## 2022-11-30 PROCEDURE — 94761 N-INVAS EAR/PLS OXIMETRY MLT: CPT

## 2022-11-30 PROCEDURE — P9041 ALBUMIN (HUMAN),5%, 50ML: HCPCS | Performed by: PHYSICIAN ASSISTANT

## 2022-11-30 PROCEDURE — 36415 COLL VENOUS BLD VENIPUNCTURE: CPT

## 2022-11-30 PROCEDURE — 2700000000 HC OXYGEN THERAPY PER DAY

## 2022-11-30 PROCEDURE — 97116 GAIT TRAINING THERAPY: CPT

## 2022-11-30 PROCEDURE — 71045 X-RAY EXAM CHEST 1 VIEW: CPT

## 2022-11-30 PROCEDURE — 97535 SELF CARE MNGMENT TRAINING: CPT

## 2022-11-30 PROCEDURE — 51701 INSERT BLADDER CATHETER: CPT

## 2022-11-30 PROCEDURE — 82330 ASSAY OF CALCIUM: CPT

## 2022-11-30 PROCEDURE — 2580000003 HC RX 258: Performed by: PHYSICIAN ASSISTANT

## 2022-11-30 RX ORDER — TRAMADOL HYDROCHLORIDE 50 MG/1
25 TABLET ORAL EVERY 6 HOURS PRN
Status: DISCONTINUED | OUTPATIENT
Start: 2022-11-30 | End: 2022-12-05 | Stop reason: HOSPADM

## 2022-11-30 RX ORDER — CITALOPRAM 20 MG/1
40 TABLET ORAL DAILY
Status: DISCONTINUED | OUTPATIENT
Start: 2022-11-30 | End: 2022-12-05 | Stop reason: HOSPADM

## 2022-11-30 RX ORDER — BUSPIRONE HYDROCHLORIDE 10 MG/1
10 TABLET ORAL 2 TIMES DAILY
Status: DISCONTINUED | OUTPATIENT
Start: 2022-11-30 | End: 2022-12-05 | Stop reason: HOSPADM

## 2022-11-30 RX ADMIN — MUPIROCIN: 20 OINTMENT TOPICAL at 21:34

## 2022-11-30 RX ADMIN — ONDANSETRON 4 MG: 2 INJECTION INTRAMUSCULAR; INTRAVENOUS at 21:23

## 2022-11-30 RX ADMIN — OXYCODONE HYDROCHLORIDE AND ACETAMINOPHEN 2 TABLET: 5; 325 TABLET ORAL at 09:30

## 2022-11-30 RX ADMIN — KETOROLAC TROMETHAMINE 15 MG: 15 INJECTION, SOLUTION INTRAMUSCULAR; INTRAVENOUS at 16:31

## 2022-11-30 RX ADMIN — AMIODARONE HYDROCHLORIDE 200 MG: 200 TABLET ORAL at 21:23

## 2022-11-30 RX ADMIN — IPRATROPIUM BROMIDE AND ALBUTEROL SULFATE 1 AMPULE: .5; 3 SOLUTION RESPIRATORY (INHALATION) at 12:47

## 2022-11-30 RX ADMIN — KETOROLAC TROMETHAMINE 15 MG: 15 INJECTION, SOLUTION INTRAMUSCULAR; INTRAVENOUS at 21:27

## 2022-11-30 RX ADMIN — METOPROLOL TARTRATE 12.5 MG: 25 TABLET ORAL at 21:23

## 2022-11-30 RX ADMIN — CITALOPRAM 40 MG: 20 TABLET, FILM COATED ORAL at 23:21

## 2022-11-30 RX ADMIN — Medication 2000 MG: at 05:30

## 2022-11-30 RX ADMIN — IPRATROPIUM BROMIDE AND ALBUTEROL SULFATE 1 AMPULE: .5; 3 SOLUTION RESPIRATORY (INHALATION) at 15:55

## 2022-11-30 RX ADMIN — Medication 81 MG: at 10:24

## 2022-11-30 RX ADMIN — AMIODARONE HYDROCHLORIDE 200 MG: 200 TABLET ORAL at 15:38

## 2022-11-30 RX ADMIN — FUROSEMIDE 20 MG: 10 INJECTION, SOLUTION INTRAMUSCULAR; INTRAVENOUS at 10:25

## 2022-11-30 RX ADMIN — MUPIROCIN: 20 OINTMENT TOPICAL at 10:25

## 2022-11-30 RX ADMIN — CLOPIDOGREL 75 MG: 75 TABLET, FILM COATED ORAL at 10:24

## 2022-11-30 RX ADMIN — PANTOPRAZOLE SODIUM 40 MG: 40 TABLET, DELAYED RELEASE ORAL at 10:24

## 2022-11-30 RX ADMIN — IPRATROPIUM BROMIDE AND ALBUTEROL SULFATE 1 AMPULE: .5; 3 SOLUTION RESPIRATORY (INHALATION) at 08:39

## 2022-11-30 RX ADMIN — ATORVASTATIN CALCIUM 20 MG: 20 TABLET, FILM COATED ORAL at 21:23

## 2022-11-30 RX ADMIN — KETOROLAC TROMETHAMINE 15 MG: 15 INJECTION, SOLUTION INTRAMUSCULAR; INTRAVENOUS at 10:25

## 2022-11-30 RX ADMIN — SODIUM CHLORIDE, PRESERVATIVE FREE 10 ML: 5 INJECTION INTRAVENOUS at 21:35

## 2022-11-30 RX ADMIN — IPRATROPIUM BROMIDE AND ALBUTEROL SULFATE 1 AMPULE: .5; 3 SOLUTION RESPIRATORY (INHALATION) at 21:16

## 2022-11-30 RX ADMIN — OXYCODONE HYDROCHLORIDE AND ACETAMINOPHEN 1 TABLET: 5; 325 TABLET ORAL at 04:10

## 2022-11-30 RX ADMIN — BUSPIRONE HYDROCHLORIDE 10 MG: 10 TABLET ORAL at 23:20

## 2022-11-30 RX ADMIN — AMIODARONE HYDROCHLORIDE 200 MG: 200 TABLET ORAL at 10:24

## 2022-11-30 RX ADMIN — KETOROLAC TROMETHAMINE 15 MG: 15 INJECTION, SOLUTION INTRAMUSCULAR; INTRAVENOUS at 00:24

## 2022-11-30 RX ADMIN — FUROSEMIDE 20 MG: 10 INJECTION, SOLUTION INTRAMUSCULAR; INTRAVENOUS at 18:04

## 2022-11-30 RX ADMIN — ALBUMIN (HUMAN) 25 G: 12.5 INJECTION, SOLUTION INTRAVENOUS at 07:45

## 2022-11-30 RX ADMIN — KETOROLAC TROMETHAMINE 15 MG: 15 INJECTION, SOLUTION INTRAMUSCULAR; INTRAVENOUS at 05:30

## 2022-11-30 RX ADMIN — INSULIN LISPRO 1 UNITS: 100 INJECTION, SOLUTION INTRAVENOUS; SUBCUTANEOUS at 12:19

## 2022-11-30 RX ADMIN — INSULIN LISPRO 1 UNITS: 100 INJECTION, SOLUTION INTRAVENOUS; SUBCUTANEOUS at 10:25

## 2022-11-30 RX ADMIN — INSULIN LISPRO 2 UNITS: 100 INJECTION, SOLUTION INTRAVENOUS; SUBCUTANEOUS at 16:29

## 2022-11-30 RX ADMIN — INSULIN LISPRO 1 UNITS: 100 INJECTION, SOLUTION INTRAVENOUS; SUBCUTANEOUS at 21:42

## 2022-11-30 ASSESSMENT — PAIN SCALES - GENERAL: PAINLEVEL_OUTOF10: 8

## 2022-11-30 NOTE — PROGRESS NOTES
[address] Cardiothoracic Surgery  Daily Progress Note    Surgeon:  Dr. Rankin Scheuermann:  Ms. Evelyn Charlton is a 78y.o. year-old female status post CABG x3 (LIMA -LAD, SVG-Ramus Intermedius, SVG-OM1) on 11/28/22. Patient was seen and examined at bedside without any complaints. Vital Signs: /60   Pulse (!) 109   Temp 98.8 °F (37.1 °C) (Oral)   Resp (!) 0   Wt 256 lb 2.8 oz (116.2 kg)   SpO2 93%   BMI 51.74 kg/m²  O2 Flow Rate (L/min): 3 L/min   Admit Weight: Weight: 256 lb 2.8 oz (116.2 kg)   WEIGHTWeight: 256 lb 2.8 oz (116.2 kg)     I/O's:  I/O last 3 completed shifts: In: 2195.3 [P.O.:1380; I.V.:215.6; IV Piggyback:599.8]  Out: 2083 [Urine:1020; Chest Tube:475]    Data:    CBC:   Recent Labs     11/28/22  1526 11/29/22  0403 11/30/22  0444   WBC 22.4* 23.0* 22.4*   HGB 13.0 14.2 12.2   HCT 40.2 45.5 41.8   MCV 89.7 92.3 99.1    192 167       BMP:   Recent Labs     11/28/22  1526 11/28/22  2126 11/29/22  0403 11/30/22  0444     --  136 140   K 4.5 4.8 4.5 4.8     --  108* 104   CO2 24  --  23 22   BUN 11  --  14 24*   CREATININE 0.53  --  0.75 1.08*       PT/INR:   Recent Labs     11/28/22  1526 11/29/22  0403 11/30/22  0444   PROTIME 12.5* 12.0 11.7   INR 1.2 1.1 1.1       APTT:   Recent Labs     11/28/22  1526   APTT 23.6         Chest X-Ray: 11/30/22  FINDINGS:   Right IJ central line remains in place terminating right atrium. Left chest   tube in place. Normal cardiomediastinal silhouette. Low lung volumes. Detail limited due to underpenetration. Asymmetric vascular congestion on   the left. Possible basal atelectasis or effusion obscures the hemidiaphragm. No pneumothorax. No acute bony abnormality. Median sternotomy. Impression   1. Right IJ central line and left chest tube in place. 2. Low lung volumes. 3. Asymmetric vascular congestion left lung with probable   effusion/atelectasis left lung base.   Increased from prior Scheduled Meds:    furosemide  20 mg IntraVENous BID    ketorolac  15 mg IntraVENous Q6H    metoprolol tartrate  12.5 mg Oral BID    sodium chloride flush  5-40 mL IntraVENous 2 times per day    sodium chloride flush  5-40 mL IntraVENous 2 times per day    aspirin  81 mg Oral Daily    clopidogrel  75 mg Oral Daily    amiodarone  200 mg Oral TID    mupirocin   Nasal BID    atorvastatin  20 mg Oral Nightly    pantoprazole  40 mg Oral Daily    pantoprazole (PROTONIX) 40 mg injection  40 mg IntraVENous Daily    ipratropium-albuterol  1 ampule Inhalation Q4H WA    insulin lispro  0-6 Units SubCUTAneous TID WC    insulin lispro  0-3 Units SubCUTAneous Nightly     Continuous Infusions:    lactated ringers      sodium chloride      sodium chloride      norepinephrine Stopped (11/29/22 1734)    EPINEPHrine Stopped (11/28/22 1739)    insulin Stopped (11/29/22 1440)    dextrose      nitroGLYCERIN 15 mcg/min (11/28/22 1830)    niCARdipine         Physical Exam:    General: Obese, A&O x 3, NAD noted  Heart: Normal S1, S2, RRR, No murmurs noted   Sternum: Prevena in place   Lungs: Diminished BS bilaterally at the bases. CT's in place to [facility]. No air leak noted   Abdomen: Obese, soft, non tender, non distended, BS x 4   : Erazo in place   Extremities: Trace edema noted bilateral LE. EVH sites: CDI / SCD's in place bilateral LE       Assessment & Plan:    Ms. Analy Oro is a 78y.o. year-old female status post CABG x3 (LIMA -LAD, SVG-Ramus Intermedius, SVG-OM1) on 11/28/22 POD# 2. No issues or events noted with the patient overnight.    - Continue current care and meds  - Follow-up labs, CXR   - DVT prophylaxis with SCD's  - Cont Lasix 20 mg IV BID for aggressive diuresis   - GI Prophylaxis  - Cardiac diet  - OOB to chair - Ambulation with PT 3x daily  - Incentive Spirometry / Pulmonary hygiene  - d/c CT's.  CXR immediately post CT removal to r/o a PTX   - d/c TLC  - Pain management  -  [facility] will start pre-cert for [facility] [place]  - Further recommendations as per Dr. Kim Hernandez      The above recommendations including medications and orders were discussed and agreed upon with Dr. Anna Chavez.        Stephen Chaidez MBA, PA-C

## 2022-11-30 NOTE — CARE COORDINATION
Received call from Hilario cortes at [facility], they are able to accept and will start pre-cert.

## 2022-11-30 NOTE — PROGRESS NOTES
Physical Therapy  Facility/Department: [facility] CAR 1- SICU  Daily Treatment Note    Name: Lorry Severe  : 1955  MRN: [id number]  Date of Service: 2022    Discharge Recommendations:  Patient would benefit from continued therapy after discharge   PT Equipment Recommendations  Equipment Needed: No (Pt has personal RW in hospital room)      Patient Diagnosis(es): There were no encounter diagnoses. Past Medical History:  has a past medical history of Ambulates with cane, Anxiety, Borderline hypertension, CAD (coronary artery disease), Depression, GERD (gastroesophageal reflux disease), Heart attack ([facility] Hypertension, Hypothyroidism, Neuropathy, Obesity, Osteoarthritis, Other cirrhosis of liver ([facility] [address] Sleep apnea, Type II or unspecified type diabetes mellitus without mention of complication, not stated as uncontrolled, Under care of service provider, Under care of service provider, and Vertebrogenic low back pain. Past Surgical History:  has a past surgical history that includes Hysterectomy; Colonoscopy; Upper gastrointestinal endoscopy; Dilation and curettage of uterus; hiatal hernia repair; Throat surgery; Appendectomy; Total knee arthroplasty (Right, 2015); Total knee arthroplasty (Left, 2015); Coronary angioplasty with stent (2020); Cardiac catheterization; Cardiac catheterization (2022); and Coronary artery bypass graft (N/A, 2022). Assessment   Body Structures, Functions, Activity Limitations Requiring Skilled Therapeutic Intervention: Decreased functional mobility ; Decreased strength;Decreased endurance;Decreased balance;Decreased coordination;Decreased cognition  Assessment: Pt ambulated 20 ft with RW with close chair follow with Shannon x 2. Pt required CGA x 2 for sit<>stand transfers. Pt requires encouragement t/o to participate. fatigue limiting. Pt would benefit from continued acute PT to address deficits.   Therapy Prognosis: Good  Activity Tolerance  Activity Tolerance: Patient limited by fatigue;Patient limited by endurance;Treatment limited secondary to decreased cognition  Activity Tolerance Comments: increased respirations with verbal cues for breathing techniques to recover     Plan   Physcial Therapy Plan  General Plan: 6-7 times per week  Current Treatment Recommendations: Strengthening, Balance training, Gait training, Functional mobility training, Stair training, Transfer training, Endurance training, Home exercise program, Safety education & training, Patient/Caregiver education & training, Equipment evaluation, education, & procurement, Therapeutic activities  Safety Devices  Type of Devices: Call light within reach, Gait belt, Nurse notified, Patient at risk for falls, Left in chair  Restraints  Restraints Initially in Place: No     Restrictions  Restrictions/Precautions  Restrictions/Precautions: General Precautions, Fall Risk  Required Braces or Orthoses?: Yes  Required Braces or Orthoses  Other: Heart Hugger Brace  Position Activity Restriction  Sternal Precautions: 5# Lifting Restrictions  Other position/activity restrictions: amb pt, up in chair, s/p cabgx3 11/28, 3L O2     Subjective   General  Chart Reviewed: Yes  Response To Previous Treatment: Patient reporting fatigue but able to participate. Family / Caregiver Present: No  General Comment  Comments: Pt retired to recliner with call light. Subjective  Subjective: Pt c/o 9/10 pain in incision site; RN notified. RN and pt agreeable to PT. Pt in recliner t/o session. Pt very slow to processing and answering questions. Cognition   Orientation  Overall Orientation Status: Impaired  Orientation Level: Oriented to place (difficult to assess; pt very slow at processing and answering questions during entire session)  Cognition  Overall Cognitive Status: Exceptions  Arousal/Alertness: Inconsistent responses to stimuli  Following Commands: Follows one step commands with increased time; Follows one step commands with repetition  Attention Span: Difficulty attending to directions; Difficulty dividing attention  Memory: Decreased recall of recent events;Decreased recall of precautions  Safety Judgement: Decreased awareness of need for assistance;Decreased awareness of need for safety  Problem Solving: Decreased awareness of errors;Assistance required to generate solutions;Assistance required to correct errors made;Assistance required to identify errors made  Insights: Decreased awareness of deficits  Initiation: Requires cues for all  Sequencing: Requires cues for all  Cognition Comment: Pt very slow to process. Pt answering about 10% of the questions asked today. Pt stated she doesn't know why she is feeling this way. RN present to motivate pt     Objective   Bed mobility  Bed Mobility Comments: Pt in chair upon arrival and exit  Transfers  Sit to Stand: 2 Person Assistance;Contact guard assistance  Stand to Sit: Contact guard assistance;2 Person Assistance  Comment: assessed to RW; dependent with use of heart hugger. verbal cues for hand placement with poor return. 1st trial-static stand x 1 minute with CGA to maintain; limited d/t pt fatigue and c/o dizzines. 2nd trial-static stand with posterior lean initially requiring modA to maintain prior to ambulation. Extended seated rest period in between standing trials d/t fatigue, c/o dizziness, increased respirations (verbal cues for breathing techniques)  Ambulation  Surface: Level tile  Device: Rolling Walker  Other Apparatus: O2 (chest tubes, 2 atriums, IV pole, 3L NC)  Assistance: Minimal assistance;2 Person assistance  Quality of Gait: fwd flexed posture, heavy lean on RW, fear of falling  Gait Deviations: Decreased step length;Decreased step height;Slow Kamini  Distance: 20 ft  Comments: chair follow for safety.  assistance with RW mgmt and encouragement t/t by staff     Balance  Posture: Fair  Sitting - Static: Good;-  Sitting - Dynamic: Fair;+  Standing - Static: Fair  Standing - Dynamic: Fair;-  Comments: assessed with RW support and sitting at [facility]           OutComes Score     AM-PAC Score  AM-PAC Inpatient Mobility Raw Score : 12 (11/30/22 0947)  AM-PAC Inpatient T-Scale Score : 35.33 (11/30/22 0947)  Mobility Inpatient CMS 0-100% Score: 68.66 (11/30/22 0947)  Mobility Inpatient CMS G-Code Modifier : CL (11/30/22 0947)     Goals  Short Term Goals  Time Frame for Short Term Goals: 14 visits  Short Term Goal 1: Pt will be Mary bed mobility  Short Term Goal 2: Pt will be Mary transfers  Short Term Goal 3: Pt will be Mary amb 250' RW or least restrictive AD  Short Term Goal 4: Pt will navigate 6 steps Mary either or B rail use       Education  Patient Education  Education Given To: Patient  Education Provided: Role of Therapy;Plan of Care;Transfer Training  Education Provided Comments: verbal cues t/o for safety and encouragement to progress  Education Method: Demonstration;Verbal  Barriers to Learning: Cognition (pt very slow to processing tasks and responding to questions)  Education Outcome: Continued education needed;Demonstrated understanding      Therapy Time   Individual Concurrent Group Co-treatment   Time In 0844         Time Out 0913         Minutes 29         Timed Code Treatment Minutes: 15 Minutes (co treatment with ORR)       Osmar Duckworth PTA

## 2022-11-30 NOTE — PLAN OF CARE
Problem: Discharge Planning  Goal: Discharge to home or other facility with appropriate resources  Outcome: Progressing     Problem: Skin/Tissue Integrity  Goal: Absence of new skin breakdown  Description: 1. Monitor for areas of redness and/or skin breakdown  2. Assess vascular access sites hourly  3. Every 4-6 hours minimum:  Change oxygen saturation probe site  4. Every 4-6 hours:  If on nasal continuous positive airway pressure, respiratory therapy assess nares and determine need for appliance change or resting period.   Outcome: Progressing     Problem: Chronic Conditions and Co-morbidities  Goal: Patient's chronic conditions and co-morbidity symptoms are monitored and maintained or improved  Outcome: Progressing     Problem: ABCDS Injury Assessment  Goal: Absence of physical injury  Outcome: Progressing     Problem: Pain  Goal: Verbalizes/displays adequate comfort level or baseline comfort level  Outcome: Progressing     Problem: Neurosensory - Adult  Goal: Achieves stable or improved neurological status  Outcome: Progressing     Problem: Respiratory - Adult  Goal: Achieves optimal ventilation and oxygenation  Outcome: Progressing     Problem: Cardiovascular - Adult  Goal: Maintains optimal cardiac output and hemodynamic stability  Outcome: Progressing     Problem: Cardiovascular - Adult  Goal: Absence of cardiac dysrhythmias or at baseline  Outcome: Progressing     Problem: Skin/Tissue Integrity - Adult  Goal: Skin integrity remains intact  Outcome: Progressing     Problem: Skin/Tissue Integrity - Adult  Goal: Incisions, wounds, or drain sites healing without S/S of infection  Outcome: Progressing     Problem: Musculoskeletal - Adult  Goal: Return mobility to safest level of function  Outcome: Progressing  Goal: Return ADL status to a safe level of function  Outcome: Progressing     Problem: Gastrointestinal - Adult  Goal: Minimal or absence of nausea and vomiting  Outcome: Progressing  Goal: Maintains adequate nutritional intake  Outcome: Progressing     Problem: Genitourinary - Adult  Goal: Absence of urinary retention  Outcome: Progressing  Goal: Urinary catheter remains patent  Outcome: Progressing     Problem: Infection - Adult  Goal: Absence of infection at discharge  Outcome: Progressing     Problem: Metabolic/Fluid and Electrolytes - Adult  Goal: Electrolytes maintained within normal limits  Outcome: Progressing  Goal: Hemodynamic stability and optimal renal function maintained  Outcome: Progressing  Goal: Glucose maintained within prescribed range  Outcome: Progressing     Problem: Hematologic - Adult  Goal: Maintains hematologic stability  Outcome: Progressing     Problem: Coping  Goal: Pt/Family able to verbalize concerns and demonstrate effective coping strategies  Description: INTERVENTIONS:  1. Assist patient/family to identify coping skills, available support systems and cultural and spiritual values  2. Provide emotional support, including active listening and acknowledgement of concerns of patient and caregivers  3. Reduce environmental stimuli, as able  4. Instruct patient/family in relaxation techniques, as appropriate  5. Assess for spiritual pain/suffering and initiate Spiritual Care, Psychosocial Clinical Specialist consults as needed  Outcome: Progressing     Problem: Confusion  Goal: Confusion, delirium, dementia, or psychosis is improved or at baseline  Description: INTERVENTIONS:  1. Assess for possible contributors to thought disturbance, including medications, impaired vision or hearing, underlying metabolic abnormalities, dehydration, psychiatric diagnoses, and notify attending LIP  2. [facility] high risk fall precautions, as indicated  3. Provide frequent short contacts to provide reality reorientation, refocusing and direction  4. Decrease environmental stimuli, including noise as appropriate  5.  Monitor and intervene to maintain adequate nutrition, hydration, elimination, sleep and activity  6. If unable to ensure safety without constant attention obtain sitter and review sitter guidelines with assigned personnel  7. Initiate Psychosocial CNS and Spiritual Care consult, as indicated  Outcome: Progressing     Problem: Spiritual Care  Goal: Pt/Family able to move forward in process of forgiving self, others, and/or higher power  Description: INTERVENTIONS:  1. Assist patient/family to overcome blocks to healing by use of spiritual practices (prayer, meditation, guided imagery, reiki, breath work, etc). 2. De-myth guilt and help patient/family identify possible irrational spiritual/cultural beliefs and values. 3. Explore possibilities of making amends & reconciliation with self, others, and/or a greater power. 4. Guide patient/family in identifying painful feelings of guilt. 5. Help patient/famiy explore and identify spiritual beliefs, cultural understandings or values that may help or hinder letting go of issue. 6. Help patient/family explore feelings of anger, bitterness, resentment. 7. Help patient/family identify and examine the situation in which these feelings are experienced. 8. Help patient/family identify destructive displacement of feelings onto other individuals. 9. Invite use of sacraments/rituals/ceremonies as appropriate (e.g. - confession, anointing, smudging). 10. Refer patient/family to formal counseling and/or to pattie community for further support work.   Outcome: Progressing     Problem: Safety - Adult  Goal: Free from fall injury  Outcome: Progressing

## 2022-11-30 NOTE — ANESTHESIA POSTPROCEDURE EVALUATION
Department of Anesthesiology  Postprocedure Note    Patient: Lynda Ruiz  MRN: [id number]  YOB: 1955  Date of evaluation: 11/30/2022      Procedure Summary     Date: 11/28/22 Room / Location: [address]    Anesthesia Start: 0803 Anesthesia Stop: 4034    Procedure: CABG CORONARY ARTERY BYPASS X3 ON PUMP , ATA, ENDO VEIN HARVEST Diagnosis:       Multiple vessel coronary artery disease      (MULTI-VESSEL CORONARY ARTERY DISEASE)    Surgeons: Brad Mckee MD Responsible Provider: Vipul Carr MD    Anesthesia Type: general ASA Status: 4          Anesthesia Type: No value filed. Lucie Phase I: Lucie Score: 3    Lucie Phase II:    POST-OP ANESTHESIA NOTE       /60   Pulse (!) 109   Temp 99.3 °F (37.4 °C) (Bladder)   Resp (!) 0   Wt 256 lb 2.8 oz (116.2 kg)   SpO2 93%   BMI 51.74 kg/m²    Pain Assessment: Critical Care Pain Observation Tool (CPOT)  Pain Level: 7           Anesthesia Post Evaluation    Patient location during evaluation: ICU  Patient participation: complete - patient participated  Level of consciousness: awake  Pain score: 7  Airway patency: patent  Nausea & Vomiting: no vomiting and no nausea  Complications: no  Cardiovascular status: hemodynamically stable  Respiratory status: acceptable  Hydration status: stable    Patient was extubated a few hours after surgery. No apparent anesthesia-related problems.

## 2022-11-30 NOTE — PROGRESS NOTES
[facility]  Progress Note                   Date:   11/30/2022  Patient name: Ayse Clement  Date of admission:  11/28/2022  5:56 AM  MRN:   [id number]  YOB: 1955  PCP: Pablo Clemons MD    Reason for Admission: Multiple vessel coronary artery disease [I25.10]  CAD in native artery [I25.10]    Subjective:       Clinical Changes /Abnormalities:Seen & examined in room. No acute CV issues/concerns overnight. Labs, vitals, & tele reviewed. Review of Systems    Medications:   Scheduled Meds:   furosemide  20 mg IntraVENous BID    ketorolac  15 mg IntraVENous Q6H    metoprolol tartrate  12.5 mg Oral BID    sodium chloride flush  5-40 mL IntraVENous 2 times per day    sodium chloride flush  5-40 mL IntraVENous 2 times per day    aspirin  81 mg Oral Daily    clopidogrel  75 mg Oral Daily    amiodarone  200 mg Oral TID    mupirocin   Nasal BID    atorvastatin  20 mg Oral Nightly    pantoprazole  40 mg Oral Daily    pantoprazole (PROTONIX) 40 mg injection  40 mg IntraVENous Daily    ipratropium-albuterol  1 ampule Inhalation Q4H WA    insulin lispro  0-6 Units SubCUTAneous TID WC    insulin lispro  0-3 Units SubCUTAneous Nightly     Continuous Infusions:   sodium chloride      sodium chloride      norepinephrine Stopped (11/29/22 1734)    EPINEPHrine Stopped (11/28/22 1739)    insulin Stopped (11/29/22 1440)    dextrose       CBC:   Recent Labs     11/28/22  1526 11/29/22  0403 11/30/22  0444   WBC 22.4* 23.0* 22.4*   HGB 13.0 14.2 12.2    192 167     BMP:    Recent Labs     11/28/22  1526 11/28/22  2126 11/29/22  0403 11/30/22  0444     --  136 140   K 4.5 4.8 4.5 4.8     --  108* 104   CO2 24  --  23 22   BUN 11  --  14 24*   CREATININE 0.53  --  0.75 1.08*   GLUCOSE 191*  --  133* 190*     Hepatic:No results for input(s): AST, ALT, ALB, BILITOT, ALKPHOS in the last 72 hours. Troponin: No results for input(s): TROPHS in the last 72 hours.   BNP: No results for input(s): BNP in the last 72 hours. Lipids: No results for input(s): CHOL, HDL in the last 72 hours. Invalid input(s): LDLCALCU  INR:   Recent Labs     11/28/22  1526 11/29/22  0403 11/30/22  0444   INR 1.2 1.1 1.1       Objective:   Vitals: /60   Pulse (!) 109   Temp 98.8 °F (37.1 °C) (Oral)   Resp (!) 0   Wt 256 lb 2.8 oz (116.2 kg)   SpO2 93%   BMI 51.74 kg/m²   General appearance: alert and cooperative with exam  HEENT: Head: Normocephalic, no lesions, without obvious abnormality. Neck:no JVD, trachea midline, no adenopathy  Lungs: Clear to auscultation  Heart: Regular rate and rhythm, s1/s2 auscultated, no murmurs  Abdomen: soft, non-tender, bowel sounds active  Extremities: no edema  Neurologic: not done    Cardiac Cath 11/2022: LMCA: has distal 20% stenosis. LAD: has mid 85% stenosis. The D1 has 30% stenosis. LCx: has ostial eccentric 80% stenosis. The OM1 has proximal patent stent. RCA: has patent mid stent. There is distal 70% stenosis. Ramus: has proximal 80% stenosis. Coronary Tree      Dominance: Right     LV Analysis  LV function assessed as:Normal.  Ejection Fraction  +----------------------------------------------------------------------+---+  ! Method                                                                ! EF%! +----------------------------------------------------------------------+---+  ! LV gram                                                               !55 !  +----------------------------------------------------------------------+---+        Echo 11/2022  Normal LV size, wall thickness and wall motions  EF > 55%  Normal RV size and function  Normal size LA and RA  AV is sclerotic opens well, mean gradient 7 mmHg, no AR  MAC noted, no MS no MR  Normal aortic root dimensions  No significant pericardial effusion seen  Normal IC diameter normal respiratory variations suggestive of normal RA  filling pressure    Assessment / Acute Cardiac Problems: Multivessel CAD status post CABG X3 with LIMA to LAD, SVG to ramus intermedius, SVG to OM1 11/28/2022. Preserved LVEF on echocardiogram.  H/o CAD s/p PCI to RCA and LCx (CATH 7/20/2020 LM 0%, LAD 30% mid, D1 25%, LCx ostial 40%, Mid 90% SHAKIR, OM 1 minimal, RCA 80% SHAKIR, LVEF 55%). HTN   DM   SHERITA. Morbid obesity. Peripheral vascular disease. Elevated white count. Patient Active Problem List:     Anxiety     GERD (gastroesophageal reflux disease)     OA (osteoarthritis)     Neuropathy     Right knee DJD     Hypothyroidism     S/P left total knee arthroplasty     Ischemic heart disease     Essential hypertension     NSTEMI (non-ST elevated myocardial infarction) (HCC)     Other cirrhosis of liver (HCC)     Elevated lipase     Depression     Type 2 diabetes mellitus, with long-term current use of insulin (HCC)     SHERITA (obstructive sleep apnea)     Morbid obesity with BMI of 45.0-49.9, adult (HCC)     PVD (peripheral vascular disease) (HCC)     Numbness and tingling of both feet     Long term (current) use of antithrombotics/antiplatelets     Painful diabetic neuropathy ([address]     Vertebrogenic low back pain     Spinal stenosis of lumbar region with neurogenic claudication     CAD in native artery      Plan of Treatment:   Stable. Continue routine post-op care per CTS. Discussed importance of increase activity along with IS use, cough/deep breathing exercises  Continue PO ASA, statin, Plavix, BB.  Monitor HR today and consider increasing BB if remains tachy    Electronically signed by HAO Nagy CNP on 11/30/2022 at 11:36 AM  [address]  [phone]

## 2022-11-30 NOTE — CARE COORDINATION
Notified [facility] of [facility]. Pt notified of acceptance.

## 2022-11-30 NOTE — PLAN OF CARE
Problem: Discharge Planning  Goal: Discharge to home or other facility with appropriate resources  11/30/2022 1146 by Cecile Tovar RN  Outcome: Progressing  11/30/2022 0236 by Hermelindo Licea RN  Outcome: Progressing     Problem: Skin/Tissue Integrity  Goal: Absence of new skin breakdown  Description: 1. Monitor for areas of redness and/or skin breakdown  2. Assess vascular access sites hourly  3. Every 4-6 hours minimum:  Change oxygen saturation probe site  4. Every 4-6 hours:  If on nasal continuous positive airway pressure, respiratory therapy assess nares and determine need for appliance change or resting period.   11/30/2022 1146 by Cecile Tovar RN  Outcome: Progressing  11/30/2022 0236 by Hermelindo Licea RN  Outcome: Progressing     Problem: Chronic Conditions and Co-morbidities  Goal: Patient's chronic conditions and co-morbidity symptoms are monitored and maintained or improved  11/30/2022 1146 by Cecile Tovar RN  Outcome: Progressing  11/30/2022 0236 by Hermelindo Licea RN  Outcome: Progressing     Problem: ABCDS Injury Assessment  Goal: Absence of physical injury  11/30/2022 1146 by Cecile Tovar RN  Outcome: Progressing  11/30/2022 0236 by Hermelindo Licea RN  Outcome: Progressing     Problem: Pain  Goal: Verbalizes/displays adequate comfort level or baseline comfort level  11/30/2022 1146 by Cecile Tovar RN  Outcome: Progressing  11/30/2022 0236 by Hermelindo Licea RN  Outcome: Progressing     Problem: Neurosensory - Adult  Goal: Achieves stable or improved neurological status  11/30/2022 1146 by Cecile Tovar RN  Outcome: Progressing  11/30/2022 0236 by Hermelindo Licea RN  Outcome: Progressing     Problem: Respiratory - Adult  Goal: Achieves optimal ventilation and oxygenation  11/30/2022 1146 by Cecile Tovar RN  Outcome: Progressing  11/30/2022 0236 by Hermelindo Licea RN  Outcome: Progressing     Problem: Cardiovascular - Adult  Goal: Maintains optimal cardiac output and hemodynamic stability  11/30/2022 1146 by Sandra Humphries RN  Outcome: Progressing  Flowsheets (Taken 11/30/2022 0800)  Maintains optimal cardiac output and hemodynamic stability:   Monitor blood pressure and heart rate   Assess for signs of decreased cardiac output   Monitor urine output and notify Licensed Independent Practitioner for values outside of normal range   Administer fluid and/or volume expanders as ordered   Administer vasoactive medications as ordered  11/30/2022 0236 by Chauncey Joshua RN  Outcome: Progressing  Goal: Absence of cardiac dysrhythmias or at baseline  11/30/2022 1146 by Sandra Humphries RN  Outcome: Progressing  Flowsheets (Taken 11/30/2022 0800)  Absence of cardiac dysrhythmias or at baseline:   Monitor cardiac rate and rhythm   Assess for signs of decreased cardiac output   Administer antiarrhythmia medication and electrolyte replacement as ordered  11/30/2022 0236 by Chauncey Joshua RN  Outcome: Progressing     Problem: Skin/Tissue Integrity - Adult  Goal: Skin integrity remains intact  11/30/2022 1146 by Sandra Humphries RN  Outcome: Progressing  Flowsheets  Taken 11/30/2022 1144  Skin Integrity Remains Intact:   Monitor for areas of redness and/or skin breakdown   Assess vascular access sites hourly  Taken 11/30/2022 0800  Skin Integrity Remains Intact:   Monitor for areas of redness and/or skin breakdown   Assess vascular access sites hourly   Every 4-6 hours minimum: Change oxygen saturation probe site  11/30/2022 0236 by Chauncey Joshua RN  Outcome: Progressing  Goal: Incisions, wounds, or drain sites healing without S/S of infection  11/30/2022 1146 by Sandra Humphries RN  Outcome: Progressing  Flowsheets (Taken 11/30/2022 0800)  Incisions, Wounds, or Drain Sites Healing Without Sign and Symptoms of Infection:   Implement wound care per orders   Initiate pressure ulcer prevention bundle as indicated  11/30/2022 0236 by Chauncey Joshua RN  Outcome: Progressing     Problem: Musculoskeletal - Adult  Goal: Return mobility to safest level of function  11/30/2022 1146 by Cecile Tovar RN  Outcome: Progressing  Flowsheets (Taken 11/30/2022 0800)  Return Mobility to Safest Level of Function:   Assess patient stability and activity tolerance for standing, transferring and ambulating with or without assistive devices   Assist with transfers and ambulation using safe patient handling equipment as needed   Obtain physical therapy/occupational therapy consults as needed   Ensure adequate protection for wounds/incisions during mobilization   Apply continuous passive motion per provider or physical therapy orders to increase flexion toward goal   Instruct patient/family in ordered activity level  11/30/2022 0236 by Hermelindo Licea RN  Outcome: Progressing  Goal: Return ADL status to a safe level of function  11/30/2022 1146 by Cecile Tovar RN  Outcome: Progressing  Flowsheets (Taken 11/30/2022 0800)  Return ADL Status to a Safe Level of Function:   Administer medication as ordered   Assess activities of daily living deficits and provide assistive devices as needed   Obtain physical therapy/occupational therapy consults as needed   Assist and instruct patient to increase activity and self care as tolerated  11/30/2022 0236 by Hermelindo Licea RN  Outcome: Progressing     Problem: Gastrointestinal - Adult  Goal: Minimal or absence of nausea and vomiting  11/30/2022 1146 by Cecile Tovar RN  Outcome: Progressing  11/30/2022 0236 by Hermelindo Licea RN  Outcome: Progressing  Goal: Maintains adequate nutritional intake  11/30/2022 1146 by Cecile Tovar RN  Outcome: Progressing  11/30/2022 0236 by Hermelindo Licea RN  Outcome: Progressing     Problem: Genitourinary - Adult  Goal: Absence of urinary retention  11/30/2022 1146 by Cecile Tovar RN  Outcome: Progressing  11/30/2022 0236 by Hermelindo Licea RN  Outcome: Progressing  Goal: Urinary catheter remains patent  11/30/2022 1146 by Arabella Grady Trice Light RN  Outcome: Progressing  11/30/2022 0236 by Nedra Gray RN  Outcome: Progressing     Problem: Infection - Adult  Goal: Absence of infection at discharge  11/30/2022 1146 by Carley Eric RN  Outcome: Progressing  Flowsheets (Taken 11/30/2022 0800)  Absence of infection at discharge:   Assess and monitor for signs and symptoms of infection   Monitor lab/diagnostic results   Monitor all insertion sites i.e., indwelling lines, tubes and drains   [place] appropriate cooling/warming therapies per order  11/30/2022 0236 by Nedra Gray RN  Outcome: Progressing     Problem: Metabolic/Fluid and Electrolytes - Adult  Goal: Electrolytes maintained within normal limits  11/30/2022 1146 by Carley Eric RN  Outcome: Progressing  Flowsheets (Taken 11/30/2022 0800)  Electrolytes maintained within normal limits:   Monitor labs and assess patient for signs and symptoms of electrolyte imbalances   Administer electrolyte replacement as ordered  11/30/2022 0236 by Nedra Gray RN  Outcome: Progressing  Goal: Hemodynamic stability and optimal renal function maintained  11/30/2022 1146 by Carley Eric RN  Outcome: Progressing  Flowsheets (Taken 11/30/2022 0800)  Hemodynamic stability and optimal renal function maintained:   Monitor labs and assess for signs and symptoms of volume excess or deficit   Monitor intake, output and patient weight   Monitor response to interventions for patient's volume status, including labs, urine output, blood pressure (other measures as available)   Encourage oral intake as appropriate   Monitor urine specific gravity, serum osmolarity and serum sodium as indicated or ordered  11/30/2022 0236 by Nedra Gray RN  Outcome: Progressing  Goal: Glucose maintained within prescribed range  11/30/2022 1146 by Carley Eric RN  Outcome: Progressing  Flowsheets (Taken 11/30/2022 0800)  Glucose maintained within prescribed range:   Monitor blood glucose as ordered   Administer ordered medications to maintain glucose within target range   Assess for signs and symptoms of hyperglycemia and hypoglycemia   Assess barriers to adequate nutritional intake and initiate nutrition consult as needed  11/30/2022 0236 by Arturo Hawkins RN  Outcome: Progressing     Problem: Hematologic - Adult  Goal: Maintains hematologic stability  11/30/2022 1146 by Wilfred Hammonds RN  Outcome: Progressing  11/30/2022 0236 by Arturo Hawkins RN  Outcome: Progressing     Problem: Coping  Goal: Pt/Family able to verbalize concerns and demonstrate effective coping strategies  Description: INTERVENTIONS:  1. Assist patient/family to identify coping skills, available support systems and cultural and spiritual values  2. Provide emotional support, including active listening and acknowledgement of concerns of patient and caregivers  3. Reduce environmental stimuli, as able  4. Instruct patient/family in relaxation techniques, as appropriate  5. Assess for spiritual pain/suffering and initiate Spiritual Care, Psychosocial Clinical Specialist consults as needed  11/30/2022 1146 by Wilfred Hammonds RN  Outcome: Progressing  Flowsheets (Taken 11/30/2022 0800)  Patient/family able to verbalize anxieties, fears, and concerns, and demonstrate effective coping:   Assist patient/family to identify coping skills, available support systems and cultural and spiritual values   Provide emotional support, including active listening and acknowledgement of concerns of patient and caregivers   Instruct patient/family in relaxation techniques, as appropriate   Assess for spiritual pain/suffering and initiate Spiritual Care, Psychosocial Clinical Specialist consults as needed   Reduce environmental stimuli, as able  11/30/2022 0236 by Arturo Hawkins RN  Outcome: Progressing     Problem: Confusion  Goal: Confusion, delirium, dementia, or psychosis is improved or at baseline  Description: INTERVENTIONS:  1.  Assess for possible contributors to thought disturbance, including medications, impaired vision or hearing, underlying metabolic abnormalities, dehydration, psychiatric diagnoses, and notify attending LIP  2. [place] high risk fall precautions, as indicated  3. Provide frequent short contacts to provide reality reorientation, refocusing and direction  4. Decrease environmental stimuli, including noise as appropriate  5. Monitor and intervene to maintain adequate nutrition, hydration, elimination, sleep and activity  6. If unable to ensure safety without constant attention obtain sitter and review sitter guidelines with assigned personnel  7. Initiate Psychosocial CNS and Spiritual Care consult, as indicated  11/30/2022 1146 by lEi Corrales RN  Outcome: Progressing  Flowsheets (Taken 11/30/2022 0800)  Effect of thought disturbance (confusion, delirium, dementia, or psychosis) are managed with adequate functional status:   Provide frequent short contacts to provide reality reorientation, refocusing and direction   [place] high risk fall precautions, as indicated   Assess for contributors to thought disturbance, including medications, impaired vision or hearing, underlying metabolic abnormalities, dehydration, psychiatric diagnoses, notify LIP   Decrease environmental stimuli, including noise as appropriate  11/30/2022 0236 by Nghia Thakur RN  Outcome: Progressing     Problem: Spiritual Care  Goal: Pt/Family able to move forward in process of forgiving self, others, and/or higher power  Description: INTERVENTIONS:  1. Assist patient/family to overcome blocks to healing by use of spiritual practices (prayer, meditation, guided imagery, reiki, breath work, etc). 2. De-myth guilt and help patient/family identify possible irrational spiritual/cultural beliefs and values. 3. Explore possibilities of making amends & reconciliation with self, others, and/or a greater power. 4. Guide patient/family in identifying painful feelings of guilt.   5. Help patient/famiy explore and identify spiritual beliefs, cultural understandings or values that may help or hinder letting go of issue. 6. Help patient/family explore feelings of anger, bitterness, resentment. 7. Help patient/family identify and examine the situation in which these feelings are experienced. 8. Help patient/family identify destructive displacement of feelings onto other individuals. 9. Invite use of sacraments/rituals/ceremonies as appropriate (e.g. - confession, anointing, smudging). 10. Refer patient/family to formal counseling and/or to [facility] for further support work.   11/30/2022 1146 by Eli Corrales, RN  Outcome: Progressing  Flowsheets (Taken 11/30/2022 0800)  Patient/family able to move forward in process of forgiving self, others, and/or higher power:   Assist patient to overcome blocks to healing by use of spiritual practices (prayer, meditation, guided imagery, reiki, breath work, etc)   De-myth guilt and help patient/family identify possible irrational spiritual/cultural beliefs and values   Guide patient/family in identifying painful feelings of guilt   Explore possibilities of making amends and reconciliation with self, others, and/or a greater power   Help patient explore and identify spiritual beliefs, cultural understandings or values that may help or hinder letting go of issue   Help patient explore feelings of anger, bitterness, resentment   Help patient/family identify destructive displacement of feelings onto other individuals   Help patient/family identify and examine the situation in which these feelings are experienced   Refer patient to formal counseling and/or to [facility] for further support work   Invite use of sacraments/rituals/ceremonies as appropriate (e.g. - confession, anointing, [address])  11/30/2022 0236 by Nghia Thakur, RN  Outcome: Progressing     Problem: Safety - Adult  Goal: Free from fall injury  11/30/2022 1146 by Eli Corrales RN  Outcome: Progressing  Flowsheets (Taken 11/30/2022 1144)  Free From Fall Injury:   Instruct family/caregiver on patient safety   Based on caregiver fall risk screen, instruct family/caregiver to ask for assistance with transferring infant if caregiver noted to have fall risk factors  11/30/2022 0236 by Tanya Flannery, RN  Outcome: Progressing

## 2022-11-30 NOTE — PROGRESS NOTES
Occupational Therapy  Facility/Department: [facility]  Occupational Therapy Daily Treatment Note    Name: Antwon Rojas  : 1955  MRN: [id number]  Date of Service: 2022    Discharge Recommendations:  Patient would benefit from continued therapy after discharge    Patient Diagnosis(es): There were no encounter diagnoses. Past Medical History:  has a past medical history of Ambulates with cane, Anxiety, Borderline hypertension, CAD (coronary artery disease), Depression, GERD (gastroesophageal reflux disease), Heart attack ([facility] [address] Hypertension, Hypothyroidism, Neuropathy, Obesity, Osteoarthritis, Other cirrhosis of liver ([facility] [address] Sleep apnea, Type II or unspecified type diabetes mellitus without mention of complication, not stated as uncontrolled, Under care of service provider, Under care of service provider, and Vertebrogenic low back pain. Past Surgical History:  has a past surgical history that includes Hysterectomy; Colonoscopy; Upper gastrointestinal endoscopy; Dilation and curettage of uterus; hiatal hernia repair; Throat surgery; Appendectomy; Total knee arthroplasty (Right, 2015); Total knee arthroplasty (Left, 2015); Coronary angioplasty with stent (2020); Cardiac catheterization; Cardiac catheterization (2022); and Coronary artery bypass graft (N/A, 2022). Assessment   Performance deficits / Impairments: Decreased functional mobility ; Decreased endurance;Decreased ADL status; Decreased balance;Decreased high-level IADLs;Decreased safe awareness;Decreased cognition;Decreased strength;Decreased coordination;Decreased posture  Assessment: Pt making slow progress this session and required frequent verbal cues and encouragement to tasks.   Prognosis: Good  Activity Tolerance  Activity Tolerance: Patient limited by fatigue;Patient limited by pain;Treatment limited secondary to decreased cognition  Activity Tolerance Comments: frequent rest breaks required        Plan Occupational Therapy Plan  Times Per Week: 4-6x (CABG)     Restrictions  Restrictions/Precautions  Restrictions/Precautions: General Precautions, Fall Risk  Required Braces or Orthoses?: Yes  Required Braces or Orthoses  Other: Heart Hugger Brace  Position Activity Restriction  Sternal Precautions: 5# Lifting Restrictions  Other position/activity restrictions: amb pt, up in chair, s/p cabgx3 11/28, 3L O2    Subjective   General  Patient assessed for rehabilitation services?: Yes  Response to previous treatment: Patient with no complaints from previous session  Family / Caregiver Present: No  Diagnosis: CABGx3 on 11/28, LVEF 50%  Subjective  General Comment  Comments: RN ok'd OT treatment this morning. Pt seated in recliner upon ORR arrival. Pt agreeable with minimal encouragement. Pt reports 9/10 incisional pain and RN present and aware with medication. Objective   Safety Devices  Type of Devices: Call light within reach;Gait belt;Nurse notified; Patient at risk for falls; Left in chair  Restraints  Restraints Initially in Place: No  Balance  Sitting:  (Pt sit upright in recliner ~10 minutes with occasional support)  Standing: Impaired (initial MOD A and progressed to CGA>MIN A)  Transfer Training  Transfer Training: Yes  Overall Level of Assistance: Contact-guard assistance;Assist X2  Interventions: Tactile cues; Safety awareness training;Verbal cues  Sit to Stand: Contact-guard assistance;Assist X2  Stand to Sit: Contact-guard assistance;Assist X2  Gait  Overall Level of Assistance: Minimum assistance;Assist X2  Interventions: Verbal cues; Tactile cues; Safety awareness training  Distance (ft):  (Room distance in prep for ADL tasks)  Assistive Device: Walker, rolling        ADL  Feeding: Minimal assistance  Feeding Skilled Clinical Factors: cup to mouth and straw to lips to drink  Grooming Skilled Clinical Factors: Wash face with SBA and min verbal cues d/t easily distracted  LE Dressing: Setup;Verbal cueing; Increased time to complete;Maximum assistance  LE Dressing Skilled Clinical Factors: slipper socks  Additional Comments: Pt with increased heart rate to 115-119 with activity and inreased SOB. Pt required verbal instruction for slowed breathing pace withpoor carryover. Pt easily distracted and required repeated instructions for all tasks     Activity Tolerance  Activity Tolerance: Patient limited by fatigue;Patient limited by endurance;Treatment limited secondary to decreased cognition  Activity Tolerance Comments: increased respirations with verbal cues for breathing techniques to recover  Bed mobility  Supine to Sit: Unable to assess  Sit to Supine: Unable to assess  Bed Mobility Comments: Pt began and concluded session seated in recliner        Cognition  Overall Cognitive Status: Exceptions  Arousal/Alertness: Inconsistent responses to stimuli  Following Commands: Follows one step commands with increased time; Follows one step commands with repetition  Attention Span: Difficulty attending to directions; Difficulty dividing attention  Memory: Decreased recall of recent events;Decreased recall of precautions  Safety Judgement: Decreased awareness of need for assistance;Decreased awareness of need for safety  Problem Solving: Decreased awareness of errors;Assistance required to generate solutions;Assistance required to correct errors made;Assistance required to identify errors made  Insights: Decreased awareness of deficits  Initiation: Requires cues for all  Sequencing: Requires cues for all  Cognition Comment: Pt very slow to process. Pt answering about 10% of the questions asked today. Pt stated she doesn't know why she is feeling this way. RN present to motivate pt  Orientation  Overall Orientation Status: Impaired  Orientation Level: Oriented to place;Oriented to person     Education Given To: Patient  Education Provided: Role of Therapy; ADL Adaptive Strategies;Transfer Training;Equipment;Orientation  Education Provided Comments: importance of all therapy participation  Education Method: Verbal;Demonstration  Barriers to Learning: Cognition;Vision  Education Outcome: Continued education needed    AM-PAC Score  AM-PAC Inpatient Daily Activity Raw Score: 14 (11/30/22 1203)  AM-PAC Inpatient ADL T-Scale Score : 33.39 (11/30/22 1203)  ADL Inpatient CMS 0-100% Score: 59.67 (11/30/22 1203)  ADL Inpatient CMS G-Code Modifier : CK (11/30/22 1203)      Goals  Short Term Goals  Time Frame for Short Term Goals: Pt will by discharge  Short Term Goal 1: demo good safety awareness during func mob around room using LRD PRN and min A  Short Term Goal 2: demo ADL UB bathing/dressing activity at SBA and increased time  Short Term Goal 3: demo ADL LB bathing/dressing activity at Neida Delia A, AE PRN, and increased time  Short Term Goal 4: identify/maintain all sternal precautions with 1 cue  Short Term Goal 5: demo activitty tolerance for 35 min+       Therapy Time   Individual Concurrent Group Co-treatment   Time In 0837         Time Out 0913         Minutes 36         Timed Code Treatment Minutes: 23 Minutes (co-tx with PTA for safety and goal progression)       DOMINGA Woods/ESTUARDO

## 2022-12-01 ENCOUNTER — APPOINTMENT (OUTPATIENT)
Dept: GENERAL RADIOLOGY | Age: 67
DRG: 236 | End: 2022-12-01
Attending: THORACIC SURGERY (CARDIOTHORACIC VASCULAR SURGERY)
Payer: MEDICARE

## 2022-12-01 LAB
ANION GAP SERPL CALCULATED.3IONS-SCNC: 12 MMOL/L (ref 9–17)
BUN BLDV-MCNC: 29 MG/DL (ref 8–23)
CALCIUM SERPL-MCNC: 8.6 MG/DL (ref 8.6–10.4)
CHLORIDE BLD-SCNC: 104 MMOL/L (ref 98–107)
CO2: 23 MMOL/L (ref 20–31)
CREAT SERPL-MCNC: 0.71 MG/DL (ref 0.5–0.9)
GFR SERPL CREATININE-BSD FRML MDRD: >60 ML/MIN/1.73M2
GLUCOSE BLD-MCNC: 184 MG/DL (ref 65–105)
GLUCOSE BLD-MCNC: 195 MG/DL (ref 65–105)
GLUCOSE BLD-MCNC: 216 MG/DL (ref 70–99)
GLUCOSE BLD-MCNC: 232 MG/DL (ref 65–105)
HCT VFR BLD CALC: 35.9 % (ref 36.3–47.1)
HEMOGLOBIN: 10.7 G/DL (ref 11.9–15.1)
MAGNESIUM: 2.4 MG/DL (ref 1.6–2.6)
MCH RBC QN AUTO: 29.2 PG (ref 25.2–33.5)
MCHC RBC AUTO-ENTMCNC: 29.8 G/DL (ref 28.4–34.8)
MCV RBC AUTO: 97.8 FL (ref 82.6–102.9)
NRBC AUTOMATED: 0.1 PER 100 WBC
PDW BLD-RTO: 14.4 % (ref 11.8–14.4)
PLATELET # BLD: 121 K/UL (ref 138–453)
PMV BLD AUTO: 11.3 FL (ref 8.1–13.5)
POTASSIUM SERPL-SCNC: 4.4 MMOL/L (ref 3.7–5.3)
RBC # BLD: 3.67 M/UL (ref 3.95–5.11)
SODIUM BLD-SCNC: 139 MMOL/L (ref 135–144)
WBC # BLD: 14.2 K/UL (ref 3.5–11.3)

## 2022-12-01 PROCEDURE — 6360000002 HC RX W HCPCS: Performed by: PHYSICIAN ASSISTANT

## 2022-12-01 PROCEDURE — 80048 BASIC METABOLIC PNL TOTAL CA: CPT

## 2022-12-01 PROCEDURE — 85027 COMPLETE CBC AUTOMATED: CPT

## 2022-12-01 PROCEDURE — 2580000003 HC RX 258: Performed by: ANESTHESIOLOGY

## 2022-12-01 PROCEDURE — 6370000000 HC RX 637 (ALT 250 FOR IP): Performed by: PHYSICIAN ASSISTANT

## 2022-12-01 PROCEDURE — 97110 THERAPEUTIC EXERCISES: CPT

## 2022-12-01 PROCEDURE — 97116 GAIT TRAINING THERAPY: CPT

## 2022-12-01 PROCEDURE — 2580000003 HC RX 258: Performed by: PHYSICIAN ASSISTANT

## 2022-12-01 PROCEDURE — 94640 AIRWAY INHALATION TREATMENT: CPT

## 2022-12-01 PROCEDURE — 94761 N-INVAS EAR/PLS OXIMETRY MLT: CPT

## 2022-12-01 PROCEDURE — 83735 ASSAY OF MAGNESIUM: CPT

## 2022-12-01 PROCEDURE — 2140000001 HC CVICU INTERMEDIATE R&B

## 2022-12-01 PROCEDURE — 2700000000 HC OXYGEN THERAPY PER DAY

## 2022-12-01 PROCEDURE — 82947 ASSAY GLUCOSE BLOOD QUANT: CPT

## 2022-12-01 PROCEDURE — 36415 COLL VENOUS BLD VENIPUNCTURE: CPT

## 2022-12-01 PROCEDURE — 71045 X-RAY EXAM CHEST 1 VIEW: CPT

## 2022-12-01 PROCEDURE — 97535 SELF CARE MNGMENT TRAINING: CPT

## 2022-12-01 RX ORDER — FUROSEMIDE 10 MG/ML
20 INJECTION INTRAMUSCULAR; INTRAVENOUS ONCE
Status: COMPLETED | OUTPATIENT
Start: 2022-12-01 | End: 2022-12-01

## 2022-12-01 RX ADMIN — INSULIN LISPRO 2 UNITS: 100 INJECTION, SOLUTION INTRAVENOUS; SUBCUTANEOUS at 11:11

## 2022-12-01 RX ADMIN — BUSPIRONE HYDROCHLORIDE 10 MG: 10 TABLET ORAL at 20:24

## 2022-12-01 RX ADMIN — MUPIROCIN: 20 OINTMENT TOPICAL at 09:38

## 2022-12-01 RX ADMIN — FUROSEMIDE 20 MG: 10 INJECTION, SOLUTION INTRAMUSCULAR; INTRAVENOUS at 09:38

## 2022-12-01 RX ADMIN — CITALOPRAM 40 MG: 20 TABLET, FILM COATED ORAL at 09:38

## 2022-12-01 RX ADMIN — TRAMADOL HYDROCHLORIDE 25 MG: 50 TABLET, COATED ORAL at 11:58

## 2022-12-01 RX ADMIN — KETOROLAC TROMETHAMINE 15 MG: 15 INJECTION, SOLUTION INTRAMUSCULAR; INTRAVENOUS at 05:28

## 2022-12-01 RX ADMIN — AMIODARONE HYDROCHLORIDE 200 MG: 200 TABLET ORAL at 14:20

## 2022-12-01 RX ADMIN — SODIUM CHLORIDE, PRESERVATIVE FREE 10 ML: 5 INJECTION INTRAVENOUS at 09:39

## 2022-12-01 RX ADMIN — METOPROLOL TARTRATE 12.5 MG: 25 TABLET ORAL at 09:39

## 2022-12-01 RX ADMIN — TRAMADOL HYDROCHLORIDE 25 MG: 50 TABLET, COATED ORAL at 20:22

## 2022-12-01 RX ADMIN — TRAMADOL HYDROCHLORIDE 25 MG: 50 TABLET, COATED ORAL at 03:03

## 2022-12-01 RX ADMIN — FUROSEMIDE 20 MG: 10 INJECTION, SOLUTION INTRAMUSCULAR; INTRAVENOUS at 10:07

## 2022-12-01 RX ADMIN — ATORVASTATIN CALCIUM 20 MG: 20 TABLET, FILM COATED ORAL at 20:24

## 2022-12-01 RX ADMIN — SODIUM CHLORIDE, PRESERVATIVE FREE 10 ML: 5 INJECTION INTRAVENOUS at 20:24

## 2022-12-01 RX ADMIN — BUSPIRONE HYDROCHLORIDE 10 MG: 10 TABLET ORAL at 09:38

## 2022-12-01 RX ADMIN — Medication 81 MG: at 09:38

## 2022-12-01 RX ADMIN — CLOPIDOGREL 75 MG: 75 TABLET, FILM COATED ORAL at 09:38

## 2022-12-01 RX ADMIN — IPRATROPIUM BROMIDE AND ALBUTEROL SULFATE 1 AMPULE: .5; 3 SOLUTION RESPIRATORY (INHALATION) at 08:46

## 2022-12-01 RX ADMIN — FUROSEMIDE 20 MG: 10 INJECTION, SOLUTION INTRAMUSCULAR; INTRAVENOUS at 19:39

## 2022-12-01 RX ADMIN — AMIODARONE HYDROCHLORIDE 200 MG: 200 TABLET ORAL at 20:24

## 2022-12-01 RX ADMIN — METOPROLOL TARTRATE 12.5 MG: 25 TABLET ORAL at 20:24

## 2022-12-01 RX ADMIN — PANTOPRAZOLE SODIUM 40 MG: 40 TABLET, DELAYED RELEASE ORAL at 09:38

## 2022-12-01 RX ADMIN — MUPIROCIN: 20 OINTMENT TOPICAL at 20:24

## 2022-12-01 RX ADMIN — INSULIN LISPRO 1 UNITS: 100 INJECTION, SOLUTION INTRAVENOUS; SUBCUTANEOUS at 20:34

## 2022-12-01 RX ADMIN — IPRATROPIUM BROMIDE AND ALBUTEROL SULFATE 1 AMPULE: .5; 3 SOLUTION RESPIRATORY (INHALATION) at 15:43

## 2022-12-01 RX ADMIN — AMIODARONE HYDROCHLORIDE 200 MG: 200 TABLET ORAL at 09:38

## 2022-12-01 ASSESSMENT — PAIN SCALES - GENERAL
PAINLEVEL_OUTOF10: 0
PAINLEVEL_OUTOF10: 8
PAINLEVEL_OUTOF10: 6
PAINLEVEL_OUTOF10: 9
PAINLEVEL_OUTOF10: 8

## 2022-12-01 ASSESSMENT — ENCOUNTER SYMPTOMS: TACHYPNEA: 1

## 2022-12-01 NOTE — CARE COORDINATION
Transitional Planning    Called at [phone] and left message with admissions requesting return phone call. Yojana Perla [phone] and left message requesting return phone call. [phone] Received phone call from Florentin King. She relates pre cert was submitted yesterday. Insurance should assign it to someone to review today. May hear tomorrow with answer.

## 2022-12-01 NOTE — PROGRESS NOTES
Physical Therapy  Facility/Department: [facility] CAR 1- SICU  Physical Therapy Daily Treatment Note    Name: Fabiola Whitehead  :   MRN: [id number]  Date of Service: 2022    Discharge Recommendations:  Patient would benefit from continued therapy after discharge   PT Equipment Recommendations  Equipment Needed: No (Pt owns RW)      Patient Diagnosis(es): There were no encounter diagnoses. Past Medical History:  has a past medical history of Ambulates with cane, Anxiety, Borderline hypertension, CAD (coronary artery disease), Depression, GERD (gastroesophageal reflux disease), Heart attack ([facility] [address] Hypertension, Hypothyroidism, Neuropathy, Obesity, Osteoarthritis, Other cirrhosis of liver ([facility] Sleep apnea, Type II or unspecified type diabetes mellitus without mention of complication, not stated as uncontrolled, Under care of service provider, Under care of service provider, and Vertebrogenic low back pain. Past Surgical History:  has a past surgical history that includes Hysterectomy; Colonoscopy; Upper gastrointestinal endoscopy; Dilation and curettage of uterus; hiatal hernia repair; Throat surgery; Appendectomy; Total knee arthroplasty (Right, 2015); Total knee arthroplasty (Left, 2015); Coronary angioplasty with stent (2020); Cardiac catheterization; Cardiac catheterization (2022); and Coronary artery bypass graft (N/A, 2022). Assessment   Body Structures, Functions, Activity Limitations Requiring Skilled Therapeutic Intervention: Decreased functional mobility ; Decreased strength;Decreased endurance;Decreased balance;Decreased coordination;Decreased cognition  Assessment: Pt demonstrates improving functional mobility this date, requiring min-A to ambulate 40 feet with a RW. Pt mildly unsteady with ambulation, continues to have endurance, safety awareness, and BLE strength deficits. Pt would benefit from additional PT upon discharge to maximize functional independence. Pt would be unsafe to return to prior living arrangements upon discharge. Therapy Prognosis: Good  Decision Making: Medium Complexity  Requires PT Follow-Up: Yes  Activity Tolerance  Activity Tolerance: Patient limited by fatigue;Patient limited by endurance;Treatment limited secondary to decreased cognition     Plan   Physcial Therapy Plan  General Plan: 6-7 times per week  Current Treatment Recommendations: Strengthening, Balance training, Gait training, Functional mobility training, Stair training, Transfer training, Endurance training, Home exercise program, Safety education & training, Patient/Caregiver education & training, Equipment evaluation, education, & procurement, Therapeutic activities  Safety Devices  Type of Devices: Call light within reach, Gait belt, Nurse notified, Patient at risk for falls, Left in chair  Restraints  Restraints Initially in Place: No     Restrictions  Restrictions/Precautions  Restrictions/Precautions: General Precautions, Fall Risk, Cardiac  Required Braces or Orthoses?: Yes  Required Braces or Orthoses  Other: Heart Hugger Brace  Position Activity Restriction  Sternal Precautions: 5# Lifting Restrictions  Other position/activity restrictions: amb pt, up in chair, s/p cabgx3 11/28     Subjective   General  Patient assessed for rehabilitation services?: Yes  Response To Previous Treatment: Patient reporting fatigue but able to participate. Family / Caregiver Present: No  Follows Commands: Within Functional Limits  Subjective  Subjective: Pt up in a chair and agreeable to therapy, RN agreeable to therapy. Pt pleasant and cooperative throughout. Pt complains of 9/10 chest pain at rest.  Pt mildly lethargic throughout. Cognition   Cognition  Overall Cognitive Status: Exceptions  Arousal/Alertness: Delayed responses to stimuli  Following Commands: Follows one step commands consistently; Follows multistep commands with repitition; Follows multistep commands with increased time  Attention Span: Attends with cues to redirect  Memory: Decreased recall of recent events;Decreased recall of precautions  Safety Judgement: Decreased awareness of need for assistance;Decreased awareness of need for safety  Problem Solving: Decreased awareness of errors;Assistance required to generate solutions;Assistance required to correct errors made;Assistance required to identify errors made  Insights: Decreased awareness of deficits  Initiation: Requires cues for all  Sequencing: Requires cues for all     Objective                             Transfer Training  Transfer Training: Yes  Overall Level of Assistance: Contact-guard assistance  Interventions: Tactile cues; Safety awareness training;Verbal cues  Sit to Stand: Contact-guard assistance  Stand to Sit: Contact-guard assistance  Toilet Transfer: Contact-guard assistance (MIN verbal cues for heart hugger use and hand placement)  Bed mobility  Bed Mobility Comments: Pt began and concluded session seated in recliner. Pt able to scoot in and out of chair. Transfers  Sit to Stand: Contact guard assistance  Stand to Sit: Contact guard assistance  Comment: Second person present for safety, Transfers performed 3x this date. Verbal cues required throughout to utilize heart hugger brace. Increased time/effort to perform. Ambulation  Surface: Level tile  Device: Rolling Walker  Assistance: Minimal assistance  Quality of Gait: mildly unsteady, forward flexed trunk, no true LOB. Gait Deviations: Decreased step length;Decreased step height;Slow Kamini  Distance: 40 feet, seated rest break, 10 feet, seated rest break, 10 feet. Comments: with chair follow  More Ambulation?: No  Stairs/Curb  Stairs?: No     Balance  Posture: Fair  Sitting - Static: Good;-  Sitting - Dynamic: Fair;+  Standing - Static: Fair  Standing - Dynamic: Fair;-  Comments: assessed with RW  Exercise Treatment: x10 BLE in sitting: LAQ, hip flexion, hip abduction, ankle dorsi/plantarflexion. AM-PAC Score  AM-PAC Inpatient Mobility Raw Score : 14 (12/01/22 1109)  AM-PAC Inpatient T-Scale Score : 38.1 (12/01/22 1109)  Mobility Inpatient CMS 0-100% Score: 61.29 (12/01/22 1109)  Mobility Inpatient CMS G-Code Modifier : CL (12/01/22 1109)            Goals  Short Term Goals  Time Frame for Short Term Goals: 14 visits  Short Term Goal 1: Pt will be Mary bed mobility  Short Term Goal 2: Pt will be Mary transfers  Short Term Goal 3: Pt will be Mary amb 250' RW or least restrictive AD  Short Term Goal 4: Pt will navigate 6 steps Mary either or B rail use  Additional Goals?: No       Education  Patient Education  Education Given To: Patient  Education Provided: Role of Therapy;Plan of Care;Transfer Training;Precautions; Fall Prevention Strategies  Education Method: Demonstration;Verbal  Barriers to Learning: Cognition  Education Outcome: Continued education needed;Demonstrated understanding      Therapy Time   Individual Concurrent Group Co-treatment   Time In 1020         Time Out 1055         Minutes 35         Timed Code Treatment Minutes: SANDY Ortiz 20, PT

## 2022-12-01 NOTE — PROGRESS NOTES
[facility]   Progress Note                   Date:   12/1/2022  Patient name: Juan Cooper  Date of admission:  11/28/2022  5:56 AM  MRN:   [id number]  YOB: 1955  PCP: Aye Arnold MD    Reason for Admission:      Subjective:       Patient was seen and evaluated at bedside, no acute events overnight, tachycardic /min, /55 mm Hg, off pressor support. Labs and imaging reviewed. Hb 10.7. EF 55%. Medications:   Scheduled Meds:   busPIRone  10 mg Oral BID    citalopram  40 mg Oral Daily    furosemide  20 mg IntraVENous BID    ketorolac  15 mg IntraVENous Q6H    metoprolol tartrate  12.5 mg Oral BID    sodium chloride flush  5-40 mL IntraVENous 2 times per day    sodium chloride flush  5-40 mL IntraVENous 2 times per day    aspirin  81 mg Oral Daily    clopidogrel  75 mg Oral Daily    amiodarone  200 mg Oral TID    mupirocin   Nasal BID    atorvastatin  20 mg Oral Nightly    pantoprazole  40 mg Oral Daily    pantoprazole (PROTONIX) 40 mg injection  40 mg IntraVENous Daily    ipratropium-albuterol  1 ampule Inhalation Q4H WA    insulin lispro  0-6 Units SubCUTAneous TID WC    insulin lispro  0-3 Units SubCUTAneous Nightly       Continuous Infusions:   sodium chloride      sodium chloride      norepinephrine Stopped (11/29/22 1734)    EPINEPHrine Stopped (11/28/22 1739)    insulin Stopped (11/29/22 1440)    dextrose         CBC:   Recent Labs     11/29/22  0403 11/30/22  0444 12/01/22  0554   WBC 23.0* 22.4* 14.2*   HGB 14.2 12.2 10.7*    167 121*     BMP:    Recent Labs     11/30/22  0444 11/30/22  1525 12/01/22  0554    137 139   K 4.8 4.3 4.4    102 104   CO2 22 29 23   BUN 24* 28* 29*   CREATININE 1.08* 1.10* 0.71   GLUCOSE 190* 200* 216*     Hepatic: No results for input(s): AST, ALT, ALB, BILITOT, ALKPHOS in the last 72 hours. Troponin: No results for input(s): TROPONINI in the last 72 hours.   BNP: No results for input(s): BNP in the last 72 hours. Lipids: No results for input(s): CHOL, HDL in the last 72 hours. Invalid input(s): LDLCALCU  INR:   Recent Labs     11/28/22  1526 11/29/22  0403 11/30/22  0444   INR 1.2 1.1 1.1       Objective:   Vitals: BP (!) 133/55   Pulse (!) 105   Temp 98.6 °F (37 °C) (Oral)   Resp 21   Wt 266 lb 1.5 oz (120.7 kg)   SpO2 94%   BMI 53.74 kg/m²     General appearance: awake, alert, in no apparent respiratory distress   HEENT: Head: Normocephalic, no lesions, without obvious abnormality  Neck: no JVD  Lungs: clear to auscultation bilaterally, no basilar rales, no wheezing   Heart: regular rate and rhythm, S1, S2 normal, no murmur, click, rub or gallop  Abdomen: soft, non-tender; bowel sounds normal  Extremities: No LE edema  Neurologic: Mental status: Alert, oriented. Motor and sensory not done. Cardiac Cath 11/2022: LMCA: has distal 20% stenosis. LAD: has mid 85% stenosis. The D1 has 30% stenosis. LCx: has ostial eccentric 80% stenosis. The OM1 has proximal patent stent. RCA: has patent mid stent. There is distal 70% stenosis. Ramus: has proximal 80% stenosis. Coronary Tree      Dominance: Right     LV Analysis  LV function assessed as:Normal.  Ejection Fraction  +----------------------------------------------------------------------+---+  ! Method                                                                ! EF%! +----------------------------------------------------------------------+---+  ! LV gram                                                               !55 !  +----------------------------------------------------------------------+---+        Echo 11/2022  Normal LV size, wall thickness and wall motions  EF > 55%  Normal RV size and function  Normal size LA and RA  AV is sclerotic opens well, mean gradient 7 mmHg, no AR  MAC noted, no MS no MR  Normal aortic root dimensions  No significant pericardial effusion seen  Normal IC diameter normal respiratory variations suggestive of normal RA  filling pressure        Assessment / Acute Cardiac Problems:   Multivessel CAD status post CABG X3 with LIMA to LAD, SVG to ramus intermedius, SVG to OM1 11/28/2022. Preserved LVEF on echocardiogram.  H/o CAD s/p PCI to RCA and LCx (CATH 7/20/2020 LM 0%, LAD 30% mid, D1 25%, LCx ostial 40%, Mid 90% SHAKIR, OM 1 minimal, RCA 80% SHAKIR, LVEF 55%). HTN   DM   SHERITA. Morbid obesity. Peripheral vascular disease. Elevated white count. Patient Active Problem List:     Anxiety     GERD (gastroesophageal reflux disease)     OA (osteoarthritis)     Neuropathy     Right knee DJD     Hypothyroidism     S/P left total knee arthroplasty     Ischemic heart disease     Essential hypertension     NSTEMI (non-ST elevated myocardial infarction) (HCC)     Other cirrhosis of liver (HCC)     Elevated lipase     Depression     Type 2 diabetes mellitus, with long-term current use of insulin (HCC)     SHERITA (obstructive sleep apnea)     Morbid obesity with BMI of 45.0-49.9, adult (HCC)     PVD (peripheral vascular disease) ([facility])     Numbness and tingling of both feet     Long term (current) use of antithrombotics/antiplatelets     Painful diabetic neuropathy ([facility] [address]     Vertebrogenic low back pain     Spinal stenosis of lumbar region with neurogenic claudication     CAD in native artery      Plan of Treatment:   Continue ASA 81 mg, plavix 75 mg, lipitor 20 mg daily. Continue amiodarone 200 mg TID as per CTS. Continue lasix 20 mg iv BID. Increase metoprolol to 25 mg BID, uptitrate dose as tolerated by HR and BP. Continue PT, OT, Incentive spirometry, cardiac rehab. Continue rest of post op management as per primary team.   Replace electrolytes to keep K>4 and Mg>2. We will continue to follow. Discussed with patient and nursing.        Jose Dang MD MD  Fellow, [facility]   Pager - [phone]

## 2022-12-01 NOTE — PROGRESS NOTES
[facility]  Daily Progress Note    Surgeon:  Dr. Kris Brownlee:  Ms. Mac Sommers is a 78y.o. year-old female status post CABG x3 (LIMA -LAD, SVG-Ramus Intermedius, SVG-OM1) on 11/28/22. Patient was seen and examined at bedside without any complaints. Vital Signs: BP (!) 113/97   Pulse (!) 107   Temp 98.6 °F (37 °C) (Oral)   Resp 20   Wt 266 lb 1.5 oz (120.7 kg)   SpO2 95%   BMI 53.74 kg/m²  O2 Flow Rate (L/min): 3 L/min   Admit Weight: Weight: 256 lb 2.8 oz (116.2 kg)   WEIGHTWeight: 266 lb 1.5 oz (120.7 kg)     I/O's:  I/O last 3 completed shifts: In: 910.7 [P.O.:710; I.V.:200.7]  Out: 1120 [Urine:1060; Chest Tube:60]    Data:    CBC:   Recent Labs     11/29/22  0403 11/30/22  0444 12/01/22  0554   WBC 23.0* 22.4* 14.2*   HGB 14.2 12.2 10.7*   HCT 45.5 41.8 35.9*   MCV 92.3 99.1 97.8    167 121*       BMP:   Recent Labs     11/30/22  0444 11/30/22  1525 12/01/22  0554    137 139   K 4.8 4.3 4.4    102 104   CO2 22 29 23   BUN 24* 28* 29*   CREATININE 1.08* 1.10* 0.71       PT/INR:   Recent Labs     11/28/22  1526 11/29/22  0403 11/30/22  0444   PROTIME 12.5* 12.0 11.7   INR 1.2 1.1 1.1       APTT:   Recent Labs     11/28/22  1526   APTT 23.6         Chest X-Ray: 12/1/22  FINDINGS:   Median sternotomy wires are noted. There are low lung volumes, accentuating   heart size and pulmonary vasculature. There is mild-to-moderate vascular   congestion, slightly increased when compared to 11/30/2022. No evidence of   pneumothorax. Small pleural effusions are suspected. Impression   Low lung volumes with mild-to-moderate vascular congestion and small pleural   effusions, slightly increased from previous radiograph.              Scheduled Meds:    busPIRone  10 mg Oral BID    citalopram  40 mg Oral Daily    furosemide  20 mg IntraVENous BID    metoprolol tartrate  12.5 mg Oral BID    sodium chloride flush  5-40 mL IntraVENous 2 times per day    sodium chloride flush  5-40 mL IntraVENous 2 times per day    aspirin  81 mg Oral Daily    clopidogrel  75 mg Oral Daily    amiodarone  200 mg Oral TID    mupirocin   Nasal BID    atorvastatin  20 mg Oral Nightly    pantoprazole  40 mg Oral Daily    pantoprazole (PROTONIX) 40 mg injection  40 mg IntraVENous Daily    ipratropium-albuterol  1 ampule Inhalation Q4H WA    insulin lispro  0-6 Units SubCUTAneous TID WC    insulin lispro  0-3 Units SubCUTAneous Nightly     Continuous Infusions:    sodium chloride      sodium chloride      norepinephrine Stopped (11/29/22 1734)    EPINEPHrine Stopped (11/28/22 1739)    insulin Stopped (11/29/22 1440)    dextrose         Physical Exam:    General: Obese, A&O x 3, NAD noted  Heart: Normal S1, S2, RRR, No murmurs noted   Sternum: Prevena in place   Lungs: Diminished BS bilaterally at the bases  Abdomen: Obese, soft, non tender, non distended, BS x 4   Extremities: Trace edema noted bilateral LE. EVH sites: CDI / SCD's in place bilateral LE       Assessment & Plan:    Ms. Ayleen Servin is a 78y.o. year-old female status post CABG x3 (LIMA -LAD, SVG-Ramus Intermedius, SVG-OM1) on 11/28/22 POD# 3. No issues or events noted with the patient overnight.    - Continue current care and meds  - Follow-up labs, CXR   - DVT prophylaxis with SCD's  - Cont Lasix 20 mg IV BID for aggressive diuresis.  Extra dose of Lasix 20 mg IV x 1 this morning   - GI Prophylaxis  - Cardiac diet  - OOB to chair - Ambulation with PT 3x daily  - Incentive Spirometry / Pulmonary hygiene  - Pain management  -  Milka Card will start pre-cert for [facility]  - Transfer to [facility] [address] today if CVICU bed is needed   - Further recommendations as per Dr. Kaylyn Packer     The above recommendations including medications and orders were discussed and agreed upon with Dr. Nia Ferrera MBA, PA-C

## 2022-12-01 NOTE — PROGRESS NOTES
Occupational Therapy  Facility/Department: [facility] CAR 2- STEPDOWN  Occupational Therapy Daily Treatment Note    Name: James Irizarry  : 1955  MRN: [id number]  Date of Service: 2022    Discharge Recommendations:  Patient would benefit from continued therapy after discharge    Patient Diagnosis(es): There were no encounter diagnoses. Past Medical History:  has a past medical history of Ambulates with cane, Anxiety, Borderline hypertension, CAD (coronary artery disease), Depression, GERD (gastroesophageal reflux disease), Heart attack ([facility] [address] Hypertension, Hypothyroidism, Neuropathy, Obesity, Osteoarthritis, Other cirrhosis of liver ([facility] [address] Sleep apnea, Type II or unspecified type diabetes mellitus without mention of complication, not stated as uncontrolled, Under care of service provider, Under care of service provider, and Vertebrogenic low back pain. Past Surgical History:  has a past surgical history that includes Hysterectomy; Colonoscopy; Upper gastrointestinal endoscopy; Dilation and curettage of uterus; hiatal hernia repair; Throat surgery; Appendectomy; Total knee arthroplasty (Right, 2015); Total knee arthroplasty (Left, 2015); Coronary angioplasty with stent (2020); Cardiac catheterization; Cardiac catheterization (2022); and Coronary artery bypass graft (N/A, 2022). Assessment   Performance deficits / Impairments: Decreased functional mobility ; Decreased endurance;Decreased ADL status; Decreased balance;Decreased high-level IADLs;Decreased safe awareness;Decreased cognition;Decreased strength;Decreased coordination;Decreased posture  Assessment: Pt making slow progress this session and required frequent rest breaks  Prognosis: Good  Activity Tolerance  Activity Tolerance: Patient Tolerated treatment well;Patient limited by fatigue;Patient limited by pain  Activity Tolerance Comments: frequent rest breaks required        Plan   Occupational Therapy Plan  Times Per Week: 4-6x (CABG)     Restrictions  Restrictions/Precautions  Restrictions/Precautions: General Precautions, Fall Risk, Cardiac  Required Braces or Orthoses?: Yes  Required Braces or Orthoses  Other: Heart Hugger Brace  Position Activity Restriction  Sternal Precautions: 5# Lifting Restrictions  Other position/activity restrictions: amb pt, up in chair, s/p cabgx3 11/28    Subjective   General  Patient assessed for rehabilitation services?: Yes  Response to previous treatment: Patient with no complaints from previous session  Family / Caregiver Present: No  Diagnosis: CABGx3 on 11/28, LVEF 50%  Subjective  General Comment  Comments: RN ok'd OT treatment this morning. Pt seated in recliner upon ORR arrival. Pt agreeable with session. . Pt reports 9/10 incisional pain and RN aware. Objective   Safety Devices  Type of Devices: Call light within reach;Gait belt;Nurse notified; Patient at risk for falls; Left in chair  Restraints  Restraints Initially in Place: No  Balance  Sitting:  (with and without support. Edge of chair 3-4 minutes, toilet ~5 minutes SBA for safety only)  Standing: Impaired (CGA with min verbal cues for posture and stood ~1-2 minutes each stand with 3 stands. Suppport on RW)  Transfer Training  Transfer Training: Yes  Overall Level of Assistance: Contact-guard assistance  Interventions: Tactile cues; Safety awareness training;Verbal cues  Sit to Stand: Contact-guard assistance  Stand to Sit: Contact-guard assistance  Toilet Transfer: Contact-guard assistance (MIN verbal cues for heart hugger use and hand placement)  Gait  Overall Level of Assistance: Contact-guard assistance (with chair follow for safety)  Interventions: Tactile cues; Verbal cues; Safety awareness training  Distance (ft):  (Room distances with RW)  Assistive Device: Walker, rolling        ADL  Feeding: Stand by assistance  Feeding Skilled Clinical Factors: improved metation today and imporved coordination  Grooming: Stand by assistance;Setup;Verbal cueing; Increased time to complete  Grooming Skilled Clinical Factors: wash face, brush hair, oral care  Toileting: Minimal assistance; Increased time to complete;Setup  Toileting Skilled Clinical Factors: hygiene following urination due to fatigue  Additional Comments: Pt required verbal cues for task. Pt unable to stand longer or safer for fabio hygiene. Pt completed groooming seated upright in recliner     Activity Tolerance  Activity Tolerance: Patient limited by fatigue;Patient limited by endurance;Treatment limited secondary to decreased cognition  Bed mobility  Supine to Sit: Unable to assess  Sit to Supine: Unable to assess  Scooting: Stand by assistance  Bed Mobility Comments: Pt began and concluded session seated in recliner. Pt able to scoot in and out of chair. Cognition  Overall Cognitive Status: Exceptions  Arousal/Alertness: Delayed responses to stimuli  Following Commands: Follows one step commands consistently; Follows multistep commands with repitition; Follows multistep commands with increased time  Attention Span: Attends with cues to redirect  Memory: Decreased recall of recent events;Decreased recall of precautions  Safety Judgement: Decreased awareness of need for assistance;Decreased awareness of need for safety  Problem Solving: Decreased awareness of errors;Assistance required to generate solutions;Assistance required to correct errors made;Assistance required to identify errors made  Insights: Decreased awareness of deficits  Initiation: Requires cues for all  Sequencing: Requires cues for all  Cognition Comment: Processing slowly improving. Pt answering and following direction with increased ease 50% of time. Orientation  Overall Orientation Status: Within Functional Limits  Orientation Level: Oriented X4      Education Given To: Patient  Education Provided: Role of Therapy; ADL Adaptive Strategies;Transfer Training;Equipment;Orientation  Education Provided Comments: body mechanics, safety sternal precautions with heart hugger use  Education Method: Verbal;Demonstration  Barriers to Learning: Cognition;Vision  Education Outcome: Continued education needed;Demonstrated understanding    AM-PAC Score  AM-PAC Inpatient Daily Activity Raw Score: 16 (12/01/22 1135)  AM-PAC Inpatient ADL T-Scale Score : 35.96 (12/01/22 1135)  ADL Inpatient CMS 0-100% Score: 53.32 (12/01/22 1135)  ADL Inpatient CMS G-Code Modifier : CK (12/01/22 1135)      Goals  Short Term Goals  Time Frame for Short Term Goals: Pt will by discharge  Short Term Goal 1: demo good safety awareness during func mob around room using LRD PRN and min A  Short Term Goal 2: demo ADL UB bathing/dressing activity at SBA and increased time  Short Term Goal 3: demo ADL LB bathing/dressing activity at [place] A, AE PRN, and increased time  Short Term Goal 4: identify/maintain all sternal precautions with 1 cue  Short Term Goal 5: demo activitty tolerance for 35 min+       Therapy Time   Individual Concurrent Group Co-treatment   Time In 0925         Time Out 1008         Minutes 43         Timed Code Treatment Minutes: 30 Minutes (overlap co-tx with PT for safety and goal progression)       DOMINGA Greenberg/ESTUARDO

## 2022-12-01 NOTE — CONSULTS
Consult for disposition recommendations. Noted patient already approved for [facility] and pre-CERT started. Please renotify if change in status or further evaluation needed.

## 2022-12-02 ENCOUNTER — APPOINTMENT (OUTPATIENT)
Dept: INTERVENTIONAL RADIOLOGY/VASCULAR | Age: 67
DRG: 236 | End: 2022-12-02
Attending: THORACIC SURGERY (CARDIOTHORACIC VASCULAR SURGERY)
Payer: MEDICARE

## 2022-12-02 ENCOUNTER — APPOINTMENT (OUTPATIENT)
Dept: GENERAL RADIOLOGY | Age: 67
DRG: 236 | End: 2022-12-02
Attending: THORACIC SURGERY (CARDIOTHORACIC VASCULAR SURGERY)
Payer: MEDICARE

## 2022-12-02 LAB
ANION GAP SERPL CALCULATED.3IONS-SCNC: 9 MMOL/L (ref 9–17)
BUN BLDV-MCNC: 28 MG/DL (ref 8–23)
CALCIUM IONIZED: 1.09 MMOL/L (ref 1.13–1.33)
CALCIUM SERPL-MCNC: 8.7 MG/DL (ref 8.6–10.4)
CHLORIDE BLD-SCNC: 102 MMOL/L (ref 98–107)
CO2: 28 MMOL/L (ref 20–31)
CREAT SERPL-MCNC: 0.7 MG/DL (ref 0.5–0.9)
GFR SERPL CREATININE-BSD FRML MDRD: >60 ML/MIN/1.73M2
GLUCOSE BLD-MCNC: 147 MG/DL (ref 65–105)
GLUCOSE BLD-MCNC: 173 MG/DL (ref 70–99)
GLUCOSE BLD-MCNC: 180 MG/DL (ref 65–105)
GLUCOSE BLD-MCNC: 203 MG/DL (ref 65–105)
GLUCOSE BLD-MCNC: 228 MG/DL (ref 65–105)
HCT VFR BLD CALC: 33.3 % (ref 36.3–47.1)
HEMOGLOBIN: 10.4 G/DL (ref 11.9–15.1)
MAGNESIUM: 2.3 MG/DL (ref 1.6–2.6)
MCH RBC QN AUTO: 29 PG (ref 25.2–33.5)
MCHC RBC AUTO-ENTMCNC: 31.2 G/DL (ref 28.4–34.8)
MCV RBC AUTO: 92.8 FL (ref 82.6–102.9)
NRBC AUTOMATED: 0 PER 100 WBC
PDW BLD-RTO: 14.3 % (ref 11.8–14.4)
PLATELET # BLD: 159 K/UL (ref 138–453)
PMV BLD AUTO: 10.2 FL (ref 8.1–13.5)
POTASSIUM SERPL-SCNC: 4.5 MMOL/L (ref 3.7–5.3)
RBC # BLD: 3.59 M/UL (ref 3.95–5.11)
REASON FOR REJECTION: NORMAL
SODIUM BLD-SCNC: 139 MMOL/L (ref 135–144)
WBC # BLD: 12.3 K/UL (ref 3.5–11.3)
ZZ NTE CLEAN UP: ORDERED TEST: NORMAL
ZZ NTE WITH NAME CLEAN UP: SPECIMEN SOURCE: NORMAL

## 2022-12-02 PROCEDURE — 2709999900 HC NON-CHARGEABLE SUPPLY

## 2022-12-02 PROCEDURE — 2140000001 HC CVICU INTERMEDIATE R&B

## 2022-12-02 PROCEDURE — 94761 N-INVAS EAR/PLS OXIMETRY MLT: CPT

## 2022-12-02 PROCEDURE — 97530 THERAPEUTIC ACTIVITIES: CPT

## 2022-12-02 PROCEDURE — 6370000000 HC RX 637 (ALT 250 FOR IP): Performed by: PHYSICIAN ASSISTANT

## 2022-12-02 PROCEDURE — 97535 SELF CARE MNGMENT TRAINING: CPT

## 2022-12-02 PROCEDURE — 83735 ASSAY OF MAGNESIUM: CPT

## 2022-12-02 PROCEDURE — 94640 AIRWAY INHALATION TREATMENT: CPT

## 2022-12-02 PROCEDURE — 71045 X-RAY EXAM CHEST 1 VIEW: CPT

## 2022-12-02 PROCEDURE — 76604 US EXAM CHEST: CPT

## 2022-12-02 PROCEDURE — 97110 THERAPEUTIC EXERCISES: CPT

## 2022-12-02 PROCEDURE — 82947 ASSAY GLUCOSE BLOOD QUANT: CPT

## 2022-12-02 PROCEDURE — 2580000003 HC RX 258: Performed by: ANESTHESIOLOGY

## 2022-12-02 PROCEDURE — 6360000002 HC RX W HCPCS

## 2022-12-02 PROCEDURE — 36415 COLL VENOUS BLD VENIPUNCTURE: CPT

## 2022-12-02 PROCEDURE — 85027 COMPLETE CBC AUTOMATED: CPT

## 2022-12-02 PROCEDURE — 82330 ASSAY OF CALCIUM: CPT

## 2022-12-02 PROCEDURE — 80048 BASIC METABOLIC PNL TOTAL CA: CPT

## 2022-12-02 PROCEDURE — 2700000000 HC OXYGEN THERAPY PER DAY

## 2022-12-02 PROCEDURE — 6360000002 HC RX W HCPCS: Performed by: PHYSICIAN ASSISTANT

## 2022-12-02 PROCEDURE — 2580000003 HC RX 258: Performed by: PHYSICIAN ASSISTANT

## 2022-12-02 RX ORDER — KETOROLAC TROMETHAMINE 15 MG/ML
30 INJECTION, SOLUTION INTRAMUSCULAR; INTRAVENOUS ONCE
Status: COMPLETED | OUTPATIENT
Start: 2022-12-02 | End: 2022-12-02

## 2022-12-02 RX ORDER — FUROSEMIDE 10 MG/ML
20 INJECTION INTRAMUSCULAR; INTRAVENOUS ONCE
Status: COMPLETED | OUTPATIENT
Start: 2022-12-02 | End: 2022-12-02

## 2022-12-02 RX ADMIN — IPRATROPIUM BROMIDE AND ALBUTEROL SULFATE 1 AMPULE: .5; 3 SOLUTION RESPIRATORY (INHALATION) at 15:34

## 2022-12-02 RX ADMIN — IPRATROPIUM BROMIDE AND ALBUTEROL SULFATE 1 AMPULE: .5; 3 SOLUTION RESPIRATORY (INHALATION) at 08:10

## 2022-12-02 RX ADMIN — TRAMADOL HYDROCHLORIDE 25 MG: 50 TABLET, COATED ORAL at 21:04

## 2022-12-02 RX ADMIN — AMIODARONE HYDROCHLORIDE 200 MG: 200 TABLET ORAL at 17:58

## 2022-12-02 RX ADMIN — INSULIN LISPRO 1 UNITS: 100 INJECTION, SOLUTION INTRAVENOUS; SUBCUTANEOUS at 08:39

## 2022-12-02 RX ADMIN — AMIODARONE HYDROCHLORIDE 200 MG: 200 TABLET ORAL at 08:29

## 2022-12-02 RX ADMIN — MUPIROCIN: 20 OINTMENT TOPICAL at 21:02

## 2022-12-02 RX ADMIN — SODIUM CHLORIDE, PRESERVATIVE FREE 10 ML: 5 INJECTION INTRAVENOUS at 08:33

## 2022-12-02 RX ADMIN — FUROSEMIDE 20 MG: 10 INJECTION, SOLUTION INTRAMUSCULAR; INTRAVENOUS at 14:20

## 2022-12-02 RX ADMIN — BUSPIRONE HYDROCHLORIDE 10 MG: 10 TABLET ORAL at 21:02

## 2022-12-02 RX ADMIN — ATORVASTATIN CALCIUM 20 MG: 20 TABLET, FILM COATED ORAL at 21:03

## 2022-12-02 RX ADMIN — INSULIN LISPRO 1 UNITS: 100 INJECTION, SOLUTION INTRAVENOUS; SUBCUTANEOUS at 21:01

## 2022-12-02 RX ADMIN — TRAMADOL HYDROCHLORIDE 25 MG: 50 TABLET, COATED ORAL at 04:21

## 2022-12-02 RX ADMIN — AMIODARONE HYDROCHLORIDE 200 MG: 200 TABLET ORAL at 21:02

## 2022-12-02 RX ADMIN — FUROSEMIDE 20 MG: 10 INJECTION, SOLUTION INTRAMUSCULAR; INTRAVENOUS at 08:29

## 2022-12-02 RX ADMIN — SODIUM CHLORIDE, PRESERVATIVE FREE 10 ML: 5 INJECTION INTRAVENOUS at 21:03

## 2022-12-02 RX ADMIN — KETOROLAC TROMETHAMINE 30 MG: 15 INJECTION, SOLUTION INTRAMUSCULAR; INTRAVENOUS at 14:19

## 2022-12-02 RX ADMIN — MUPIROCIN: 20 OINTMENT TOPICAL at 08:30

## 2022-12-02 RX ADMIN — METOPROLOL TARTRATE 12.5 MG: 25 TABLET ORAL at 21:02

## 2022-12-02 RX ADMIN — PANTOPRAZOLE SODIUM 40 MG: 40 TABLET, DELAYED RELEASE ORAL at 08:30

## 2022-12-02 RX ADMIN — IPRATROPIUM BROMIDE AND ALBUTEROL SULFATE 1 AMPULE: .5; 3 SOLUTION RESPIRATORY (INHALATION) at 00:24

## 2022-12-02 RX ADMIN — INSULIN LISPRO 1 UNITS: 100 INJECTION, SOLUTION INTRAVENOUS; SUBCUTANEOUS at 18:37

## 2022-12-02 RX ADMIN — TRAMADOL HYDROCHLORIDE 25 MG: 50 TABLET, COATED ORAL at 12:29

## 2022-12-02 RX ADMIN — CLOPIDOGREL 75 MG: 75 TABLET, FILM COATED ORAL at 08:30

## 2022-12-02 RX ADMIN — BUSPIRONE HYDROCHLORIDE 10 MG: 10 TABLET ORAL at 08:29

## 2022-12-02 RX ADMIN — CITALOPRAM 40 MG: 20 TABLET, FILM COATED ORAL at 08:29

## 2022-12-02 RX ADMIN — IPRATROPIUM BROMIDE AND ALBUTEROL SULFATE 1 AMPULE: .5; 3 SOLUTION RESPIRATORY (INHALATION) at 20:12

## 2022-12-02 RX ADMIN — INSULIN LISPRO 2 UNITS: 100 INJECTION, SOLUTION INTRAVENOUS; SUBCUTANEOUS at 14:21

## 2022-12-02 RX ADMIN — METOPROLOL TARTRATE 12.5 MG: 25 TABLET ORAL at 08:29

## 2022-12-02 RX ADMIN — FUROSEMIDE 20 MG: 10 INJECTION, SOLUTION INTRAMUSCULAR; INTRAVENOUS at 20:04

## 2022-12-02 RX ADMIN — Medication 81 MG: at 08:30

## 2022-12-02 RX ADMIN — SODIUM CHLORIDE, PRESERVATIVE FREE 10 ML: 5 INJECTION INTRAVENOUS at 08:31

## 2022-12-02 ASSESSMENT — PAIN SCALES - GENERAL
PAINLEVEL_OUTOF10: 10
PAINLEVEL_OUTOF10: 10
PAINLEVEL_OUTOF10: 7
PAINLEVEL_OUTOF10: 8
PAINLEVEL_OUTOF10: 7

## 2022-12-02 ASSESSMENT — PAIN DESCRIPTION - LOCATION: LOCATION: CHEST

## 2022-12-02 ASSESSMENT — PAIN DESCRIPTION - ORIENTATION: ORIENTATION: MID

## 2022-12-02 ASSESSMENT — PAIN DESCRIPTION - PAIN TYPE: TYPE: CHRONIC PAIN

## 2022-12-02 ASSESSMENT — PAIN DESCRIPTION - DESCRIPTORS: DESCRIPTORS: ACHING

## 2022-12-02 NOTE — PROGRESS NOTES
US of left chest did not show an appreciable amount of fluid suitable for thoracentesis. Procedure was not performed.

## 2022-12-02 NOTE — PLAN OF CARE
Problem: Discharge Planning  Goal: Discharge to home or other facility with appropriate resources  Outcome: Progressing     Problem: Skin/Tissue Integrity  Goal: Absence of new skin breakdown  Description: 1. Monitor for areas of redness and/or skin breakdown  2. Assess vascular access sites hourly  3. Every 4-6 hours minimum:  Change oxygen saturation probe site  4. Every 4-6 hours:  If on nasal continuous positive airway pressure, respiratory therapy assess nares and determine need for appliance change or resting period.   Outcome: Progressing     Problem: Chronic Conditions and Co-morbidities  Goal: Patient's chronic conditions and co-morbidity symptoms are monitored and maintained or improved  Outcome: Progressing     Problem: ABCDS Injury Assessment  Goal: Absence of physical injury  Outcome: Progressing     Problem: Pain  Goal: Verbalizes/displays adequate comfort level or baseline comfort level  Outcome: Progressing

## 2022-12-02 NOTE — PROGRESS NOTES
Occupational Therapy  Facility/Department: [facility] CAR 2- STEPDOWN  Occupational Therapy Daily Treatment Note    Name: Lynda Ruiz  : 1955  MRN: [id number]  Date of Service: 2022    Discharge Recommendations:  Patient would benefit from continued therapy after discharge     Patient Diagnosis(es): There were no encounter diagnoses. Past Medical History:  has a past medical history of Ambulates with cane, Anxiety, Borderline hypertension, CAD (coronary artery disease), Depression, GERD (gastroesophageal reflux disease), Heart attack ([facility] [address] Hypertension, Hypothyroidism, Neuropathy, Obesity, Osteoarthritis, Other cirrhosis of liver ([facility] [address] Sleep apnea, Type II or unspecified type diabetes mellitus without mention of complication, not stated as uncontrolled, Under care of service provider, Under care of service provider, and Vertebrogenic low back pain. Past Surgical History:  has a past surgical history that includes Hysterectomy; Colonoscopy; Upper gastrointestinal endoscopy; Dilation and curettage of uterus; hiatal hernia repair; Throat surgery; Appendectomy; Total knee arthroplasty (Right, 2015); Total knee arthroplasty (Left, 2015); Coronary angioplasty with stent (2020); Cardiac catheterization; Cardiac catheterization (2022); and Coronary artery bypass graft (N/A, 2022). Assessment   Performance deficits / Impairments: Decreased functional mobility ; Decreased endurance;Decreased ADL status; Decreased balance;Decreased high-level IADLs;Decreased safe awareness;Decreased cognition;Decreased strength;Decreased coordination;Decreased posture  Assessment: Pt making increased improvements this session with noted increased mentation and functional movement throughout  Prognosis: Good  Activity Tolerance  Activity Tolerance: Patient Tolerated treatment well;Patient limited by pain  Activity Tolerance Comments: Occasional rest breaks        Plan   Occupational Therapy Plan  Times Per Week: 4-6x (CABG)     Restrictions  Restrictions/Precautions  Restrictions/Precautions: General Precautions, Fall Risk, Cardiac  Required Braces or Orthoses?: Yes  Required Braces or Orthoses  Other: Heart Hugger Brace  Position Activity Restriction  Sternal Precautions: 5# Lifting Restrictions  Other position/activity restrictions: amb pt, up in chair, s/p cabgx3 11/28    Subjective   General  Patient assessed for rehabilitation services?: Yes  Response to previous treatment: Patient with no complaints from previous session  Family / Caregiver Present: Yes (spouse)  Diagnosis: CABGx3 on 11/28, LVEF 50%  Subjective  General Comment  Comments: RN ok'd OT treatment this afternoon. Pt seated in recliner upon ORR arrival. Pt agreeable with session. . Pt reports 9/10 neck pain and RN aware and present with medication       Objective   Safety Devices  Type of Devices: Call light within reach; Patient at risk for falls; Left in chair;Nurse notified  Restraints  Restraints Initially in Place: No  Bed Mobility Training  Bed Mobility Training: No (pt up in chair upon PT arrival and retired to chair at end of PT session)  Balance  Sitting: Without support (upright in recliner ~2-3 minutes x2 SBA)  Standing: With support (Stood CGA ~1-2 minutes)  Transfer Training  Transfer Training: Yes  Overall Level of Assistance: Stand-by assistance  Interventions: Verbal cues  Sit to Stand: Contact-guard assistance  Stand to Sit: Contact-guard assistance  Stand Pivot Transfers: Contact-guard assistance  Gait Training  Gait Training: Yes  Gait  Overall Level of Assistance: Contact-guard assistance  Interventions: Verbal cues  Base of Support: Widened  Speed/Kamini: Slow  Step Length: Left shortened;Right shortened  Distance (ft):  (room distance recliner to bathroom and back)  Assistive Device: Walker, rolling        ADL  Feeding: Modified independent   Feeding Skilled Clinical Factors: Patient completing beverage from table top and able to brink to mouth and simulated self feeding without difficlty  Grooming: Supervision;Modified independent   Grooming Skilled Clinical Factors: wash face, brush hair, oral care  UE Dressing: Minimal assistance  UE Dressing Skilled Clinical Factors: Heart hugger don/doff around back following education and demonstration via ORR  LE Dressing: Setup;Verbal cueing; Increased time to complete;Maximum assistance  LE Dressing Skilled Clinical Factors: slipper socks  Toileting: Contact guard assistance  Additional Comments: Pt completed education and heart hugger task seated, grooming and dressing tanding and 75% seated     Activity Tolerance  Activity Tolerance: Patient limited by pain; Patient limited by endurance  Bed mobility  Supine to Sit: Unable to assess  Sit to Supine: Unable to assess  Scooting: Stand by assistance  Bed Mobility Comments: Pt began and concluded session seated in recliner. Pt able to scoot in and out of chair. Cognition  Overall Cognitive Status: Exceptions  Arousal/Alertness: Appropriate responses to stimuli  Following Commands: Follows multistep commands with increased time; Follows multistep commands with repitition  Attention Span: Appears intact  Safety Judgement: Decreased awareness of need for safety  Problem Solving: Decreased awareness of errors;Assistance required to generate solutions;Assistance required to correct errors made;Assistance required to identify errors made  Initiation: Requires cues for some  Sequencing: Requires cues for some  Cognition Comment: Noted improvement in mentation this session  Orientation  Overall Orientation Status: Within Functional Limits  Orientation Level: Oriented X4      Education Given To: Patient  Education Provided: Role of Therapy; ADL Adaptive Strategies;Transfer Training;Equipment;Orientation  Education Provided Comments: body mechanics, safety sternal precautions with heart hugger use  Education Method: Verbal;Demonstration  Barriers to Learning: Cognition;Vision  Education Outcome: Continued education needed;Demonstrated understanding;Verbalized understanding (Pt now able to state sternal precations with 1 verbal cue)     AM-PAC Score  AM-PAC Inpatient Daily Activity Raw Score: 18 (12/02/22 1448)  AM-PAC Inpatient ADL T-Scale Score : 38.66 (12/02/22 1448)  ADL Inpatient CMS 0-100% Score: 46.65 (12/02/22 1448)  ADL Inpatient CMS G-Code Modifier : CK (12/02/22 1448)      Goals  Short Term Goals  Time Frame for Short Term Goals: Pt will by discharge  Short Term Goal 1: demo good safety awareness during func mob around room using LRD PRN and min A  Short Term Goal 2: demo ADL UB bathing/dressing activity at SBA and increased time  Short Term Goal 3: demo ADL LB bathing/dressing activity at [place] A, AE PRN, and increased time  Short Term Goal 4: identify/maintain all sternal precautions with 1 cue  Short Term Goal 5: demo activitty tolerance for 35 min+       Therapy Time   Individual Concurrent Group Co-treatment   Time In 1400         Time Out 1430         Minutes 30         Timed Code Treatment Minutes: DOMINGA Dang/ESTUARDO

## 2022-12-02 NOTE — PROGRESS NOTES
[facility]   Progress Note                   Date:   12/2/2022  Patient name: Angeline Lagos  Date of admission:  11/28/2022  5:56 AM  MRN:   [id number]  YOB: 1955  PCP: Rambo Mead MD    Reason for Admission:      Subjective:       Patient was seen and evaluated at bedside,  Pt states that she is feeling worse today. She is very sob today and is having midsternal pain. She states that she feels like she can't take a deep breath    Medications:   Scheduled Meds:   busPIRone  10 mg Oral BID    citalopram  40 mg Oral Daily    furosemide  20 mg IntraVENous BID    metoprolol tartrate  12.5 mg Oral BID    sodium chloride flush  5-40 mL IntraVENous 2 times per day    sodium chloride flush  5-40 mL IntraVENous 2 times per day    aspirin  81 mg Oral Daily    clopidogrel  75 mg Oral Daily    amiodarone  200 mg Oral TID    mupirocin   Nasal BID    atorvastatin  20 mg Oral Nightly    pantoprazole  40 mg Oral Daily    pantoprazole (PROTONIX) 40 mg injection  40 mg IntraVENous Daily    ipratropium-albuterol  1 ampule Inhalation Q4H WA    insulin lispro  0-6 Units SubCUTAneous TID WC    insulin lispro  0-3 Units SubCUTAneous Nightly       Continuous Infusions:   sodium chloride      sodium chloride      norepinephrine Stopped (11/29/22 1734)    EPINEPHrine Stopped (11/28/22 1739)    insulin Stopped (11/29/22 1440)    dextrose         CBC:   Recent Labs     11/30/22  0444 12/01/22  0554 12/02/22  0618   WBC 22.4* 14.2* 12.3*   HGB 12.2 10.7* 10.4*    121* 159       BMP:    Recent Labs     11/30/22  1525 12/01/22  0554 12/02/22  0618    139 139   K 4.3 4.4 4.5    104 102   CO2 29 23 28   BUN 28* 29* 28*   CREATININE 1.10* 0.71 0.70   GLUCOSE 200* 216* 173*       Hepatic: No results for input(s): AST, ALT, ALB, BILITOT, ALKPHOS in the last 72 hours. Troponin: No results for input(s): TROPONINI in the last 72 hours.   BNP: No results for input(s): BNP in the last 72 hours. Lipids: No results for input(s): CHOL, HDL in the last 72 hours. Invalid input(s): LDLCALCU  INR:   Recent Labs     11/30/22  0444   INR 1.1         Objective:   Vitals: BP (!) 120/54   Pulse 90   Temp 97.5 °F (36.4 °C) (Oral)   Resp 20   Wt 266 lb 1.5 oz (120.7 kg)   SpO2 93%   BMI 53.74 kg/m²     General appearance: awake, alert, diaphoretic   HEENT: Head: Normocephalic, no lesions, without obvious abnormality  Neck: no JVD  Lungs: diminished to auscultation bilaterally, no basilar rales, no wheezing   Heart: regular rate and rhythm, S1, S2 normal, no murmur, click, rub or gallop  Abdomen: soft, non-tender; bowel sounds normal  Extremities: No LE edema  Neurologic: Mental status: Alert, oriented. Motor and sensory not done. Cardiac Cath 11/2022: LMCA: has distal 20% stenosis. LAD: has mid 85% stenosis. The D1 has 30% stenosis. LCx: has ostial eccentric 80% stenosis. The OM1 has proximal patent stent. RCA: has patent mid stent. There is distal 70% stenosis. Ramus: has proximal 80% stenosis. Coronary Tree      Dominance: Right     LV Analysis  LV function assessed as:Normal.  Ejection Fraction  +----------------------------------------------------------------------+---+  ! Method                                                                ! EF%! +----------------------------------------------------------------------+---+  ! LV gram                                                               !55 !  +----------------------------------------------------------------------+---+        Echo 11/2022  Normal LV size, wall thickness and wall motions  EF > 55%  Normal RV size and function  Normal size LA and RA  AV is sclerotic opens well, mean gradient 7 mmHg, no AR  MAC noted, no MS no MR  Normal aortic root dimensions  No significant pericardial effusion seen  Normal IC diameter normal respiratory variations suggestive of normal RA  filling pressure        Assessment / Acute Cardiac Problems:   Multivessel CAD status post CABG X3 with LIMA to LAD, SVG to ramus intermedius, SVG to OM1 11/28/2022. Preserved LVEF on echocardiogram.  H/o CAD s/p PCI to RCA and LCx (CATH 7/20/2020 LM 0%, LAD 30% mid, D1 25%, LCx ostial 40%, Mid 90% SHAKIR, OM 1 minimal, RCA 80% SHAKIR, LVEF 55%). HTN   DM   SHERITA. Morbid obesity. Peripheral vascular disease. Elevated white count. Patient Active Problem List:     Anxiety     GERD (gastroesophageal reflux disease)     OA (osteoarthritis)     Neuropathy     Right knee DJD     Hypothyroidism     S/P left total knee arthroplasty     Ischemic heart disease     Essential hypertension     NSTEMI (non-ST elevated myocardial infarction) (HCC)     Other cirrhosis of liver (HCC)     Elevated lipase     Depression     Type 2 diabetes mellitus, with long-term current use of insulin (HCC)     SHERITA (obstructive sleep apnea)     Morbid obesity with BMI of 45.0-49.9, adult (HCC)     PVD (peripheral vascular disease) ([facility])     Numbness and tingling of both feet     Long term (current) use of antithrombotics/antiplatelets     Painful diabetic neuropathy ([facility] [address]     Vertebrogenic low back pain     Spinal stenosis of lumbar region with neurogenic claudication     CAD in native artery      Plan of Treatment:   Continue ASA 81 mg, plavix 75 mg, lipitor 20 mg daily. Continue amiodarone 200 mg TID as per CTS. Continue lasix 20 mg iv BID. Plan for IR consult for tap of L pleural effusion today. Increase metoprolol to 25 mg BID, uptitrate dose as tolerated by HR and BP. Continue PT, OT, Incentive spirometry, cardiac rehab. Continue rest of post op management as per primary team.   Replace electrolytes to keep K>4 and Mg>2. We will continue to follow. Discussed with patient and nursing.        Alinda Mcburney, APRN - [id number] [facility]  [phone]

## 2022-12-02 NOTE — CARE COORDINATION
Notified by Lakia Pineda from [facility] that precert has been received. Notified Pineda Memos PA    1200 notified Lakia Pineda that patient had thoracentesis today and may not be ready for discharge. Her [facility]ke[facility]iate is valid until 12/4. They are able to accept over the weekend if she is ready. Contact is Patricia Argueta [phone].  Patient and spouse updated

## 2022-12-02 NOTE — PROGRESS NOTES
Physical Therapy Cancel Note      DATE: 2022    NAME: Keith Douglas  MRN: [id number]   : 1955      Patient not seen this date for Physical Therapy due to:    Surgery/Procedure: per RN, pt at IR for thoracentesis; check back pm if time, otherwise check 12/3      Electronically signed by Sabiha Griffin PT on 2022 at 11:21 AM

## 2022-12-02 NOTE — PROGRESS NOTES
[address] [facility]  Daily Progress Note    Surgeon:  Dr. Elo Smith:  Ms. Markus Castaneda is a 78y.o. year-old female status post CABG x3 (LIMA -LAD, SVG-Ramus Intermedius, SVG-OM1) on 11/28/22. Patient was seen and examined at bedside, patient notes increased shortness of breath     Vital Signs: BP (!) 120/54   Pulse 90   Temp 97.5 °F (36.4 °C) (Oral)   Resp 20   Wt 266 lb 1.5 oz (120.7 kg)   SpO2 93%   BMI 53.74 kg/m²  O2 Flow Rate (L/min): (S) 4 L/min   Admit Weight: Weight: 256 lb 2.8 oz (116.2 kg)   WEIGHTWeight: 266 lb 1.5 oz (120.7 kg)     I/O's:  I/O last 3 completed shifts: In: 250 [P.O.:250]  Out: 700 [Urine:700]    Data:    CBC:   Recent Labs     11/30/22  0444 12/01/22  0554 12/02/22  0618   WBC 22.4* 14.2* 12.3*   HGB 12.2 10.7* 10.4*   HCT 41.8 35.9* 33.3*   MCV 99.1 97.8 92.8    121* 159       BMP:   Recent Labs     11/30/22  1525 12/01/22  0554 12/02/22  0618    139 139   K 4.3 4.4 4.5    104 102   CO2 29 23 28   BUN 28* 29* 28*   CREATININE 1.10* 0.71 0.70       PT/INR:   Recent Labs     11/30/22  0444   PROTIME 11.7   INR 1.1       APTT:   No results for input(s): APTT in the last 72 hours. Chest X-Ray: 12/2/2022  FINDINGS:   Median sternotomy wires are noted. Heart size is unchanged. There is   improved inspiratory effort on today's radiograph. Right lung appears to be   clear. There is mild increased hazy opacity in the left perihilar lung and   left base. No evidence of pneumothorax. Impression   Improved inspiratory effort. Right lung is clear. Increased hazy opacity in   the perihilar and basilar left lung, possibly related to edema, atelectasis,   or pneumonia.              Scheduled Meds:    busPIRone  10 mg Oral BID    citalopram  40 mg Oral Daily    furosemide  20 mg IntraVENous BID    metoprolol tartrate  12.5 mg Oral BID    sodium chloride flush  5-40 mL IntraVENous 2 times per day    sodium chloride flush  5-40 mL IntraVENous 2 times per day    aspirin  81 mg Oral Daily    clopidogrel  75 mg Oral Daily    amiodarone  200 mg Oral TID    mupirocin   Nasal BID    atorvastatin  20 mg Oral Nightly    pantoprazole  40 mg Oral Daily    pantoprazole (PROTONIX) 40 mg injection  40 mg IntraVENous Daily    ipratropium-albuterol  1 ampule Inhalation Q4H WA    insulin lispro  0-6 Units SubCUTAneous TID WC    insulin lispro  0-3 Units SubCUTAneous Nightly     Continuous Infusions:    sodium chloride      sodium chloride      norepinephrine Stopped (11/29/22 1734)    EPINEPHrine Stopped (11/28/22 1739)    insulin Stopped (11/29/22 1440)    dextrose         Physical Exam:    General: Obese, A&O x 3, NAD noted  Heart: Normal S1, S2, RRR, No murmurs noted   Sternum: Prevena in place   Lungs: Diminished BS bilaterally at the bases L>R  Abdomen: Obese, soft, non tender, non distended, BS x 4   Extremities: Trace edema noted bilateral LE. EVH sites: CDI / SCD's in place bilateral LE       Assessment & Plan:    Ms. Nichelle Zuñiga is a 78y.o. year-old female status post CABG x3 (LIMA -LAD, SVG-Ramus Intermedius, SVG-OM1) on 11/28/22 POD# 3. O2 requirements increased overnight to 5L NC, now currently at 4L resting in bed     Neuro   - Pain management - continue tramadol PRN   CV  - AF- now SR - continue PO amio/metoprolol   -Continue lipitor   RESP   -  CXR - today with worsening left sided opacification   WBC continues to down trend, on 4L NC.   - Consult to IR for tap of L pleural effusion - IR does not feel there is anything to drain   - Aggressive Incentive Spirometry / Pulmonary hygiene  - additional dose of lasix 20mg once    GI  - NO BM since surgery - milk of mag and senokot to be given today, may consider bisacodyl if no BM by end of day  - strict glycemic control   - GI PU Prophylaxis  - Cardiac diet    - voids via purewick   - Cont Lasix 20 mg IV BID for aggressive diuresis.   - Replace electrolytes to keep K>4 and Mg>2  Misc  - DVT prophylaxis with SCD's  - OOB to chair - Ambulation with PT 3x daily  - Continue PT, OT  -  pre-cert started for [facility] [place]  - Further recommendations as per Dr. Eder Roach     The above recommendations including medications and orders were discussed and agreed upon with Dr. Nikhil Luis, CNP

## 2022-12-02 NOTE — DISCHARGE INSTR - COC
Continuity of Care Form    Patient Name: Shanell Cross   :  [birth year]  MRN:  [id number]    Admit date:  2022  Discharge date:  22    Code Status Order: Full Code   Advance Directives:   [facility] Documentation       Date/Time Healthcare Directive Type of Healthcare Directive Copy in [facility] [address] Agent's Name Healthcare Agent's Phone Number    22 0545 No, patient does not have an advance directive for healthcare treatment -- -- -- -- --            Admitting Physician:  Darin Valderrama MD  PCP: Sushil Hayward MD    Discharging Nurse: Kim Man Unit/Room#:   Discharging Unit Phone Number: [phone]    Emergency Contact:   Extended Emergency Contact Information  Primary Emergency Contact: Bertin Ptaricia  Address: [address] [place] [address], [address] [place] [address],[address] Phone: [phone]  Mobile Phone: [phone]  Relation: Spouse  Secondary Emergency Contact: [address], [address] Phone: [phone]  Relation: Brother/Sister    Past Surgical History:  Past Surgical History:   Procedure Laterality Date    APPENDECTOMY      CARDIAC CATHETERIZATION      2 stents    CARDIAC CATHETERIZATION  2022    COLONOSCOPY      CORONARY ANGIOPLASTY WITH STENT PLACEMENT  2020    CORONARY ARTERY BYPASS GRAFT N/A 2022    CABG CORONARY ARTERY BYPASS X3 ON PUMP , ATA, ENDO VEIN HARVEST performed by Darin Valderrama MD at [address] (CERVIX STATUS UNKNOWN)      [address] Right 2015    TOTAL KNEE ARTHROPLASTY Left 2015    UPPER GASTROINTESTINAL ENDOSCOPY         Immunization History:   Immunization History   Administered Date(s) Administered    COVID-19, [facility] border, Primary or Immunocompromised, (age 12y+), IM, 100 mcg/0.5mL 04/15/2021    DTaP 07/07/2011, 09/01/2011, 12/15/2011, 05/29/2012    Hepatitis A Adult (Havrix, Vaqta) 08/17/2020    Influenza Vaccine, unspecified formulation 09/27/2012    Influenza Virus Vaccine 09/27/2012, 09/30/2014    Influenza Whole 11/15/2005    Influenza, FLUARIX, FLULAVAL, FLUZONE (age 10 mo+) AND AFLURIA, (age 1 y+), PF, 0.5mL 09/14/2020    Pneumococcal Polysaccharide (Twnuawqou22) 09/27/2012, 01/07/2015    Tdap (Boostrix, Adacel) 06/27/2014       Active Problems:  Patient Active Problem List   Diagnosis Code    Anxiety F41.9    GERD (gastroesophageal reflux disease) K21.9    OA (osteoarthritis) M19.90    Neuropathy G62.9    Right knee DJD M17.11    Hypothyroidism E03.9    S/P left total knee arthroplasty Z96.659    Ischemic heart disease I25.9    Essential hypertension I10    NSTEMI (non-ST elevated myocardial infarction) ([facility] [address] I21.4    Other cirrhosis of liver ([facility]) K74.69    Elevated lipase R74.8    Depression F32. A    Type 2 diabetes mellitus, with long-term current use of insulin (HCC) E11.9, Z79.4    SHERITA (obstructive sleep apnea) G47.33    Morbid obesity with BMI of 45.0-49.9, adult (HCC) E66.01, Z68.42    PVD (peripheral vascular disease) ([facility]) I73.9    Numbness and tingling of both feet R20.0, R20.2    Long term (current) use of antithrombotics/antiplatelets F46.11    Painful diabetic neuropathy ([facility]) E11.40    Vertebrogenic low back pain M54.51    Spinal stenosis of lumbar region with neurogenic claudication M48.062    CAD in native artery I25.10       Isolation/Infection:   Isolation            No Isolation          Patient Infection Status       Infection Onset Added Last Indicated Last Indicated By Review Planned Expiration Resolved Resolved By    None active    Resolved    COVID-19 (Rule Out) 10/13/20 10/13/20 10/14/20 COVID-19 (Ordered)   10/14/20 Rule-Out Test Resulted    COVID-19 (Rule Out) 10/02/20 10/02/20 10/03/20 Covid-19 Ambulatory (Ordered)   10/05/20 Rule-Out Test Resulted    COVID-19 (Rule Out) 06/04/20 06/04/20 06/04/20 COVID-19 Ambulatory (Ordered)   06/08/20 Rule-Out Test Resulted            Nurse Assessment:  Last Vital Signs: BP (!) 120/54   Pulse 90   Temp 97.5 °F (36.4 °C) (Oral)   Resp 20   Wt 266 lb 1.5 oz (120.7 kg)   SpO2 93%   BMI 53.74 kg/m²     Last documented pain score (0-10 scale): Pain Level: 7  Last Weight:   Wt Readings from Last 1 Encounters:   12/01/22 266 lb 1.5 oz (120.7 kg)     Mental Status:  oriented    IV Access:  - None    Nursing Mobility/ADLs:  Walking   Assisted  Transfer  Assisted  Bathing  Assisted  Dressing  Assisted  Toileting  Assisted  Feeding  Independent  Med Admin  Independent  Med Delivery   whole    Wound Care Documentation and Therapy:  Negative Pressure Wound Therapy Sternum (Active)   Wound Type Surgical 12/01/22 2000   Unit Type Prevena 12/01/22 2000   Dressing Type Other (Comment) 12/01/22 2000   Number of pieces used 1 12/01/22 2000   Cycle Continuous; On 12/01/22 2000   Target Pressure (mmHg) 125 12/01/22 2000   Dressing Status Clean, dry & intact 12/01/22 2000   Dressing Changed Changed/New 11/28/22 1600   Drainage Amount None 12/01/22 0400   Dressing Change Due 12/01/22 12/01/22 2000   Wound Assessment Other (Comment) 12/01/22 2000   Number of days: 3       Incision 11/28/22 Pretibial Left;Proximal (Active)   Dressing Status Clean;Dry; Intact 12/01/22 2000   Dressing Change Due 11/29/22 11/29/22 1200   Incision Cleansed Not Cleansed 12/01/22 2000   Dressing/Treatment Open to air 12/01/22 2000   Closure Surgical glue 12/01/22 2000   Margins Approximated 12/01/22 2000   Incision Assessment Dry 12/01/22 2000   Jeannie-incision Assessment Intact 11/28/22 1600   Number of days: 4       Incision 11/28/22 Sternum (Active)   Dressing Status Clean;Dry; Intact 12/01/22 2000   Dressing Change Due 12/01/22 12/01/22 1130   Incision Cleansed Not Cleansed 12/01/22 2000   Dressing/Treatment Foam;Negative pressure wound therapy 12/01/22 2000   Closure Other (Comment) 12/01/22 2000   Margins Other (Comment) 12/01/22 2000   Incision Assessment Other (Comment) 12/01/22 2000   Jeannie-incision Assessment Intact 11/29/22 1200   Number of days: 4        Elimination:  Continence: Bowel: Yes  Bladder: Yes  Urinary Catheter: None   Colostomy/Ileostomy/Ileal Conduit: No       Date of Last BM: 12/4/22  No intake or output data in the 24 hours ending 12/02/22 1105  I/O last 3 completed shifts: In: 250 [P.O.:250]  Out: 700 [Urine:700]    Safety Concerns: At Risk for Falls    Impairments/Disabilities:      None    Nutrition Therapy:  Current Nutrition Therapy:   - Oral Diet:  Carb Control 4 carbs/meal (1800kcals/day)    Routes of Feeding: Oral  Liquids: No Restrictions  Daily Fluid Restriction: no  Last Modified Barium Swallow with Video (Video Swallowing Test): not done    Treatments at the Time of Hospital Discharge:   Respiratory Treatments: None  Oxygen Therapy:  is on oxygen at 2 L/min per nasal cannula.   Ventilator:    - No ventilator support    Rehab Therapies: Physical Therapy and Occupational Therapy  Weight Bearing Status/Restrictions: No weight bearing restrictions  Other Medical Equipment (for information only, NOT a DME order):  walker  Other Treatments: None    Patient's personal belongings (please select all that are sent with patient):  None    RN SIGNATURE:  Electronically signed by Santiago Hernandez on 12/5/22 at 1:05 PM EST    CASE MANAGEMENT/SOCIAL WORK SECTION    Inpatient Status Date: 11/28/22    Readmission Risk Assessment Score:  Readmission Risk              Risk of Unplanned Readmission:  18           Discharging to Facility/ Agency   Name: [facility]  Address: [address] [place] [address]  Phone: [phone]    / signature: Electronically signed by Sam Sloan RN on 12/5/22 at 11:02 AM EST    PHYSICIAN SECTION    Prognosis: Good    Condition at Discharge: Stable    Rehab Potential (if transferring to Rehab): Good    Recommended Labs or Other Treatments After Discharge:       Physician Certification: I certify the above information and transfer of Suzanne Mckeon  is necessary for the continuing treatment of the diagnosis listed and that she requires Acute Rehab for less 30 days.      Update Admission H&P: No change in H&P    PHYSICIAN SIGNATURE:  Electronically signed by Maggie Johnston MD on 12/5/22 at 10:50 AM EST

## 2022-12-03 ENCOUNTER — APPOINTMENT (OUTPATIENT)
Dept: ULTRASOUND IMAGING | Age: 67
DRG: 236 | End: 2022-12-03
Attending: THORACIC SURGERY (CARDIOTHORACIC VASCULAR SURGERY)
Payer: MEDICARE

## 2022-12-03 ENCOUNTER — APPOINTMENT (OUTPATIENT)
Dept: GENERAL RADIOLOGY | Age: 67
DRG: 236 | End: 2022-12-03
Attending: THORACIC SURGERY (CARDIOTHORACIC VASCULAR SURGERY)
Payer: MEDICARE

## 2022-12-03 LAB
ANION GAP SERPL CALCULATED.3IONS-SCNC: 9 MMOL/L (ref 9–17)
BUN BLDV-MCNC: 22 MG/DL (ref 8–23)
CALCIUM SERPL-MCNC: 8.4 MG/DL (ref 8.6–10.4)
CHLORIDE BLD-SCNC: 101 MMOL/L (ref 98–107)
CO2: 27 MMOL/L (ref 20–31)
CREAT SERPL-MCNC: 0.59 MG/DL (ref 0.5–0.9)
GFR SERPL CREATININE-BSD FRML MDRD: >60 ML/MIN/1.73M2
GLUCOSE BLD-MCNC: 169 MG/DL (ref 65–105)
GLUCOSE BLD-MCNC: 177 MG/DL (ref 65–105)
GLUCOSE BLD-MCNC: 181 MG/DL (ref 65–105)
GLUCOSE BLD-MCNC: 187 MG/DL (ref 65–105)
GLUCOSE BLD-MCNC: 189 MG/DL (ref 70–99)
HCT VFR BLD CALC: 34.8 % (ref 36.3–47.1)
HEMOGLOBIN: 10.4 G/DL (ref 11.9–15.1)
MAGNESIUM: 2.1 MG/DL (ref 1.6–2.6)
MCH RBC QN AUTO: 28.9 PG (ref 25.2–33.5)
MCHC RBC AUTO-ENTMCNC: 29.9 G/DL (ref 28.4–34.8)
MCV RBC AUTO: 96.7 FL (ref 82.6–102.9)
NRBC AUTOMATED: 0 PER 100 WBC
PDW BLD-RTO: 13.9 % (ref 11.8–14.4)
PLATELET # BLD: 165 K/UL (ref 138–453)
PMV BLD AUTO: 10.4 FL (ref 8.1–13.5)
POTASSIUM SERPL-SCNC: 4.2 MMOL/L (ref 3.7–5.3)
RBC # BLD: 3.6 M/UL (ref 3.95–5.11)
SODIUM BLD-SCNC: 137 MMOL/L (ref 135–144)
WBC # BLD: 10.3 K/UL (ref 3.5–11.3)

## 2022-12-03 PROCEDURE — 6360000002 HC RX W HCPCS: Performed by: PHYSICIAN ASSISTANT

## 2022-12-03 PROCEDURE — 2580000003 HC RX 258: Performed by: ANESTHESIOLOGY

## 2022-12-03 PROCEDURE — 76604 US EXAM CHEST: CPT

## 2022-12-03 PROCEDURE — 82947 ASSAY GLUCOSE BLOOD QUANT: CPT

## 2022-12-03 PROCEDURE — 97110 THERAPEUTIC EXERCISES: CPT

## 2022-12-03 PROCEDURE — 36415 COLL VENOUS BLD VENIPUNCTURE: CPT

## 2022-12-03 PROCEDURE — 94761 N-INVAS EAR/PLS OXIMETRY MLT: CPT

## 2022-12-03 PROCEDURE — 2580000003 HC RX 258: Performed by: PHYSICIAN ASSISTANT

## 2022-12-03 PROCEDURE — C9113 INJ PANTOPRAZOLE SODIUM, VIA: HCPCS | Performed by: PHYSICIAN ASSISTANT

## 2022-12-03 PROCEDURE — 6370000000 HC RX 637 (ALT 250 FOR IP): Performed by: PHYSICIAN ASSISTANT

## 2022-12-03 PROCEDURE — 83735 ASSAY OF MAGNESIUM: CPT

## 2022-12-03 PROCEDURE — 94640 AIRWAY INHALATION TREATMENT: CPT

## 2022-12-03 PROCEDURE — 97530 THERAPEUTIC ACTIVITIES: CPT

## 2022-12-03 PROCEDURE — 80048 BASIC METABOLIC PNL TOTAL CA: CPT

## 2022-12-03 PROCEDURE — 71045 X-RAY EXAM CHEST 1 VIEW: CPT

## 2022-12-03 PROCEDURE — 2140000001 HC CVICU INTERMEDIATE R&B

## 2022-12-03 PROCEDURE — 85027 COMPLETE CBC AUTOMATED: CPT

## 2022-12-03 PROCEDURE — 2700000000 HC OXYGEN THERAPY PER DAY

## 2022-12-03 RX ADMIN — INSULIN LISPRO 1 UNITS: 100 INJECTION, SOLUTION INTRAVENOUS; SUBCUTANEOUS at 09:17

## 2022-12-03 RX ADMIN — ATORVASTATIN CALCIUM 20 MG: 20 TABLET, FILM COATED ORAL at 20:46

## 2022-12-03 RX ADMIN — CITALOPRAM 40 MG: 20 TABLET, FILM COATED ORAL at 09:02

## 2022-12-03 RX ADMIN — TRAMADOL HYDROCHLORIDE 25 MG: 50 TABLET, COATED ORAL at 16:15

## 2022-12-03 RX ADMIN — FUROSEMIDE 20 MG: 10 INJECTION, SOLUTION INTRAMUSCULAR; INTRAVENOUS at 09:07

## 2022-12-03 RX ADMIN — TRAMADOL HYDROCHLORIDE 25 MG: 50 TABLET, COATED ORAL at 09:02

## 2022-12-03 RX ADMIN — MAGNESIUM HYDROXIDE 30 ML: 400 SUSPENSION ORAL at 12:32

## 2022-12-03 RX ADMIN — CLOPIDOGREL 75 MG: 75 TABLET, FILM COATED ORAL at 09:02

## 2022-12-03 RX ADMIN — IPRATROPIUM BROMIDE AND ALBUTEROL SULFATE 1 AMPULE: .5; 3 SOLUTION RESPIRATORY (INHALATION) at 08:29

## 2022-12-03 RX ADMIN — AMIODARONE HYDROCHLORIDE 200 MG: 200 TABLET ORAL at 16:15

## 2022-12-03 RX ADMIN — METOPROLOL TARTRATE 12.5 MG: 25 TABLET ORAL at 09:03

## 2022-12-03 RX ADMIN — INSULIN LISPRO 1 UNITS: 100 INJECTION, SOLUTION INTRAVENOUS; SUBCUTANEOUS at 17:50

## 2022-12-03 RX ADMIN — AMIODARONE HYDROCHLORIDE 200 MG: 200 TABLET ORAL at 09:02

## 2022-12-03 RX ADMIN — SODIUM CHLORIDE, PRESERVATIVE FREE 10 ML: 5 INJECTION INTRAVENOUS at 20:47

## 2022-12-03 RX ADMIN — SODIUM CHLORIDE, PRESERVATIVE FREE 10 ML: 5 INJECTION INTRAVENOUS at 09:11

## 2022-12-03 RX ADMIN — BUSPIRONE HYDROCHLORIDE 10 MG: 10 TABLET ORAL at 09:02

## 2022-12-03 RX ADMIN — BUSPIRONE HYDROCHLORIDE 10 MG: 10 TABLET ORAL at 20:46

## 2022-12-03 RX ADMIN — METOPROLOL TARTRATE 12.5 MG: 25 TABLET ORAL at 20:46

## 2022-12-03 RX ADMIN — FUROSEMIDE 20 MG: 10 INJECTION, SOLUTION INTRAMUSCULAR; INTRAVENOUS at 17:51

## 2022-12-03 RX ADMIN — PANTOPRAZOLE SODIUM 40 MG: 40 TABLET, DELAYED RELEASE ORAL at 09:12

## 2022-12-03 RX ADMIN — IPRATROPIUM BROMIDE AND ALBUTEROL SULFATE 1 AMPULE: .5; 3 SOLUTION RESPIRATORY (INHALATION) at 20:08

## 2022-12-03 RX ADMIN — ONDANSETRON 4 MG: 4 TABLET, ORALLY DISINTEGRATING ORAL at 09:30

## 2022-12-03 RX ADMIN — AMIODARONE HYDROCHLORIDE 200 MG: 200 TABLET ORAL at 20:46

## 2022-12-03 RX ADMIN — INSULIN LISPRO 1 UNITS: 100 INJECTION, SOLUTION INTRAVENOUS; SUBCUTANEOUS at 12:34

## 2022-12-03 RX ADMIN — MUPIROCIN: 20 OINTMENT TOPICAL at 09:16

## 2022-12-03 RX ADMIN — Medication 81 MG: at 09:03

## 2022-12-03 ASSESSMENT — PAIN SCALES - GENERAL
PAINLEVEL_OUTOF10: 8
PAINLEVEL_OUTOF10: 9
PAINLEVEL_OUTOF10: 8
PAINLEVEL_OUTOF10: 9
PAINLEVEL_OUTOF10: 9

## 2022-12-03 ASSESSMENT — PAIN DESCRIPTION - ORIENTATION: ORIENTATION: MID

## 2022-12-03 ASSESSMENT — PAIN DESCRIPTION - DESCRIPTORS: DESCRIPTORS: THROBBING

## 2022-12-03 ASSESSMENT — PAIN DESCRIPTION - LOCATION: LOCATION: CHEST

## 2022-12-03 NOTE — PROGRESS NOTES
PATIENT REFUSES TO WEAR BIPAP     [x] Risks and benefits explained to patient   [x] Patient refuses to wear Bipap stating \"No I don,t want it. \"  [x] Patient verbalizes understanding of information presented.

## 2022-12-03 NOTE — PROGRESS NOTES
[facility]  Daily Progress Note    Surgeon:  Dr. Thakkar Round:  Ms. Bambi Stock is a 78y.o. year-old female status post CABG x3 (LIMA -LAD, SVG-Ramus Intermedius, SVG-OM1) on 11/28/22. Patient was seen and examined at bedside without any complaints. Vital Signs: /61   Pulse 77   Temp 98.7 °F (37.1 °C) (Oral)   Resp 24   Wt 266 lb 1.5 oz (120.7 kg)   SpO2 97%   BMI 53.74 kg/m²  O2 Flow Rate (L/min): 4 L/min   Admit Weight: Weight: 256 lb 2.8 oz (116.2 kg)   WEIGHTWeight: 266 lb 1.5 oz (120.7 kg)     I/O's:  I/O last 3 completed shifts:  In: -   Out: 1250 [Urine:1250]    Data:    CBC:   Recent Labs     12/01/22  0554 12/02/22  0618 12/03/22  0843   WBC 14.2* 12.3* 10.3   HGB 10.7* 10.4* 10.4*   HCT 35.9* 33.3* 34.8*   MCV 97.8 92.8 96.7   * 159 165       BMP:   Recent Labs     12/01/22  0554 12/02/22  0618 12/03/22  0843    139 137   K 4.4 4.5 4.2    102 101   CO2 23 28 27   BUN 29* 28* 22   CREATININE 0.71 0.70 0.59       PT/INR:   No results for input(s): PROTIME, INR in the last 72 hours. APTT:   No results for input(s): APTT in the last 72 hours. Chest X-Ray: 12/3/22  FINDINGS:   Cardiomegaly unchanged. Pulmonary vascular congestion. Asymmetric increase   opacification left lung may reflect asymmetric pulmonary edema. Left   hemidiaphragm is obscured. This may be due to consolidation and or pleural   effusion. Similar to prior. No pneumothorax. Impression   Cardiomegaly and pulmonary vascular congestion which appears symmetric more   pronounced on the left. Continued left lung base opacification with obscured hemidiaphragm reflecting   effusion and or consolidation.        Scheduled Meds:    busPIRone  10 mg Oral BID    citalopram  40 mg Oral Daily    furosemide  20 mg IntraVENous BID    metoprolol tartrate  12.5 mg Oral BID    sodium chloride flush  5-40 mL IntraVENous 2 times per day    sodium chloride flush  5-40 mL IntraVENous 2 times per day    aspirin  81 mg Oral Daily    clopidogrel  75 mg Oral Daily    amiodarone  200 mg Oral TID    atorvastatin  20 mg Oral Nightly    pantoprazole  40 mg Oral Daily    pantoprazole (PROTONIX) 40 mg injection  40 mg IntraVENous Daily    ipratropium-albuterol  1 ampule Inhalation Q4H WA    insulin lispro  0-6 Units SubCUTAneous TID WC    insulin lispro  0-3 Units SubCUTAneous Nightly     Continuous Infusions:    sodium chloride      sodium chloride      norepinephrine Stopped (11/29/22 1734)    EPINEPHrine Stopped (11/28/22 1739)    insulin Stopped (11/29/22 1440)    dextrose         Physical Exam:    General: Obese, A&O x 3, NAD noted  Heart: Normal S1, S2, RRR, No murmurs noted   Sternum: Prevena in place   Lungs: Diminished BS bilaterally at the bases (L>R)  Abdomen: Obese, soft, non tender, non distended, BS x 4   Extremities: Trace edema noted bilateral LE. EVH sites: CDI / SCD's in place bilateral LE       Assessment & Plan:    Ms. Hanh Daigle is a 78y.o. year-old female status post CABG x3 (LIMA -LAD, SVG-Ramus Intermedius, SVG-OM1) on 11/28/22 POD# 5. No issues or events noted with the patient overnight.    - Continue current care and meds  - Follow-up labs, CXR   - Wean off 2-3 L NC as oxygen saturations tolerate   - d/c Prevena today   - Left sided US today to assess effusion for possible IR thoracentesis   - DVT prophylaxis with SCD's  - Cont Lasix 20 mg IV BID for aggressive diuresis  - GI Prophylaxis  - Cardiac diet  - OOB to chair - Ambulation with PT 3x daily  - Incentive Spirometry / Pulmonary hygiene  - Pain management  - SNF facility might be a better option as patient may not be able to tolerate 3 hours of PT daily.  HUSAM James -  informed by the bedside RN   - Further recommendations as per Dr. Eder Roach     The above recommendations including medications and orders were discussed and agreed upon with Dr. Stevan Mendosa, NATHAN BLANDON

## 2022-12-03 NOTE — PROGRESS NOTES
Physical Therapy  Facility/Department: [facility] CAR 2- STEPDOWN  Daily Treatment Note    Name: Fabiola Whitehead  : 1955  MRN: [id number]  Date of Service: 12/3/2022    Discharge Recommendations:  Patient would benefit from continued therapy after discharge   PT Equipment Recommendations  Equipment Needed: No  Other: CTA, Pt requires RW to safetly attempt amb with assistance      Patient Diagnosis(es): There were no encounter diagnoses. Past Medical History:  has a past medical history of Ambulates with cane, Anxiety, Borderline hypertension, CAD (coronary artery disease), Depression, GERD (gastroesophageal reflux disease), Heart attack ([facility] [address] Hypertension, Hypothyroidism, Neuropathy, Obesity, Osteoarthritis, Other cirrhosis of liver ([facility] [address] Sleep apnea, Type II or unspecified type diabetes mellitus without mention of complication, not stated as uncontrolled, Under care of service provider, Under care of service provider, and Vertebrogenic low back pain. Past Surgical History:  has a past surgical history that includes Hysterectomy; Colonoscopy; Upper gastrointestinal endoscopy; Dilation and curettage of uterus; hiatal hernia repair; Throat surgery; Appendectomy; Total knee arthroplasty (Right, 2015); Total knee arthroplasty (Left, 2015); Coronary angioplasty with stent (2020); Cardiac catheterization; Cardiac catheterization (2022); and Coronary artery bypass graft (N/A, 2022). Assessment   Body Structures, Functions, Activity Limitations Requiring Skilled Therapeutic Intervention: Decreased functional mobility ; Decreased strength;Decreased endurance;Decreased balance;Decreased coordination;Decreased cognition  Assessment: Pt ambulated 3 ft. from bedside chair to EOB with CGA using a RW. Pt performed therapeutic exercise while supine in bed. Pt refused longer distance gait training this date due to c/o increase pain/ fatigue with mobility.  Pt is limited by decreased endurance, decreased strength, adn increased pain with mobility. Pt would benefit from continued acute PT following discharge to address functional deficits and regain PLOF. Therapy Prognosis: Good  Decision Making: Medium Complexity  Requires PT Follow-Up: Yes  Activity Tolerance  Activity Tolerance: Patient tolerated treatment well;Patient limited by endurance; Patient limited by pain     Plan   Physcial Therapy Plan  General Plan: 6-7 times per week  Current Treatment Recommendations: Strengthening, Balance training, Gait training, Functional mobility training, Stair training, Transfer training, Endurance training, Home exercise program, Safety education & training, Patient/Caregiver education & training, Equipment evaluation, education, & procurement, Therapeutic activities, ROM, Pain management, Positioning  Safety Devices  Type of Devices: Call light within reach, Patient at risk for falls, Nurse notified, Left in bed  Restraints  Restraints Initially in Place: No     Restrictions  Restrictions/Precautions  Restrictions/Precautions: General Precautions, Fall Risk, Cardiac  Required Braces or Orthoses?: Yes  Required Braces or Orthoses  Other: Heart Hugger Brace  Position Activity Restriction  Sternal Precautions: 5# Lifting Restrictions  Other position/activity restrictions: amb pt, up in chair, s/p cabgx3 11/28     Subjective   General  Chart Reviewed: Yes  Response To Previous Treatment: Patient with no complaints from previous session. Family / Caregiver Present: No  Follows Commands: Within Functional Limits  General Comment  Comments: Pt sitting upright in chair upon arrival, retired to supine in bed post PT. Subjective  Subjective: RN agreeable to therapy. Pt pleasant and cooperative throughout. Pt complains of 9/10 chest pain at rest.  Pt mildly lethargic throughout.            Cognition   Orientation  Overall Orientation Status: Within Functional Limits  Orientation Level: Oriented X4  Cognition  Overall Cognitive Status: Exceptions  Arousal/Alertness: Appropriate responses to stimuli  Following Commands: Follows multistep commands with increased time; Follows multistep commands with repitition  Attention Span: Appears intact  Memory: Decreased recall of recent events;Decreased recall of precautions  Safety Judgement: Decreased awareness of need for safety  Problem Solving: Decreased awareness of errors;Assistance required to generate solutions;Assistance required to correct errors made;Assistance required to identify errors made  Insights: Decreased awareness of deficits  Initiation: Requires cues for some  Sequencing: Requires cues for some     Objective   Bed mobility  Supine to Sit: Unable to assess  Sit to Supine: Contact guard assistance  Scooting: Stand by assistance  Transfers  Sit to Stand: Contact guard assistance  Stand to Sit: Contact guard assistance  Ambulation  Surface: Level tile  Device: Rolling Walker  Other Apparatus: O2  Assistance: Contact guard assistance  Quality of Gait: mildly unsteady, forward flexed trunk, no true LOB. Gait Deviations: Decreased step length;Decreased step height;Slow Kamini  Distance: 3 ft. More Ambulation?: No  Stairs/Curb  Stairs?: No     Balance  Posture: Fair  Sitting - Static: Good;-  Sitting - Dynamic: Fair;+  Standing - Static: Fair  Standing - Dynamic: Fair;-  Comments: assessed with RW  A/AROM Exercises: AP's x15 BLE, SLR x15 BLE, Heel Slides x10 BLE, Hip abduction x15 BLE, LAQ x15 BLE.         AM-PAC Score  AM-PAC Inpatient Mobility Raw Score : 15 (12/03/22 1501)  AM-PAC Inpatient T-Scale Score : 39.45 (12/03/22 1501)  Mobility Inpatient CMS 0-100% Score: 57.7 (12/03/22 1501)  Mobility Inpatient CMS G-Code Modifier : CK (12/03/22 1501)        Goals  Short Term Goals  Time Frame for Short Term Goals: 14 visits  Short Term Goal 1: Pt will be Mary bed mobility  Short Term Goal 2: Pt will be Mary transfers  Short Term Goal 3: Pt will be Mary amb 250' RW or least restrictive AD  Short Term Goal 4: Pt will navigate 6 steps Mary either or B rail use  Additional Goals?: No       Therapy Time   Individual Concurrent Group Co-treatment   Time In 1340         Time Out 1405         Minutes 25         Timed Code Treatment Minutes: [address], [facility]

## 2022-12-03 NOTE — PROGRESS NOTES
[facility]   Progress Note                   Date:   12/3/2022  Patient name: Juan Cooper  Date of admission:  11/28/2022  5:56 AM  MRN:   [id number]  YOB: 1955  PCP: Aye Arnold MD    Reason for Admission:      Subjective:       Patient was seen and evaluated at bedside,  Pt states that she is feeling slightly better today. Continue to be sob and midsternal incision pain     Medications:   Scheduled Meds:   busPIRone  10 mg Oral BID    citalopram  40 mg Oral Daily    furosemide  20 mg IntraVENous BID    metoprolol tartrate  12.5 mg Oral BID    sodium chloride flush  5-40 mL IntraVENous 2 times per day    sodium chloride flush  5-40 mL IntraVENous 2 times per day    aspirin  81 mg Oral Daily    clopidogrel  75 mg Oral Daily    amiodarone  200 mg Oral TID    atorvastatin  20 mg Oral Nightly    pantoprazole  40 mg Oral Daily    pantoprazole (PROTONIX) 40 mg injection  40 mg IntraVENous Daily    ipratropium-albuterol  1 ampule Inhalation Q4H WA    insulin lispro  0-6 Units SubCUTAneous TID WC    insulin lispro  0-3 Units SubCUTAneous Nightly       Continuous Infusions:   sodium chloride      sodium chloride      norepinephrine Stopped (11/29/22 1734)    EPINEPHrine Stopped (11/28/22 1739)    insulin Stopped (11/29/22 1440)    dextrose         CBC:   Recent Labs     12/01/22  0554 12/02/22  0618 12/03/22  0843   WBC 14.2* 12.3* 10.3   HGB 10.7* 10.4* 10.4*   * 159 165       BMP:    Recent Labs     12/01/22  0554 12/02/22  0618 12/03/22  0843    139 137   K 4.4 4.5 4.2    102 101   CO2 23 28 27   BUN 29* 28* 22   CREATININE 0.71 0.70 0.59   GLUCOSE 216* 173* 189*       Hepatic: No results for input(s): AST, ALT, ALB, BILITOT, ALKPHOS in the last 72 hours. Troponin: No results for input(s): TROPONINI in the last 72 hours. BNP: No results for input(s): BNP in the last 72 hours. Lipids: No results for input(s): CHOL, HDL in the last 72 hours.     Invalid input(s): LDLCALCU  INR:   No results for input(s): INR in the last 72 hours. Objective:   Vitals: /62   Pulse 86   Temp 98.4 °F (36.9 °C) (Oral)   Resp 22   Wt 266 lb 1.5 oz (120.7 kg)   SpO2 96%   BMI 53.74 kg/m²     General appearance: awake, alert, diaphoretic   HEENT: Head: Normocephalic, no lesions, without obvious abnormality  Neck: no JVD  Lungs: diminished to auscultation bilaterally, no basilar rales, no wheezing   Heart: regular rate and rhythm, S1, S2 normal, no murmur, click, rub or gallop  Abdomen: soft, non-tender; bowel sounds normal  Extremities: No LE edema  Neurologic: Mental status: Alert, oriented. Motor and sensory not done. Cardiac Cath 11/2022: LMCA: has distal 20% stenosis. LAD: has mid 85% stenosis. The D1 has 30% stenosis. LCx: has ostial eccentric 80% stenosis. The OM1 has proximal patent stent. RCA: has patent mid stent. There is distal 70% stenosis. Ramus: has proximal 80% stenosis. Coronary Tree      Dominance: Right     LV Analysis  LV function assessed as:Normal.  Ejection Fraction  +----------------------------------------------------------------------+---+  ! Method                                                                ! EF%! +----------------------------------------------------------------------+---+  ! LV gram                                                               !55 !  +----------------------------------------------------------------------+---+        Echo 11/2022  Normal LV size, wall thickness and wall motions  EF > 55%  Normal RV size and function  Normal size LA and RA  AV is sclerotic opens well, mean gradient 7 mmHg, no AR  MAC noted, no MS no MR  Normal aortic root dimensions  No significant pericardial effusion seen  Normal IC diameter normal respiratory variations suggestive of normal RA  filling pressure        Assessment / Acute Cardiac Problems:   Multivessel CAD status post CABG X3 with LIMA to LAD, SVG to ramus intermedius, SVG to OM1 11/28/2022. Preserved LVEF on echocardiogram.  H/o CAD s/p PCI to RCA and LCx (CATH 7/20/2020 LM 0%, LAD 30% mid, D1 25%, LCx ostial 40%, Mid 90% SHAKIR, OM 1 minimal, RCA 80% SHAKIR, LVEF 55%). HTN   DM   SHERITA. Morbid obesity. Peripheral vascular disease. Elevated white count. Patient Active Problem List:     Anxiety     GERD (gastroesophageal reflux disease)     OA (osteoarthritis)     Neuropathy     Right knee DJD     Hypothyroidism     S/P left total knee arthroplasty     Ischemic heart disease     Essential hypertension     NSTEMI (non-ST elevated myocardial infarction) (HCC)     Other cirrhosis of liver (HCC)     Elevated lipase     Depression     Type 2 diabetes mellitus, with long-term current use of insulin (HCC)     SHERITA (obstructive sleep apnea)     Morbid obesity with BMI of 45.0-49.9, adult (HCC)     PVD (peripheral vascular disease) ([facility])     Numbness and tingling of both feet     Long term (current) use of antithrombotics/antiplatelets     Painful diabetic neuropathy ([address]     Vertebrogenic low back pain     Spinal stenosis of lumbar region with neurogenic claudication     CAD in native artery      Plan of Treatment:   Continue ASA 81 mg, plavix 75 mg, lipitor 20 mg daily. Continue amiodarone 200 mg TID as per CTS. Continue lasix 20 mg iv BID. Continue BB   Continue PT, OT, Incentive spirometry, cardiac rehab. Continue rest of post op management as per primary team.   Replace electrolytes to keep K>4 and Mg>2. We will continue to follow. Discussed with patient and nursing.        Fawn Pod, APRN - [address] [facility]  [phone]

## 2022-12-03 NOTE — CARE COORDINATION
Writer met with pt and spouse to discuss transition plans and per pt, she does not want a SNF referral, would like to go to [facility], writer discussed the importance of participating in therapy, and per pt, she understands and will do therapy.

## 2022-12-04 ENCOUNTER — APPOINTMENT (OUTPATIENT)
Dept: GENERAL RADIOLOGY | Age: 67
DRG: 236 | End: 2022-12-04
Attending: THORACIC SURGERY (CARDIOTHORACIC VASCULAR SURGERY)
Payer: MEDICARE

## 2022-12-04 LAB
ANION GAP SERPL CALCULATED.3IONS-SCNC: 8 MMOL/L (ref 9–17)
BUN BLDV-MCNC: 16 MG/DL (ref 8–23)
CALCIUM IONIZED: 1.21 MMOL/L (ref 1.13–1.33)
CALCIUM SERPL-MCNC: 8.4 MG/DL (ref 8.6–10.4)
CHLORIDE BLD-SCNC: 98 MMOL/L (ref 98–107)
CO2: 31 MMOL/L (ref 20–31)
CREAT SERPL-MCNC: 0.54 MG/DL (ref 0.5–0.9)
GFR SERPL CREATININE-BSD FRML MDRD: >60 ML/MIN/1.73M2
GLUCOSE BLD-MCNC: 175 MG/DL (ref 70–99)
GLUCOSE BLD-MCNC: 216 MG/DL (ref 65–105)
GLUCOSE BLD-MCNC: 256 MG/DL (ref 65–105)
GLUCOSE BLD-MCNC: 259 MG/DL (ref 65–105)
HCT VFR BLD CALC: 33.7 % (ref 36.3–47.1)
HEMOGLOBIN: 10.5 G/DL (ref 11.9–15.1)
MAGNESIUM: 2.1 MG/DL (ref 1.6–2.6)
MCH RBC QN AUTO: 29.1 PG (ref 25.2–33.5)
MCHC RBC AUTO-ENTMCNC: 31.2 G/DL (ref 28.4–34.8)
MCV RBC AUTO: 93.4 FL (ref 82.6–102.9)
NRBC AUTOMATED: 0 PER 100 WBC
PDW BLD-RTO: 13.7 % (ref 11.8–14.4)
PLATELET # BLD: 186 K/UL (ref 138–453)
PMV BLD AUTO: 10.4 FL (ref 8.1–13.5)
POTASSIUM SERPL-SCNC: 4 MMOL/L (ref 3.7–5.3)
RBC # BLD: 3.61 M/UL (ref 3.95–5.11)
SODIUM BLD-SCNC: 137 MMOL/L (ref 135–144)
WBC # BLD: 10.1 K/UL (ref 3.5–11.3)

## 2022-12-04 PROCEDURE — 2580000003 HC RX 258: Performed by: ANESTHESIOLOGY

## 2022-12-04 PROCEDURE — 80048 BASIC METABOLIC PNL TOTAL CA: CPT

## 2022-12-04 PROCEDURE — 82947 ASSAY GLUCOSE BLOOD QUANT: CPT

## 2022-12-04 PROCEDURE — 6360000002 HC RX W HCPCS: Performed by: PHYSICIAN ASSISTANT

## 2022-12-04 PROCEDURE — 83735 ASSAY OF MAGNESIUM: CPT

## 2022-12-04 PROCEDURE — 97110 THERAPEUTIC EXERCISES: CPT

## 2022-12-04 PROCEDURE — 36415 COLL VENOUS BLD VENIPUNCTURE: CPT

## 2022-12-04 PROCEDURE — 2140000001 HC CVICU INTERMEDIATE R&B

## 2022-12-04 PROCEDURE — 82330 ASSAY OF CALCIUM: CPT

## 2022-12-04 PROCEDURE — 2580000003 HC RX 258: Performed by: PHYSICIAN ASSISTANT

## 2022-12-04 PROCEDURE — 97116 GAIT TRAINING THERAPY: CPT

## 2022-12-04 PROCEDURE — 71045 X-RAY EXAM CHEST 1 VIEW: CPT

## 2022-12-04 PROCEDURE — 97530 THERAPEUTIC ACTIVITIES: CPT

## 2022-12-04 PROCEDURE — 6370000000 HC RX 637 (ALT 250 FOR IP): Performed by: PHYSICIAN ASSISTANT

## 2022-12-04 PROCEDURE — 2700000000 HC OXYGEN THERAPY PER DAY

## 2022-12-04 PROCEDURE — 94761 N-INVAS EAR/PLS OXIMETRY MLT: CPT

## 2022-12-04 PROCEDURE — 85027 COMPLETE CBC AUTOMATED: CPT

## 2022-12-04 RX ORDER — ALBUTEROL SULFATE 2.5 MG/3ML
2.5 SOLUTION RESPIRATORY (INHALATION) EVERY 6 HOURS PRN
Status: DISCONTINUED | OUTPATIENT
Start: 2022-12-04 | End: 2022-12-05 | Stop reason: HOSPADM

## 2022-12-04 RX ADMIN — BUSPIRONE HYDROCHLORIDE 10 MG: 10 TABLET ORAL at 08:54

## 2022-12-04 RX ADMIN — SODIUM CHLORIDE, PRESERVATIVE FREE 10 ML: 5 INJECTION INTRAVENOUS at 08:54

## 2022-12-04 RX ADMIN — AMIODARONE HYDROCHLORIDE 200 MG: 200 TABLET ORAL at 19:40

## 2022-12-04 RX ADMIN — BUSPIRONE HYDROCHLORIDE 10 MG: 10 TABLET ORAL at 19:40

## 2022-12-04 RX ADMIN — TRAMADOL HYDROCHLORIDE 25 MG: 50 TABLET, COATED ORAL at 00:24

## 2022-12-04 RX ADMIN — METOPROLOL TARTRATE 12.5 MG: 25 TABLET ORAL at 19:44

## 2022-12-04 RX ADMIN — TRAMADOL HYDROCHLORIDE 25 MG: 50 TABLET, COATED ORAL at 22:37

## 2022-12-04 RX ADMIN — METOPROLOL TARTRATE 12.5 MG: 25 TABLET ORAL at 08:54

## 2022-12-04 RX ADMIN — SODIUM CHLORIDE, PRESERVATIVE FREE 10 ML: 5 INJECTION INTRAVENOUS at 19:41

## 2022-12-04 RX ADMIN — INSULIN LISPRO 3 UNITS: 100 INJECTION, SOLUTION INTRAVENOUS; SUBCUTANEOUS at 16:21

## 2022-12-04 RX ADMIN — ATORVASTATIN CALCIUM 20 MG: 20 TABLET, FILM COATED ORAL at 19:40

## 2022-12-04 RX ADMIN — PANTOPRAZOLE SODIUM 40 MG: 40 TABLET, DELAYED RELEASE ORAL at 08:53

## 2022-12-04 RX ADMIN — AMIODARONE HYDROCHLORIDE 200 MG: 200 TABLET ORAL at 08:53

## 2022-12-04 RX ADMIN — SODIUM CHLORIDE, PRESERVATIVE FREE 10 ML: 5 INJECTION INTRAVENOUS at 09:00

## 2022-12-04 RX ADMIN — INSULIN LISPRO 2 UNITS: 100 INJECTION, SOLUTION INTRAVENOUS; SUBCUTANEOUS at 19:44

## 2022-12-04 RX ADMIN — CLOPIDOGREL 75 MG: 75 TABLET, FILM COATED ORAL at 08:53

## 2022-12-04 RX ADMIN — Medication 81 MG: at 08:53

## 2022-12-04 RX ADMIN — INSULIN LISPRO 2 UNITS: 100 INJECTION, SOLUTION INTRAVENOUS; SUBCUTANEOUS at 12:30

## 2022-12-04 RX ADMIN — FUROSEMIDE 20 MG: 10 INJECTION, SOLUTION INTRAMUSCULAR; INTRAVENOUS at 18:18

## 2022-12-04 RX ADMIN — CITALOPRAM 40 MG: 20 TABLET, FILM COATED ORAL at 08:53

## 2022-12-04 RX ADMIN — FUROSEMIDE 20 MG: 10 INJECTION, SOLUTION INTRAMUSCULAR; INTRAVENOUS at 08:53

## 2022-12-04 RX ADMIN — AMIODARONE HYDROCHLORIDE 200 MG: 200 TABLET ORAL at 15:00

## 2022-12-04 RX ADMIN — TRAMADOL HYDROCHLORIDE 25 MG: 50 TABLET, COATED ORAL at 08:53

## 2022-12-04 ASSESSMENT — PAIN SCALES - GENERAL
PAINLEVEL_OUTOF10: 2
PAINLEVEL_OUTOF10: 2
PAINLEVEL_OUTOF10: 6
PAINLEVEL_OUTOF10: 9
PAINLEVEL_OUTOF10: 7
PAINLEVEL_OUTOF10: 2
PAINLEVEL_OUTOF10: 6
PAINLEVEL_OUTOF10: 8
PAINLEVEL_OUTOF10: 1

## 2022-12-04 ASSESSMENT — PAIN DESCRIPTION - ORIENTATION
ORIENTATION: MID

## 2022-12-04 ASSESSMENT — PAIN SCALES - WONG BAKER
WONGBAKER_NUMERICALRESPONSE: 0

## 2022-12-04 ASSESSMENT — PAIN DESCRIPTION - LOCATION
LOCATION: CHEST

## 2022-12-04 ASSESSMENT — PAIN DESCRIPTION - PAIN TYPE: TYPE: SURGICAL PAIN

## 2022-12-04 ASSESSMENT — PAIN DESCRIPTION - FREQUENCY: FREQUENCY: INTERMITTENT

## 2022-12-04 ASSESSMENT — PAIN DESCRIPTION - DESCRIPTORS
DESCRIPTORS: ACHING

## 2022-12-04 ASSESSMENT — PAIN DESCRIPTION - ONSET: ONSET: ON-GOING

## 2022-12-04 ASSESSMENT — PAIN - FUNCTIONAL ASSESSMENT: PAIN_FUNCTIONAL_ASSESSMENT: ACTIVITIES ARE NOT PREVENTED

## 2022-12-04 NOTE — PLAN OF CARE
Problem: Discharge Planning  Goal: Discharge to home or other facility with appropriate resources  12/3/2022 2202 by Malka Weber RN  Outcome: Progressing     Problem: Skin/Tissue Integrity  Goal: Absence of new skin breakdown  Description: 1. Monitor for areas of redness and/or skin breakdown  2. Assess vascular access sites hourly  3. Every 4-6 hours minimum:  Change oxygen saturation probe site  4. Every 4-6 hours:  If on nasal continuous positive airway pressure, respiratory therapy assess nares and determine need for appliance change or resting period.   12/3/2022 2202 by Malka Weber RN  Outcome: Progressing     Problem: Chronic Conditions and Co-morbidities  Goal: Patient's chronic conditions and co-morbidity symptoms are monitored and maintained or improved  12/3/2022 2202 by Malka Weber RN  Outcome: Progressing     Problem: ABCDS Injury Assessment  Goal: Absence of physical injury  12/3/2022 2202 by Malka Weber RN  Outcome: Progressing     Problem: Cardiovascular - Adult  Goal: Maintains optimal cardiac output and hemodynamic stability  12/3/2022 2202 by Malka Weber RN  Outcome: Progressing

## 2022-12-04 NOTE — PROGRESS NOTES
Physical Therapy  Facility/Department: [facility]- [facility]  Daily Treatment Note    Name: Viv Fiore  : 1955  MRN: [id number]  Date of Service: 2022    Discharge Recommendations:  Patient would benefit from continued therapy after discharge   PT Equipment Recommendations  Equipment Needed: No  Other: CTA, Pt requires RW to safetly attempt amb with assistance      Patient Diagnosis(es): There were no encounter diagnoses. Past Medical History:  has a past medical history of Ambulates with cane, Anxiety, Borderline hypertension, CAD (coronary artery disease), Depression, GERD (gastroesophageal reflux disease), Heart attack ([facility] [address] Hypertension, Hypothyroidism, Neuropathy, Obesity, Osteoarthritis, Other cirrhosis of liver ([facility] [address] Sleep apnea, Type II or unspecified type diabetes mellitus without mention of complication, not stated as uncontrolled, Under care of service provider, Under care of service provider, and Vertebrogenic low back pain. Past Surgical History:  has a past surgical history that includes Hysterectomy; Colonoscopy; Upper gastrointestinal endoscopy; Dilation and curettage of uterus; hiatal hernia repair; Throat surgery; Appendectomy; Total knee arthroplasty (Right, 2015); Total knee arthroplasty (Left, 2015); Coronary angioplasty with stent (2020); Cardiac catheterization; Cardiac catheterization (2022); and Coronary artery bypass graft (N/A, 2022). Assessment   Body Structures, Functions, Activity Limitations Requiring Skilled Therapeutic Intervention: Decreased functional mobility ; Decreased strength;Decreased endurance;Decreased balance;Decreased coordination;Decreased cognition  Assessment: Pt ambulated 100 ft. in the san with CGA using a RW along with 4L of O2. Pt required 2 standing rest breaks during gait training due to decreased endurance. Pt presents with increased forward flexed posture during gait with increased fatigue.  Pt is limited by decreased endurance, decreased strength, and increased pain with mobility. Pt would benefit from continued acute PT following discharge to address functional deficits and regain PLOF. Therapy Prognosis: Good  Decision Making: Medium Complexity  Requires PT Follow-Up: Yes  Activity Tolerance  Activity Tolerance: Patient tolerated treatment well;Patient limited by endurance; Patient limited by pain     Plan   Physcial Therapy Plan  General Plan: 6-7 times per week  Current Treatment Recommendations: Strengthening, Balance training, Gait training, Functional mobility training, Stair training, Transfer training, Endurance training, Home exercise program, Safety education & training, Patient/Caregiver education & training, Equipment evaluation, education, & procurement, Therapeutic activities, ROM, Pain management, Positioning  Safety Devices  Type of Devices: Call light within reach, Patient at risk for falls, Nurse notified, Left in bed  Restraints  Restraints Initially in Place: No     Restrictions  Restrictions/Precautions  Restrictions/Precautions: General Precautions, Fall Risk, Cardiac  Required Braces or Orthoses?: Yes  Required Braces or Orthoses  Other: Heart Hugger Brace  Position Activity Restriction  Sternal Precautions: 5# Lifting Restrictions  Other position/activity restrictions: amb pt, up in chair, s/p cabgx3 11/28     Subjective   General  Chart Reviewed: Yes  Response To Previous Treatment: Patient with no complaints from previous session. Family / Caregiver Present: No  Follows Commands: Within Functional Limits  General Comment  Comments: Pt sitting upright in chair upon arrival, retired to supine in bed post PT. Subjective  Subjective: RN agreeable to therapy. Pt pleasant and cooperative throughout. Pt complains of 8/10 chest pain at rest.  Pt mildly lethargic throughout.         Cognition   Orientation  Overall Orientation Status: Within Functional Limits  Orientation Level: Oriented X4  Cognition  Overall Cognitive Status: Exceptions  Arousal/Alertness: Appropriate responses to stimuli  Following Commands: Follows multistep commands with increased time; Follows multistep commands with repitition  Attention Span: Appears intact  Memory: Decreased recall of recent events;Decreased recall of precautions  Safety Judgement: Decreased awareness of need for safety  Problem Solving: Decreased awareness of errors;Assistance required to generate solutions;Assistance required to correct errors made;Assistance required to identify errors made  Insights: Decreased awareness of deficits  Initiation: Requires cues for some  Sequencing: Requires cues for some     Objective   Bed mobility  Supine to Sit: Unable to assess  Sit to Supine: Minimal assistance  Scooting: Contact guard assistance  Transfers  Sit to Stand: Contact guard assistance  Stand to Sit: Contact guard assistance  Ambulation  Surface: Level tile  Device: Rolling Walker  Other Apparatus: O2  Assistance: Contact guard assistance  Quality of Gait: mildly unsteady, forward flexed trunk, no true LOB. Gait Deviations: Decreased step length;Decreased step height;Slow Kamini  Distance: 100 ft. More Ambulation?: No  Stairs/Curb  Stairs?: No     Balance  Posture: Fair  Sitting - Static: Good;-  Sitting - Dynamic: Fair;+  Standing - Static: Fair  Standing - Dynamic: Fair;-  Comments: assessed with RW  A/AROM Exercises: AP's x15 BLE, SLR x15 BLE, LAQ x15 BLE.           AM-PAC Score  AM-PAC Inpatient Mobility Raw Score : 15 (12/04/22 1531)  AM-PAC Inpatient T-Scale Score : 39.45 (12/04/22 1531)  Mobility Inpatient CMS 0-100% Score: 57.7 (12/04/22 1531)  Mobility Inpatient CMS G-Code Modifier : CK (12/04/22 1531)       Goals  Short Term Goals  Time Frame for Short Term Goals: 14 visits  Short Term Goal 1: Pt will be Mary bed mobility  Short Term Goal 2: Pt will be Mary transfers  Short Term Goal 3: Pt will be Mary amb 250' RW or least restrictive AD  Short Term Goal 4: Pt will navigate 6 steps Mary either or B rail use  Additional Goals?: No       Therapy Time   Individual Concurrent Group Co-treatment   Time In 1335         Time Out 1413         Minutes 38         Timed Code Treatment Minutes: [address], [place]

## 2022-12-04 NOTE — PLAN OF CARE
Problem: Discharge Planning  Goal: Discharge to home or other facility with appropriate resources  Outcome: Progressing     Problem: Skin/Tissue Integrity  Goal: Absence of new skin breakdown  Description: 1. Monitor for areas of redness and/or skin breakdown  2. Assess vascular access sites hourly  3. Every 4-6 hours minimum:  Change oxygen saturation probe site  4. Every 4-6 hours:  If on nasal continuous positive airway pressure, respiratory therapy assess nares and determine need for appliance change or resting period.   Outcome: Progressing     Problem: Chronic Conditions and Co-morbidities  Goal: Patient's chronic conditions and co-morbidity symptoms are monitored and maintained or improved  Outcome: Progressing     Problem: ABCDS Injury Assessment  Goal: Absence of physical injury  Outcome: Progressing     Problem: Pain  Goal: Verbalizes/displays adequate comfort level or baseline comfort level  Outcome: Progressing     Problem: Neurosensory - Adult  Goal: Achieves stable or improved neurological status  Outcome: Progressing     Problem: Respiratory - Adult  Goal: Achieves optimal ventilation and oxygenation  Outcome: Progressing     Problem: Cardiovascular - Adult  Goal: Maintains optimal cardiac output and hemodynamic stability  Outcome: Progressing  Goal: Absence of cardiac dysrhythmias or at baseline  Outcome: Progressing     Problem: Skin/Tissue Integrity - Adult  Goal: Skin integrity remains intact  Outcome: Progressing  Goal: Incisions, wounds, or drain sites healing without S/S of infection  Outcome: Progressing     Problem: Musculoskeletal - Adult  Goal: Return mobility to safest level of function  Outcome: Progressing  Goal: Return ADL status to a safe level of function  Outcome: Progressing     Problem: Gastrointestinal - Adult  Goal: Minimal or absence of nausea and vomiting  Outcome: Progressing  Goal: Maintains adequate nutritional intake  Outcome: Progressing     Problem: Genitourinary - Adult  Goal: Absence of urinary retention  Outcome: Progressing  Goal: Urinary catheter remains patent  Outcome: Progressing     Problem: Infection - Adult  Goal: Absence of infection at discharge  Outcome: Progressing     Problem: Metabolic/Fluid and Electrolytes - Adult  Goal: Electrolytes maintained within normal limits  Outcome: Progressing  Goal: Hemodynamic stability and optimal renal function maintained  Outcome: Progressing  Goal: Glucose maintained within prescribed range  Outcome: Progressing     Problem: Hematologic - Adult  Goal: Maintains hematologic stability  Outcome: Progressing     Problem: Coping  Goal: Pt/Family able to verbalize concerns and demonstrate effective coping strategies  Description: INTERVENTIONS:  1. Assist patient/family to identify coping skills, available support systems and cultural and spiritual values  2. Provide emotional support, including active listening and acknowledgement of concerns of patient and caregivers  3. Reduce environmental stimuli, as able  4. Instruct patient/family in relaxation techniques, as appropriate  5. Assess for spiritual pain/suffering and initiate Spiritual Care, Psychosocial Clinical Specialist consults as needed  Outcome: Progressing     Problem: Confusion  Goal: Confusion, delirium, dementia, or psychosis is improved or at baseline  Description: INTERVENTIONS:  1. Assess for possible contributors to thought disturbance, including medications, impaired vision or hearing, underlying metabolic abnormalities, dehydration, psychiatric diagnoses, and notify attending LIP  2. [place] high risk fall precautions, as indicated  3. Provide frequent short contacts to provide reality reorientation, refocusing and direction  4. Decrease environmental stimuli, including noise as appropriate  5. Monitor and intervene to maintain adequate nutrition, hydration, elimination, sleep and activity  6.  If unable to ensure safety without constant attention obtain sitter and review sitter guidelines with assigned personnel  7. Initiate Psychosocial CNS and Spiritual Care consult, as indicated  Outcome: Progressing     Problem: Spiritual Care  Goal: Pt/Family able to move forward in process of forgiving self, others, and/or higher power  Description: INTERVENTIONS:  1. Assist patient/family to overcome blocks to healing by use of spiritual practices (prayer, meditation, guided imagery, reiki, breath work, etc). 2. De-myth guilt and help patient/family identify possible irrational spiritual/cultural beliefs and values. 3. Explore possibilities of making amends & reconciliation with self, others, and/or a greater power. 4. Guide patient/family in identifying painful feelings of guilt. 5. Help patient/famiy explore and identify spiritual beliefs, cultural understandings or values that may help or hinder letting go of issue. 6. Help patient/family explore feelings of anger, bitterness, resentment. 7. Help patient/family identify and examine the situation in which these feelings are experienced. 8. Help patient/family identify destructive displacement of feelings onto other individuals. 9. Invite use of sacraments/rituals/ceremonies as appropriate (e.g. - confession, anointing, smudging). 10. Refer patient/family to formal counseling and/or to pattie community for further support work.   Outcome: Progressing     Problem: Safety - Adult  Goal: Free from fall injury  Outcome: Progressing

## 2022-12-04 NOTE — PLAN OF CARE
Problem: Discharge Planning  Goal: Discharge to home or other facility with appropriate resources  Outcome: Progressing     Problem: Skin/Tissue Integrity  Goal: Absence of new skin breakdown  Description: 1. Monitor for areas of redness and/or skin breakdown  2. Assess vascular access sites hourly  3. Every 4-6 hours minimum:  Change oxygen saturation probe site  4. Every 4-6 hours:  If on nasal continuous positive airway pressure, respiratory therapy assess nares and determine need for appliance change or resting period.   Outcome: Progressing     Problem: Chronic Conditions and Co-morbidities  Goal: Patient's chronic conditions and co-morbidity symptoms are monitored and maintained or improved  Outcome: Progressing     Problem: ABCDS Injury Assessment  Goal: Absence of physical injury  Outcome: Progressing     Problem: Pain  Goal: Verbalizes/displays adequate comfort level or baseline comfort level  Outcome: Progressing     Problem: Neurosensory - Adult  Goal: Achieves stable or improved neurological status  Outcome: Progressing     Problem: Respiratory - Adult  Goal: Achieves optimal ventilation and oxygenation  Outcome: Progressing     Problem: Cardiovascular - Adult  Goal: Maintains optimal cardiac output and hemodynamic stability  Outcome: Progressing  Goal: Absence of cardiac dysrhythmias or at baseline  Outcome: Progressing     Problem: Skin/Tissue Integrity - Adult  Goal: Skin integrity remains intact  Outcome: Progressing  Goal: Incisions, wounds, or drain sites healing without S/S of infection  Outcome: Progressing     Problem: Musculoskeletal - Adult  Goal: Return mobility to safest level of function  Outcome: Progressing  Goal: Return ADL status to a safe level of function  Outcome: Progressing     Problem: Gastrointestinal - Adult  Goal: Minimal or absence of nausea and vomiting  Outcome: Progressing  Goal: Maintains adequate nutritional intake  Outcome: Progressing     Problem: Genitourinary - Adult  Goal: Absence of urinary retention  Outcome: Progressing  Goal: Urinary catheter remains patent  Outcome: Progressing     Problem: Infection - Adult  Goal: Absence of infection at discharge  Outcome: Progressing     Problem: Metabolic/Fluid and Electrolytes - Adult  Goal: Electrolytes maintained within normal limits  Outcome: Progressing  Goal: Hemodynamic stability and optimal renal function maintained  Outcome: Progressing  Goal: Glucose maintained within prescribed range  Outcome: Progressing     Problem: Hematologic - Adult  Goal: Maintains hematologic stability  Outcome: Progressing     Problem: Coping  Goal: Pt/Family able to verbalize concerns and demonstrate effective coping strategies  Description: INTERVENTIONS:  1. Assist patient/family to identify coping skills, available support systems and cultural and spiritual values  2. Provide emotional support, including active listening and acknowledgement of concerns of patient and caregivers  3. Reduce environmental stimuli, as able  4. Instruct patient/family in relaxation techniques, as appropriate  5. Assess for spiritual pain/suffering and initiate Spiritual Care, Psychosocial Clinical Specialist consults as needed  Outcome: Progressing     Problem: Confusion  Goal: Confusion, delirium, dementia, or psychosis is improved or at baseline  Description: INTERVENTIONS:  1. Assess for possible contributors to thought disturbance, including medications, impaired vision or hearing, underlying metabolic abnormalities, dehydration, psychiatric diagnoses, and notify attending LIP  2. [place] high risk fall precautions, as indicated  3. Provide frequent short contacts to provide reality reorientation, refocusing and direction  4. Decrease environmental stimuli, including noise as appropriate  5. Monitor and intervene to maintain adequate nutrition, hydration, elimination, sleep and activity  6.  If unable to ensure safety without constant attention obtain sitter and review sitter guidelines with assigned personnel  7. Initiate Psychosocial CNS and Spiritual Care consult, as indicated  Outcome: Progressing     Problem: Spiritual Care  Goal: Pt/Family able to move forward in process of forgiving self, others, and/or higher power  Description: INTERVENTIONS:  1. Assist patient/family to overcome blocks to healing by use of spiritual practices (prayer, meditation, guided imagery, reiki, breath work, etc). 2. De-myth guilt and help patient/family identify possible irrational spiritual/cultural beliefs and values. 3. Explore possibilities of making amends & reconciliation with self, others, and/or a greater power. 4. Guide patient/family in identifying painful feelings of guilt. 5. Help patient/famiy explore and identify spiritual beliefs, cultural understandings or values that may help or hinder letting go of issue. 6. Help patient/family explore feelings of anger, bitterness, resentment. 7. Help patient/family identify and examine the situation in which these feelings are experienced. 8. Help patient/family identify destructive displacement of feelings onto other individuals. 9. Invite use of sacraments/rituals/ceremonies as appropriate (e.g. - confession, anointing, smudging). 10. Refer patient/family to formal counseling and/or to pattie community for further support work.   Outcome: Progressing     Problem: Safety - Adult  Goal: Free from fall injury  Outcome: Progressing

## 2022-12-04 NOTE — PROGRESS NOTES
Kinjal Smith, PPatient Assessment complete. Multiple vessel coronary artery disease [I25.10]  CAD in native artery [I25.10] . Vitals:    12/04/22 0430   BP: 138/68   Pulse: 79   Resp: 22   Temp: 97.7 °F (36.5 °C)   SpO2: 96%   . Patients home meds are   Prior to Admission medications    Medication Sig Start Date End Date Taking?  Authorizing Provider   GARLIC PO Take 1 tablet by mouth daily    Historical Provider, MD   atorvastatin (LIPITOR) 40 MG tablet Take 1 tablet by mouth nightly 11/16/22   Miguel Manuel MD   APPLE CIDER VINEGAR PO Take 2 tablets by mouth daily    Historical Provider, MD   lisinopril (PRINIVIL;ZESTRIL) 10 MG tablet TAKE 1 TABLET BY MOUTH EVERY DAY 10/22/22   Filemon Sanders MD   empagliflozin (JARDIANCE) 10 MG tablet TAKE 1 TABLET BY MOUTH EVERY DAY 10/22/22   Filemon Sanders MD   clopidogrel (PLAVIX) 75 MG tablet TAKE 1 TABLET BY MOUTH ONCE DAILY 10/22/22   Filemon Sanders MD   levothyroxine (SYNTHROID) 50 MCG tablet TAKE 1 TABLET BY MOUTH EVERY DAY 10/22/22   Filemon Sanders MD   carvedilol (COREG) 6.25 MG tablet TAKE 1 TABLET BY MOUTH TWICE DAILY WITH MEALS *EMERGENCY REFILL* 9/29/22   Filemon Sanders MD   citalopram (CELEXA) 40 MG tablet TAKE 1 TABLET BY MOUTH ONCE DAILY *EMERGENCY REFILL* 9/29/22   Filemon Sanders MD   glimepiride (AMARYL) 4 MG tablet TAKE 1 TABLET BY MOUTH TWICE DAILY *EMERGENCY REFILL* 9/29/22   Filemon Sanders MD   omeprazole (PRILOSEC) 20 MG delayed release capsule TAKE 1 CAPSULE BY MOUTH ONCE DAILY 9/22/22   Paulette Turner DO   isosorbide mononitrate (IMDUR) 60 MG extended release tablet TAKE 1 TABLET BY MOUTH ONCE DAILY 5/12/22   Blaze Maxwell MD   amLODIPine (NORVASC) 5 MG tablet TAKE 1 TABLET BY MOUTH EVERY DAY 5/12/22   Blaze Maxwell MD   gabapentin (NEURONTIN) 400 MG capsule TAKE 1 CAPSULE BY MOUTH THREE TIMES DAILY 4/22/22 11/18/22  Blaze Maxwell MD   sucralfate (CARAFATE) 1 GM tablet TAKE 1 TABLET BY MOUTH EVERY DAY 4/20/22   Shawna Morris MD   busPIRone (BUSPAR) 10 MG tablet TAKE ONE (1) TABLET BY MOUTH TWICE DAILY 3/11/22   Shawna Morris MD   insulin glargine [facility]) 100 UNIT/ML injection pen Inject 65 units subcutaneously once daily. 3/1/22   Shawna Morris MD   aspirin (ASPIRIN LOW DOSE) 81 MG EC tablet TAKE 1 TABLET BY MOUTH ONCE DAILY 2/4/22   Shawna Morris MD   miconazole (MICOTIN) 2 % cream APPLY TO AFFECTED AREA TWICE DAILY 11/12/21   Shawna Morris MD   acetaminophen (TYLENOL) 500 MG tablet TAKE TWO TABLETS BY MOUTH THREE TIMES A DAY AS NEEDED FOR PAIN 10/21/21   Padilla Sam MD   lidocaine (XYLOCAINE) 2 % jelly Apply topically with dressing changes every 3 days 8/5/21   Shawna Morris MD   melatonin 10 MG CAPS capsule TAKE 1 CAPSULE BY MOUTH NIGHTLY AS NEEDED FOR SLEEP 8/5/21   Shawna Morris MD   blood glucose monitor strips Test before meals and at bedtime. DX: DM, on insulin 10/8/20   Shawna Morris MD   miconazole (ZEASORB-AF) 2 % powder Apply topically 2 times daily. 10/1/20   Shawna Morris MD   blood glucose monitor kit and supplies Dispense sufficient amount for indicated testing frequency plus additional to accommodate PRN testing needs. Dispense all needed supplies to include: monitor, strips, lancing device, lancets, control solutions, alcohol swabs. 8/17/20   Andrea Ayala MD   magnesium hydroxide (MILK OF MAGNESIA) 400 MG/5ML suspension Take 30 mLs by mouth daily as needed for Constipation 7/29/20   Andrea Ayala MD   nitroGLYCERIN (NITROSTAT) 0.4 MG SL tablet up to max of 3 total doses. If no relief after 1 dose, call 911. 7/21/20   HAO Panda - CNP   Handicap Placard MISC Is a permanent condition. DX: M19.90 5/14/19   Shawna Morris MD   Insulin Pen Needle (B-D UF III MINI PEN NEEDLES) 31G X 5 MM MISC USE 1 PEN NEEDLE DAILY 3/8/18   Shawna Morris MD   [employer] Lancets Thin MISC Test before meals and at bedtime.  DX: DM, on insulin 4/22/14   Ramin MD Eric   MULTIPLE VITAMIN PO Take 2 tablets by mouth daily DAILY    Historical Provider, MD   Alcohol Swabs (ALCOHOL PREP PADS) by Other route 2 times daily      Historical Provider, MD   .      Assessment     RR 18  Breath Sounds: clear/diminished      Bronchodilator assessment at level  1      [x]    Bronchodilator Assessment  BRONCHODILATOR ASSESSMENT SCORE  Score 0 1 2 3 4 5   Breath Sounds   []  Patient Baseline [x]  No Wheeze good aeration []  Faint, scattered wheezing, good aeration []  Expiratory Wheezing and or moderately diminished []  Insp/Exp wheeze and/or very diminished []  Insp/Exp and/ or marked distress   Respiratory Rate   []  Patient Baseline [x]  Less than 20 []  Less than 20 []  20-25 []  Greater than 25 []  Greater than 25   Peak flow % of Pred or PB [x]  NA   []  Greater than 90%  []  81-90% []  71-80% []  Less than or equal to 70%  or unable to perform []  Unable due to Respiratory Distress   Dyspnea re []  Patient Baseline [x]  No SOB []  No SOB []  SOB on exertion []  SOB min activity []  At rest/acute   e FEV% Predicted       [x]  NA []  Above 69%  []  Unable []  Above 60-69%  []  Unable []  Above 50-59%  []  Unable []  Above 35-49%  []  Unable []  Less than 35%  []  Unable

## 2022-12-04 NOTE — PROGRESS NOTES
[facility]  Daily Progress Note    Surgeon:  Dr. Tim Santana:  Ms. Tamra Jc is a 78y.o. year-old female status post CABG x3 (LIMA -LAD, SVG-Ramus Intermedius, SVG-OM1) on 11/28/22. Patient was seen and examined at bedside without any complaints. Vital Signs: /68   Pulse 79   Temp 97.7 °F (36.5 °C) (Oral)   Resp 22   Wt (!) 343 lb 0.6 oz (155.6 kg)   SpO2 96%   BMI 69.28 kg/m²  O2 Flow Rate (L/min): 4 L/min   Admit Weight: Weight: 256 lb 2.8 oz (116.2 kg)   WEIGHTWeight: (!) 343 lb 0.6 oz (155.6 kg)     I/O's:  I/O last 3 completed shifts:  In: -   Out: 1100 [Urine:1100]    Data:    CBC:   Recent Labs     12/02/22  0618 12/03/22  0843 12/04/22  0558   WBC 12.3* 10.3 10.1   HGB 10.4* 10.4* 10.5*   HCT 33.3* 34.8* 33.7*   MCV 92.8 96.7 93.4    165 186       BMP:   Recent Labs     12/02/22  0618 12/03/22  0843 12/04/22  0558    137 137   K 4.5 4.2 4.0    101 98   CO2 28 27 31   BUN 28* 22 16   CREATININE 0.70 0.59 0.54       PT/INR:   No results for input(s): PROTIME, INR in the last 72 hours. APTT:   No results for input(s): APTT in the last 72 hours. Chest X-Ray: 12/4/22  FINDINGS:   Prior median sternotomy. The cardiomediastinal silhouette is enlarged,   unchanged. Similar appearance of a dense left mid to lower hemithorax   opacities favored to represent a moderate pleural effusion. Central   pulmonary vascular congestion without overt edema. No right pleural   effusion. No pneumothorax. Osseous structures are unchanged. Impression   Stable appearance of dense left mid to lower hemithorax opacification favored   to represent a moderate pleural effusion and associated atelectasis. Redemonstrated enlarged cardiomediastinal silhouette and central pulmonary   vascular congestion.          Scheduled Meds:    busPIRone  10 mg Oral BID    citalopram  40 mg Oral Daily    furosemide  20 mg IntraVENous BID metoprolol tartrate  12.5 mg Oral BID    sodium chloride flush  5-40 mL IntraVENous 2 times per day    sodium chloride flush  5-40 mL IntraVENous 2 times per day    aspirin  81 mg Oral Daily    clopidogrel  75 mg Oral Daily    amiodarone  200 mg Oral TID    atorvastatin  20 mg Oral Nightly    pantoprazole  40 mg Oral Daily    pantoprazole (PROTONIX) 40 mg injection  40 mg IntraVENous Daily    insulin lispro  0-6 Units SubCUTAneous TID WC    insulin lispro  0-3 Units SubCUTAneous Nightly     Continuous Infusions:    sodium chloride      sodium chloride      norepinephrine Stopped (11/29/22 1734)    EPINEPHrine Stopped (11/28/22 1739)    insulin Stopped (11/29/22 1440)    dextrose         Physical Exam:    General: Obese, A&O x 3, NAD noted  Heart: Normal S1, S2, RRR, No murmurs noted   Sternum: CDI, CSD in place   Lungs: Diminished BS bilaterally at the bases (L>R)  Abdomen: Obese, soft, non tender, non distended, BS x 4   Extremities: Trace edema noted bilateral LE. EVH sites: CDI / SCD's in place bilateral LE       Assessment & Plan:    Ms. Chacho Brandt is a 78y.o. year-old female status post CABG x3 (LIMA -LAD, SVG-Ramus Intermedius, SVG-OM1) on 11/28/22 POD# 6. No issues or events noted with the patient overnight.    - Continue current care and meds  - Follow-up labs, CXR   - Wean off 2-3 L NC as oxygen saturations tolerate  - 6 minute walk test to be performed to necessitate the need for home O2  - Left sided US on 12/3 revealed no significant effusion   - DVT prophylaxis with SCD's  - Cont Lasix 20 mg IV BID for aggressive diuresis  - GI Prophylaxis  - Cardiac diet  - OOB to chair - Ambulation with PT 3x daily  - Incentive Spirometry / Pulmonary hygiene  - Pain management  - Patient does not want to go to a SNF facility as per [facility], RN - . Patient would like to go to [facility] and understands that she will participate in therapy for at least 3 hours per day.  Awaiting authorization   - Further recommendations as per Dr. Abigail Lizama     The above recommendations including medications and orders were discussed and agreed upon with Dr. Vandana Parrish MBA, PA-C

## 2022-12-05 ENCOUNTER — APPOINTMENT (OUTPATIENT)
Dept: CT IMAGING | Age: 67
DRG: 236 | End: 2022-12-05
Attending: THORACIC SURGERY (CARDIOTHORACIC VASCULAR SURGERY)
Payer: MEDICARE

## 2022-12-05 ENCOUNTER — APPOINTMENT (OUTPATIENT)
Dept: GENERAL RADIOLOGY | Age: 67
DRG: 236 | End: 2022-12-05
Attending: THORACIC SURGERY (CARDIOTHORACIC VASCULAR SURGERY)
Payer: MEDICARE

## 2022-12-05 VITALS
SYSTOLIC BLOOD PRESSURE: 133 MMHG | OXYGEN SATURATION: 91 % | DIASTOLIC BLOOD PRESSURE: 78 MMHG | HEIGHT: 59 IN | BODY MASS INDEX: 59.07 KG/M2 | WEIGHT: 293 LBS | HEART RATE: 74 BPM | RESPIRATION RATE: 18 BRPM | TEMPERATURE: 98.5 F

## 2022-12-05 LAB
GLUCOSE BLD-MCNC: 221 MG/DL (ref 65–105)
GLUCOSE BLD-MCNC: 298 MG/DL (ref 65–105)

## 2022-12-05 PROCEDURE — 99024 POSTOP FOLLOW-UP VISIT: CPT | Performed by: PHYSICIAN ASSISTANT

## 2022-12-05 PROCEDURE — 82947 ASSAY GLUCOSE BLOOD QUANT: CPT

## 2022-12-05 PROCEDURE — 71250 CT THORAX DX C-: CPT

## 2022-12-05 PROCEDURE — 6360000002 HC RX W HCPCS: Performed by: PHYSICIAN ASSISTANT

## 2022-12-05 PROCEDURE — 71045 X-RAY EXAM CHEST 1 VIEW: CPT

## 2022-12-05 PROCEDURE — 97110 THERAPEUTIC EXERCISES: CPT

## 2022-12-05 PROCEDURE — 2580000003 HC RX 258: Performed by: PHYSICIAN ASSISTANT

## 2022-12-05 PROCEDURE — 97530 THERAPEUTIC ACTIVITIES: CPT

## 2022-12-05 PROCEDURE — 97535 SELF CARE MNGMENT TRAINING: CPT

## 2022-12-05 PROCEDURE — 97116 GAIT TRAINING THERAPY: CPT

## 2022-12-05 PROCEDURE — 6370000000 HC RX 637 (ALT 250 FOR IP): Performed by: PHYSICIAN ASSISTANT

## 2022-12-05 PROCEDURE — 2700000000 HC OXYGEN THERAPY PER DAY

## 2022-12-05 PROCEDURE — 94761 N-INVAS EAR/PLS OXIMETRY MLT: CPT

## 2022-12-05 RX ORDER — CLOPIDOGREL BISULFATE 75 MG/1
75 TABLET ORAL DAILY
Qty: 30 TABLET | Refills: 3 | Status: SHIPPED | OUTPATIENT
Start: 2022-12-06

## 2022-12-05 RX ORDER — TRAMADOL HYDROCHLORIDE 50 MG/1
25 TABLET ORAL EVERY 6 HOURS PRN
Qty: 30 TABLET | Refills: 0 | Status: SHIPPED | OUTPATIENT
Start: 2022-12-05 | End: 2022-12-12

## 2022-12-05 RX ORDER — POTASSIUM CHLORIDE 750 MG/1
20 TABLET, EXTENDED RELEASE ORAL DAILY
Qty: 90 TABLET | Refills: 1 | Status: SHIPPED | OUTPATIENT
Start: 2022-12-05

## 2022-12-05 RX ORDER — AMIODARONE HYDROCHLORIDE 200 MG/1
200 TABLET ORAL 3 TIMES DAILY
Qty: 90 TABLET | Refills: 0 | Status: SHIPPED | OUTPATIENT
Start: 2022-12-05

## 2022-12-05 RX ORDER — FUROSEMIDE 10 MG/ML
40 INJECTION INTRAMUSCULAR; INTRAVENOUS 2 TIMES DAILY
Status: DISCONTINUED | OUTPATIENT
Start: 2022-12-05 | End: 2022-12-05 | Stop reason: HOSPADM

## 2022-12-05 RX ORDER — ASPIRIN 81 MG/1
81 TABLET ORAL DAILY
Qty: 30 TABLET | Refills: 3 | Status: SHIPPED | OUTPATIENT
Start: 2022-12-06

## 2022-12-05 RX ORDER — FUROSEMIDE 40 MG/1
40 TABLET ORAL 2 TIMES DAILY
Qty: 60 TABLET | Refills: 3 | Status: SHIPPED | OUTPATIENT
Start: 2022-12-05

## 2022-12-05 RX ADMIN — FUROSEMIDE 20 MG: 10 INJECTION, SOLUTION INTRAMUSCULAR; INTRAVENOUS at 08:48

## 2022-12-05 RX ADMIN — INSULIN LISPRO 3 UNITS: 100 INJECTION, SOLUTION INTRAVENOUS; SUBCUTANEOUS at 12:00

## 2022-12-05 RX ADMIN — BUSPIRONE HYDROCHLORIDE 10 MG: 10 TABLET ORAL at 08:48

## 2022-12-05 RX ADMIN — INSULIN LISPRO 2 UNITS: 100 INJECTION, SOLUTION INTRAVENOUS; SUBCUTANEOUS at 17:21

## 2022-12-05 RX ADMIN — INSULIN LISPRO 2 UNITS: 100 INJECTION, SOLUTION INTRAVENOUS; SUBCUTANEOUS at 08:49

## 2022-12-05 RX ADMIN — FUROSEMIDE 40 MG: 10 INJECTION, SOLUTION INTRAMUSCULAR; INTRAVENOUS at 17:24

## 2022-12-05 RX ADMIN — Medication 81 MG: at 08:48

## 2022-12-05 RX ADMIN — AMIODARONE HYDROCHLORIDE 200 MG: 200 TABLET ORAL at 08:48

## 2022-12-05 RX ADMIN — SODIUM CHLORIDE, PRESERVATIVE FREE 10 ML: 5 INJECTION INTRAVENOUS at 08:49

## 2022-12-05 RX ADMIN — FUROSEMIDE 40 MG: 10 INJECTION, SOLUTION INTRAMUSCULAR; INTRAVENOUS at 11:27

## 2022-12-05 RX ADMIN — CITALOPRAM 40 MG: 20 TABLET, FILM COATED ORAL at 08:52

## 2022-12-05 RX ADMIN — PANTOPRAZOLE SODIUM 40 MG: 40 TABLET, DELAYED RELEASE ORAL at 08:49

## 2022-12-05 RX ADMIN — TRAMADOL HYDROCHLORIDE 25 MG: 50 TABLET, COATED ORAL at 09:26

## 2022-12-05 RX ADMIN — AMIODARONE HYDROCHLORIDE 200 MG: 200 TABLET ORAL at 13:20

## 2022-12-05 RX ADMIN — METOPROLOL TARTRATE 12.5 MG: 25 TABLET ORAL at 08:48

## 2022-12-05 RX ADMIN — CLOPIDOGREL 75 MG: 75 TABLET, FILM COATED ORAL at 08:48

## 2022-12-05 ASSESSMENT — PAIN SCALES - GENERAL: PAINLEVEL_OUTOF10: 8

## 2022-12-05 NOTE — PLAN OF CARE
Problem: Discharge Planning  Goal: Discharge to home or other facility with appropriate resources  12/4/2022 2104 by Mayelin Alfonso RN  Outcome: Progressing     Problem: Skin/Tissue Integrity  Goal: Absence of new skin breakdown  Description: 1. Monitor for areas of redness and/or skin breakdown  2. Assess vascular access sites hourly  3. Every 4-6 hours minimum:  Change oxygen saturation probe site  4. Every 4-6 hours:  If on nasal continuous positive airway pressure, respiratory therapy assess nares and determine need for appliance change or resting period.   12/4/2022 2104 by Mayelin Alfonso RN  Outcome: Progressing     Problem: Chronic Conditions and Co-morbidities  Goal: Patient's chronic conditions and co-morbidity symptoms are monitored and maintained or improved  12/4/2022 2104 by Mayelin Alfonso RN  Outcome: Progressing     Problem: ABCDS Injury Assessment  Goal: Absence of physical injury  12/4/2022 2104 by Mayelin Alfonso RN  Outcome: Progressing     Problem: Pain  Goal: Verbalizes/displays adequate comfort level or baseline comfort level  12/4/2022 2104 by Mayelin Alfonso RN  Outcome: Progressing     Problem: Neurosensory - Adult  Goal: Achieves stable or improved neurological status  12/4/2022 2104 by Mayelin Alfonso RN  Outcome: Progressing     Problem: Respiratory - Adult  Goal: Achieves optimal ventilation and oxygenation  12/4/2022 2104 by Mayelin Alfonso RN  Outcome: Progressing

## 2022-12-05 NOTE — PROGRESS NOTES
[facility] Cardi[facility]  Daily Progress Note    Surgeon:  Dr. Shivani Unger:  Ms. Adam Weller is a 78y.o. year-old female status post CABG x3 (LIMA -LAD, SVG-Ramus Intermedius, SVG-OM1) on 11/28/22. Patient was seen and examined at bedside without any complaints. Vital Signs: BP (!) 137/49   Pulse 81   Temp 98.7 °F (37.1 °C) (Oral)   Resp 20   Wt (!) 343 lb 0.6 oz (155.6 kg)   SpO2 97%   BMI 69.28 kg/m²  O2 Flow Rate (L/min): 4 L/min   Admit Weight: Weight: 256 lb 2.8 oz (116.2 kg)   WEIGHTWeight: (!) 343 lb 0.6 oz (155.6 kg)     I/O's:  I/O last 3 completed shifts: In: 2550 [P.O.:2550]  Out: 3125 [Urine:3125]    Data:    CBC:   Recent Labs     12/03/22  0843 12/04/22  0558   WBC 10.3 10.1   HGB 10.4* 10.5*   HCT 34.8* 33.7*   MCV 96.7 93.4    186     BMP:   Recent Labs     12/03/22  0843 12/04/22  0558    137   K 4.2 4.0    98   CO2 27 31   BUN 22 16   CREATININE 0.59 0.54     PT/INR:   No results for input(s): PROTIME, INR in the last 72 hours. APTT:   No results for input(s): APTT in the last 72 hours. Chest X-Ray: 12/4/22  FINDINGS:   Prior median sternotomy. The cardiomediastinal silhouette is enlarged,   unchanged. Similar appearance of a dense left mid to lower hemithorax   opacities favored to represent a moderate pleural effusion. Central   pulmonary vascular congestion without overt edema. No right pleural   effusion. No pneumothorax. Osseous structures are unchanged. Impression   Stable appearance of dense left mid to lower hemithorax opacification favored   to represent a moderate pleural effusion and associated atelectasis. Redemonstrated enlarged cardiomediastinal silhouette and central pulmonary   vascular congestion.          Scheduled Meds:    furosemide  40 mg IntraVENous BID    busPIRone  10 mg Oral BID    citalopram  40 mg Oral Daily    metoprolol tartrate  12.5 mg Oral BID    sodium chloride flush  5-40 mL IntraVENous 2 times per day    sodium chloride flush  5-40 mL IntraVENous 2 times per day    aspirin  81 mg Oral Daily    clopidogrel  75 mg Oral Daily    amiodarone  200 mg Oral TID    atorvastatin  20 mg Oral Nightly    pantoprazole  40 mg Oral Daily    pantoprazole (PROTONIX) 40 mg injection  40 mg IntraVENous Daily    insulin lispro  0-6 Units SubCUTAneous TID WC    insulin lispro  0-3 Units SubCUTAneous Nightly     Continuous Infusions:    sodium chloride      sodium chloride      norepinephrine Stopped (11/29/22 1734)    EPINEPHrine Stopped (11/28/22 1739)    insulin Stopped (11/29/22 1440)    dextrose         Physical Exam:    General: Obese, A&O x 3, NAD noted  Heart: Normal S1, S2, RRR, No murmurs noted   Sternum: CDI, CSD in place   Lungs: Diminished BS bilaterally at the bases (L>R)  Abdomen: Obese, soft, non tender, non distended, BS x 4   Extremities: Trace edema noted bilateral LE. EVH sites: CDI / SCD's in place bilateral LE       Assessment & Plan:    Ms. Abraham Copeland is a 78y.o. year-old female status post CABG x3 (LIMA -LAD, SVG-Ramus Intermedius, SVG-OM1) on 11/28/22 POD# 6. No issues or events noted with the patient overnight.    -increase lasix to 40mg bid on top of the 20mg she already received this morning. Supllement potssium per scale. -CT scan chest evaluate for left thoracentesis  - 6 minute walk test  -D/C to acute care rehab tomorrow. - Continue current care and meds  - Follow-up labs, CXR   - Wean off 2-3 L NC as oxygen saturations tolerate  - 6 minute walk test to be performed to necessitate the need for home O2  - Left sided US on 12/3 revealed no significant effusion   - DVT prophylaxis with SCD's  - Cont Lasix 20 mg IV BID for aggressive diuresis  - GI Prophylaxis  - Cardiac diet  - OOB to chair - Ambulation with PT 3x daily  - Incentive Spirometry / Pulmonary hygiene  - Pain management  - Patient does not want to go to a SNF facility as per Cricket Riddle RN - .  Patient would like to go [facility] and understands that she will participate in therapy for at least 3 hours per day.  Awaiting authorization   - Further recommendations as per Dr. Doris Martinez     The above recommendations including medications and orders were discussed and agreed upon with Dr. Lois Gutierrez MBA, PA-C

## 2022-12-05 NOTE — PROGRESS NOTES
Comprehensive Nutrition Assessment    Type and Reason for Visit:  Initial, RD Nutrition Re-Screen/LOS    Nutrition Recommendations/Plan:   Continue current diet. Encourage/monitor PO intakes as tolerated. Will monitor need for ONS with meals. Will monitor labs, weights, and plan of care. Malnutrition Assessment:  Malnutrition Status: At risk for malnutrition (12/05/22 1227)    Context:  Acute Illness (on Chronic)     Findings of the 6 clinical characteristics of malnutrition:  Energy Intake:  Mild decrease in energy intake  Weight Loss:  No significant weight loss - Weight gain noted per chart review. Body Fat Loss:  No significant body fat loss   Muscle Mass Loss:  No significant muscle mass loss  Fluid Accumulation:   (Moderate) Generalized   Strength:  Not Performed    Nutrition Assessment:    Chart reviewed for length of stay. Pt admitted for and s/p CABG x 3 on 11/28. PMH: CAD, DM, HTN, GERD. Pt currently tolerating an oral diet. Observed breakfast tray which pt had eaten about 50% of her meal.  Per chart review, pt has been taking fluids well. C/o nausea at times. Weight gain/fluctuations noted per chart review. Labs reviewed: Glucose 221-298 mg/dL. Meds include: Lasix, Humalog SS. Nutrition Related Findings:    Labs/Meds reviewed. C/o nausea at times. No recorded BM since admission. Wound Type: Surgical Incision (to left pretibial and sternum)       Current Nutrition Intake & Therapies:    Average Meal Intake: 26-50%, 51-75%  Average Supplements Intake: None Ordered  ADULT DIET; Regular; 4 carb choices (60 gm/meal); No Added Salt (3-4 gm)    Anthropometric Measures:  Height: 4' 11\" (149.9 cm)  Ideal Body Weight (IBW): 95 lbs (43 kg)    Admission Body Weight: 256 lb 2.8 oz (116.2 kg)  Current Body Weight: 343 lb 0.6 oz (155.6 kg), 361.1 % IBW.  Weight Source: Bed Scale  Current BMI (kg/m2): 69.2  Usual Body Weight: 268 lb 6.4 oz (121.7 kg) (3/29/22 bed scale per chart review)  % Weight Change (Calculated): 27.8                    BMI Categories: Obese Class 3 (BMI 40.0 or greater)    Estimated Daily Nutrient Needs:  Energy Requirements Based On: Formula  Weight Used for Energy Requirements: Admission  Energy (kcal/day): 2394-5634 kcals/day  Weight Used for Protein Requirements: Ideal  Protein (g/day): 85 gm pro/day  Fluid (ml/day): per MD    Nutrition Diagnosis:   Inadequate oral intake related to cardiac dysfunction (recent surgery) as evidenced by intake 26-50%, intake 51-75% (variable PO intakes)    Nutrition Interventions:   Food and/or Nutrient Delivery: Continue Current Diet (Monitor need for oral supplements.)  Nutrition Education/Counseling: No recommendation at this time  Coordination of Nutrition Care: Continue to monitor while inpatient       Goals:  Previous Goal Met:  (Goal Set)  Goals: Meet at least 75% of estimated needs, prior to discharge       Nutrition Monitoring and Evaluation:   Behavioral-Environmental Outcomes: None Identified  Food/Nutrient Intake Outcomes: Food and Nutrient Intake  Physical Signs/Symptoms Outcomes: Biochemical Data, GI Status, Fluid Status or Edema, Nutrition Focused Physical Findings, Skin, Weight    Discharge Planning:    Continue current diet     Derek Miramontes, [address], [place]  Contact: [phone]

## 2022-12-05 NOTE — PROGRESS NOTES
Occupational Therapy  Facility/Department: [facility] CAR 2- STEPDOWN  Occupational Therapy Daily Treatment Note    Name: Leonora Armstrong  : 1955  MRN: [id number]  Date of Service: 2022    Discharge Recommendations:  Patient would benefit from continued therapy after discharge    Patient Diagnosis(es): The encounter diagnosis was CAD in native artery. Past Medical History:  has a past medical history of Ambulates with cane, Anxiety, Borderline hypertension, CAD (coronary artery disease), Depression, GERD (gastroesophageal reflux disease), Heart attack ([address] Hypertension, Hypothyroidism, Neuropathy, Obesity, Osteoarthritis, Other cirrhosis of liver ([facility] [address] Sleep apnea, Type II or unspecified type diabetes mellitus without mention of complication, not stated as uncontrolled, Under care of service provider, Under care of service provider, and Vertebrogenic low back pain. Past Surgical History:  has a past surgical history that includes Hysterectomy; Colonoscopy; Upper gastrointestinal endoscopy; Dilation and curettage of uterus; hiatal hernia repair; Throat surgery; Appendectomy; Total knee arthroplasty (Right, 2015); Total knee arthroplasty (Left, 2015); Coronary angioplasty with stent (2020); Cardiac catheterization; Cardiac catheterization (2022); and Coronary artery bypass graft (N/A, 2022). Assessment   Performance deficits / Impairments: Decreased functional mobility ; Decreased endurance;Decreased ADL status; Decreased balance;Decreased high-level IADLs;Decreased safe awareness;Decreased cognition;Decreased strength;Decreased coordination;Decreased posture  Assessment: Pt making increased improvements and progress towards goals this session  Prognosis: Good  Activity Tolerance  Activity Tolerance: Patient Tolerated treatment well        Plan   Occupational Therapy Plan  Times Per Week: 4-6x (CABG)     Restrictions  Restrictions/Precautions  Restrictions/Precautions: General Precautions, Fall Risk, Cardiac  Required Braces or Orthoses?: Yes  Required Braces or Orthoses  Other: Heart Hugger Brace  Position Activity Restriction  Sternal Precautions: 5# Lifting Restrictions  Other position/activity restrictions: amb pt, up in chair, s/p cabgx3 11/28    Subjective   General  Patient assessed for rehabilitation services?: Yes  Response to previous treatment: Patient with no complaints from previous session  Family / Caregiver Present: No  Diagnosis: CABGx3 on 11/28, LVEF 50%  Subjective  General Comment  Comments: RN ok'd OT treatment morning. Pt seated in recliner upon ORR arrival. Pt agreeable with session. . Pt denied pain this morning. Transport arrived for transport for testing cutting session short       Objective   Safety Devices  Type of Devices: Call light within reach; Patient at risk for falls;Nurse notified;Gait belt (left with transport)  Restraints  Restraints Initially in Place: No  Bed Mobility Training  Bed Mobility Training: No  Balance  Sitting: Without support (upright in reclienr 2-3 minutes in prep for transfer)  Standing: With support (RW SBA>CGA for brief management/change/Sheron Del Valle ~4-5 minutes)  Transfer Training  Overall Level of Assistance: Stand-by assistance;Contact-guard assistance  Interventions: Verbal cues  Sit to Stand: Stand-by assistance;Contact-guard assistance  Stand to Sit: Stand-by assistance;Contact-guard assistance  Bed to Chair: Stand-by assistance;Contact-guard assistance  Gait  Overall Level of Assistance: Stand-by assistance;Contact-guard assistance  Interventions: Verbal cues (walker safety)  Distance (ft):  (Room distance with RW)  Assistive Device: Walker, rolling        ADL  UE Dressing: Minimal assistance  UE Dressing Skilled Clinical Factors: heart hugger and gown mgt  Toileting: Maximum assistance  Toileting Skilled Clinical Factors: brief mgt/change  Additional Comments: ORR provided educationon heart hugger, grooming, clothing mgt and dressing in stanbding ~5 minutes     Activity Tolerance  Activity Tolerance: Patient limited by pain; Patient limited by fatigue;Patient limited by endurance  Activity Tolerance Comments: Pt required 3 seated rest breaks due to low endurance and pain, RN aware, PLB performed, SPO2 WNL  Bed mobility  Supine to Sit: Unable to assess  Sit to Supine: Unable to assess  Scooting: Supervision  Bed Mobility Comments: Pt began and concluded session seated in recliner. Pt able to scoot in and out of chair. Cognition  Overall Cognitive Status: Exceptions  Arousal/Alertness: Appropriate responses to stimuli  Following Commands: Follows multistep commands with increased time; Follows multistep commands with repitition  Attention Span: Appears intact  Memory: Decreased recall of recent events;Decreased recall of precautions  Safety Judgement: Decreased awareness of need for safety  Problem Solving: Decreased awareness of errors;Assistance required to generate solutions;Assistance required to correct errors made;Assistance required to identify errors made  Insights: Decreased awareness of deficits  Initiation: Requires cues for some  Sequencing: Requires cues for some  Cognition Comment: Required encourgment to ambulate with fair return  Orientation  Overall Orientation Status: Within Functional Limits  Orientation Level: Oriented X4     Education Given To: Patient  Education Provided: Role of Therapy; ADL Adaptive Strategies;Transfer Training;Equipment  Education Provided Comments: body mechanics, safety sternal precautions with heart hugger use  Education Method: Verbal;Demonstration  Barriers to Learning: None  Education Outcome: Verbalized understanding;Demonstrated understanding;Continued education needed    AM-PAC Score  AM-PAC Inpatient Daily Activity Raw Score: 18 (12/05/22 1129)  AM-PAC Inpatient ADL T-Scale Score : 38.66 (12/05/22 1129)  ADL Inpatient CMS 0-100% Score: 46.65 (12/05/22 1129)  ADL Inpatient CMS G-Code Modifier : CK (12/05/22 1129)       Goals  Short Term Goals  Time Frame for Short Term Goals: Pt will by discharge  Short Term Goal 1: demo good safety awareness during func mob around room using LRD PRN and min A  Short Term Goal 2: demo ADL UB bathing/dressing activity at SBA and increased time  Short Term Goal 3: demo ADL LB bathing/dressing activity at [address], AE PRN, and increased time  Short Term Goal 4: identify/maintain all sternal precautions with 1 cue  Short Term Goal 5: demo activitty tolerance for 35 min+       Therapy Time   Individual Concurrent Group Co-treatment   Time In 1022         Time Out 1045         Minutes 23         Timed Code Treatment Minutes: Amy ORR/L

## 2022-12-05 NOTE — PROGRESS NOTES
Physician Progress Note      PATIENT:               Junior Zimmer  CSN #:                  [id number]  :                       1955  ADMIT DATE:       2022 5:56 AM  DISCH DATE:  RESPONDING  PROVIDER #:        Marni BUENO PA-C          QUERY TEXT:    Pt admitted with CAD requiring CABG. Pt noted to have HMG 14.2>13.0>10.4. If   possible, please document in the progress notes and discharge summary if you   are evaluating and/or treating any of the following: The medical record reflects the following:  Risk Factors: Age, CAD, Morbid obesity  Clinical Indicators: HMG 13.0>10.4.  OP Note CABG x3 EBL 500cc. B/P's   86/ 88/54, 87/57, HR   Treatment: Epinephrine gtt, Oxygen, CABG x3, Cardiology Consult, CXR, IV   Fluis,  Options provided:  -- Acute blood loss anemia  -- Acute on chronic blood loss anemia  -- Postoperative acute blood loss anemia  -- Dilutional anemia  -- Other - I will add my own diagnosis  -- Disagree - Not applicable / Not valid  -- Disagree - Clinically unable to determine / Unknown  -- Refer to Clinical Documentation Reviewer    PROVIDER RESPONSE TEXT:    This patient has acute blood loss anemia. Query created by:  Mohit Jarrett on 2022 9:45 AM      Electronically signed by:  Yris Ying PA-C 2022 11:15 AM

## 2022-12-05 NOTE — PROGRESS NOTES
[facility]   Progress Note                   Date:   12/5/2022  Patient name: Delicia Chino  Date of admission:  11/28/2022  5:56 AM  MRN:   [id number]  YOB: 1955  PCP: Bouchra Snider MD    Reason for Admission:      Subjective:       Patient seen and examined, sititng up in chair after working with PT/OT . Tele/vitals/labs reviewed . Discussed case with RN. Mild discomfort at  midsternal incision     Medications:   Scheduled Meds:   furosemide  40 mg IntraVENous BID    busPIRone  10 mg Oral BID    citalopram  40 mg Oral Daily    metoprolol tartrate  12.5 mg Oral BID    sodium chloride flush  5-40 mL IntraVENous 2 times per day    sodium chloride flush  5-40 mL IntraVENous 2 times per day    aspirin  81 mg Oral Daily    clopidogrel  75 mg Oral Daily    amiodarone  200 mg Oral TID    atorvastatin  20 mg Oral Nightly    pantoprazole  40 mg Oral Daily    pantoprazole (PROTONIX) 40 mg injection  40 mg IntraVENous Daily    insulin lispro  0-6 Units SubCUTAneous TID WC    insulin lispro  0-3 Units SubCUTAneous Nightly       Continuous Infusions:   sodium chloride      sodium chloride      norepinephrine Stopped (11/29/22 1734)    EPINEPHrine Stopped (11/28/22 1739)    insulin Stopped (11/29/22 1440)    dextrose         CBC:   Recent Labs     12/03/22  0843 12/04/22  0558   WBC 10.3 10.1   HGB 10.4* 10.5*    186       BMP:    Recent Labs     12/03/22  0843 12/04/22  0558    137   K 4.2 4.0    98   CO2 27 31   BUN 22 16   CREATININE 0.59 0.54   GLUCOSE 189* 175*       Hepatic: No results for input(s): AST, ALT, ALB, BILITOT, ALKPHOS in the last 72 hours. Troponin: No results for input(s): TROPONINI in the last 72 hours. BNP: No results for input(s): BNP in the last 72 hours. Lipids: No results for input(s): CHOL, HDL in the last 72 hours. Invalid input(s): LDLCALCU  INR:   No results for input(s): INR in the last 72 hours.       Objective:   Vitals: BP (!) 147/78   Pulse 79   Temp 98.6 °F (37 °C) (Oral)   Resp 20   Wt (!) 343 lb 0.6 oz (155.6 kg)   SpO2 91%   BMI 69.28 kg/m²     General appearance: awake, alert, diaphoretic   HEENT: Head: Normocephalic, no lesions, without obvious abnormality  Neck: no JVD  Lungs: diminished to auscultation bilaterally, no basilar rales, no wheezing   Heart: regular rate and rhythm, S1, S2 normal, no murmur, click, rub or gallop  Abdomen: soft, non-tender; bowel sounds normal  Extremities: No LE edema  Neurologic: Mental status: Alert, oriented. Motor and sensory not done. Cardiac Cath 11/2022: LMCA: has distal 20% stenosis. LAD: has mid 85% stenosis. The D1 has 30% stenosis. LCx: has ostial eccentric 80% stenosis. The OM1 has proximal patent stent. RCA: has patent mid stent. There is distal 70% stenosis. Ramus: has proximal 80% stenosis. Coronary Tree      Dominance: Right     LV Analysis  LV function assessed as:Normal.  Ejection Fraction  +----------------------------------------------------------------------+---+  ! Method                                                                ! EF%! +----------------------------------------------------------------------+---+  ! LV gram                                                               !55 !  +----------------------------------------------------------------------+---+        Echo 11/2022  Normal LV size, wall thickness and wall motions  EF > 55%  Normal RV size and function  Normal size LA and RA  AV is sclerotic opens well, mean gradient 7 mmHg, no AR  MAC noted, no MS no MR  Normal aortic root dimensions  No significant pericardial effusion seen  Normal IC diameter normal respiratory variations suggestive of normal RA  filling pressure        Assessment / Acute Cardiac Problems:   Multivessel CAD status post CABG X3 with LIMA to LAD, SVG to ramus intermedius, SVG to OM1 11/28/2022.   Preserved LVEF on echocardiogram.  H/o CAD s/p PCI to RCA and LCx (CATH 7/20/2020 LM 0%, LAD 30% mid, D1 25%, LCx ostial 40%, Mid 90% SHAKIR, OM 1 minimal, RCA 80% SHAKIR, LVEF 55%). HTN   DM   SHERITA. Morbid obesity. Peripheral vascular disease. Elevated white count. Patient Active Problem List:     Anxiety     GERD (gastroesophageal reflux disease)     OA (osteoarthritis)     Neuropathy     Right knee DJD     Hypothyroidism     S/P left total knee arthroplasty     Ischemic heart disease     Essential hypertension     NSTEMI (non-ST elevated myocardial infarction) ([facility])     Other cirrhosis of liver (HCC)     Elevated lipase     Depression     Type 2 diabetes mellitus, with long-term current use of insulin (HCC)     SHERITA (obstructive sleep apnea)     Morbid obesity with BMI of 45.0-49.9, adult (HCC)     PVD (peripheral vascular disease) ([facility])     Numbness and tingling of both feet     Long term (current) use of antithrombotics/antiplatelets     Painful diabetic neuropathy ([facility] [address]     Vertebrogenic low back pain     Spinal stenosis of lumbar region with neurogenic claudication     CAD in native artery      Plan of Treatment:   Continue ASA 81 mg, plavix 75 mg, lipitor 20 mg daily. Continue amiodarone 200 mg TID as per CTS. Continue lasix iv BID    Continue BB   Continue PT, OT, Incentive spirometry, cardiac rehab. Continue rest of post op management as per primary team.   Replace electrolytes to keep K>4 and Mg>2. BMP daily   Discharging planning per primary     Discussed with patient and nursing.        Luciano Santa, APRN - NP   [facility]  [phone]

## 2022-12-05 NOTE — PROGRESS NOTES
Physical Therapy  Facility/Department: [facility] CAR 2- STEPDOWN  Physical Therapy Daily progress note    Name: Juan Cooper  : 1955  MRN: [id number]  Date of Service: 2022    Discharge Recommendations:  Patient would benefit from continued therapy after discharge   PT Equipment Recommendations  Equipment Needed: No  Other: CTA, Pt requires RW to safetly attempt amb with assistance      Patient Diagnosis(es): The encounter diagnosis was CAD in native artery. Past Medical History:  has a past medical history of Ambulates with cane, Anxiety, Borderline hypertension, CAD (coronary artery disease), Depression, GERD (gastroesophageal reflux disease), Heart attack ([facility] [address] Hypertension, Hypothyroidism, Neuropathy, Obesity, Osteoarthritis, Other cirrhosis of liver ([facility] [address] Sleep apnea, Type II or unspecified type diabetes mellitus without mention of complication, not stated as uncontrolled, Under care of service provider, Under care of service provider, and Vertebrogenic low back pain. Past Surgical History:  has a past surgical history that includes Hysterectomy; Colonoscopy; Upper gastrointestinal endoscopy; Dilation and curettage of uterus; hiatal hernia repair; Throat surgery; Appendectomy; Total knee arthroplasty (Right, 2015); Total knee arthroplasty (Left, 2015); Coronary angioplasty with stent (2020); Cardiac catheterization; Cardiac catheterization (2022); and Coronary artery bypass graft (N/A, 2022). Assessment   Body Structures, Functions, Activity Limitations Requiring Skilled Therapeutic Intervention: Decreased functional mobility ; Decreased strength;Decreased endurance;Decreased balance;Decreased coordination;Decreased cognition  Assessment: Pt ambulated 8 ftx2 to bathroom, CGA using a RW along with 4L of O2. Pt required extended rest break during gait training due to decreased endurance.  Pt ambulated additional 17 ft x2 with much encourgment, Pt grossly CGA with MIN assist when fatigued for RW managment. Pt presents with increased forward flexed posture during gait with increased fatigue. Pt is limited by decreased endurance, decreased strength, adn increased pain with mobility. Pt would benefit from continued acute PT following discharge to address functional deficits and regain PLOF. Therapy Prognosis: Good  Requires PT Follow-Up: Yes  Activity Tolerance  Activity Tolerance: Patient limited by pain; Patient limited by fatigue;Patient limited by endurance  Activity Tolerance Comments: Pt required 3 seated rest breaks due to low endurance and pain, RN aware, PLB performed, SPO2 WNL     Plan   Physcial Therapy Plan  General Plan: 6-7 times per week  Current Treatment Recommendations: Strengthening, Balance training, Gait training, Functional mobility training, Stair training, Transfer training, Endurance training, Home exercise program, Safety education & training, Patient/Caregiver education & training, Equipment evaluation, education, & procurement, Therapeutic activities, ROM, Pain management, Positioning  Safety Devices  Type of Devices: Call light within reach, Patient at risk for falls, Nurse notified, Left in chair, Gait belt  Restraints  Restraints Initially in Place: No     Restrictions  Restrictions/Precautions  Restrictions/Precautions: General Precautions, Fall Risk, Cardiac  Required Braces or Orthoses?: Yes  Required Braces or Orthoses  Other: Heart Hugger Brace  Position Activity Restriction  Sternal Precautions: 5# Lifting Restrictions  Other position/activity restrictions: amb pt, up in chair, s/p cabgx3 11/28     Subjective   General  Chart Reviewed: Yes  Patient assessed for rehabilitation services?: Yes  Response To Previous Treatment: Patient with no complaints from previous session. Family / Caregiver Present: No  Follows Commands: Within Functional Limits  General Comment  Comments: Pt retired upright in chair, legs elevated.   Subjective  Subjective: RN agreeable to therapy. Pt pleasant and cooperative throughout. Pt complains of 8/10 chest pain at rest.        Cognition   Orientation  Overall Orientation Status: Within Functional Limits  Orientation Level: Oriented X4  Cognition  Overall Cognitive Status: Exceptions  Arousal/Alertness: Appropriate responses to stimuli  Following Commands: Follows multistep commands with increased time; Follows multistep commands with repitition  Attention Span: Appears intact  Memory: Decreased recall of recent events;Decreased recall of precautions  Safety Judgement: Decreased awareness of need for safety  Problem Solving: Decreased awareness of errors;Assistance required to generate solutions;Assistance required to correct errors made;Assistance required to identify errors made  Insights: Decreased awareness of deficits  Initiation: Requires cues for some  Sequencing: Requires cues for some  Cognition Comment: Required encourgment to ambulate with fair return      Bed Mobility Training  Bed Mobility Training: No  Bed mobility  Supine to Sit: Unable to assess  Sit to Supine: Unable to assess  Scooting: Contact guard assistance  Bed Mobility Comments: Pt began and concluded session seated in recliner. Pt able to scoot in and out of chair. Transfers  Sit to Stand: Contact guard assistance  Stand to Sit: Contact guard assistance  Comment: Transfers performed 4x this date. Verbal cues required throughout to utilize heart hugger brace. Increased time/effort to perform. Ambulation  Surface: Level tile  Device: Rolling Walker  Other Apparatus: O2 (4L)  Assistance: Contact guard assistance;Minimal assistance (MIN assist for safety with RW)  Quality of Gait: mildly unsteady, forward flexed trunk, no true LOB.   Gait Deviations: Decreased step length;Decreased step height;Slow Kamini  Distance: 8ft x2+ 17 ft x2  More Ambulation?: No  Stairs/Curb  Stairs?: No     Balance  Posture: Poor  Sitting - Static: Good;-  Sitting - Dynamic: Fair;+  Standing - Static: Fair  Standing - Dynamic: Fair;-  Comments: assessed with RW  Exercise Treatment: BLE in sitting: LAQ, hip flexion, hip abduction, ankle dorsi/plantarflexion x10, cues for encourgment  Breathing Techniques: IS and accapella x10        AM-PAC Score  AM-PAC Inpatient Mobility Raw Score : 15 (12/05/22 1112)  AM-PAC Inpatient T-Scale Score : 39.45 (12/05/22 1112)  Mobility Inpatient CMS 0-100% Score: 57.7 (12/05/22 1112)  Mobility Inpatient CMS G-Code Modifier : CK (12/05/22 1112)     Goals  Short Term Goals  Time Frame for Short Term Goals: 14 visits  Short Term Goal 1: Pt will be Mary bed mobility  Short Term Goal 2: Pt will be Mary transfers  Short Term Goal 3: Pt will be Mary amb 250' RW or least restrictive AD  Short Term Goal 4: Pt will navigate 6 steps Mary either or B rail use  Additional Goals?: No       Education  Patient Education  Education Given To: Patient  Education Provided: Role of Therapy;Plan of Care;Transfer Training;Precautions; Fall Prevention Strategies; Home Exercise Program;Energy Conservation; Family Education  Education Provided Comments: verbal cues t/o for safety and encouragement to progress  Education Method: Verbal;Demonstration  Barriers to Learning: None  Education Outcome: Continued education needed      Therapy Time   Individual Concurrent Group Co-treatment   Time In 0900         Time Out 0958         Minutes 58         Timed Code Treatment Minutes: [address], PTA

## 2022-12-05 NOTE — CARE COORDINATION
Transitional planning    Received message from Brian Sauceda at [facility] that insurance will approve admission until end of today. If not admitted today, will have to re do pre cert. [facility] and spoke to [address]. Told him about rehab. HE relates patient needs a 6 minute walk test and CT of chest before patient can be discharged. [address] and updated her. [phone] to patient about plan for discharge. Plan is for her to go to [facility], MAHESH explained insurance authorization is only good until end of today. CT chest pending. CM told patient she will set up transportation for this evening. IF not medically ready later today can d/c transportation. She is in agreement with this plan. 1028 Left message with [address] that transportation set up for 7pm this evening. Home oxygen evaluation does not need to be done if she is going to ARU. Requested return phone call. [address] [facility] [address] to patient and told her she has a discharge order. Patient relates she still has PMW in. Spoke to [address] about PMWs and he relates nurses on [address] have been trained on how to pull or cut them. CM contacted charge nurse on [address] and related this information. Charles Marcum [address] and told him DIOGENES needs to be signed by Attending. [facility] and let her know patient is getting picked up at 8pm for transfer to rehab. Report # [phone], Fax # [phone] [id number] AVS/DIOGENES and prescription for tramadol faxed to [phone].  [facility] called back and said to fax to [phone] to patient and she is agreeable to plan to discharge to [facility] this evening,  time 8pm.

## 2022-12-06 LAB — GLUCOSE BLD-MCNC: 202 MG/DL (ref 65–105)

## 2022-12-06 NOTE — PROGRESS NOTES
Transportation came to the [facility] and picked the patient,I/v cannula removed,she left with the staff in stable condition,all the belongings taken by the patient.

## 2022-12-15 NOTE — DISCHARGE SUMMARY
Physician Discharge Summary     Patient ID:  Jac Thornton  [id number]  89 y.o.  1955    Admit date: 11/28/2022    Discharge date and time: 12/5/2022  9:00 PM     Admitting Physician: Kenzie Horton MD     Discharge Physician: Same    Admission Diagnoses: Multiple vessel coronary artery disease [I25.10]  CAD in native artery [I25.10]    Discharge Diagnoses: Same    Admission Condition: good    Discharged Condition: good    Indication for Admission: CAD    Hospital Course: Taken to the OR on 11/282022 for CABg x 3. Post op course prolonged due to patient failure to participate in recovery. She did have home O2 eval before discharge which she did not qualify. Consults: cardiology and pulmonary/intensive care    Treatments:   1. CABG x3, LIMA to LAD, saphenous vein to the ramus intermedius,  saphenous vein to OM1.  2.  Cardiopulmonary bypass. 3.  Platelet gel. 4.  Left leg endo vein harvest.  5.  ATA.     Discharge Exam:  /78   Pulse 74   Temp 98.5 °F (36.9 °C) (Oral)   Resp 18   Ht 4' 11\" (1.499 m)   Wt (!) 343 lb 0.6 oz (155.6 kg)   SpO2 91%   BMI 69.28 kg/m²     General Appearance:    Alert, cooperative, no distress, appears stated age   Head:    Normocephalic, without obvious abnormality, atraumatic   Eyes:    PERRL, conjunctiva/corneas clear, EOM's intact, fundi     benign, both eyes   Ears:    Normal TM's and external ear canals, both ears   Nose:   Nares normal, septum midline, mucosa normal, no drainage    or sinus tenderness   Throat:   Lips, mucosa, and tongue normal; teeth and gums normal   Neck:   Supple, symmetrical, trachea midline, no adenopathy;     thyroid:  no enlargement/tenderness/nodules; no carotid    bruit or JVD   Back:     Symmetric, no curvature, ROM normal, no CVA tenderness   Lungs:     Clear to auscultation bilaterally, respirations unlabored   Chest Wall:    No tenderness or deformity    Heart:    Regular rate and rhythm, S1 and S2 normal, no murmur, rub   or gallop   Breast Exam:    No tenderness, masses, or nipple abnormality   Abdomen:     Soft, non-tender, bowel sounds active all four quadrants,     no masses, no organomegaly   Genitalia:    Normal female without lesion, discharge or tenderness   Rectal:    Normal tone ;guaiac negative stool   Extremities:   Extremities normal, atraumatic, no cyanosis or edema   Pulses:   2+ and symmetric all extremities   Skin:   Skin color, texture, turgor normal, no rashes or lesions   Lymph nodes:   Cervical, supraclavicular, and axillary nodes normal   Neurologic:   CNII-XII intact, normal strength, sensation and reflexes     throughout       Disposition: SNF    In process/preliminary results:  Outstanding Order Results       Date and Time Order Name Status Description    12/2/2022  5:33 PM PREVIOUS SPECIMEN In process             Patient Instructions:   Discharge Medication List as of 12/5/2022  2:10 PM        START taking these medications    Details   traMADol (ULTRAM) 50 MG tablet Take 0.5 tablets by mouth every 6 hours as needed for Pain for up to 7 days. , Disp-30 tablet, R-0Print      amiodarone (CORDARONE) 200 MG tablet Take 1 tablet by mouth 3 times daily, Disp-90 tablet, R-0Normal      metoprolol tartrate (LOPRESSOR) 25 MG tablet Take 0.5 tablets by mouth 2 times daily, Disp-60 tablet, R-3Normal      furosemide (LASIX) 40 MG tablet Take 1 tablet by mouth 2 times daily, Disp-60 tablet, R-3Normal      potassium chloride (KLOR-CON M) 10 MEQ extended release tablet Take 2 tablets by mouth daily, Disp-90 tablet, R-1Normal           CONTINUE these medications which have CHANGED    Details   aspirin 81 MG EC tablet Take 1 tablet by mouth daily, Disp-30 tablet, R-3Normal      clopidogrel (PLAVIX) 75 MG tablet Take 1 tablet by mouth daily, Disp-30 tablet, R-3Normal           CONTINUE these medications which have NOT CHANGED    Details   atorvastatin (LIPITOR) 40 MG tablet Take 1 tablet by mouth nightly, Disp-90 tablet, R-1Normal empagliflozin (JARDIANCE) 10 MG tablet TAKE 1 TABLET BY MOUTH EVERY DAY, Disp-30 tablet, R-2Normal      levothyroxine (SYNTHROID) 50 MCG tablet TAKE 1 TABLET BY MOUTH EVERY DAY, Disp-60 tablet, R-2Normal      citalopram (CELEXA) 40 MG tablet TAKE 1 TABLET BY MOUTH ONCE DAILY *EMERGENCY REFILL*, Disp-30 tablet, R-5Normal      glimepiride (AMARYL) 4 MG tablet TAKE 1 TABLET BY MOUTH TWICE DAILY *EMERGENCY REFILL*, Disp-60 tablet, R-5Normal      omeprazole (PRILOSEC) 20 MG delayed release capsule TAKE 1 CAPSULE BY MOUTH ONCE DAILY, Disp-30 capsule, R-10Normal      gabapentin (NEURONTIN) 400 MG capsule TAKE 1 CAPSULE BY MOUTH THREE TIMES DAILY, Disp-90 capsule, R-0Normal      sucralfate (CARAFATE) 1 GM tablet TAKE 1 TABLET BY MOUTH EVERY DAY, Disp-30 tablet, R-10Normal      busPIRone (BUSPAR) 10 MG tablet TAKE ONE (1) TABLET BY MOUTH TWICE DAILY, Disp-60 tablet, R-10Normal      insulin glargine (BASAGLAR KWIKPEN) 100 UNIT/ML injection pen Inject 65 units subcutaneously once daily. , Disp-15 pen, R-3Normal      miconazole (MICOTIN) 2 % cream APPLY TO AFFECTED AREA TWICE DAILY, Disp-1 each, R-1, Normal      lidocaine (XYLOCAINE) 2 % jelly Apply topically with dressing changes every 3 days, Disp-1 Tube, R-1, Normal      blood glucose monitor strips Test before meals and at bedtime. DX: DM, on insulin, Disp-100 strip,R-11, Normal      miconazole (ZEASORB-AF) 2 % powder Apply topically 2 times daily. , Disp-45 g,R-1Normal      blood glucose monitor kit and supplies Dispense sufficient amount for indicated testing frequency plus additional to accommodate PRN testing needs.  Dispense all needed supplies to include: monitor, strips, lancing device, lancets, control solutions, alcohol swabs., Disp-1 kit,R-0, Print      magnesium hydroxide (MILK OF MAGNESIA) 400 MG/5ML suspension Take 30 mLs by mouth daily as needed for Constipation, Disp-900 mL,R-0Normal      Handicap Placard MISC Disp-1 each, R-0, PrintIs a permanent condition. DX: M19.90      Insulin Pen Needle (B-D UF III MINI PEN NEEDLES) 31G X 5 MM MISC Disp-50 each, R-1, PrintUSE 1 PEN NEEDLE DAILY      [employer] Lancets Thin MISC Disp-100 each, R-11, NormalTest before meals and at bedtime. DX: DM, on insulin      Alcohol Swabs (ALCOHOL PREP PADS) by Other route 2 times daily  Historical Med           STOP taking these medications       GARLIC PO Comments:   Reason for Stopping:         APPLE CIDER VINEGAR PO Comments:   Reason for Stopping:         lisinopril (PRINIVIL;ZESTRIL) 10 MG tablet Comments:   Reason for Stopping:         carvedilol (COREG) 6.25 MG tablet Comments:   Reason for Stopping:         isosorbide mononitrate (IMDUR) 60 MG extended release tablet Comments:   Reason for Stopping:         amLODIPine (NORVASC) 5 MG tablet Comments:   Reason for Stopping:         acetaminophen (TYLENOL) 500 MG tablet Comments:   Reason for Stopping:         melatonin 10 MG CAPS capsule Comments:   Reason for Stopping:         nitroGLYCERIN (NITROSTAT) 0.4 MG SL tablet Comments:   Reason for Stopping:         MULTIPLE VITAMIN PO Comments:   Reason for Stopping:             Activity: activity as tolerated  Diet: diabetic diet  Wound Care: keep wound clean and dry    Follow-up with clinic in 2 weeks. Signed:   QUENTIN Craven    12/15/2022  2:01 PM

## 2022-12-21 ENCOUNTER — OFFICE VISIT (OUTPATIENT)
Dept: CARDIOTHORACIC SURGERY | Age: 67
End: 2022-12-21

## 2022-12-21 VITALS
OXYGEN SATURATION: 97 % | DIASTOLIC BLOOD PRESSURE: 51 MMHG | TEMPERATURE: 97.3 F | HEART RATE: 69 BPM | SYSTOLIC BLOOD PRESSURE: 102 MMHG | HEIGHT: 59 IN | WEIGHT: 252 LBS | RESPIRATION RATE: 16 BRPM | BODY MASS INDEX: 50.8 KG/M2

## 2022-12-21 DIAGNOSIS — T81.49XA INCISIONAL INFECTION: Primary | ICD-10-CM

## 2022-12-21 PROCEDURE — 99024 POSTOP FOLLOW-UP VISIT: CPT | Performed by: NURSE PRACTITIONER

## 2022-12-21 RX ORDER — POTASSIUM CHLORIDE 750 MG/1
TABLET, FILM COATED, EXTENDED RELEASE ORAL
COMMUNITY
Start: 2022-12-20

## 2022-12-21 RX ORDER — SULFAMETHOXAZOLE AND TRIMETHOPRIM 400; 80 MG/1; MG/1
1 TABLET ORAL 2 TIMES DAILY
Qty: 14 TABLET | Refills: 0 | Status: SHIPPED | OUTPATIENT
Start: 2022-12-21 | End: 2022-12-28

## 2022-12-21 RX ORDER — TRAMADOL HYDROCHLORIDE 50 MG/1
TABLET ORAL
COMMUNITY
Start: 2022-12-20

## 2022-12-21 RX ORDER — TRAZODONE HYDROCHLORIDE 50 MG/1
TABLET ORAL
COMMUNITY
Start: 2022-12-20

## 2022-12-21 RX ORDER — AMLODIPINE BESYLATE 5 MG/1
TABLET ORAL
COMMUNITY
Start: 2022-12-20

## 2022-12-21 RX ORDER — ISOSORBIDE MONONITRATE 60 MG/1
TABLET, EXTENDED RELEASE ORAL
COMMUNITY
Start: 2022-12-20

## 2022-12-21 RX ORDER — DOXYCYCLINE 100 MG/1
CAPSULE ORAL
COMMUNITY
Start: 2022-12-20

## 2022-12-21 RX ORDER — FLUTICASONE FUROATE AND VILANTEROL TRIFENATATE 100; 25 UG/1; UG/1
POWDER RESPIRATORY (INHALATION)
COMMUNITY
Start: 2022-12-20

## 2022-12-21 NOTE — PROGRESS NOTES
[id number] [facility]  Office Visit      Subjective:  Ms. Ella Marsh is a 78 y.o. female s/p patient came back to cardiothoracic surgery for an incisional wound check. Sternal incision looks like it is healing well. No discharge noted. Patient finished antibiotic. She will continue to monitor her blood sugars to help with healing. No sign of dehiscence. Denies any nausea vomiting diarrhea fever chills at home    Physical Exam  Vitals:  Vitals:    12/21/22 1108   BP: (!) 102/51   Pulse: 69   Resp: 16   Temp: 97.3 °F (36.3 °C)   SpO2: 97%       General: Alert and Oriented x3. Ambulatory. No apparent distress. Chest:  No abnormality. Equal and symmetric expansion with respiration. Lungs:  Clear to auscultation. Cardiac:  Regular rate and rhythm without murmurs, rubs or gallops. Abdomen:  Soft, non-tender, normoactive bowel sounds. Extremities:  No edema. Intact pulses in all four extremities. Psychiatric: Mood and affect are appropriate. Media Information  Document Information    Wound Care Image:  Wound   12/21/22 12/21/2022 11:29   Attached To:    Office Visit on 12/21/22 with SCHEDULE, [facility] CT SURG     Source Information    HAO Walker - NP  [facility] S[facility]       Current Medications:    Current Outpatient Medications:     amLODIPine (NORVASC) 5 MG tablet, , Disp: , Rfl:     doxycycline monohydrate (MONODOX) 100 MG capsule, , Disp: , Rfl:     BREO ELLIPTA 100-25 MCG/ACT AEPB, , Disp: , Rfl:     isosorbide mononitrate (IMDUR) 60 MG extended release tablet, , Disp: , Rfl:     potassium chloride (KLOR-CON) 10 MEQ extended release tablet, , Disp: , Rfl:     traMADol (ULTRAM) 50 MG tablet, , Disp: , Rfl:     traZODone (DESYREL) 50 MG tablet, , Disp: , Rfl:     aspirin 81 MG EC tablet, Take 1 tablet by mouth daily, Disp: 30 tablet, Rfl: 3    amiodarone (CORDARONE) 200 MG tablet, Take 1 tablet by mouth 3 times daily, Disp: 90 tablet, Rfl: 0    metoprolol tartrate (LOPRESSOR) 25 MG tablet, Take 0.5 tablets by mouth 2 times daily, Disp: 60 tablet, Rfl: 3    clopidogrel (PLAVIX) 75 MG tablet, Take 1 tablet by mouth daily, Disp: 30 tablet, Rfl: 3    furosemide (LASIX) 40 MG tablet, Take 1 tablet by mouth 2 times daily, Disp: 60 tablet, Rfl: 3    potassium chloride (KLOR-CON M) 10 MEQ extended release tablet, Take 2 tablets by mouth daily, Disp: 90 tablet, Rfl: 1    atorvastatin (LIPITOR) 40 MG tablet, Take 1 tablet by mouth nightly, Disp: 90 tablet, Rfl: 1    empagliflozin (JARDIANCE) 10 MG tablet, TAKE 1 TABLET BY MOUTH EVERY DAY, Disp: 30 tablet, Rfl: 2    levothyroxine (SYNTHROID) 50 MCG tablet, TAKE 1 TABLET BY MOUTH EVERY DAY, Disp: 60 tablet, Rfl: 2    citalopram (CELEXA) 40 MG tablet, TAKE 1 TABLET BY MOUTH ONCE DAILY *EMERGENCY REFILL*, Disp: 30 tablet, Rfl: 5    glimepiride (AMARYL) 4 MG tablet, TAKE 1 TABLET BY MOUTH TWICE DAILY *EMERGENCY REFILL*, Disp: 60 tablet, Rfl: 5    omeprazole (PRILOSEC) 20 MG delayed release capsule, TAKE 1 CAPSULE BY MOUTH ONCE DAILY, Disp: 30 capsule, Rfl: 10    gabapentin (NEURONTIN) 400 MG capsule, TAKE 1 CAPSULE BY MOUTH THREE TIMES DAILY, Disp: 90 capsule, Rfl: 0    sucralfate (CARAFATE) 1 GM tablet, TAKE 1 TABLET BY MOUTH EVERY DAY, Disp: 30 tablet, Rfl: 10    busPIRone (BUSPAR) 10 MG tablet, TAKE ONE (1) TABLET BY MOUTH TWICE DAILY, Disp: 60 tablet, Rfl: 10    insulin glargine (BASAGLAR KWIKPEN) 100 UNIT/ML injection pen, Inject 65 units subcutaneously once daily. , Disp: 15 pen, Rfl: 3    miconazole (MICOTIN) 2 % cream, APPLY TO AFFECTED AREA TWICE DAILY, Disp: 1 each, Rfl: 1    lidocaine (XYLOCAINE) 2 % jelly, Apply topically with dressing changes every 3 days, Disp: 1 Tube, Rfl: 1    blood glucose monitor strips, Test before meals and at bedtime. DX: DM, on insulin, Disp: 100 strip, Rfl: 11    miconazole (ZEASORB-AF) 2 % powder, Apply topically 2 times daily. , Disp: 45 g, Rfl: 1    blood glucose monitor kit and supplies, Dispense sufficient amount for indicated testing frequency plus additional to accommodate PRN testing needs. Dispense all needed supplies to include: monitor, strips, lancing device, lancets, control solutions, alcohol swabs., Disp: 1 kit, Rfl: 0    magnesium hydroxide (MILK OF MAGNESIA) 400 MG/5ML suspension, Take 30 mLs by mouth daily as needed for Constipation, Disp: 900 mL, Rfl: 0    Handicap Placard MISC, Is a permanent condition. DX: M19.90, Disp: 1 each, Rfl: 0    Insulin Pen Needle (B-D UF III MINI PEN NEEDLES) 31G X 5 MM MISC, USE 1 PEN NEEDLE DAILY, Disp: 50 each, Rfl: 1    Kroger Lancets Thin MISC, Test before meals and at bedtime. DX: DM, on insulin, Disp: 100 each, Rfl: 11    Alcohol Swabs (ALCOHOL PREP PADS), by Other route 2 times daily  , Disp: , Rfl:     Past Surgical History:   Procedure Laterality Date    APPENDECTOMY      CARDIAC CATHETERIZATION      2 stents    CARDIAC CATHETERIZATION  11/01/2022    COLONOSCOPY      CORONARY ANGIOPLASTY WITH STENT PLACEMENT  07/2020    CORONARY ARTERY BYPASS GRAFT N/A 11/28/2022    CABG CORONARY ARTERY BYPASS X3 ON PUMP , ATA, ENDO VEIN HARVEST performed by Dieudonne Fulton MD at [address] [facility] (CERVIX STATUS UNKNOWN)      THROAT SURGERY      POLYPS REMOVED FROM VOCAL CORD    TOTAL KNEE ARTHROPLASTY Right 01/06/2015    TOTAL KNEE ARTHROPLASTY Left 05/26/2015    UPPER GASTROINTESTINAL ENDOSCOPY         Social Hx: reports that she has never smoked. She has never used smokeless tobacco.    Assessment & Plan:   Bactrim p.o. started to further enable healing  Patient has noted excoriation from allergy to the bandages being used. Again no open wounds no drainage with palpation to surgical site. She will continue to wear a bra and keep the area clean and dry. She will follow-up with us if there is any further complications.     [address] [facility], APRN - NP,APRN CNP

## 2022-12-29 ENCOUNTER — OFFICE VISIT (OUTPATIENT)
Dept: FAMILY MEDICINE CLINIC | Age: 67
End: 2022-12-29

## 2022-12-29 VITALS
HEART RATE: 66 BPM | BODY MASS INDEX: 50.9 KG/M2 | DIASTOLIC BLOOD PRESSURE: 52 MMHG | SYSTOLIC BLOOD PRESSURE: 112 MMHG | WEIGHT: 252 LBS

## 2022-12-29 DIAGNOSIS — I10 ESSENTIAL HYPERTENSION: Primary | ICD-10-CM

## 2022-12-29 DIAGNOSIS — G47.33 OSA (OBSTRUCTIVE SLEEP APNEA): ICD-10-CM

## 2022-12-29 DIAGNOSIS — Z95.1 HX OF CABG: ICD-10-CM

## 2022-12-29 RX ORDER — INSULIN LISPRO 100 [IU]/ML
3 INJECTION, SOLUTION INTRAVENOUS; SUBCUTANEOUS
Qty: 1 ADJUSTABLE DOSE PRE-FILLED PEN SYRINGE | Refills: 5 | Status: SHIPPED | OUTPATIENT
Start: 2022-12-29

## 2022-12-29 SDOH — ECONOMIC STABILITY: FOOD INSECURITY: WITHIN THE PAST 12 MONTHS, YOU WORRIED THAT YOUR FOOD WOULD RUN OUT BEFORE YOU GOT MONEY TO BUY MORE.: NEVER TRUE

## 2022-12-29 SDOH — ECONOMIC STABILITY: FOOD INSECURITY: WITHIN THE PAST 12 MONTHS, THE FOOD YOU BOUGHT JUST DIDN'T LAST AND YOU DIDN'T HAVE MONEY TO GET MORE.: NEVER TRUE

## 2022-12-29 ASSESSMENT — ENCOUNTER SYMPTOMS
COUGH: 0
ABDOMINAL PAIN: 0
SHORTNESS OF BREATH: 0

## 2022-12-29 ASSESSMENT — PATIENT HEALTH QUESTIONNAIRE - PHQ9
5. POOR APPETITE OR OVEREATING: 1
6. FEELING BAD ABOUT YOURSELF - OR THAT YOU ARE A FAILURE OR HAVE LET YOURSELF OR YOUR FAMILY DOWN: 0
3. TROUBLE FALLING OR STAYING ASLEEP: 1
2. FEELING DOWN, DEPRESSED OR HOPELESS: 1
10. IF YOU CHECKED OFF ANY PROBLEMS, HOW DIFFICULT HAVE THESE PROBLEMS MADE IT FOR YOU TO DO YOUR WORK, TAKE CARE OF THINGS AT HOME, OR GET ALONG WITH OTHER PEOPLE: 0
4. FEELING TIRED OR HAVING LITTLE ENERGY: 1
SUM OF ALL RESPONSES TO PHQ QUESTIONS 1-9: 5
8. MOVING OR SPEAKING SO SLOWLY THAT OTHER PEOPLE COULD HAVE NOTICED. OR THE OPPOSITE, BEING SO FIGETY OR RESTLESS THAT YOU HAVE BEEN MOVING AROUND A LOT MORE THAN USUAL: 0
7. TROUBLE CONCENTRATING ON THINGS, SUCH AS READING THE NEWSPAPER OR WATCHING TELEVISION: 0
SUM OF ALL RESPONSES TO PHQ QUESTIONS 1-9: 5
9. THOUGHTS THAT YOU WOULD BE BETTER OFF DEAD, OR OF HURTING YOURSELF: 0
SUM OF ALL RESPONSES TO PHQ QUESTIONS 1-9: 5
1. LITTLE INTEREST OR PLEASURE IN DOING THINGS: 1
SUM OF ALL RESPONSES TO PHQ QUESTIONS 1-9: 5
SUM OF ALL RESPONSES TO PHQ9 QUESTIONS 1 & 2: 2

## 2022-12-29 ASSESSMENT — SOCIAL DETERMINANTS OF HEALTH (SDOH): HOW HARD IS IT FOR YOU TO PAY FOR THE VERY BASICS LIKE FOOD, HOUSING, MEDICAL CARE, AND HEATING?: NOT HARD AT ALL

## 2022-12-29 NOTE — PATIENT INSTRUCTIONS
Thank you for letting us take care of you today. We hope all your questions were addressed. If a question was overlooked or something else comes to mind after you return home, please contact a member of your Care Team listed below. Your Care Team at [place] [address] is Team #4  Magy Garcia MD (Faculty)  Kash Pierce MD (Resident)  Tiffanie Altman MD (Resident)  Joey Wright MD (Resident)  Surya Walker MD (Resident)  Angelika KERN,GIUSEPPE Khalil., LUIS Mahan., Heydi King., [facility] office)  Joann Basilio, [address] [facility] (Clinical Practice Manager)  Jen Martinez, [address] [place] [address] (Clinical Pharmacist)       Office phone number: [phone]    If you need to get in right away due to illness, please be advised we have \"Same Day\" appointments available Monday-Friday. Please call us at [phone] option #3 to schedule your \"Same Day\" appointment.

## 2022-12-29 NOTE — PROGRESS NOTES
Subjective:    Clarita Marcano is a 78 y.o. female with  has a past medical history of Ambulates with cane, Anxiety, Borderline hypertension, CAD (coronary artery disease), Depression, GERD (gastroesophageal reflux disease), Heart attack ([facility] [address] Hypertension, Hypothyroidism, Neuropathy, Obesity, Osteoarthritis, Other cirrhosis of liver ([facility] [address] Sleep apnea, Type II or unspecified type diabetes mellitus without mention of complication, not stated as uncontrolled, Under care of service provider, Under care of service provider, and Vertebrogenic low back pain. Presented to the office today for:  Chief Complaint   Patient presents with    Follow-Up from Hospital     Post-op follow up       HPI    This is 78years old female presents to the office today for follow-up after recent surgery    S/p CABG 1 month ago  -Seen by cardiothoracic surgeon 1 week ago  -Denies chest pain, shortness of breath  -Currently on 2 L nasal cannula all the time  -Able to ambulate with walker and sleeps comfortably  -Compliant with medication,  caregiver  -Complaining from yellow discharge of the surgical wound in between the breast and some irritation.  -Denied fever, chills. -Does not have an appointment with a cardiologist  -Requested review of the medications      Review of Systems   Constitutional:  Negative for activity change, appetite change, fatigue and fever. Respiratory:  Negative for cough and shortness of breath. Cardiovascular:  Negative for chest pain (chest wall soreness from recent surgical scar), palpitations and leg swelling. Gastrointestinal:  Negative for abdominal pain. Genitourinary:  Negative for difficulty urinating. Skin:  Positive for wound (from surgery with yeloow discharge). Neurological:  Negative for headaches.                The patient has a   Family History   Problem Relation Age of Onset    Heart Disease Mother     Arthritis Mother     Heart Disease Father     Arthritis Father Cancer Father         \"oral\"    Diabetes Sister         gestational       Objective:    BP (!) 112/52 (Site: Left Upper Arm, Position: Sitting, Cuff Size: Medium Adult)   Pulse 66   Wt 252 lb (114.3 kg)   BMI 50.90 kg/m²    BP Readings from Last 3 Encounters:   12/29/22 (!) 112/52   12/21/22 (!) 102/51   12/05/22 133/78       Physical Exam  Constitutional:       Comments: On 2 L nasal cannula saturate at 97%. Cardiovascular:      Rate and Rhythm: Normal rate and regular rhythm. Pulses: Normal pulses. Heart sounds: Normal heart sounds. Pulmonary:      Effort: Pulmonary effort is normal.      Breath sounds: Normal breath sounds. Musculoskeletal:      Right lower leg: No edema. Left lower leg: No edema. Skin:     Comments: Scar from recent CABG, healing well with mild yellow discharge at the inferior part of the wound. No obvious erythema or warmth around the wound. Lab Results   Component Value Date    WBC 10.1 12/04/2022    HGB 10.5 (L) 12/04/2022    HCT 33.7 (L) 12/04/2022     12/04/2022    CHOL 110 02/17/2022    TRIG 88 02/17/2022    HDL 47 02/17/2022    ALT 21 07/29/2020    AST 27 07/29/2020     12/04/2022    K 4.0 12/04/2022    CL 98 12/04/2022    CREATININE 0.54 12/04/2022    BUN 16 12/04/2022    CO2 31 12/04/2022    TSH 2.49 02/17/2022    INR 1.1 11/30/2022    LABA1C 7.5 (H) 11/18/2022    LABMICR 39 (H) 02/17/2022     Lab Results   Component Value Date    CALCIUM 8.4 (L) 12/04/2022     Lab Results   Component Value Date    LDLCHOLESTEROL 45 02/17/2022       Assessment and Plan:    1. Essential hypertension  -Well controlled 112/52 and heart rate of 66  -Continue lisinopril and metoprolol. Patient instructed to measure her blood pressure on daily basis. Patient to skip lisinopril if the blood pressure below 338 systolic. Also patient to skip lisinopril if she developed orthostatic hypotension.     2. CAD s/p CABG  -Follows with cardiothoracic surgeon, seen last week by NP who started her on Bactrim and documented good wound healing.   -Mildly low discharge from the wound of the parts in between breasts. Instructed the patient to keep the area dry.  -On 2 L nasal cannula saturating well at 97. Without oxygen while patient talking shows a still saturating above 96. Patient instructed to wear the oxygen during exertion/walking and during sleep. No need to wear it at rest.  Patient unsure who put her on 2 L of nasal cannula I given her history of SHERITA we will keep the 2 L at night and during exertion and follow-up back with the patient.  -Continue aspirin, atorvastatin, metoprolol, and lisinopril. Patient is compliant with medication. Requested Prescriptions     Signed Prescriptions Disp Refills    insulin lispro, 1 Unit Dial, (HUMALOG KWIKPEN) 100 UNIT/ML SOPN 1 Adjustable Dose Pre-filled Pen Syringe 5     Sig: Inject 3 Units into the skin 3 times daily (before meals) 125 - 150 2 units   151 - 200 4 units   201 - 250 6 units   251 - 300 8 units  301 - 350 10 units 351 - 400 12 units       There are no discontinued medications. Tati Chambers received counseling on the following healthy behaviors: nutrition, exercise and medication adherence    Discussed use,benefit, and side effects of prescribed medications. Barriers to medication compliance addressed. All patient questions answered. Pt voiced understanding. Return in about 1 week (around 1/5/2023) for phone visit. Disclaimer: Some orall of this note was transcribed using voice-recognition software. This may cause typographical errors occasionally. Although all effort is made to fix these errors, please do not hesitate to contact our office if there Koko Sarabia concern with the understanding of this note.

## 2022-12-30 ENCOUNTER — TELEPHONE (OUTPATIENT)
Dept: FAMILY MEDICINE CLINIC | Age: 67
End: 2022-12-30

## 2022-12-30 DIAGNOSIS — N76.0 VULVOVAGINITIS: Primary | ICD-10-CM

## 2022-12-30 RX ORDER — FLUCONAZOLE 150 MG/1
150 TABLET ORAL
Qty: 3 TABLET | Refills: 0 | Status: SHIPPED | OUTPATIENT
Start: 2022-12-30 | End: 2023-01-06

## 2022-12-30 NOTE — TELEPHONE ENCOUNTER
Pt was seen on 12-29-22 pt forgot to discuss at appt that she is having burning with urination and vaginal itching. Please advise, if medication could be call into pharmacy, pharmacy [facility].

## 2023-01-06 NOTE — TELEPHONE ENCOUNTER
Writer spoke to pt  and he stated pt did  medication. Pt didn't get urine samples done.  will inform pt about urine samples to get done.

## 2023-01-12 ENCOUNTER — TELEPHONE (OUTPATIENT)
Dept: FAMILY MEDICINE CLINIC | Age: 68
End: 2023-01-12

## 2023-01-12 ENCOUNTER — HOSPITAL ENCOUNTER (INPATIENT)
Age: 68
LOS: 8 days | Discharge: NURSING, REHAB OR LONG-TERM CARE | End: 2023-01-20
Attending: EMERGENCY MEDICINE | Admitting: FAMILY MEDICINE
Payer: MEDICARE

## 2023-01-12 ENCOUNTER — APPOINTMENT (OUTPATIENT)
Dept: CT IMAGING | Age: 68
End: 2023-01-12
Payer: MEDICARE

## 2023-01-12 ENCOUNTER — APPOINTMENT (OUTPATIENT)
Dept: GENERAL RADIOLOGY | Age: 68
End: 2023-01-12
Payer: MEDICARE

## 2023-01-12 ENCOUNTER — TELEMEDICINE (OUTPATIENT)
Dept: FAMILY MEDICINE CLINIC | Age: 68
End: 2023-01-12
Payer: MEDICARE

## 2023-01-12 DIAGNOSIS — J90 PLEURAL EFFUSION ON LEFT: Primary | ICD-10-CM

## 2023-01-12 DIAGNOSIS — Z95.1 S/P CABG (CORONARY ARTERY BYPASS GRAFT): ICD-10-CM

## 2023-01-12 DIAGNOSIS — B99.9 INFECTION: ICD-10-CM

## 2023-01-12 DIAGNOSIS — R07.9 CHEST PAIN, UNSPECIFIED TYPE: Primary | ICD-10-CM

## 2023-01-12 DIAGNOSIS — T81.49XA INCISIONAL INFECTION: ICD-10-CM

## 2023-01-12 DIAGNOSIS — J18.9 COMMUNITY ACQUIRED PNEUMONIA OF LEFT LOWER LOBE OF LUNG: ICD-10-CM

## 2023-01-12 DIAGNOSIS — L02.213 ABSCESS OF STERNAL REGION: ICD-10-CM

## 2023-01-12 LAB
ABSOLUTE EOS #: 0.05 K/UL (ref 0–0.44)
ABSOLUTE IMMATURE GRANULOCYTE: 0.1 K/UL (ref 0–0.3)
ABSOLUTE LYMPH #: 0.79 K/UL (ref 1.1–3.7)
ABSOLUTE MONO #: 1.34 K/UL (ref 0.1–1.2)
ALBUMIN SERPL-MCNC: 2.7 G/DL (ref 3.5–5.2)
ALBUMIN/GLOBULIN RATIO: 0.6 (ref 1–2.5)
ALP BLD-CCNC: 143 U/L (ref 35–104)
ALT SERPL-CCNC: 24 U/L (ref 5–33)
ANION GAP SERPL CALCULATED.3IONS-SCNC: 19 MMOL/L (ref 9–17)
AST SERPL-CCNC: 37 U/L
BASOPHILS # BLD: 1 % (ref 0–2)
BASOPHILS ABSOLUTE: 0.08 K/UL (ref 0–0.2)
BILIRUB SERPL-MCNC: 0.7 MG/DL (ref 0.3–1.2)
BUN BLDV-MCNC: 22 MG/DL (ref 8–23)
CALCIUM SERPL-MCNC: 9.6 MG/DL (ref 8.6–10.4)
CHLORIDE BLD-SCNC: 95 MMOL/L (ref 98–107)
CO2: 23 MMOL/L (ref 20–31)
CREAT SERPL-MCNC: 0.77 MG/DL (ref 0.5–0.9)
EOSINOPHILS RELATIVE PERCENT: 0 % (ref 1–4)
GFR SERPL CREATININE-BSD FRML MDRD: >60 ML/MIN/1.73M2
GLUCOSE BLD-MCNC: 260 MG/DL (ref 65–105)
GLUCOSE BLD-MCNC: 294 MG/DL (ref 70–99)
HCT VFR BLD CALC: 46.8 % (ref 36.3–47.1)
HEMOGLOBIN: 13.1 G/DL (ref 11.9–15.1)
IMMATURE GRANULOCYTES: 1 %
INR BLD: 1.1
LACTIC ACID, SEPSIS WHOLE BLOOD: 2.6 MMOL/L (ref 0.5–1.9)
LACTIC ACID, SEPSIS WHOLE BLOOD: 4.7 MMOL/L (ref 0.5–1.9)
LIPASE: 61 U/L (ref 13–60)
LYMPHOCYTES # BLD: 5 % (ref 24–43)
MCH RBC QN AUTO: 27.9 PG (ref 25.2–33.5)
MCHC RBC AUTO-ENTMCNC: 28 G/DL (ref 28.4–34.8)
MCV RBC AUTO: 99.6 FL (ref 82.6–102.9)
MONOCYTES # BLD: 8 % (ref 3–12)
NRBC AUTOMATED: 0 PER 100 WBC
PDW BLD-RTO: 13.6 % (ref 11.8–14.4)
PLATELET # BLD: 258 K/UL (ref 138–453)
PMV BLD AUTO: 11.1 FL (ref 8.1–13.5)
POTASSIUM SERPL-SCNC: 5 MMOL/L (ref 3.7–5.3)
PRO-BNP: 1814 PG/ML
PROCALCITONIN: 0.53 NG/ML
PROTHROMBIN TIME: 11.2 SEC (ref 9.1–12.3)
RBC # BLD: 4.7 M/UL (ref 3.95–5.11)
RBC # BLD: ABNORMAL 10*6/UL
SEG NEUTROPHILS: 86 % (ref 36–65)
SEGMENTED NEUTROPHILS ABSOLUTE COUNT: 14.3 K/UL (ref 1.5–8.1)
SODIUM BLD-SCNC: 137 MMOL/L (ref 135–144)
TOTAL PROTEIN: 7.1 G/DL (ref 6.4–8.3)
TROPONIN, HIGH SENSITIVITY: 36 NG/L (ref 0–14)
TROPONIN, HIGH SENSITIVITY: 37 NG/L (ref 0–14)
WBC # BLD: 16.7 K/UL (ref 3.5–11.3)

## 2023-01-12 PROCEDURE — 1200000000 HC SEMI PRIVATE

## 2023-01-12 PROCEDURE — 36415 COLL VENOUS BLD VENIPUNCTURE: CPT

## 2023-01-12 PROCEDURE — 83690 ASSAY OF LIPASE: CPT

## 2023-01-12 PROCEDURE — 85610 PROTHROMBIN TIME: CPT

## 2023-01-12 PROCEDURE — 93005 ELECTROCARDIOGRAM TRACING: CPT | Performed by: STUDENT IN AN ORGANIZED HEALTH CARE EDUCATION/TRAINING PROGRAM

## 2023-01-12 PROCEDURE — 99285 EMERGENCY DEPT VISIT HI MDM: CPT

## 2023-01-12 PROCEDURE — 83880 ASSAY OF NATRIURETIC PEPTIDE: CPT

## 2023-01-12 PROCEDURE — 6370000000 HC RX 637 (ALT 250 FOR IP)

## 2023-01-12 PROCEDURE — 80053 COMPREHEN METABOLIC PANEL: CPT

## 2023-01-12 PROCEDURE — 6360000002 HC RX W HCPCS: Performed by: STUDENT IN AN ORGANIZED HEALTH CARE EDUCATION/TRAINING PROGRAM

## 2023-01-12 PROCEDURE — 87040 BLOOD CULTURE FOR BACTERIA: CPT

## 2023-01-12 PROCEDURE — 6360000004 HC RX CONTRAST MEDICATION: Performed by: STUDENT IN AN ORGANIZED HEALTH CARE EDUCATION/TRAINING PROGRAM

## 2023-01-12 PROCEDURE — 2580000003 HC RX 258

## 2023-01-12 PROCEDURE — 82947 ASSAY GLUCOSE BLOOD QUANT: CPT

## 2023-01-12 PROCEDURE — 83605 ASSAY OF LACTIC ACID: CPT

## 2023-01-12 PROCEDURE — 85025 COMPLETE CBC W/AUTO DIFF WBC: CPT

## 2023-01-12 PROCEDURE — 71260 CT THORAX DX C+: CPT

## 2023-01-12 PROCEDURE — 84484 ASSAY OF TROPONIN QUANT: CPT

## 2023-01-12 PROCEDURE — 71045 X-RAY EXAM CHEST 1 VIEW: CPT

## 2023-01-12 PROCEDURE — 84145 PROCALCITONIN (PCT): CPT

## 2023-01-12 PROCEDURE — 6360000002 HC RX W HCPCS

## 2023-01-12 PROCEDURE — 96365 THER/PROPH/DIAG IV INF INIT: CPT

## 2023-01-12 PROCEDURE — 99442 PR PHYS/QHP TELEPHONE EVALUATION 11-20 MIN: CPT

## 2023-01-12 PROCEDURE — 2580000003 HC RX 258: Performed by: STUDENT IN AN ORGANIZED HEALTH CARE EDUCATION/TRAINING PROGRAM

## 2023-01-12 PROCEDURE — 96375 TX/PRO/DX INJ NEW DRUG ADDON: CPT

## 2023-01-12 RX ORDER — POTASSIUM CHLORIDE 7.45 MG/ML
10 INJECTION INTRAVENOUS PRN
Status: DISCONTINUED | OUTPATIENT
Start: 2023-01-12 | End: 2023-01-20 | Stop reason: HOSPADM

## 2023-01-12 RX ORDER — POLYETHYLENE GLYCOL 3350 17 G/17G
17 POWDER, FOR SOLUTION ORAL DAILY PRN
Status: DISCONTINUED | OUTPATIENT
Start: 2023-01-12 | End: 2023-01-14

## 2023-01-12 RX ORDER — INSULIN GLARGINE 100 [IU]/ML
45 INJECTION, SOLUTION SUBCUTANEOUS NIGHTLY
Status: DISCONTINUED | OUTPATIENT
Start: 2023-01-12 | End: 2023-01-13

## 2023-01-12 RX ORDER — ENOXAPARIN SODIUM 100 MG/ML
30 INJECTION SUBCUTANEOUS 2 TIMES DAILY
Status: DISCONTINUED | OUTPATIENT
Start: 2023-01-12 | End: 2023-01-14

## 2023-01-12 RX ORDER — ASPIRIN 81 MG/1
81 TABLET ORAL DAILY
Status: DISCONTINUED | OUTPATIENT
Start: 2023-01-13 | End: 2023-01-20 | Stop reason: HOSPADM

## 2023-01-12 RX ORDER — ISOSORBIDE MONONITRATE 60 MG/1
60 TABLET, EXTENDED RELEASE ORAL DAILY
Status: DISCONTINUED | OUTPATIENT
Start: 2023-01-13 | End: 2023-01-20 | Stop reason: HOSPADM

## 2023-01-12 RX ORDER — ACETAMINOPHEN 325 MG/1
650 TABLET ORAL EVERY 6 HOURS PRN
Status: DISCONTINUED | OUTPATIENT
Start: 2023-01-12 | End: 2023-01-20 | Stop reason: HOSPADM

## 2023-01-12 RX ORDER — ACETAMINOPHEN 650 MG/1
650 SUPPOSITORY RECTAL EVERY 6 HOURS PRN
Status: DISCONTINUED | OUTPATIENT
Start: 2023-01-12 | End: 2023-01-20 | Stop reason: HOSPADM

## 2023-01-12 RX ORDER — SODIUM CHLORIDE 9 MG/ML
INJECTION, SOLUTION INTRAVENOUS PRN
Status: DISCONTINUED | OUTPATIENT
Start: 2023-01-12 | End: 2023-01-20 | Stop reason: HOSPADM

## 2023-01-12 RX ORDER — BUSPIRONE HYDROCHLORIDE 10 MG/1
10 TABLET ORAL 2 TIMES DAILY
Status: DISCONTINUED | OUTPATIENT
Start: 2023-01-12 | End: 2023-01-20 | Stop reason: HOSPADM

## 2023-01-12 RX ORDER — LEVOTHYROXINE SODIUM 0.05 MG/1
50 TABLET ORAL DAILY
Status: DISCONTINUED | OUTPATIENT
Start: 2023-01-13 | End: 2023-01-20 | Stop reason: HOSPADM

## 2023-01-12 RX ORDER — ONDANSETRON 2 MG/ML
4 INJECTION INTRAMUSCULAR; INTRAVENOUS ONCE
Status: COMPLETED | OUTPATIENT
Start: 2023-01-12 | End: 2023-01-12

## 2023-01-12 RX ORDER — SODIUM CHLORIDE 0.9 % (FLUSH) 0.9 %
5-40 SYRINGE (ML) INJECTION PRN
Status: DISCONTINUED | OUTPATIENT
Start: 2023-01-12 | End: 2023-01-14

## 2023-01-12 RX ORDER — ONDANSETRON 4 MG/1
4 TABLET, ORALLY DISINTEGRATING ORAL EVERY 8 HOURS PRN
Status: DISCONTINUED | OUTPATIENT
Start: 2023-01-12 | End: 2023-01-14

## 2023-01-12 RX ORDER — SODIUM CHLORIDE 0.9 % (FLUSH) 0.9 %
5-40 SYRINGE (ML) INJECTION EVERY 12 HOURS SCHEDULED
Status: DISCONTINUED | OUTPATIENT
Start: 2023-01-12 | End: 2023-01-14

## 2023-01-12 RX ORDER — BUDESONIDE AND FORMOTEROL FUMARATE DIHYDRATE 80; 4.5 UG/1; UG/1
2 AEROSOL RESPIRATORY (INHALATION) 2 TIMES DAILY
Status: DISCONTINUED | OUTPATIENT
Start: 2023-01-12 | End: 2023-01-20 | Stop reason: HOSPADM

## 2023-01-12 RX ORDER — INSULIN LISPRO 100 [IU]/ML
0-16 INJECTION, SOLUTION INTRAVENOUS; SUBCUTANEOUS
Status: DISCONTINUED | OUTPATIENT
Start: 2023-01-13 | End: 2023-01-20 | Stop reason: HOSPADM

## 2023-01-12 RX ORDER — 0.9 % SODIUM CHLORIDE 0.9 %
1000 INTRAVENOUS SOLUTION INTRAVENOUS ONCE
Status: COMPLETED | OUTPATIENT
Start: 2023-01-12 | End: 2023-01-12

## 2023-01-12 RX ORDER — CITALOPRAM 20 MG/1
40 TABLET ORAL DAILY
Status: DISCONTINUED | OUTPATIENT
Start: 2023-01-13 | End: 2023-01-20 | Stop reason: HOSPADM

## 2023-01-12 RX ORDER — OXYCODONE HYDROCHLORIDE 5 MG/1
5 TABLET ORAL ONCE
Status: COMPLETED | OUTPATIENT
Start: 2023-01-12 | End: 2023-01-12

## 2023-01-12 RX ORDER — PANTOPRAZOLE SODIUM 40 MG/1
40 TABLET, DELAYED RELEASE ORAL
Status: DISCONTINUED | OUTPATIENT
Start: 2023-01-13 | End: 2023-01-14

## 2023-01-12 RX ORDER — GABAPENTIN 400 MG/1
400 CAPSULE ORAL 3 TIMES DAILY
Status: DISCONTINUED | OUTPATIENT
Start: 2023-01-12 | End: 2023-01-20 | Stop reason: HOSPADM

## 2023-01-12 RX ORDER — ONDANSETRON 2 MG/ML
4 INJECTION INTRAMUSCULAR; INTRAVENOUS EVERY 6 HOURS PRN
Status: DISCONTINUED | OUTPATIENT
Start: 2023-01-12 | End: 2023-01-14

## 2023-01-12 RX ORDER — FUROSEMIDE 40 MG/1
40 TABLET ORAL 2 TIMES DAILY
Status: DISCONTINUED | OUTPATIENT
Start: 2023-01-12 | End: 2023-01-14

## 2023-01-12 RX ORDER — ATORVASTATIN CALCIUM 40 MG/1
40 TABLET, FILM COATED ORAL NIGHTLY
Status: DISCONTINUED | OUTPATIENT
Start: 2023-01-12 | End: 2023-01-20 | Stop reason: HOSPADM

## 2023-01-12 RX ORDER — DEXTROSE MONOHYDRATE 100 MG/ML
INJECTION, SOLUTION INTRAVENOUS CONTINUOUS PRN
Status: DISCONTINUED | OUTPATIENT
Start: 2023-01-12 | End: 2023-01-14

## 2023-01-12 RX ORDER — AMIODARONE HYDROCHLORIDE 200 MG/1
200 TABLET ORAL 3 TIMES DAILY
Status: DISCONTINUED | OUTPATIENT
Start: 2023-01-12 | End: 2023-01-20 | Stop reason: HOSPADM

## 2023-01-12 RX ORDER — OXYCODONE HYDROCHLORIDE 5 MG/1
5 TABLET ORAL EVERY 6 HOURS PRN
Status: DISCONTINUED | OUTPATIENT
Start: 2023-01-12 | End: 2023-01-13 | Stop reason: SDUPTHER

## 2023-01-12 RX ORDER — TRAZODONE HYDROCHLORIDE 50 MG/1
50 TABLET ORAL NIGHTLY
Status: DISCONTINUED | OUTPATIENT
Start: 2023-01-12 | End: 2023-01-20 | Stop reason: HOSPADM

## 2023-01-12 RX ORDER — INSULIN LISPRO 100 [IU]/ML
0-4 INJECTION, SOLUTION INTRAVENOUS; SUBCUTANEOUS NIGHTLY
Status: DISCONTINUED | OUTPATIENT
Start: 2023-01-12 | End: 2023-01-20 | Stop reason: HOSPADM

## 2023-01-12 RX ORDER — POTASSIUM CHLORIDE 20 MEQ/1
40 TABLET, EXTENDED RELEASE ORAL PRN
Status: DISCONTINUED | OUTPATIENT
Start: 2023-01-12 | End: 2023-01-20 | Stop reason: HOSPADM

## 2023-01-12 RX ORDER — CLOPIDOGREL BISULFATE 75 MG/1
75 TABLET ORAL DAILY
Status: DISCONTINUED | OUTPATIENT
Start: 2023-01-13 | End: 2023-01-14

## 2023-01-12 RX ORDER — AMLODIPINE BESYLATE 5 MG/1
5 TABLET ORAL DAILY
Status: DISCONTINUED | OUTPATIENT
Start: 2023-01-13 | End: 2023-01-20 | Stop reason: HOSPADM

## 2023-01-12 RX ADMIN — GABAPENTIN 400 MG: 400 CAPSULE ORAL at 22:20

## 2023-01-12 RX ADMIN — ONDANSETRON 4 MG: 2 INJECTION INTRAMUSCULAR; INTRAVENOUS at 16:23

## 2023-01-12 RX ADMIN — TRAZODONE HYDROCHLORIDE 50 MG: 50 TABLET ORAL at 22:21

## 2023-01-12 RX ADMIN — OXYCODONE HYDROCHLORIDE 5 MG: 5 TABLET ORAL at 20:58

## 2023-01-12 RX ADMIN — ENOXAPARIN SODIUM 30 MG: 100 INJECTION SUBCUTANEOUS at 23:31

## 2023-01-12 RX ADMIN — SODIUM CHLORIDE 1000 ML: 9 INJECTION, SOLUTION INTRAVENOUS at 16:22

## 2023-01-12 RX ADMIN — AMIODARONE HYDROCHLORIDE 200 MG: 200 TABLET ORAL at 22:19

## 2023-01-12 RX ADMIN — IOPAMIDOL 100 ML: 755 INJECTION, SOLUTION INTRAVENOUS at 18:29

## 2023-01-12 RX ADMIN — VANCOMYCIN HYDROCHLORIDE 1750 MG: 10 INJECTION, POWDER, LYOPHILIZED, FOR SOLUTION INTRAVENOUS at 18:12

## 2023-01-12 RX ADMIN — METOPROLOL TARTRATE 12.5 MG: 25 TABLET ORAL at 22:41

## 2023-01-12 RX ADMIN — BUSPIRONE HYDROCHLORIDE 10 MG: 10 TABLET ORAL at 22:19

## 2023-01-12 RX ADMIN — INSULIN GLARGINE 45 UNITS: 100 INJECTION, SOLUTION SUBCUTANEOUS at 22:36

## 2023-01-12 RX ADMIN — PIPERACILLIN AND TAZOBACTAM 3375 MG: 3; .375 INJECTION, POWDER, FOR SOLUTION INTRAVENOUS at 17:32

## 2023-01-12 RX ADMIN — FUROSEMIDE 40 MG: 40 TABLET ORAL at 22:19

## 2023-01-12 RX ADMIN — SODIUM CHLORIDE, PRESERVATIVE FREE 10 ML: 5 INJECTION INTRAVENOUS at 22:50

## 2023-01-12 RX ADMIN — DESMOPRESSIN ACETATE 40 MG: 0.2 TABLET ORAL at 22:19

## 2023-01-12 ASSESSMENT — PAIN - FUNCTIONAL ASSESSMENT: PAIN_FUNCTIONAL_ASSESSMENT: 0-10

## 2023-01-12 ASSESSMENT — PAIN SCALES - GENERAL
PAINLEVEL_OUTOF10: 8
PAINLEVEL_OUTOF10: 10

## 2023-01-12 ASSESSMENT — PAIN DESCRIPTION - PAIN TYPE: TYPE: SURGICAL PAIN

## 2023-01-12 ASSESSMENT — PATIENT HEALTH QUESTIONNAIRE - PHQ9
SUM OF ALL RESPONSES TO PHQ QUESTIONS 1-9: 14
SUM OF ALL RESPONSES TO PHQ QUESTIONS 1-9: 14
10. IF YOU CHECKED OFF ANY PROBLEMS, HOW DIFFICULT HAVE THESE PROBLEMS MADE IT FOR YOU TO DO YOUR WORK, TAKE CARE OF THINGS AT HOME, OR GET ALONG WITH OTHER PEOPLE: 2
SUM OF ALL RESPONSES TO PHQ QUESTIONS 1-9: 14
SUM OF ALL RESPONSES TO PHQ QUESTIONS 1-9: 14
9. THOUGHTS THAT YOU WOULD BE BETTER OFF DEAD, OR OF HURTING YOURSELF: 0
4. FEELING TIRED OR HAVING LITTLE ENERGY: 3
7. TROUBLE CONCENTRATING ON THINGS, SUCH AS READING THE NEWSPAPER OR WATCHING TELEVISION: 2
6. FEELING BAD ABOUT YOURSELF - OR THAT YOU ARE A FAILURE OR HAVE LET YOURSELF OR YOUR FAMILY DOWN: 1
2. FEELING DOWN, DEPRESSED OR HOPELESS: 1
3. TROUBLE FALLING OR STAYING ASLEEP: 3
8. MOVING OR SPEAKING SO SLOWLY THAT OTHER PEOPLE COULD HAVE NOTICED. OR THE OPPOSITE, BEING SO FIGETY OR RESTLESS THAT YOU HAVE BEEN MOVING AROUND A LOT MORE THAN USUAL: 2
5. POOR APPETITE OR OVEREATING: 2

## 2023-01-12 ASSESSMENT — PAIN DESCRIPTION - DESCRIPTORS: DESCRIPTORS: BURNING

## 2023-01-12 ASSESSMENT — PAIN DESCRIPTION - LOCATION
LOCATION: STERNUM
LOCATION: CHEST

## 2023-01-12 ASSESSMENT — PAIN DESCRIPTION - ORIENTATION: ORIENTATION: MID

## 2023-01-12 NOTE — PATIENT INSTRUCTIONS
Thank you for letting us take care of you today. We hope all your questions were addressed. If a question was overlooked or something else comes to mind after you return home, please contact a member of your Care Team listed below. Your Care Team at [place] [address] is Team #4  Lizett Garcia MD (Faculty)  Gely Avalos MD (Resident)  Lselye Serrano MD (Resident)  Александр Aponte MD (Resident)  Barb Zee MD (Resident)  GIUSEPPE Gould., LPN Valarie Barthel., Keerthi Sullivan., Kathie Chinchilla [facility] office)  Edilia Martinez, [address] (Clinical Practice Manager)  Jay[facility] (Clinical Pharmacist)       Office phone number: [phone]    If you need to get in right away due to illness, please be advised we have \"Same Day\" appointments available Monday-Friday. Please call us at [phone] option #3 to schedule your \"Same Day\" appointment.

## 2023-01-12 NOTE — PROGRESS NOTES
Visit Information    Have you changed or started any medications since your last visit including any over-the-counter medicines, vitamins, or herbal medicines? no   Have you stopped taking any of your medications? Is so, why? -  no  Are you having any side effects from any of your medications? - no    Have you seen any other physician or provider since your last visit?  no   Have you had any other diagnostic tests since your last visit?  no   Have you been seen in the emergency room and/or had an admission in a hospital since we last saw you?  no   Have you had your routine dental cleaning in the past 6 months?  no     Do you have an active MyChart account? If no, what is the barrier?   Yes    Patient Care Team:  Alek Dillon MD as PCP - General (Family Medicine)  Vikram Lewis MD as Surgeon (Orthopedic Surgery)    Medical History Review  Past Medical, Family, and Social History reviewed and does not contribute to the patient presenting condition    Health Maintenance   Topic Date Due    Hepatitis B vaccine (1 of 3 - Risk 3-dose series) Never done    Shingles vaccine (1 of 2) Never done    Diabetic retinal exam  01/01/2014    Pneumococcal 65+ years Vaccine (2 - PCV) 01/07/2016    Hepatitis A vaccine (2 of 2 - Risk 2-dose series) 02/17/2021    Diabetic foot exam  08/17/2021    COVID-19 Vaccine (4 - Booster for Orie Huguenin series) 01/30/2022    Annual Wellness Visit (AWV)  Never done    Flu vaccine (1) 08/01/2022    Breast cancer screen  08/20/2022    Diabetic Alb to Cr ratio (uACR) test  02/17/2023    Lipids  02/17/2023    A1C test (Diabetic or Prediabetic)  11/18/2023    GFR test (Diabetes, CKD 3-4, OR last GFR 15-59)  12/04/2023    Depression Monitoring  12/29/2023    DTaP/Tdap/Td vaccine (6 - Td or Tdap) 06/27/2024    Colorectal Cancer Screen  08/01/2024    DEXA (modify frequency per FRAX score)  Completed    Hepatitis C screen  Completed    Hib vaccine  Aged Out    Meningococcal (ACWY) vaccine  Aged Out

## 2023-01-12 NOTE — ED NOTES
Pt presented to ED via ems for the complaint of CABG incision infection with pain. Pt states she had triple bypass in November and since has had infection to incision. Pt states weakness x 1 week and several falls this week. Pt complains of lack of appetite, and nausea. Pt alert and oriented x 4. RR even and non labored. Incision is red with drainage.        Phyllis Dey RN  01/12/23 1100

## 2023-01-12 NOTE — ED PROVIDER NOTES
[facility] ED  Emergency Department Encounter  EmergencyMedicine Resident     Pt Name:Lisset Dixon  MRN: [id number]  [place] 1955  Date of evaluation: 1/12/23  PCP:  Seth Byrne MD    This patient was evaluated in the Emergency Department for symptoms described in the history of present illness. CHIEF COMPLAINT       Chief Complaint   Patient presents with    Incisional Pain     Had open heart sx in November        HISTORY OF PRESENT ILLNESS  (Location/Symptom, Timing/Onset, Context/Setting, Quality, Duration, Modifying Factors, Severity.)      Dameon Santos is a 78 y.o. female with history of coronary artery disease status post CABG on 11/28/2022 who presents with chest pain and concern for infection on incision. Patient states that she has been dealing with infection with her incision since the surgery. States that she has pain over her incision. Denies fever/chills. Patient also endorsing chest pain. Substernal in nature. Nonradiating. No nausea/vomiting. Patient has baseline shortness of breath and is on 1 L nasal cannula oxygen at home. Patient states that she is having more shortness of breath than she normally does. Denies back pain. No leg swelling. Denies blood thinners. PAST MEDICAL / SURGICAL / SOCIAL / FAMILY HISTORY      has a past medical history of Ambulates with cane, Anxiety, Borderline hypertension, CAD (coronary artery disease), Depression, GERD (gastroesophageal reflux disease), Heart attack ([address] Hypertension, Hypothyroidism, Neuropathy, Obesity, Osteoarthritis, Other cirrhosis of liver ([address] Sleep apnea, Type II or unspecified type diabetes mellitus without mention of complication, not stated as uncontrolled, Under care of service provider, Under care of service provider, and Vertebrogenic low back pain. has a past surgical history that includes Hysterectomy; Colonoscopy;  Upper gastrointestinal endoscopy; Dilation and curettage of uterus; hiatal hernia repair; Throat surgery; Appendectomy; Total knee arthroplasty (Right, 01/06/2015); Total knee arthroplasty (Left, 05/26/2015); Coronary angioplasty with stent (07/2020); Cardiac catheterization; Cardiac catheterization (11/01/2022); and Coronary artery bypass graft (N/A, 11/28/2022). Social History     Socioeconomic History    Marital status:      Spouse name: Kim Garcia    Number of children: 0    Years of education: Not on file    Highest education level: Not on file   Occupational History    Occupation: Retired      Employer: [employer] [place]   Tobacco Use    Smoking status: Never    Smokeless tobacco: Never   Vaping Use    Vaping Use: Never used   Substance and Sexual Activity    Alcohol use: Yes     Comment: once a month    Drug use: No    Sexual activity: Yes     Partners: Male   Other Topics Concern    Not on file   Social History Narrative    Not on file     Social Determinants of Health     Financial Resource Strain: Low Risk     Difficulty of Paying Living Expenses: Not hard at all   Food Insecurity: No Food Insecurity    Worried About Running Out of Food in the Last Year: Never true    Ran Out of Food in the Last Year: Never true   Transportation Needs: Not on file   Physical Activity: Not on file   Stress: Not on file   Social Connections: Not on file   Intimate Partner Violence: Not on file   Housing Stability: Not on file       Family History   Problem Relation Age of Onset    Heart Disease Mother     Arthritis Mother     Heart Disease Father     Arthritis Father     Cancer Father         \"oral\"    Diabetes Sister         gestational       Allergies:    Morphine, Shellfish-derived products, and Tape [adhesive tape]    Home Medications:  Prior to Admission medications    Medication Sig Start Date End Date Taking?  Authorizing Provider   insulin lispro, 1 Unit Dial, (HUMALOG KWIKPEN) 100 UNIT/ML SOPN Inject 3 Units into the skin 3 times daily (before meals) 125 - 150 2 units   151 - 200 4 units   201 - 250 6 units   251 - 300 8 units  301 - 350 10 units 351 - 400 12 units 12/29/22   Marquis Miller MD   amLODIPine (NORVASC) 5 MG tablet  12/20/22   Historical Provider, MD   doxycycline monohydrate (MONODOX) 100 MG capsule  12/20/22   Historical Provider, MD [place] 100-25 MCG/ACT AEPB  12/20/22   Historical Provider, MD   isosorbide mononitrate (IMDUR) 60 MG extended release tablet  12/20/22   Historical Provider, MD   potassium chloride (KLOR-CON) 10 MEQ extended release tablet  12/20/22   Historical Provider, MD   traMADol Gwynneth Blinks) 50 MG tablet  12/20/22   Historical Provider, MD   traZODone (DESYREL) 50 MG tablet  12/20/22   Historical Provider, MD   aspirin 81 MG EC tablet Take 1 tablet by mouth daily 12/6/22   Hero Hay MD   amiodarone (CORDARONE) 200 MG tablet Take 1 tablet by mouth 3 times daily 12/5/22   Hero Hay MD   metoprolol tartrate (LOPRESSOR) 25 MG tablet Take 0.5 tablets by mouth 2 times daily 12/5/22   Hero Hay MD   clopidogrel (PLAVIX) 75 MG tablet Take 1 tablet by mouth daily 12/6/22   Hero Hay MD   furosemide (LASIX) 40 MG tablet Take 1 tablet by mouth 2 times daily 12/5/22   Hero Hay MD   potassium chloride (KLOR-CON M) 10 MEQ extended release tablet Take 2 tablets by mouth daily 12/5/22   Hero Hay MD   atorvastatin (LIPITOR) 40 MG tablet Take 1 tablet by mouth nightly 11/16/22   Alvarado Servin MD   empagliflozin (JARDIANCE) 10 MG tablet TAKE 1 TABLET BY MOUTH EVERY DAY 10/22/22   Marquis Miller MD   levothyroxine (SYNTHROID) 50 MCG tablet TAKE 1 TABLET BY MOUTH EVERY DAY 10/22/22   Marquis Miller MD   citalopram (CELEXA) 40 MG tablet TAKE 1 TABLET BY MOUTH ONCE DAILY *EMERGENCY REFILL* 9/29/22   Marquis Miller MD   glimepiride (AMARYL) 4 MG tablet TAKE 1 TABLET BY MOUTH TWICE DAILY *EMERGENCY REFILL* 9/29/22   Marquis Miller MD   omeprazole (PRILOSEC) 20 MG delayed release capsule TAKE 1 CAPSULE BY MOUTH ONCE DAILY 9/22/22   Quratulain DO Johnny   gabapentin (NEURONTIN) 400 MG capsule TAKE 1 CAPSULE BY MOUTH THREE TIMES DAILY 4/22/22 11/18/22  Arley Lane MD   sucralfate (CARAFATE) 1 GM tablet TAKE 1 TABLET BY MOUTH EVERY DAY 4/20/22   Arley Lane MD   busPIRone (BUSPAR) 10 MG tablet TAKE ONE (1) TABLET BY MOUTH TWICE DAILY 3/11/22   Arley Lane MD   insulin glargine [facility]) 100 UNIT/ML injection pen Inject 65 units subcutaneously once daily. 3/1/22   Arley Lane MD   miconazole (MICOTIN) 2 % cream APPLY TO AFFECTED AREA TWICE DAILY 11/12/21   Arley Lane MD   lidocaine (XYLOCAINE) 2 % jelly Apply topically with dressing changes every 3 days 8/5/21   Arley Lane MD   blood glucose monitor strips Test before meals and at bedtime. DX: DM, on insulin 10/8/20   Arley Lane MD   miconazole (ZEASORB-AF) 2 % powder Apply topically 2 times daily. 10/1/20   Arley Lane MD   blood glucose monitor kit and supplies Dispense sufficient amount for indicated testing frequency plus additional to accommodate PRN testing needs. Dispense all needed supplies to include: monitor, strips, lancing device, lancets, control solutions, alcohol swabs. 8/17/20   Yolis Herron MD   magnesium hydroxide (MILK OF MAGNESIA) 400 MG/5ML suspension Take 30 mLs by mouth daily as needed for Constipation 7/29/20   Yolis Herron MD   Handicap Placard MISC Is a permanent condition. DX: M19.90 5/14/19   Arley Lane MD   Insulin Pen Needle (B-D UF III MINI PEN NEEDLES) 31G X 5 MM MISC USE 1 PEN NEEDLE DAILY 3/8/18   Arley Lane MD   [employer] Lancets Thin MISC Test before meals and at bedtime.  DX: DM, on insulin 4/22/14   Seleta Goldberg, MD   Alcohol Swabs (ALCOHOL PREP PADS) by Other route 2 times daily      Historical Provider, MD       REVIEW OF SYSTEMS    (2-9 systems for level 4, 10 or more for level 5)    Review of Systems Constitutional:  Negative for chills and fever. HENT:  Negative for congestion. Eyes:  Negative for visual disturbance. Respiratory:  Positive for shortness of breath. Negative for cough. Cardiovascular:  Positive for chest pain. Negative for leg swelling. Gastrointestinal:  Negative for abdominal pain, constipation, diarrhea, nausea and vomiting. Genitourinary:  Negative for difficulty urinating, dysuria, vaginal bleeding and vaginal discharge. Musculoskeletal:  Negative for back pain. Skin:  Positive for wound. Neurological:  Negative for weakness, numbness and headaches. PHYSICAL EXAM   (up to 7 for level 4, 8 or more for level 5)    INITIAL VITALS:   /61   Pulse 83   Temp 98.5 °F (36.9 °C) (Oral)   Resp 17   Wt 252 lb (114.3 kg)   SpO2 92%   BMI 50.90 kg/m²   I have reviewed the triage vital signs. Const: Well nourished, well developed, appears stated age, no acute distress, nontoxic  Eyes: PERRL, no conjunctival injection  HENT: NCAT, Neck supple without meningismus   CV: RRR, Warm, well-perfused extremities. +2/4 radial pulses bilaterally. RESP: Clear to auscultation over right lung. Diminished lung sounds on the left. Unlabored respiratory effort  GI: soft, non-tender, non-distended, no masses  MSK: No gross deformities appreciated  Skin: Warm, dry. No rashes. Poorly healing midline incision from suprasternal notch to epigastrium. Area of scabbing with purulence over the xiphoid process. Erythematous and tender to touch along the incision. Neuro: Alert, CNs II-XII grossly intact. Sensation and motor function of extremities grossly intact. Psych: Appropriate mood and affect. DIFFERENTIAL  DIAGNOSIS   DDX:  ACS/MI, pneumonia, pneumothorax, postop complication, cellulitis, abscess, mediastinitis, infection    Initial MDM:  23-year-old female status post CABG on 11/28/2022 presents with chest pain and incisional pain. Incision has multiple signs of infection. Patient has chest pain concerning for ACS. Afebrile. Vital signs stable. Given concern for infection. Will do sepsis work-up. We will get cardiac work-up. Will get CTA chest to evaluate for deep infection. We will consult cardiothoracic surgery. Will treat symptoms. Will reevaluate. Sepsis Times and Checklist  Vital Signs: BP: 124/61  Heart Rate: 79  Resp: 18  Temp: 98.2 °F (36.8 °C) SpO2: 93 %  SIRS (>2) Non Pregnant       Temp > 38.3C or < 36C   HR > 90   RR > 20   WBC > 12 or < 4 or >10% bands  SIRS (>2) Pregnant 20 weeks until 3 days postpartum   Temp > 38C or <36C   HR >110   RR > 24   WBC >15 or < 4 or >10% bands   SIRS (>2) and confirmed or suspected source of infection = Sepsis  Is Sepsis due to likely bacterial infection?: Yes    Sepsis Identified:  Date: 1/12/23   Time: 1600  Sepsis Orders:   CBC(required): Yes   CMP: Yes   PT/PTT: Yes   Blood Cultures x2(required): Yes   Urinalysis and Urine Culture: Yes   Lactate(required): Yes   Antibiotics Given (within 3 hours of sepsis identification, after blood cultures):  Yes    (If unable to obtain IV access for IV antibiotics within 3 hours of identification of sepsis, IM antibiotics is acceptable.)              If lactate >2.0 MUST repeat within 6 hours    If elevated, is elevated lactate from a likely infectious source?: Yes. IV Fluid Bolus:  Is lactate > 4.0:  Yes  If lactate >  4.0 OR hypotension (MAP<65 mmHg,BP<90 or SBP<85 pregnancy 20 weeks to 3 days  postpartum) (with 2 BP readings) 30 ml/kg crystalloid MUST be ordered.     If 30 ml/kg crystalloid not ordered the following must be documented in the medical record:  Administration of 30 mL/kg of crystalloid fluids would be detrimental or harmful for the patient;   AND that the patient has one of the following conditions, OR that a portion of the crystalloid fluid volume was administered as colloids (if a portion consisted of colloids, there must be an order and documentation that colloids were started or noted as given); Concern for fluid overload. (If a portion consisted of colloids, there must be an order and documentation that colloids were started or noted as given.)   the volume of crystalloid fluids in place of 30 mL/kg the patient was to receive is 1L, (Order for this amount of fluid must be placed)     PLAN (LABS / IMAGING / EKG):  Orders Placed This Encounter   Procedures    Culture, Blood 1    Culture, Blood 1    Culture, Urine    XR CHEST PORTABLE    CT CHEST W CONTRAST    CBC with Auto Differential    Comprehensive Metabolic Panel    Lipase    Troponin    Brain Natriuretic Peptide    Lactate, Sepsis    Procalcitonin    Protime-INR    Urinalysis with Microscopic    Inpatient consult to Cardiothoracic Surgery    Inpatient consult to IV Team    EKG 12 Lead       MEDICATIONS ORDERED:  Orders Placed This Encounter   Medications    ondansetron (ZOFRAN) injection 4 mg    0.9 % sodium chloride bolus    vancomycin (VANCOCIN) 1750 mg in sodium chloride 0.9 % 500 mL IVPB     Order Specific Question:   Antimicrobial Indications     Answer:   Skin and Soft Tissue Infection    piperacillin-tazobactam (ZOSYN) 3,375 mg in dextrose 5 % 50 mL IVPB (mini-bag)     Order Specific Question:   Antimicrobial Indications     Answer:   Skin and Soft Tissue Infection         DIAGNOSTIC RESULTS / EMERGENCY DEPARTMENT COURSE / MDM   LAB RESULTS:  Results for orders placed or performed during the hospital encounter of 01/12/23   Culture, Blood 1    Specimen: Blood   Result Value Ref Range    Specimen Description . BLOOD     Special Requests RFOREARM, 0.75ML     Culture NO GROWTH <24 HRS    Culture, Blood 1    Specimen: Blood   Result Value Ref Range    Specimen Description . BLOOD     Special Requests LHAND, 5ML     Culture NO GROWTH <24 HRS    CBC with Auto Differential   Result Value Ref Range    WBC 16.7 (H) 3.5 - 11.3 k/uL    RBC 4.70 3.95 - 5.11 m/uL    Hemoglobin 13.1 11.9 - 15.1 g/dL    Hematocrit 46.8 36.3 - 47.1 %    MCV 99.6 82.6 - 102.9 fL    MCH 27.9 25.2 - 33.5 pg    MCHC 28.0 (L) 28.4 - 34.8 g/dL    RDW 13.6 11.8 - 14.4 %    Platelets 550 392 - 407 k/uL    MPV 11.1 8.1 - 13.5 fL    NRBC Automated 0.0 0.0 per 100 WBC    RBC Morphology HYPOCHROMIA PRESENT     Seg Neutrophils 86 (H) 36 - 65 %    Lymphocytes 5 (L) 24 - 43 %    Monocytes 8 3 - 12 %    Eosinophils % 0 (L) 1 - 4 %    Basophils 1 0 - 2 %    Immature Granulocytes 1 (H) 0 %    Segs Absolute 14.30 (H) 1.50 - 8.10 k/uL    Absolute Lymph # 0.79 (L) 1.10 - 3.70 k/uL    Absolute Mono # 1.34 (H) 0.10 - 1.20 k/uL    Absolute Eos # 0.05 0.00 - 0.44 k/uL    Basophils Absolute 0.08 0.00 - 0.20 k/uL    Absolute Immature Granulocyte 0.10 0.00 - 0.30 k/uL   Comprehensive Metabolic Panel   Result Value Ref Range    Glucose 294 (H) 70 - 99 mg/dL    BUN 22 8 - 23 mg/dL    Creatinine 0.77 0.50 - 0.90 mg/dL    Est, Glom Filt Rate >60 >60 mL/min/1.73m2    Calcium 9.6 8.6 - 10.4 mg/dL    Sodium 137 135 - 144 mmol/L    Potassium 5.0 3.7 - 5.3 mmol/L    Chloride 95 (L) 98 - 107 mmol/L    CO2 23 20 - 31 mmol/L    Anion Gap 19 (H) 9 - 17 mmol/L    Alkaline Phosphatase 143 (H) 35 - 104 U/L    ALT 24 5 - 33 U/L    AST PENDING U/L    Total Bilirubin 0.7 0.3 - 1.2 mg/dL    Total Protein 7.1 6.4 - 8.3 g/dL    Albumin 2.7 (L) 3.5 - 5.2 g/dL    Albumin/Globulin Ratio 0.6 (L) 1.0 - 2.5   Lipase   Result Value Ref Range    Lipase 61 (H) 13 - 60 U/L   Troponin   Result Value Ref Range    Troponin, High Sensitivity 37 (H) 0 - 14 ng/L   Brain Natriuretic Peptide   Result Value Ref Range    Pro-BNP 1,814 (H) <300 pg/mL   Lactate, Sepsis   Result Value Ref Range    Lactic Acid, Sepsis, Whole Blood 4.7 (H) 0.5 - 1.9 mmol/L   Procalcitonin   Result Value Ref Range    Procalcitonin 0.53 (H) <0.09 ng/mL   Protime-INR   Result Value Ref Range    Protime 11.2 9.1 - 12.3 sec    INR 1.1        Leukocytosis of 16.7, consistent with concerns for infection.   Elevated glucose of 294, will likely come down with fluids. Elevated lipase of 61. Elevated troponins, slightly higher than baseline. Elevated BNP, much higher than baseline. Lactic acid of 4.7 to 2.6. Likely infection, receiving fluids. Elevated procalcitonin of 0.53 concerning for bacterial infection. RADIOLOGY:  CT CHEST W CONTRAST    Result Date: 1/12/2023  EXAMINATION: CT OF THE CHEST WITH CONTRAST 1/12/2023 6:14 pm TECHNIQUE: CT of the chest was performed with the administration of intravenous contrast. Multiplanar reformatted images are provided for review. Automated exposure control, iterative reconstruction, and/or weight based adjustment of the mA/kV was utilized to reduce the radiation dose to as low as reasonably achievable. COMPARISON: None. HISTORY: ORDERING SYSTEM PROVIDED HISTORY: recent CABG, incisional infection TECHNOLOGIST PROVIDED HISTORY: recent CABG, incisional infection Decision Support Exception - unselect if not a suspected or confirmed emergency medical condition->Emergency Medical Condition (MA) Reason for Exam: recent CABG, incisional infection FINDINGS: Mediastinum: Air in the anterior mediastinum is noted extending through the sternotomy to the soft tissues ventral to the sternum. Diastasis of the sternotomy is noted. Along the ventral aspect of the sternotomy fluid and air is noted suggesting abscess collection 2.4 x 1.8 cm. No acute aortic abnormality. Mild to moderate calcified plaque in the aorta. Moderate cardiomegaly. Trace pericardial fluid without significant pericardial accumulation. Shotty epicardial lymph nodes are present without cara adenopathy. Electrodes extend from the skin surface through the inferior sternal anterior chest terminating in the epicardial area. No cara mediastinal adenopathy. [place] left suprahilar lymph node. Lungs/pleura: Right lung is clear. No right effusion.   Large left pleural effusion is noted with almost complete collapse of the left lung with mild aeration in the left upper lobe medially. Mucus is noted in the bifurcation of the left mainstem bronchus suggesting a mucous plug. Upper Abdomen: Hepatic steatosis is noted. Small gallstones in the gallbladder. Spleen is top-normal.  Atherosclerotic calcification of the aorta and major branches is demonstrated. Left adrenal mass of 2 cm is noted with indeterminate Hounsfield numbers. Advise follow-up with adrenal CT scan or MRI. Soft Tissues/Bones: Diastasis of the patient's median sternotomy is noted. No significant sternal wire bridging below the most cranial 2 of the sternal wires. 1.  Diastasis of median sternotomy with air in the soft tissues, and appearance of abscess formation/dehiscence dural to the sternotomy. Air is present in the mediastinum. 2.  Large left pleural effusion with almost complete collapse of the left lobe of the lung. Bronchial wall thickening is noted with material within the bronchi likely mucous plugging. 3.  Hepatic steatosis. 4.  Small gallstones in the gallbladder. 5.  2 cm left adrenal mass with indeterminate Hounsfield numbers. Further workup using adrenal CT protocol or MRI advised when the patient's condition permits. RECOMMENDATIONS: Advise adrenal protocol CT or MRI to evaluate a left adrenal mass. XR CHEST PORTABLE    Result Date: 1/12/2023  EXAMINATION: ONE XRAY VIEW OF THE CHEST 1/12/2023 1:24 pm COMPARISON: 12/05/2022 HISTORY: ORDERING SYSTEM PROVIDED HISTORY: chest pain, recent CABG TECHNOLOGIST PROVIDED HISTORY: chest pain, recent CABG FINDINGS: Cardiac size is enlarged. No acute infiltrates are seen . The visualized pulmonary vascularity is stable. No pneumothorax. Progressive left pleural effusion with nearly complete opacification of the left hemithorax. Harsha Sanders Postsurgical changes overlying the mediastinum. Progressive left pleural effusion with nearly complete opacification of the left hemithorax.         EKG  Rhythm: normal sinus   Rate: normal  Axis: left  Ectopy: none  Conduction: normal  ST Segments: no acute change  T Waves: non specific changes  Q Waves: nonspecific    EKG  Impression: non-specific EKG  with low voltage    All EKG's are interpreted by the Emergency Department Physician who either signs or Co-signs this chart in the absence of a cardiologist.    CONSULTS:  Katharine Joyner 86 CONSULT TO IV TEAM      MDM/EMERGENCY DEPARTMENT COURSE:  Cardiothoracic surgery consulted and would like patient admitted to medicine. Will evaluate in the morning. Cardiothoracic surgery informed the patient has large left-sided pleural effusion with almost complete collapse of left lung. Cardiothoracic surgery will address tomorrow. Cardiothoracic surgery recommending pulmonology consult for the morning. Patient started on antibiotics for infection. CT concerning for spreading infection from incision site. Patient's vitals remained stable while in the emergency department. Patient satting well on 4 L nasal cannula. Upon reevaluation patient resting comfortably, no acute distress. No complaints at this time. Family medicine consult. Family medicine to admit patient. Patient understands and agrees with the plan. CRITICAL CARE:  Please see Attending Note    FINAL IMPRESSION      1. Pleural effusion on left    2. Abscess of sternal region      DISPOSITION / PLAN     DISPOSITION Admitted 01/12/2023 07:41:50 PM        PATIENT REFERRED TO:  No follow-up provider specified.     DISCHARGE MEDICATIONS:  New Prescriptions    No medications on file       Taj Allred DO  Emergency Medicine Resident, PGY 2    (Please note that portions of this note were completed with a voice recognition program.  Efforts were made to edit the dictations but occasionally words are mis-transcribed.)       Taj Allred DO  Resident  01/13/23 [id number]

## 2023-01-12 NOTE — ED PROVIDER NOTES
I performed a history and physical examination of the patient and discussed management with the resident. I reviewed the residents note and agree with the documented findings and plan of care. Any areas of disagreement are noted on the chart. I was personally present for the key portions of any procedures. I have documented in the chart those procedures where I was not present during the key portions. I have reviewed the emergency nurses triage note. I agree with the chief complaint, past medical history, past surgical history, allergies, medications, social and family history as documented unless otherwise noted below. Documentation of the HPI, Physical Exam and Medical Decision Making performed by medical students or scribes is based on my personal performance of the HPI, PE and MDM. For Phys Assistant/ Nurse Practitioner cases/documentation I have personally evaluated this patient and have completed at least one if not all key elements of the E/M (history, physical exam, and MDM). I find the patient's history and physical exam are consistent with the NP/PA documentation. I agree with the care provided, treatment rendered, disposition and followup plan. Additional findings are as noted. Wallace Ayers. Thomas Gray MD  Attending Emergency  Physician          EKG Interpretation    Interpreted by me    Rhythm: normal sinus   Rate: normal  Axis: normal  Ectopy: none  Conduction: normal  ST Segments: no acute change  T Waves: no acute change  Q Waves: Q waves noted in leads III and aVF. Low voltage QRS. Poor R wave progression. Clinical Impression: no acute changes and when compared with prior tracing dated 28 November 2022 the previously noted ST elevation MI changes have resolved. Awake, alert, cooperative, responsive. Speech fluent, normal comprehension. Lungs clear bilaterally. No rales, rhonchi, wheezes, stridor, retractions. Cardiac-S1S2, RRR, no murmur, rub, or gallop.   Abdomen soft, nondistended, nontender. No organomegaly, mass, bruit. Normal bowel sounds. Examination the chest wall demonstrates a large open area in the inferior most aspect of the sternotomy incision with eschar and some mild surrounding erythema. There is no obvious purulent discharge. Impression: Infected sternotomy wound post coronary artery bypass graft. Plan: We have spoken with the cardiovascular surgeons who indicated that they would see the patient after she was admitted in the hospital.  Remainder of her work-up is pending. Anika Obrien MD  01/12/23 8628    I reviewed the patient's CT scan as well as her lab results. Note is made of worsening left-sided pleural effusion compared with most recent chest x-ray. Also notes soft tissue gas in the area of the sternotomy incision and possible wound infection. Antibiotics have been ordered and given. Patient will require admission. I believe the left-sided pleural effusion is chronic and the patient is relatively stable at this time. We will notify the cardiothoracic surgeons of the findings and arrange for admission. I spoke with the patient and her spouse regarding her findings and our plan and she is in agreement.      Anika Obrien MD  01/12/23 7900

## 2023-01-13 ENCOUNTER — APPOINTMENT (OUTPATIENT)
Dept: GENERAL RADIOLOGY | Age: 68
End: 2023-01-13
Payer: MEDICARE

## 2023-01-13 ENCOUNTER — APPOINTMENT (OUTPATIENT)
Dept: INTERVENTIONAL RADIOLOGY/VASCULAR | Age: 68
End: 2023-01-13
Payer: MEDICARE

## 2023-01-13 PROBLEM — T81.32XA STERNAL WOUND DEHISCENCE: Status: ACTIVE | Noted: 2023-01-13

## 2023-01-13 PROBLEM — J90 PLEURAL EFFUSION ON LEFT: Status: ACTIVE | Noted: 2023-01-13

## 2023-01-13 PROBLEM — D72.825 BANDEMIA: Status: ACTIVE | Noted: 2023-01-13

## 2023-01-13 PROBLEM — E87.20 LACTIC ACID ACIDOSIS: Status: ACTIVE | Noted: 2023-01-13

## 2023-01-13 LAB
CULTURE: NORMAL
DIRECT EXAM: NORMAL
EKG ATRIAL RATE: 85 BPM
EKG P AXIS: 32 DEGREES
EKG P-R INTERVAL: 166 MS
EKG Q-T INTERVAL: 392 MS
EKG QRS DURATION: 68 MS
EKG QTC CALCULATION (BAZETT): 466 MS
EKG R AXIS: -5 DEGREES
EKG T AXIS: -11 DEGREES
EKG VENTRICULAR RATE: 85 BPM
GLUCOSE BLD-MCNC: 224 MG/DL (ref 65–105)
GLUCOSE BLD-MCNC: 283 MG/DL (ref 65–105)
GLUCOSE BLD-MCNC: 290 MG/DL (ref 65–105)
GLUCOSE BLD-MCNC: 327 MG/DL (ref 65–105)
LACTIC ACID, WHOLE BLOOD: 1.7 MMOL/L (ref 0.7–2.1)
SPECIMEN DESCRIPTION: NORMAL

## 2023-01-13 PROCEDURE — 36415 COLL VENOUS BLD VENIPUNCTURE: CPT

## 2023-01-13 PROCEDURE — 94761 N-INVAS EAR/PLS OXIMETRY MLT: CPT

## 2023-01-13 PROCEDURE — 6360000002 HC RX W HCPCS

## 2023-01-13 PROCEDURE — 6360000002 HC RX W HCPCS: Performed by: RADIOLOGY

## 2023-01-13 PROCEDURE — 2060000000 HC ICU INTERMEDIATE R&B

## 2023-01-13 PROCEDURE — C1729 CATH, DRAINAGE: HCPCS

## 2023-01-13 PROCEDURE — 32557 INSERT CATH PLEURA W/ IMAGE: CPT

## 2023-01-13 PROCEDURE — 84311 SPECTROPHOTOMETRY: CPT

## 2023-01-13 PROCEDURE — 87075 CULTR BACTERIA EXCEPT BLOOD: CPT

## 2023-01-13 PROCEDURE — 71045 X-RAY EXAM CHEST 1 VIEW: CPT

## 2023-01-13 PROCEDURE — 99222 1ST HOSP IP/OBS MODERATE 55: CPT | Performed by: INTERNAL MEDICINE

## 2023-01-13 PROCEDURE — 87070 CULTURE OTHR SPECIMN AEROBIC: CPT

## 2023-01-13 PROCEDURE — 97530 THERAPEUTIC ACTIVITIES: CPT

## 2023-01-13 PROCEDURE — 6370000000 HC RX 637 (ALT 250 FOR IP)

## 2023-01-13 PROCEDURE — 87205 SMEAR GRAM STAIN: CPT

## 2023-01-13 PROCEDURE — 2580000003 HC RX 258

## 2023-01-13 PROCEDURE — 97162 PT EVAL MOD COMPLEX 30 MIN: CPT

## 2023-01-13 PROCEDURE — 84157 ASSAY OF PROTEIN OTHER: CPT

## 2023-01-13 PROCEDURE — 82947 ASSAY GLUCOSE BLOOD QUANT: CPT

## 2023-01-13 PROCEDURE — C1769 GUIDE WIRE: HCPCS

## 2023-01-13 PROCEDURE — 86920 COMPATIBILITY TEST SPIN: CPT

## 2023-01-13 PROCEDURE — 2709999900 HC NON-CHARGEABLE SUPPLY

## 2023-01-13 PROCEDURE — 2700000000 HC OXYGEN THERAPY PER DAY

## 2023-01-13 PROCEDURE — 83605 ASSAY OF LACTIC ACID: CPT

## 2023-01-13 PROCEDURE — 86850 RBC ANTIBODY SCREEN: CPT

## 2023-01-13 PROCEDURE — 97166 OT EVAL MOD COMPLEX 45 MIN: CPT

## 2023-01-13 PROCEDURE — 83615 LACTATE (LD) (LDH) ENZYME: CPT

## 2023-01-13 PROCEDURE — 0W9B30Z DRAINAGE OF LEFT PLEURAL CAVITY WITH DRAINAGE DEVICE, PERCUTANEOUS APPROACH: ICD-10-PCS | Performed by: RADIOLOGY

## 2023-01-13 PROCEDURE — 80048 BASIC METABOLIC PNL TOTAL CA: CPT

## 2023-01-13 PROCEDURE — 86900 BLOOD TYPING SEROLOGIC ABO: CPT

## 2023-01-13 PROCEDURE — 86901 BLOOD TYPING SEROLOGIC RH(D): CPT

## 2023-01-13 PROCEDURE — 94640 AIRWAY INHALATION TREATMENT: CPT

## 2023-01-13 PROCEDURE — 85025 COMPLETE CBC W/AUTO DIFF WBC: CPT

## 2023-01-13 RX ORDER — SODIUM CHLORIDE 9 MG/ML
INJECTION, SOLUTION INTRAVENOUS PRN
OUTPATIENT
Start: 2023-01-13

## 2023-01-13 RX ORDER — CHLORHEXIDINE GLUCONATE 4 G/100ML
SOLUTION TOPICAL SEE ADMIN INSTRUCTIONS
OUTPATIENT
Start: 2023-01-13

## 2023-01-13 RX ORDER — SODIUM CHLORIDE 0.9 % (FLUSH) 0.9 %
5-40 SYRINGE (ML) INJECTION EVERY 12 HOURS SCHEDULED
OUTPATIENT
Start: 2023-01-13

## 2023-01-13 RX ORDER — CHLORHEXIDINE GLUCONATE 0.12 MG/ML
15 RINSE ORAL ONCE
OUTPATIENT
Start: 2023-01-13 | End: 2023-01-13

## 2023-01-13 RX ORDER — KETOROLAC TROMETHAMINE 30 MG/ML
30 INJECTION, SOLUTION INTRAMUSCULAR; INTRAVENOUS ONCE
Status: DISCONTINUED | OUTPATIENT
Start: 2023-01-13 | End: 2023-01-13

## 2023-01-13 RX ORDER — FENTANYL CITRATE 50 UG/ML
INJECTION, SOLUTION INTRAMUSCULAR; INTRAVENOUS
Status: COMPLETED | OUTPATIENT
Start: 2023-01-13 | End: 2023-01-13

## 2023-01-13 RX ORDER — ASPIRIN 81 MG/1
81 TABLET ORAL
OUTPATIENT
Start: 2023-01-13

## 2023-01-13 RX ORDER — OXYCODONE HYDROCHLORIDE AND ACETAMINOPHEN 5; 325 MG/1; MG/1
1 TABLET ORAL EVERY 6 HOURS PRN
Status: DISCONTINUED | OUTPATIENT
Start: 2023-01-13 | End: 2023-01-16

## 2023-01-13 RX ORDER — SODIUM CHLORIDE, SODIUM LACTATE, POTASSIUM CHLORIDE, CALCIUM CHLORIDE 600; 310; 30; 20 MG/100ML; MG/100ML; MG/100ML; MG/100ML
INJECTION, SOLUTION INTRAVENOUS CONTINUOUS
OUTPATIENT
Start: 2023-01-13

## 2023-01-13 RX ORDER — KETOROLAC TROMETHAMINE 15 MG/ML
15 INJECTION, SOLUTION INTRAMUSCULAR; INTRAVENOUS EVERY 6 HOURS PRN
Status: DISCONTINUED | OUTPATIENT
Start: 2023-01-13 | End: 2023-01-14

## 2023-01-13 RX ORDER — INSULIN GLARGINE 100 [IU]/ML
55 INJECTION, SOLUTION SUBCUTANEOUS NIGHTLY
Status: DISCONTINUED | OUTPATIENT
Start: 2023-01-13 | End: 2023-01-14

## 2023-01-13 RX ORDER — SODIUM CHLORIDE 0.9 % (FLUSH) 0.9 %
10 SYRINGE (ML) INJECTION PRN
OUTPATIENT
Start: 2023-01-13

## 2023-01-13 RX ADMIN — FENTANYL CITRATE 50 MCG: 50 INJECTION, SOLUTION INTRAMUSCULAR; INTRAVENOUS at 10:13

## 2023-01-13 RX ADMIN — LEVOTHYROXINE SODIUM 50 MCG: 50 TABLET ORAL at 11:37

## 2023-01-13 RX ADMIN — OXYCODONE HYDROCHLORIDE 5 MG: 5 TABLET ORAL at 02:34

## 2023-01-13 RX ADMIN — AMIODARONE HYDROCHLORIDE 200 MG: 200 TABLET ORAL at 11:38

## 2023-01-13 RX ADMIN — INSULIN LISPRO 12 UNITS: 100 INJECTION, SOLUTION INTRAVENOUS; SUBCUTANEOUS at 17:09

## 2023-01-13 RX ADMIN — GABAPENTIN 400 MG: 400 CAPSULE ORAL at 11:37

## 2023-01-13 RX ADMIN — AMIODARONE HYDROCHLORIDE 200 MG: 200 TABLET ORAL at 13:59

## 2023-01-13 RX ADMIN — INSULIN LISPRO 8 UNITS: 100 INJECTION, SOLUTION INTRAVENOUS; SUBCUTANEOUS at 12:36

## 2023-01-13 RX ADMIN — GABAPENTIN 400 MG: 400 CAPSULE ORAL at 13:59

## 2023-01-13 RX ADMIN — AMIODARONE HYDROCHLORIDE 200 MG: 200 TABLET ORAL at 20:30

## 2023-01-13 RX ADMIN — BUSPIRONE HYDROCHLORIDE 10 MG: 10 TABLET ORAL at 20:30

## 2023-01-13 RX ADMIN — KETOROLAC TROMETHAMINE 15 MG: 15 INJECTION, SOLUTION INTRAMUSCULAR; INTRAVENOUS at 15:04

## 2023-01-13 RX ADMIN — VANCOMYCIN HYDROCHLORIDE 750 MG: 10 INJECTION, POWDER, LYOPHILIZED, FOR SOLUTION INTRAVENOUS at 17:56

## 2023-01-13 RX ADMIN — FENTANYL CITRATE 50 MCG: 50 INJECTION, SOLUTION INTRAMUSCULAR; INTRAVENOUS at 10:18

## 2023-01-13 RX ADMIN — INSULIN GLARGINE 55 UNITS: 100 INJECTION, SOLUTION SUBCUTANEOUS at 20:37

## 2023-01-13 RX ADMIN — DESMOPRESSIN ACETATE 40 MG: 0.2 TABLET ORAL at 20:30

## 2023-01-13 RX ADMIN — GABAPENTIN 400 MG: 400 CAPSULE ORAL at 20:30

## 2023-01-13 RX ADMIN — CITALOPRAM 40 MG: 20 TABLET, FILM COATED ORAL at 11:38

## 2023-01-13 RX ADMIN — ONDANSETRON 4 MG: 4 TABLET, ORALLY DISINTEGRATING ORAL at 07:37

## 2023-01-13 RX ADMIN — PANTOPRAZOLE SODIUM 40 MG: 40 TABLET, DELAYED RELEASE ORAL at 11:38

## 2023-01-13 RX ADMIN — OXYCODONE HYDROCHLORIDE 5 MG: 5 TABLET ORAL at 08:55

## 2023-01-13 RX ADMIN — METOPROLOL TARTRATE 12.5 MG: 25 TABLET ORAL at 11:37

## 2023-01-13 RX ADMIN — AMLODIPINE BESYLATE 5 MG: 5 TABLET ORAL at 11:38

## 2023-01-13 RX ADMIN — SODIUM CHLORIDE, PRESERVATIVE FREE 10 ML: 5 INJECTION INTRAVENOUS at 20:33

## 2023-01-13 RX ADMIN — ENOXAPARIN SODIUM 30 MG: 100 INJECTION SUBCUTANEOUS at 20:32

## 2023-01-13 RX ADMIN — BUSPIRONE HYDROCHLORIDE 10 MG: 10 TABLET ORAL at 11:38

## 2023-01-13 RX ADMIN — PIPERACILLIN AND TAZOBACTAM 3375 MG: 3; .375 INJECTION, POWDER, LYOPHILIZED, FOR SOLUTION INTRAVENOUS at 18:51

## 2023-01-13 RX ADMIN — OXYCODONE HYDROCHLORIDE AND ACETAMINOPHEN 1 TABLET: 5; 325 TABLET ORAL at 13:01

## 2023-01-13 RX ADMIN — FUROSEMIDE 40 MG: 40 TABLET ORAL at 20:30

## 2023-01-13 RX ADMIN — ISOSORBIDE MONONITRATE 60 MG: 60 TABLET, EXTENDED RELEASE ORAL at 11:37

## 2023-01-13 RX ADMIN — BUDESONIDE AND FORMOTEROL FUMARATE DIHYDRATE 2 PUFF: 80; 4.5 AEROSOL RESPIRATORY (INHALATION) at 08:37

## 2023-01-13 RX ADMIN — VANCOMYCIN HYDROCHLORIDE 750 MG: 10 INJECTION, POWDER, LYOPHILIZED, FOR SOLUTION INTRAVENOUS at 06:50

## 2023-01-13 RX ADMIN — ACETAMINOPHEN 650 MG: 325 TABLET ORAL at 08:55

## 2023-01-13 RX ADMIN — PIPERACILLIN AND TAZOBACTAM 3375 MG: 3; .375 INJECTION, POWDER, LYOPHILIZED, FOR SOLUTION INTRAVENOUS at 02:42

## 2023-01-13 RX ADMIN — PIPERACILLIN AND TAZOBACTAM 3375 MG: 3; .375 INJECTION, POWDER, LYOPHILIZED, FOR SOLUTION INTRAVENOUS at 11:29

## 2023-01-13 RX ADMIN — TRAZODONE HYDROCHLORIDE 50 MG: 50 TABLET ORAL at 20:30

## 2023-01-13 RX ADMIN — SODIUM CHLORIDE, PRESERVATIVE FREE 10 ML: 5 INJECTION INTRAVENOUS at 11:38

## 2023-01-13 RX ADMIN — METOPROLOL TARTRATE 12.5 MG: 25 TABLET ORAL at 20:30

## 2023-01-13 RX ADMIN — FUROSEMIDE 40 MG: 40 TABLET ORAL at 11:37

## 2023-01-13 RX ADMIN — BUDESONIDE AND FORMOTEROL FUMARATE DIHYDRATE 2 PUFF: 80; 4.5 AEROSOL RESPIRATORY (INHALATION) at 20:36

## 2023-01-13 RX ADMIN — SODIUM CHLORIDE 25 ML: 9 INJECTION, SOLUTION INTRAVENOUS at 11:27

## 2023-01-13 ASSESSMENT — PAIN DESCRIPTION - ORIENTATION
ORIENTATION: MID
ORIENTATION: LEFT
ORIENTATION: MID

## 2023-01-13 ASSESSMENT — ENCOUNTER SYMPTOMS
CONSTIPATION: 0
ABDOMINAL PAIN: 1
ABDOMINAL PAIN: 0
DIARRHEA: 0
NAUSEA: 1
EYE DISCHARGE: 0
CONSTIPATION: 1
BACK PAIN: 0
APNEA: 0
VOMITING: 0
VOICE CHANGE: 0
COUGH: 1
SHORTNESS OF BREATH: 1
COLOR CHANGE: 1
CHEST TIGHTNESS: 0
NAUSEA: 0
WHEEZING: 0
COUGH: 0

## 2023-01-13 ASSESSMENT — PAIN DESCRIPTION - LOCATION
LOCATION: CHEST

## 2023-01-13 ASSESSMENT — PAIN SCALES - GENERAL
PAINLEVEL_OUTOF10: 7
PAINLEVEL_OUTOF10: 4
PAINLEVEL_OUTOF10: 7
PAINLEVEL_OUTOF10: 7
PAINLEVEL_OUTOF10: 8

## 2023-01-13 ASSESSMENT — PAIN DESCRIPTION - DESCRIPTORS
DESCRIPTORS: DISCOMFORT
DESCRIPTORS: PRESSURE
DESCRIPTORS: PRESSURE

## 2023-01-13 ASSESSMENT — PAIN DESCRIPTION - FREQUENCY: FREQUENCY: INTERMITTENT

## 2023-01-13 ASSESSMENT — PAIN DESCRIPTION - PAIN TYPE: TYPE: SURGICAL PAIN

## 2023-01-13 ASSESSMENT — PAIN DESCRIPTION - ONSET: ONSET: ON-GOING

## 2023-01-13 ASSESSMENT — PAIN - FUNCTIONAL ASSESSMENT: PAIN_FUNCTIONAL_ASSESSMENT: PREVENTS OR INTERFERES SOME ACTIVE ACTIVITIES AND ADLS

## 2023-01-13 NOTE — PLAN OF CARE
Problem: Chronic Conditions and Co-morbidities  Goal: Patient's chronic conditions and co-morbidity symptoms are monitored and maintained or improved  Outcome: Progressing     Problem: Pain  Goal: Verbalizes/displays adequate comfort level or baseline comfort level  Outcome: Progressing     Problem: Skin/Tissue Integrity  Goal: Absence of new skin breakdown  Description: 1. Monitor for areas of redness and/or skin breakdown  2. Assess vascular access sites hourly  3. Every 4-6 hours minimum:  Change oxygen saturation probe site  4. Every 4-6 hours:  If on nasal continuous positive airway pressure, respiratory therapy assess nares and determine need for appliance change or resting period.   Outcome: Progressing     Problem: Safety - Adult  Goal: Free from fall injury  Outcome: Progressing     Problem: ABCDS Injury Assessment  Goal: Absence of physical injury  Outcome: Progressing     Problem: Discharge Planning  Goal: Discharge to home or other facility with appropriate resources  Outcome: Progressing     Problem: Respiratory - Adult  Goal: Achieves optimal ventilation and oxygenation  Outcome: Progressing     Problem: Skin/Tissue Integrity - Adult  Goal: Skin integrity remains intact  Outcome: Progressing  Flowsheets (Taken 1/13/2023 1200)  Skin Integrity Remains Intact:   Monitor for areas of redness and/or skin breakdown   Assess vascular access sites hourly   Every 4-6 hours: If on nasal continuous positive airway pressure, respiratory therapy assesses nares and determine need for appliance change or resting period   Every 4-6 hours minimum: Change oxygen saturation probe site  Goal: Incisions, wounds, or drain sites healing without S/S of infection  Outcome: Progressing  Goal: Oral mucous membranes remain intact  Outcome: Progressing

## 2023-01-13 NOTE — PROGRESS NOTES
[address] [facility] Pharmacokinetic Monitoring Service - Vancomycin     Iram Camacho is a 78 y.o. female starting on vancomycin therapy for a sternal wound infection. Pharmacy consulted by Dr. Lisa Edgar for monitoring and adjustment. Target Concentration: Goal AUC/LATANYA 400-600 mg*hr/L    Additional Antimicrobials: zosyn    Pertinent Laboratory Values: Wt Readings from Last 1 Encounters:   01/12/23 239 lb (108.4 kg)     Temp Readings from Last 1 Encounters:   01/12/23 98.2 °F (36.8 °C) (Oral)     Estimated Creatinine Clearance: 82 mL/min (based on SCr of 0.77 mg/dL). Recent Labs     01/12/23  1619   CREATININE 0.77   WBC 16.7*     Procalcitonin: 0.53    Pertinent Cultures:  Culture Date Source Results   1/12/23 blood pending   MRSA Nasal Swab: N/A. Non-respiratory infection. Plan:  Dosing recommendations based on Bayesian software  Start vancomycin 1250mg IV x1, then 750mg IV q12h  Anticipated AUC of 49*5 and trough concentration of 15.7 at steady state  Renal labs as indicated   Vancomycin concentration will be ordered when clinically indicated. Pharmacy will continue to monitor patient and adjust therapy as indicated    Thank you for the consult,    Chris Abbott. MALU., BCPS  1/12/2023  9:23 PM

## 2023-01-13 NOTE — PROGRESS NOTES
Contacted phlebotomy regarding patients labs to be drawn, as internmed contacted writer. Phlebotomy stated will be up as soon as they can.  Ext [id number]

## 2023-01-13 NOTE — CONSULTS
PULMONARY & CRITICAL CARE MEDICINE CONSULT NOTE     Patient:  Lucy Irwin  MRN: [id number]  Admit date: 1/12/2023  Primary Care Physician: Tommy Durbin MD  Consulting Physician: Gareth Valladares DO  CODE Status: Full Code  LOS: 1     SUBJECTIVE     CHIEF COMPLAINT/REASON FOR CONSULT:   large pleural effusion    HISTORY OF PRESENT ILLNESS:  The patient is a 78 y.o. female with a history of hypertension, CAD s/p stents, s/p CABG 11/28/2022, hypothyroidism came to ED with chief complaint of chest pain and shortness of breath. Patient is known to CT surgery as she got CABG on 11/28/2022 and was recently seen in their office on 12/21/2022, during that visit patient was doing well, had no respiratory or cardiac issues. Patient was afebrile, NSR /55 and was room air was eventually put on 3 L nasal cannula due to respiratory distress, patient is on 1 L oxygen at home. Initial lab work show hyperglycemia with glucose 260, Pro-Jame 0.53.  proBNP 1814, troponin 36. WBC 16.7, hemoglobin 13.1, platelet 958. In the ED CT chest revealed large left pleural effusion with near collapse of left lung. ID, pulmonary and CT surgery were consulted. Patient was chest tube placed by IR on 1/13/2023. Patient is getting Zosyn as per ID recommendation.     PAST MEDICAL HISTORY:        Diagnosis Date    Ambulates with cane     or walker    Anxiety     Borderline hypertension     CAD (coronary artery disease)     stents x 2 in 2020    Depression 07/28/2020    GERD (gastroesophageal reflux disease)     Heart attack ([facility] [address] 07/18/2020    Hypertension     Hypothyroidism     Neuropathy     Obesity     morbid    Osteoarthritis     Other cirrhosis of liver ([facility] [address] 07/27/2020    pt denies    Sleep apnea     possible    Type II or unspecified type diabetes mellitus without mention of complication, not stated as uncontrolled     Under care of service provider 11/18/2022    wto-Jkcplgzg-rrhcjeHrnan owen-last visit aug 2022    Under care of service provider 11/18/2022    cardiology-ali-last visit nov 2022    Vertebrogenic low back pain 03/29/2022     PAST SURGICAL HISTORY:        Procedure Laterality Date    APPENDECTOMY      CARDIAC CATHETERIZATION      2 stents    CARDIAC CATHETERIZATION  11/01/2022    COLONOSCOPY      CORONARY ANGIOPLASTY WITH STENT PLACEMENT  07/2020    CORONARY ARTERY BYPASS GRAFT N/A 11/28/2022    CABG CORONARY ARTERY BYPASS X3 ON PUMP , ATA, ENDO VEIN HARVEST performed by Wallace Rodriguez MD at [address] [facility] [employer] (CERVIX STATUS UNKNOWN)      THROAT SURGERY      POLYPS REMOVED FROM VOCAL CORD    TOTAL KNEE ARTHROPLASTY Right 01/06/2015    TOTAL KNEE ARTHROPLASTY Left 05/26/2015    UPPER GASTROINTESTINAL ENDOSCOPY       FAMILY HISTORY:       Problem Relation Age of Onset    Heart Disease Mother     Arthritis Mother     Heart Disease Father     Arthritis Father     Cancer Father         \"oral\"    Diabetes Sister         gestational     SOCIAL HISTORY:   TOBACCO:   reports that she has never smoked. She has never used smokeless tobacco.  ETOH:  reports current alcohol use. DRUGS: reports no history of drug use. ALLERGIES:    Allergies   Allergen Reactions    Morphine Other (See Comments)     HALLUCINATIONS      Shellfish-Derived Products Nausea Only    Tape Abigail Mean Tape] Rash         HOME MEDICATIONS:  Prior to Admission medications    Medication Sig Start Date End Date Taking?  Authorizing Provider   insulin lispro, 1 Unit Dial, (HUMALOG KWIKPEN) 100 UNIT/ML SOPN Inject 3 Units into the skin 3 times daily (before meals) 125 - 150 2 units   151 - 200 4 units   201 - 250 6 units   251 - 300 8 units  301 - 350 10 units 351 - 400 12 units 12/29/22   Alexei Jay MD   amLODIPine (NORVASC) 5 MG tablet  12/20/22   Historical Provider, MD   doxycycline monohydrate (MONODOX) 100 MG capsule  12/20/22   Historical Provider, MD THAIS MARTINEZ 100-25 MCG/ACT AEPB  12/20/22   Historical Provider, MD   isosorbide mononitrate (IMDUR) 60 MG extended release tablet  12/20/22   Historical Provider, MD   potassium chloride (KLOR-CON) 10 MEQ extended release tablet  12/20/22   Historical Provider, MD   traMADol Ivette Loosen) 50 MG tablet  12/20/22   Historical Provider, MD   traZODone (DESYREL) 50 MG tablet  12/20/22   Historical Provider, MD   aspirin 81 MG EC tablet Take 1 tablet by mouth daily 12/6/22   Penny Reyes MD   amiodarone (CORDARONE) 200 MG tablet Take 1 tablet by mouth 3 times daily 12/5/22   Penny Reyes MD   metoprolol tartrate (LOPRESSOR) 25 MG tablet Take 0.5 tablets by mouth 2 times daily 12/5/22   Penny Reyes MD   clopidogrel (PLAVIX) 75 MG tablet Take 1 tablet by mouth daily 12/6/22   Penny Reyes MD   furosemide (LASIX) 40 MG tablet Take 1 tablet by mouth 2 times daily 12/5/22   Penny Reyes MD   potassium chloride (KLOR-CON M) 10 MEQ extended release tablet Take 2 tablets by mouth daily 12/5/22   Penny Reyes MD   atorvastatin (LIPITOR) 40 MG tablet Take 1 tablet by mouth nightly 11/16/22   Christina Fuentes MD   empagliflozin (JARDIANCE) 10 MG tablet TAKE 1 TABLET BY MOUTH EVERY DAY 10/22/22   Deshawn Vicente MD   levothyroxine (SYNTHROID) 50 MCG tablet TAKE 1 TABLET BY MOUTH EVERY DAY 10/22/22   Deshawn Vicente MD   citalopram (CELEXA) 40 MG tablet TAKE 1 TABLET BY MOUTH ONCE DAILY *EMERGENCY REFILL* 9/29/22   Deshawn Vicente MD   glimepiride (AMARYL) 4 MG tablet TAKE 1 TABLET BY MOUTH TWICE DAILY *EMERGENCY REFILL* 9/29/22   Deshawn Vicente MD   omeprazole (PRILOSEC) 20 MG delayed release capsule TAKE 1 CAPSULE BY MOUTH ONCE DAILY 9/22/22   Paulette Turner DO   gabapentin (NEURONTIN) 400 MG capsule TAKE 1 CAPSULE BY MOUTH THREE TIMES DAILY 4/22/22 11/18/22  Aidan Lopez MD   sucralfate (CARAFATE) 1 GM tablet TAKE 1 TABLET BY MOUTH EVERY DAY  Patient not taking: Reported on 1/12/2023 4/20/22   Aidan Lopez, MD   busPIRone (BUSPAR) 10 MG tablet TAKE ONE (1) TABLET BY MOUTH TWICE DAILY 3/11/22   Jean Marie Marie MD   insulin glargine [facility]) 100 UNIT/ML injection pen Inject 65 units subcutaneously once daily. Patient taking differently: Inject 20 Units into the skin Inject 65 units subcutaneously once daily. Changed at [facility] 3/1/22   Jean Marie Marie MD   miconazole (MICOTIN) 2 % cream APPLY TO AFFECTED AREA TWICE DAILY  Patient not taking: Reported on 1/12/2023 11/12/21   Jean Marie Marie MD   lidocaine (XYLOCAINE) 2 % jelly Apply topically with dressing changes every 3 days  Patient not taking: Reported on 1/12/2023 8/5/21   Jean Marie Marie MD   blood glucose monitor strips Test before meals and at bedtime. DX: DM, on insulin 10/8/20   Jean Marie Marie MD   miconazole (ZEASORB-AF) 2 % powder Apply topically 2 times daily. Patient not taking: Reported on 1/12/2023 10/1/20   Jean Marie Marie MD   blood glucose monitor kit and supplies Dispense sufficient amount for indicated testing frequency plus additional to accommodate PRN testing needs. Dispense all needed supplies to include: monitor, strips, lancing device, lancets, control solutions, alcohol swabs. 8/17/20   Trey Amezquita MD   magnesium hydroxide (MILK OF MAGNESIA) 400 MG/5ML suspension Take 30 mLs by mouth daily as needed for Constipation 7/29/20   Trey Amezquita MD   Handicap Placard MISC Is a permanent condition. DX: M19.90 5/14/19   Jean Marie Marie MD   Insulin Pen Needle (B-D UF III MINI PEN NEEDLES) 31G X 5 MM MISC USE 1 PEN NEEDLE DAILY 3/8/18   Jean Marie Marie MD   [employer] Lancets Thin MISC Test before meals and at bedtime.  DX: DM, on insulin 4/22/14   Xiomara Mcnally MD   Alcohol Swabs (ALCOHOL PREP PADS) by Other route 2 times daily      Historical Provider, MD     IMMUNIZATIONS:  Most Recent Immunizations   Administered Date(s) Administered    COVID-19, MODERNA BLUE border, Primary or Immunocompromised, (age 12y+), IM, 100 mcg/0.5mL 2021    COVID-19, MODERNA Booster BLUE border, (age 18y+), IM, 50mcg/0.25mL 2021    DTaP 2012    Hepatitis A Adult (Havrix, Vaqta) 2020    Influenza Vaccine, unspecified formulation 2012    Influenza Virus Vaccine 2014    Influenza Whole 11/15/2005    Influenza, FLUAD, (age 72 y+), Adjuvanted, 0.5mL 2021    Influenza, FLUARIX, FLULAVAL, FLUZONE (age 10 mo+) AND AFLURIA, (age 1 y+), PF, 0.5mL 2020    Pneumococcal Polysaccharide ([place]u25) 2015    Tdap (Boostrix, Adacel) 2014     REVIEW OF SYSTEMS:  Review of Systems   Constitutional:  Negative for activity change, appetite change, diaphoresis and fever. HENT:  Negative for congestion and voice change. Eyes:  Negative for visual disturbance. Respiratory:  Positive for cough and shortness of breath. Negative for apnea and chest tightness. Cardiovascular:  Positive for chest pain. Negative for palpitations and leg swelling. Gastrointestinal:  Negative for abdominal pain, constipation and diarrhea. Genitourinary:  Negative for difficulty urinating. Musculoskeletal:  Negative for back pain and gait problem. Neurological:  Negative for dizziness and weakness. Psychiatric/Behavioral:  Negative for agitation and confusion. OBJECTIVE     PaO2/FiO2 RATIO:  No results for input(s): POCPO2 in the last 72 hours.          VITAL SIGNS:   LAST:  /61   Pulse 85   Temp 98.2 °F (36.8 °C) (Oral)   Resp 18   Ht 4' 11\" (1.499 m)   Wt 239 lb (108.4 kg)   SpO2 97%   BMI 48.27 kg/m²   8-24 HR RANGE:  TEMP Temp  Av.4 °F (36.9 °C)  Min: 98.2 °F (36.8 °C)  Max: 98.5 °F (20.8 °C)   BP Systolic (02NPS), JZL:377 , Min:117 , TXO:032      Diastolic (89GJP), DAY:27, Min:53, Max:66     PULSE Pulse  Av  Min: 79  Max: 88   RR Resp  Av  Min: 18  Max: 18   O2 SAT SpO2  Av %  Min: 97 %  Max: 97 %   OXYGEN DELIVERY O2 Flow Rate (L/min)  Avg: 3 L/min  Min: 3 L/min Max: 3 L/min        SYSTEMIC EXAMINATION:   Physical Exam -  Constitutional:  Alert, cooperative and no distress. Chest tube in place left side. Mental Status:  Oriented to person, place and time and normal affect. Lungs:  Bilateral air entry present, lung fields clear. Normal effort. Heart:  Regular rate and rhythm, no murmur. Abdomen:  Soft, nontender, nondistended, normal bowel sounds. Extremities:  No edema, redness, tenderness in the calves. Skin:  Warm, dry, no gross lesions or rashes.       DATA REVIEW     Medications: Current Inpatient  Scheduled Meds:   sodium chloride flush  5-40 mL IntraVENous 2 times per day    [Held by provider] enoxaparin  30 mg SubCUTAneous BID    piperacillin-tazobactam  3,375 mg IntraVENous Q8H    amiodarone  200 mg Oral TID    amLODIPine  5 mg Oral Daily    [Held by provider] aspirin  81 mg Oral Daily    atorvastatin  40 mg Oral Nightly    budesonide-formoterol  2 puff Inhalation BID    busPIRone  10 mg Oral BID    citalopram  40 mg Oral Daily    [Held by provider] clopidogrel  75 mg Oral Daily    furosemide  40 mg Oral BID    gabapentin  400 mg Oral TID    insulin glargine  45 Units SubCUTAneous Nightly    isosorbide mononitrate  60 mg Oral Daily    levothyroxine  50 mcg Oral Daily    metoprolol tartrate  12.5 mg Oral BID    pantoprazole  40 mg Oral QAM AC    traZODone  50 mg Oral Nightly    insulin lispro  0-16 Units SubCUTAneous TID WC    insulin lispro  0-4 Units SubCUTAneous Nightly    vancomycin  750 mg IntraVENous Q12H    vancomycin (VANCOCIN) intermittent dosing (placeholder)   Other RX Placeholder     Continuous Infusions:   sodium chloride      dextrose         INPUT/OUTPUT:  In: 62.5   Out: -   Date 01/13/23 0000 - 01/13/23 2359   Shift [id number] 2062-1981 1600-2359 24 Hour Total   INTAKE   IV Piggyback(mL/kg) 62.5(0.6)   62.5(0.6)   Shift Total(mL/kg) 62.5(0.6)   62.5(0.6)   OUTPUT   Shift Total(mL/kg)       Weight (kg) 108.4 108.4 108.4 108.4        LABS:  ABGs: No results for input(s): POCPH, POCPCO2, POCPO2, POCHCO3, NFQS9IQL in the last 72 hours. CBC:   Recent Labs     01/12/23  1619   WBC 16.7*   HGB 13.1   HCT 46.8   MCV 99.6      LYMPHOPCT 5*   RBC 4.70   MCH 27.9   MCHC 28.0*   RDW 13.6     CRP:   No results for input(s): CRP in the last 72 hours. LDH:   No results for input(s): LDH in the last 72 hours. BMP:   Recent Labs     01/12/23  1619      K 5.0   CL 95*   CO2 23   BUN 22   CREATININE 0.77   GLUCOSE 294*     Liver Function Test:   Recent Labs     01/12/23  1619   PROT 7.1   LABALBU 2.7*   ALT 24   AST 37*   ALKPHOS 143*   BILITOT 0.7     Coagulation Profile:   Recent Labs     01/12/23  1619   INR 1.1   PROTIME 11.2     D-Dimer:  No results for input(s): DDIMER in the last 72 hours. Lactic Acid:  No results for input(s): LACTA in the last 72 hours. Cardiac Enzymes:  No results for input(s): CKTOTAL, CKMB, CKMBINDEX, TROPONINI in the last 72 hours. Invalid input(s): TROPONIN, HSTROP  BNP/ProBNP:   Recent Labs     01/12/23  1619   PROBNP 1,814*     Triglycerides:  No results for input(s): TRIG in the last 72 hours. Microbiology:  Urine Culture:  No components found for: CURINE  Blood Culture:  No components found for: CBLOOD, CFUNGUSBL  Sputum Culture:  No components found for: CSPUTUM  Recent Labs     01/12/23  1614   SPECDESC . BLOOD   SPECIAL LHAND, 5ML   CULTURE NO GROWTH 12 HOURS     Recent Labs     01/12/23  1607 01/12/23  1614   SPECIAL RFOREARM, 0.75ML LHAND, 5ML   CULTURE NO GROWTH 12 HOURS NO GROWTH 12 HOURS          Radiology Reports:  CT CHEST W CONTRAST   Final Result   1. Diastasis of median sternotomy with air in the soft tissues, and   appearance of abscess formation/dehiscence dural to the sternotomy. Air is   present in the mediastinum. 2.  Large left pleural effusion with almost complete collapse of the left   lobe of the lung.   Bronchial wall thickening is noted with material within   the bronchi likely mucous plugging. 3.  Hepatic steatosis. 4.  Small gallstones in the gallbladder. 5.  2 cm left adrenal mass with indeterminate Hounsfield numbers. Further   workup using adrenal CT protocol or MRI advised when the patient's condition   permits. RECOMMENDATIONS:   Advise adrenal protocol CT or MRI to evaluate a left adrenal mass. XR CHEST PORTABLE   Final Result   Progressive left pleural effusion with nearly complete opacification of the   left hemithorax. IR CHEST TUBE INSERTION    (Results Pending)        Echocardiogram:   No results found for this or any previous visit. ASSESSMENT AND PLAN     Assessment:    // Acute hypoxic respiratory failure  //Left lobar collapse secondary to effusion  //Large left loculated effusion s/p chest tube 1/13/2023  //CAD s/p CABG  //Hypertension      Plan:    Sternal wound and chest tube management as per CT surgery. Plan for sternal debridement with washout and wound VAC placement tomorrow by CTS. Agree with Vivi and Christine as per ID recommendation. Follow-up on culture results. Follow-up on chest x-ray to look for left lung expansion post chest tube placement. Follow-up on fluid analysis. Thank you for this interesting consult. We will continue to follow. I will discuss with attending. Khalida Rodrigues MD  Internal Medicine Resident, PGY-3  [facility] [address]         1/13/2023, 9:38 AM    Please note that this chart was generated using voice recognition Dragon dictation software. Although every effort was made to ensure the accuracy of this automated transcription, some errors in transcription may have occurred.

## 2023-01-13 NOTE — CONSULTS
[facility] -   Infectious diseases evaluation  admission date 1/12/2023    reason for consultation:   Sternal infection    Impression :   Current:  Surgical wound dehiscence and infection w abscess - lower wound scabbed and infected - no pus seens  CAD, CABG November 2022  Left lobar collapse and mucous plug  Large Left loculated effusion - could be post op loculation - possibly collapsing the left lung  Left chest tube 1/13  Left adrenal tumor  Lactic acidosis  Bandemia 16    Other:    Discussion / summary of stay / plan of care     Recommendations   Cx from the sternal wound  Await BC   Resp tx fr the left lung collapse  Agree w vanco and zosyn 1/12  Await CTS surg plan for the lower sternal wound    Infection Control Recommendations   [place] Precautions  Contact Isolation     Antimicrobial Stewardship Recommendations   Simplification of therapy  Targeted therapy      History of Present Illness:   Initial history:  Isaiah Alexander is a 78y.o.-year-old female who had a CABG 11/2022, comes in with worsening chest pain, tightness, cough nonproductive nausea vomiting and finally fell twice in the last 2 days and hit her head due to dizziness. She also describes diffuse abdominal pain and some shortness of breath with exertion, no fever or chills    Drainage from the surgical wound, with pain at the surgical site  Comes into the emergency room, started on 1 L of uses oxygen since her bypass, 1 L at home, she is diabetic on insulin, cirrhosis and obese, SHERITA    In the ER, the surgical wound looked infected with some drainage and swelling of the tissues. X-ray showed opacification of the left lung.   CT shows a left adrenal mass, suggested an adrenal imaging    Patient started on antibiotic, infectious consulted for sternal wound dehiscence and infection        Interval changes  1/13/2023   Patient Vitals for the past 8 hrs:   BP Temp Temp src Pulse Resp SpO2 Weight 01/13/23 1109 125/60 97.6 °F (36.4 °C) Temporal 96 15 94 % --   01/13/23 1029 134/74 -- -- 93 12 96 % --   01/13/23 1023 110/67 -- -- 92 12 96 % --   01/13/23 1014 119/64 -- -- 88 18 97 % --   01/13/23 1011 121/68 -- -- 88 18 97 % --   01/13/23 1002 136/61 -- -- 90 17 95 % --   01/13/23 0838 -- -- -- 85 18 97 % --   01/13/23 0810 133/72 98.1 °F (36.7 °C) Oral 87 16 91 % --   01/13/23 0600 -- -- -- -- -- -- 239 lb (108.4 kg)       Summary of relevant labs:  Labs:  Lactic Acid, 4.7 High    WBC16.7 High    Procalcitonin0.53 High    Creatinine0.77      Micro:  BC  Imaging:  CT of the chest  .  2 cm left adrenal mass with indeterminate Hounsfield numbers. Further   workup using adrenal CT protocol or MRI advised when the patient's condition   permits. Air seen in the mediastinum, an abscess around the sternotomy area    2. Large left pleural effusion with almost complete collapse of the left   lobe of the lung. Bronchial wall thickening is noted with material within   the bronchi likely mucous plugging           I have personally reviewed the past medical history, past surgical history, medications, social history, and family history, and I haveupdated the database accordingly. Allergies:   Morphine, Shellfish-derived products, and Tape [adhesive tape]     Review of Systems:     Review of Systems   Constitutional:  Positive for fatigue and fever. Negative for activity change. HENT:  Negative for congestion. Eyes:  Negative for discharge. Respiratory:  Positive for cough and shortness of breath. Negative for apnea. Cardiovascular:  Positive for chest pain. Gastrointestinal:  Positive for abdominal pain. Endocrine: Negative for cold intolerance. Genitourinary:  Negative for dysuria. Musculoskeletal:  Negative for arthralgias. Skin:  Positive for color change and wound. Allergic/Immunologic: Negative for immunocompromised state. Neurological:  Negative for dizziness.    Hematological: Negative for adenopathy. Psychiatric/Behavioral:  Negative for agitation. Physical Examination :       Physical Exam  Constitutional:       General: She is not in acute distress. Appearance: Normal appearance. She is not ill-appearing. HENT:      Head: Normocephalic and atraumatic. Nose: Nose normal. No congestion. Eyes:      Conjunctiva/sclera: Conjunctivae normal.      Pupils: Pupils are equal, round, and reactive to light. Cardiovascular:      Rate and Rhythm: Normal rate. Heart sounds: Normal heart sounds. No murmur heard. Pulmonary:      Breath sounds: Normal breath sounds. No stridor. Abdominal:      Palpations: There is no mass. Hernia: No hernia is present. Genitourinary:     Comments: Urine jackie  Musculoskeletal:         General: No swelling or deformity. Cervical back: Neck supple. No rigidity. Skin:     Coloration: Skin is not jaundiced. Findings: No bruising. Neurological:      Mental Status: She is alert and oriented to person, place, and time. Cranial Nerves: No cranial nerve deficit. Sensory: No sensory deficit. Psychiatric:         Mood and Affect: Mood normal.         Thought Content:  Thought content normal.       Past Medical History:     Past Medical History:   Diagnosis Date    Ambulates with cane     or walker    Anxiety     Borderline hypertension     CAD (coronary artery disease)     stents x 2 in 2020    Depression 07/28/2020    GERD (gastroesophageal reflux disease)     Heart attack ([facility] [address] 07/18/2020    Hypertension     Hypothyroidism     Neuropathy     Obesity     morbid    Osteoarthritis     Other cirrhosis of liver ([facility] [address] 07/27/2020    pt denies    Sleep apnea     possible    Type II or unspecified type diabetes mellitus without mention of complication, not stated as uncontrolled     Under care of service provider 11/18/2022    oqf-Gxbnptwa-rjmipHernan owen-last visit aug 2022    Under care of service provider 11/18/2022 cardiology-ali-last visit nov 2022    Vertebrogenic low back pain 03/29/2022       Past Surgical  History:     Past Surgical History:   Procedure Laterality Date    APPENDECTOMY      CARDIAC CATHETERIZATION      2 stents    CARDIAC CATHETERIZATION  11/01/2022    COLONOSCOPY      CORONARY ANGIOPLASTY WITH STENT PLACEMENT  07/2020    CORONARY ARTERY BYPASS GRAFT N/A 11/28/2022    CABG CORONARY ARTERY BYPASS X3 ON PUMP , ATA, ENDO VEIN HARVEST performed by Wallace Rodriguez MD at [id number] (CERVIX STATUS UNKNOWN)      IR CHEST TUBE INSERTION  1/13/2023    IR CHEST TUBE INSERTION 1/13/2023 Chris Dennis MD [facility]    THROAT SURGERY      POLYPS REMOVED FROM VOCAL CORD    TOTAL KNEE ARTHROPLASTY Right 01/06/2015    TOTAL KNEE ARTHROPLASTY Left 05/26/2015    UPPER GASTROINTESTINAL ENDOSCOPY         Medications:      sodium chloride flush  5-40 mL IntraVENous 2 times per day    enoxaparin  30 mg SubCUTAneous BID    piperacillin-tazobactam  3,375 mg IntraVENous Q8H    amiodarone  200 mg Oral TID    amLODIPine  5 mg Oral Daily    aspirin  81 mg Oral Daily    atorvastatin  40 mg Oral Nightly    budesonide-formoterol  2 puff Inhalation BID    busPIRone  10 mg Oral BID    citalopram  40 mg Oral Daily    clopidogrel  75 mg Oral Daily    furosemide  40 mg Oral BID    gabapentin  400 mg Oral TID    insulin glargine  45 Units SubCUTAneous Nightly    isosorbide mononitrate  60 mg Oral Daily    levothyroxine  50 mcg Oral Daily    metoprolol tartrate  12.5 mg Oral BID    pantoprazole  40 mg Oral QAM AC    traZODone  50 mg Oral Nightly    insulin lispro  0-16 Units SubCUTAneous TID WC    insulin lispro  0-4 Units SubCUTAneous Nightly    vancomycin  750 mg IntraVENous Q12H    vancomycin (VANCOCIN) intermittent dosing (placeholder)   Other RX Placeholder       Social History:     Social History     Socioeconomic History    Marital status:      Spouse name: Mali Wu    Number of children: 0    Years of education: Not on file    Highest education level: Not on file   Occupational History    Occupation: Retired      Employer: [employer]   Tobacco Use    Smoking status: Never    Smokeless tobacco: Never   Vaping Use    Vaping Use: Never used   Substance and Sexual Activity    Alcohol use: Yes     Comment: once a month    Drug use: No    Sexual activity: Yes     Partners: Male   Other Topics Concern    Not on file   Social History Narrative    Not on file     Social Determinants of Health     Financial Resource Strain: Low Risk     Difficulty of Paying Living Expenses: Not hard at all   Food Insecurity: No Food Insecurity    Worried About Running Out of Food in the Last Year: Never true    Ran Out of Food in the Last Year: Never true   Transportation Needs: Not on file   Physical Activity: Not on file   Stress: Not on file   Social Connections: Not on file   Intimate Partner Violence: Not on file   Housing Stability: Not on file       Family History:     Family History   Problem Relation Age of Onset    Heart Disease Mother     Arthritis Mother     Heart Disease Father     Arthritis Father     Cancer Father         \"oral\"    Diabetes Sister         gestational      Medical Decision Making:   I have independently reviewed/ordered the following labs:    CBC with Differential:   Recent Labs     01/12/23  1619   WBC 16.7*   HGB 13.1   HCT 46.8      LYMPHOPCT 5*   MONOPCT 8     BMP:  Recent Labs     01/12/23  1619      K 5.0   CL 95*   CO2 23   BUN 22   CREATININE 0.77     Hepatic Function Panel:   Recent Labs     01/12/23  1619   PROT 7.1   LABALBU 2.7*   BILITOT 0.7   ALKPHOS 143*   ALT 24   AST 37*     No results for input(s): RPR in the last 72 hours. No results for input(s): HIV in the last 72 hours. No results for input(s): BC in the last 72 hours.   Lab Results   Component Value Date/Time    CREATININE 0.77 01/12/2023 04:19 PM    GLUCOSE 294 01/12/2023 04:19 PM    GLUCOSE 250 03/30/2012 03:15 PM       Detailed results: Thank you for allowing us to participate in the care of this patient. Please call with questions. This note is created with the assistance of a speech recognition program.  While intending to generate adocument that actually reflects the content of the visit, the document can still have some errors including those of syntax and sound a like substitutions which may escape proof reading. It such instances, actual meaningcan be extrapolated by contextual diversion.     Aries Hussein MD  Office: [phone]  Perfect serve / office [phone]

## 2023-01-13 NOTE — PROGRESS NOTES
Occupational Therapy  [facility] [address] [facility]  Occupational Therapy Initial Assessment    Name: Wing Brand  : [id number]  MRN: [id number]  Date of Service: 2023    Discharge Recommendations:  Patient would benefit from continued therapy after discharge  OT Equipment Recommendations  Equipment Needed: Yes  Mobility Devices: ADL Assistive Devices  ADL Assistive Devices: Reacher;Long-handled Shoe [place];Long-handled Sponge;Sock-Aid Hard       Patient Diagnosis(es): The primary encounter diagnosis was Pleural effusion on left. A diagnosis of Abscess of sternal region was also pertinent to this visit. Past Medical History:  has a past medical history of Ambulates with cane, Anxiety, Borderline hypertension, CAD (coronary artery disease), Depression, GERD (gastroesophageal reflux disease), Heart attack ([facility] [address] Hypertension, Hypothyroidism, Neuropathy, Obesity, Osteoarthritis, Other cirrhosis of liver ([facility] [address] Sleep apnea, Type II or unspecified type diabetes mellitus without mention of complication, not stated as uncontrolled, Under care of service provider, Under care of service provider, and Vertebrogenic low back pain. Past Surgical History:  has a past surgical history that includes Hysterectomy; Colonoscopy; Upper gastrointestinal endoscopy; Dilation and curettage of uterus; hiatal hernia repair; Throat surgery; Appendectomy; Total knee arthroplasty (Right, 2015); Total knee arthroplasty (Left, 2015); Coronary angioplasty with stent (2020); Cardiac catheterization; Cardiac catheterization (2022); Coronary artery bypass graft (N/A, 2022); and IR CHEST TUBE INSERTION (2023). Chief Complaint   Patient presents with    Incisional Pain     Had open heart sx in November        Assessment   Performance deficits / Impairments: Decreased functional mobility ; Decreased ADL status; Decreased endurance;Decreased high-level IADLs;Decreased balance  Assessment: Pt engaged in bed mobility sit<>supine with Mod A x 2. Required significant assist d/t maintaining sternal precautions by pt squeezing pillow with BUEs. Completed 2x sit<>stand transfers with CGA-Min A and functional mobility short distance in room with CGA and RW. Required utilization of restbreaks d/t fatigue and dizziness. BP stable. Pt overall limited by decreased endurance, balance and dizizness throughout today's session. Pt is expected to require skilled OT services during their acute hospitalization stay to address the above noted deficits through skilled occupational therapy intervention for promotion of increased independence throughout ADLs, IADLs and functional mobility tasks. Prognosis: Good  Decision Making: Medium Complexity  REQUIRES OT FOLLOW-UP: Yes  Activity Tolerance  Activity Tolerance: Patient Tolerated treatment well        Plan   Occupational Therapy Plan  Times Per Week: 3-5x/wk  Current Treatment Recommendations: Balance training, Functional mobility training, Endurance training, Safety education & training, Patient/Caregiver education & training, Equipment evaluation, education, & procurement, Self-Care / ADL, Home management training     Restrictions  Restrictions/Precautions  Restrictions/Precautions: Fall Risk  Required Braces or Orthoses?: No  Position Activity Restriction  Sternal Precautions: 5# Lifting Restrictions  Other position/activity restrictions: Pt had recent CABG on 11/28/22, sternal precautions maintained with pillow used on chest during session d/t pt stating she has not yet been cleared of them.  report can bring pt's heart hugger tomorrow. Chest tube-per RN required to remain on suction today. Subjective   General  Patient assessed for rehabilitation services?: Yes  Family / Caregiver Present: No  General Comment  Comments: RN ok'd for OT/PT eval this AM. Pt agreeable to session, pleasent/cooperative throughout. Pt reports 6/10 pain at incision.      Social/Functional History  Social/Functional History  Lives With: Spouse  Type of Home: House  Home Layout: One level  Home Access: Stairs to enter with rails  Entrance Stairs - Number of Steps: 3  Entrance Stairs - Rails: Left  Bathroom Shower/Tub: Walk-in shower  Bathroom Toilet: Standard  Bathroom Equipment: Grab bars in shower, Built-in shower seat, Grab bars around toilet  Home Equipment: Medardo Sorrow, rolling, Oxygen, Rollator (Uses cane for household distances. Uses RW community distances.)  Has the patient had two or more falls in the past year or any fall with injury in the past year?: Yes (2x this week)  Receives Help From: Family  ADL Assistance: Needs assistance ( assists with LB bathing. Able to complete all other ADLS independent.)  Toileting: Independent  Homemaking Assistance: Needs assistance  Homemaking Responsibilities: No ( completes)  Ambulation Assistance: Independent  Transfer Assistance: Independent  Active : No  Patient's  Info:   Mode of Transportation: [facility]  Occupation: Retired  Type of Occupation: retired  at [address] [employer]  Additional Comments:  is home almost at all times. Has support to call if  is not home. Objective   Safety Devices  Type of Devices: Call light within reach; Bed alarm in place;Gait belt;Left in bed  Restraints  Restraints Initially in Place: No    Balance  Sitting: With support (Pt sat EOB ~25 minutes for static sitting, education, eval questions and rest breaks.)  Standing: With support (2x bouts. 1st bout ~1 min, pt became dizzy. Took seated restbreak, BP stable. 2nd bout in prep for mobility ~2 min. CGA and use of RW.)    Gait  Overall Level of Assistance: Contact-guard assistance; Additional time; Adaptive equipment (Pt completed functional mobility EOB->close to window->EOB. Use of RW.  Slow mobility, but no LOB.)     AROM: Within functional limits  Strength:  (Not assessed d/t sternal precautions)  Coordination: Within functional limits  Tone: Normal  Sensation: Intact (Denies any numbness/tingling)    ADL  Feeding: Modified independent ;Setup  Grooming: Modified independent ;Setup; Increased time to complete  UE Bathing: Verbal cueing;Setup; Increased time to complete;Minimal assistance  LE Bathing: Moderate assistance; Increased time to complete;Setup;Verbal cueing  UE Dressing: Minimal assistance;Verbal cueing;Setup; Increased time to complete  LE Dressing: Maximum assistance; Increased time to complete;Setup;Verbal cueing  Toileting: Moderate assistance;Setup; Increased time to complete     Activity Tolerance  Activity Tolerance: Patient limited by fatigue;Patient limited by endurance    Bed mobility  Supine to Sit: Moderate assistance;2 Person assistance  Sit to Supine: 2 Person assistance; Moderate assistance  Scooting: Moderate assistance  Bed Mobility Comments: HOB elevated ~30 degrees; Assist for LE and trunk progression d/t requiring pt to maintain sternal precautions and hold pillow to chest.    Transfers  Sit to stand: Minimal assistance (First transfer required min A. Progressed to [address] for second transfer.)  Stand to sit: Contact guard assistance  Transfer Comments: 2x, use of RW.     Vision  Vision: Impaired  Vision Exceptions: Wears glasses for reading  Hearing  Hearing: Exceptions to [facility]  Hearing Exceptions: Hard of hearing/hearing concerns    Cognition  Overall Cognitive Status: WFL  Orientation  Overall Orientation Status: Within Functional Limits           Education Provided Comments: Pt educated on OT role, OT POC, activity promotion, transfer training, walker management, sternal precautions, implementation of restbreaks-good return             AM-PAC Score        AM-PAC Inpatient Daily Activity Raw Score: 17 (01/13/23 1728)  AM-PAC Inpatient ADL T-Scale Score : 37.26 (01/13/23 1728)  ADL Inpatient CMS 0-100% Score: 50.11 (01/13/23 1728)  ADL Inpatient CMS G-Code Modifier : CK (01/13/23 4176)        Goals  Short Term Goals  Time Frame for Short Term Goals: By discharge, pt will:  Short Term Goal 1: Demo bed mobility with Min A and use of adaptive strategies PRN  Short Term Goal 2: Demo functional sit<>stand transfers and functional mobility with SBA and LRD PRN  Short Term Goal 3: Demo 8 minutes of dynamic standing balance with CGA to promote increased engagement in ADLs  Short Term Goal 4: Demo UB ADLs with SUP and use of adpative tech PRN  Short Term Goal 5: Demo LB ADLs with CGA and use of DME PRN  Short Term Goal 6: Demonstrate independent implementation of EC/WS strategies throughout all functional activities with  0 VCs to initiate       Therapy Time   Individual Concurrent Group Co-treatment   Time In 1406         Time Out 1445         Minutes 39         Timed Code Treatment Minutes: 8 Minutes       CEDRICK Giles/L

## 2023-01-13 NOTE — PROGRESS NOTES
Writer spoke with [address] clin lead Vitaliy regarding planned OR for tomorrow at [address] aware that patient will need a CAR 1 bed after OR tomorrow. Unable to accommodate transfer this evening due to high census in [address]. Bharath Caceres in bed placement also notified of need for bed after OR tomorrow.       Electronically signed by Andrew Morton RN on 1/13/2023 at 4:09 PM

## 2023-01-13 NOTE — CONSULTS
[facility]  History and Physical    Pt Name: Dalia Whyte  MRN: [id number]  YOB: 1955  Date of evaluation: 1/13/2023  Primary Care Physician: Curly Nguyen MD  Patient evaluated at the request of  Dr. Jocelyne King  Reason for evaluation: pleural effusion post op cabg    History of Present Illness:       Dalia Whyte is a 78y.o. year old, ,  female known to our staff. Patient had CABG performed by Dr. Roseann Gao on 11/28/2022. She last was seen in the office on 12/21/2022. At that point she denied any respiratory or cardiac problems. She did have multiple areas of eschar around and including lower sternal incision. No acitve drainage or odor. She was asked to follow up if any changes. She retruned to the ER last night complaining of chest pain and shortness of breath. CT scan of chest revealed large left pleural effusion with near collapse of left lung. Pulmonary and ID have been consulted. We've been asked to evaluate sternum and pleural effusion. I'm seeing the patient in consult with Dr. Roseann Gao. All films and records available have been reviewed.     Review of Systems:     General {CONSTITUTIONAL:194874959::Denies any fever or chills}  HEENT {HEENT:147898468::Denies any diplopia, tinnitus or vertigo}  Resp {RESPIRATORY:307775379::Denies any shortness of breath, cough or wheezing}  Cardiac {CARDIAC:336675933::Denies any chest pain, palpitations, claudication or edema}  GI {GASTROINTESTINAL:975962131::Denies any melena, hematochezia, hematemesis or pyrosis}   {:375644023::Denies any frequency, urgency, hesitancy or incontinence}  Heme {ros heme/lymph peds:678459::Denies bruising or bleeding easily}  Endocrine {Findings; ROS endocrine:89945::\"Denies any history of diabetes or thyroid disease\"}  Neuro {NEURO:935765408::Denies any focal motor or sensory deficits}    Past Medical History         Diagnosis Date    Ambulates with cane     or walker    Anxiety Borderline hypertension     CAD (coronary artery disease)     stents x 2 in 2020    Depression 07/28/2020    GERD (gastroesophageal reflux disease)     Heart attack ([facility] [address] 07/18/2020    Hypertension     Hypothyroidism     Neuropathy     Obesity     morbid    Osteoarthritis     Other cirrhosis of liver ([facility] 07/27/2020    pt denies    Sleep apnea     possible    Type II or unspecified type diabetes mellitus without mention of complication, not stated as uncontrolled     Under care of service provider 11/18/2022    [id number]last visit aug 2022    Under care of service provider 11/18/2022    cardiology-[facility]-last visit nov 2022    Vertebrogenic low back pain 03/29/2022        Past Surgical History         Procedure Laterality Date    APPENDECTOMY      CARDIAC CATHETERIZATION      2 stents    CARDIAC CATHETERIZATION  11/01/2022    COLONOSCOPY      CORONARY ANGIOPLASTY WITH STENT PLACEMENT  07/2020    CORONARY ARTERY BYPASS GRAFT N/A 11/28/2022    CABG CORONARY ARTERY BYPASS X3 ON PUMP , ATA, ENDO VEIN HARVEST performed by Jesus Alberto Atkins MD at [address] [facility] (CERVIX STATUS UNKNOWN)      [address] ARTHROPLASTY Right 01/06/2015    TOTAL KNEE ARTHROPLASTY Left 05/26/2015    UPPER GASTROINTESTINAL ENDOSCOPY         Medications     Prior to Admission medications    Medication Sig Start Date End Date Taking?  Authorizing Provider   insulin lispro, 1 Unit Dial, (HUMALOG KWIKPEN) 100 UNIT/ML SOPN Inject 3 Units into the skin 3 times daily (before meals) 125 - 150 2 units   151 - 200 4 units   201 - 250 6 units   251 - 300 8 units  301 - 350 10 units 351 - 400 12 units 12/29/22   Samantha Shah MD   amLODIPine (NORVASC) 5 MG tablet  12/20/22   Historical Provider, MD   doxycycline monohydrate (MONODOX) 100 MG capsule  12/20/22   Historical Provider, MD THAIS MARTINEZ 100-25 MCG/ACT AEPB  12/20/22   Historical Provider, MD   isosorbide mononitrate (IMDUR) 60 MG extended release tablet  12/20/22   Historical Provider, MD   potassium chloride (KLOR-CON) 10 MEQ extended release tablet  12/20/22   Historical Provider, MD   traMADol Daniel Ruff) 50 MG tablet  12/20/22   Historical Provider, MD   traZODone (DESYREL) 50 MG tablet  12/20/22   Historical Provider, MD   aspirin 81 MG EC tablet Take 1 tablet by mouth daily 12/6/22   Chris Yung MD   amiodarone (CORDARONE) 200 MG tablet Take 1 tablet by mouth 3 times daily 12/5/22   Chris Yung MD   metoprolol tartrate (LOPRESSOR) 25 MG tablet Take 0.5 tablets by mouth 2 times daily 12/5/22   Chris Yung MD   clopidogrel (PLAVIX) 75 MG tablet Take 1 tablet by mouth daily 12/6/22   Chris Yung MD   furosemide (LASIX) 40 MG tablet Take 1 tablet by mouth 2 times daily 12/5/22   Chris Yung MD   potassium chloride (KLOR-CON M) 10 MEQ extended release tablet Take 2 tablets by mouth daily 12/5/22   Chris Yung MD   atorvastatin (LIPITOR) 40 MG tablet Take 1 tablet by mouth nightly 11/16/22   Misbah Kamara MD   empagliflozin (JARDIANCE) 10 MG tablet TAKE 1 TABLET BY MOUTH EVERY DAY 10/22/22   Myrtha Leventhal, MD   levothyroxine (SYNTHROID) 50 MCG tablet TAKE 1 TABLET BY MOUTH EVERY DAY 10/22/22   Myrtha Leventhal, MD   citalopram (CELEXA) 40 MG tablet TAKE 1 TABLET BY MOUTH ONCE DAILY *EMERGENCY REFILL* 9/29/22   Myrtha Leventhal, MD   glimepiride (AMARYL) 4 MG tablet TAKE 1 TABLET BY MOUTH TWICE DAILY *EMERGENCY REFILL* 9/29/22   Myrtha Leventhal, MD   omeprazole (PRILOSEC) 20 MG delayed release capsule TAKE 1 CAPSULE BY MOUTH ONCE DAILY 9/22/22   Florianratalicia Turner DO   gabapentin (NEURONTIN) 400 MG capsule TAKE 1 CAPSULE BY MOUTH THREE TIMES DAILY 4/22/22 11/18/22  Rocco Huitron MD   sucralfate (CARAFATE) 1 GM tablet TAKE 1 TABLET BY MOUTH EVERY DAY  Patient not taking: Reported on 1/12/2023 4/20/22   Rocco Huitron MD busPIRone (BUSPAR) 10 MG tablet TAKE ONE (1) TABLET BY MOUTH TWICE DAILY 3/11/22   Sang Mcginnis MD   insulin glargine [facility]) 100 UNIT/ML injection pen Inject 65 units subcutaneously once daily. Patient taking differently: Inject 20 Units into the skin Inject 65 units subcutaneously once daily. Changed at [facility] 3/1/22   Sang Mcginnis MD   miconazole (MICOTIN) 2 % cream APPLY TO AFFECTED AREA TWICE DAILY  Patient not taking: Reported on 1/12/2023 11/12/21   Sang Mcginnis MD   lidocaine (XYLOCAINE) 2 % jelly Apply topically with dressing changes every 3 days  Patient not taking: Reported on 1/12/2023 8/5/21   Sang Mcginnis MD   blood glucose monitor strips Test before meals and at bedtime. DX: DM, on insulin 10/8/20   Sang Mcginnis MD   miconazole (ZEASORB-AF) 2 % powder Apply topically 2 times daily. Patient not taking: Reported on 1/12/2023 10/1/20   Sang Mcginnis MD   blood glucose monitor kit and supplies Dispense sufficient amount for indicated testing frequency plus additional to accommodate PRN testing needs. Dispense all needed supplies to include: monitor, strips, lancing device, lancets, control solutions, alcohol swabs. 8/17/20   Zayda Melo MD   magnesium hydroxide (MILK OF MAGNESIA) 400 MG/5ML suspension Take 30 mLs by mouth daily as needed for Constipation 7/29/20   Zayda Melo MD   Handicap Placard MISC Is a permanent condition. DX: M19.90 5/14/19   Sang Mcginnis MD   Insulin Pen Needle (B-D UF III MINI PEN NEEDLES) 31G X 5 MM MISC USE 1 PEN NEEDLE DAILY 3/8/18   Sang Mcginnis MD   [employer] Lancets Thin MISC Test before meals and at bedtime. DX: DM, on insulin 4/22/14   Rick Hair MD   Alcohol Swabs (ALCOHOL PREP PADS) by Other route 2 times daily      Historical Provider, MD        Allergies     is allergic to morphine, shellfish-derived products, and tape [adhesive tape].     Family History     family history includes Arthritis in her father and mother; Cancer in her father; Diabetes in her sister; Heart Disease in her father and mother. Social History      reports that she has never smoked. She has never used smokeless tobacco.   reports current alcohol use. reports no history of drug use. OBJECTIVE:     VITALS:  height is 4' 11\" (1.499 m) and weight is 239 lb (108.4 kg). Her oral temperature is 98.2 °F (36.8 °C). Her blood pressure is 124/61 and her pulse is 85. Her respiration is 18 and oxygen saturation is 97%. CONSTITUTIONAL: {constituional:481819578::Alert and oriented times 3, no acute distress and cooperative to examination.}  HEENT: {Exam; ALOKY:16655::FWCB is normocephalic, atraumatic. EOMI, PERRLA}  NECK: {neck:122396204::Soft, trachea midline and straight}  LUNGS: {LUNG EXAM:14355::Chest expands equally bilaterally upon respiration, no accessory muscle used. Ausculation reveals no wheezes, rales or rhonchi.}  CARDIOVASCULAR: {CARDIAC EXAM:56087::\"Heart sounds are normal.  Regular rate and rhythm without murmur, gallop or rub. \"}  ABDOMEN: {abd exam:814403::\"soft, nontender, nondistended, no masses or organomegaly, no hernias palpable, no guarding or peritoneal signs. Bowel sounds are present in all four quadrants\"} Scars are consistent with previous surgical history. NEUROLOGIC: {neurologic:588988685::CN II-XII are grossly intact. There are no focalizing motor or sensory deficits}  EXTREMITIES: {GENERAL EXTREMITY EXAM:19987::\"no cyanosis, clubbing or edema\"}    LABS:     Recent Labs     01/12/23  1619   WBC 16.7*   HGB 13.1   HCT 46.8         K 5.0   CL 95*   CO2 23   BUN 22   CREATININE 0.77   CALCIUM 9.6   INR 1.1   AST 37*   ALT 24   BILITOT 0.7     Recent Labs     01/12/23  1619   ALKPHOS 143*   ALT 24   AST 37*   BILITOT 0.7   LABALBU 2.7*   LIPASE 61*       RADIOLOGY:     CT scan chest:  1.   Diastasis of median sternotomy with air in the soft tissues, and appearance of abscess formation/dehiscence dural to the sternotomy. Air is present in the mediastinum. 2. Large left pleural effusion with almost complete collapse of the left lobe of the lung. Bronchial wall thickening is noted with material within the bronchi likely mucous plugging. 3. Hepatic steatosis. 4. Small gallstones in the gallbladder. 5. 2 cm left adrenal mass with indeterminate Hounsfield numbers. Further workup using adrenal CT protocol or MRI advised when the patient's condition permits.        Assessment:     Superficial sternal dehiscence  Large left pleural effusion  Uncontrolled diabetes  HTN  Hypertension  Hypothyroidism      Plan:         QUENTIN Hamlin PA-C  Electronically signed 1/13/2023 at 8:41 AM

## 2023-01-13 NOTE — PROGRESS NOTES
Patient arrived from IR s/p LEFT chest tube placement. Chest tube placed to -20 suction per Paul Jiménez NP from CT surgery.  OK to eat per family medicine team.    Electronically signed by Gino Castle RN on 1/13/2023 at 11:11 AM

## 2023-01-13 NOTE — PROGRESS NOTES
[facility]  Occupational Therapy Not Seen Note    DATE: 2023    NAME: Jason Barragan  MRN: [id number]   : 1955      Patient not seen this date for Occupational Therapy due to:     Other: currently getting chest tube and being moved to step down     Next Scheduled Treatment: check back 2023    Electronically signed by JUAN DAVID Parada on 2023 at 9:10 AM

## 2023-01-13 NOTE — CARE COORDINATION
Case Management Assessment  Initial Evaluation    Date/Time of Evaluation: 1/13/2023 9:43 AM  Assessment Completed by: Unruly Ren RN    If patient is discharged prior to next notation, then this note serves as note for discharge by case management. Patient Name: Dalia Whyte                   YOB: 1955  Diagnosis: Pleural effusion on left [J90]  Abscess of sternal region [L02.213]  Incisional infection [T81.49XA]                   Date / Time: 1/12/2023  3:55 PM    Patient Admission Status: Inpatient   Readmission Risk (Low < 19, Mod (19-27), High > 27): Readmission Risk Score: 19.3    Current PCP: Curly Nguyen MD  PCP verified by CM? (P) Yes    Chart Reviewed: Yes      History Provided by: (P) Patient  Patient Orientation: (P) Alert and Oriented    Patient Cognition: (P) Alert    Hospitalization in the last 30 days (Readmission):  No    If yes, Readmission Assessment in CM Navigator will be completed.     Advance Directives:      Code Status: Full Code   Patient's Primary Decision Maker is: (P) Named in Scanned ACP Document      Discharge Planning:    Patient lives with: (P) Spouse/Significant Other Type of Home: (P) Skilled Nursing Facility  Primary Care Giver: (P) Self  Patient Support Systems include: (P) Spouse/Significant Other   Current Financial resources: (P) Medicare  Current community resources:    Current services prior to admission: (P) Home Care            Current DME:              Type of Home Care services:  (P) PT, OT, Nursing Services    ADLS  Prior functional level: (P) Independent in ADLs/IADLs  Current functional level: (P) Independent in ADLs/IADLs    PT AM-PAC:   /24  OT AM-PAC:   /24    Family can provide assistance at DC: (P) Yes  Would you like Case Management to discuss the discharge plan with any other family members/significant others, and if so, who? (P) Yes  Plans to Return to Present Housing: (P) No  Other Identified Issues/Barriers to RETURNING to current housing: increased weakness  Potential Assistance needed at discharge: (P) Gio Joseph            Potential DME:    Patient expects to discharge to: (P) Elizabeth Bansal for transportation at discharge: (P) Self    Financial    Payor: Gerardo Brittney / Plan: [address] HMO / Product Type: Medicare /     Does insurance require precert for SNF: Yes    Potential assistance Purchasing Medications:    Meds-to-Beds request:        [facility], [address] [place] [address]  Phone: [phone] Fax: [address] [phone] - [address] [place] [address], [place] [address] [facility]ae - P [phone] - F [phone]  [address]  [address] [facility] [address]  Phone: [phone] Fax: [phone]      Notes:    Factors facilitating achievement of predicted outcomes: Family support, Motivated, Cooperative, and Pleasant    Barriers to discharge: Additional Case Management Notes: The Plan for Transition of Care is related to the following treatment goals of Pleural effusion on left [J90]  Abscess of sternal region [L02.213]  Incisional infection [Q29.76WE]    IF APPLICABLE: The Patient and/or patient representative Shahana Hargrove and her family were provided with a choice of provider and agrees with the discharge plan. Freedom of choice list with basic dialogue that supports the patient's individualized plan of care/goals and shares the quality data associated with the providers was provided to: (P) Patient   Patient Representative Name:       The Patient and/or Patient Representative Agree with the Discharge Plan?  (P) Yes    Zachary Bamberger, RN  Case Management Department  Ph:  Fax:

## 2023-01-13 NOTE — PLAN OF CARE
Sternal debridement and washout with wound vac placement tomorrow AM at 9AM  Please release preop imaging prior to this surgery

## 2023-01-13 NOTE — CONSULTS
[facility] Cardiothoracic Surgery  Consult    Patient's Name/Date of Birth: Stephanie Ayala / [birth year] (47 y.o.)    Date: January 13, 2023     Chief Complaint: Pleural effusion sternal infection    HPI: Stephanie Ayala is a 78 y.o.  female who presented to cardiothoracic surgery with sternal incision infection and left pleural effusion. Patient has been following in the cardiothoracic clinic wound clinic and outpatient PCP regarding the sternal incision. Patient was on multiple antibiotics. Patient has poor blood glucose control with average sugars around 300. Incision did not improve therefore cardiothoracic surgery was reconsulted for further management. Patient admits that she does touch this incision often because it itches. There is old eschar tissue with a little bit of purulent drainage. She states she has been feeling a little weak but denies any type of fever. No chest pain does admit to some orthopnea. Had a CABG surgery over a month and a half ago. She has poor PT ability due to obesity. Currently shows no acute distress.   Does take Plavix and all of her medications as prescribed        ROS:   CONSTITUTIONAL: oriented  Respiratory: Positive orthopnea and shortness of breath with overexertion  Cardiovascular: negative  Gastrointestinal: negative  Genitourinary:negative  Hematologic/lymphatic: negative  Musculoskeletal:negative  Neurological: negative  Endocrine: negative  Psychiatric: negative  Past Medical History:   Diagnosis Date    Ambulates with cane     or walker    Anxiety     Borderline hypertension     CAD (coronary artery disease)     stents x 2 in 2020    Depression 07/28/2020    GERD (gastroesophageal reflux disease)     Heart attack ([facility] [address] 07/18/2020    Hypertension     Hypothyroidism     Neuropathy     Obesity     morbid    Osteoarthritis     Other cirrhosis of liver ([facility] [address] 07/27/2020    pt denies    Sleep apnea     possible    Type II or unspecified type diabetes mellitus without mention of complication, not stated as uncontrolled     Under care of service provider 11/18/2022    [id number]-last visit aug 2022    Under care of service provider 11/18/2022    cardiology-[facility]-last visit nov 2022    Vertebrogenic low back pain 03/29/2022     Past Surgical History:   Procedure Laterality Date    APPENDECTOMY      CARDIAC CATHETERIZATION      2 stents    CARDIAC CATHETERIZATION  11/01/2022    COLONOSCOPY      CORONARY ANGIOPLASTY WITH STENT PLACEMENT  07/2020    CORONARY ARTERY BYPASS GRAFT N/A 11/28/2022    CABG CORONARY ARTERY BYPASS X3 ON PUMP , ATA, ENDO VEIN HARVEST performed by Shabbir Arroyo MD at [email or web address] (CERVIX STATUS UNKNOWN)      IR CHEST TUBE INSERTION  1/13/2023    IR CHEST TUBE INSERTION 1/13/2023 Darci Weber MD [facility]    THROAT SURGERY      POLYPS REMOVED FROM VOCAL CORD    TOTAL KNEE ARTHROPLASTY Right 01/06/2015    TOTAL KNEE ARTHROPLASTY Left 05/26/2015    UPPER GASTROINTESTINAL ENDOSCOPY       Allergies   Allergen Reactions    Morphine Other (See Comments)     HALLUCINATIONS      Shellfish-Derived Products Nausea Only    Tape [Adhesive Tape] Rash     Family History   Problem Relation Age of Onset    Heart Disease Mother     Arthritis Mother     Heart Disease Father     Arthritis Father     Cancer Father         \"oral\"    Diabetes Sister         gestational     Social History     Socioeconomic History    Marital status:      Spouse name: Deshawn Ellis    Number of children: 0    Years of education: Not on file    Highest education level: Not on file   Occupational History    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     Employer: Replaced by [employer]   Tobacco Use    Smoking status: Never    Smokeless tobacco: Never   Vaping Use    Vaping Use: Never used   Substance and Sexual Activity    Alcohol use: Yes     Comment: once a month    Drug use: No    Sexual activity: Yes     Partners: Male   Other Topics Concern    Not on file   Social History Narrative    Not on file     Social Determinants of Health     Financial Resource Strain: Low Risk     Difficulty of Paying Living Expenses: Not hard at all   Food Insecurity: No Food Insecurity    Worried About Running Out of Food in the Last Year: Never true    Ran Out of Food in the Last Year: Never true   Transportation Needs: Not on file   Physical Activity: Not on file   Stress: Not on file   Social Connections: Not on file   Intimate Partner Violence: Not on file   Housing Stability: Not on file       Current Facility-Administered Medications   Medication Dose Route Frequency Provider Last Rate Last Admin    oxyCODONE-acetaminophen (PERCOCET) 5-325 MG per tablet 1 tablet  1 tablet Oral Q6H PRN Orlando Griffith MD   1 tablet at 01/13/23 1301    ketorolac (TORADOL) injection 15 mg  15 mg IntraVENous Q6H PRN Chrissy Do MD   15 mg at 01/13/23 1504    sodium chloride flush 0.9 % injection 5-40 mL  5-40 mL IntraVENous 2 times per day Mica Junior MD   10 mL at 01/13/23 1138    sodium chloride flush 0.9 % injection 5-40 mL  5-40 mL IntraVENous PRN Mica Junior MD        0.9 % sodium chloride infusion   IntraVENous PRN Mica Junior MD 12.5 mL/hr at 01/13/23 1127 25 mL at 01/13/23 1127    enoxaparin Sodium (LOVENOX) injection 30 mg  30 mg SubCUTAneous BID Mica Junior MD   30 mg at 01/12/23 2331    ondansetron (ZOFRAN-ODT) disintegrating tablet 4 mg  4 mg Oral Q8H PRN Mica Junior MD   4 mg at 01/13/23 0737    Or    ondansetron (ZOFRAN) injection 4 mg  4 mg IntraVENous Q6H PRN Mica Junior MD        polyethylene glycol (GLYCOLAX) packet 17 g  17 g Oral Daily PRN Mica Junior MD        acetaminophen (TYLENOL) tablet 650 mg  650 mg Oral Q6H PRN Mica Junior MD   650 mg at 01/13/23 0855    Or    acetaminophen (TYLENOL) suppository 650 mg  650 mg Rectal Q6H PRN Mica Junior MD        potassium chloride (KLOR-CON M) extended release tablet 40 mEq  40 mEq Oral PRN Mata Tarango MD        Or    potassium bicarb-citric acid (EFFER-K) effervescent tablet 40 mEq  40 mEq Oral PRN Mata Tarango MD        Or    potassium chloride 10 mEq/100 mL IVPB (Peripheral Line)  10 mEq IntraVENous PRN Mata Tarango MD        piperacillin-tazobactam (ZOSYN) 3,375 mg in dextrose 5 % 50 mL IVPB extended infusion (mini-bag)  3,375 mg IntraVENous Q8H Mata Tarango MD 12.5 mL/hr at 01/13/23 1129 3,375 mg at 01/13/23 1129    amiodarone (CORDARONE) tablet 200 mg  200 mg Oral TID Mata Tarango MD   200 mg at 01/13/23 1359    amLODIPine (NORVASC) tablet 5 mg  5 mg Oral Daily Mata Tarango MD   5 mg at 01/13/23 1138    aspirin EC tablet 81 mg  81 mg Oral Daily Mata Tarango MD        atorvastatin (LIPITOR) tablet 40 mg  40 mg Oral Nightly Mata Taragno MD   40 mg at 01/12/23 2219    budesonide-formoterol (SYMBICORT) 80-4.5 MCG/ACT inhaler 2 puff  2 puff Inhalation BID Mata Tarango MD   2 puff at 01/13/23 0837    busPIRone (BUSPAR) tablet 10 mg  10 mg Oral BID Mata Tarango MD   10 mg at 01/13/23 1138    citalopram (CELEXA) tablet 40 mg  40 mg Oral Daily Mata Tarango MD   40 mg at 01/13/23 1138    [Held by provider] clopidogrel (PLAVIX) tablet 75 mg  75 mg Oral Daily Mata Tarango MD        furosemide (LASIX) tablet 40 mg  40 mg Oral BID Mata Tarango MD   40 mg at 01/13/23 1137    gabapentin (NEURONTIN) capsule 400 mg  400 mg Oral TID Mata Tarango MD   400 mg at 01/13/23 1359    insulin glargine (LANTUS) injection vial 45 Units  45 Units SubCUTAneous Nightly Mata Tarango MD   45 Units at 01/12/23 2236    isosorbide mononitrate (IMDUR) extended release tablet 60 mg  60 mg Oral Daily Mata Tarango MD   60 mg at 01/13/23 1137    levothyroxine (SYNTHROID) tablet 50 mcg  50 mcg Oral Daily Mata Tarango MD   50 mcg at 01/13/23 1137    metoprolol tartrate (LOPRESSOR) tablet 12.5 mg  12.5 mg Oral BID Mata Tarango MD   12.5 mg at 01/13/23 1137 pantoprazole (PROTONIX) tablet 40 mg  40 mg Oral QAM AC Delfino Lora MD   40 mg at 01/13/23 1138    traZODone (DESYREL) tablet 50 mg  50 mg Oral Nightly Delfino Lora MD   50 mg at 01/12/23 2221    glucose chewable tablet 16 g  4 tablet Oral PRN Delfino Lora MD        dextrose bolus 10% 125 mL  125 mL IntraVENous PRN Delfino Lora MD        Or    dextrose bolus 10% 250 mL  250 mL IntraVENous PRN Delfino Lora MD        glucagon (rDNA) injection 1 mg  1 mg SubCUTAneous PRN Delfino Lora MD        dextrose 10 % infusion   IntraVENous Continuous PRN Delfino Lora MD        insulin lispro (HUMALOG) injection vial 0-16 Units  0-16 Units SubCUTAneous TID WC Delfino Lora MD   8 Units at 01/13/23 1236    insulin lispro (HUMALOG) injection vial 0-4 Units  0-4 Units SubCUTAneous Nightly Delfino Lora MD        vancomycin (VANCOCIN) 750 mg in sodium chloride 0.9 % 250 mL IVPB  750 mg IntraVENous Q12H Delfino Lora MD   Stopped at 01/13/23 1113    vancomycin (VANCOCIN) intermittent dosing (placeholder)   Other RX Placeholder Delfino Lora MD           Physical Exam:  Vitals:    01/13/23 1359   BP: (!) 113/58   Pulse: 84   Resp:    Temp:    SpO2:      Weight: Weight: 239 lb (108.4 kg)    Weight: 252 lb (114.3 kg)        General: Alert and Oriented x3. Sitting up in bed. No apparent distress. HEENT:  Normocephalic and atraumatic. PERRL. EOMI. Lips and oral mucosa moist and without lesions. Neck:  Supple. Trachea midline. Chest: Incision has significant eschar tissue no open wounds. Erythema around the scar tissue. No improvement upon incision check from last cardiothoracic visit. Picture in file  Lungs:  Clear to auscultation. Cardiac:  Regular rate and rhythm without murmurs, rubs or gallops. Abdomen:  Soft, non-tender, normoactive bowel sounds. No masses or organomegaly. Extremities:  No cyanosis, clubbing, or edema. Intact pulses in all four extremities.   Musculoskeletal:  Intact range of motion of peripheral joints. Normal muscular strength. Neurologic:  Cranial nerves are grossly intact. Non-focal sensory deficits on exam.  Psychiatric: Mood and affect are appropriate. Imaging Studies:        CT: CT chest shows significant consolidation over left thorax likely effusion. No appreciated pneumothorax. Reviewed sternum on CT chest which shows to be intact with no sign of communication. No appreciated gas under the sternal incision. Prior Labs reviewed: Mild leukocytosis. No other labs of significant concern    Assessment   Patient Active Problem List   Diagnosis    Anxiety    GERD (gastroesophageal reflux disease)    OA (osteoarthritis)    Neuropathy    Right knee DJD    Hypothyroidism    S/P left total knee arthroplasty    Ischemic heart disease    Essential hypertension    NSTEMI (non-ST elevated myocardial infarction) ([facility] [address]    Other cirrhosis of liver (HCC)    Elevated lipase    Depression    Type 2 diabetes mellitus, with long-term current use of insulin ([facility])    SHERITA (obstructive sleep apnea)    Morbid obesity with BMI of 45.0-49.9, adult (HCC)    PVD (peripheral vascular disease) ([facility])    Numbness and tingling of both feet    Long term (current) use of antithrombotics/antiplatelets    Painful diabetic neuropathy (HCC)    Vertebrogenic low back pain    Spinal stenosis of lumbar region with neurogenic claudication    CAD in native artery    Vulvovaginitis    Incisional infection    Pleural effusion on left    Lactic acid acidosis    Bandemia    Sternal wound dehiscence       Risks Reviewed w/pt  We went over the risk of sternal debridement and washout. PLAN:  Plan for sternal debridement with washout and wound VAC placement at 9 AM tomorrow with Dr. Solitario Fishman  N.p.o. liliya at midnight  Complete all preop testing prior to procedure.       Dana Cade, HAO - NP, CNP  Phone: [phone]

## 2023-01-13 NOTE — PROGRESS NOTES
[facility]  Progress Note    Date:   1/13/2023  Patient name:  Rodriguez Vega  Date of admission:  1/12/2023  3:55 PM  MRN:   [id number]  YOB: 1955    SUBJECTIVE/Last 24 hours update:     Patient seen and examined at the bed side , no new acute events overnight, no new complains. Patient will undergo chest tube placement today. Notes from nursing staff and Consults had been reviewed, and the overnight progress had been checked with the nursing staff as well. HPI:   78 y.o.  female with history of diabetes mellitus, GERD, painful neuropathy, multivessel CAD status post triple bypass CABG 11/28/2022. She presented today due to worsening symptoms of chest pain, which started after surgery but has worsened since last 3 days. She describes the chest pain as central, and is a tightness, nonradiating, better with tramadol, comes and goes, 8 out of 10 in severity and is getting worse. Patient was hospitalized for 1 week after surgery, then had cardiac rehab for 2 weeks which she describes the pain increasing during that time. She also had some cough which was nonproductive and was painful to her chest when coughing. She also experienced severe nausea and lack of appetite, and mentions she has eaten very minimal for the last 3 days. She also fell twice in the last 2 days, but did not hit her head or endorses dizziness. She also mentions she has some diffuse abdominal pain but is not severe. She also has some shortness of breath on exertion since the surgery, no fevers some chills, no night sweats. She is on 1 L of oxygen at home which started after her triple bypass surgery. She is on insulin nightly. Her hypertension is managed with amlodipine and lopressor. She also takes amiodarone post CABG.      The patient has an infected CABG incision site and ED CT chest showed diastasis of median sternotomy with air in the soft tissues, and abscess formation. And chest x-ray taken shows progressive pleural effusion with nearly complete opacification of the left hemithorax. BNP was elevated, Pro-Jame was elevated, and initially she needed 4 L of oxygen and she is normally on 1 L at home. CT also showed a 2cm left adrenal mass. Patient was placed on Zosyn and vancomycin in the ED    REVIEW OF SYSTEMS:      Review of Systems   Constitutional:  Positive for appetite change. Negative for chills and fever. Respiratory:  Positive for shortness of breath. Negative for wheezing. Cardiovascular:  Positive for chest pain. Negative for palpitations and leg swelling. Gastrointestinal:  Positive for constipation and nausea. Negative for abdominal pain, diarrhea and vomiting. Genitourinary:  Negative for dysuria and flank pain. Skin:  Positive for wound (Multiple wounds with drainage). Neurological:  Negative for light-headedness and headaches. Psychiatric/Behavioral:  Negative for agitation and behavioral problems. PAST MEDICAL HISTORY:      has a past medical history of Ambulates with cane, Anxiety, Borderline hypertension, CAD (coronary artery disease), Depression, GERD (gastroesophageal reflux disease), Heart attack ([address] Hypertension, Hypothyroidism, Neuropathy, Obesity, Osteoarthritis, Other cirrhosis of liver ([facility] [address] Sleep apnea, Type II or unspecified type diabetes mellitus without mention of complication, not stated as uncontrolled, Under care of service provider, Under care of service provider, and Vertebrogenic low back pain. PAST SURGICAL HISTORY:      has a past surgical history that includes Hysterectomy; Colonoscopy; Upper gastrointestinal endoscopy; Dilation and curettage of uterus; hiatal hernia repair; Throat surgery; Appendectomy; Total knee arthroplasty (Right, 01/06/2015); Total knee arthroplasty (Left, 05/26/2015); Coronary angioplasty with stent (07/2020);  Cardiac catheterization; Cardiac catheterization (11/01/2022); and Coronary artery bypass graft (N/A, 11/28/2022). SOCIAL HISTORY:      reports that she has never smoked. She has never used smokeless tobacco. She reports current alcohol use. She reports that she does not use drugs. FAMILY HISTORY:     family history includes Arthritis in her father and mother; Cancer in her father; Diabetes in her sister; Heart Disease in her father and mother. HOME MEDICATIONS:      Prior to Admission medications    Medication Sig Start Date End Date Taking?  Authorizing Provider   insulin lispro, 1 Unit Dial, (HUMALOG KWIKPEN) 100 UNIT/ML SOPN Inject 3 Units into the skin 3 times daily (before meals) 125 - 150 2 units   151 - 200 4 units   201 - 250 6 units   251 - 300 8 units  301 - 350 10 units 351 - 400 12 units 12/29/22   Alexei Jay MD   amLODIPine (NORVASC) 5 MG tablet  12/20/22   Historical Provider, MD   doxycycline monohydrate (MONODOX) 100 MG capsule  12/20/22   Historical Provider, MD   Ann Marie Sondra 100-25 MCG/ACT AEPB  12/20/22   Historical Provider, MD   isosorbide mononitrate (IMDUR) 60 MG extended release tablet  12/20/22   Historical Provider, MD   potassium chloride (KLOR-CON) 10 MEQ extended release tablet  12/20/22   Historical Provider, MD   traMADol Caralee Longo) 50 MG tablet  12/20/22   Historical Provider, MD   traZODone (DESYREL) 50 MG tablet  12/20/22   Historical Provider, MD   aspirin 81 MG EC tablet Take 1 tablet by mouth daily 12/6/22   Wallace Rodriguez MD   amiodarone (CORDARONE) 200 MG tablet Take 1 tablet by mouth 3 times daily 12/5/22   Wallace Rodriguez MD   metoprolol tartrate (LOPRESSOR) 25 MG tablet Take 0.5 tablets by mouth 2 times daily 12/5/22   Wallace Rodriguez MD   clopidogrel (PLAVIX) 75 MG tablet Take 1 tablet by mouth daily 12/6/22   Wallace Rodriguez MD   furosemide (LASIX) 40 MG tablet Take 1 tablet by mouth 2 times daily 12/5/22   Wallace Rodriguez MD   potassium chloride (KLOR-CON M) 10 MEQ extended release tablet Take 2 tablets by mouth daily 12/5/22   Hero Hay MD   atorvastatin (LIPITOR) 40 MG tablet Take 1 tablet by mouth nightly 11/16/22   Alvarado Servin MD   empagliflozin (JARDIANCE) 10 MG tablet TAKE 1 TABLET BY MOUTH EVERY DAY 10/22/22   Marquis Miller MD   levothyroxine (SYNTHROID) 50 MCG tablet TAKE 1 TABLET BY MOUTH EVERY DAY 10/22/22   Marquis Miller MD   citalopram (CELEXA) 40 MG tablet TAKE 1 TABLET BY MOUTH ONCE DAILY *EMERGENCY REFILL* 9/29/22   Marquis Miller MD   glimepiride (AMARYL) 4 MG tablet TAKE 1 TABLET BY MOUTH TWICE DAILY *EMERGENCY REFILL* 9/29/22   Marquis Miller MD   omeprazole (PRILOSEC) 20 MG delayed release capsule TAKE 1 CAPSULE BY MOUTH ONCE DAILY 9/22/22   Florianrapito Turner DO   gabapentin (NEURONTIN) 400 MG capsule TAKE 1 CAPSULE BY MOUTH THREE TIMES DAILY 4/22/22 11/18/22  Mora Do MD   sucralfate (CARAFATE) 1 GM tablet TAKE 1 TABLET BY MOUTH EVERY DAY  Patient not taking: Reported on 1/12/2023 4/20/22   Mora Do MD   busPIRone (BUSPAR) 10 MG tablet TAKE ONE (1) TABLET BY MOUTH TWICE DAILY 3/11/22   Mora Do MD   insulin glargine (BASAGLAR KWIKPEN) 100 UNIT/ML injection pen Inject 65 units subcutaneously once daily. Patient taking differently: Inject 20 Units into the skin Inject 65 units subcutaneously once daily. [facility]   Mora Do MD   miconazole (MICOTIN) 2 % cream APPLY TO AFFECTED AREA TWICE DAILY  Patient not taking: Reported on 1/12/2023 11/12/21   Mora Do MD   lidocaine (XYLOCAINE) 2 % jelly Apply topically with dressing changes every 3 days  Patient not taking: Reported on 1/12/2023 8/5/21   Mora Do MD   blood glucose monitor strips Test before meals and at bedtime. DX: DM, on insulin 10/8/20   Mora Do MD   miconazole (ZEASORB-AF) 2 % powder Apply topically 2 times daily.   Patient not taking: Reported on 1/12/2023 10/1/20   Mora Do MD   blood glucose monitor kit and supplies Dispense sufficient amount for indicated testing frequency plus additional to accommodate PRN testing needs. Dispense all needed supplies to include: monitor, strips, lancing device, lancets, control solutions, alcohol swabs. 8/17/20   Micaela Whitfield MD   magnesium hydroxide (MILK OF MAGNESIA) 400 MG/5ML suspension Take 30 mLs by mouth daily as needed for Constipation 7/29/20   Micaela Whitfield MD   Handicap Placard MISC Is a permanent condition. DX: M19.90 5/14/19   Joyce Turner MD   Insulin Pen Needle (B-D UF III MINI PEN NEEDLES) 31G X 5 MM MISC USE 1 PEN NEEDLE DAILY 3/8/18   Joyce Turner MD   [employer] Lancets Thin MISC Test before meals and at bedtime. DX: DM, on insulin 4/22/14   Nini Francois MD   Alcohol Swabs (ALCOHOL PREP PADS) by Other route 2 times daily      Historical Provider, MD       ALLERGIES:     Morphine, Shellfish-derived products, and Tape [adhesive tape]      OBJECTIVE:       Vitals:    01/13/23 0234 01/13/23 0304 01/13/23 0600 01/13/23 0838   BP:       Pulse:    85   Resp: 18 18  18   Temp:       TempSrc:       SpO2:    97%   Weight:   239 lb (108.4 kg)    Height:             Intake/Output Summary (Last 24 hours) at 1/13/2023 0841  Last data filed at 1/13/2023 0654  Gross per 24 hour   Intake 62.48 ml   Output --   Net 62.48 ml       PHYSICAL EXAM:  Physical Exam  Constitutional:       General: She is not in acute distress. Appearance: She is obese. Cardiovascular:      Rate and Rhythm: Normal rate and regular rhythm. Heart sounds: Normal heart sounds. Pulmonary:      Comments: Soft breath sounds, currently saturating at 97% on 4 L O2 nasal cannula  Abdominal:      General: There is distension. Palpations: Abdomen is soft. Tenderness: There is no abdominal tenderness. There is no guarding. Musculoskeletal:      Right lower leg: No edema. Left lower leg: No edema. Skin:     General: Skin is warm and dry.       Comments: Multiple scabbed wounds on mid anterior chest, largest in epigastric region with drainage, draining ulcer under L breast   Psychiatric:         Mood and Affect: Mood normal.         Behavior: Behavior normal.        DIAGNOSTICS:     Laboratory Testing:    Recent Results (from the past 24 hour(s))   EKG 12 Lead    Collection Time: 01/12/23  4:01 PM   Result Value Ref Range    Ventricular Rate 85 BPM    Atrial Rate 85 BPM    P-R Interval 166 ms    QRS Duration 68 ms    Q-T Interval 392 ms    QTc Calculation (Bazett) 466 ms    P Axis 32 degrees    R Axis -5 degrees    T Axis -11 degrees   Culture, Blood 1    Collection Time: 01/12/23  4:07 PM    Specimen: Blood   Result Value Ref Range    Specimen Description . BLOOD     Special Requests RFOREARM, 0.75ML     Culture NO GROWTH 12 HOURS    Culture, Blood 1    Collection Time: 01/12/23  4:14 PM    Specimen: Blood   Result Value Ref Range    Specimen Description . BLOOD     Special Requests LHAND, 5ML     Culture NO GROWTH 12 HOURS    CBC with Auto Differential    Collection Time: 01/12/23  4:19 PM   Result Value Ref Range    WBC 16.7 (H) 3.5 - 11.3 k/uL    RBC 4.70 3.95 - 5.11 m/uL    Hemoglobin 13.1 11.9 - 15.1 g/dL    Hematocrit 46.8 36.3 - 47.1 %    MCV 99.6 82.6 - 102.9 fL    MCH 27.9 25.2 - 33.5 pg    MCHC 28.0 (L) 28.4 - 34.8 g/dL    RDW 13.6 11.8 - 14.4 %    Platelets 596 529 - 626 k/uL    MPV 11.1 8.1 - 13.5 fL    NRBC Automated 0.0 0.0 per 100 WBC    RBC Morphology HYPOCHROMIA PRESENT     Seg Neutrophils 86 (H) 36 - 65 %    Lymphocytes 5 (L) 24 - 43 %    Monocytes 8 3 - 12 %    Eosinophils % 0 (L) 1 - 4 %    Basophils 1 0 - 2 %    Immature Granulocytes 1 (H) 0 %    Segs Absolute 14.30 (H) 1.50 - 8.10 k/uL    Absolute Lymph # 0.79 (L) 1.10 - 3.70 k/uL    Absolute Mono # 1.34 (H) 0.10 - 1.20 k/uL    Absolute Eos # 0.05 0.00 - 0.44 k/uL    Basophils Absolute 0.08 0.00 - 0.20 k/uL    Absolute Immature Granulocyte 0.10 0.00 - 0.30 k/uL   Comprehensive Metabolic Panel Collection Time: 01/12/23  4:19 PM   Result Value Ref Range    Glucose 294 (H) 70 - 99 mg/dL    BUN 22 8 - 23 mg/dL    Creatinine 0.77 0.50 - 0.90 mg/dL    Est, Glom Filt Rate >60 >60 mL/min/1.73m2    Calcium 9.6 8.6 - 10.4 mg/dL    Sodium 137 135 - 144 mmol/L    Potassium 5.0 3.7 - 5.3 mmol/L    Chloride 95 (L) 98 - 107 mmol/L    CO2 23 20 - 31 mmol/L    Anion Gap 19 (H) 9 - 17 mmol/L    Alkaline Phosphatase 143 (H) 35 - 104 U/L    ALT 24 5 - 33 U/L    AST 37 (H) <32 U/L    Total Bilirubin 0.7 0.3 - 1.2 mg/dL    Total Protein 7.1 6.4 - 8.3 g/dL    Albumin 2.7 (L) 3.5 - 5.2 g/dL    Albumin/Globulin Ratio 0.6 (L) 1.0 - 2.5   Lipase    Collection Time: 01/12/23  4:19 PM   Result Value Ref Range    Lipase 61 (H) 13 - 60 U/L   Troponin    Collection Time: 01/12/23  4:19 PM   Result Value Ref Range    Troponin, High Sensitivity 37 (H) 0 - 14 ng/L   Brain Natriuretic Peptide    Collection Time: 01/12/23  4:19 PM   Result Value Ref Range    Pro-BNP 1,814 (H) <300 pg/mL   Lactate, Sepsis    Collection Time: 01/12/23  4:19 PM   Result Value Ref Range    Lactic Acid, Sepsis, Whole Blood 4.7 (H) 0.5 - 1.9 mmol/L   Procalcitonin    Collection Time: 01/12/23  4:19 PM   Result Value Ref Range    Procalcitonin 0.53 (H) <0.09 ng/mL   Protime-INR    Collection Time: 01/12/23  4:19 PM   Result Value Ref Range    Protime 11.2 9.1 - 12.3 sec    INR 1.1    Troponin    Collection Time: 01/12/23  5:19 PM   Result Value Ref Range    Troponin, High Sensitivity 36 (H) 0 - 14 ng/L   Lactate, Sepsis    Collection Time: 01/12/23  7:11 PM   Result Value Ref Range    Lactic Acid, Sepsis, Whole Blood 2.6 (H) 0.5 - 1.9 mmol/L   POC Glucose Fingerstick    Collection Time: 01/12/23 10:28 PM   Result Value Ref Range    POC Glucose 260 (H) 65 - 105 mg/dL         Imaging/Diagonstics:    Current Facility-Administered Medications   Medication Dose Route Frequency Provider Last Rate Last Admin    sodium chloride flush 0.9 % injection 5-40 mL  5-40 mL IntraVENous 2 times per day Hal Lees MD   10 mL at 01/12/23 2250    sodium chloride flush 0.9 % injection 5-40 mL  5-40 mL IntraVENous PRN Hal Lees MD        0.9 % sodium chloride infusion   IntraVENous PRN Hal Lees MD        enoxaparin Sodium (LOVENOX) injection 30 mg  30 mg SubCUTAneous BID Hal Lees MD   30 mg at 01/12/23 2331    ondansetron (ZOFRAN-ODT) disintegrating tablet 4 mg  4 mg Oral Q8H PRN Hal Lees MD   4 mg at 01/13/23 0737    Or    ondansetron (ZOFRAN) injection 4 mg  4 mg IntraVENous Q6H PRN Hal Lees MD        polyethylene glycol (GLYCOLAX) packet 17 g  17 g Oral Daily PRN Hal Lees MD        acetaminophen (TYLENOL) tablet 650 mg  650 mg Oral Q6H PRN Hal Lees MD        Or    acetaminophen (TYLENOL) suppository 650 mg  650 mg Rectal Q6H PRN Hal Lees MD        potassium chloride (KLOR-CON M) extended release tablet 40 mEq  40 mEq Oral PRN Hal Lees MD        Or    potassium bicarb-citric acid (EFFER-K) effervescent tablet 40 mEq  40 mEq Oral PRN Hal Lees MD        Or    potassium chloride 10 mEq/100 mL IVPB (Peripheral Line)  10 mEq IntraVENous PRN Hal Lees MD        piperacillin-tazobactam (ZOSYN) 3,375 mg in dextrose 5 % 50 mL IVPB extended infusion (mini-bag)  3,375 mg IntraVENous Q8H Hal Lees MD   Stopped at 01/13/23 7501    amiodarone (CORDARONE) tablet 200 mg  200 mg Oral TID Hal Lees MD   200 mg at 01/12/23 2219    amLODIPine (NORVASC) tablet 5 mg  5 mg Oral Daily Hal Lees MD        aspirin EC tablet 81 mg  81 mg Oral Daily Hal Lees MD        atorvastatin (LIPITOR) tablet 40 mg  40 mg Oral Nightly Hal Lees MD   40 mg at 01/12/23 2219    budesonide-formoterol (SYMBICORT) 80-4.5 MCG/ACT inhaler 2 puff  2 puff Inhalation BID Hal Lees MD   2 puff at 01/13/23 0837    busPIRone (BUSPAR) tablet 10 mg  10 mg Oral BID Hal Lees MD   10 mg at 01/12/23 2219    citalopram (CELEXA) tablet 40 mg  40 mg Oral Daily Eliceo Wong Kia Jones MD        clopidogrel (PLAVIX) tablet 75 mg  75 mg Oral Daily Migdalia Lainez MD        furosemide (LASIX) tablet 40 mg  40 mg Oral BID Migdalia Lainez MD   40 mg at 01/12/23 2219    gabapentin (NEURONTIN) capsule 400 mg  400 mg Oral TID Migdalia Lainez MD   400 mg at 01/12/23 2220    insulin glargine (LANTUS) injection vial 45 Units  45 Units SubCUTAneous Nightly Migdalia Lainez MD   45 Units at 01/12/23 2236    isosorbide mononitrate (IMDUR) extended release tablet 60 mg  60 mg Oral Daily Migdalia Lainez MD        levothyroxine (SYNTHROID) tablet 50 mcg  50 mcg Oral Daily Migdalia Lainez MD        metoprolol tartrate (LOPRESSOR) tablet 12.5 mg  12.5 mg Oral BID Migdalia Lainez MD   12.5 mg at 01/12/23 2241    pantoprazole (PROTONIX) tablet 40 mg  40 mg Oral QAM AC Migdalia Lainez MD        traZODone (DESYREL) tablet 50 mg  50 mg Oral Nightly Migdalia Lainez MD   50 mg at 01/12/23 2221    glucose chewable tablet 16 g  4 tablet Oral PRN Migdalia Lainez MD        dextrose bolus 10% 125 mL  125 mL IntraVENous PRN Migdalia Lainez MD        Or    dextrose bolus 10% 250 mL  250 mL IntraVENous PRN Migdalia Lainez MD        glucagon (rDNA) injection 1 mg  1 mg SubCUTAneous PRN Migdalia Lainez MD        dextrose 10 % infusion   IntraVENous Continuous PRN Migdalia Lainez MD        insulin lispro (HUMALOG) injection vial 0-16 Units  0-16 Units SubCUTAneous TID WC Migdalia Lainez MD        insulin lispro (HUMALOG) injection vial 0-4 Units  0-4 Units SubCUTAneous Nightly Migdalia Lainez MD        oxyCODONE (ROXICODONE) immediate release tablet 5 mg  5 mg Oral Q6H PRN Migdalia Lainez MD   5 mg at 01/13/23 0234    vancomycin (VANCOCIN) 750 mg in sodium chloride 0.9 % 250 mL IVPB  750 mg IntraVENous Q12H Migdalia Lainez  mL/hr at 01/13/23 0654 Rate Verify at 01/13/23 0654    vancomycin (VANCOCIN) intermittent dosing (placeholder)   Other RX Lennie Rivers MD           ASSESSMENT:     Principal Problem:    Incisional infection  Resolved Problems:    * No resolved hospital problems. *      PLAN:     Patient status: Admit the patient as Inpatient    Post CABG incisional abscess   -Initial lactic acid 4.7 >> 2.6  -CT scan of the chest showed diastasis of mediastinal mass with air in the soft tissues as well as an abscess  -Chest x-ray showed left-sided pleural effusion with almost complete collapse of the left lobe of lung  -Troponin 37 >> 36  -proBNP 1814 - higher than baseline  -Procalcitonin 0.53  -WBC 16.7  -Continue Zosyn and pharmacy dose Vanc  -Pain control with Oxycodone 5 mg q6h PRN  -Amiodarone 200 mg tid started post CABG - will resume   -Blood cultures negative within 24 hours, repeat collected  -Pulmonology on board  -CTS on board  -ID on board    L sided pleural effusion  -Chest x-ray showed left-sided pleural effusion with almost complete collapse of the left lobe of lung  -At home 1 L O2 nasal cannula. Currently saturating at 97% on 4 L O2 nasal cannula  -Will undergo chest tube placement today. Holding Lovenox, aspirin, and Plavix  -Pulmonology on board     T2DM  -Lantus 35 units lantus nightly with HDSS  -POC glucose checks,  last read    -A1c 7.5 11/18/22  -Hypoglycemia protocol  -Neuropathy - resumed Gabapentin 400 mg tid      HTN  -On Lopressor 12.5 mg bid and Norvasc 5 mg qd - resumed     Anxiety  -On Buspar, Celexa - resumed      Hypothyroidism  -Synthroid 50 mcg qd - resumed      Multivessel CAD status post CABG 11/28/22   -On Aspirin 81 mg, Plavix 75 mg qd, Lasix 40 mg bid, Imdur 60 mg qd. - ASA and Plavix Holding  -Last echo showed EF 54%- 11/14/22     DVT prophylaxis: Lovenox 30 mg bid - Holding   GI prophylaxis: Protonix    Discussed care plan with nurse after getting her input.     Plan will be discussed with the attending, Dr Judy Lamb MD  Family Medicine Resident  1/13/2023 8:41 AM

## 2023-01-13 NOTE — BRIEF OP NOTE
Brief Postoperative Note for Chest Tube    Leo Patricia  YOB: 1955  [id number]    Pre-operative Diagnosis: left pleural effusion      Post-operative Diagnosis: Same    Procedure: Chest Tube Placement     Anesthesia: 1% Lidocaine     Surgeons/Assistants: MD Cliff    Complications: none    EBL: Minimal    Specimens: were obtained    Successful placement of 10 fr left chest tube performed. Catheter was sutured in place and occlusive dressing were applied.       Electronically signed by QUENTIN Jorge on 1/13/2023 at 10:32 AM

## 2023-01-13 NOTE — CARE COORDINATION
Discussed discharge planning, pt currently has home care she cannot remember agency. Pt is concerned about her weakness and safety going home and is agreeable to go to SNF for strengthening. Freedom of choice was given and explained.

## 2023-01-13 NOTE — H&P
[facility]  History & Physical Examination Note              Date:   1/12/2023  Patient name:  Too Jimenez  Date of admission:  1/12/2023  3:55 PM  MRN:   [id number]  YOB: 1955    CHIEF COMPLAINT:       Chief Complaint   Patient presents with    Incisional Pain     Had open heart sx in November        History Obtained From:  Patient and chart review. HPI:     The patient is a 78 y.o.  female with history of diabetes mellitus, GERD, painful neuropathy, triple bypass CABG done November 2022. She presented today due to worsening symptoms of chest pain, which started after surgery but has worsened since last 3 days. She describes the chest pain as central, and is a tightness, nonradiating, better with tramadol, comes and goes, 8 out of 10 in severity and is getting worse, patient was hospitalized for 1 week after surgery, then had cardiac rehab for 2 weeks which she describes the pain increasing during that time. She also had some cough which was nonproductive and was painful to her chest when coughing. The patient is also experienced severe nausea and lack of appetite, and mentions she has not eaten for the last 3 days. She also fell twice in the last 2 days, but did not hit her head and endorses dizziness. She also mentions she has some diffuse abdominal pain but is not severe. She also has some shortness of breath on exertion since the surgery, no fevers some chills, no night sweats. She is on 1 L of oxygen at home which started after her triple bypass surgery. She is on insulin nightly. Her hypertension is managed with amlodipine and lopressor. She also takes amiodarone post CABG. The patient has an infected CABG incision site and ED CT chest showed diastasis of median sternotomy with air in the soft tissues, and abscess formation.   And chest x-ray taken shows progressive pleural effusion with nearly complete opacification of the left hemithorax. BNP was elevated, Pro-Jame was elevated, and initially she needed 4 L of oxygen and she is normally on 1 L at home. CT also showed a 2cm left adrenal mass. Patient was placed on Zosyn and vancomycin in the ED      PAST MEDICAL HISTORY:        has a past medical history of Ambulates with cane, Anxiety, Borderline hypertension, CAD (coronary artery disease), Depression, GERD (gastroesophageal reflux disease), Heart attack ([facility] [address] Hypertension, Hypothyroidism, Neuropathy, Obesity, Osteoarthritis, Other cirrhosis of liver ([facility] [address] Sleep apnea, Type II or unspecified type diabetes mellitus without mention of complication, not stated as uncontrolled, Under care of service provider, Under care of service provider, and Vertebrogenic low back pain. PAST SURGICAL HISTORY:      has a past surgical history that includes Hysterectomy; Colonoscopy; Upper gastrointestinal endoscopy; Dilation and curettage of uterus; hiatal hernia repair; Throat surgery; Appendectomy; Total knee arthroplasty (Right, 01/06/2015); Total knee arthroplasty (Left, 05/26/2015); Coronary angioplasty with stent (07/2020); Cardiac catheterization; Cardiac catheterization (11/01/2022); and Coronary artery bypass graft (N/A, 11/28/2022). FAMILY HISTORY:     family history includes Arthritis in her father and mother; Cancer in her father; Diabetes in her sister; Heart Disease in her father and mother. HOME MEDICATIONS:     Prior to Admission medications    Medication Sig Start Date End Date Taking?  Authorizing Provider   insulin lispro, 1 Unit Dial, (HUMALOG KWIKPEN) 100 UNIT/ML SOPN Inject 3 Units into the skin 3 times daily (before meals) 125 - 150 2 units   151 - 200 4 units   201 - 250 6 units   251 - 300 8 units  301 - 350 10 units 351 - 400 12 units 12/29/22   Alexei MD Misty   amLODIPine (NORVASC) 5 MG tablet  12/20/22   Historical Provider, MD   doxycycline monohydrate (MONODOX) 100 MG capsule 12/20/22   Historical Provider, MD Paulie Maria 100-25 MCG/ACT AEPB  12/20/22   Historical Provider, MD   isosorbide mononitrate (IMDUR) 60 MG extended release tablet  12/20/22   Historical Provider, MD   potassium chloride (KLOR-CON) 10 MEQ extended release tablet  12/20/22   Historical Provider, MD   traMADol Kolleen Copping) 50 MG tablet  12/20/22   Historical Provider, MD   traZODone (DESYREL) 50 MG tablet  12/20/22   Historical Provider, MD   aspirin 81 MG EC tablet Take 1 tablet by mouth daily 12/6/22   David Last MD   amiodarone (CORDARONE) 200 MG tablet Take 1 tablet by mouth 3 times daily 12/5/22   David Last MD   metoprolol tartrate (LOPRESSOR) 25 MG tablet Take 0.5 tablets by mouth 2 times daily 12/5/22   David Last MD   clopidogrel (PLAVIX) 75 MG tablet Take 1 tablet by mouth daily 12/6/22   David Last MD   furosemide (LASIX) 40 MG tablet Take 1 tablet by mouth 2 times daily 12/5/22   David Last MD   potassium chloride (KLOR-CON M) 10 MEQ extended release tablet Take 2 tablets by mouth daily 12/5/22   David Last MD   atorvastatin (LIPITOR) 40 MG tablet Take 1 tablet by mouth nightly 11/16/22   Francesca Mims MD   empagliflozin (JARDIANCE) 10 MG tablet TAKE 1 TABLET BY MOUTH EVERY DAY 10/22/22   Nicole Rush MD   levothyroxine (SYNTHROID) 50 MCG tablet TAKE 1 TABLET BY MOUTH EVERY DAY 10/22/22   Nicole Rush MD   citalopram (CELEXA) 40 MG tablet TAKE 1 TABLET BY MOUTH ONCE DAILY *EMERGENCY REFILL* 9/29/22   Nicole Rush MD   glimepiride (AMARYL) 4 MG tablet TAKE 1 TABLET BY MOUTH TWICE DAILY *EMERGENCY REFILL* 9/29/22   Nicole Rush MD   omeprazole (PRILOSEC) 20 MG delayed release capsule TAKE 1 CAPSULE BY MOUTH ONCE DAILY 9/22/22   Quratulachiquita Turner DO   gabapentin (NEURONTIN) 400 MG capsule TAKE 1 CAPSULE BY MOUTH THREE TIMES DAILY 4/22/22 11/18/22  Isabelle Rowell MD   sucralfate (CARAFATE) 1 GM tablet TAKE 1 TABLET BY MOUTH EVERY DAY 4/20/22   Brenda Dickson Jackie Sanders MD   busPIRone (BUSPAR) 10 MG tablet TAKE ONE (1) TABLET BY MOUTH TWICE DAILY 3/11/22   Presley Roy MD   insulin glargine [facility]) 100 UNIT/ML injection pen Inject 65 units subcutaneously once daily. 3/1/22   Presley Roy MD   miconazole (MICOTIN) 2 % cream APPLY TO AFFECTED AREA TWICE DAILY 11/12/21   Presley Roy MD   lidocaine (XYLOCAINE) 2 % jelly Apply topically with dressing changes every 3 days 8/5/21   Presley Roy MD   blood glucose monitor strips Test before meals and at bedtime. DX: DM, on insulin 10/8/20   Presley Roy MD   miconazole (ZEASORB-AF) 2 % powder Apply topically 2 times daily. 10/1/20   Presley Roy MD   blood glucose monitor kit and supplies Dispense sufficient amount for indicated testing frequency plus additional to accommodate PRN testing needs. Dispense all needed supplies to include: monitor, strips, lancing device, lancets, control solutions, alcohol swabs. 8/17/20   Balwinder Rodríguez MD   magnesium hydroxide (MILK OF MAGNESIA) 400 MG/5ML suspension Take 30 mLs by mouth daily as needed for Constipation 7/29/20   Balwinder Rodríguez MD   Handicap Placard MISC Is a permanent condition. DX: M19.90 5/14/19   Presley Roy MD   Insulin Pen Needle (B-D UF III MINI PEN NEEDLES) 31G X 5 MM MISC USE 1 PEN NEEDLE DAILY 3/8/18   Presley Roy MD   [employer] Lancets Thin MISC Test before meals and at bedtime. DX: DM, on insulin 4/22/14   Aster Still MD   Alcohol Swabs (ALCOHOL PREP PADS) by Other route 2 times daily      Historical Provider, MD       ALLERGIES:      Morphine, Shellfish-derived products, and Tape [adhesive tape]    SOCIAL HISTORY:      reports that she has never smoked. She has never used smokeless tobacco. She reports current alcohol use. She reports that she does not use drugs.       REVIEW OF SYSTEMS:     CONSTITUTIONAL:  no fevers,   EYES: negative for double vision  HEENT: No headaches, no rhinorrhea, no nasal congestion, no sore throat, no difficulty swallowing  RESPIRATORY: Some shortness of breath, some coughing  CARDIOVASCULAR: chest pain, no palpitations, no fatigue, no edema   GASTROINTESTINAL: no nausea, no vomiting, no change in bowel habits, no abdominal pain   GENITOURINARY: negative for frequency, no dysuria, no nocturia   INTEGUMENT: negative for rash, no easy bruising   HEMATOLOGIC/LYMPHATIC: negative for swelling/edema   ALLERGIC/IMMUNOLOGIC: negative for urticaria   ENDOCRINE: no polydipsia, no polyuria, no hot or cold intolerance  MUSCULOSKELETAL: no joint pains, no muscle aches, no swelling of joints or extremities  NEUROLOGICAL: no numbness, no tingling, dizziness and falls  BEHAVIOR/PSYCH: negative      PHYSICAL EXAM:     Vitals:    01/12/23 1630 01/12/23 1657 01/12/23 1837 01/12/23 2000   BP: 139/61  (!) 133/53 117/66   Pulse:  83 81 81   Resp:  17 14 22   Temp:       TempSrc:       SpO2:  92% 90% 93%   Weight:           No intake or output data in the 24 hours ending 01/12/23 2021    General Appearance  Alert , awake , oriented x 3, not in acute distress, obesity  HEENT - Head is normocephalic, atraumatic. Eye - no icterus no redness, EOMI  Ear- NO ear pain, normal external ear , no discharge  Lungs - Bilateral equal air entry , no wheezes, rales or rhonchi, aeration good  Cardiovascular - Heart sounds are normal.  Regular rhythm, normal rate without murmur, gallop or rub. Abdomen - Soft, some tenderness, nondistended, no masses or organomegaly  Neurologic - There are no new focal motor or sensory deficits, muscle strength 5/5 in all extremities, normal muscle tone and bulk, no abnormal sensation. , painful neuropathy when palpating legs  Skin - No bruising or bleeding on exposed skin area- Midline sternotomy scar, appears infected with pus draining  Extremities - No cyanosis, clubbing or edema  Psych - normal affect      DIAGNOSTICS:      Laboratory Testing:    Recent Results (from the past 24 hour(s))   Culture, Blood 1    Collection Time: 01/12/23  4:07 PM    Specimen: Blood   Result Value Ref Range    Specimen Description . BLOOD     Special Requests RFOREARM, 0.75ML     Culture NO GROWTH <24 HRS    Culture, Blood 1    Collection Time: 01/12/23  4:14 PM    Specimen: Blood   Result Value Ref Range    Specimen Description . BLOOD     Special Requests LHAND, 5ML     Culture NO GROWTH <24 HRS    CBC with Auto Differential    Collection Time: 01/12/23  4:19 PM   Result Value Ref Range    WBC 16.7 (H) 3.5 - 11.3 k/uL    RBC 4.70 3.95 - 5.11 m/uL    Hemoglobin 13.1 11.9 - 15.1 g/dL    Hematocrit 46.8 36.3 - 47.1 %    MCV 99.6 82.6 - 102.9 fL    MCH 27.9 25.2 - 33.5 pg    MCHC 28.0 (L) 28.4 - 34.8 g/dL    RDW 13.6 11.8 - 14.4 %    Platelets 921 832 - 399 k/uL    MPV 11.1 8.1 - 13.5 fL    NRBC Automated 0.0 0.0 per 100 WBC    RBC Morphology HYPOCHROMIA PRESENT     Seg Neutrophils 86 (H) 36 - 65 %    Lymphocytes 5 (L) 24 - 43 %    Monocytes 8 3 - 12 %    Eosinophils % 0 (L) 1 - 4 %    Basophils 1 0 - 2 %    Immature Granulocytes 1 (H) 0 %    Segs Absolute 14.30 (H) 1.50 - 8.10 k/uL    Absolute Lymph # 0.79 (L) 1.10 - 3.70 k/uL    Absolute Mono # 1.34 (H) 0.10 - 1.20 k/uL    Absolute Eos # 0.05 0.00 - 0.44 k/uL    Basophils Absolute 0.08 0.00 - 0.20 k/uL    Absolute Immature Granulocyte 0.10 0.00 - 0.30 k/uL   Comprehensive Metabolic Panel    Collection Time: 01/12/23  4:19 PM   Result Value Ref Range    Glucose 294 (H) 70 - 99 mg/dL    BUN 22 8 - 23 mg/dL    Creatinine 0.77 0.50 - 0.90 mg/dL    Est, Glom Filt Rate >60 >60 mL/min/1.73m2    Calcium 9.6 8.6 - 10.4 mg/dL    Sodium 137 135 - 144 mmol/L    Potassium 5.0 3.7 - 5.3 mmol/L    Chloride 95 (L) 98 - 107 mmol/L    CO2 23 20 - 31 mmol/L    Anion Gap 19 (H) 9 - 17 mmol/L    Alkaline Phosphatase 143 (H) 35 - 104 U/L    ALT 24 5 - 33 U/L    AST 37 (H) <32 U/L    Total Bilirubin 0.7 0.3 - 1.2 mg/dL    Total Protein 7.1 6.4 - 8.3 g/dL    Albumin 2.7 (L) 3.5 - 5.2 g/dL    Albumin/Globulin Ratio 0.6 (L) 1.0 - 2.5   Lipase    Collection Time: 01/12/23  4:19 PM   Result Value Ref Range    Lipase 61 (H) 13 - 60 U/L   Troponin    Collection Time: 01/12/23  4:19 PM   Result Value Ref Range    Troponin, High Sensitivity 37 (H) 0 - 14 ng/L   Brain Natriuretic Peptide    Collection Time: 01/12/23  4:19 PM   Result Value Ref Range    Pro-BNP 1,814 (H) <300 pg/mL   Lactate, Sepsis    Collection Time: 01/12/23  4:19 PM   Result Value Ref Range    Lactic Acid, Sepsis, Whole Blood 4.7 (H) 0.5 - 1.9 mmol/L   Procalcitonin    Collection Time: 01/12/23  4:19 PM   Result Value Ref Range    Procalcitonin 0.53 (H) <0.09 ng/mL   Protime-INR    Collection Time: 01/12/23  4:19 PM   Result Value Ref Range    Protime 11.2 9.1 - 12.3 sec    INR 1.1    Troponin    Collection Time: 01/12/23  5:19 PM   Result Value Ref Range    Troponin, High Sensitivity 36 (H) 0 - 14 ng/L   Lactate, Sepsis    Collection Time: 01/12/23  7:11 PM   Result Value Ref Range    Lactic Acid, Sepsis, Whole Blood 2.6 (H) 0.5 - 1.9 mmol/L         Imaging/Diagonstics:  CT CHEST W CONTRAST    Result Date: 1/12/2023  EXAMINATION: CT OF THE CHEST WITH CONTRAST 1/12/2023 6:14 pm TECHNIQUE: CT of the chest was performed with the administration of intravenous contrast. Multiplanar reformatted images are provided for review. Automated exposure control, iterative reconstruction, and/or weight based adjustment of the mA/kV was utilized to reduce the radiation dose to as low as reasonably achievable. COMPARISON: None.  HISTORY: ORDERING SYSTEM PROVIDED HISTORY: recent CABG, incisional infection TECHNOLOGIST PROVIDED HISTORY: recent CABG, incisional infection Decision Support Exception - unselect if not a suspected or confirmed emergency medical condition->Emergency Medical Condition (MA) Reason for Exam: recent CABG, incisional infection FINDINGS: Mediastinum: Air in the anterior mediastinum is noted extending through the sternotomy to the soft tissues ventral to the sternum. Diastasis of the sternotomy is noted. Along the ventral aspect of the sternotomy fluid and air is noted suggesting abscess collection 2.4 x 1.8 cm. No acute aortic abnormality. Mild to moderate calcified plaque in the aorta. Moderate cardiomegaly. Trace pericardial fluid without significant pericardial accumulation. Shotty epicardial lymph nodes are present without cara adenopathy. Electrodes extend from the skin surface through the inferior sternal anterior chest terminating in the epicardial area. No cara mediastinal adenopathy. Shotty left suprahilar lymph node. Lungs/pleura: Right lung is clear. No right effusion. Large left pleural effusion is noted with almost complete collapse of the left lung with mild aeration in the left upper lobe medially. Mucus is noted in the bifurcation of the left mainstem bronchus suggesting a mucous plug. Upper Abdomen: Hepatic steatosis is noted. Small gallstones in the gallbladder. Spleen is top-normal.  Atherosclerotic calcification of the aorta and major branches is demonstrated. Left adrenal mass of 2 cm is noted with indeterminate Hounsfield numbers. Advise follow-up with adrenal CT scan or MRI. Soft Tissues/Bones: Diastasis of the patient's median sternotomy is noted. No significant sternal wire bridging below the most cranial 2 of the sternal wires. 1.  Diastasis of median sternotomy with air in the soft tissues, and appearance of abscess formation/dehiscence dural to the sternotomy. Air is present in the mediastinum. 2.  Large left pleural effusion with almost complete collapse of the left lobe of the lung. Bronchial wall thickening is noted with material within the bronchi likely mucous plugging. 3.  Hepatic steatosis. 4.  Small gallstones in the gallbladder. 5.  2 cm left adrenal mass with indeterminate Hounsfield numbers.   Further workup using adrenal CT protocol or MRI advised when the patient's condition permits. RECOMMENDATIONS: Advise adrenal protocol CT or MRI to evaluate a left adrenal mass. XR CHEST PORTABLE    Result Date: 1/12/2023  EXAMINATION: ONE XRAY VIEW OF THE CHEST 1/12/2023 1:24 pm COMPARISON: 12/05/2022 HISTORY: ORDERING SYSTEM PROVIDED HISTORY: chest pain, recent CABG TECHNOLOGIST PROVIDED HISTORY: chest pain, recent CABG FINDINGS: Cardiac size is enlarged. No acute infiltrates are seen . The visualized pulmonary vascularity is stable. No pneumothorax. Progressive left pleural effusion with nearly complete opacification of the left hemithorax. Heddy  Postsurgical changes overlying the mediastinum. Progressive left pleural effusion with nearly complete opacification of the left hemithorax. ASSESSMENT:       Principal Problem:    Incisional infection  Resolved Problems:    * No resolved hospital problems. *      PLAN:     Patient status: Admit the patient as Inpatient        Post CABG incision scar infection   -Initial lactic acid 4.7 repeat 2.6  -CT scan of the chest showed diastasis of mediastinal mass with air in the soft tissues as well as an abscess.   - Chest x-ray showed left-sided pleural effusion with almost complete collapse of the left lobe of lung, pulmonology consulted  -Troponin 36  - Lipase 61  - proBNP elevated much higher than baseline  -Procalcitonin 0.53  -White blood cells 16.7  -We will continue Zosyn and Vanc, pharmacy will dose vancomycin  -ID, pulmonolgy, and cardiothoracic surgery consulted  -Pain control with oxycodone  -Amiodarone started post CABG  -Continue Lasix 40 mg  -Blood cultures negative within 24 hours, repeat collected    Type 2 diabetes   35 units lantus nightly  High dose sliding scale  POCT checks  Hypoglycemia protocol  Neuropathy- resumed gabapentin    HTN  -Lopressor and Norvasc resumed    Anxiety  -Buspar, Celexa resumed    Hypothyroidism  -Resumed synthroid    CAD status post CABG in November 2022  -Aspirin Plavix resumed  Last echo showed EF 54%- 11/14/22      DVT prophylaxis: Lovenox 30 mg twice daily 1  GI prophylaxis: Protonix      Consultations:   Consults: IP CONSULT TO CARDIOTHORACIC SURGERY  IP CONSULT TO IV TEAM  IP CONSULT TO HOSPITALIST  IP CONSULT TO FAMILY MEDICINE  PT/OT      Above plan discussed with the patient and family in room, who agreed to the above plan    Plan will be discussed with the attending      Tomer Sims MD  Transitional year Resident  1/12/2023 8:21 PM

## 2023-01-14 ENCOUNTER — APPOINTMENT (OUTPATIENT)
Dept: GENERAL RADIOLOGY | Age: 68
End: 2023-01-14
Payer: MEDICARE

## 2023-01-14 ENCOUNTER — ANESTHESIA (OUTPATIENT)
Dept: OPERATING ROOM | Age: 68
End: 2023-01-14
Payer: MEDICARE

## 2023-01-14 ENCOUNTER — ANESTHESIA EVENT (OUTPATIENT)
Dept: OPERATING ROOM | Age: 68
End: 2023-01-14
Payer: MEDICARE

## 2023-01-14 LAB
ABSOLUTE BANDS #: 7.74 K/UL (ref 0–1)
ABSOLUTE EOS #: 0.32 K/UL (ref 0–0.4)
ABSOLUTE EOS #: 0.38 K/UL (ref 0–0.44)
ABSOLUTE IMMATURE GRANULOCYTE: 0 K/UL (ref 0–0.3)
ABSOLUTE IMMATURE GRANULOCYTE: 0.13 K/UL (ref 0–0.3)
ABSOLUTE LYMPH #: 0.9 K/UL (ref 1.1–3.7)
ABSOLUTE LYMPH #: 1.59 K/UL (ref 1–4.8)
ABSOLUTE MONO #: 0 K/UL (ref 0.1–0.8)
ABSOLUTE MONO #: 1.79 K/UL (ref 0.1–1.2)
ANION GAP SERPL CALCULATED.3IONS-SCNC: 10 MMOL/L (ref 9–17)
ANION GAP SERPL CALCULATED.3IONS-SCNC: 12 MMOL/L (ref 9–17)
ANION GAP SERPL CALCULATED.3IONS-SCNC: 7 MMOL/L (ref 9–17)
BANDS: 73 %
BASOPHILS # BLD: 0 % (ref 0–2)
BASOPHILS # BLD: 0 % (ref 0–2)
BASOPHILS ABSOLUTE: 0 K/UL (ref 0–0.2)
BASOPHILS ABSOLUTE: 0 K/UL (ref 0–0.2)
BUN BLDV-MCNC: 22 MG/DL (ref 8–23)
BUN BLDV-MCNC: 23 MG/DL (ref 8–23)
BUN BLDV-MCNC: 26 MG/DL (ref 8–23)
CALCIUM IONIZED: 1.14 MMOL/L (ref 1.13–1.33)
CALCIUM SERPL-MCNC: 8.3 MG/DL (ref 8.6–10.4)
CALCIUM SERPL-MCNC: 8.4 MG/DL (ref 8.6–10.4)
CALCIUM SERPL-MCNC: 8.4 MG/DL (ref 8.6–10.4)
CHLORIDE BLD-SCNC: 93 MMOL/L (ref 98–107)
CHLORIDE BLD-SCNC: 96 MMOL/L (ref 98–107)
CHLORIDE BLD-SCNC: 97 MMOL/L (ref 98–107)
CO2: 26 MMOL/L (ref 20–31)
CO2: 28 MMOL/L (ref 20–31)
CO2: 31 MMOL/L (ref 20–31)
CREAT SERPL-MCNC: 1.11 MG/DL (ref 0.5–0.9)
CREAT SERPL-MCNC: 1.21 MG/DL (ref 0.5–0.9)
CREAT SERPL-MCNC: 1.38 MG/DL (ref 0.5–0.9)
CULTURE: NORMAL
EOSINOPHILS RELATIVE PERCENT: 3 % (ref 1–4)
EOSINOPHILS RELATIVE PERCENT: 3 % (ref 1–4)
GFR SERPL CREATININE-BSD FRML MDRD: 42 ML/MIN/1.73M2
GFR SERPL CREATININE-BSD FRML MDRD: 49 ML/MIN/1.73M2
GFR SERPL CREATININE-BSD FRML MDRD: 55 ML/MIN/1.73M2
GLUCOSE BLD-MCNC: 200 MG/DL (ref 70–99)
GLUCOSE BLD-MCNC: 222 MG/DL (ref 65–105)
GLUCOSE BLD-MCNC: 225 MG/DL (ref 65–105)
GLUCOSE BLD-MCNC: 228 MG/DL (ref 65–105)
GLUCOSE BLD-MCNC: 261 MG/DL (ref 65–105)
GLUCOSE BLD-MCNC: 262 MG/DL (ref 70–99)
GLUCOSE BLD-MCNC: 270 MG/DL (ref 70–99)
HCT VFR BLD CALC: 36 % (ref 36.3–47.1)
HCT VFR BLD CALC: 36.1 % (ref 36.3–47.1)
HCT VFR BLD CALC: 38.1 % (ref 36.3–47.1)
HEMOGLOBIN: 10.6 G/DL (ref 11.9–15.1)
IMMATURE GRANULOCYTES: 0 %
IMMATURE GRANULOCYTES: 1 %
INR BLD: 1.1
LACTATE DEHYDROGENASE, FLUID: 313 U/L
LACTIC ACID, WHOLE BLOOD: 1.5 MMOL/L (ref 0.7–2.1)
LYMPHOCYTES # BLD: 15 % (ref 24–44)
LYMPHOCYTES # BLD: 7 % (ref 24–43)
Lab: NORMAL
MAGNESIUM: 1.7 MG/DL (ref 1.6–2.6)
MCH RBC QN AUTO: 27.5 PG (ref 25.2–33.5)
MCH RBC QN AUTO: 27.6 PG (ref 25.2–33.5)
MCH RBC QN AUTO: 27.7 PG (ref 25.2–33.5)
MCHC RBC AUTO-ENTMCNC: 27.8 G/DL (ref 28.4–34.8)
MCHC RBC AUTO-ENTMCNC: 29.4 G/DL (ref 28.4–34.8)
MCHC RBC AUTO-ENTMCNC: 29.4 G/DL (ref 28.4–34.8)
MCV RBC AUTO: 93.8 FL (ref 82.6–102.9)
MCV RBC AUTO: 94.5 FL (ref 82.6–102.9)
MCV RBC AUTO: 99 FL (ref 82.6–102.9)
MONOCYTES # BLD: 0 % (ref 1–7)
MONOCYTES # BLD: 14 % (ref 3–12)
MORPHOLOGY: NORMAL
MORPHOLOGY: NORMAL
NRBC AUTOMATED: 0 PER 100 WBC
PARTIAL THROMBOPLASTIN TIME: 23 SEC (ref 20.5–30.5)
PDW BLD-RTO: 13.5 % (ref 11.8–14.4)
PDW BLD-RTO: 13.6 % (ref 11.8–14.4)
PDW BLD-RTO: 13.8 % (ref 11.8–14.4)
PLATELET # BLD: 169 K/UL (ref 138–453)
PLATELET # BLD: 185 K/UL (ref 138–453)
PLATELET # BLD: 205 K/UL (ref 138–453)
PMV BLD AUTO: 10.5 FL (ref 8.1–13.5)
PMV BLD AUTO: 10.7 FL (ref 8.1–13.5)
PMV BLD AUTO: 10.8 FL (ref 8.1–13.5)
POTASSIUM SERPL-SCNC: 4 MMOL/L (ref 3.7–5.3)
POTASSIUM SERPL-SCNC: 4.5 MMOL/L (ref 3.7–5.3)
POTASSIUM SERPL-SCNC: 4.6 MMOL/L (ref 3.7–5.3)
PROTHROMBIN TIME: 11.4 SEC (ref 9.1–12.3)
RBC # BLD: 3.82 M/UL (ref 3.95–5.11)
RBC # BLD: 3.84 M/UL (ref 3.95–5.11)
RBC # BLD: 3.85 M/UL (ref 3.95–5.11)
SEG NEUTROPHILS: 75 % (ref 36–65)
SEG NEUTROPHILS: 9 % (ref 36–66)
SEGMENTED NEUTROPHILS ABSOLUTE COUNT: 0.95 K/UL (ref 1.8–7.7)
SEGMENTED NEUTROPHILS ABSOLUTE COUNT: 9.6 K/UL (ref 1.5–8.1)
SODIUM BLD-SCNC: 131 MMOL/L (ref 135–144)
SODIUM BLD-SCNC: 134 MMOL/L (ref 135–144)
SODIUM BLD-SCNC: 135 MMOL/L (ref 135–144)
SPECIMEN DESCRIPTION: NORMAL
SPECIMEN TYPE: NORMAL
SPECIMEN TYPE: NORMAL
TOTAL PROTEIN, BODY FLUID: 4.4 G/DL
VANCOMYCIN RANDOM: 18.1 UG/ML
WBC # BLD: 10.6 K/UL (ref 3.5–11.3)
WBC # BLD: 12.8 K/UL (ref 3.5–11.3)
WBC # BLD: 9.4 K/UL (ref 3.5–11.3)

## 2023-01-14 PROCEDURE — 6360000002 HC RX W HCPCS

## 2023-01-14 PROCEDURE — 6370000000 HC RX 637 (ALT 250 FOR IP)

## 2023-01-14 PROCEDURE — 99233 SBSQ HOSP IP/OBS HIGH 50: CPT | Performed by: INTERNAL MEDICINE

## 2023-01-14 PROCEDURE — 83735 ASSAY OF MAGNESIUM: CPT

## 2023-01-14 PROCEDURE — 80202 ASSAY OF VANCOMYCIN: CPT

## 2023-01-14 PROCEDURE — 83605 ASSAY OF LACTIC ACID: CPT

## 2023-01-14 PROCEDURE — 85730 THROMBOPLASTIN TIME PARTIAL: CPT

## 2023-01-14 PROCEDURE — 3600000005 HC SURGERY LEVEL 5 BASE: Performed by: THORACIC SURGERY (CARDIOTHORACIC VASCULAR SURGERY)

## 2023-01-14 PROCEDURE — 6360000002 HC RX W HCPCS: Performed by: THORACIC SURGERY (CARDIOTHORACIC VASCULAR SURGERY)

## 2023-01-14 PROCEDURE — 3600000015 HC SURGERY LEVEL 5 ADDTL 15MIN: Performed by: THORACIC SURGERY (CARDIOTHORACIC VASCULAR SURGERY)

## 2023-01-14 PROCEDURE — 94761 N-INVAS EAR/PLS OXIMETRY MLT: CPT

## 2023-01-14 PROCEDURE — 7100000000 HC PACU RECOVERY - FIRST 15 MIN: Performed by: THORACIC SURGERY (CARDIOTHORACIC VASCULAR SURGERY)

## 2023-01-14 PROCEDURE — 82947 ASSAY GLUCOSE BLOOD QUANT: CPT

## 2023-01-14 PROCEDURE — 36415 COLL VENOUS BLD VENIPUNCTURE: CPT

## 2023-01-14 PROCEDURE — 87075 CULTR BACTERIA EXCEPT BLOOD: CPT

## 2023-01-14 PROCEDURE — 93005 ELECTROCARDIOGRAM TRACING: CPT

## 2023-01-14 PROCEDURE — 2709999900 HC NON-CHARGEABLE SUPPLY: Performed by: THORACIC SURGERY (CARDIOTHORACIC VASCULAR SURGERY)

## 2023-01-14 PROCEDURE — 0JB80ZZ EXCISION OF ABDOMEN SUBCUTANEOUS TISSUE AND FASCIA, OPEN APPROACH: ICD-10-PCS | Performed by: THORACIC SURGERY (CARDIOTHORACIC VASCULAR SURGERY)

## 2023-01-14 PROCEDURE — 87070 CULTURE OTHR SPECIMN AEROBIC: CPT

## 2023-01-14 PROCEDURE — 2580000003 HC RX 258

## 2023-01-14 PROCEDURE — 0PD00ZZ EXTRACTION OF STERNUM, OPEN APPROACH: ICD-10-PCS | Performed by: THORACIC SURGERY (CARDIOTHORACIC VASCULAR SURGERY)

## 2023-01-14 PROCEDURE — 6370000000 HC RX 637 (ALT 250 FOR IP): Performed by: ANESTHESIOLOGY

## 2023-01-14 PROCEDURE — 6360000002 HC RX W HCPCS: Performed by: NURSE ANESTHETIST, CERTIFIED REGISTERED

## 2023-01-14 PROCEDURE — 2700000000 HC OXYGEN THERAPY PER DAY

## 2023-01-14 PROCEDURE — 99232 SBSQ HOSP IP/OBS MODERATE 35: CPT | Performed by: FAMILY MEDICINE

## 2023-01-14 PROCEDURE — 85610 PROTHROMBIN TIME: CPT

## 2023-01-14 PROCEDURE — 2000000000 HC ICU R&B

## 2023-01-14 PROCEDURE — 2580000003 HC RX 258: Performed by: THORACIC SURGERY (CARDIOTHORACIC VASCULAR SURGERY)

## 2023-01-14 PROCEDURE — 7100000001 HC PACU RECOVERY - ADDTL 15 MIN: Performed by: THORACIC SURGERY (CARDIOTHORACIC VASCULAR SURGERY)

## 2023-01-14 PROCEDURE — 82330 ASSAY OF CALCIUM: CPT

## 2023-01-14 PROCEDURE — 85025 COMPLETE CBC W/AUTO DIFF WBC: CPT

## 2023-01-14 PROCEDURE — 3700000000 HC ANESTHESIA ATTENDED CARE: Performed by: THORACIC SURGERY (CARDIOTHORACIC VASCULAR SURGERY)

## 2023-01-14 PROCEDURE — 6360000002 HC RX W HCPCS: Performed by: ANESTHESIOLOGY

## 2023-01-14 PROCEDURE — 86403 PARTICLE AGGLUT ANTBDY SCRN: CPT

## 2023-01-14 PROCEDURE — 2W13X6Z COMPRESSION OF ABDOMINAL WALL USING PRESSURE DRESSING: ICD-10-PCS | Performed by: THORACIC SURGERY (CARDIOTHORACIC VASCULAR SURGERY)

## 2023-01-14 PROCEDURE — 85027 COMPLETE CBC AUTOMATED: CPT

## 2023-01-14 PROCEDURE — 2500000003 HC RX 250 WO HCPCS: Performed by: NURSE ANESTHETIST, CERTIFIED REGISTERED

## 2023-01-14 PROCEDURE — 87205 SMEAR GRAM STAIN: CPT

## 2023-01-14 PROCEDURE — 94640 AIRWAY INHALATION TREATMENT: CPT

## 2023-01-14 PROCEDURE — 80048 BASIC METABOLIC PNL TOTAL CA: CPT

## 2023-01-14 PROCEDURE — 71045 X-RAY EXAM CHEST 1 VIEW: CPT

## 2023-01-14 PROCEDURE — 3700000001 HC ADD 15 MINUTES (ANESTHESIA): Performed by: THORACIC SURGERY (CARDIOTHORACIC VASCULAR SURGERY)

## 2023-01-14 RX ORDER — DEXTROSE MONOHYDRATE 100 MG/ML
INJECTION, SOLUTION INTRAVENOUS CONTINUOUS PRN
Status: DISCONTINUED | OUTPATIENT
Start: 2023-01-14 | End: 2023-01-14

## 2023-01-14 RX ORDER — ENOXAPARIN SODIUM 100 MG/ML
30 INJECTION SUBCUTANEOUS 2 TIMES DAILY
Status: DISCONTINUED | OUTPATIENT
Start: 2023-01-14 | End: 2023-01-20 | Stop reason: HOSPADM

## 2023-01-14 RX ORDER — INSULIN LISPRO 100 [IU]/ML
0-3 INJECTION, SOLUTION INTRAVENOUS; SUBCUTANEOUS NIGHTLY
Status: DISCONTINUED | OUTPATIENT
Start: 2023-01-14 | End: 2023-01-14

## 2023-01-14 RX ORDER — MEPERIDINE HYDROCHLORIDE 50 MG/ML
25 INJECTION INTRAMUSCULAR; INTRAVENOUS; SUBCUTANEOUS
Status: ACTIVE | OUTPATIENT
Start: 2023-01-14 | End: 2023-01-15

## 2023-01-14 RX ORDER — FENTANYL CITRATE 50 UG/ML
25 INJECTION, SOLUTION INTRAMUSCULAR; INTRAVENOUS
Status: DISCONTINUED | OUTPATIENT
Start: 2023-01-14 | End: 2023-01-20 | Stop reason: HOSPADM

## 2023-01-14 RX ORDER — INSULIN LISPRO 100 [IU]/ML
0-6 INJECTION, SOLUTION INTRAVENOUS; SUBCUTANEOUS
Status: DISCONTINUED | OUTPATIENT
Start: 2023-01-14 | End: 2023-01-14

## 2023-01-14 RX ORDER — FENTANYL CITRATE 50 UG/ML
50 INJECTION, SOLUTION INTRAMUSCULAR; INTRAVENOUS
Status: DISCONTINUED | OUTPATIENT
Start: 2023-01-14 | End: 2023-01-20 | Stop reason: HOSPADM

## 2023-01-14 RX ORDER — ALBUMIN, HUMAN INJ 5% 5 %
25 SOLUTION INTRAVENOUS PRN
Status: DISCONTINUED | OUTPATIENT
Start: 2023-01-14 | End: 2023-01-20 | Stop reason: HOSPADM

## 2023-01-14 RX ORDER — INSULIN GLARGINE 100 [IU]/ML
60 INJECTION, SOLUTION SUBCUTANEOUS NIGHTLY
Status: DISCONTINUED | OUTPATIENT
Start: 2023-01-14 | End: 2023-01-15

## 2023-01-14 RX ORDER — CEFAZOLIN SODIUM 1 G/3ML
INJECTION, POWDER, FOR SOLUTION INTRAMUSCULAR; INTRAVENOUS PRN
Status: DISCONTINUED | OUTPATIENT
Start: 2023-01-14 | End: 2023-01-14 | Stop reason: SDUPTHER

## 2023-01-14 RX ORDER — MAGNESIUM HYDROXIDE 1200 MG/15ML
LIQUID ORAL CONTINUOUS PRN
Status: COMPLETED | OUTPATIENT
Start: 2023-01-14 | End: 2023-01-14

## 2023-01-14 RX ORDER — ONDANSETRON 4 MG/1
4 TABLET, ORALLY DISINTEGRATING ORAL EVERY 8 HOURS PRN
Status: DISCONTINUED | OUTPATIENT
Start: 2023-01-14 | End: 2023-01-14

## 2023-01-14 RX ORDER — BISACODYL 10 MG
10 SUPPOSITORY, RECTAL RECTAL DAILY PRN
Status: DISCONTINUED | OUTPATIENT
Start: 2023-01-14 | End: 2023-01-20 | Stop reason: HOSPADM

## 2023-01-14 RX ORDER — SODIUM CHLORIDE 9 MG/ML
INJECTION, SOLUTION INTRAVENOUS PRN
Status: DISCONTINUED | OUTPATIENT
Start: 2023-01-14 | End: 2023-01-20 | Stop reason: HOSPADM

## 2023-01-14 RX ORDER — SODIUM CHLORIDE 9 MG/ML
INJECTION, SOLUTION INTRAVENOUS PRN
Status: DISCONTINUED | OUTPATIENT
Start: 2023-01-14 | End: 2023-01-14 | Stop reason: HOSPADM

## 2023-01-14 RX ORDER — POLYETHYLENE GLYCOL 3350 17 G/17G
17 POWDER, FOR SOLUTION ORAL DAILY
Status: DISCONTINUED | OUTPATIENT
Start: 2023-01-14 | End: 2023-01-20 | Stop reason: HOSPADM

## 2023-01-14 RX ORDER — GLYCOPYRROLATE 0.2 MG/ML
INJECTION INTRAMUSCULAR; INTRAVENOUS PRN
Status: DISCONTINUED | OUTPATIENT
Start: 2023-01-14 | End: 2023-01-14 | Stop reason: SDUPTHER

## 2023-01-14 RX ORDER — POTASSIUM CHLORIDE 29.8 MG/ML
20 INJECTION INTRAVENOUS PRN
Status: DISCONTINUED | OUTPATIENT
Start: 2023-01-14 | End: 2023-01-14

## 2023-01-14 RX ORDER — SODIUM CHLORIDE 0.9 % (FLUSH) 0.9 %
5-40 SYRINGE (ML) INJECTION EVERY 12 HOURS SCHEDULED
Status: DISCONTINUED | OUTPATIENT
Start: 2023-01-14 | End: 2023-01-20 | Stop reason: HOSPADM

## 2023-01-14 RX ORDER — HYDRALAZINE HYDROCHLORIDE 20 MG/ML
5 INJECTION INTRAMUSCULAR; INTRAVENOUS EVERY 5 MIN PRN
Status: DISCONTINUED | OUTPATIENT
Start: 2023-01-14 | End: 2023-01-20 | Stop reason: HOSPADM

## 2023-01-14 RX ORDER — METOPROLOL TARTRATE 5 MG/5ML
2.5 INJECTION INTRAVENOUS EVERY 10 MIN PRN
Status: DISCONTINUED | OUTPATIENT
Start: 2023-01-14 | End: 2023-01-20 | Stop reason: HOSPADM

## 2023-01-14 RX ORDER — INSULIN GLARGINE 100 [IU]/ML
0.15 INJECTION, SOLUTION SUBCUTANEOUS NIGHTLY
Status: DISCONTINUED | OUTPATIENT
Start: 2023-01-15 | End: 2023-01-14

## 2023-01-14 RX ORDER — PIPERACILLIN SODIUM, TAZOBACTAM SODIUM 3; .375 G/15ML; G/15ML
INJECTION, POWDER, LYOPHILIZED, FOR SOLUTION INTRAVENOUS PRN
Status: DISCONTINUED | OUTPATIENT
Start: 2023-01-14 | End: 2023-01-14 | Stop reason: SDUPTHER

## 2023-01-14 RX ORDER — CLOPIDOGREL BISULFATE 75 MG/1
75 TABLET ORAL DAILY
Status: DISCONTINUED | OUTPATIENT
Start: 2023-01-15 | End: 2023-01-20 | Stop reason: HOSPADM

## 2023-01-14 RX ORDER — ROCURONIUM BROMIDE 10 MG/ML
INJECTION, SOLUTION INTRAVENOUS PRN
Status: DISCONTINUED | OUTPATIENT
Start: 2023-01-14 | End: 2023-01-14 | Stop reason: SDUPTHER

## 2023-01-14 RX ORDER — SODIUM CHLORIDE 0.9 % (FLUSH) 0.9 %
5-40 SYRINGE (ML) INJECTION PRN
Status: DISCONTINUED | OUTPATIENT
Start: 2023-01-14 | End: 2023-01-14 | Stop reason: HOSPADM

## 2023-01-14 RX ORDER — NOREPINEPHRINE BIT/0.9 % NACL 16MG/250ML
.01-.08 INFUSION BOTTLE (ML) INTRAVENOUS CONTINUOUS PRN
Status: DISCONTINUED | OUTPATIENT
Start: 2023-01-14 | End: 2023-01-14

## 2023-01-14 RX ORDER — ONDANSETRON 2 MG/ML
4 INJECTION INTRAMUSCULAR; INTRAVENOUS EVERY 6 HOURS PRN
Status: DISCONTINUED | OUTPATIENT
Start: 2023-01-14 | End: 2023-01-14

## 2023-01-14 RX ORDER — SODIUM CHLORIDE 0.9 % (FLUSH) 0.9 %
5-40 SYRINGE (ML) INJECTION PRN
Status: DISCONTINUED | OUTPATIENT
Start: 2023-01-14 | End: 2023-01-20 | Stop reason: HOSPADM

## 2023-01-14 RX ORDER — IPRATROPIUM BROMIDE AND ALBUTEROL SULFATE 2.5; .5 MG/3ML; MG/3ML
1 SOLUTION RESPIRATORY (INHALATION) EVERY 4 HOURS PRN
Status: DISCONTINUED | OUTPATIENT
Start: 2023-01-14 | End: 2023-01-20 | Stop reason: HOSPADM

## 2023-01-14 RX ORDER — PANTOPRAZOLE SODIUM 40 MG/1
40 TABLET, DELAYED RELEASE ORAL DAILY
Status: DISCONTINUED | OUTPATIENT
Start: 2023-01-14 | End: 2023-01-20 | Stop reason: HOSPADM

## 2023-01-14 RX ORDER — SODIUM CHLORIDE 9 MG/ML
INJECTION, SOLUTION INTRAVENOUS CONTINUOUS
Status: DISCONTINUED | OUTPATIENT
Start: 2023-01-14 | End: 2023-01-14

## 2023-01-14 RX ORDER — SENNA AND DOCUSATE SODIUM 50; 8.6 MG/1; MG/1
1 TABLET, FILM COATED ORAL 2 TIMES DAILY
Status: DISCONTINUED | OUTPATIENT
Start: 2023-01-14 | End: 2023-01-15

## 2023-01-14 RX ORDER — PROPOFOL 10 MG/ML
INJECTION, EMULSION INTRAVENOUS PRN
Status: DISCONTINUED | OUTPATIENT
Start: 2023-01-14 | End: 2023-01-14 | Stop reason: SDUPTHER

## 2023-01-14 RX ORDER — FENTANYL CITRATE 50 UG/ML
50 INJECTION, SOLUTION INTRAMUSCULAR; INTRAVENOUS EVERY 5 MIN PRN
Status: DISCONTINUED | OUTPATIENT
Start: 2023-01-14 | End: 2023-01-14 | Stop reason: HOSPADM

## 2023-01-14 RX ORDER — SODIUM CHLORIDE 0.9 % (FLUSH) 0.9 %
5-40 SYRINGE (ML) INJECTION EVERY 12 HOURS SCHEDULED
Status: DISCONTINUED | OUTPATIENT
Start: 2023-01-14 | End: 2023-01-14 | Stop reason: HOSPADM

## 2023-01-14 RX ORDER — FENTANYL CITRATE 50 UG/ML
INJECTION, SOLUTION INTRAMUSCULAR; INTRAVENOUS PRN
Status: DISCONTINUED | OUTPATIENT
Start: 2023-01-14 | End: 2023-01-14 | Stop reason: SDUPTHER

## 2023-01-14 RX ORDER — MAGNESIUM SULFATE IN WATER 40 MG/ML
2000 INJECTION, SOLUTION INTRAVENOUS PRN
Status: DISCONTINUED | OUTPATIENT
Start: 2023-01-14 | End: 2023-01-20 | Stop reason: HOSPADM

## 2023-01-14 RX ORDER — VANCOMYCIN HYDROCHLORIDE 1 G/20ML
INJECTION, POWDER, LYOPHILIZED, FOR SOLUTION INTRAVENOUS PRN
Status: DISCONTINUED | OUTPATIENT
Start: 2023-01-14 | End: 2023-01-14 | Stop reason: ALTCHOICE

## 2023-01-14 RX ORDER — FENTANYL CITRATE 50 UG/ML
25 INJECTION, SOLUTION INTRAMUSCULAR; INTRAVENOUS EVERY 5 MIN PRN
Status: DISCONTINUED | OUTPATIENT
Start: 2023-01-14 | End: 2023-01-14 | Stop reason: HOSPADM

## 2023-01-14 RX ORDER — FUROSEMIDE 40 MG/1
40 TABLET ORAL 2 TIMES DAILY
Status: DISCONTINUED | OUTPATIENT
Start: 2023-01-14 | End: 2023-01-20 | Stop reason: HOSPADM

## 2023-01-14 RX ORDER — LIDOCAINE HYDROCHLORIDE 10 MG/ML
INJECTION, SOLUTION EPIDURAL; INFILTRATION; INTRACAUDAL; PERINEURAL PRN
Status: DISCONTINUED | OUTPATIENT
Start: 2023-01-14 | End: 2023-01-14 | Stop reason: SDUPTHER

## 2023-01-14 RX ORDER — ALBUMIN (HUMAN) 12.5 G/50ML
25 SOLUTION INTRAVENOUS PRN
Status: DISCONTINUED | OUTPATIENT
Start: 2023-01-14 | End: 2023-01-20 | Stop reason: HOSPADM

## 2023-01-14 RX ORDER — DIPHENHYDRAMINE HCL 25 MG
25 TABLET ORAL NIGHTLY PRN
Status: DISCONTINUED | OUTPATIENT
Start: 2023-01-15 | End: 2023-01-20 | Stop reason: HOSPADM

## 2023-01-14 RX ADMIN — DOCUSATE SODIUM 50 MG AND SENNOSIDES 8.6 MG 1 TABLET: 8.6; 5 TABLET, FILM COATED ORAL at 20:56

## 2023-01-14 RX ADMIN — INSULIN LISPRO 4 UNITS: 100 INJECTION, SOLUTION INTRAVENOUS; SUBCUTANEOUS at 17:09

## 2023-01-14 RX ADMIN — ENOXAPARIN SODIUM 30 MG: 100 INJECTION SUBCUTANEOUS at 20:56

## 2023-01-14 RX ADMIN — PHENYLEPHRINE HYDROCHLORIDE 100 MCG: 10 INJECTION INTRAVENOUS at 09:12

## 2023-01-14 RX ADMIN — PHENYLEPHRINE HYDROCHLORIDE 100 MCG: 10 INJECTION INTRAVENOUS at 09:38

## 2023-01-14 RX ADMIN — BUSPIRONE HYDROCHLORIDE 10 MG: 10 TABLET ORAL at 14:50

## 2023-01-14 RX ADMIN — Medication 81 MG: at 12:32

## 2023-01-14 RX ADMIN — BUDESONIDE AND FORMOTEROL FUMARATE DIHYDRATE 2 PUFF: 80; 4.5 AEROSOL RESPIRATORY (INHALATION) at 19:50

## 2023-01-14 RX ADMIN — ROCURONIUM BROMIDE 50 MG: 10 INJECTION, SOLUTION INTRAVENOUS at 09:08

## 2023-01-14 RX ADMIN — SODIUM CHLORIDE, PRESERVATIVE FREE 10 ML: 5 INJECTION INTRAVENOUS at 12:42

## 2023-01-14 RX ADMIN — FUROSEMIDE 40 MG: 40 TABLET ORAL at 20:55

## 2023-01-14 RX ADMIN — LEVOTHYROXINE SODIUM 50 MCG: 50 TABLET ORAL at 12:32

## 2023-01-14 RX ADMIN — PIPERACILLIN AND TAZOBACTAM 3.38 G: 3; .375 INJECTION, POWDER, LYOPHILIZED, FOR SOLUTION INTRAVENOUS; PARENTERAL at 09:49

## 2023-01-14 RX ADMIN — PHENYLEPHRINE HYDROCHLORIDE 200 MCG: 10 INJECTION INTRAVENOUS at 09:20

## 2023-01-14 RX ADMIN — FENTANYL CITRATE 25 MCG: 50 INJECTION INTRAMUSCULAR; INTRAVENOUS at 10:28

## 2023-01-14 RX ADMIN — SUGAMMADEX 300 MG: 100 INJECTION, SOLUTION INTRAVENOUS at 09:53

## 2023-01-14 RX ADMIN — DESMOPRESSIN ACETATE 40 MG: 0.2 TABLET ORAL at 20:30

## 2023-01-14 RX ADMIN — LIDOCAINE HYDROCHLORIDE 50 MG: 10 INJECTION, SOLUTION EPIDURAL; INFILTRATION; INTRACAUDAL; PERINEURAL at 09:08

## 2023-01-14 RX ADMIN — METOPROLOL TARTRATE 12.5 MG: 25 TABLET ORAL at 20:30

## 2023-01-14 RX ADMIN — PHENYLEPHRINE HYDROCHLORIDE 200 MCG: 10 INJECTION INTRAVENOUS at 09:27

## 2023-01-14 RX ADMIN — CEFAZOLIN 2 G: 1 INJECTION, POWDER, FOR SOLUTION INTRAMUSCULAR; INTRAVENOUS at 09:39

## 2023-01-14 RX ADMIN — PIPERACILLIN AND TAZOBACTAM 3375 MG: 3; .375 INJECTION, POWDER, LYOPHILIZED, FOR SOLUTION INTRAVENOUS at 18:22

## 2023-01-14 RX ADMIN — PHENYLEPHRINE HYDROCHLORIDE 200 MCG: 10 INJECTION INTRAVENOUS at 09:47

## 2023-01-14 RX ADMIN — SODIUM CHLORIDE: 9 INJECTION, SOLUTION INTRAVENOUS at 01:18

## 2023-01-14 RX ADMIN — AMIODARONE HYDROCHLORIDE 200 MG: 200 TABLET ORAL at 20:55

## 2023-01-14 RX ADMIN — SODIUM CHLORIDE, PRESERVATIVE FREE 10 ML: 5 INJECTION INTRAVENOUS at 21:07

## 2023-01-14 RX ADMIN — PROPOFOL 130 MG: 10 INJECTION, EMULSION INTRAVENOUS at 09:08

## 2023-01-14 RX ADMIN — MUPIROCIN: 20 OINTMENT TOPICAL at 20:56

## 2023-01-14 RX ADMIN — PHENYLEPHRINE HYDROCHLORIDE 100 MCG: 10 INJECTION INTRAVENOUS at 09:16

## 2023-01-14 RX ADMIN — PANTOPRAZOLE SODIUM 40 MG: 40 TABLET, DELAYED RELEASE ORAL at 12:32

## 2023-01-14 RX ADMIN — AMIODARONE HYDROCHLORIDE 200 MG: 200 TABLET ORAL at 14:49

## 2023-01-14 RX ADMIN — GABAPENTIN 400 MG: 400 CAPSULE ORAL at 14:49

## 2023-01-14 RX ADMIN — OXYCODONE HYDROCHLORIDE AND ACETAMINOPHEN 1 TABLET: 5; 325 TABLET ORAL at 23:22

## 2023-01-14 RX ADMIN — PHENYLEPHRINE HYDROCHLORIDE 100 MCG: 10 INJECTION INTRAVENOUS at 09:40

## 2023-01-14 RX ADMIN — MUPIROCIN: 20 OINTMENT TOPICAL at 12:34

## 2023-01-14 RX ADMIN — PHENYLEPHRINE HYDROCHLORIDE 100 MCG: 10 INJECTION INTRAVENOUS at 09:36

## 2023-01-14 RX ADMIN — INSULIN GLARGINE 60 UNITS: 100 INJECTION, SOLUTION SUBCUTANEOUS at 21:03

## 2023-01-14 RX ADMIN — FENTANYL CITRATE 50 MCG: 50 INJECTION INTRAMUSCULAR; INTRAVENOUS at 20:54

## 2023-01-14 RX ADMIN — PHENYLEPHRINE HYDROCHLORIDE 100 MCG: 10 INJECTION INTRAVENOUS at 09:50

## 2023-01-14 RX ADMIN — POLYETHYLENE GLYCOL 3350 17 G: 17 POWDER, FOR SOLUTION ORAL at 12:53

## 2023-01-14 RX ADMIN — TRAZODONE HYDROCHLORIDE 50 MG: 50 TABLET ORAL at 20:29

## 2023-01-14 RX ADMIN — INSULIN LISPRO 4 UNITS: 100 INJECTION, SOLUTION INTRAVENOUS; SUBCUTANEOUS at 12:55

## 2023-01-14 RX ADMIN — GABAPENTIN 400 MG: 400 CAPSULE ORAL at 20:30

## 2023-01-14 RX ADMIN — BUSPIRONE HYDROCHLORIDE 10 MG: 10 TABLET ORAL at 20:30

## 2023-01-14 RX ADMIN — PHENYLEPHRINE HYDROCHLORIDE 100 MCG: 10 INJECTION INTRAVENOUS at 09:34

## 2023-01-14 RX ADMIN — INSULIN HUMAN 5 UNITS: 100 INJECTION, SOLUTION PARENTERAL at 09:18

## 2023-01-14 RX ADMIN — FENTANYL CITRATE 50 MCG: 50 INJECTION, SOLUTION INTRAMUSCULAR; INTRAVENOUS at 09:04

## 2023-01-14 RX ADMIN — PHENYLEPHRINE HYDROCHLORIDE 100 MCG: 10 INJECTION INTRAVENOUS at 09:09

## 2023-01-14 RX ADMIN — VANCOMYCIN HYDROCHLORIDE 500 MG: 500 INJECTION, POWDER, LYOPHILIZED, FOR SOLUTION INTRAVENOUS at 17:13

## 2023-01-14 RX ADMIN — PIPERACILLIN AND TAZOBACTAM 3375 MG: 3; .375 INJECTION, POWDER, LYOPHILIZED, FOR SOLUTION INTRAVENOUS at 00:56

## 2023-01-14 RX ADMIN — OXYCODONE HYDROCHLORIDE AND ACETAMINOPHEN 1 TABLET: 5; 325 TABLET ORAL at 17:45

## 2023-01-14 RX ADMIN — GLYCOPYRROLATE 0.2 MG: 0.2 INJECTION INTRAMUSCULAR; INTRAVENOUS at 09:17

## 2023-01-14 ASSESSMENT — PAIN SCALES - GENERAL
PAINLEVEL_OUTOF10: 6
PAINLEVEL_OUTOF10: 2
PAINLEVEL_OUTOF10: 6
PAINLEVEL_OUTOF10: 6
PAINLEVEL_OUTOF10: 5
PAINLEVEL_OUTOF10: 6
PAINLEVEL_OUTOF10: 2

## 2023-01-14 ASSESSMENT — ENCOUNTER SYMPTOMS
EYE ITCHING: 0
COUGH: 1
WHEEZING: 0
NAUSEA: 0
COLOR CHANGE: 1
VOMITING: 0
COUGH: 0
EYE DISCHARGE: 0
ABDOMINAL PAIN: 0
SHORTNESS OF BREATH: 1
APNEA: 0
ABDOMINAL PAIN: 1
TROUBLE SWALLOWING: 0
CHEST TIGHTNESS: 0

## 2023-01-14 ASSESSMENT — PAIN DESCRIPTION - ORIENTATION: ORIENTATION: ANTERIOR;LEFT

## 2023-01-14 ASSESSMENT — PAIN DESCRIPTION - DESCRIPTORS
DESCRIPTORS: STABBING;SHOOTING
DESCRIPTORS: PRESSURE

## 2023-01-14 ASSESSMENT — PAIN DESCRIPTION - LOCATION
LOCATION: CHEST

## 2023-01-14 NOTE — PROGRESS NOTES
Family medicine called writer regarding patients labs. Labs have no been draw. Writer contacted phlebotomy. Phlebotomy states someone will be up as soon as they can as they are short tonight.  Ext [phone]

## 2023-01-14 NOTE — PROGRESS NOTES
Comprehensive Nutrition Assessment    Type and Reason for Visit:  Initial, Positive Nutrition Screen    Nutrition Recommendations/Plan:   Monitor for diet advancement s/p surgical procedure, weights, intakes, tolerance to diet, wound healing and follow up. Recommend ONS as diet is advanced to aid in meeting nutrition needs related to recent decrease in appetite/intakes, weight loss, wound healing. Malnutrition Assessment:  Malnutrition Status:  Insufficient data (01/14/23 1139)    Context:  Acute Illness     Findings of the 6 clinical characteristics of malnutrition:  Energy Intake:  50% or less of estimated energy requirements for 5 or more days  Weight Loss:  5% over 1 month     Body Fat Loss:  Unable to assess     Muscle Mass Loss:  Unable to assess    Fluid Accumulation:  Unable to assess     Strength:  Not Performed    Nutrition Assessment:    Patient seen today for new admission related to incision infection (CABG 11/2022), left adrenal mass, left pleural effusion with almost complete collapse of left lung; positive nutrition screen in place related to decreased appetite and weight loss prior to admission. Patient currently NPO for surgical procedure. EMR weight hx indicates a 5.1% weight loss x 30 days. Weight loss is likely related to decreased appetite/increased falls due to illness AEB notes upon admission. Patient  lbs. Patient to have surgery today for sternal wound washout/debridement/wound vac. RN unsure of PO intakes prior to surgery. According to EMR, patient follows a diabetic diet at home. No further recommendations at this time due to patient NPO status. Will continue to monitor for diet advancement, tolerance, intakes, weight, wound healing, labs and follow up.     Nutrition Related Findings:    Meds/Labs reviewed Wound Type: Surgical Incision       Current Nutrition Intake & Therapies:    Average Meal Intake: NPO  Average Supplements Intake: NPO  Diet NPO    Anthropometric Measures:  Height: 4' 11\" (149.9 cm)  Ideal Body Weight (IBW): 95 lbs (43 kg)    Admission Body Weight: 239 lb (108.4 kg)  Current Body Weight: 239 lb (108.4 kg),   IBW. Weight Source: Bed Scale  Current BMI (kg/m2): 48.2  Usual Body Weight: 250 lb (113.4 kg)  % Weight Change (Calculated): -4.4  Weight Adjustment For: No Adjustment  BMI Categories: Obese Class 3 (BMI 40.0 or greater)    Estimated Daily Nutrient Needs:  Energy Requirements Based On: Formula  Weight Used for Energy Requirements: Current  Energy (kcal/day): 2100kcal/day  Weight Used for Protein Requirements: Ideal  Protein (g/day): 80-90gmsPRO/day  Fluid (ml/day): Per MD    Nutrition Diagnosis:   Inadequate oral intake related to inadequate protein-energy intake as evidenced by NPO or clear liquid status due to medical condition, weight loss    Nutrition Interventions:   Food and/or Nutrient Delivery: Continue NPO  Nutrition Education/Counseling: No recommendation at this time  Coordination of Nutrition Care: Continue to monitor while inpatient  Plan of Care discussed with: RN    Goals:     Goals: Meet at least 75% of estimated needs, within 7 days, Initiate PO diet       Nutrition Monitoring and Evaluation:   Behavioral-Environmental Outcomes: None Identified  Food/Nutrient Intake Outcomes: Diet Advancement/Tolerance  Physical Signs/Symptoms Outcomes: Biochemical Data, Skin, Weight    Discharge Planning:     Too soon to determine     Joan Siddiqi RD  Contact: [phone]

## 2023-01-14 NOTE — PLAN OF CARE
Problem: Chronic Conditions and Co-morbidities  Goal: Patient's chronic conditions and co-morbidity symptoms are monitored and maintained or improved  1/14/2023 0339 by Gi Coulter RN  Outcome: Progressing  1/13/2023 1812 by Carolina Malloy RN  Outcome: Progressing     Problem: Pain  Goal: Verbalizes/displays adequate comfort level or baseline comfort level  1/14/2023 0339 by Gi Coulter RN  Outcome: Progressing  1/13/2023 1812 by Carolina Malloy RN  Outcome: Progressing     Problem: Skin/Tissue Integrity  Goal: Absence of new skin breakdown  Description: 1. Monitor for areas of redness and/or skin breakdown  2. Assess vascular access sites hourly  3. Every 4-6 hours minimum:  Change oxygen saturation probe site  4. Every 4-6 hours:  If on nasal continuous positive airway pressure, respiratory therapy assess nares and determine need for appliance change or resting period.   1/14/2023 0339 by Gi Coulter RN  Outcome: Progressing  1/13/2023 1812 by Carolina Malloy RN  Outcome: Progressing     Problem: Safety - Adult  Goal: Free from fall injury  1/14/2023 0339 by Gi Coulter RN  Outcome: Progressing  1/13/2023 1812 by Carolina Malloy RN  Outcome: Progressing     Problem: ABCDS Injury Assessment  Goal: Absence of physical injury  1/14/2023 0339 by Gi Coulter RN  Outcome: Progressing  1/13/2023 1812 by Carolina Malloy RN  Outcome: Progressing     Problem: Discharge Planning  Goal: Discharge to home or other facility with appropriate resources  1/14/2023 0339 by Gi Coulter RN  Outcome: Progressing  1/13/2023 1812 by Carolina Malloy RN  Outcome: Progressing     Problem: Respiratory - Adult  Goal: Achieves optimal ventilation and oxygenation  1/14/2023 0339 by Gi Coulter RN  Outcome: Progressing  1/13/2023 1812 by Carolina Malloy RN  Outcome: Progressing     Problem: Skin/Tissue Integrity - Adult  Goal: Skin integrity remains intact  1/14/2023 0339 by Ricardo Dodd Narciso Newell RN  Outcome: Progressing  1/13/2023 1812 by Renetta Parra RN  Outcome: Progressing  Flowsheets (Taken 1/13/2023 1200)  Skin Integrity Remains Intact:   Monitor for areas of redness and/or skin breakdown   Assess vascular access sites hourly   Every 4-6 hours: If on nasal continuous positive airway pressure, respiratory therapy assesses nares and determine need for appliance change or resting period   Every 4-6 hours minimum: Change oxygen saturation probe site  Goal: Incisions, wounds, or drain sites healing without S/S of infection  1/14/2023 0339 by Sri Powell RN  Outcome: Progressing  1/13/2023 1812 by Renetta Parra, RN  Outcome: Progressing  Goal: Oral mucous membranes remain intact  1/14/2023 0339 by Sri Powell RN  Outcome: Progressing  1/13/2023 1812 by Renetta Parra RN  Outcome: Progressing

## 2023-01-14 NOTE — PROGRESS NOTES
[facility]  Pre-op Note    1/14/2023    [address] is scheduled for surgery today. All of the pertinent studies have been reviewed and patient is NPO. I have discussed the procedure with the patient and family, and they have been given opportunity to ask questions. The attendant risks, benefits, and possible outcomes have been discussed with them. They understand and have signed informed consent.       Raina Burroughs MD

## 2023-01-14 NOTE — PROGRESS NOTES
[facility] -   Infectious diseases evaluation  admission date 1/12/2023    reason for consultation:   Sternal infection    Impression :   Current:  Surgical wound dehiscence and infection w abscess - lower wound scabbed and infected - no pus seens  CAD, CABG November 2022  I/D lower sternal wound 1/13 - VAC  Left lobar collapse and mucous plug  Large Left loculated effusion - could be post op loculation - possibly collapsing the left lung  Left chest tube 1/13  Left adrenal tumor  Lactic acidosis  Bandemia 16    Other:    Discussion / summary of stay / plan of care     Recommendations   Cx from the sternal wound  Await BC   Resp tx fr the left lung collapse  Agree w vanco and zosyn 1/12 - watch creat      Infection Control Recommendations   [place] Precautions  Contact Isolation     Antimicrobial Stewardship Recommendations   Simplification of therapy  Targeted therapy      History of Present Illness:   Initial history:  Stephanie Ayala is a 78y.o.-year-old female who had a CABG 11/2022, comes in with worsening chest pain, tightness, cough nonproductive nausea vomiting and finally fell twice in the last 2 days and hit her head due to dizziness. She also describes diffuse abdominal pain and some shortness of breath with exertion, no fever or chills    Drainage from the surgical wound, with pain at the surgical site  Comes into the emergency room, started on 1 L of uses oxygen since her bypass, 1 L at home, she is diabetic on insulin, cirrhosis and obese, SHERITA    In the ER, the surgical wound looked infected with some drainage and swelling of the tissues. X-ray showed opacification of the left lung.   CT shows a left adrenal mass, suggested an adrenal imaging    Patient started on antibiotic, infectious consulted for sternal wound dehiscence and infection        Interval changes  1/14/2023   Patient Vitals for the past 8 hrs:   BP Temp Temp src Pulse Resp SpO2 01/14/23 0745 120/61 98.1 °F (36.7 °C) Oral 79 17 96 %   01/14/23 0415 126/67 97.8 °F (36.6 °C) Oral 85 20 94 %     1/14  Sternal debridement with wound vac placement 1/13  Culture no growth to this date   Blood culture no growth   Pain better - ox 3    Summary of relevant labs:  Labs:  Lactic Acid, 4.7 High    WBC16.7 High    Procalcitonin0.53 High    Creatinine0.77      Micro:  BC  Imaging:  CT of the chest  .  2 cm left adrenal mass with indeterminate Hounsfield numbers. Further   workup using adrenal CT protocol or MRI advised when the patient's condition   permits. Air seen in the mediastinum, an abscess around the sternotomy area    2. Large left pleural effusion with almost complete collapse of the left   lobe of the lung. Bronchial wall thickening is noted with material within   the bronchi likely mucous plugging           I have personally reviewed the past medical history, past surgical history, medications, social history, and family history, and I haveupdated the database accordingly. Allergies:   Morphine, Shellfish-derived products, and Tape [adhesive tape]     Review of Systems:     Review of Systems   Constitutional:  Negative for activity change, fatigue and fever. HENT:  Negative for congestion and trouble swallowing. Eyes:  Negative for discharge and itching. Respiratory:  Positive for shortness of breath. Negative for apnea and cough. Cardiovascular:  Positive for chest pain. Negative for palpitations. Gastrointestinal:  Positive for abdominal pain. Negative for nausea. Endocrine: Negative for cold intolerance, heat intolerance and polyuria. Genitourinary:  Negative for dysuria and urgency. Musculoskeletal:  Negative for arthralgias and myalgias. Skin:  Positive for color change and wound. Allergic/Immunologic: Negative for immunocompromised state. Neurological:  Negative for dizziness and numbness. Hematological:  Negative for adenopathy. Psychiatric/Behavioral:  Negative for agitation. The patient is not nervous/anxious. Physical Examination :       Physical Exam  Constitutional:       General: She is not in acute distress. Appearance: Normal appearance. She is not ill-appearing, toxic-appearing or diaphoretic. HENT:      Head: Normocephalic and atraumatic. Nose: Nose normal. No congestion or rhinorrhea. Mouth/Throat:      Pharynx: Oropharynx is clear. No posterior oropharyngeal erythema. Eyes:      Conjunctiva/sclera: Conjunctivae normal.      Pupils: Pupils are equal, round, and reactive to light. Cardiovascular:      Rate and Rhythm: Normal rate. Heart sounds: Normal heart sounds. No murmur heard. Pulmonary:      Effort: No respiratory distress. Breath sounds: Normal breath sounds. No stridor. Abdominal:      Palpations: Abdomen is soft. There is no mass. Hernia: No hernia is present. Genitourinary:     Comments: Urine jackie  Musculoskeletal:         General: No swelling, tenderness or deformity. Cervical back: Neck supple. No rigidity or tenderness. Skin:     General: Skin is dry. Capillary Refill: Capillary refill takes less than 2 seconds. Coloration: Skin is not jaundiced. Findings: No bruising. Neurological:      Mental Status: She is alert and oriented to person, place, and time. Cranial Nerves: No cranial nerve deficit. Sensory: No sensory deficit. Psychiatric:         Mood and Affect: Mood normal.         Thought Content:  Thought content normal.       Past Medical History:     Past Medical History:   Diagnosis Date    Ambulates with cane     or walker    Anxiety     Borderline hypertension     CAD (coronary artery disease)     stents x 2 in 2020    Depression 07/28/2020    GERD (gastroesophageal reflux disease)     Heart attack ([facility] [address] 07/18/2020    Hypertension     Hypothyroidism     Neuropathy     Obesity     morbid    Osteoarthritis     Other cirrhosis of liver ([facility] [address] 07/27/2020    pt denies    Sleep apnea     possible    Type II or unspecified type diabetes mellitus without mention of complication, not stated as uncontrolled     Under care of service provider 11/18/2022    qnx-Wfmhezgm-dhyvgHernan owen-last visit aug 2022    Under care of service provider 11/18/2022    cardiology-ali-last visit nov 2022    Vertebrogenic low back pain 03/29/2022       Past Surgical  History:     Past Surgical History:   Procedure Laterality Date    APPENDECTOMY      CARDIAC CATHETERIZATION      2 stents    CARDIAC CATHETERIZATION  11/01/2022    COLONOSCOPY      CORONARY ANGIOPLASTY WITH STENT PLACEMENT  07/2020    CORONARY ARTERY BYPASS GRAFT N/A 11/28/2022    CABG CORONARY ARTERY BYPASS X3 ON PUMP , ATA, ENDO VEIN HARVEST performed by Geovanny Carlos MD at [id number] (CERVIX STATUS UNKNOWN)      IR CHEST TUBE INSERTION  1/13/2023    IR CHEST TUBE INSERTION 1/13/2023 Anali Lomeli MD [facility]    THROAT SURGERY      POLYPS REMOVED FROM VOCAL CORD    TOTAL KNEE ARTHROPLASTY Right 01/06/2015    TOTAL KNEE ARTHROPLASTY Left 05/26/2015    UPPER GASTROINTESTINAL ENDOSCOPY         Medications:      insulin glargine  55 Units SubCUTAneous Nightly    sodium chloride flush  5-40 mL IntraVENous 2 times per day    enoxaparin  30 mg SubCUTAneous BID    piperacillin-tazobactam  3,375 mg IntraVENous Q8H    amiodarone  200 mg Oral TID    amLODIPine  5 mg Oral Daily    aspirin  81 mg Oral Daily    atorvastatin  40 mg Oral Nightly    budesonide-formoterol  2 puff Inhalation BID    busPIRone  10 mg Oral BID    citalopram  40 mg Oral Daily    [Held by provider] clopidogrel  75 mg Oral Daily    [Held by provider] furosemide  40 mg Oral BID    gabapentin  400 mg Oral TID    isosorbide mononitrate  60 mg Oral Daily    levothyroxine  50 mcg Oral Daily    metoprolol tartrate  12.5 mg Oral BID    pantoprazole  40 mg Oral QAM AC    traZODone  50 mg Oral Nightly    insulin lispro  0-16 Units SubCUTAneous TID WC    insulin lispro  0-4 Units SubCUTAneous Nightly    [Held by provider] vancomycin  750 mg IntraVENous Q12H    vancomycin (VANCOCIN) intermittent dosing (placeholder)   Other RX Placeholder       Social History:     Social History     Socioeconomic History    Marital status:      Spouse name: Siena Centeno    Number of children: 0    Years of education: Not on file    Highest education level: Not on file   Occupational History    Occupation: Retired      Employer: [employer] [place]   Tobacco Use    Smoking status: Never    Smokeless tobacco: Never   Vaping Use    Vaping Use: Never used   Substance and Sexual Activity    Alcohol use: Yes     Comment: once a month    Drug use: No    Sexual activity: Yes     Partners: Male   Other Topics Concern    Not on file   Social History Narrative    Not on file     Social Determinants of Health     Financial Resource Strain: Low Risk     Difficulty of Paying Living Expenses: Not hard at all   Food Insecurity: No Food Insecurity    Worried About Running Out of Food in the Last Year: Never true    Ran Out of Food in the Last Year: Never true   Transportation Needs: Not on file   Physical Activity: Not on file   Stress: Not on file   Social Connections: Not on file   Intimate Partner Violence: Not on file   Housing Stability: Not on file       Family History:     Family History   Problem Relation Age of Onset    Heart Disease Mother     Arthritis Mother     Heart Disease Father     Arthritis Father     Cancer Father         \"oral\"    Diabetes Sister         gestational      Medical Decision Making:   I have independently reviewed/ordered the following labs:    CBC with Differential:   Recent Labs     01/13/23  2338 01/14/23  0656   WBC 12.8* 10.6   HGB 10.6* 10.6*   HCT 36.1* 38.1    169   LYMPHOPCT 7* PENDING   MONOPCT 14* PENDING       BMP:  Recent Labs 01/13/23  2338 01/14/23  0656   * 131*   K 4.6 4.5   CL 96* 93*   CO2 28 26   BUN 23 26*   CREATININE 1.38* 1.21*       Hepatic Function Panel:   Recent Labs     01/12/23  1619   PROT 7.1   LABALBU 2.7*   BILITOT 0.7   ALKPHOS 143*   ALT 24   AST 37*       No results for input(s): RPR in the last 72 hours. No results for input(s): HIV in the last 72 hours. No results for input(s): BC in the last 72 hours. Lab Results   Component Value Date/Time    CREATININE 1.21 01/14/2023 06:56 AM    GLUCOSE 262 01/14/2023 06:56 AM    GLUCOSE 250 03/30/2012 03:15 PM       Detailed results: Thank you for allowing us to participate in the care of this patient. Please call with questions. This note is created with the assistance of a speech recognition program.  While intending to generate adocument that actually reflects the content of the visit, the document can still have some errors including those of syntax and sound a like substitutions which may escape proof reading. It such instances, actual meaningcan be extrapolated by contextual diversion.     [address]  Office: [phone]  Perfect serve / office [phone]

## 2023-01-14 NOTE — PROGRESS NOTES
[facility]  Progress Note    Date:   1/14/2023  Patient name:  Jossue Hernandez  Date of admission:  1/12/2023  3:55 PM  MRN:   [id number]  YOB: 1955    SUBJECTIVE/Last 24 hours update:     Patient seen and examined at the bed side , no new acute events overnight. Patient had thoracentesis done yesterday with left sided chest tube insertion 58 ml total output since yesterday. Plan for sternal debridement today. Patient denies fever, chills, SOB, abdominal or urinary issues  Patient also had elevated Cr which is trending down with Gentle Fluids      HPI:   78 y.o.  female with history of diabetes mellitus, GERD, painful neuropathy, multivessel CAD status post triple bypass CABG 11/28/2022. She presented today due to worsening symptoms of chest pain, which started after surgery but has worsened since last 3 days. She describes the chest pain as central, and is a tightness, nonradiating, better with tramadol, comes and goes, 8 out of 10 in severity and is getting worse. Patient was hospitalized for 1 week after surgery, then had cardiac rehab for 2 weeks which she describes the pain increasing during that time. She also had some cough which was nonproductive and was painful to her chest when coughing. She also experienced severe nausea and lack of appetite, and mentions she has eaten very minimal for the last 3 days. She also fell twice in the last 2 days, but did not hit her head or endorses dizziness. She also mentions she has some diffuse abdominal pain but is not severe. She also has some shortness of breath on exertion since the surgery, no fevers some chills, no night sweats. She is on 1 L of oxygen at home which started after her triple bypass surgery. She is on insulin nightly. Her hypertension is managed with amlodipine and lopressor. She also takes amiodarone post CABG.      The patient has an infected CABG incision site and ED CT chest showed diastasis of median sternotomy with air in the soft tissues, and abscess formation. And chest x-ray taken shows progressive pleural effusion with nearly complete opacification of the left hemithorax. BNP was elevated, Pro-Jame was elevated, and initially she needed 4 L of oxygen and she is normally on 1 L at home. CT also showed a 2cm left adrenal mass. Patient was placed on Zosyn and vancomycin in the ED    REVIEW OF SYSTEMS:   Review of Systems    PAST MEDICAL HISTORY:      has a past medical history of Ambulates with cane, Anxiety, Borderline hypertension, CAD (coronary artery disease), Depression, GERD (gastroesophageal reflux disease), Heart attack ([address] Hypertension, Hypothyroidism, Neuropathy, Obesity, Osteoarthritis, Other cirrhosis of liver ([address] Sleep apnea, Type II or unspecified type diabetes mellitus without mention of complication, not stated as uncontrolled, Under care of service provider, Under care of service provider, and Vertebrogenic low back pain. PAST SURGICAL HISTORY:      has a past surgical history that includes Hysterectomy; Colonoscopy; Upper gastrointestinal endoscopy; Dilation and curettage of uterus; hiatal hernia repair; Throat surgery; Appendectomy; Total knee arthroplasty (Right, 01/06/2015); Total knee arthroplasty (Left, 05/26/2015); Coronary angioplasty with stent (07/2020); Cardiac catheterization; Cardiac catheterization (11/01/2022); Coronary artery bypass graft (N/A, 11/28/2022); and IR CHEST TUBE INSERTION (1/13/2023). SOCIAL HISTORY:      reports that she has never smoked. She has never used smokeless tobacco. She reports current alcohol use. She reports that she does not use drugs. FAMILY HISTORY:     family history includes Arthritis in her father and mother; Cancer in her father; Diabetes in her sister; Heart Disease in her father and mother.     HOME MEDICATIONS:      Prior to Admission medications    Medication Sig Start Date End Date Taking?  Authorizing Provider   insulin lispro, 1 Unit Dial, (HUMALOG KWIKPEN) 100 UNIT/ML SOPN Inject 3 Units into the skin 3 times daily (before meals) 125 - 150 2 units   151 - 200 4 units   201 - 250 6 units   251 - 300 8 units  301 - 350 10 units 351 - 400 12 units 12/29/22   Alexei Jay MD   amLODIPine (NORVASC) 5 MG tablet  12/20/22   Historical Provider, MD   doxycycline monohydrate (MONODOX) 100 MG capsule  12/20/22   Historical Provider, MD Jesus Sondra 100-25 MCG/ACT AEPB  12/20/22   Historical Provider, MD   isosorbide mononitrate (IMDUR) 60 MG extended release tablet  12/20/22   Historical Provider, MD   potassium chloride (KLOR-CON) 10 MEQ extended release tablet  12/20/22   Historical Provider, MD   traMADol Beau Joytle) 50 MG tablet  12/20/22   Historical Provider, MD   traZODone (DESYREL) 50 MG tablet  12/20/22   Historical Provider, MD   aspirin 81 MG EC tablet Take 1 tablet by mouth daily 12/6/22   Wallace Rodriguez MD   amiodarone (CORDARONE) 200 MG tablet Take 1 tablet by mouth 3 times daily 12/5/22   Wallace Rodriguez MD   metoprolol tartrate (LOPRESSOR) 25 MG tablet Take 0.5 tablets by mouth 2 times daily 12/5/22   Wallace Rodriguez MD   clopidogrel (PLAVIX) 75 MG tablet Take 1 tablet by mouth daily 12/6/22   Wallace Rodriguez MD   furosemide (LASIX) 40 MG tablet Take 1 tablet by mouth 2 times daily 12/5/22   Wallace Rodriguez MD   potassium chloride (KLOR-CON M) 10 MEQ extended release tablet Take 2 tablets by mouth daily 12/5/22   Wallace Rodriguez MD   atorvastatin (LIPITOR) 40 MG tablet Take 1 tablet by mouth nightly 11/16/22   Victorino Starks MD   empagliflozin (JARDIANCE) 10 MG tablet TAKE 1 TABLET BY MOUTH EVERY DAY 10/22/22   Alexei Jay MD   levothyroxine (SYNTHROID) 50 MCG tablet TAKE 1 TABLET BY MOUTH EVERY DAY 10/22/22   Alexei Jay MD   citalopram (CELEXA) 40 MG tablet TAKE 1 TABLET BY MOUTH ONCE DAILY *EMERGENCY REFILL* 9/29/22   Alexei Jay MD   glimepiride (AMARYL) 4 MG tablet TAKE 1 TABLET BY MOUTH TWICE DAILY *EMERGENCY REFILL* 9/29/22   Bryan Enciso MD   omeprazole (PRILOSEC) 20 MG delayed release capsule TAKE 1 CAPSULE BY MOUTH ONCE DAILY 9/22/22   Paulette Turner DO   gabapentin (NEURONTIN) 400 MG capsule TAKE 1 CAPSULE BY MOUTH THREE TIMES DAILY 4/22/22 11/18/22  Anisha Paige MD   sucralfate (CARAFATE) 1 GM tablet TAKE 1 TABLET BY MOUTH EVERY DAY  Patient not taking: Reported on 1/12/2023 4/20/22   Anisha Paige MD   busPIRone (BUSPAR) 10 MG tablet TAKE ONE (1) TABLET BY MOUTH TWICE DAILY 3/11/22   Anisha Paige MD   insulin glargine (BASAGLAR KWIKPEN) 100 UNIT/ML injection pen Inject 65 units subcutaneously once daily. Patient taking differently: Inject 20 Units into the skin Inject 65 units subcutaneously once daily. Changed at [facility] 3/1/22   Anisha Paige MD   miconazole (MICOTIN) 2 % cream APPLY TO AFFECTED AREA TWICE DAILY  Patient not taking: Reported on 1/12/2023 11/12/21   Anisha Paige MD   lidocaine (XYLOCAINE) 2 % jelly Apply topically with dressing changes every 3 days  Patient not taking: Reported on 1/12/2023 8/5/21   Anisha Paige MD   blood glucose monitor strips Test before meals and at bedtime. DX: DM, on insulin 10/8/20   Anisha Paige MD   miconazole (ZEASORB-AF) 2 % powder Apply topically 2 times daily. Patient not taking: Reported on 1/12/2023 10/1/20   Anisha Paige MD   blood glucose monitor kit and supplies Dispense sufficient amount for indicated testing frequency plus additional to accommodate PRN testing needs. Dispense all needed supplies to include: monitor, strips, lancing device, lancets, control solutions, alcohol swabs. 8/17/20   Juancarlos Hickey MD   magnesium hydroxide (MILK OF MAGNESIA) 400 MG/5ML suspension Take 30 mLs by mouth daily as needed for Constipation 7/29/20   Juancarlos Hickey MD   Handicap Placard MISC Is a permanent condition.   DX: M19.90 5/14/19   Andres Alonzo Christina Fong MD   Insulin Pen Needle (B-D UF III MINI PEN NEEDLES) 31G X 5 MM MISC USE 1 PEN NEEDLE DAILY 3/8/18   Jackie De Paz MD   [employer] Lancets Thin MISC Test before meals and at bedtime. DX: DM, on insulin 4/22/14   Garfield Grover MD   Alcohol Swabs (ALCOHOL PREP PADS) by Other route 2 times daily      Historical Provider, MD       ALLERGIES:     Morphine, Shellfish-derived products, and Tape [adhesive tape]      OBJECTIVE:       Vitals:    01/13/23 1855 01/13/23 2030 01/13/23 2326 01/14/23 0415   BP:  130/73 107/81 126/67   Pulse: 80 80 82 85   Resp: 17 12 17 20   Temp:  97.9 °F (36.6 °C) 97.4 °F (36.3 °C) 97.8 °F (36.6 °C)   TempSrc:  Oral Oral Oral   SpO2: 95% 95% 94% 94%   Weight:       Height:             Intake/Output Summary (Last 24 hours) at 1/14/2023 1134  Last data filed at 1/14/2023 9533  Gross per 24 hour   Intake --   Output 58 ml   Net -58 ml       PHYSICAL EXAM:  Physical Exam    DIAGNOSTICS:     Laboratory Testing:    Recent Results (from the past 24 hour(s))   POC Glucose Fingerstick    Collection Time: 01/13/23  7:40 AM   Result Value Ref Range    POC Glucose 224 (H) 65 - 105 mg/dL   Culture, Body Fluid    Collection Time: 01/13/23 10:48 AM    Specimen: Aspirate; Body Fluid   Result Value Ref Range    Specimen Description . ASPIRATE CHEST TUBE     Direct Exam          Culture       DUE TO THE SPECIMEN TYPE, THE ORDER WAS CANCELED AND REORDERED. PLEASE REFER TO: AEROBIC ANAERONIC CULTURE   Culture, Anaerobic and Aerobic    Collection Time: 01/13/23 10:50 AM    Specimen: Aspirate   Result Value Ref Range    Specimen Description . ASPIRATE LEFT CHEST TUBE     Direct Exam NO NEUTROPHILS SEEN     Direct Exam NO BACTERIA SEEN     Culture PENDING    POC Glucose Fingerstick    Collection Time: 01/13/23 12:33 PM   Result Value Ref Range    POC Glucose 283 (H) 65 - 105 mg/dL   TYPE AND SCREEN    Collection Time: 01/13/23  3:55 PM   Result Value Ref Range    Expiration Date 01/16/2023,2359     Arm Band Number [id number]     ABO/Rh O NEGATIVE     Antibody Screen NEGATIVE     Unit Number [id number]     Product Code Leukocyte Reduced Red Cell     Unit Divison 00     Dispense Status ALLOCATED     Transfusion Status OK TO TRANSFUSE     Crossmatch Result COMPATIBLE     Unit Number [id number]     Product Code Leukocyte Reduced Red Cell     Unit Divison 00     Dispense Status ALLOCATED     Transfusion Status OK TO TRANSFUSE     Crossmatch Result COMPATIBLE    POC Glucose Fingerstick    Collection Time: 01/13/23  4:46 PM   Result Value Ref Range    POC Glucose 327 (H) 65 - 105 mg/dL   POC Glucose Fingerstick    Collection Time: 01/13/23  8:28 PM   Result Value Ref Range    POC Glucose 290 (H) 65 - 105 mg/dL   Lactic Acid    Collection Time: 01/13/23 11:38 PM   Result Value Ref Range    Lactic Acid, Whole Blood 1.7 0.7 - 2.1 mmol/L   CBC with Auto Differential    Collection Time: 01/13/23 11:38 PM   Result Value Ref Range    WBC 12.8 (H) 3.5 - 11.3 k/uL    RBC 3.82 (L) 3.95 - 5.11 m/uL    Hemoglobin 10.6 (L) 11.9 - 15.1 g/dL    Hematocrit 36.1 (L) 36.3 - 47.1 %    MCV 94.5 82.6 - 102.9 fL    MCH 27.7 25.2 - 33.5 pg    MCHC 29.4 28.4 - 34.8 g/dL    RDW 13.8 11.8 - 14.4 %    Platelets 851 398 - 024 k/uL    MPV 10.7 8.1 - 13.5 fL    NRBC Automated 0.0 0.0 per 100 WBC    Immature Granulocytes 1 (H) 0 %    Seg Neutrophils 75 (H) 36 - 65 %    Lymphocytes 7 (L) 24 - 43 %    Monocytes 14 (H) 3 - 12 %    Eosinophils % 3 1 - 4 %    Basophils 0 0 - 2 %    Absolute Immature Granulocyte 0.13 0.00 - 0.30 k/uL    Segs Absolute 9.60 (H) 1.50 - 8.10 k/uL    Absolute Lymph # 0.90 (L) 1.10 - 3.70 k/uL    Absolute Mono # 1.79 (H) 0.10 - 1.20 k/uL    Absolute Eos # 0.38 0.00 - 0.44 k/uL    Basophils Absolute 0.00 0.00 - 0.20 k/uL    Morphology Normal    Basic Metabolic Panel w/ Reflex to MG    Collection Time: 01/13/23 11:38 PM   Result Value Ref Range    Glucose 270 (H) 70 - 99 mg/dL    BUN 23 8 - 23 mg/dL    Creatinine 1.38 (H) 0.50 - 0.90 mg/dL    Est, Glom Filt Rate 42 (L) >60 mL/min/1.73m2    Calcium 8.4 (L) 8.6 - 10.4 mg/dL    Sodium 134 (L) 135 - 144 mmol/L    Potassium 4.6 3.7 - 5.3 mmol/L    Chloride 96 (L) 98 - 107 mmol/L    CO2 28 20 - 31 mmol/L    Anion Gap 10 9 - 17 mmol/L   Lactic Acid    Collection Time: 01/14/23  6:56 AM   Result Value Ref Range    Lactic Acid, Whole Blood 1.5 0.7 - 2.1 mmol/L         Imaging/Diagonstics:      CT CHEST W CONTRAST    Result Date: 1/12/2023  EXAMINATION: CT OF THE CHEST WITH CONTRAST 1/12/2023 6:14 pm TECHNIQUE: CT of the chest was performed with the administration of intravenous contrast. Multiplanar reformatted images are provided for review. Automated exposure control, iterative reconstruction, and/or weight based adjustment of the mA/kV was utilized to reduce the radiation dose to as low as reasonably achievable. COMPARISON: None. HISTORY: ORDERING SYSTEM PROVIDED HISTORY: recent CABG, incisional infection TECHNOLOGIST PROVIDED HISTORY: recent CABG, incisional infection Decision Support Exception - unselect if not a suspected or confirmed emergency medical condition->Emergency Medical Condition (MA) Reason for Exam: recent CABG, incisional infection FINDINGS: Mediastinum: Air in the anterior mediastinum is noted extending through the sternotomy to the soft tissues ventral to the sternum. Diastasis of the sternotomy is noted. Along the ventral aspect of the sternotomy fluid and air is noted suggesting abscess collection 2.4 x 1.8 cm. No acute aortic abnormality. Mild to moderate calcified plaque in the aorta. Moderate cardiomegaly. Trace pericardial fluid without significant pericardial accumulation. Shotty epicardial lymph nodes are present without cara adenopathy. Electrodes extend from the skin surface through the inferior sternal anterior chest terminating in the epicardial area. No cara mediastinal adenopathy. Shotty left suprahilar lymph node. Lungs/pleura: Right lung is clear. No right effusion. Large left pleural effusion is noted with almost complete collapse of the left lung with mild aeration in the left upper lobe medially. Mucus is noted in the bifurcation of the left mainstem bronchus suggesting a mucous plug. Upper Abdomen: Hepatic steatosis is noted. Small gallstones in the gallbladder. Spleen is top-normal.  Atherosclerotic calcification of the aorta and major branches is demonstrated. Left adrenal mass of 2 cm is noted with indeterminate Hounsfield numbers. Advise follow-up with adrenal CT scan or MRI. Soft Tissues/Bones: Diastasis of the patient's median sternotomy is noted. No significant sternal wire bridging below the most cranial 2 of the sternal wires. 1.  Diastasis of median sternotomy with air in the soft tissues, and appearance of abscess formation/dehiscence dural to the sternotomy. Air is present in the mediastinum. 2.  Large left pleural effusion with almost complete collapse of the left lobe of the lung. Bronchial wall thickening is noted with material within the bronchi likely mucous plugging. 3.  Hepatic steatosis. 4.  Small gallstones in the gallbladder. 5.  2 cm left adrenal mass with indeterminate Hounsfield numbers. Further workup using adrenal CT protocol or MRI advised when the patient's condition permits. RECOMMENDATIONS: Advise adrenal protocol CT or MRI to evaluate a left adrenal mass. XR CHEST PORTABLE    Result Date: 1/13/2023  EXAMINATION: ONE XRAY VIEW OF THE CHEST 1/13/2023 6:19 pm COMPARISON: 01/12/2023 HISTORY: ORDERING SYSTEM PROVIDED HISTORY: interval f/u for pleural effusion s/p chest tube placement TECHNOLOGIST PROVIDED HISTORY: interval f/u for pleural effusion s/p chest tube placement FINDINGS: Prior sternotomy. The cardiomediastinal silhouette is partially obscured. There is interval placement of a pigtail catheter in the left pleural space.  There is persistent opacification of the left hemithorax with some suspected rightward mediastinal shift. There is some lucency along the lower chest, possibly suggesting hydropneumothorax. Right lung remains predominantly clear. No acute osseous abnormality. Interval left chest tube placement with persistent opacification of the left chest.  There is some lucency in the left lower chest, which could represent hydropneumothorax. XR CHEST PORTABLE    Result Date: 1/12/2023  EXAMINATION: ONE XRAY VIEW OF THE CHEST 1/12/2023 1:24 pm COMPARISON: 12/05/2022 HISTORY: ORDERING SYSTEM PROVIDED HISTORY: chest pain, recent CABG TECHNOLOGIST PROVIDED HISTORY: chest pain, recent CABG FINDINGS: Cardiac size is enlarged. No acute infiltrates are seen . The visualized pulmonary vascularity is stable. No pneumothorax. Progressive left pleural effusion with nearly complete opacification of the left hemithorax. Crystal Antoinette Postsurgical changes overlying the mediastinum. Progressive left pleural effusion with nearly complete opacification of the left hemithorax. IR CHEST TUBE INSERTION    Result Date: 1/13/2023  PROCEDURE: Ultrasound and fluoroscopic GUIDED left pleural drain PLACEMENT 1/13/2023 HISTORY: ORDERING SYSTEM PROVIDED HISTORY: left pleural effusion TECHNOLOGIST PROVIDED HISTORY: left pleural effusion Which side should the procedure be performed? ->Left Shortness of breath SEDATION: 100 micro g IV fentanyl for pain Fluoro: DAP 1738 cGy cm squared TECHNIQUE: Informed consent was obtained after a detailed explanation of the procedure including risks, benefits, and alternatives. Universal protocol was followed. A suitable skin site was prepped and draped in sterile fashion following ultrasound localization. An 18 gauge needle was advanced into the left pleural space. Sonogram image confirmed satisfactory needle tip position and safe tract access. Non turbid yellow fluid was aspirated. A sample was sent for laboratory analysis.   A 0.035 guidewire was used to place a 10 Kittitian chest tube after the fascial tract was dilated under fluoroscopic guidance. The catheter was sutured to the skin and the patient tolerated the procedure well. The catheter was attached to atrium drainage. FINDINGS: Preprocedural sonogram demonstrated marked loculations of the left pleural collection. Post procedure images demonstrate the left pleural drain in good position     Successful percutaneous placement of a 10 Amharic left pleural drain. There is marked loculation of the left pleural collection indicating complex pleural fluid. Small bore drainage catheter may not successfully drained the entire pleural collection given its complex nature.        Current Facility-Administered Medications   Medication Dose Route Frequency Provider Last Rate Last Admin    0.9 % sodium chloride infusion   IntraVENous Continuous Hal Lees MD 75 mL/hr at 01/14/23 0118 New Bag at 01/14/23 0118    oxyCODONE-acetaminophen (PERCOCET) 5-325 MG per tablet 1 tablet  1 tablet Oral Q6H PRN Jacob Davidson MD   1 tablet at 01/13/23 1301    ketorolac (TORADOL) injection 15 mg  15 mg IntraVENous Q6H PRN Chrissy Akins MD   15 mg at 01/13/23 1504    insulin glargine (LANTUS) injection vial 55 Units  55 Units SubCUTAneous Nightly Hal Lees MD   55 Units at 01/13/23 2037    sodium chloride flush 0.9 % injection 5-40 mL  5-40 mL IntraVENous 2 times per day Hal Lees MD   10 mL at 01/13/23 2033    sodium chloride flush 0.9 % injection 5-40 mL  5-40 mL IntraVENous PRN Hal Lees MD        0.9 % sodium chloride infusion   IntraVENous PRN Hal Lees MD 12.5 mL/hr at 01/13/23 1127 25 mL at 01/13/23 1127    enoxaparin Sodium (LOVENOX) injection 30 mg  30 mg SubCUTAneous BID Hal Lees MD   30 mg at 01/13/23 2032    ondansetron (ZOFRAN-ODT) disintegrating tablet 4 mg  4 mg Oral Q8H PRN Hal Lees MD   4 mg at 01/13/23 0737    Or    ondansetron (ZOFRAN) injection 4 mg  4 mg IntraVENous Q6H PRN Hal Lees MD        polyethylene glycol (GLYCOLAX) packet 17 g  17 g Oral Daily PRN Jez Ocampo MD        acetaminophen (TYLENOL) tablet 650 mg  650 mg Oral Q6H PRN Jez Ocampo MD   650 mg at 01/13/23 0855    Or    acetaminophen (TYLENOL) suppository 650 mg  650 mg Rectal Q6H PRN Jez Ocampo MD        potassium chloride (KLOR-CON M) extended release tablet 40 mEq  40 mEq Oral PRN Jez Ocampo MD        Or    potassium bicarb-citric acid (EFFER-K) effervescent tablet 40 mEq  40 mEq Oral PRN Jez Ocampo MD        Or    potassium chloride 10 mEq/100 mL IVPB (Peripheral Line)  10 mEq IntraVENous PRN Jez Ocampo MD        piperacillin-tazobactam (ZOSYN) 3,375 mg in dextrose 5 % 50 mL IVPB extended infusion (mini-bag)  3,375 mg IntraVENous Q8H Jez Ocampo MD   Stopped at 01/14/23 0456    amiodarone (CORDARONE) tablet 200 mg  200 mg Oral TID Jez Ocampo MD   200 mg at 01/13/23 2030    amLODIPine (NORVASC) tablet 5 mg  5 mg Oral Daily Jez Ocampo MD   5 mg at 01/13/23 1138    aspirin EC tablet 81 mg  81 mg Oral Daily Jez Ocampo MD        atorvastatin (LIPITOR) tablet 40 mg  40 mg Oral Nightly Jez Ocampo MD   40 mg at 01/13/23 2030    budesonide-formoterol (SYMBICORT) 80-4.5 MCG/ACT inhaler 2 puff  2 puff Inhalation BID Jez Ocampo MD   2 puff at 01/13/23 2036    busPIRone (BUSPAR) tablet 10 mg  10 mg Oral BID Jez Oacmpo MD   10 mg at 01/13/23 2030    citalopram (CELEXA) tablet 40 mg  40 mg Oral Daily Jez Ocampo MD   40 mg at 01/13/23 1138    [Held by provider] clopidogrel (PLAVIX) tablet 75 mg  75 mg Oral Daily Jez Ocampo MD        [Held by provider] furosemide (LASIX) tablet 40 mg  40 mg Oral BID Jez Ocampo MD   40 mg at 01/13/23 2030    gabapentin (NEURONTIN) capsule 400 mg  400 mg Oral TID Jez Ocampo MD   400 mg at 01/13/23 2030    isosorbide mononitrate (IMDUR) extended release tablet 60 mg  60 mg Oral Daily Jez Ocampo MD   60 mg at 01/13/23 1137    levothyroxine (SYNTHROID) tablet 50 mcg  50 mcg Oral Daily Jeff Alas MD   50 mcg at 01/13/23 1137    metoprolol tartrate (LOPRESSOR) tablet 12.5 mg  12.5 mg Oral BID Jeff Alas MD   12.5 mg at 01/13/23 2030    pantoprazole (PROTONIX) tablet 40 mg  40 mg Oral QAM AC Jeff Alas MD   40 mg at 01/13/23 1138    traZODone (DESYREL) tablet 50 mg  50 mg Oral Nightly Jeff Alas MD   50 mg at 01/13/23 2030    glucose chewable tablet 16 g  4 tablet Oral PRN Jfef Alas MD        dextrose bolus 10% 125 mL  125 mL IntraVENous PRN Jeff Alas MD        Or    dextrose bolus 10% 250 mL  250 mL IntraVENous PRN Jeff Alas MD        glucagon (rDNA) injection 1 mg  1 mg SubCUTAneous PRN Jeff Alas MD        dextrose 10 % infusion   IntraVENous Continuous PRN Jeff Alas MD        insulin lispro (HUMALOG) injection vial 0-16 Units  0-16 Units SubCUTAneous TID WC Jeff Alas MD   12 Units at 01/13/23 1709    insulin lispro (HUMALOG) injection vial 0-4 Units  0-4 Units SubCUTAneous Nightly Jeff Alas MD        [Held by provider] vancomycin (VANCOCIN) 750 mg in sodium chloride 0.9 % 250 mL IVPB  750 mg IntraVENous Q12H Jeff Alas MD   Stopped at 01/13/23 1856    vancomycin (VANCOCIN) intermittent dosing (placeholder)   Other RX Placeholder Jeff Alas MD           ASSESSMENT:     Principal Problem:    Incisional infection  Active Problems:    Pleural effusion on left    Lactic acid acidosis    Bandemia    Sternal wound dehiscence  Resolved Problems:    * No resolved hospital problems.  *      PLAN:       Post CABG incisional abscess   -CT scan of the chest showed diastasis of mediastinal mass with air in the soft tissues as well as an abscess  -Chest x-ray showed left-sided pleural effusion with almost complete collapse of the left lobe of lung  -Troponin 37 >> 36  -proBNP 1814 - higher than baseline  -Procalcitonin 0.53  -WBC trended down from 16.7 to 10.6  -Continue Zosyn and pharmacy dose Vanc - held due to AMRIK  -Pain control with Oxycodone 5 mg q6h PRN  -Amiodarone 200 mg tid started post CABG - will resume   -Blood cultures negative 1 day  -Pulmonology on board  -CTS on board   -Plan for sternal debridement with washout and wound VAC today  -ID on board     AMRIK   -Creatinine elevated 1.38, trending down  -Repeat BMP in am  -NS @ 75 cc/h  -Avoid nephrotoxic agents  -Lasix - held    L sided pleural effusion  -Chest x-ray showed left-sided pleural effusion with almost complete collapse of the left lobe of lung  -chest tube placement on 1/13/22  -Pulmonology on board     T2DM  -Lantus 35 units lantus nightly with HDSS  -POC glucose checks  -A1c 7.5 11/18/22  -Hypoglycemia protocol  -Neuropathy - resumed Gabapentin 400 mg tid      HTN  -On Lopressor 12.5 mg bid and Norvasc 5 mg qd     Anxiety  -On Buspar, Celexa      Hypothyroidism  -Synthroid 50 mcg qd      Multivessel CAD status post CABG 11/28/22   -On Aspirin 81 mg, Plavix 75 mg qd, Lasix 40 mg bid, Imdur 60 mg qd.  - ASA and Plavix Holding  -Last echo showed EF 54%- 11/14/22     DVT prophylaxis: Lovenox 30 mg bid - Holding   GI prophylaxis: Protonix         Above plan discussed with the patient and family in room, who agreed to the above plan   Plan will be discussed with the attending, Dr. Ephriam Kayser, MD  Family Medicine Resident  1/14/2023 7:18 AM

## 2023-01-14 NOTE — PROGRESS NOTES
Pt noted to have orders for specimen collection on 1/13/23 on fluid. Writer unable to determine type of fluid to send. Call placed to lab and micro does not have any fluid from today's surgery.  Message sent to primary team who ordered the fluid to clarify

## 2023-01-14 NOTE — ANESTHESIA PRE PROCEDURE
Department of Anesthesiology  Preprocedure Note       Name:  Deidre Gomez   Age:  78 y.o.  :  1955                                          MRN:  [id number]         Date:  2023      Surgeon: Carola Reynoso):  Bishop Lia MD    Procedure: Procedure(s):  STERNAL WOUND WASHOUT, DEBRIDEMENT, WOUND VAC PLACEMENT    Medications prior to admission:   Prior to Admission medications    Medication Sig Start Date End Date Taking?  Authorizing Provider   insulin lispro, 1 Unit Dial, (HUMALOG KWIKPEN) 100 UNIT/ML SOPN Inject 3 Units into the skin 3 times daily (before meals) 125 - 150 2 units   151 - 200 4 units   201 - 250 6 units   251 - 300 8 units  301 - 350 10 units 351 - 400 12 units 22   Sandee Alvarez MD   amLODIPine (NORVASC) 5 MG tablet  22   Historical Provider, MD   doxycycline monohydrate (MONODOX) 100 MG capsule  22   Historical Provider, MD   [place] 100-25 MCG/ACT AEPB  22   Historical Provider, MD   isosorbide mononitrate (IMDUR) 60 MG extended release tablet  22   Historical Provider, MD   potassium chloride (KLOR-CON) 10 MEQ extended release tablet  22   Historical Provider, MD   traMADol Jesus Flatter) 50 MG tablet  22   Historical Provider, MD   traZODone (DESYREL) 50 MG tablet  22   Historical Provider, MD   aspirin 81 MG EC tablet Take 1 tablet by mouth daily 22   Bishop Lia MD   amiodarone (CORDARONE) 200 MG tablet Take 1 tablet by mouth 3 times daily 22   Bishop Lia MD   metoprolol tartrate (LOPRESSOR) 25 MG tablet Take 0.5 tablets by mouth 2 times daily 22   Bishop Lia MD   clopidogrel (PLAVIX) 75 MG tablet Take 1 tablet by mouth daily 22   Bishop Lia MD   furosemide (LASIX) 40 MG tablet Take 1 tablet by mouth 2 times daily 22   Bishop Lia MD   potassium chloride (KLOR-CON M) 10 MEQ extended release tablet Take 2 tablets by mouth daily 22   Bishop Lia MD   atorvastatin (LIPITOR) 40 MG tablet Take 1 tablet by mouth nightly 11/16/22   Nicole Avery MD   empagliflozin (JARDIANCE) 10 MG tablet TAKE 1 TABLET BY MOUTH EVERY DAY 10/22/22   Juan Manuel Piper MD   levothyroxine (SYNTHROID) 50 MCG tablet TAKE 1 TABLET BY MOUTH EVERY DAY 10/22/22   Juan Manuel Piper MD   citalopram (CELEXA) 40 MG tablet TAKE 1 TABLET BY MOUTH ONCE DAILY *EMERGENCY REFILL* 9/29/22   Juan Manuel Piper MD   glimepiride (AMARYL) 4 MG tablet TAKE 1 TABLET BY MOUTH TWICE DAILY *EMERGENCY REFILL* 9/29/22   Juan Manuel Piper MD   omeprazole (PRILOSEC) 20 MG delayed release capsule TAKE 1 CAPSULE BY MOUTH ONCE DAILY 9/22/22   Paulette Turner DO   gabapentin (NEURONTIN) 400 MG capsule TAKE 1 CAPSULE BY MOUTH THREE TIMES DAILY 4/22/22 11/18/22  Tacos Brady MD   sucralfate (CARAFATE) 1 GM tablet TAKE 1 TABLET BY MOUTH EVERY DAY  Patient not taking: Reported on 1/12/2023 4/20/22   Tacos Brady MD   busPIRone (BUSPAR) 10 MG tablet TAKE ONE (1) TABLET BY MOUTH TWICE DAILY 3/11/22   Tacos Brady MD   insulin glargine (BASAGLAR KWIKPEN) 100 UNIT/ML injection pen Inject 65 units subcutaneously once daily. Patient taking differently: Inject 20 Units into the skin Inject 65 units subcutaneously once daily. Changed at [facility] 3/1/22   Tacos Brady MD   miconazole (MICOTIN) 2 % cream APPLY TO AFFECTED AREA TWICE DAILY  Patient not taking: Reported on 1/12/2023 11/12/21   Tacos Brady MD   lidocaine (XYLOCAINE) 2 % jelly Apply topically with dressing changes every 3 days  Patient not taking: Reported on 1/12/2023 8/5/21   Tacos Brady MD   blood glucose monitor strips Test before meals and at bedtime. DX: DM, on insulin 10/8/20   Tacos Brady MD   miconazole (ZEASORB-AF) 2 % powder Apply topically 2 times daily.   Patient not taking: Reported on 1/12/2023 10/1/20   Tacos Brady MD   blood glucose monitor kit and supplies Dispense sufficient amount for indicated testing frequency plus additional to accommodate PRN testing needs. Dispense all needed supplies to include: monitor, strips, lancing device, lancets, control solutions, alcohol swabs. 8/17/20   Yemi Vaca MD   magnesium hydroxide (MILK OF MAGNESIA) 400 MG/5ML suspension Take 30 mLs by mouth daily as needed for Constipation 7/29/20   Yemi Vaca MD   Handicap Placard MISC Is a permanent condition. DX: M19.90 5/14/19   Mihir Mota MD   Insulin Pen Needle (B-D UF III MINI PEN NEEDLES) 31G X 5 MM MISC USE 1 PEN NEEDLE DAILY 3/8/18   Mihir Mota MD   [employer] Lancets Thin MISC Test before meals and at bedtime. DX: DM, on insulin 4/22/14   Uriah Negro MD   Alcohol Swabs (ALCOHOL PREP PADS) by Other route 2 times daily      Historical Provider, MD       Current medications:    No current facility-administered medications for this visit. No current outpatient medications on file.      Facility-Administered Medications Ordered in Other Visits   Medication Dose Route Frequency Provider Last Rate Last Admin    0.9 % sodium chloride infusion   IntraVENous Continuous Francis Urbano MD 75 mL/hr at 01/14/23 0118 New Bag at 01/14/23 0118    insulin regular (HUMULIN R;NOVOLIN R) injection 5 Units  5 Units IntraVENous NOW Alejandro Cervantes MD        oxyCODONE-acetaminophen (PERCOCET) 5-325 MG per tablet 1 tablet  1 tablet Oral Q6H PRN Lee Swartz MD   1 tablet at 01/13/23 1301    ketorolac (TORADOL) injection 15 mg  15 mg IntraVENous Q6H PRN Chrissy Chapa MD   15 mg at 01/13/23 1504    insulin glargine (LANTUS) injection vial 55 Units  55 Units SubCUTAneous Nightly Francis Urbano MD   55 Units at 01/13/23 2037    sodium chloride flush 0.9 % injection 5-40 mL  5-40 mL IntraVENous 2 times per day Francis Urbano MD   10 mL at 01/13/23 2033    sodium chloride flush 0.9 % injection 5-40 mL  5-40 mL IntraVENous PRN Francis Urbano MD        0.9 % sodium chloride infusion   IntraVENous PRN Francis Urbano MD 12.5 mL/hr at 01/13/23 1127 25 mL at 01/13/23 1127    enoxaparin Sodium (LOVENOX) injection 30 mg  30 mg SubCUTAneous BID Domenica Hernandez MD   30 mg at 01/13/23 2032    ondansetron (ZOFRAN-ODT) disintegrating tablet 4 mg  4 mg Oral Q8H PRN Domenica Hernandez MD   4 mg at 01/13/23 0737    Or    ondansetron (ZOFRAN) injection 4 mg  4 mg IntraVENous Q6H PRN Domenica Hernandez MD        polyethylene glycol (GLYCOLAX) packet 17 g  17 g Oral Daily PRN Domenica Hernandez MD        acetaminophen (TYLENOL) tablet 650 mg  650 mg Oral Q6H PRN Domenica Hernandez MD   650 mg at 01/13/23 0855    Or    acetaminophen (TYLENOL) suppository 650 mg  650 mg Rectal Q6H PRN Domenica Hernandez MD        potassium chloride (KLOR-CON M) extended release tablet 40 mEq  40 mEq Oral PRN Domenica Hernandez MD        Or    potassium bicarb-citric acid (EFFER-K) effervescent tablet 40 mEq  40 mEq Oral PRN Domenica Hernandez MD        Or    potassium chloride 10 mEq/100 mL IVPB (Peripheral Line)  10 mEq IntraVENous PRN Domenica Hernandez MD        piperacillin-tazobactam (ZOSYN) 3,375 mg in dextrose 5 % 50 mL IVPB extended infusion (mini-bag)  3,375 mg IntraVENous Q8H Domenica Hernandez MD   Stopped at 01/14/23 0456    amiodarone (CORDARONE) tablet 200 mg  200 mg Oral TID Domenica Hernandez MD   200 mg at 01/13/23 2030    amLODIPine (NORVASC) tablet 5 mg  5 mg Oral Daily Domenica Hernandez MD   5 mg at 01/13/23 1138    aspirin EC tablet 81 mg  81 mg Oral Daily Domenica Hernandez MD        atorvastatin (LIPITOR) tablet 40 mg  40 mg Oral Nightly Domenica Hernandez MD   40 mg at 01/13/23 2030    budesonide-formoterol (SYMBICORT) 80-4.5 MCG/ACT inhaler 2 puff  2 puff Inhalation BID Domenica Hernandez MD   2 puff at 01/13/23 2036    busPIRone (BUSPAR) tablet 10 mg  10 mg Oral BID Domenica Hernandez MD   10 mg at 01/13/23 2030    citalopram (CELEXA) tablet 40 mg  40 mg Oral Daily Domenica Hernandez MD   40 mg at 01/13/23 1138    [Held by provider] clopidogrel (PLAVIX) tablet 75 mg  75 mg Oral Daily Domenica Hernandez MD   • [Held by provider] furosemide (LASIX) tablet 40 mg  40 mg Oral BID Shirin Ferrera MD   40 mg at 01/13/23 2030   • gabapentin (NEURONTIN) capsule 400 mg  400 mg Oral TID Shirin Ferrera MD   400 mg at 01/13/23 2030   • isosorbide mononitrate (IMDUR) extended release tablet 60 mg  60 mg Oral Daily Shirin Ferrera MD   60 mg at 01/13/23 1137   • levothyroxine (SYNTHROID) tablet 50 mcg  50 mcg Oral Daily Shirin Ferrera MD   50 mcg at 01/13/23 1137   • metoprolol tartrate (LOPRESSOR) tablet 12.5 mg  12.5 mg Oral BID Shirin Ferrera MD   12.5 mg at 01/13/23 2030   • pantoprazole (PROTONIX) tablet 40 mg  40 mg Oral QAM AC Shirin Ferrera MD   40 mg at 01/13/23 1138   • traZODone (DESYREL) tablet 50 mg  50 mg Oral Nightly Shirin Ferrera MD   50 mg at 01/13/23 2030   • glucose chewable tablet 16 g  4 tablet Oral PRN Shirin Ferrera MD       • dextrose bolus 10% 125 mL  125 mL IntraVENous PRN Shirin Ferrera MD        Or   • dextrose bolus 10% 250 mL  250 mL IntraVENous PRN Shirin Ferrera MD       • glucagon (rDNA) injection 1 mg  1 mg SubCUTAneous PRN Shirin Ferrera MD       • dextrose 10 % infusion   IntraVENous Continuous PRN Shirin Ferrera MD       • insulin lispro (HUMALOG) injection vial 0-16 Units  0-16 Units SubCUTAneous TID WC Shirin Ferrera MD   12 Units at 01/13/23 1709   • insulin lispro (HUMALOG) injection vial 0-4 Units  0-4 Units SubCUTAneous Nightly Shirin Ferrera MD       • [Held by provider] vancomycin (VANCOCIN) 750 mg in sodium chloride 0.9 % 250 mL IVPB  750 mg IntraVENous Q12H Shirin Ferrera MD   Stopped at 01/13/23 1856   • vancomycin (VANCOCIN) intermittent dosing (placeholder)   Other RX Placeholder Shirin Ferrera MD           Allergies:    Allergies   Allergen Reactions   • Morphine Other (See Comments)     HALLUCINATIONS     • Shellfish-Derived Products Nausea Only   • Tape [Adhesive Tape] Rash       Problem List:    Patient Active Problem List   Diagnosis Code   • Anxiety F41.9   • GERD (gastroesophageal reflux  disease) K21.9    OA (osteoarthritis) M19.90    Neuropathy G62.9    Right knee DJD M17.11    Hypothyroidism E03.9    S/P left total knee arthroplasty Z96.659    Ischemic heart disease I25.9    Essential hypertension I10    NSTEMI (non-ST elevated myocardial infarction) ([facility]) I21.4    Other cirrhosis of liver ([facility]) K74.69    Elevated lipase R74.8    Depression F32. A    Type 2 diabetes mellitus, with long-term current use of insulin ([facility]) E11.9, Z79.4    SHERITA (obstructive sleep apnea) G47.33    Morbid obesity with BMI of 45.0-49.9, adult ([facility]) E66.01, Z68.42    PVD (peripheral vascular disease) ([facility]) I73.9    Numbness and tingling of both feet R20.0, R20.2    Long term (current) use of antithrombotics/antiplatelets X23.71    Painful diabetic neuropathy ([facility]) E11.40    Vertebrogenic low back pain M54.51    Spinal stenosis of lumbar region with neurogenic claudication M48.062    CAD in native artery I25.10    Vulvovaginitis N76.0    Incisional infection T81.49XA    Pleural effusion on left J90    Lactic acid acidosis E87.20    Bandemia D72.825    Sternal wound dehiscence T81.32XA       Past Medical History:        Diagnosis Date    Ambulates with cane     or walker    Anxiety     Borderline hypertension     CAD (coronary artery disease)     stents x 2 in 2020    Depression 07/28/2020    GERD (gastroesophageal reflux disease)     Heart attack ([facility] [address] 07/18/2020    Hypertension     Hypothyroidism     Neuropathy     Obesity     morbid    Osteoarthritis     Other cirrhosis of liver ([facility] [address] 07/27/2020    pt denies    Sleep apnea     possible    Type II or unspecified type diabetes mellitus without mention of complication, not stated as uncontrolled     Under care of service provider 11/18/2022    coy-Mbettkbk-sebpyHernan owen-last visit aug 2022    Under care of service provider 11/18/2022    cardiology-ali-last visit nov 2022    Vertebrogenic low back pain 03/29/2022       Past Surgical History:        Procedure Laterality Date    APPENDECTOMY      CARDIAC CATHETERIZATION      2 stents    CARDIAC CATHETERIZATION  11/01/2022    COLONOSCOPY      CORONARY ANGIOPLASTY WITH STENT PLACEMENT  07/2020    CORONARY ARTERY BYPASS GRAFT N/A 11/28/2022    CABG CORONARY ARTERY BYPASS X3 ON PUMP , ATA, ENDO VEIN HARVEST performed by Kenzie Horton MD at [facility] (CERVIX STATUS UNKNOWN)      IR CHEST TUBE INSERTION  1/13/2023    IR CHEST TUBE INSERTION 1/13/2023 Elida Foster MD [facility]    THROAT SURGERY      POLYPS REMOVED FROM VOCAL CORD    TOTAL KNEE ARTHROPLASTY Right 01/06/2015    TOTAL KNEE ARTHROPLASTY Left 05/26/2015    UPPER GASTROINTESTINAL ENDOSCOPY         Social History:    Social History     Tobacco Use    Smoking status: Never    Smokeless tobacco: Never   Substance Use Topics    Alcohol use: Yes     Comment: once a month                                Counseling given: Not Answered      Vital Signs (Current): There were no vitals filed for this visit.                                            BP Readings from Last 3 Encounters:   01/14/23 120/61   12/29/22 (!) 112/52   12/21/22 (!) 102/51       NPO Status:                                                                                 BMI:   Wt Readings from Last 3 Encounters:   01/13/23 239 lb (108.4 kg)   12/29/22 252 lb (114.3 kg)   12/21/22 252 lb (114.3 kg)     There is no height or weight on file to calculate BMI.    CBC:   Lab Results   Component Value Date/Time    WBC 10.6 01/14/2023 06:56 AM    RBC 3.85 01/14/2023 06:56 AM    HGB 10.6 01/14/2023 06:56 AM    HCT 38.1 01/14/2023 06:56 AM    MCV 99.0 01/14/2023 06:56 AM    RDW 13.6 01/14/2023 06:56 AM     01/14/2023 06:56 AM       CMP:   Lab Results   Component Value Date/Time     01/14/2023 06:56 AM    K 4.5 01/14/2023 06:56 AM    CL 93 01/14/2023 06:56 AM CO2 26 01/14/2023 06:56 AM    BUN 26 01/14/2023 06:56 AM    CREATININE 1.21 01/14/2023 06:56 AM    GFRAA >60 02/17/2022 09:30 AM    LABGLOM 49 01/14/2023 06:56 AM    GLUCOSE 262 01/14/2023 06:56 AM    GLUCOSE 250 03/30/2012 03:15 PM    PROT 7.1 01/12/2023 04:19 PM    CALCIUM 8.4 01/14/2023 06:56 AM    BILITOT 0.7 01/12/2023 04:19 PM    ALKPHOS 143 01/12/2023 04:19 PM    AST 37 01/12/2023 04:19 PM    ALT 24 01/12/2023 04:19 PM       POC Tests:   Recent Labs     01/13/23 2028   POCGLU 290*       Coags:   Lab Results   Component Value Date/Time    PROTIME 11.2 01/12/2023 04:19 PM    INR 1.1 01/12/2023 04:19 PM    APTT 23.6 11/28/2022 03:26 PM       HCG (If Applicable): No results found for: PREGTESTUR, PREGSERUM, HCG, HCGQUANT     ABGs: No results found for: PHART, PO2ART, XNJ4WNS, TNW3QWZ, BEART, H5ZWZWLJ     Type & Screen (If Applicable):  No results found for: LABABO, LABRH    Drug/Infectious Status (If Applicable):  Lab Results   Component Value Date/Time    HEPCAB NONREACTIVE 07/27/2020 10:10 PM       COVID-19 Screening (If Applicable):   Lab Results   Component Value Date/Time    COVID19 Not Detected 10/14/2020 10:20 AM    COVID19 Not Detected 10/03/2020 02:20 PM       Cardiac Cath  11/1/2022: LMCA: has distal 20% stenosis. LAD: has mid 85% stenosis. The D1 has 30% stenosis. LCx: has ostial eccentric 80% stenosis. The OM1 has proximal patent stent. RCA: has patent mid stent. There is distal 70% stenosis. Ramus: has proximal 80% stenosis.    EF 55%      TTE 11/14/2022  Normal LV size, wall thickness and wall motions  EF> 55%  Normal RV size and function  Normal size LA and RA  AV is sclerotic opens well, mean gradient 7 mmHg, no AR  MAC noted, no MS no MR  Normal aortic root dimensions  No significant pericardial effusion seen  Normal IC diameter normal respiratory variations suggestive of normal RA  filling pressure    Anesthesia Evaluation    Airway: Mallampati: III  TM distance: >3 FB   Neck ROM: full  Mouth opening: > = 3 FB   Dental:    (+) other      Pulmonary:   (+) sleep apnea: on noncompliant,  decreased breath sounds                            Cardiovascular:    (+) hypertension:, past MI:, CAD:, CABG/stent:,                   Neuro/Psych:   (+) psychiatric history:depression/anxiety             GI/Hepatic/Renal:   (+) GERD:, liver disease:, morbid obesity          Endo/Other:    (+) DiabetesType II DM, poorly controlled, using insulin, hypothyroidism: arthritis: OA., .                 Abdominal:   (+) obese,           Vascular: negative vascular ROS. Other Findings:             Anesthesia Plan      general     ASA 4       Induction: intravenous. MIPS: Postoperative ventilation. Anesthetic plan and risks discussed with patient. Use of blood products discussed with whom consented to blood products. Plan discussed with CRNA.                     Radha Bazan MD   1/14/2023

## 2023-01-14 NOTE — ANESTHESIA POSTPROCEDURE EVALUATION
Department of Anesthesiology  Postprocedure Note    Patient: Haritha Padilla  MRN: [id number]  YOB: 1955  Date of evaluation: 1/14/2023      Procedure Summary     Date: 01/14/23 Room / Location: [address]    Anesthesia Start: 0900 Anesthesia Stop: 1010    Procedure: STERNAL WOUND WASHOUT, DEBRIDEMENT, WOUND VAC PLACEMENT (Chest) Diagnosis:       Infection      (ADD-ON)    Surgeons: Elba Hendricks MD Responsible Provider: Luz Marina Aguilera MD    Anesthesia Type: general ASA Status: 4          Anesthesia Type: No value filed.     Lucie Phase I: Lucie Score: 8    Lucie Phase II:        Anesthesia Post Evaluation    Patient location during evaluation: PACU  Patient participation: complete - patient participated  Level of consciousness: awake and alert  Pain score: 3  Airway patency: patent  Nausea & Vomiting: no nausea and no vomiting  Complications: no  Cardiovascular status: hemodynamically stable  Respiratory status: acceptable  Hydration status: euvolemic

## 2023-01-15 ENCOUNTER — APPOINTMENT (OUTPATIENT)
Dept: GENERAL RADIOLOGY | Age: 68
End: 2023-01-15
Payer: MEDICARE

## 2023-01-15 LAB
ABO/RH: NORMAL
ANION GAP SERPL CALCULATED.3IONS-SCNC: 8 MMOL/L (ref 9–17)
ANTIBODY SCREEN: NEGATIVE
ARM BAND NUMBER: NORMAL
BLD PROD TYP BPU: NORMAL
BLD PROD TYP BPU: NORMAL
BPU ID: NORMAL
BPU ID: NORMAL
BUN BLDV-MCNC: 19 MG/DL (ref 8–23)
CALCIUM IONIZED: 1.13 MMOL/L (ref 1.13–1.33)
CALCIUM SERPL-MCNC: 8.5 MG/DL (ref 8.6–10.4)
CHLORIDE BLD-SCNC: 96 MMOL/L (ref 98–107)
CO2: 29 MMOL/L (ref 20–31)
CREAT SERPL-MCNC: 0.89 MG/DL (ref 0.5–0.9)
CROSSMATCH RESULT: NORMAL
CROSSMATCH RESULT: NORMAL
DISPENSE STATUS BLOOD BANK: NORMAL
DISPENSE STATUS BLOOD BANK: NORMAL
EXPIRATION DATE: NORMAL
GFR SERPL CREATININE-BSD FRML MDRD: >60 ML/MIN/1.73M2
GLUCOSE BLD-MCNC: 203 MG/DL (ref 65–105)
GLUCOSE BLD-MCNC: 262 MG/DL (ref 70–99)
GLUCOSE BLD-MCNC: 344 MG/DL (ref 65–105)
HCT VFR BLD CALC: 35.5 % (ref 36.3–47.1)
HEMOGLOBIN: 10.8 G/DL (ref 11.9–15.1)
INR BLD: 1.1
MAGNESIUM: 1.7 MG/DL (ref 1.6–2.6)
MCH RBC QN AUTO: 28.1 PG (ref 25.2–33.5)
MCHC RBC AUTO-ENTMCNC: 30.4 G/DL (ref 28.4–34.8)
MCV RBC AUTO: 92.2 FL (ref 82.6–102.9)
NRBC AUTOMATED: 0 PER 100 WBC
PDW BLD-RTO: 13.6 % (ref 11.8–14.4)
PLATELET # BLD: 205 K/UL (ref 138–453)
PMV BLD AUTO: 10.7 FL (ref 8.1–13.5)
POTASSIUM SERPL-SCNC: 3.8 MMOL/L (ref 3.7–5.3)
PROTHROMBIN TIME: 11.8 SEC (ref 9.1–12.3)
RBC # BLD: 3.85 M/UL (ref 3.95–5.11)
SODIUM BLD-SCNC: 133 MMOL/L (ref 135–144)
TRANSFUSION STATUS: NORMAL
TRANSFUSION STATUS: NORMAL
UNIT DIVISION: 0
UNIT DIVISION: 0
WBC # BLD: 9.8 K/UL (ref 3.5–11.3)

## 2023-01-15 PROCEDURE — 2580000003 HC RX 258: Performed by: THORACIC SURGERY (CARDIOTHORACIC VASCULAR SURGERY)

## 2023-01-15 PROCEDURE — 82947 ASSAY GLUCOSE BLOOD QUANT: CPT

## 2023-01-15 PROCEDURE — 6370000000 HC RX 637 (ALT 250 FOR IP)

## 2023-01-15 PROCEDURE — 99233 SBSQ HOSP IP/OBS HIGH 50: CPT | Performed by: INTERNAL MEDICINE

## 2023-01-15 PROCEDURE — 2140000001 HC CVICU INTERMEDIATE R&B

## 2023-01-15 PROCEDURE — 2700000000 HC OXYGEN THERAPY PER DAY

## 2023-01-15 PROCEDURE — 6360000002 HC RX W HCPCS: Performed by: STUDENT IN AN ORGANIZED HEALTH CARE EDUCATION/TRAINING PROGRAM

## 2023-01-15 PROCEDURE — 6360000002 HC RX W HCPCS: Performed by: THORACIC SURGERY (CARDIOTHORACIC VASCULAR SURGERY)

## 2023-01-15 PROCEDURE — 2580000003 HC RX 258

## 2023-01-15 PROCEDURE — 97605 NEG PRS WND THER DME<=50SQCM: CPT | Performed by: THORACIC SURGERY (CARDIOTHORACIC VASCULAR SURGERY)

## 2023-01-15 PROCEDURE — 2060000000 HC ICU INTERMEDIATE R&B

## 2023-01-15 PROCEDURE — 6370000000 HC RX 637 (ALT 250 FOR IP): Performed by: STUDENT IN AN ORGANIZED HEALTH CARE EDUCATION/TRAINING PROGRAM

## 2023-01-15 PROCEDURE — 6360000002 HC RX W HCPCS

## 2023-01-15 PROCEDURE — 80048 BASIC METABOLIC PNL TOTAL CA: CPT

## 2023-01-15 PROCEDURE — 83735 ASSAY OF MAGNESIUM: CPT

## 2023-01-15 PROCEDURE — 71045 X-RAY EXAM CHEST 1 VIEW: CPT

## 2023-01-15 PROCEDURE — 85610 PROTHROMBIN TIME: CPT

## 2023-01-15 PROCEDURE — 82330 ASSAY OF CALCIUM: CPT

## 2023-01-15 PROCEDURE — 21627 STERNAL DEBRIDEMENT: CPT | Performed by: THORACIC SURGERY (CARDIOTHORACIC VASCULAR SURGERY)

## 2023-01-15 PROCEDURE — 87641 MR-STAPH DNA AMP PROBE: CPT

## 2023-01-15 PROCEDURE — 85027 COMPLETE CBC AUTOMATED: CPT

## 2023-01-15 PROCEDURE — 94761 N-INVAS EAR/PLS OXIMETRY MLT: CPT

## 2023-01-15 PROCEDURE — 94640 AIRWAY INHALATION TREATMENT: CPT

## 2023-01-15 PROCEDURE — 36415 COLL VENOUS BLD VENIPUNCTURE: CPT

## 2023-01-15 RX ORDER — INSULIN GLARGINE 100 [IU]/ML
70 INJECTION, SOLUTION SUBCUTANEOUS NIGHTLY
Status: DISCONTINUED | OUTPATIENT
Start: 2023-01-15 | End: 2023-01-20 | Stop reason: HOSPADM

## 2023-01-15 RX ORDER — INSULIN GLARGINE 100 [IU]/ML
10 INJECTION, SOLUTION SUBCUTANEOUS ONCE
Status: COMPLETED | OUTPATIENT
Start: 2023-01-15 | End: 2023-01-15

## 2023-01-15 RX ORDER — MAGNESIUM SULFATE IN WATER 40 MG/ML
2000 INJECTION, SOLUTION INTRAVENOUS ONCE
Status: COMPLETED | OUTPATIENT
Start: 2023-01-15 | End: 2023-01-15

## 2023-01-15 RX ORDER — OXYCODONE HYDROCHLORIDE AND ACETAMINOPHEN 5; 325 MG/1; MG/1
1 TABLET ORAL ONCE
Status: COMPLETED | OUTPATIENT
Start: 2023-01-15 | End: 2023-01-15

## 2023-01-15 RX ORDER — SENNA AND DOCUSATE SODIUM 50; 8.6 MG/1; MG/1
2 TABLET, FILM COATED ORAL 2 TIMES DAILY
Status: DISCONTINUED | OUTPATIENT
Start: 2023-01-15 | End: 2023-01-20 | Stop reason: HOSPADM

## 2023-01-15 RX ORDER — INSULIN GLARGINE 100 [IU]/ML
5 INJECTION, SOLUTION SUBCUTANEOUS ONCE
Status: DISCONTINUED | OUTPATIENT
Start: 2023-01-15 | End: 2023-01-15

## 2023-01-15 RX ADMIN — DOCUSATE SODIUM 50 MG AND SENNOSIDES 8.6 MG 1 TABLET: 8.6; 5 TABLET, FILM COATED ORAL at 08:14

## 2023-01-15 RX ADMIN — PANTOPRAZOLE SODIUM 40 MG: 40 TABLET, DELAYED RELEASE ORAL at 08:15

## 2023-01-15 RX ADMIN — Medication 81 MG: at 08:15

## 2023-01-15 RX ADMIN — MUPIROCIN: 20 OINTMENT TOPICAL at 21:10

## 2023-01-15 RX ADMIN — METOPROLOL TARTRATE 12.5 MG: 25 TABLET ORAL at 20:57

## 2023-01-15 RX ADMIN — GABAPENTIN 400 MG: 400 CAPSULE ORAL at 13:04

## 2023-01-15 RX ADMIN — MAGNESIUM SULFATE HEPTAHYDRATE 2000 MG: 40 INJECTION, SOLUTION INTRAVENOUS at 10:14

## 2023-01-15 RX ADMIN — GABAPENTIN 400 MG: 400 CAPSULE ORAL at 08:13

## 2023-01-15 RX ADMIN — AMIODARONE HYDROCHLORIDE 200 MG: 200 TABLET ORAL at 13:04

## 2023-01-15 RX ADMIN — BUSPIRONE HYDROCHLORIDE 10 MG: 10 TABLET ORAL at 08:16

## 2023-01-15 RX ADMIN — INSULIN LISPRO 8 UNITS: 100 INJECTION, SOLUTION INTRAVENOUS; SUBCUTANEOUS at 07:36

## 2023-01-15 RX ADMIN — OXYCODONE HYDROCHLORIDE AND ACETAMINOPHEN 1 TABLET: 5; 325 TABLET ORAL at 21:09

## 2023-01-15 RX ADMIN — PIPERACILLIN AND TAZOBACTAM 3375 MG: 3; .375 INJECTION, POWDER, LYOPHILIZED, FOR SOLUTION INTRAVENOUS at 02:03

## 2023-01-15 RX ADMIN — AMIODARONE HYDROCHLORIDE 200 MG: 200 TABLET ORAL at 20:57

## 2023-01-15 RX ADMIN — DESMOPRESSIN ACETATE 40 MG: 0.2 TABLET ORAL at 20:57

## 2023-01-15 RX ADMIN — INSULIN GLARGINE 70 UNITS: 100 INJECTION, SOLUTION SUBCUTANEOUS at 20:58

## 2023-01-15 RX ADMIN — ENOXAPARIN SODIUM 30 MG: 100 INJECTION SUBCUTANEOUS at 08:14

## 2023-01-15 RX ADMIN — POTASSIUM CHLORIDE 40 MEQ: 1500 TABLET, EXTENDED RELEASE ORAL at 13:04

## 2023-01-15 RX ADMIN — FUROSEMIDE 40 MG: 40 TABLET ORAL at 08:13

## 2023-01-15 RX ADMIN — AMLODIPINE BESYLATE 5 MG: 5 TABLET ORAL at 08:14

## 2023-01-15 RX ADMIN — PIPERACILLIN AND TAZOBACTAM 3375 MG: 3; .375 INJECTION, POWDER, LYOPHILIZED, FOR SOLUTION INTRAVENOUS at 17:52

## 2023-01-15 RX ADMIN — CITALOPRAM 40 MG: 20 TABLET, FILM COATED ORAL at 08:15

## 2023-01-15 RX ADMIN — BUDESONIDE AND FORMOTEROL FUMARATE DIHYDRATE 2 PUFF: 80; 4.5 AEROSOL RESPIRATORY (INHALATION) at 09:37

## 2023-01-15 RX ADMIN — GABAPENTIN 400 MG: 400 CAPSULE ORAL at 20:57

## 2023-01-15 RX ADMIN — VANCOMYCIN HYDROCHLORIDE 500 MG: 500 INJECTION, POWDER, LYOPHILIZED, FOR SOLUTION INTRAVENOUS at 07:34

## 2023-01-15 RX ADMIN — CLOPIDOGREL 75 MG: 75 TABLET, FILM COATED ORAL at 08:14

## 2023-01-15 RX ADMIN — METOPROLOL TARTRATE 12.5 MG: 25 TABLET ORAL at 08:13

## 2023-01-15 RX ADMIN — OXYCODONE HYDROCHLORIDE AND ACETAMINOPHEN 1 TABLET: 5; 325 TABLET ORAL at 13:04

## 2023-01-15 RX ADMIN — LEVOTHYROXINE SODIUM 50 MCG: 50 TABLET ORAL at 08:16

## 2023-01-15 RX ADMIN — POLYETHYLENE GLYCOL 3350 17 G: 17 POWDER, FOR SOLUTION ORAL at 07:35

## 2023-01-15 RX ADMIN — DOCUSATE SODIUM 50 MG AND SENNOSIDES 8.6 MG 2 TABLET: 8.6; 5 TABLET, FILM COATED ORAL at 20:57

## 2023-01-15 RX ADMIN — ENOXAPARIN SODIUM 30 MG: 100 INJECTION SUBCUTANEOUS at 21:03

## 2023-01-15 RX ADMIN — FUROSEMIDE 40 MG: 40 TABLET ORAL at 20:57

## 2023-01-15 RX ADMIN — INSULIN GLARGINE 10 UNITS: 100 INJECTION, SOLUTION SUBCUTANEOUS at 08:20

## 2023-01-15 RX ADMIN — MUPIROCIN: 20 OINTMENT TOPICAL at 08:17

## 2023-01-15 RX ADMIN — INSULIN LISPRO 8 UNITS: 100 INJECTION, SOLUTION INTRAVENOUS; SUBCUTANEOUS at 16:20

## 2023-01-15 RX ADMIN — OXYCODONE HYDROCHLORIDE AND ACETAMINOPHEN 1 TABLET: 5; 325 TABLET ORAL at 07:35

## 2023-01-15 RX ADMIN — AMIODARONE HYDROCHLORIDE 200 MG: 200 TABLET ORAL at 08:15

## 2023-01-15 RX ADMIN — INSULIN LISPRO 12 UNITS: 100 INJECTION, SOLUTION INTRAVENOUS; SUBCUTANEOUS at 11:46

## 2023-01-15 RX ADMIN — OXYCODONE HYDROCHLORIDE AND ACETAMINOPHEN 1 TABLET: 5; 325 TABLET ORAL at 14:24

## 2023-01-15 RX ADMIN — PIPERACILLIN AND TAZOBACTAM 3375 MG: 3; .375 INJECTION, POWDER, LYOPHILIZED, FOR SOLUTION INTRAVENOUS at 12:44

## 2023-01-15 RX ADMIN — ISOSORBIDE MONONITRATE 60 MG: 60 TABLET, EXTENDED RELEASE ORAL at 08:14

## 2023-01-15 ASSESSMENT — ENCOUNTER SYMPTOMS
COUGH: 0
ABDOMINAL PAIN: 0
EYE REDNESS: 0
EYE ITCHING: 0
WHEEZING: 0
NAUSEA: 1
COLOR CHANGE: 1
SHORTNESS OF BREATH: 0
COUGH: 1
TROUBLE SWALLOWING: 0
SHORTNESS OF BREATH: 1
APNEA: 0
NAUSEA: 0
PHOTOPHOBIA: 0
VOMITING: 0
EYE DISCHARGE: 0
CHEST TIGHTNESS: 0

## 2023-01-15 ASSESSMENT — PAIN SCALES - GENERAL
PAINLEVEL_OUTOF10: 4
PAINLEVEL_OUTOF10: 7
PAINLEVEL_OUTOF10: 6

## 2023-01-15 NOTE — PROGRESS NOTES
Gisselle Harp is a 78 y.o. female evaluated via telephone on 1/12/2023 for Post-Op Check (Follow up on Bypass) and Depression (Positive PHQ9)  . Documentation: This is Telephone visit with this 79 yo F with recent CABG. Pt reported that she fell 2 days ago for which Ambulance was called who measures her vitals and sugar which was wnl,Pt did not want to go to the ED for further evaluation. Since then she has been feeling chest pain unsure if it is related to the wound or inside the chest. Possible skin traction after fall. Reported yellowish discharge from the wound and redness and tenderness. Chest pain central without radiation, Pt mentioned that she has been feeling weak since the fall with decreased appetite and very minimum oral intake. Pt taking all medications including antiHTN, and diabetes meds despite not eating. Assessment and Plan:  - Chest pain likely related to chest wound / with hx of CAD s/p recent CABG, can not exclude heart attack: Instructed pt to go the ER for further evaluation, with pt hx of continuous wound discharge and marked wound tenderness after recent falls make wound dehiscence and infection very likely. Also will need to exclude heart attack. - Generalized weakness and decrease oral intake, can not exclude sepsis     Can not exclude hypotension and hypoglycemia    Needs urgent evaluation that can not be obtained in this telephone visit     Total Time: minutes: 21-30 minutes    Gisselle Harp was evaluated through a synchronous (real-time) audio encounter. Patient identification was verified at the start of the visit. She (or guardian if applicable) is aware that this is a billable service, which includes applicable co-pays. This visit was conducted with the patient's (and/or legal guardian's) verbal consent. She has not had a related appointment within my department in the past 7 days or scheduled within the next 24 hours.    The patient was located at Home: [address]  [id number]. The provider was located at [place] [address] (Appt Dept): [address]     Note: not billable if this call serves to triage the patient into an appointment for the relevant concern    Sushil Hayward MD

## 2023-01-15 NOTE — PROGRESS NOTES
[address] Pharmacokinetic Monitoring Service - Vancomycin    Consulting Provider: Dr Jose Miles   Indication: Sternal wound  Target Concentration: Goal AUC/LATANYA 400-600 mg*hr/L  Day of Therapy: 4  Additional Antimicrobials: zosyn    Pertinent Laboratory Values: Wt Readings from Last 1 Encounters:   01/13/23 239 lb (108.4 kg)     Temp Readings from Last 1 Encounters:   01/15/23 97.7 °F (36.5 °C)     Estimated Creatinine Clearance: 71 mL/min (based on SCr of 0.89 mg/dL). Recent Labs     01/14/23  1141 01/15/23  0606   CREATININE 1.11* 0.89   WBC 9.4 9.8     Procalcitonin: N/A    Pertinent Cultures:  Culture Date Source Results   1/13 Aspirate chest tube NG x 2 days   1/12 Blood NG x 2 days   1/14 Chest wound No bacteria seen/ pending   MRSA Nasal Swab: N/A. Non-respiratory infection.     Recent vancomycin administrations                     vancomycin (VANCOCIN) 500 mg in dextrose 5 % 100 mL IVPB (mini-bag) (mg) 500 mg New Bag 01/15/23 0734     500 mg New Bag 01/14/23 1713    vancomycin (VANCOCIN) injection (mg) 1,000 mg Given 01/14/23 0910    vancomycin (VANCOCIN) 750 mg in sodium chloride 0.9 % 250 mL IVPB (mg) 750 mg New Bag 01/13/23 1756     750 mg New Bag  0650    vancomycin (VANCOCIN) 1750 mg in sodium chloride 0.9 % 500 mL IVPB (mg) 1,750 mg New Bag 01/12/23 1812                    Assessment:  Date/Time Current Dose Concentration Timing of Concentration (h) AUC   1/15 500 mg IVPB q12h      Note: Serum concentrations collected for AUC dosing may appear elevated if collected in close proximity to the dose administered, this is not necessarily an indication of toxicity    Plan:  Current dosing regimen is  Due to renal function fluctuations, will increase dose to 750 mg IVBP q12h as current dosing is thought to be subtherapeutic  Repeat vancomycin concentration not ordered   Pharmacy will continue to monitor patient and adjust therapy as indicated    Thank you for the consult,  Gorman Ganser Dylan [facility]  1/15/2023 11:34 AM

## 2023-01-15 NOTE — PROGRESS NOTES
PULMONARY & CRITICAL CARE MEDICINE PROGRESS NOTE     Patient:  Rojas Clancy  MRN: [id number]  Admit date: 1/12/2023  Primary Care Physician: Margie Castillo MD  Consulting Physician: Lindy Clark DO  CODE Status: Full Code  LOS: 2     SUBJECTIVE     Chief Complaint/ Reason for consult:    large pleural effusion    Hospital Course: The patient is a 78 y.o. female with a history of hypertension, CAD s/p stents, s/p CABG 11/28/2022, hypothyroidism came to ED with chief complaint of chest pain and shortness of breath. Patient is known to CT surgery as she got CABG on 11/28/2022 and was recently seen in their office on 12/21/2022, during that visit patient was doing well, had no respiratory or cardiac issues. Patient was afebrile, NSR /55 and was room air was eventually put on 3 L nasal cannula due to respiratory distress, patient is on 1 L oxygen at home. Initial lab work show hyperglycemia with glucose 260, Pro-Jame 0.53.  proBNP 1814, troponin 36. WBC 16.7, hemoglobin 13.1, platelet 522. In the ED CT chest revealed large left pleural effusion with near collapse of left lung. ID, pulmonary and CT surgery were consulted. Patient was chest tube placed by IR on 1/13/2023. Patient is getting Zosyn as per ID recommendation. Interval History:  01/14/23  Pt was seen and examined at bedside. Resting comfortably in the bed. Pt was NPO overnight, CTS plan for sternal debridement today. Saturating appropriately at 2L NC. Remained afebrile, /67, HR 83.  Labs reviewed. B glucose elevated 200, no leukocytosis, Hb 10.6. Blood clut negative so far. No acute events overnight. Review Of Systems:  Review of Systems   Constitutional:  Positive for fatigue. HENT:  Negative for congestion. Eyes:  Negative for visual disturbance. Respiratory:  Positive for cough and shortness of breath. Negative for apnea, chest tightness and wheezing.     Gastrointestinal:  Negative for abdominal pain, nausea and vomiting. Genitourinary:  Negative for difficulty urinating. Neurological:  Positive for weakness. Negative for dizziness, tremors and light-headedness. OBJECTIVE     PaO2/FiO2 RATIO:  No results for input(s): POCPO2 in the last 72 hours. VITAL SIGNS:   LAST:  BP (!) 131/59   Pulse 86   Temp 99 °F (37.2 °C) (Oral)   Resp 18   Ht 4' 11\" (1.499 m)   Wt 239 lb (108.4 kg)   SpO2 95%   BMI 48.27 kg/m²   8-24 HR RANGE:  TEMP Temp  Av.9 °F (36.6 °C)  Min: 97.4 °F (36.3 °C)  Max: 99 °F (49.1 °C)   BP Systolic (13VVG), LSY:130 , Min:99 , GGD:566      Diastolic (41EFV), JKP:04, Min:51, Max:81     PULSE Pulse  Av.8  Min: 76  Max: 87   RR Resp  Av.3  Min: 16  Max: 18   O2 SAT SpO2  Av %  Min: 93 %  Max: 99 %   OXYGEN DELIVERY O2 Flow Rate (L/min)  Av.3 L/min  Min: 2 L/min  Max: 3 L/min        Systemic Examination:   Physical Exam -  Constitutional:  Alert, cooperative and no distress. Mental Status:  Oriented to person, place and time and normal affect. Lungs:  Bilateral air entry present, lung fields clear. Normal effort. Heart:  Regular rate and rhythm, no murmur. Abdomen:  Soft, nontender, nondistended, normal bowel sounds. Extremities:  No edema, redness, tenderness in the calves. Skin:  Warm, dry, no gross lesions or rashes.     DATA REVIEW     Medications: Current Inpatient  Scheduled Meds:   piperacillin-tazobactam  3,375 mg IntraVENous Once    sodium chloride flush  5-40 mL IntraVENous 2 times per day    mupirocin   Nasal BID    polyethylene glycol  17 g Oral Daily    sennosides-docusate sodium  1 tablet Oral BID    pantoprazole  40 mg Oral Daily    [START ON 1/15/2023] clopidogrel  75 mg Oral Daily    enoxaparin  30 mg SubCUTAneous BID    furosemide  40 mg Oral BID    vancomycin  500 mg IntraVENous Q12H    insulin glargine  60 Units SubCUTAneous Nightly    piperacillin-tazobactam  3,375 mg IntraVENous Q8H    amiodarone  200 mg Oral TID amLODIPine  5 mg Oral Daily    aspirin  81 mg Oral Daily    atorvastatin  40 mg Oral Nightly    budesonide-formoterol  2 puff Inhalation BID    busPIRone  10 mg Oral BID    citalopram  40 mg Oral Daily    gabapentin  400 mg Oral TID    isosorbide mononitrate  60 mg Oral Daily    levothyroxine  50 mcg Oral Daily    metoprolol tartrate  12.5 mg Oral BID    traZODone  50 mg Oral Nightly    insulin lispro  0-16 Units SubCUTAneous TID WC    insulin lispro  0-4 Units SubCUTAneous Nightly    vancomycin (VANCOCIN) intermittent dosing (placeholder)   Other RX Placeholder     Continuous Infusions:   sodium chloride      sodium chloride 25 mL (01/13/23 1127)       INPUT/OUTPUT:  In: 1112.4 [I.V.:475]  Out: 1063 [Urine:950; Drains:50]  Date 01/14/23 0000 - 01/14/23 2359   Shift 1097-5297 2324-2811 3551-0159 24 Hour Total   INTAKE   I.V.(mL/kg)  475(4.4)  475(4.4)   IV Piggyback(mL/kg)  637.4(5.9)  637.4(5.9)   Shift Total(mL/kg)  1112.4(10.3)  1112.4(10.3)   OUTPUT   Urine(mL/kg/hr)  550(0.6) 400 950   Drains(mL/kg)  50(0.5)  50(0.5)   Chest Tube 23 40  63   Shift Total(mL/kg) 23(0.2) 640(5.9) 400(3.7) 1063(9.8)   Weight (kg) 108.4 108.4 108.4 108.4        LABS:  ABGs:   No results for input(s): POCPH, POCPCO2, POCPO2, POCHCO3, CCUL0VVC in the last 72 hours. CBC:   Recent Labs     01/12/23  1619 01/13/23  2338 01/14/23  0656 01/14/23  1141   WBC 16.7* 12.8* 10.6 9.4   HGB 13.1 10.6* 10.6* 10.6*   HCT 46.8 36.1* 38.1 36.0*   MCV 99.6 94.5 99.0 93.8    205 169 185   LYMPHOPCT 5* 7* 15*  --    RBC 4.70 3.82* 3.85* 3.84*   MCH 27.9 27.7 27.5 27.6   MCHC 28.0* 29.4 27.8* 29.4   RDW 13.6 13.8 13.6 13.5     CRP:   No results for input(s): CRP in the last 72 hours. LDH:   No results for input(s): LDH in the last 72 hours.   BMP:   Recent Labs     01/12/23  1619 01/13/23  2338 01/14/23  0656 01/14/23  1141    134* 131* 135   K 5.0 4.6 4.5 4.0   CL 95* 96* 93* 97*   CO2 23 28 26 31   BUN 22 23 26* 22   CREATININE 0.77 1.38* 1.21* 1.11*   GLUCOSE 294* 270* 262* 200*     Liver Function Test:   Recent Labs     01/12/23  1619   PROT 7.1   LABALBU 2.7*   ALT 24   AST 37*   ALKPHOS 143*   BILITOT 0.7     Coagulation Profile:   Recent Labs     01/12/23  1619 01/14/23  1141   INR 1.1 1.1   PROTIME 11.2 11.4   APTT  --  23.0     D-Dimer:  No results for input(s): DDIMER in the last 72 hours. Lactic Acid:  No results for input(s): LACTA in the last 72 hours. Cardiac Enzymes:  No results for input(s): CKTOTAL, CKMB, CKMBINDEX, TROPONINI in the last 72 hours. Invalid input(s): TROPONIN, HSTROP  BNP/ProBNP:   Recent Labs     01/12/23  1619   PROBNP 1,814*     Triglycerides:  No results for input(s): TRIG in the last 72 hours. Microbiology:  Urine Culture:  No components found for: CURINE  Blood Culture:  No components found for: CBLOOD, CFUNGUSBL  Sputum Culture:  No components found for: CSPUTUM  Recent Labs     01/14/23  1035   [facility] . CHEST   SPECIAL STERNAL WOUND SWAB   CULTURE PENDING     Recent Labs     01/12/23  1614 01/13/23  1048 01/13/23  1050 01/14/23  1034 01/14/23  1035   SPECDESC . BLOOD   < > .ASPIRATE LEFT CHEST TUBE . CHEST . CHEST   SPECIAL LHAND, 5ML  --   --  SWAB STERNAL WOUND SWAB   CULTURE NO GROWTH 2 DAYS   < > NO GROWTH 1 DAY DUE TO THE SPECIMEN TYPE, THE ORDER WAS CANCELED AND REORDERED. PLEASE REFER TO: AEROBIC CULTURE NO ANAEROBIC SWAB SENT PENDING    < > = values in this interval not displayed. Pathology:    Radiology Reports:  XR CHEST PORTABLE   Final Result   Little change from prior study. Significant left lung opacity with left   hydropneumothorax suspected. Slight mediastinal shift toward the right is   noted diminished from prior study. Right lung is clear. XR CHEST PORTABLE   Final Result   Interval left chest tube placement with persistent opacification of the left   chest.  There is some lucency in the left lower chest, which could represent   hydropneumothorax.          IR CHEST TUBE INSERTION   Final Result   Successful percutaneous placement of a 10 [place] left pleural drain. There is marked loculation of the left pleural collection indicating complex   pleural fluid. Small bore drainage catheter may not successfully drained the   entire pleural collection given its complex nature. CT CHEST W CONTRAST   Final Result   1. Diastasis of median sternotomy with air in the soft tissues, and   appearance of abscess formation/dehiscence dural to the sternotomy. Air is   present in the mediastinum. 2.  Large left pleural effusion with almost complete collapse of the left   lobe of the lung. Bronchial wall thickening is noted with material within   the bronchi likely mucous plugging. 3.  Hepatic steatosis. 4.  Small gallstones in the gallbladder. 5.  2 cm left adrenal mass with indeterminate Hounsfield numbers. Further   workup using adrenal CT protocol or MRI advised when the patient's condition   permits. RECOMMENDATIONS:   Advise adrenal protocol CT or MRI to evaluate a left adrenal mass. XR CHEST PORTABLE   Final Result   Progressive left pleural effusion with nearly complete opacification of the   left hemithorax. XR CHEST PORTABLE    (Results Pending)        Echocardiogram:   No results found for this or any previous visit. ASSESSMENT AND PLAN     Assessment:    // Acute hypoxic respiratory failure  //Left lobar collapse secondary to effusion  //Large left loculated effusion s/p chest tube 1/13/2023  //CAD s/p CABG  //Hypertension        Plan:     Sternal wound and chest tube management as per CT surgery. Plan for sternal debridement with washout and wound VAC placement tomorrow by CTS. Agree with Afia as per ID recommendation. Follow-up on culture results. Follow up on fluid analysis. Follow-up on chest x-ray to look for left lung expansion post chest tube placement. We will continue to follow.  I will discuss with attending. Penny Ro MD  Internal Medicine Resident, PGY-3  [facility] [id number]          1/14/2023, 7:14 PM    Please note that this chart was generated using voice recognition Dragon dictation software. Although every effort was made to ensure the accuracy of this automated transcription, some errors in transcription may have occurred.

## 2023-01-15 NOTE — PROGRESS NOTES
PULMONARY & CRITICAL CARE MEDICINE PROGRESS NOTE     Patient:  Axel Rodgers  MRN: [id number]  Admit date: 1/12/2023  Primary Care Physician: Maribel Gillespie MD  Consulting Physician: Rebecca Rodriguez DO  CODE Status: Full Code  LOS: 3     SUBJECTIVE     Chief Complaint/ Reason for consult:    large pleural effusion    Hospital Course: The patient is a 78 y.o. female with a history of hypertension, CAD s/p stents, s/p CABG 11/28/2022, hypothyroidism came to ED with chief complaint of chest pain and shortness of breath. Patient is known to CT surgery as she got CABG on 11/28/2022 and was recently seen in their office on 12/21/2022, during that visit patient was doing well, had no respiratory or cardiac issues. Patient was afebrile, NSR /55 and was room air was eventually put on 3 L nasal cannula due to respiratory distress, patient is on 1 L oxygen at home. Initial lab work show hyperglycemia with glucose 260, Pro-Jame 0.53.  proBNP 1814, troponin 36. WBC 16.7, hemoglobin 13.1, platelet 364. In the ED CT chest revealed large left pleural effusion with near collapse of left lung. ID, pulmonary and CT surgery were consulted. Patient was chest tube placed by IR on 1/13/2023. Patient is getting Zosyn as per ID recommendation. Interval History:  01/15/23  Pt was seen and examined at bedside. Resting comfortably in the bed. S/p sternal wound debridement by CTS   Saturating appropriately at 1-2L NC. Remained afebrile, /69, HR 70s. Labs reviewed. B glucose elevated 262, no leukocytosis, Hb 10.8. Blood clut negative so far. No acute events overnight. Review Of Systems:  Review of Systems   Constitutional:  Positive for fatigue. HENT:  Negative for congestion. Eyes:  Negative for visual disturbance. Respiratory:  Positive for cough and shortness of breath. Negative for apnea, chest tightness and wheezing.     Gastrointestinal:  Negative for abdominal pain, nausea and vomiting. Genitourinary:  Negative for difficulty urinating. Neurological:  Positive for weakness. Negative for dizziness, tremors and light-headedness. OBJECTIVE     PaO2/FiO2 RATIO:  No results for input(s): POCPO2 in the last 72 hours. VITAL SIGNS:   LAST:  /71   Pulse 74   Temp 98.6 °F (37 °C) (Oral)   Resp 21   Ht 4' 11\" (1.499 m)   Wt 239 lb (108.4 kg)   SpO2 91%   BMI 48.27 kg/m²   8-24 HR RANGE:  TEMP Temp  Av.5 °F (36.9 °C)  Min: 97.7 °F (36.5 °C)  Max: 99.1 °F (07.4 °C)   BP Systolic (00FKU), JCK:430 , Min:93 , PZZ:358      Diastolic (91RVS), MJY:89, Min:42, Max:92     PULSE Pulse  Av.1  Min: 70  Max: 90   RR Resp  Av.2  Min: 11  Max: 24   O2 SAT SpO2  Av.9 %  Min: 90 %  Max: 95 %   OXYGEN DELIVERY No data recorded        Systemic Examination:   Physical Exam -  Constitutional:  Alert, cooperative and no distress. Mental Status:  Oriented to person, place and time and normal affect. Lungs:  Bilateral air entry present, lung fields clear. Normal effort. Heart:  Regular rate and rhythm, no murmur. Abdomen:  Soft, nontender, nondistended, normal bowel sounds. Extremities:  No edema, redness, tenderness in the calves. Skin:  Warm, dry, no gross lesions or rashes.     DATA REVIEW     Medications: Current Inpatient  Scheduled Meds:   sennosides-docusate sodium  2 tablet Oral BID    alteplase (ACTIVASE) syringe  5 mg IntraPLEUral Once    And    dornase (PULMOZYME) syringe  5 mg IntraPLEUral Once    insulin glargine  70 Units SubCUTAneous Nightly    piperacillin-tazobactam  3,375 mg IntraVENous Once    sodium chloride flush  5-40 mL IntraVENous 2 times per day    mupirocin   Nasal BID    polyethylene glycol  17 g Oral Daily    pantoprazole  40 mg Oral Daily    clopidogrel  75 mg Oral Daily    enoxaparin  30 mg SubCUTAneous BID    furosemide  40 mg Oral BID    piperacillin-tazobactam  3,375 mg IntraVENous Q8H    amiodarone  200 mg Oral TID    amLODIPine  5 mg Oral Daily    aspirin  81 mg Oral Daily    atorvastatin  40 mg Oral Nightly    budesonide-formoterol  2 puff Inhalation BID    [Held by provider] busPIRone  10 mg Oral BID    citalopram  40 mg Oral Daily    gabapentin  400 mg Oral TID    isosorbide mononitrate  60 mg Oral Daily    levothyroxine  50 mcg Oral Daily    metoprolol tartrate  12.5 mg Oral BID    [Held by provider] traZODone  50 mg Oral Nightly    insulin lispro  0-16 Units SubCUTAneous TID     insulin lispro  0-4 Units SubCUTAneous Nightly     Continuous Infusions:   sodium chloride      sodium chloride Stopped (01/15/23 1244)       INPUT/OUTPUT:  In: 1564.8 [I.V.:555.9]  Out: 4288 [Urine:2950; Drains:100]  Date 01/15/23 0000 - 01/15/23 2359   Shift 0178-5040 3271-7995 8201-2397 24 Hour Total   INTAKE   I.V.(mL/kg)  80.9(0.7)  80.9(0.7)   IV Piggyback(mL/kg)  371.5(3.4)  371.5(3.4)   Shift Total(mL/kg)  452.4(4.2)  452.4(4.2)   OUTPUT   Urine(mL/kg/hr) 600(0.7) 950(1.1) 450 2000   Drains(mL/kg) 50(0.5)   50(0.5)   Chest Tube 3   3   Shift Total(mL/kg) 653(6) 950(8.8) 450(4.2) 3838(37.3)   Weight (kg) 108.4 108.4 108.4 108.4          LABS:  ABGs:   No results for input(s): POCPH, POCPCO2, POCPO2, POCHCO3, LDZB7DXD in the last 72 hours. CBC:   Recent Labs     01/13/23  2338 01/14/23  0656 01/14/23  1141 01/15/23  0606   WBC 12.8* 10.6 9.4 9.8   HGB 10.6* 10.6* 10.6* 10.8*   HCT 36.1* 38.1 36.0* 35.5*   MCV 94.5 99.0 93.8 92.2    169 185 205   LYMPHOPCT 7* 15*  --   --    RBC 3.82* 3.85* 3.84* 3.85*   MCH 27.7 27.5 27.6 28.1   MCHC 29.4 27.8* 29.4 30.4   RDW 13.8 13.6 13.5 13.6       CRP:   No results for input(s): CRP in the last 72 hours. LDH:   No results for input(s): LDH in the last 72 hours.   BMP:   Recent Labs     01/13/23  2338 01/14/23  0656 01/14/23  1141 01/15/23  0606   * 131* 135 133*   K 4.6 4.5 4.0 3.8   CL 96* 93* 97* 96*   CO2 28 26 31 29   BUN 23 26* 22 19   CREATININE 1.38* 1.21* 1.11* 0.89   GLUCOSE 270* 262* 200* 262* Liver Function Test:   No results for input(s): PROT, LABALBU, ALT, AST, GGT, ALKPHOS, BILITOT in the last 72 hours. Coagulation Profile:   Recent Labs     01/14/23  1141 01/15/23  0606   INR 1.1 1.1   PROTIME 11.4 11.8   APTT 23.0  --        D-Dimer:  No results for input(s): DDIMER in the last 72 hours. Lactic Acid:  No results for input(s): LACTA in the last 72 hours. Cardiac Enzymes:  No results for input(s): CKTOTAL, CKMB, CKMBINDEX, TROPONINI in the last 72 hours. Invalid input(s): TROPONIN, HSTROP  BNP/ProBNP:   No results for input(s): BNP, PROBNP in the last 72 hours. Triglycerides:  No results for input(s): TRIG in the last 72 hours. Microbiology:  Urine Culture:  No components found for: CURINE  Blood Culture:  No components found for: CBLOOD, CFUNGUSBL  Sputum Culture:  No components found for: CSPUTUM  Recent Labs     01/14/23  1035   [address] [facility] . CHEST   SPECIAL STERNAL WOUND SWAB   CULTURE CULTURE IN PROGRESS       Recent Labs     01/13/23  1050 01/14/23  1034 01/14/23  1035   SPECDESC . ASPIRATE LEFT CHEST TUBE . CHEST . CHEST   SPECIAL  --  SWAB STERNAL WOUND SWAB   CULTURE NO GROWTH 2 DAYS DUE TO THE SPECIMEN TYPE, THE ORDER WAS CANCELED AND REORDERED. PLEASE REFER TO: AEROBIC CULTURE NO ANAEROBIC SWAB SENT CULTURE IN PROGRESS          Pathology:    Radiology Reports:  XR CHEST PORTABLE   Final Result   Near complete opacification of the left hemithorax with decreased air   compared to prior exam.         XR CHEST PORTABLE   Final Result   Little change from prior study. Significant left lung opacity with left   hydropneumothorax suspected. Slight mediastinal shift toward the right is   noted diminished from prior study. Right lung is clear. XR CHEST PORTABLE   Final Result   Interval left chest tube placement with persistent opacification of the left   chest.  There is some lucency in the left lower chest, which could represent   hydropneumothorax.          IR CHEST TUBE INSERTION   Final Result   Successful percutaneous placement of a 10 [place] left pleural drain. There is marked loculation of the left pleural collection indicating complex   pleural fluid. Small bore drainage catheter may not successfully drained the   entire pleural collection given its complex nature. CT CHEST W CONTRAST   Final Result   1. Diastasis of median sternotomy with air in the soft tissues, and   appearance of abscess formation/dehiscence dural to the sternotomy. Air is   present in the mediastinum. 2.  Large left pleural effusion with almost complete collapse of the left   lobe of the lung. Bronchial wall thickening is noted with material within   the bronchi likely mucous plugging. 3.  Hepatic steatosis. 4.  Small gallstones in the gallbladder. 5.  2 cm left adrenal mass with indeterminate Hounsfield numbers. Further   workup using adrenal CT protocol or MRI advised when the patient's condition   permits. RECOMMENDATIONS:   Advise adrenal protocol CT or MRI to evaluate a left adrenal mass. XR CHEST PORTABLE   Final Result   Progressive left pleural effusion with nearly complete opacification of the   left hemithorax. XR CHEST PORTABLE    (Results Pending)   IR CHEST TUBE INSERTION    (Results Pending)        Echocardiogram:   No results found for this or any previous visit. ASSESSMENT AND PLAN     Assessment:    // Acute hypoxic respiratory failure  //Left lobar collapse secondary to effusion  //Large left loculated effusion s/p chest tube 1/13/2023  //CAD s/p CABG  //Hypertension  //DM        Plan:     Sternal wound and chest tube management as per CT surgery. s/p sternal wound debridement with washout and wound VAC placement by CTS. On Zosyn as per ID recommendation. Blood, wound and body fluid cultures negative so far. Discussed with CTS regarding tPA and dornase for loculation, follow up on recs. We will continue to follow. I will discuss with attending. Vivi Leventhal, MD  Internal Medicine Resident, PGY-3  [facility] [address]         1/15/2023, 5:54 PM    Please note that this chart was generated using voice recognition Dragon dictation software. Although every effort was made to ensure the accuracy of this automated transcription, some errors in transcription may have occurred.

## 2023-01-15 NOTE — PROGRESS NOTES
[facility]  Progress Note    Date:   1/15/2023  Patient name:  Kiesha Sanchez  Date of admission:  1/12/2023  3:55 PM  MRN:   [id number]  YOB: 1955    SUBJECTIVE/Last 24 hours update:     Patient was seen and examined bedside. No acute events overnight. S/p surgical wound debridement and washout with wound VAC placement yesterday. Afebrile VSS. Glucose uncontrolled. We will give 10 units of Lantus this a.m. WBC 9.8. Hemoglobin steady 10.8. Repeat mag 1.7. We will give 2 g of mag. CXR this a.m. shows near complete opacification of the left hemithorax with decreased air. Cultures from yesterday pending. Patient is currently on IV Vanco and Zosyn. ID on board. Patient states she is doing well. She still expresses tenderness over surgical area. Patient complains of nausea but no vomiting. She has not yet passed a bowel movement. Denies fever, chills, shortness of breath, palpitations, abdominal pain, vomiting, or lower extremity tenderness. UOP -600cc/24 hrs  Chest tube 43cc/24hrs    Notes from nursing staff and Consults had been reviewed, and the overnight progress had been checked with the nursing staff as well. HPI:     78 y.o.  female with history of diabetes mellitus, GERD, painful neuropathy, multivessel CAD status post triple bypass CABG 11/28/2022. She presented today due to worsening symptoms of chest pain, which started after surgery but has worsened since last 3 days. She describes the chest pain as central, and is a tightness, nonradiating, better with tramadol, comes and goes, 8 out of 10 in severity and is getting worse. Patient was hospitalized for 1 week after surgery, then had cardiac rehab for 2 weeks which she describes the pain increasing during that time. She also had some cough which was nonproductive and was painful to her chest when coughing.   She also experienced severe nausea and lack of appetite, and mentions she has eaten very minimal for the last 3 days. She also fell twice in the last 2 days, but did not hit her head or endorses dizziness. She also mentions she has some diffuse abdominal pain but is not severe. She also has some shortness of breath on exertion since the surgery, no fevers some chills, no night sweats. She is on 1 L of oxygen at home which started after her triple bypass surgery. She is on insulin nightly. Her hypertension is managed with amlodipine and lopressor. She also takes amiodarone post CABG. The patient has an infected CABG incision site and ED CT chest showed diastasis of median sternotomy with air in the soft tissues, and abscess formation. And chest x-ray taken shows progressive pleural effusion with nearly complete opacification of the left hemithorax. BNP was elevated, Pro-Jame was elevated, and initially she needed 4 L of oxygen and she is normally on 1 L at home. CT also showed a 2cm left adrenal mass. Patient was placed on Zosyn and vancomycin in the ED    REVIEW OF SYSTEMS:      Review of Systems   Constitutional:  Positive for fatigue. Negative for chills and fever. Respiratory:  Negative for shortness of breath and wheezing. Cardiovascular:  Positive for chest pain. Negative for palpitations. Gastrointestinal:  Positive for nausea. Negative for abdominal pain and vomiting. Skin:  Positive for color change and wound. Neurological:  Negative for syncope, numbness and headaches.       PAST MEDICAL HISTORY:      has a past medical history of Ambulates with cane, Anxiety, Borderline hypertension, CAD (coronary artery disease), Depression, GERD (gastroesophageal reflux disease), Heart attack ([address] Hypertension, Hypothyroidism, Neuropathy, Obesity, Osteoarthritis, Other cirrhosis of liver ([address] Sleep apnea, Type II or unspecified type diabetes mellitus without mention of complication, not stated as uncontrolled, Under care of service provider, Under care of service provider, and Vertebrogenic low back pain. PAST SURGICAL HISTORY:      has a past surgical history that includes Hysterectomy; Colonoscopy; Upper gastrointestinal endoscopy; Dilation and curettage of uterus; hiatal hernia repair; Throat surgery; Appendectomy; Total knee arthroplasty (Right, 01/06/2015); Total knee arthroplasty (Left, 05/26/2015); Coronary angioplasty with stent (07/2020); Cardiac catheterization; Cardiac catheterization (11/01/2022); Coronary artery bypass graft (N/A, 11/28/2022); IR CHEST TUBE INSERTION (01/13/2023); and Wound debridement (01/14/2023). SOCIAL HISTORY:      reports that she has never smoked. She has never used smokeless tobacco. She reports current alcohol use. She reports that she does not use drugs. FAMILY HISTORY:     family history includes Arthritis in her father and mother; Cancer in her father; Diabetes in her sister; Heart Disease in her father and mother. HOME MEDICATIONS:      Prior to Admission medications    Medication Sig Start Date End Date Taking?  Authorizing Provider   insulin lispro, 1 Unit Dial, (HUMALOG KWIKPEN) 100 UNIT/ML SOPN Inject 3 Units into the skin 3 times daily (before meals) 125 - 150 2 units   151 - 200 4 units   201 - 250 6 units   251 - 300 8 units  301 - 350 10 units 351 - 400 12 units 12/29/22   Tommy Durbin MD   amLODIPine (NORVASC) 5 MG tablet  12/20/22   Historical Provider, MD   doxycycline monohydrate (MONODOX) 100 MG capsule  12/20/22   Historical Provider, MD Kehinde Barboza 100-25 MCG/ACT AEPB  12/20/22   Historical Provider, MD   isosorbide mononitrate (IMDUR) 60 MG extended release tablet  12/20/22   Historical Provider, MD   potassium chloride (KLOR-CON) 10 MEQ extended release tablet  12/20/22   Historical Provider, MD   traMADol [place] Mor) 50 MG tablet  12/20/22   Historical Provider, MD   traZODone (DESYREL) 50 MG tablet  12/20/22   Historical Provider, MD   aspirin 81 MG EC tablet Take 1 tablet by mouth daily 12/6/22   Wallace Rodriguez MD   amiodarone (CORDARONE) 200 MG tablet Take 1 tablet by mouth 3 times daily 12/5/22   Wallace Rodriguez MD   metoprolol tartrate (LOPRESSOR) 25 MG tablet Take 0.5 tablets by mouth 2 times daily 12/5/22   Wallace Rodriguez MD   clopidogrel (PLAVIX) 75 MG tablet Take 1 tablet by mouth daily 12/6/22   Wallace Rodriguez MD   furosemide (LASIX) 40 MG tablet Take 1 tablet by mouth 2 times daily 12/5/22   Wallace Rodriguez MD   potassium chloride (KLOR-CON M) 10 MEQ extended release tablet Take 2 tablets by mouth daily 12/5/22   Wallace Rodriguez MD   atorvastatin (LIPITOR) 40 MG tablet Take 1 tablet by mouth nightly 11/16/22   Victorino Starks MD   empagliflozin (JARDIANCE) 10 MG tablet TAKE 1 TABLET BY MOUTH EVERY DAY 10/22/22   Alexei Jay MD   levothyroxine (SYNTHROID) 50 MCG tablet TAKE 1 TABLET BY MOUTH EVERY DAY 10/22/22   Alexei Jay MD   citalopram (CELEXA) 40 MG tablet TAKE 1 TABLET BY MOUTH ONCE DAILY *EMERGENCY REFILL* 9/29/22   Alexei Jay MD   glimepiride (AMARYL) 4 MG tablet TAKE 1 TABLET BY MOUTH TWICE DAILY *EMERGENCY REFILL* 9/29/22   Alexei Jay MD   omeprazole (PRILOSEC) 20 MG delayed release capsule TAKE 1 CAPSULE BY MOUTH ONCE DAILY 9/22/22   Paulette Turner DO   gabapentin (NEURONTIN) 400 MG capsule TAKE 1 CAPSULE BY MOUTH THREE TIMES DAILY 4/22/22 11/18/22  Sandra Spain MD   sucralfate (CARAFATE) 1 GM tablet TAKE 1 TABLET BY MOUTH EVERY DAY  Patient not taking: Reported on 1/12/2023 4/20/22   Sandra Spain MD   busPIRone (BUSPAR) 10 MG tablet TAKE ONE (1) TABLET BY MOUTH TWICE DAILY 3/11/22   Sandra Spain MD   insulin glargine (BASAGLAR KWIKPEN) 100 UNIT/ML injection pen Inject 65 units subcutaneously once daily. Patient taking differently: Inject 20 Units into the skin Inject 65 units subcutaneously once daily.    Changed at [facility] rehab 3/1/22   Sandra Spain MD   miconazole (MICOTIN) 2 % cream APPLY TO AFFECTED AREA TWICE DAILY  Patient not taking: Reported on 1/12/2023 11/12/21   Harpreet Bueno MD   lidocaine (XYLOCAINE) 2 % jelly Apply topically with dressing changes every 3 days  Patient not taking: Reported on 1/12/2023 8/5/21   Harpreet Bueno MD   blood glucose monitor strips Test before meals and at bedtime. DX: DM, on insulin 10/8/20   Harpreet Bueno MD   miconazole (ZEASORB-AF) 2 % powder Apply topically 2 times daily. Patient not taking: Reported on 1/12/2023 10/1/20   Harpreet Bueno MD   blood glucose monitor kit and supplies Dispense sufficient amount for indicated testing frequency plus additional to accommodate PRN testing needs. Dispense all needed supplies to include: monitor, strips, lancing device, lancets, control solutions, alcohol swabs. 8/17/20   Isaiah Jaime MD   magnesium hydroxide (MILK OF MAGNESIA) 400 MG/5ML suspension Take 30 mLs by mouth daily as needed for Constipation 7/29/20   Isaiah Jaime MD   Handicap Placard MISC Is a permanent condition. DX: M19.90 5/14/19   Harpreet Bueno MD   Insulin Pen Needle (B-D UF III MINI PEN NEEDLES) 31G X 5 MM MISC USE 1 PEN NEEDLE DAILY 3/8/18   Harpreet Bueno MD   [employer] Lancets Thin MISC Test before meals and at bedtime.  DX: DM, on insulin 4/22/14   Merle Whittaker MD   Alcohol Swabs (ALCOHOL PREP PADS) by Other route 2 times daily      Historical Provider, MD       ALLERGIES:     Morphine, Shellfish-derived products, and Tape [adhesive tape]      OBJECTIVE:       Vitals:    01/15/23 0937 01/15/23 0940 01/15/23 1000 01/15/23 1005   BP:   104/71    Pulse: 74 73 70    Resp: 20 20 11    Temp:    97.7 °F (36.5 °C)   TempSrc:       SpO2: 97% 97% 91%    Weight:       Height:             Intake/Output Summary (Last 24 hours) at 1/15/2023 1042  Last data filed at 1/15/2023 0827  Gross per 24 hour   Intake 812.39 ml   Output 2293 ml   Net -1480.61 ml       PHYSICAL EXAM:  Physical Exam  Constitutional:       General: She is not in acute distress. Appearance: She is obese. She is not ill-appearing. HENT:      Head: Normocephalic and atraumatic. Eyes:      Extraocular Movements: Extraocular movements intact. Cardiovascular:      Rate and Rhythm: Normal rate and regular rhythm. Pulses: Normal pulses. Comments: Midsternal wound VAC in place    Left-sided chest tube in place  Pulmonary:      Effort: Pulmonary effort is normal.      Breath sounds: No wheezing or rales. Comments: Decreased breath sound left lung  Abdominal:      General: Bowel sounds are normal. There is no distension. Palpations: Abdomen is soft. Tenderness: There is no abdominal tenderness. There is no guarding or rebound. Comments: Protuberant abdomen   Skin:     General: Skin is warm. Neurological:      General: No focal deficit present. Mental Status: She is alert and oriented to person, place, and time.    Psychiatric:         Mood and Affect: Mood normal.        DIAGNOSTICS:     Laboratory Testing:    Recent Results (from the past 24 hour(s))   Basic Metabolic Panel    Collection Time: 01/14/23 11:41 AM   Result Value Ref Range    Glucose 200 (H) 70 - 99 mg/dL    BUN 22 8 - 23 mg/dL    Creatinine 1.11 (H) 0.50 - 0.90 mg/dL    Est, Glom Filt Rate 55 (L) >60 mL/min/1.73m2    Calcium 8.3 (L) 8.6 - 10.4 mg/dL    Sodium 135 135 - 144 mmol/L    Potassium 4.0 3.7 - 5.3 mmol/L    Chloride 97 (L) 98 - 107 mmol/L    CO2 31 20 - 31 mmol/L    Anion Gap 7 (L) 9 - 17 mmol/L   CBC    Collection Time: 01/14/23 11:41 AM   Result Value Ref Range    WBC 9.4 3.5 - 11.3 k/uL    RBC 3.84 (L) 3.95 - 5.11 m/uL    Hemoglobin 10.6 (L) 11.9 - 15.1 g/dL    Hematocrit 36.0 (L) 36.3 - 47.1 %    MCV 93.8 82.6 - 102.9 fL    MCH 27.6 25.2 - 33.5 pg    MCHC 29.4 28.4 - 34.8 g/dL    RDW 13.5 11.8 - 14.4 %    Platelets 658 903 - 696 k/uL    MPV 10.8 8.1 - 13.5 fL    NRBC Automated 0.0 0.0 per 100 WBC   Protime-INR    Collection Time: 01/14/23 11:41 AM Result Value Ref Range    Protime 11.4 9.1 - 12.3 sec    INR 1.1    APTT    Collection Time: 01/14/23 11:41 AM   Result Value Ref Range    PTT 23.0 20.5 - 30.5 sec   Magnesium    Collection Time: 01/14/23 11:41 AM   Result Value Ref Range    Magnesium 1.7 1.6 - 2.6 mg/dL   Calcium, Ionized    Collection Time: 01/14/23 11:41 AM   Result Value Ref Range    Calcium, Ionized 1.14 1.13 - 1.33 mmol/L   EKG 12 lead    Collection Time: 01/14/23  1:19 PM   Result Value Ref Range    Ventricular Rate 76 BPM    Atrial Rate 76 BPM    P-R Interval 154 ms    QRS Duration 96 ms    Q-T Interval 468 ms    QTc Calculation (Bazett) 526 ms    P Axis 23 degrees    R Axis -12 degrees    T Axis 18 degrees   POC Glucose Fingerstick    Collection Time: 01/14/23  4:41 PM   Result Value Ref Range    POC Glucose 228 (H) 65 - 105 mg/dL   POC Glucose Fingerstick    Collection Time: 01/14/23  8:37 PM   Result Value Ref Range    POC Glucose 222 (H) 65 - 105 mg/dL   Basic Metabolic Panel    Collection Time: 01/15/23  6:06 AM   Result Value Ref Range    Glucose 262 (H) 70 - 99 mg/dL    BUN 19 8 - 23 mg/dL    Creatinine 0.89 0.50 - 0.90 mg/dL    Est, Glom Filt Rate >60 >60 mL/min/1.73m2    Calcium 8.5 (L) 8.6 - 10.4 mg/dL    Sodium 133 (L) 135 - 144 mmol/L    Potassium 3.8 3.7 - 5.3 mmol/L    Chloride 96 (L) 98 - 107 mmol/L    CO2 29 20 - 31 mmol/L    Anion Gap 8 (L) 9 - 17 mmol/L   CBC    Collection Time: 01/15/23  6:06 AM   Result Value Ref Range    WBC 9.8 3.5 - 11.3 k/uL    RBC 3.85 (L) 3.95 - 5.11 m/uL    Hemoglobin 10.8 (L) 11.9 - 15.1 g/dL    Hematocrit 35.5 (L) 36.3 - 47.1 %    MCV 92.2 82.6 - 102.9 fL    MCH 28.1 25.2 - 33.5 pg    MCHC 30.4 28.4 - 34.8 g/dL    RDW 13.6 11.8 - 14.4 %    Platelets 925 067 - 900 k/uL    MPV 10.7 8.1 - 13.5 fL    NRBC Automated 0.0 0.0 per 100 WBC   Protime-INR    Collection Time: 01/15/23  6:06 AM   Result Value Ref Range    Protime 11.8 9.1 - 12.3 sec    INR 1.1    Magnesium    Collection Time: 01/15/23  6:06 AM   Result Value Ref Range    Magnesium 1.7 1.6 - 2.6 mg/dL   Calcium, Ionized    Collection Time: 01/15/23  6:06 AM   Result Value Ref Range    Calcium, Ionized 1.13 1.13 - 1.33 mmol/L         Imaging/Diagonstics:    Current Facility-Administered Medications   Medication Dose Route Frequency Provider Last Rate Last Admin    magnesium sulfate 2000 mg in 50 mL IVPB premix  2,000 mg IntraVENous Once Boby Tanner MD 25 mL/hr at 01/15/23 1014 2,000 mg at 01/15/23 1014    piperacillin-tazobactam (ZOSYN) 3,375 mg in dextrose 5 % 50 mL IVPB (mini-bag)  3,375 mg IntraVENous Once Marguerita Flakes, APRN - CNP        sodium chloride flush 0.9 % injection 5-40 mL  5-40 mL IntraVENous 2 times per day Marguerita Flakes, APRN - CNP   10 mL at 01/14/23 2107    sodium chloride flush 0.9 % injection 5-40 mL  5-40 mL IntraVENous PRN Marguerita Flakes, APRN - CNP        0.9 % sodium chloride infusion   IntraVENous PRN Marguerita Flakes, APRN - CNP        fentaNYL (SUBLIMAZE) injection 25 mcg  25 mcg IntraVENous Q1H PRN Marguerita Flakes, APRN - CNP        Or    fentaNYL (SUBLIMAZE) injection 50 mcg  50 mcg IntraVENous Q1H PRN Marguerita Flakes, APRN - CNP   50 mcg at 01/14/23 2054    meperidine (DEMEROL) injection 25 mg  25 mg IntraVENous Once PRN Marguerita Flakes, APRN - CNP        hydrALAZINE (APRESOLINE) injection 5 mg  5 mg IntraVENous Q5 Min PRN Marguerita Flakes, APRN - CNP        metoprolol (LOPRESSOR) injection 2.5 mg  2.5 mg IntraVENous Q10 Min PRN Marguerita Flakes, APRN - CNP        mupirocin (BACTROBAN) 2 % ointment   Nasal BID Marguerita Flakes, APRN - CNP   Given at 01/15/23 0817    diphenhydrAMINE (BENADRYL) tablet 25 mg  25 mg Oral Nightly PRN Marguerita Flakes, APRN - CNP        polyethylene glycol (GLYCOLAX) packet 17 g  17 g Oral Daily Marguerita Flakes, APRN - CNP   17 g at 01/15/23 0735    sennosides-docusate sodium (SENOKOT-S) 8.6-50 MG tablet 1 tablet  1 tablet Oral BID HAO Correa CNP   1 tablet at 01/15/23 5576    magnesium hydroxide (MILK OF MAGNESIA) 400 MG/5ML suspension 30 mL  30 mL Oral Daily PRN Mayelin Kraft, APRN - CNP        bisacodyl (DULCOLAX) suppository 10 mg  10 mg Rectal Daily PRN [facility], APR[facility] - CNP        pantoprazole (PROTONIX) tablet 40 mg  40 mg Oral Daily [facility]   40 mg at 01/15/23 0815    magnesium sulfate 2000 mg in 50 mL IVPB premix  2,000 mg IntraVENous PRN Meldmauricio Kraft, APRN - CNP        ipratropium-albuterol (DUONEB) nebulizer solution 1 ampule  1 ampule Inhalation Q4H PRN [facility] , APRN - CNP        albumin human 5 % IV solution 25 g  25 g IntraVENous PRN [facility] , APRN - CNP        albumin human 25 % IV solution 25 g  25 g IntraVENous PRN [facility] - CNP        glucose chewable tablet 16 g  4 tablet Oral PRN [facility] - CNP        clopidogrel (PLAVIX) tablet 75 mg  75 mg Oral Daily [facility] - CNP   75 mg at 01/15/23 0814    enoxaparin Sodium (LOVENOX) injection 30 mg  30 mg SubCUTAneous BID [facility], APR[facility] - CNP   30 mg at 01/15/23 0814    furosemide (LASIX) tablet 40 mg  40 mg Oral BID [facility] - CNP   40 mg at 01/15/23 0813    vancomycin (VANCOCIN) 500 mg in dextrose 5 % 100 mL IVPB (mini-bag)  500 mg IntraVENous Q12H Keo Barcenas MD   Stopped at 01/15/23 0913    insulin glargine (LANTUS) injection vial 60 Units  60 Units SubCUTAneous Nightly Hong Diaz MD   60 Units at 01/14/23 2103    oxyCODONE-acetaminophen (PERCOCET) 5-325 MG per tablet 1 tablet  1 tablet Oral Q6H PRN Meldmauricio Kraft, APRN - CNP   1 tablet at 01/15/23 0735    0.9 % sodium chloride infusion   IntraVENous PRN Mayelin Kraft, APRN - CNP 12.5 mL/hr at 01/13/23 1127 25 mL at 01/13/23 1127    acetaminophen (TYLENOL) tablet 650 mg  650 mg Oral Q6H PRN HAO John - CNP   650 mg at 01/13/23 0855    Or    acetaminophen (TYLENOL) suppository 650 mg  650 mg Rectal Q6H PRN Mayelin Kraft, APRN - CNP        potassium chloride (KLOR-CON M) extended release tablet 40 mEq  40 mEq Oral PRN [facility], APR[facility] - CNP        Or    potassium bicarb-citric acid (EFFER-K) effervescent tablet 40 mEq  40 mEq Oral PRN Kiko Lofts, APRN - CNP        Or    potassium chloride 10 mEq/100 mL IVPB (Peripheral Line)  10 mEq IntraVENous PRN Kiko Courtneyfts, APRN - CNP        piperacillin-tazobactam (ZOSYN) 3,375 mg in dextrose 5 % 50 mL IVPB extended infusion (mini-bag)  3,375 mg IntraVENous Q8H Kiko Kelly, APRN - CNP   Stopped at 01/15/23 0603    amiodarone (CORDARONE) tablet 200 mg  200 mg Oral TID Kiko Kelly, APRN - CNP   200 mg at 01/15/23 0815    amLODIPine (NORVASC) tablet 5 mg  5 mg Oral Daily [facility], APR[facility] - CNP   5 mg at 01/15/23 5377    aspirin EC tablet 81 mg  81 mg Oral Daily [facility], APRN - CNP   81 mg at 01/15/23 0815    atorvastatin (LIPITOR) tablet 40 mg  40 mg Oral Nightly Kiko [facility], APRN - CNP   40 mg at 01/14/23 2030    budesonide-formoterol (SYMBICORT) 80-4.5 MCG/ACT inhaler 2 puff  2 puff Inhalation BID [facility], [facility] - CNP   2 puff at 01/15/23 0937    busPIRone (BUSPAR) tablet 10 mg  10 mg Oral BID Kiko [facility], APRN - CNP   10 mg at 01/15/23 0816    citalopram (CELEXA) tablet 40 mg  40 mg Oral Daily Kiko [facility], APRN - CNP   40 mg at 01/15/23 0815    gabapentin (NEURONTIN) capsule 400 mg  400 mg Oral TID [facility], APR[facility] - CNP   400 mg at 01/15/23 0813    isosorbide mononitrate (IMDUR) extended release tablet 60 mg  60 mg Oral Daily Kiko [facility], APRN - CNP   60 mg at 01/15/23 0814    levothyroxine (SYNTHROID) tablet 50 mcg  50 mcg Oral Daily Kiko [facility], APRN - CNP   50 mcg at 01/15/23 0816    metoprolol tartrate (LOPRESSOR) tablet 12.5 mg  12.5 mg Oral BID HAO Terry - CNP   12.5 mg at 01/15/23 0813    [Held by provider] traZODone (DESYREL) tablet 50 mg  50 mg Oral Nightly HAO Terry - NEELAM   50 mg at 01/14/23 2029    [MAR Hold] glucagon (rDNA) injection 1 mg  1 mg SubCUTAneous PRN Lia Colindres MD        insulin lispro (HUMALOG) injection vial 0-16 Units  0-16 Units SubCUTAneous TID  HAO Cutler CNP   8 Units at 01/15/23 0736    insulin lispro (HUMALOG) injection vial 0-4 Units  0-4 Units SubCUTAneous Nightly HAO Cutler CNP        vancomycin (VANCOCIN) intermittent dosing (placeholder)   Other RX Placeholder HAO Culter CNP           ASSESSMENT:     Principal Problem:    Incisional infection  Active Problems:    Pleural effusion on left    Lactic acid acidosis    Bandemia    Sternal wound dehiscence  Resolved Problems:    * No resolved hospital problems.  *      PLAN:     S/p CABG 11/22/23 with subsequent surgical wound dehiscence and infection with abscess  -CT scan of the chest showed diastasis of mediastinal mass with air in the soft tissues as well as an abscess  -S/p I&D 1/13  -S/p sternal debridement and washout with wound VAC placement 1/14  -Pain control: Percocet 5-325 mg 1 tablet every 6 hours PRN and fentanyl pain panel PRN  -On aspirin 81 mg, plavix 75 mg QD, Lasix 40 mg twice daily  -Amiodarone 200 mg tid started post CABG  -Blood cultures NGTD  -Pulmonology on board:   -Appreciated recommendations  -CTS on board              -Appreciate further recommendation  -ID on board   -Appreciated recommendations   -Currently on IV Vanco and Zosyn     L sided pleural effusion  -Chest x-ray showed left-sided pleural effusion with almost complete collapse of the left lobe of lung  -chest tube placement on 1/13/22   -output: -43 cc/ 24hrs  -repeat CXR 1/15: Near complete opacification of the left hemithorax with decreased air  -Pulmonology on board:   -Appreciate further recommendations    Uncontrolled T2DM  -Lantus 60 units lantus nightly with HDISS  -POC glucose checks  -A1c 7.5 11/18/22  -Hypoglycemia protocol  -Neuropathy - resumed Gabapentin 400 mg tid HTN  -Norvasc 5 mg daily, Imdur 60 mg daily, Lopressor 12.5 mg twice daily  -Hydralazine 5 mg as needed     Prolonged QTC  -EKG 1/14:   -Repeat mag 1.7 this AM; will give 2G magnesium  -On multiple medications that can prolong the QTC including BuSpar, Celexa, and trazodone  -We will discontinue trazodone at this time  -Recommend repeat EKG tomorrow     Hypothyroidism  -Synthroid 50 mcg qd      Multivessel CAD status post CABG 11/28/22   -On Aspirin 81 mg, Plavix 75 mg qd, Lasix 40 mg bid, Imdur 60 mg qd.   -Lipitor 40 mg nightly  -Last echo showed EF 54%- 11/14/22    AMRIK (resolved)  -Creatinine 0.89  -Avoid nephrotoxic agents       DVT prophylaxis: Lovenox 30 mg bid  GI prophylaxis: Protonix 40 ,mg QD           Above plan discussed with the patient and family in room, who agreed to the above plan   Plan will be discussed with the attending, Dr. Cecily Johnson MD  Family Medicine Resident  1/15/2023 10:42 AM

## 2023-01-15 NOTE — BRIEF OP NOTE
Brief Postoperative Note      Patient: Iram Camacho  YOB: 1955  MRN: [id number]    Date of Procedure: 1/14/2023    Pre-Op Diagnosis: ADD-ON    Post-Op Diagnosis: Same       Procedure(s):  STERNAL WOUND WASHOUT, DEBRIDEMENT, WOUND VAC PLACEMENT    Surgeon(s):  Marybel Muniz MD    Assistant:  * No surgical staff found *    Anesthesia: General    Estimated Blood Loss (mL): 272     Complications: None    Specimens:   ID Type Source Tests Collected by Time Destination   1 : STERNAL WOUND INFECTION Swab Chest CULTURE, ANAEROBIC AND AEROBIC Marybel Muniz MD 1/14/2023 7428        Implants:  * No implants in log *      Drains:   Chest Tube Left 1 (Active)   Chest Tube Airleak No 01/15/23 1200   Status Continuous Suction 01/15/23 0700   Suction -20 cm H2O 01/15/23 0700   Y Connector Used No 01/15/23 1200   Drainage Description Serosanguinous 01/15/23 0700   Dressing Status Clean, dry & intact 01/15/23 1200   Chest Tube Dressing Petroleum Jelly 01/14/23 2000   Site Assessment Not assessed 01/15/23 1200   Surrounding Skin Unable to view 01/15/23 1200   Output (ml) 3 ml 01/15/23 0551       Negative Pressure Wound Therapy Sternum Anterior; Lower (Active)   Wound Type Surgical 01/15/23 1200   Unit Type VeraFlo Therapy 01/15/23 1200   Dressing Type Black Foam 01/15/23 1200   Cycle Continuous 01/15/23 1200   Target Pressure (mmHg) 125 01/15/23 1200   Canister changed?  No 01/15/23 1200   Dressing Status Clean, dry & intact 01/15/23 1200   Drainage Amount Small 01/15/23 1200   Drainage Description Serosanguinous 01/15/23 1200   Output (ml) 50 ml 01/15/23 0551       External Urinary Catheter (Active)   Site Assessment Clean,dry & intact 01/14/23 1045   Placement Initiated 01/14/23 1045   Securement Method Securing device (Describe) 01/14/23 1045   Catheter Care Catheter/Wick replaced;Suction Canister/Tubing changed 01/14/23 1045   Perineal Care Yes 01/14/23 1045   Suction 40 mmgHg continuous 01/14/23 1045   Urine Color Yellow 01/14/23 1045   Urine Appearance Clear 01/14/23 1045   Output (mL) 350 mL 01/15/23 1210         Electronically signed by Bharati Alcocer MD on 1/15/2023 at 12:14 PM

## 2023-01-15 NOTE — PLAN OF CARE
Problem: Chronic Conditions and Co-morbidities  Goal: Patient's chronic conditions and co-morbidity symptoms are monitored and maintained or improved  Outcome: Progressing  Flowsheets (Taken 1/15/2023 0700)  Care Plan - Patient's Chronic Conditions and Co-Morbidity Symptoms are Monitored and Maintained or Improved: Monitor and assess patient's chronic conditions and comorbid symptoms for stability, deterioration, or improvement     Problem: Pain  Goal: Verbalizes/displays adequate comfort level or baseline comfort level  Outcome: Progressing     Problem: Skin/Tissue Integrity  Goal: Absence of new skin breakdown  Description: 1. Monitor for areas of redness and/or skin breakdown  2. Assess vascular access sites hourly  3. Every 4-6 hours minimum:  Change oxygen saturation probe site  4. Every 4-6 hours:  If on nasal continuous positive airway pressure, respiratory therapy assess nares and determine need for appliance change or resting period.   Outcome: Progressing     Problem: Safety - Adult  Goal: Free from fall injury  Outcome: Progressing     Problem: ABCDS Injury Assessment  Goal: Absence of physical injury  Outcome: Progressing     Problem: Discharge Planning  Goal: Discharge to home or other facility with appropriate resources  Outcome: Progressing  Flowsheets (Taken 1/15/2023 0700)  Discharge to home or other facility with appropriate resources: Identify barriers to discharge with patient and caregiver     Problem: Respiratory - Adult  Goal: Achieves optimal ventilation and oxygenation  Outcome: Progressing  Flowsheets (Taken 1/15/2023 0700)  Achieves optimal ventilation and oxygenation: Assess for changes in respiratory status     Problem: Skin/Tissue Integrity - Adult  Goal: Skin integrity remains intact  Outcome: Progressing  Flowsheets (Taken 1/15/2023 0700 by Favio Maxwell)  Skin Integrity Remains Intact: Monitor for areas of redness and/or skin breakdown  Goal: Incisions, wounds, or drain sites healing without S/S of infection  Outcome: Progressing  Flowsheets (Taken 1/15/2023 0700 by [employer])  Incisions, Wounds, or Drain Sites Healing Without Sign and Symptoms of Infection: TWICE DAILY: Assess and document skin integrity  Goal: Oral mucous membranes remain intact  Outcome: Progressing  Flowsheets (Taken 1/15/2023 0700 by [employer])  Oral Mucous Membranes Remain Intact: Assess oral mucosa and hygiene practices     Problem: Nutrition Deficit:  Goal: Optimize nutritional status  Outcome: Progressing

## 2023-01-15 NOTE — PROGRESS NOTES
[facility]  Daily Progress Note    Surgeon: Kendall Levy   POD# 1   S/P :  sternal wound debridement with wound vac placement     Subjective:    Patient was seen and examined at bedside this morning without any complaints. Vital Signs: /63   Pulse 72   Temp 98.2 °F (36.8 °C) (Oral)   Resp 19   Ht 4' 11\" (1.499 m)   Wt 239 lb (108.4 kg)   SpO2 90%   BMI 48.27 kg/m²  O2 Flow Rate (L/min): (S) 1 L/min   Admit Weight: Weight: 252 lb (114.3 kg)       I/O's:  I/O last 3 completed shifts: In: 1112.4 [I.V.:475; IV Piggyback:637.4]  Out: 1716 [Urine:1550; Drains:100; Chest Tube:66]    Data:    CBC:   Recent Labs     01/14/23  0656 01/14/23  1141 01/15/23  0606   WBC 10.6 9.4 9.8   HGB 10.6* 10.6* 10.8*   HCT 38.1 36.0* 35.5*   MCV 99.0 93.8 92.2    185 205     BMP:   Recent Labs     01/14/23  0656 01/14/23  1141 01/15/23  0606   * 135 133*   K 4.5 4.0 3.8   CL 93* 97* 96*   CO2 26 31 29   BUN 26* 22 19   CREATININE 1.21* 1.11* 0.89     PT/INR:   Recent Labs     01/12/23  1619 01/14/23  1141 01/15/23  0606   PROTIME 11.2 11.4 11.8   INR 1.1 1.1 1.1     APTT:   Recent Labs     01/14/23  1141   APTT 23.0       Chest X-Ray:   FINDINGS:   AP portable view of the chest time stamped at 526 hours demonstrates prior   median sternotomy and overlying cardiac monitoring electrodes. Pigtail   terminus chest tube on the left is unchanged ending in the left mid lung   field laterally. Complete opacification of the left hemithorax is unchanged   from prior examination. Slightly decreased air in the left hemithorax is   noted. Hydropneumothorax on the left not excluded. Lung demonstrates no   acute infiltrate or extrapleural air.            Impression   Near complete opacification of the left hemithorax with decreased air   compared to prior exam.       Scheduled Meds:    sennosides-docusate sodium  2 tablet Oral BID    alteplase (ACTIVASE) syringe  5 mg IntraPLEUral Once    And    dornase (PULMOZYME) syringe  5 mg IntraPLEUral Once    vancomycin  750 mg IntraVENous Q12H    piperacillin-tazobactam  3,375 mg IntraVENous Once    sodium chloride flush  5-40 mL IntraVENous 2 times per day    mupirocin   Nasal BID    polyethylene glycol  17 g Oral Daily    pantoprazole  40 mg Oral Daily    clopidogrel  75 mg Oral Daily    enoxaparin  30 mg SubCUTAneous BID    furosemide  40 mg Oral BID    insulin glargine  60 Units SubCUTAneous Nightly    piperacillin-tazobactam  3,375 mg IntraVENous Q8H    amiodarone  200 mg Oral TID    amLODIPine  5 mg Oral Daily    aspirin  81 mg Oral Daily    atorvastatin  40 mg Oral Nightly    budesonide-formoterol  2 puff Inhalation BID    [Held by provider] busPIRone  10 mg Oral BID    citalopram  40 mg Oral Daily    gabapentin  400 mg Oral TID    isosorbide mononitrate  60 mg Oral Daily    levothyroxine  50 mcg Oral Daily    metoprolol tartrate  12.5 mg Oral BID    [Held by provider] traZODone  50 mg Oral Nightly    insulin lispro  0-16 Units SubCUTAneous TID WC    insulin lispro  0-4 Units SubCUTAneous Nightly    vancomycin (VANCOCIN) intermittent dosing (placeholder)   Other RX Placeholder     Continuous Infusions:    sodium chloride      sodium chloride 25 mL (01/13/23 1127)           Physical Exam:    General: A&O x 3, NAD noted  Heart: Normal S1, S2, RRR, No murmurs noted   Sternum: Prevena in place   Lungs: Diminished BS bilaterally at the bases. CT's in place to [facility]. No air leak noted   Abdomen: Soft, non tender, non distended, Hypoactive BS x 4   : Erazo in place   Extremities: No edema noted bilateral LE. EVH sites: CDI         Assessment & Plan:    Ms. Sanjuana Lau is a 78y.o. year-old female status post sternal wound debridement with wound vac placement on 1/14/2023.      1/15/2023  No issues or events noted with the patient overnight. Plan to transfer to [address] [place] [address] today.  CXR complete while uot on left, prior CT with loculated pleural effusion along with mucous plugging. Attempted TPA  and dornase to break up effusion as CT with minimal drainage output. Will need new CT placed in IR tomorrow, currently clogged unable to flush, will try again tomorrow once CT is placed. ID on board to manage abx    The above recommendations including medications and orders were discussed and agreed upon with Dr. Walter Reed / Dr. Zee Altman.        Lori Butterfield, APRN-CNP

## 2023-01-16 ENCOUNTER — APPOINTMENT (OUTPATIENT)
Dept: INTERVENTIONAL RADIOLOGY/VASCULAR | Age: 68
End: 2023-01-16
Payer: MEDICARE

## 2023-01-16 ENCOUNTER — APPOINTMENT (OUTPATIENT)
Dept: GENERAL RADIOLOGY | Age: 68
End: 2023-01-16
Payer: MEDICARE

## 2023-01-16 PROBLEM — Z95.1 S/P CABG (CORONARY ARTERY BYPASS GRAFT): Status: ACTIVE | Noted: 2023-01-16

## 2023-01-16 PROBLEM — L02.213 ABSCESS OF STERNAL REGION: Status: ACTIVE | Noted: 2023-01-16

## 2023-01-16 LAB
ANION GAP SERPL CALCULATED.3IONS-SCNC: 8 MMOL/L (ref 9–17)
BUN BLDV-MCNC: 15 MG/DL (ref 8–23)
CALCIUM IONIZED: 1.17 MMOL/L (ref 1.13–1.33)
CALCIUM SERPL-MCNC: 8.7 MG/DL (ref 8.6–10.4)
CHLORIDE BLD-SCNC: 95 MMOL/L (ref 98–107)
CO2: 33 MMOL/L (ref 20–31)
CREAT SERPL-MCNC: 0.74 MG/DL (ref 0.5–0.9)
CULTURE: ABNORMAL
DIRECT EXAM: ABNORMAL
DIRECT EXAM: ABNORMAL
EKG ATRIAL RATE: 76 BPM
EKG ATRIAL RATE: 79 BPM
EKG P AXIS: 23 DEGREES
EKG P AXIS: 28 DEGREES
EKG P-R INTERVAL: 154 MS
EKG P-R INTERVAL: 182 MS
EKG Q-T INTERVAL: 430 MS
EKG Q-T INTERVAL: 468 MS
EKG QRS DURATION: 92 MS
EKG QRS DURATION: 96 MS
EKG QTC CALCULATION (BAZETT): 493 MS
EKG QTC CALCULATION (BAZETT): 526 MS
EKG R AXIS: -12 DEGREES
EKG R AXIS: -22 DEGREES
EKG T AXIS: 16 DEGREES
EKG T AXIS: 18 DEGREES
EKG VENTRICULAR RATE: 76 BPM
EKG VENTRICULAR RATE: 79 BPM
GFR SERPL CREATININE-BSD FRML MDRD: >60 ML/MIN/1.73M2
GLUCOSE BLD-MCNC: 117 MG/DL (ref 70–99)
GLUCOSE BLD-MCNC: 139 MG/DL (ref 65–105)
GLUCOSE BLD-MCNC: 197 MG/DL (ref 65–105)
GLUCOSE BLD-MCNC: 256 MG/DL (ref 65–105)
GLUCOSE BLD-MCNC: 298 MG/DL (ref 65–105)
GLUCOSE BLD-MCNC: 331 MG/DL (ref 65–105)
HCT VFR BLD CALC: 35.2 % (ref 36.3–47.1)
HEMOGLOBIN: 10.8 G/DL (ref 11.9–15.1)
INR BLD: 1.1
Lab: ABNORMAL
MAGNESIUM: 1.9 MG/DL (ref 1.6–2.6)
MCH RBC QN AUTO: 27.8 PG (ref 25.2–33.5)
MCHC RBC AUTO-ENTMCNC: 30.7 G/DL (ref 28.4–34.8)
MCV RBC AUTO: 90.5 FL (ref 82.6–102.9)
MRSA, DNA, NASAL: NEGATIVE
NRBC AUTOMATED: 0 PER 100 WBC
PDW BLD-RTO: 13.4 % (ref 11.8–14.4)
PLATELET # BLD: 237 K/UL (ref 138–453)
PMV BLD AUTO: 10.2 FL (ref 8.1–13.5)
POTASSIUM SERPL-SCNC: 3.9 MMOL/L (ref 3.7–5.3)
PROTHROMBIN TIME: 11.8 SEC (ref 9.1–12.3)
RBC # BLD: 3.89 M/UL (ref 3.95–5.11)
SODIUM BLD-SCNC: 136 MMOL/L (ref 135–144)
SPECIMEN DESCRIPTION: ABNORMAL
SPECIMEN DESCRIPTION: NORMAL
WBC # BLD: 9.8 K/UL (ref 3.5–11.3)

## 2023-01-16 PROCEDURE — 99232 SBSQ HOSP IP/OBS MODERATE 35: CPT | Performed by: INTERNAL MEDICINE

## 2023-01-16 PROCEDURE — 6360000002 HC RX W HCPCS

## 2023-01-16 PROCEDURE — 2709999900 HC NON-CHARGEABLE SUPPLY

## 2023-01-16 PROCEDURE — 6370000000 HC RX 637 (ALT 250 FOR IP): Performed by: STUDENT IN AN ORGANIZED HEALTH CARE EDUCATION/TRAINING PROGRAM

## 2023-01-16 PROCEDURE — 2140000001 HC CVICU INTERMEDIATE R&B

## 2023-01-16 PROCEDURE — 82947 ASSAY GLUCOSE BLOOD QUANT: CPT

## 2023-01-16 PROCEDURE — 6370000000 HC RX 637 (ALT 250 FOR IP)

## 2023-01-16 PROCEDURE — 49423 EXCHANGE DRAINAGE CATHETER: CPT

## 2023-01-16 PROCEDURE — 71045 X-RAY EXAM CHEST 1 VIEW: CPT

## 2023-01-16 PROCEDURE — 97606 NEG PRS WND THER DME>50 SQCM: CPT

## 2023-01-16 PROCEDURE — 80048 BASIC METABOLIC PNL TOTAL CA: CPT

## 2023-01-16 PROCEDURE — 75984 XRAY CONTROL CATHETER CHANGE: CPT

## 2023-01-16 PROCEDURE — C1729 CATH, DRAINAGE: HCPCS

## 2023-01-16 PROCEDURE — 2060000000 HC ICU INTERMEDIATE R&B

## 2023-01-16 PROCEDURE — 6360000002 HC RX W HCPCS: Performed by: RADIOLOGY

## 2023-01-16 PROCEDURE — 93005 ELECTROCARDIOGRAM TRACING: CPT | Performed by: FAMILY MEDICINE

## 2023-01-16 PROCEDURE — 85027 COMPLETE CBC AUTOMATED: CPT

## 2023-01-16 PROCEDURE — 83735 ASSAY OF MAGNESIUM: CPT

## 2023-01-16 PROCEDURE — 2580000003 HC RX 258

## 2023-01-16 PROCEDURE — C1769 GUIDE WIRE: HCPCS

## 2023-01-16 PROCEDURE — 97530 THERAPEUTIC ACTIVITIES: CPT

## 2023-01-16 PROCEDURE — 99232 SBSQ HOSP IP/OBS MODERATE 35: CPT | Performed by: FAMILY MEDICINE

## 2023-01-16 PROCEDURE — 82330 ASSAY OF CALCIUM: CPT

## 2023-01-16 PROCEDURE — 97535 SELF CARE MNGMENT TRAINING: CPT

## 2023-01-16 PROCEDURE — 36415 COLL VENOUS BLD VENIPUNCTURE: CPT

## 2023-01-16 PROCEDURE — 2700000000 HC OXYGEN THERAPY PER DAY

## 2023-01-16 PROCEDURE — 94640 AIRWAY INHALATION TREATMENT: CPT

## 2023-01-16 PROCEDURE — 85610 PROTHROMBIN TIME: CPT

## 2023-01-16 RX ORDER — ONDANSETRON 2 MG/ML
INJECTION INTRAMUSCULAR; INTRAVENOUS
Status: COMPLETED | OUTPATIENT
Start: 2023-01-16 | End: 2023-01-16

## 2023-01-16 RX ORDER — FENTANYL CITRATE 50 UG/ML
INJECTION, SOLUTION INTRAMUSCULAR; INTRAVENOUS
Status: COMPLETED | OUTPATIENT
Start: 2023-01-16 | End: 2023-01-16

## 2023-01-16 RX ORDER — OXYCODONE HYDROCHLORIDE AND ACETAMINOPHEN 5; 325 MG/1; MG/1
1 TABLET ORAL EVERY 4 HOURS PRN
Status: DISCONTINUED | OUTPATIENT
Start: 2023-01-16 | End: 2023-01-20 | Stop reason: HOSPADM

## 2023-01-16 RX ADMIN — BUSPIRONE HYDROCHLORIDE 10 MG: 10 TABLET ORAL at 20:43

## 2023-01-16 RX ADMIN — ONDANSETRON 4 MG: 2 INJECTION INTRAMUSCULAR; INTRAVENOUS at 12:12

## 2023-01-16 RX ADMIN — MAGNESIUM SULFATE HEPTAHYDRATE 2000 MG: 40 INJECTION, SOLUTION INTRAVENOUS at 11:08

## 2023-01-16 RX ADMIN — AMLODIPINE BESYLATE 5 MG: 5 TABLET ORAL at 08:17

## 2023-01-16 RX ADMIN — SODIUM CHLORIDE, PRESERVATIVE FREE 10 ML: 5 INJECTION INTRAVENOUS at 20:44

## 2023-01-16 RX ADMIN — DOCUSATE SODIUM 50 MG AND SENNOSIDES 8.6 MG 2 TABLET: 8.6; 5 TABLET, FILM COATED ORAL at 20:43

## 2023-01-16 RX ADMIN — GABAPENTIN 400 MG: 400 CAPSULE ORAL at 13:15

## 2023-01-16 RX ADMIN — ENOXAPARIN SODIUM 30 MG: 100 INJECTION SUBCUTANEOUS at 13:15

## 2023-01-16 RX ADMIN — OXYCODONE HYDROCHLORIDE AND ACETAMINOPHEN 1 TABLET: 5; 325 TABLET ORAL at 23:55

## 2023-01-16 RX ADMIN — INSULIN GLARGINE 70 UNITS: 100 INJECTION, SOLUTION SUBCUTANEOUS at 20:57

## 2023-01-16 RX ADMIN — FUROSEMIDE 40 MG: 40 TABLET ORAL at 20:45

## 2023-01-16 RX ADMIN — FENTANYL CITRATE 25 MCG: 50 INJECTION INTRAMUSCULAR; INTRAVENOUS at 20:32

## 2023-01-16 RX ADMIN — FENTANYL CITRATE 25 MCG: 50 INJECTION INTRAMUSCULAR; INTRAVENOUS at 15:50

## 2023-01-16 RX ADMIN — POLYETHYLENE GLYCOL 3350 17 G: 17 POWDER, FOR SOLUTION ORAL at 08:18

## 2023-01-16 RX ADMIN — GABAPENTIN 400 MG: 400 CAPSULE ORAL at 08:16

## 2023-01-16 RX ADMIN — ISOSORBIDE MONONITRATE 60 MG: 60 TABLET, EXTENDED RELEASE ORAL at 08:16

## 2023-01-16 RX ADMIN — METOPROLOL TARTRATE 12.5 MG: 25 TABLET ORAL at 08:17

## 2023-01-16 RX ADMIN — Medication 81 MG: at 13:15

## 2023-01-16 RX ADMIN — OXYCODONE HYDROCHLORIDE AND ACETAMINOPHEN 1 TABLET: 5; 325 TABLET ORAL at 13:20

## 2023-01-16 RX ADMIN — FENTANYL CITRATE 50 MCG: 50 INJECTION, SOLUTION INTRAMUSCULAR; INTRAVENOUS at 12:07

## 2023-01-16 RX ADMIN — SODIUM CHLORIDE, PRESERVATIVE FREE 10 ML: 5 INJECTION INTRAVENOUS at 08:18

## 2023-01-16 RX ADMIN — PIPERACILLIN AND TAZOBACTAM 3375 MG: 3; .375 INJECTION, POWDER, LYOPHILIZED, FOR SOLUTION INTRAVENOUS at 02:53

## 2023-01-16 RX ADMIN — AMIODARONE HYDROCHLORIDE 200 MG: 200 TABLET ORAL at 20:43

## 2023-01-16 RX ADMIN — OXYCODONE HYDROCHLORIDE AND ACETAMINOPHEN 1 TABLET: 5; 325 TABLET ORAL at 06:06

## 2023-01-16 RX ADMIN — FUROSEMIDE 40 MG: 40 TABLET ORAL at 08:16

## 2023-01-16 RX ADMIN — GABAPENTIN 400 MG: 400 CAPSULE ORAL at 20:43

## 2023-01-16 RX ADMIN — PIPERACILLIN AND TAZOBACTAM 3375 MG: 3; .375 INJECTION, POWDER, LYOPHILIZED, FOR SOLUTION INTRAVENOUS at 18:23

## 2023-01-16 RX ADMIN — PIPERACILLIN AND TAZOBACTAM 3375 MG: 3; .375 INJECTION, POWDER, LYOPHILIZED, FOR SOLUTION INTRAVENOUS at 14:35

## 2023-01-16 RX ADMIN — LEVOTHYROXINE SODIUM 50 MCG: 50 TABLET ORAL at 08:16

## 2023-01-16 RX ADMIN — CLOPIDOGREL 75 MG: 75 TABLET, FILM COATED ORAL at 13:15

## 2023-01-16 RX ADMIN — BUDESONIDE AND FORMOTEROL FUMARATE DIHYDRATE 2 PUFF: 80; 4.5 AEROSOL RESPIRATORY (INHALATION) at 07:51

## 2023-01-16 RX ADMIN — INSULIN LISPRO 8 UNITS: 100 INJECTION, SOLUTION INTRAVENOUS; SUBCUTANEOUS at 18:24

## 2023-01-16 RX ADMIN — PANTOPRAZOLE SODIUM 40 MG: 40 TABLET, DELAYED RELEASE ORAL at 08:16

## 2023-01-16 RX ADMIN — MUPIROCIN: 20 OINTMENT TOPICAL at 20:44

## 2023-01-16 RX ADMIN — METOPROLOL TARTRATE 12.5 MG: 25 TABLET ORAL at 20:44

## 2023-01-16 RX ADMIN — AMIODARONE HYDROCHLORIDE 200 MG: 200 TABLET ORAL at 08:17

## 2023-01-16 RX ADMIN — CITALOPRAM 40 MG: 20 TABLET, FILM COATED ORAL at 08:16

## 2023-01-16 RX ADMIN — AMIODARONE HYDROCHLORIDE 200 MG: 200 TABLET ORAL at 13:15

## 2023-01-16 RX ADMIN — DOCUSATE SODIUM 50 MG AND SENNOSIDES 8.6 MG 2 TABLET: 8.6; 5 TABLET, FILM COATED ORAL at 08:16

## 2023-01-16 RX ADMIN — DESMOPRESSIN ACETATE 40 MG: 0.2 TABLET ORAL at 20:45

## 2023-01-16 RX ADMIN — ENOXAPARIN SODIUM 30 MG: 100 INJECTION SUBCUTANEOUS at 20:43

## 2023-01-16 RX ADMIN — MUPIROCIN: 20 OINTMENT TOPICAL at 08:19

## 2023-01-16 RX ADMIN — INSULIN LISPRO 4 UNITS: 100 INJECTION, SOLUTION INTRAVENOUS; SUBCUTANEOUS at 20:57

## 2023-01-16 RX ADMIN — OXYCODONE HYDROCHLORIDE AND ACETAMINOPHEN 1 TABLET: 5; 325 TABLET ORAL at 19:32

## 2023-01-16 ASSESSMENT — PAIN DESCRIPTION - DESCRIPTORS
DESCRIPTORS: ACHING
DESCRIPTORS: ACHING;BURNING;SHARP;SHOOTING

## 2023-01-16 ASSESSMENT — ENCOUNTER SYMPTOMS
VOICE CHANGE: 0
ALLERGIC/IMMUNOLOGIC NEGATIVE: 1
CHEST TIGHTNESS: 0
EYE REDNESS: 0
PHOTOPHOBIA: 0
VOMITING: 0
COLOR CHANGE: 1
CHOKING: 0
EYE ITCHING: 0
NAUSEA: 0
EYE DISCHARGE: 0
SHORTNESS OF BREATH: 0
COUGH: 0
APNEA: 0
TROUBLE SWALLOWING: 0
ABDOMINAL PAIN: 0
ABDOMINAL DISTENTION: 1
WHEEZING: 0
COUGH: 1
SHORTNESS OF BREATH: 1

## 2023-01-16 ASSESSMENT — PAIN - FUNCTIONAL ASSESSMENT
PAIN_FUNCTIONAL_ASSESSMENT: PREVENTS OR INTERFERES WITH MANY ACTIVE NOT PASSIVE ACTIVITIES
PAIN_FUNCTIONAL_ASSESSMENT: PREVENTS OR INTERFERES SOME ACTIVE ACTIVITIES AND ADLS
PAIN_FUNCTIONAL_ASSESSMENT: PREVENTS OR INTERFERES WITH MANY ACTIVE NOT PASSIVE ACTIVITIES

## 2023-01-16 ASSESSMENT — PAIN DESCRIPTION - ORIENTATION
ORIENTATION: MID

## 2023-01-16 ASSESSMENT — PAIN DESCRIPTION - PAIN TYPE
TYPE: ACUTE PAIN;SURGICAL PAIN
TYPE: SURGICAL PAIN;ACUTE PAIN
TYPE: ACUTE PAIN;SURGICAL PAIN

## 2023-01-16 ASSESSMENT — PAIN DESCRIPTION - ONSET
ONSET: GRADUAL
ONSET: GRADUAL
ONSET: ON-GOING

## 2023-01-16 ASSESSMENT — PAIN DESCRIPTION - FREQUENCY
FREQUENCY: CONTINUOUS
FREQUENCY: INTERMITTENT
FREQUENCY: CONTINUOUS

## 2023-01-16 ASSESSMENT — PAIN SCALES - GENERAL
PAINLEVEL_OUTOF10: 8
PAINLEVEL_OUTOF10: 7
PAINLEVEL_OUTOF10: 6
PAINLEVEL_OUTOF10: 6

## 2023-01-16 ASSESSMENT — PAIN DESCRIPTION - LOCATION
LOCATION: CHEST

## 2023-01-16 NOTE — OP NOTE
[facility]                                OPERATIVE REPORT    PATIENT NAME: Keon Sanders                  :        1955  MED REC NO:   [id number]                             ROOM:         ACCOUNT NO:   [de-identified]                           ADMIT DATE: 2023  PROVIDER:     Dea Singh MD    DATE OF PROCEDURE:  2023    PREOPERATIVE DIAGNOSIS:  Eschar of the chest wall. POSTOPERATIVE DIAGNOSIS:  Eschar of the chest wall. OPERATIONS PERFORMED:  1. Eschar debridement. 2.  Removal and drainage of fluid collection. 3.  Wound VAC placement. SURGEON:  Dea Singh MD    ASSISTANT:  _____    EBL:  100 mL. SPECIMENS:  None. DRAINS:  Wound VAC. OPERATIVE PROCEDURE:  The patient presents with eschar of the upper  abdomen. The patient at this time was taken to the OR after signed  consent, given anesthesia, washed, prepped and draped in a normal  sterile fashion. The eschar was removed sharply. Cautery was used to  excise the fat. A pocket of purulent pus was identified. This was  drained out. Some bone and sternal wires were removed. The patient at  this time had vancomycin irrigation, used on the pocket. There was no  additional necrotic tissue and the wound VAC was placed. The wound VAC  _____. The patient was taken to the ICU for further treatment.         Brandy Franco MD    D: 01/15/2023 12:18:12       T: 01/15/2023 13:45:59     [id number]  Job#: [id number]     Doc#: [id number]    CC:

## 2023-01-16 NOTE — PROGRESS NOTES
[facility] -   Infectious diseases evaluation  admission date 1/12/2023    reason for consultation:   Sternal infection    Impression :   Current:  Surgical wound dehiscence and infection w abscess - lower wound scabbed and infected - no pus seens  CAD, CABG November 2022  I/D lower sternal wound 1/13 - VAC  Left lobar collapse and mucous plug  Large Left loculated effusion - could be post op loculation - possibly collapsing the left lung  Left chest tube 1/13  Left adrenal tumor  Lactic acidosis  Bandemia 16    Other:    Discussion / summary of stay / plan of care     Recommendations   Cx from the sternal wound seems neg  Await BC  still neg  Stop vanco  Keep zosyn 1/12 -   CXR new left opacification 1/15  Resp tx fr the left lung collapse    Infection Control Recommendations   Doyle Precautions  Contact Isolation     Antimicrobial Stewardship Recommendations   Simplification of therapy  Targeted therapy      History of Present Illness:   Initial history:  Kenrick Gomes is a 78y.o.-year-old female who had a CABG 11/2022, comes in with worsening chest pain, tightness, cough nonproductive nausea vomiting and finally fell twice in the last 2 days and hit her head due to dizziness. She also describes diffuse abdominal pain and some shortness of breath with exertion, no fever or chills    Drainage from the surgical wound, with pain at the surgical site  Comes into the emergency room, started on 1 L of uses oxygen since her bypass, 1 L at home, she is diabetic on insulin, cirrhosis and obese, SHERITA    In the ER, the surgical wound looked infected with some drainage and swelling of the tissues. X-ray showed opacification of the left lung.   CT shows a left adrenal mass, suggested an adrenal imaging    Patient started on antibiotic, infectious consulted for sternal wound dehiscence and infection        Interval changes  1/15/2023   Patient Vitals for the past 8 hrs: BP Temp Temp src Pulse Resp SpO2   01/15/23 1600 123/71 98.6 °F (37 °C) Oral 74 21 91 %   01/15/23 1500 103/62 -- -- 75 15 95 %   01/15/23 1427 -- -- -- 77 16 --   01/15/23 1400 (!) 93/55 98.6 °F (37 °C) -- 76 15 92 %   01/15/23 1300 113/62 -- -- 73 15 91 %   01/15/23 1200 111/63 98.2 °F (36.8 °C) Oral 72 19 90 %     1/14  Sternal debridement with wound vac placement 1/13  Culture no growth to this date   Blood culture no growth   Pain better - ox 3    1/15  All cx neg and CXR new left opacification  Chest tube in place on the left x 1/14  Sternal wound w VAC  Comfortable and ox 3  On NC      Summary of relevant labs:  Labs:  Lactic Acid, 4.7 High    WBC16.7 High    Procalcitonin0.53 High    Creatinine0.77      Micro:  BC  Imaging:  CT of the chest  .  2 cm left adrenal mass with indeterminate Hounsfield numbers. Further   workup using adrenal CT protocol or MRI advised when the patient's condition   permits. Air seen in the mediastinum, an abscess around the sternotomy area    2. Large left pleural effusion with almost complete collapse of the left   lobe of the lung. Bronchial wall thickening is noted with material within   the bronchi likely mucous plugging           I have personally reviewed the past medical history, past surgical history, medications, social history, and family history, and I haveupdated the database accordingly. Allergies:   Morphine, Shellfish-derived products, and Tape [adhesive tape]     Review of Systems:     Review of Systems   Constitutional:  Negative for activity change, fatigue and fever. HENT:  Negative for congestion and trouble swallowing. Eyes:  Negative for photophobia, discharge, redness and itching. Respiratory:  Negative for apnea, cough and shortness of breath. Cardiovascular:  Negative for chest pain and palpitations. Gastrointestinal:  Negative for abdominal pain and nausea. Endocrine: Negative for cold intolerance, heat intolerance and polyuria. Genitourinary:  Negative for dysuria and urgency. Musculoskeletal:  Negative for arthralgias and myalgias. Skin:  Positive for color change and wound. Allergic/Immunologic: Negative for immunocompromised state. Neurological:  Negative for dizziness and numbness. Hematological:  Negative for adenopathy. Psychiatric/Behavioral:  Negative for agitation. The patient is not nervous/anxious. Physical Examination :       Physical Exam  Constitutional:       General: She is not in acute distress. Appearance: Normal appearance. She is not ill-appearing, toxic-appearing or diaphoretic. HENT:      Head: Normocephalic and atraumatic. Nose: Nose normal. No congestion or rhinorrhea. Mouth/Throat:      Pharynx: Oropharynx is clear. No posterior oropharyngeal erythema. Eyes:      Conjunctiva/sclera: Conjunctivae normal.      Pupils: Pupils are equal, round, and reactive to light. Cardiovascular:      Rate and Rhythm: Normal rate. Heart sounds: Normal heart sounds. No murmur heard. Pulmonary:      Effort: No respiratory distress. Breath sounds: Normal breath sounds. No stridor. Abdominal:      Palpations: Abdomen is soft. There is no mass. Hernia: No hernia is present. Genitourinary:     Comments: Urine jackie  Musculoskeletal:         General: No swelling, tenderness or deformity. Cervical back: Neck supple. No rigidity or tenderness. Skin:     General: Skin is dry. Capillary Refill: Capillary refill takes less than 2 seconds. Coloration: Skin is not jaundiced or pale. Findings: No bruising or erythema. Neurological:      Mental Status: She is alert and oriented to person, place, and time. Cranial Nerves: No cranial nerve deficit. Sensory: No sensory deficit. Psychiatric:         Mood and Affect: Mood normal.         Thought Content:  Thought content normal.       Past Medical History:     Past Medical History:   Diagnosis Date    Ambulates with cane     or walker    Anxiety     Borderline hypertension     CAD (coronary artery disease)     stents x 2 in 2020    Depression 07/28/2020    GERD (gastroesophageal reflux disease)     Heart attack ([facility] [address] 07/18/2020    Hypertension     Hypothyroidism     Neuropathy     Obesity     morbid    Osteoarthritis     Other cirrhosis of liver ([facility] [address] 07/27/2020    pt denies    Sleep apnea     possible    Type II or unspecified type diabetes mellitus without mention of complication, not stated as uncontrolled     Under care of service provider 11/18/2022    [id number]-last visit aug 2022    Under care of service provider 11/18/2022    cardiology-[facility]-last visit nov 2022    Vertebrogenic low back pain 03/29/2022       Past Surgical  History:     Past Surgical History:   Procedure Laterality Date    APPENDECTOMY      CARDIAC CATHETERIZATION      2 stents    CARDIAC CATHETERIZATION  11/01/2022    COLONOSCOPY      CORONARY ANGIOPLASTY WITH STENT PLACEMENT  07/2020    CORONARY ARTERY BYPASS GRAFT N/A 11/28/2022    CABG CORONARY ARTERY BYPASS X3 ON PUMP , ATA, ENDO VEIN HARVEST performed by Balwinder Franco MD at [place] [address]  01/14/2023    STERNAL WOUND WASHOUT, DEBRIDEMENT, WOUND VAC PLACEMENT    DILATION AND CURETTAGE OF UTERUS      HIATAL HERNIA REPAIR      HYSTERECTOMY (CERVIX STATUS UNKNOWN)      IR CHEST TUBE INSERTION  01/13/2023    IR CHEST TUBE INSERTION 1/13/2023 Nehal Mullins MD [facility]    THROAT SURGERY      POLYPS REMOVED FROM VOCAL CORD    TOTAL KNEE ARTHROPLASTY Right 01/06/2015    TOTAL KNEE ARTHROPLASTY Left 05/26/2015    UPPER GASTROINTESTINAL ENDOSCOPY         Medications:      sennosides-docusate sodium  2 tablet Oral BID    alteplase (ACTIVASE) syringe  5 mg IntraPLEUral Once    And    dornase (PULMOZYME) syringe  5 mg IntraPLEUral Once    insulin glargine  70 Units SubCUTAneous Nightly    piperacillin-tazobactam  3,375 mg IntraVENous Once    sodium chloride flush  5-40 mL IntraVENous 2 times per day    mupirocin   Nasal BID    polyethylene glycol  17 g Oral Daily    pantoprazole  40 mg Oral Daily    clopidogrel  75 mg Oral Daily    enoxaparin  30 mg SubCUTAneous BID    furosemide  40 mg Oral BID    piperacillin-tazobactam  3,375 mg IntraVENous Q8H    amiodarone  200 mg Oral TID    amLODIPine  5 mg Oral Daily    aspirin  81 mg Oral Daily    atorvastatin  40 mg Oral Nightly    budesonide-formoterol  2 puff Inhalation BID    [Held by provider] busPIRone  10 mg Oral BID    citalopram  40 mg Oral Daily    gabapentin  400 mg Oral TID    isosorbide mononitrate  60 mg Oral Daily    levothyroxine  50 mcg Oral Daily    metoprolol tartrate  12.5 mg Oral BID    [Held by provider] traZODone  50 mg Oral Nightly    insulin lispro  0-16 Units SubCUTAneous TID WC    insulin lispro  0-4 Units SubCUTAneous Nightly       Social History:     Social History     Socioeconomic History    Marital status:      Spouse name: Andie Jiménez    Number of children: 0    Years of education: Not on file    Highest education level: Not on file   Occupational History    Occupation: Retired      Employer: [employer]   Tobacco Use    Smoking status: Never    Smokeless tobacco: Never   Vaping Use    Vaping Use: Never used   Substance and Sexual Activity    Alcohol use: Yes     Comment: once a month    Drug use: No    Sexual activity: Yes     Partners: Male   Other Topics Concern    Not on file   Social History Narrative    Not on file     Social Determinants of Health     Financial Resource Strain: Low Risk     Difficulty of Paying Living Expenses: Not hard at all   Food Insecurity: No Food Insecurity    Worried About Running Out of Food in the Last Year: Never true    Ran Out of Food in the Last Year: Never true   Transportation Needs: Not on file   Physical Activity: Not on file   Stress: Not on file   Social Connections: Not on file   Intimate Partner Violence: Not on file Housing Stability: Not on file       Family History:     Family History   Problem Relation Age of Onset    Heart Disease Mother     Arthritis Mother     Heart Disease Father     Arthritis Father     Cancer Father         \"oral\"    Diabetes Sister         gestational      Medical Decision Making:   I have independently reviewed/ordered the following labs:    CBC with Differential:   Recent Labs     01/13/23  2338 01/14/23  0656 01/14/23  1141 01/15/23  0606   WBC 12.8* 10.6 9.4 9.8   HGB 10.6* 10.6* 10.6* 10.8*   HCT 36.1* 38.1 36.0* 35.5*    169 185 205   LYMPHOPCT 7* 15*  --   --    MONOPCT 14* 0*  --   --        BMP:  Recent Labs     01/14/23  1141 01/15/23  0606    133*   K 4.0 3.8   CL 97* 96*   CO2 31 29   BUN 22 19   CREATININE 1.11* 0.89   MG 1.7 1.7       Hepatic Function Panel:   No results for input(s): PROT, LABALBU, BILIDIR, IBILI, BILITOT, ALKPHOS, ALT, AST in the last 72 hours. No results for input(s): RPR in the last 72 hours. No results for input(s): HIV in the last 72 hours. No results for input(s): BC in the last 72 hours. Lab Results   Component Value Date/Time    CREATININE 0.89 01/15/2023 06:06 AM    GLUCOSE 262 01/15/2023 06:06 AM    GLUCOSE 250 03/30/2012 03:15 PM       Detailed results: Thank you for allowing us to participate in the care of this patient. Please call with questions. This note is created with the assistance of a speech recognition program.  While intending to generate adocument that actually reflects the content of the visit, the document can still have some errors including those of syntax and sound a like substitutions which may escape proof reading. It such instances, actual meaningcan be extrapolated by contextual diversion.     Jada Bragg MD  Office: [phone]  Perfect serve / office [phone]

## 2023-01-16 NOTE — PROGRESS NOTES
Physical Therapy        Physical Therapy Cancel Note      DATE: 2023    NAME: Stephanie Ayala  MRN: [id number]   : 1955      Patient not seen this date for Physical Therapy due to:    Surgery/Procedure: Pt at IR      Electronically signed by Demetrio Chua PTA on 2023 at 12:20 PM

## 2023-01-16 NOTE — PROGRESS NOTES
PULMONARY & CRITICAL CARE MEDICINE PROGRESS NOTE     Patient:  Iram Camacho  MRN: [id number]  Admit date: 1/12/2023  Primary Care Physician: Irene Hoffman MD  Consulting Physician: Lulu Eisenberg DO  CODE Status: Full Code  LOS: 4     SUBJECTIVE     Chief Complaint/ Reason for consult:    large pleural effusion    Hospital Course: The patient is a 78 y.o. female with a history of hypertension, CAD s/p stents, s/p CABG 11/28/2022, hypothyroidism came to ED with chief complaint of chest pain and shortness of breath. Patient is known to CT surgery as she got CABG on 11/28/2022 and was recently seen in their office on 12/21/2022, during that visit patient was doing well, had no respiratory or cardiac issues. Patient was afebrile, NSR /55 and was room air was eventually put on 3 L nasal cannula due to respiratory distress, patient is on 1 L oxygen at home. Initial lab work show hyperglycemia with glucose 260, Pro-Jame 0.53.  proBNP 1814, troponin 36. WBC 16.7, hemoglobin 13.1, platelet 767. In the ED CT chest revealed large left pleural effusion with near collapse of left lung. ID, pulmonary and CT surgery were consulted. Patient was chest tube placed by IR on 1/13/2023. Patient is getting Zosyn as per ID recommendation. Interval History:  01/16/23  I seen the patient today, chart reviewed, last imaging studies seen and labs reviewed. Overnight no acute hypoxic events were reported. Patient remained on 2 L nasal cannula maintaining saturation 94% to 97%. She is afebrile T-max of 98.7 heart rate is in 70s she is hemodynamically stable. Sternal wound wound VAC is in place. Left chest tube has 370 mL drainage last 24 hours. Shortness of breath is better she has mild cough denies sputum production. Labs shows BUN is bicarb 33 BUN 15 creatinine 0.74 WBC 9.8 hemoglobin 10.8  So far cultures are negative.      Chest x-ray 01/16/2023 as compared to 01/15/2023 and 01/14/2023 showed improved aeration of left lung left pleural effusion is reduced pleural catheter is in place. Review Of Systems:  Review of Systems   Constitutional:  Positive for fatigue. HENT:  Negative for congestion, nosebleeds, trouble swallowing and voice change. Eyes:  Negative for visual disturbance. Respiratory:  Positive for cough and shortness of breath. Negative for apnea, chest tightness and wheezing. Cardiovascular:  Positive for chest pain. Negative for leg swelling. Gastrointestinal:  Negative for abdominal pain, nausea and vomiting. Genitourinary:  Negative for difficulty urinating. Allergic/Immunologic: Negative. Neurological:  Negative for dizziness, tremors and light-headedness. Hematological:  Negative for adenopathy. Does not bruise/bleed easily. Psychiatric/Behavioral: Negative. OBJECTIVE     PaO2/FiO2 RATIO:  No results for input(s): POCPO2 in the last 72 hours. VITAL SIGNS:   LAST:  /64   Pulse 80   Temp 98.7 °F (37.1 °C)   Resp (!) 9   Ht 4' 11\" (1.499 m)   Wt 243 lb 9.7 oz (110.5 kg)   SpO2 97%   BMI 49.20 kg/m²   8-24 HR RANGE:  TEMP Temp  Av.3 °F (36.8 °C)  Min: 97.7 °F (36.5 °C)  Max: 98.7 °F (91.6 °C)   BP Systolic (13DUF), QBM:836 , Min:93 , UTT:048      Diastolic (65HRT), FQD:81, Min:55, Max:71     PULSE Pulse  Av.2  Min: 70  Max: 80   RR Resp  Av  Min: 9  Max: 9   O2 SAT SpO2  Av %  Min: 97 %  Max: 97 %   OXYGEN DELIVERY O2 Flow Rate (L/min)  Av L/min  Min: 2 L/min  Max: 2 L/min        Systemic Examination:   Physical Exam -  Constitutional:  Alert, cooperative and no distress. Mental Status:  Oriented to person, place and time and normal affect. Lungs: Left-sided chest tube is present. Sternal wound VAC is present. Air entry is present bilaterally decreased breath sound in left lower lung field as compared to right. Normal effort. Heart:  Regular rate and rhythm, no murmur.   Abdomen:  Soft, nontender, nondistended, normal bowel sounds. Extremities: Mild bilateral edema, negative for redness, tenderness in the calves. Skin:  Warm, dry, no gross lesions or rashes. DATA REVIEW     Medications: Current Inpatient  Scheduled Meds:   sennosides-docusate sodium  2 tablet Oral BID    alteplase (ACTIVASE) syringe  5 mg IntraPLEUral Once    And    dornase (PULMOZYME) syringe  5 mg IntraPLEUral Once    insulin glargine  70 Units SubCUTAneous Nightly    piperacillin-tazobactam  3,375 mg IntraVENous Once    sodium chloride flush  5-40 mL IntraVENous 2 times per day    mupirocin   Nasal BID    polyethylene glycol  17 g Oral Daily    pantoprazole  40 mg Oral Daily    clopidogrel  75 mg Oral Daily    enoxaparin  30 mg SubCUTAneous BID    furosemide  40 mg Oral BID    piperacillin-tazobactam  3,375 mg IntraVENous Q8H    amiodarone  200 mg Oral TID    amLODIPine  5 mg Oral Daily    aspirin  81 mg Oral Daily    atorvastatin  40 mg Oral Nightly    budesonide-formoterol  2 puff Inhalation BID    [Held by provider] busPIRone  10 mg Oral BID    citalopram  40 mg Oral Daily    gabapentin  400 mg Oral TID    isosorbide mononitrate  60 mg Oral Daily    levothyroxine  50 mcg Oral Daily    metoprolol tartrate  12.5 mg Oral BID    [Held by provider] traZODone  50 mg Oral Nightly    insulin lispro  0-16 Units SubCUTAneous TID WC    insulin lispro  0-4 Units SubCUTAneous Nightly     Continuous Infusions:   sodium chloride      sodium chloride Stopped (01/15/23 1244)       INPUT/OUTPUT:  In: 452.4 [I.V.:80.9]  Out: 2070 [Urine:1700]  Date 01/16/23 0000 - 01/16/23 2359   Shift 1235-3831 2508-5654 1760-1087 24 Hour Total   INTAKE   Shift Total(mL/kg)       OUTPUT   Urine(mL/kg/hr) 300(0.3)   300   Chest Tube 310   310   Shift Total(mL/kg) 610(5.5)   610(5.5)   Weight (kg) 110.5 110.5 110.5 110.5          LABS:  ABGs:   No results for input(s): POCPH, POCPCO2, POCPO2, POCHCO3, IQUJ8FRT in the last 72 hours.   CBC:   Recent Labs 01/13/23 2338 01/14/23  0656 01/14/23  1141 01/15/23  0606 01/16/23  0611   WBC 12.8* 10.6 9.4 9.8 9.8   HGB 10.6* 10.6* 10.6* 10.8* 10.8*   HCT 36.1* 38.1 36.0* 35.5* 35.2*   MCV 94.5 99.0 93.8 92.2 90.5    169 185 205 237   LYMPHOPCT 7* 15*  --   --   --    RBC 3.82* 3.85* 3.84* 3.85* 3.89*   MCH 27.7 27.5 27.6 28.1 27.8   MCHC 29.4 27.8* 29.4 30.4 30.7   RDW 13.8 13.6 13.5 13.6 13.4       CRP:   No results for input(s): CRP in the last 72 hours. LDH:   No results for input(s): LDH in the last 72 hours. BMP:   Recent Labs     01/13/23 2338 01/14/23 0656 01/14/23  1141 01/15/23  0606 01/16/23  0611   * 131* 135 133* 136   K 4.6 4.5 4.0 3.8 3.9   CL 96* 93* 97* 96* 95*   CO2 28 26 31 29 33*   BUN 23 26* 22 19 15   CREATININE 1.38* 1.21* 1.11* 0.89 0.74   GLUCOSE 270* 262* 200* 262* 117*       Liver Function Test:   No results for input(s): PROT, LABALBU, ALT, AST, GGT, ALKPHOS, BILITOT in the last 72 hours. Coagulation Profile:   Recent Labs     01/14/23  1141 01/15/23  0606 01/16/23  0611   INR 1.1 1.1 1.1   PROTIME 11.4 11.8 11.8   APTT 23.0  --   --        D-Dimer:  No results for input(s): DDIMER in the last 72 hours. Lactic Acid:  No results for input(s): LACTA in the last 72 hours. Cardiac Enzymes:  No results for input(s): CKTOTAL, CKMB, CKMBINDEX, TROPONINI in the last 72 hours. Invalid input(s): TROPONIN, HSTROP  BNP/ProBNP:   No results for input(s): BNP, PROBNP in the last 72 hours. Triglycerides:  No results for input(s): TRIG in the last 72 hours. Microbiology:  Urine Culture:  No components found for: CURINE  Blood Culture:  No components found for: CBLOOD, CFUNGUSBL  Sputum Culture:  No components found for: CSPUTUM  Recent Labs     01/14/23  1035   [facility] . CHEST   SPECIAL STERNAL WOUND SWAB   CULTURE CULTURE IN PROGRESS       Recent Labs     01/13/23  1050 01/14/23  1034 01/14/23  1035   [facility] . ASPIRATE LEFT CHEST TUBE . CHEST . CHEST   SPECIAL  --  SWAB STERNAL WOUND SWAB   CULTURE NO GROWTH 3 DAYS DUE TO THE SPECIMEN TYPE, THE ORDER WAS CANCELED AND REORDERED. PLEASE REFER TO: AEROBIC CULTURE NO ANAEROBIC SWAB SENT CULTURE IN PROGRESS          Pathology:    Radiology Reports:  XR CHEST PORTABLE   Final Result   Left chest tube remains in place. Interval reduction of left pleural   effusion and improved aeration of the left lung. XR CHEST PORTABLE   Final Result   Near complete opacification of the left hemithorax with decreased air   compared to prior exam.         XR CHEST PORTABLE   Final Result   Little change from prior study. Significant left lung opacity with left   hydropneumothorax suspected. Slight mediastinal shift toward the right is   noted diminished from prior study. Right lung is clear. XR CHEST PORTABLE   Final Result   Interval left chest tube placement with persistent opacification of the left   chest.  There is some lucency in the left lower chest, which could represent   hydropneumothorax. IR CHEST TUBE INSERTION   Final Result   Successful percutaneous placement of a 10 [place] left pleural drain. There is marked loculation of the left pleural collection indicating complex   pleural fluid. Small bore drainage catheter may not successfully drained the   entire pleural collection given its complex nature. CT CHEST W CONTRAST   Final Result   1. Diastasis of median sternotomy with air in the soft tissues, and   appearance of abscess formation/dehiscence dural to the sternotomy. Air is   present in the mediastinum. 2.  Large left pleural effusion with almost complete collapse of the left   lobe of the lung. Bronchial wall thickening is noted with material within   the bronchi likely mucous plugging. 3.  Hepatic steatosis. 4.  Small gallstones in the gallbladder. 5.  2 cm left adrenal mass with indeterminate Hounsfield numbers.   Further   workup using adrenal CT protocol or MRI advised when the patient's condition   permits. RECOMMENDATIONS:   Advise adrenal protocol CT or MRI to evaluate a left adrenal mass. XR CHEST PORTABLE   Final Result   Progressive left pleural effusion with nearly complete opacification of the   left hemithorax. IR CHEST TUBE INSERTION    (Results Pending)   XR CHEST PORTABLE    (Results Pending)        Echocardiogram:   No results found for this or any previous visit. ASSESSMENT AND PLAN     Assessment:    // Acute hypoxic respiratory failure improving  //Left lobar collapse secondary to effusion  //Large left loculated effusion s/p chest tube 1/13/2023  //CAD s/p CABG  //Sternal wound s/p debridement and wound VAC  //Hypertension  //DM  //Probable obstructive sleep apnea        Plan:     Patient is currently on 2 L nasal cannula and maintaining saturation. Wean O2 to keep saturation above 90%. Sternal wound and chest tube management as per CT surgery. s/p sternal wound debridement with washout and wound VAC placement by CTS. CT surgery had attempted tPA/dornase alpha with minimal drainage but now Patient had increased drainage from chest tube in last 24 hours and better aeration of left lung. On Zosyn as per ID recommendation. Follow-up with infectious disease. Blood, wound and body fluid cultures negative so far. Encourage incentive spirometry deep breathing and cough and ambulation physical therapy. On Lasix monitor intake and output and electrolytes. We will continue to follow. I will discuss with attending. Jerome Bonilla MD.  Pulmonary critical care attending  SANDY Desouza 21 1/16/2023, 9:59 AM    Please note that this chart was generated using voice recognition Dragon dictation software. Although every effort was made to ensure the accuracy of this automated transcription, some errors in transcription may have occurred.

## 2023-01-16 NOTE — CARE COORDINATION
Spoke to Sierra Macedo from [facility]. They are awaiting a stop date for IV antibiotics before making a decision. They cannot take if IV antibiotics will be longer than 20 days    1029 spoke to Dr Stephanie Do. She thinks patient may need IV antibiotics for 6 weeks. Call to [facility] centralized intake. No answer and voice mailbox is full. Voicemail left for admissions at [address] received call from [place] from [facility].  They are unable to accept because they are out of network

## 2023-01-16 NOTE — PROGRESS NOTES
Occupational Therapy  Facility/Department: [facility] CAR 2- STEPDOWN  Occupational Therapy Daily Treatment Note    Name: Iram Camacho  : 1955  MRN: [id number]  Date of Service: 2023    Discharge Recommendations:  Patient would benefit from continued therapy after discharge  OT Equipment Recommendations  Mobility Devices: Nobie Stockwell: Rolling     Patient Diagnosis(es): The primary encounter diagnosis was Pleural effusion on left. Diagnoses of Abscess of sternal region and Infection were also pertinent to this visit. Past Medical History:  has a past medical history of Ambulates with cane, Anxiety, Borderline hypertension, CAD (coronary artery disease), Depression, GERD (gastroesophageal reflux disease), Heart attack ([facility] [address] Hypertension, Hypothyroidism, Neuropathy, Obesity, Osteoarthritis, Other cirrhosis of liver ([facility] [address] Sleep apnea, Type II or unspecified type diabetes mellitus without mention of complication, not stated as uncontrolled, Under care of service provider, Under care of service provider, and Vertebrogenic low back pain. Past Surgical History:  has a past surgical history that includes Hysterectomy; Colonoscopy; Upper gastrointestinal endoscopy; Dilation and curettage of uterus; hiatal hernia repair; Throat surgery; Appendectomy; Total knee arthroplasty (Right, 2015); Total knee arthroplasty (Left, 2015); Coronary angioplasty with stent (2020); Cardiac catheterization; Cardiac catheterization (2022); Coronary artery bypass graft (N/A, 2022); IR CHEST TUBE INSERTION (2023); and Wound debridement (2023). Assessment   Performance deficits / Impairments: Decreased functional mobility ; Decreased endurance;Decreased ADL status; Decreased high-level IADLs;Decreased strength;Decreased balance Prognosis: Good  Activity Tolerance  Activity Tolerance: Patient Tolerated treatment well;Patient limited by fatigue  Activity Tolerance Comments: Mild dizziness throughout session        Plan   Occupational Therapy Plan  Times Per Week: 3-5x/wk  Current Treatment Recommendations: Balance training, Functional mobility training, Endurance training, Safety education & training, Patient/Caregiver education & training, Equipment evaluation, education, & procurement, Self-Care / ADL, Home management training     Restrictions  Restrictions/Precautions  Restrictions/Precautions: Fall Risk, Up as Tolerated  Required Braces or Orthoses?: No  Position Activity Restriction  Sternal Precautions: 5# Lifting Restrictions  Other position/activity restrictions: Pt had recent CABG on 11/28/22, sternal precautions . Chest tube-per RN required to remain on suction today. O2 NC 2 Lm. Wound vac. Pain assessment: Pt c/o pain 6/10 left chest  Pain intervention: Repositioned  Subjective   General  Patient assessed for rehabilitation services?: Yes  Family / Caregiver Present: No  Safety Devices  Type of Devices: Call light within reach; Bed alarm in place;Gait belt;Left in bed;Patient at risk for falls;Nurse notified  Bed Mobility Training  Bed Mobility Training: Yes  Overall Level of Assistance: Minimum assistance  Balance  Sitting: With support (Pt sat EOB SBA~15 minutes for oral care and simple grooming)  Standing: With support (Static standing EOB x2 using RW initially Min A progressing to CGA, side step to [facility] using RW. Total time: 3 min w/seated rest break x1. Pt displayed mild posterior lean w/first sit to stand transfer requiring min A, v/c for body posture w/ good return.)  Gait  Overall Level of Assistance: Additional time;Contact-guard assistance (side step to [facility].)  Assistive Device: Walker, rolling     ADL  Grooming: Modified independent ;Setup; Increased time to complete (Pt washed face. Completed oral care IND)  UE Bathing: Setup;Maximum assistance (Max A for back seated EOB d/t limited reach)  UE Dressing: Setup; Increased time to complete;Minimal assistance (Changed gown)  Toileting: Setup; Increased time to complete;Maximum assistance (Pericare/bottom care/brief mgnt supine in bed d/t limited reach)     Cognition  Overall Cognitive Status: WFL  Orientation  Overall Orientation Status: Within Functional Limits     AM-PAC Score     AM-PAC Inpatient Daily Activity Raw Score: 16 (01/16/23 1045)  AM-PAC Inpatient ADL T-Scale Score : 35.96 (01/16/23 1045)  ADL Inpatient CMS 0-100% Score: 53.32 (01/16/23 1045)  ADL Inpatient CMS G-Code Modifier : CK (01/16/23 1045)    Goals  Short Term Goals  Time Frame for Short Term Goals: By discharge, pt will:  Short Term Goal 1: Demo bed mobility with Min A and use of adaptive strategies PRN  Short Term Goal 2: Demo functional sit<>stand transfers and functional mobility with SBA and LRD PRN  Short Term Goal 3: Demo 8 minutes of dynamic standing balance with CGA to promote increased engagement in ADLs  Short Term Goal 4: Demo UB ADLs with SUP and use of adpative tech PRN  Short Term Goal 5: Demo LB ADLs with CGA and use of DME PRN  Short Term Goal 6: Demonstrate independent implementation of EC/WS strategies throughout all functional activities with  0 VCs to initiate       Therapy Time   Individual Concurrent Group Co-treatment   Time In 0857         Time Out 0950         Minutes 53         Timed Code Treatment Minutes: [address] [facility]       Emily Deras S/AURA  Dameon ORR/ESTUARDO

## 2023-01-16 NOTE — PROGRESS NOTES
[facility] Wound Ostomy Continence Nurse  Consult Note       NAME:  Lucy Irwin  MEDICAL RECORD NUMBER:  [id number]  AGE: 78 y.o. GENDER: female  : 1955  TODAY'S DATE:  2023    Subjective:     Reason for WOCN Evaluation and Assessment: sternotomy surgical scar with purulent drainage.  currently has wound vac      Lucy Irwin is a 78 y.o. female referred by:   [x] Physician: Dr Anna Cardoza  [] Nursing  [] Other:     Wound Identification:  Wound Type: non-healing surgical  Contributing Factors: diabetes, poor glucose control, and obesity            Objective:      /61   Pulse 75   Temp 98.7 °F (37.1 °C)   Resp 20   Ht 4' 11\" (1.499 m)   Wt 243 lb 9.7 oz (110.5 kg)   SpO2 93%   BMI 49.20 kg/m²   Jacob Risk Score: Jacob Scale Score: 16    LABS    CBC:   Lab Results   Component Value Date/Time    WBC 9.8 2023 06:11 AM    RBC 3.89 2023 06:11 AM    HGB 10.8 2023 06:11 AM     CMP:  Albumin:    Lab Results   Component Value Date/Time    LABALBU 2.7 2023 04:19 PM     PT/INR:    Lab Results   Component Value Date/Time    PROTIME 11.8 2023 06:11 AM    INR 1.1 2023 06:11 AM     HgBA1c:    Lab Results   Component Value Date/Time    LABA1C 7.5 2022 09:53 AM     PTT: No components found for: LABPTT      Assessment:       Measurements:       23 1533   Wound 23 Other (Comment) Medial;Upper epigastric   Date First Assessed/Time First Assessed: 23 1533   Present on Hospital Admission: Yes  Primary Wound Type: Surgical Type  Location: Other (Comment)  Wound Location Orientation: Medial;Upper  Wound Description (Comments): epigastric   Wound Image    Wound Etiology Non-Healing Surgical   Dressing Status New dressing applied   Wound Cleansed Cleansed with saline   Dressing/Treatment Hydrofiber Ag   Dressing Change Due 23   Wound Length (cm)   (cluster of two wounds mid and left epigastric area)   Wound Assessment Superficial;Pink/red;Slough Drainage Amount Small   Drainage Description Serosanguinous   Odor None   Jeannie-wound Assessment Intact   Negative Pressure Wound Therapy Sternum Anterior; Lower   Placement Date/Time: 01/14/23 0942   Present on Admission/Arrival: No  Location: Sternum  Wound Location Orientation: Anterior; Lower   Wound Type Surgical   Unit Type KCI VAC [place]ta   Dressing Type White Foam;Black Foam   Number of pieces used 1   Number of pieces removed 1   Cycle Continuous; On   Target Pressure (mmHg) 125   Canister changed? No   Dressing Status New dressing applied   Dressing Changed Changed/New   Drainage Amount Moderate   Drainage Description Sanguinous;Purulent   Dressing Change Due 01/18/23   Wound Assessment Subcutaneous; Exposed structure bone; Other (Comment)  (adipose tissue)   Jeannie-wound Assessment Other (Comment)   Odor None   Incision 11/28/22 Sternum   Date First Assessed/Time First Assessed: 11/28/22 0941   Present on Hospital Admission: No  Location: Sternum  Incision Description (Comments): CORONARY ARTERY BYPASS GRAFT X3, INCISION EDGES WELL APPROXIMATED, DRESSING PLACED POST-OP AND CLEAR. Wound Image    Dressing Status New dressing applied   Dressing Change Due 01/18/23   Incision Cleansed Cleansed with saline   Dressing/Treatment Negative pressure wound therapy   Incision Length (cm) 6.5   Incision Width (cm) 7.3 cm   Incision Depth (cm) 3.5 cm       NPWT dressing changed to the sternal wound. Jeannie-wound adhesive related skin injuries covered with Opticell Ag gelling fiber and drape. White foam placed into the base of the wound, Granufoam to fill cavity. Two epigastric former tube/line sites covered with Opticell gelling fiber and drape. Culture obtained and sent to lab from the left epigastric former tube/line site as requested by Dr Saadia Moon. Response to treatment:  Well tolerated by patient. Plan:     Plan of Care:   Routine pressure injury and incontinence care.   Sternum: NPWT white foam beneath black/Granufoam. -125 mmhg, change M-W-F. Change the canister weekly and prn full. Epigastric and sternal fabio-wound skin: cover open areas with Opticell Ag prior to drape. Change with the NPWT dressing on M-W-F. Specialty Bed Required : Yes   [] Low Air Loss   [x] Pressure Redistribution  [] Fluid Immersion  [] Bariatric  [] Total Pressure Relief  [] Other:     Discharge Plan:  Placement for patient upon discharge: skilled nursing   Hospice Care: No   Patient appropriate for Outpatient [address]: No    Patient/Caregiver Teaching:    [] Indicates understanding       [] Needs reinforcement  [] Unsuccessful      [x] Verbal Understanding  [] Demonstrated understanding       [] No evidence of learning  [] Refused teaching         [] N/A    Contact the Wound Ostomy RN on-call Monday-Friday [phone] via [facility] by searching \"wound\" under \"groups\" and selecting the on-call clinician. Sending messages via individual names will not reach a clinician.         Electronically signed by Mariana Edwards RN, [facility] on 1/16/2023 at 3:50 PM

## 2023-01-16 NOTE — PROGRESS NOTES
[facility]  Daily Progress Note    Surgeon: Maegan Villanueva   POD# 2  S/P :  sternal wound debridement with wound vac placement     Subjective:    Patient was seen and examined at bedside this morning without any complaints. Vital Signs: /64   Pulse 80   Temp 98.7 °F (37.1 °C)   Resp (!) 9   Ht 4' 11\" (1.499 m)   Wt 243 lb 9.7 oz (110.5 kg)   SpO2 97%   BMI 49.20 kg/m²  O2 Flow Rate (L/min): 2 L/min   Admit Weight: Weight: 252 lb (114.3 kg)       I/O's:  I/O last 3 completed shifts: In: 452.4 [I.V.:80.9; IV Piggyback:371.5]  Out: 3681 [Urine:2300; Drains:50; Chest Tube:373]    Data:    CBC:   Recent Labs     01/14/23  1141 01/15/23  0606 01/16/23  0611   WBC 9.4 9.8 9.8   HGB 10.6* 10.8* 10.8*   HCT 36.0* 35.5* 35.2*   MCV 93.8 92.2 90.5    205 237     BMP:   Recent Labs     01/14/23  1141 01/15/23  0606 01/16/23  0611    133* 136   K 4.0 3.8 3.9   CL 97* 96* 95*   CO2 31 29 33*   BUN 22 19 15   CREATININE 1.11* 0.89 0.74     PT/INR:   Recent Labs     01/14/23  1141 01/15/23  0606 01/16/23  0611   PROTIME 11.4 11.8 11.8   INR 1.1 1.1 1.1     APTT:   Recent Labs     01/14/23  1141   APTT 23.0       Chest X-Ray:   FINDINGS:   AP portable view of the chest time stamped at 526 hours demonstrates prior   median sternotomy and overlying cardiac monitoring electrodes. Pigtail   terminus chest tube on the left is unchanged ending in the left mid lung   field laterally. Complete opacification of the left hemithorax is unchanged   from prior examination. Slightly decreased air in the left hemithorax is   noted. Hydropneumothorax on the left not excluded. Lung demonstrates no   acute infiltrate or extrapleural air.            Impression   Near complete opacification of the left hemithorax with decreased air   compared to prior exam.       Scheduled Meds:    sennosides-docusate sodium  2 tablet Oral BID    alteplase (ACTIVASE) syringe  5 mg IntraPLEUral Once    And    dornase (PULMOZYME) syringe  5 mg IntraPLEUral Once    insulin glargine  70 Units SubCUTAneous Nightly    piperacillin-tazobactam  3,375 mg IntraVENous Once    sodium chloride flush  5-40 mL IntraVENous 2 times per day    mupirocin   Nasal BID    polyethylene glycol  17 g Oral Daily    pantoprazole  40 mg Oral Daily    clopidogrel  75 mg Oral Daily    enoxaparin  30 mg SubCUTAneous BID    furosemide  40 mg Oral BID    piperacillin-tazobactam  3,375 mg IntraVENous Q8H    amiodarone  200 mg Oral TID    amLODIPine  5 mg Oral Daily    aspirin  81 mg Oral Daily    atorvastatin  40 mg Oral Nightly    budesonide-formoterol  2 puff Inhalation BID    [Held by provider] busPIRone  10 mg Oral BID    citalopram  40 mg Oral Daily    gabapentin  400 mg Oral TID    isosorbide mononitrate  60 mg Oral Daily    levothyroxine  50 mcg Oral Daily    metoprolol tartrate  12.5 mg Oral BID    [Held by provider] traZODone  50 mg Oral Nightly    insulin lispro  0-16 Units SubCUTAneous TID WC    insulin lispro  0-4 Units SubCUTAneous Nightly     Continuous Infusions:    sodium chloride      sodium chloride Stopped (01/15/23 1244)           Physical Exam:    General: A&O x 3, NAD noted  Heart: Normal S1, S2, RRR, No murmurs noted   Sternum: Prevena in place   Lungs: Diminished BS bilaterally at the bases. CT's in place to [facility]. No air leak noted   Abdomen: Soft, non tender, non distended, Hypoactive BS x 4   : Erazo in place   Extremities: No edema noted bilateral LE. EVH sites: CDI         Assessment & Plan:    Ms. Peter Guevara is a 78y.o. year-old female status post sternal wound debridement with wound vac placement on 1/14/2023. 1-16-23  Pt going to IR this AM to upsize/change out pigtail  We will eval for TPA and dornase for empyema  Internal medicine to assist with better glucose control  Anticipate DC in next few days with wound vac.       1/15/2023  No issues or events noted with the patient overnight.  Plan to transfer to [facility] today. CXR complete while uot on left, prior CT with loculated pleural effusion along with mucous plugging. Attempted TPA  and dornase to break up effusion as CT with minimal drainage output. Will need new CT placed in IR tomorrow, currently clogged unable to flush, will try again tomorrow once CT is placed. ID on board to manage abx    The above recommendations including medications and orders were discussed and agreed upon with Dr. Anita Segal / Dr. Randalyn Scheuermann.        Yaritza Rojas, APRN-CNP

## 2023-01-16 NOTE — PROGRESS NOTES
[facility]  Progress Note    Date:   1/16/2023  Patient name:  Dalia Whyte  Date of admission:  1/12/2023  3:55 PM  MRN:   [id number]  YOB: 1955    SUBJECTIVE/Last 24 hours update:       Patient seen and examined at bedside  No acute overnight events  Chest tube was clogged, will be replaced by IR today  Continuing Vanco and Zosyn  New ECG pending, last , trazodone and BuSpar held  Glycemic control improved patient eating and drinking      HPI:     78 y.o.  female with history of diabetes mellitus, GERD, painful neuropathy, multivessel CAD status post triple bypass CABG 11/28/2022. She presented today due to worsening symptoms of chest pain, which started after surgery but has worsened since last 3 days. She describes the chest pain as central, and is a tightness, nonradiating, better with tramadol, comes and goes, 8 out of 10 in severity and is getting worse. Patient was hospitalized for 1 week after surgery, then had cardiac rehab for 2 weeks which she describes the pain increasing during that time. She also had some cough which was nonproductive and was painful to her chest when coughing. She also experienced severe nausea and lack of appetite, and mentions she has eaten very minimal for the last 3 days. She also fell twice in the last 2 days, but did not hit her head or endorses dizziness. She also mentions she has some diffuse abdominal pain but is not severe. She also has some shortness of breath on exertion since the surgery, no fevers some chills, no night sweats. She is on 1 L of oxygen at home which started after her triple bypass surgery. She is on insulin nightly. Her hypertension is managed with amlodipine and lopressor. She also takes amiodarone post CABG.      The patient has an infected CABG incision site and ED CT chest showed diastasis of median sternotomy with air in the soft tissues, and abscess formation. And chest x-ray taken shows progressive pleural effusion with nearly complete opacification of the left hemithorax. BNP was elevated, Pro-Jame was elevated, and initially she needed 4 L of oxygen and she is normally on 1 L at home. CT also showed a 2cm left adrenal mass. Patient was placed on Zosyn and vancomycin in the ED    REVIEW OF SYSTEMS:      CONSTITUTIONAL:  no fevers, no headcahes  EYES: negative for blury vision  HEENT: No headaches, No nasal congestion, no difficulty swallowing  RESPIRATORY:negative for dyspnea, no wheezing, no Cough  CARDIOVASCULAR: negative for chest pain, no palpitations  GASTROINTESTINAL: no nausea, no vomiting, no change in bowel habits, no abdominal pain -improved appetite  GENITOURINARY: negative for dysuria, no hematuria   MUSCULOSKELETAL: no joint pains, no muscle aches, no swelling of joints or extremities  NEUROLOGICAL: No  Weakness or numbness      PAST MEDICAL HISTORY:      has a past medical history of Ambulates with cane, Anxiety, Borderline hypertension, CAD (coronary artery disease), Depression, GERD (gastroesophageal reflux disease), Heart attack ([facility] [address] Hypertension, Hypothyroidism, Neuropathy, Obesity, Osteoarthritis, Other cirrhosis of liver ([facility] [address] Sleep apnea, Type II or unspecified type diabetes mellitus without mention of complication, not stated as uncontrolled, Under care of service provider, Under care of service provider, and Vertebrogenic low back pain. PAST SURGICAL HISTORY:      has a past surgical history that includes Hysterectomy; Colonoscopy; Upper gastrointestinal endoscopy; Dilation and curettage of uterus; hiatal hernia repair; Throat surgery; Appendectomy; Total knee arthroplasty (Right, 01/06/2015); Total knee arthroplasty (Left, 05/26/2015); Coronary angioplasty with stent (07/2020); Cardiac catheterization; Cardiac catheterization (11/01/2022); Coronary artery bypass graft (N/A, 11/28/2022);  IR CHEST TUBE INSERTION (01/13/2023); and Wound debridement (01/14/2023). SOCIAL HISTORY:      reports that she has never smoked. She has never used smokeless tobacco. She reports current alcohol use. She reports that she does not use drugs. FAMILY HISTORY:     family history includes Arthritis in her father and mother; Cancer in her father; Diabetes in her sister; Heart Disease in her father and mother. HOME MEDICATIONS:      Prior to Admission medications    Medication Sig Start Date End Date Taking?  Authorizing Provider   insulin lispro, 1 Unit Dial, (HUMALOG KWIKPEN) 100 UNIT/ML SOPN Inject 3 Units into the skin 3 times daily (before meals) 125 - 150 2 units   151 - 200 4 units   201 - 250 6 units   251 - 300 8 units  301 - 350 10 units 351 - 400 12 units 12/29/22   Myrtha Leventhal, MD   amLODIPine (NORVASC) 5 MG tablet  12/20/22   Historical Provider, MD   doxycycline monohydrate (MONODOX) 100 MG capsule  12/20/22   Historical Provider, MD   Billye Child 100-25 MCG/ACT AEPB  12/20/22   Historical Provider, MD   isosorbide mononitrate (IMDUR) 60 MG extended release tablet  12/20/22   Historical Provider, MD   potassium chloride (KLOR-CON) 10 MEQ extended release tablet  12/20/22   Historical Provider, MD   traMADol Daniel Ruff) 50 MG tablet  12/20/22   Historical Provider, MD   traZODone (DESYREL) 50 MG tablet  12/20/22   Historical Provider, MD   aspirin 81 MG EC tablet Take 1 tablet by mouth daily 12/6/22   Chris Yung MD   amiodarone (CORDARONE) 200 MG tablet Take 1 tablet by mouth 3 times daily 12/5/22   Chris Yung MD   metoprolol tartrate (LOPRESSOR) 25 MG tablet Take 0.5 tablets by mouth 2 times daily 12/5/22   Chris Yung MD   clopidogrel (PLAVIX) 75 MG tablet Take 1 tablet by mouth daily 12/6/22   Chris Yung MD   furosemide (LASIX) 40 MG tablet Take 1 tablet by mouth 2 times daily 12/5/22   Chris Yung MD   potassium chloride (KLOR-CON M) 10 MEQ extended release tablet Take 2 tablets by mouth daily 12/5/22 Andres Snider MD   atorvastatin (LIPITOR) 40 MG tablet Take 1 tablet by mouth nightly 11/16/22   Mamie Hutchins MD   empagliflozin (JARDIANCE) 10 MG tablet TAKE 1 TABLET BY MOUTH EVERY DAY 10/22/22   Seth Byrne MD   levothyroxine (SYNTHROID) 50 MCG tablet TAKE 1 TABLET BY MOUTH EVERY DAY 10/22/22   Seth Byrne MD   citalopram (CELEXA) 40 MG tablet TAKE 1 TABLET BY MOUTH ONCE DAILY *EMERGENCY REFILL* 9/29/22   Seth Byrne MD   glimepiride (AMARYL) 4 MG tablet TAKE 1 TABLET BY MOUTH TWICE DAILY *EMERGENCY REFILL* 9/29/22   Seth Byrne MD   omeprazole (PRILOSEC) 20 MG delayed release capsule TAKE 1 CAPSULE BY MOUTH ONCE DAILY 9/22/22   Paulette Turner DO   gabapentin (NEURONTIN) 400 MG capsule TAKE 1 CAPSULE BY MOUTH THREE TIMES DAILY 4/22/22 11/18/22  Rafat Santa MD   sucralfate (CARAFATE) 1 GM tablet TAKE 1 TABLET BY MOUTH EVERY DAY  Patient not taking: Reported on 1/12/2023 4/20/22   Rafat Santa MD   busPIRone (BUSPAR) 10 MG tablet TAKE ONE (1) TABLET BY MOUTH TWICE DAILY 3/11/22   Rafat Santa MD   insulin glargine (BASAGLAR KWIKPEN) 100 UNIT/ML injection pen Inject 65 units subcutaneously once daily. Patient taking differently: Inject 20 Units into the skin Inject 65 units subcutaneously once daily. Changed at [facility] 3/1/22   Rafat Santa MD   miconazole (MICOTIN) 2 % cream APPLY TO AFFECTED AREA TWICE DAILY  Patient not taking: Reported on 1/12/2023 11/12/21   Rafat Santa MD   lidocaine (XYLOCAINE) 2 % jelly Apply topically with dressing changes every 3 days  Patient not taking: Reported on 1/12/2023 8/5/21   Rafat Santa MD   blood glucose monitor strips Test before meals and at bedtime. DX: DM, on insulin 10/8/20   Rafat Santa MD   miconazole (ZEASORB-AF) 2 % powder Apply topically 2 times daily.   Patient not taking: Reported on 1/12/2023 10/1/20   Rafat Santa MD   blood glucose monitor kit and supplies Dispense sufficient amount for indicated testing frequency plus additional to accommodate PRN testing needs. Dispense all needed supplies to include: monitor, strips, lancing device, lancets, control solutions, alcohol swabs. 8/17/20   Pedro Whelan MD   magnesium hydroxide (MILK OF MAGNESIA) 400 MG/5ML suspension Take 30 mLs by mouth daily as needed for Constipation 7/29/20   Pedro Whelan MD   Handicap Placard MISC Is a permanent condition. DX: M19.90 5/14/19   Rocco Huitron MD   Insulin Pen Needle (B-D UF III MINI PEN NEEDLES) 31G X 5 MM MISC USE 1 PEN NEEDLE DAILY 3/8/18   Rocco Huitron MD   [employer] Lancets Thin MISC Test before meals and at bedtime. DX: DM, on insulin 4/22/14   Filomena Brand MD   Alcohol Swabs (ALCOHOL PREP PADS) by Other route 2 times daily      Historical Provider, MD       ALLERGIES:     Morphine, Shellfish-derived products, and Tape [adhesive tape]      OBJECTIVE:       Vitals:    01/16/23 0000 01/16/23 0011 01/16/23 0536 01/16/23 0600   BP:  128/62 135/64    Pulse: 72 73 80    Resp: 18 17 (!) 9    Temp:  98.2 °F (36.8 °C) 98.7 °F (37.1 °C)    TempSrc:  Oral     SpO2: 95% 96% 97%    Weight:    243 lb 9.7 oz (110.5 kg)   Height:             Intake/Output Summary (Last 24 hours) at 1/16/2023 0801  Last data filed at 1/16/2023 0400  Gross per 24 hour   Intake 452.38 ml   Output 2070 ml   Net -1617.62 ml       PHYSICAL EXAM:  General Appearance  Alert , awake , not in acute distress  HEENT - Head is normocephalic, atraumatic. Lungs - Bilateral equal air entry , no wheezes, rales or rhonchi, aeration good  Cardiovascular - Heart sounds are normal.  Regular rhythm, normal rate without murmur, gallop or rub.   Abdomen - Soft, nontender, nondistended, no masses or organomegaly  Neurologic - There are no new focal motor or sensory deficits  Skin - No bruising or bleeding on exposed skin area-improved wound  Extremities - No cyanosis, clubbing or edema      DIAGNOSTICS:     Laboratory Testing:    Recent Results (from the past 24 hour(s))   POC Glucose Fingerstick    Collection Time: 01/15/23 11:09 AM   Result Value Ref Range    POC Glucose 344 (H) 65 - 105 mg/dL   POC Glucose Fingerstick    Collection Time: 01/15/23  8:56 PM   Result Value Ref Range    POC Glucose 203 (H) 65 - 105 mg/dL   Basic Metabolic Panel    Collection Time: 01/16/23  6:11 AM   Result Value Ref Range    Glucose 117 (H) 70 - 99 mg/dL    BUN 15 8 - 23 mg/dL    Creatinine 0.74 0.50 - 0.90 mg/dL    Est, Glom Filt Rate >60 >60 mL/min/1.73m2    Calcium 8.7 8.6 - 10.4 mg/dL    Sodium 136 135 - 144 mmol/L    Potassium 3.9 3.7 - 5.3 mmol/L    Chloride 95 (L) 98 - 107 mmol/L    CO2 33 (H) 20 - 31 mmol/L    Anion Gap 8 (L) 9 - 17 mmol/L   CBC    Collection Time: 01/16/23  6:11 AM   Result Value Ref Range    WBC 9.8 3.5 - 11.3 k/uL    RBC 3.89 (L) 3.95 - 5.11 m/uL    Hemoglobin 10.8 (L) 11.9 - 15.1 g/dL    Hematocrit 35.2 (L) 36.3 - 47.1 %    MCV 90.5 82.6 - 102.9 fL    MCH 27.8 25.2 - 33.5 pg    MCHC 30.7 28.4 - 34.8 g/dL    RDW 13.4 11.8 - 14.4 %    Platelets 608 395 - 539 k/uL    MPV 10.2 8.1 - 13.5 fL    NRBC Automated 0.0 0.0 per 100 WBC   Protime-INR    Collection Time: 01/16/23  6:11 AM   Result Value Ref Range    Protime 11.8 9.1 - 12.3 sec    INR 1.1    Magnesium    Collection Time: 01/16/23  6:11 AM   Result Value Ref Range    Magnesium 1.9 1.6 - 2.6 mg/dL   Calcium, Ionized    Collection Time: 01/16/23  6:11 AM   Result Value Ref Range    Calcium, Ionized 1.17 1.13 - 1.33 mmol/L         Imaging/Diagonstics:  EKG: prolonged QT interval.    CXR: Left chest tube remains in place    Current Facility-Administered Medications   Medication Dose Route Frequency Provider Last Rate Last Admin    sennosides-docusate sodium (SENOKOT-S) 8.6-50 MG tablet 2 tablet  2 tablet Oral BID Cody Webster MD   2 tablet at 01/15/23 2057    alteplase (CATHFLO) 5 mg in sodium chloride 0.9 % 30 mL  5 mg IntraPLEUral Once Harini Webster, APRN - CNP And    dornase alpha (PULMOZYME) 5 mg in sterile water 30 mL  5 mg IntraPLEUral Once HAO Ayala CNP        insulin glargine (LANTUS) injection vial 70 Units  70 Units SubCUTAneous Nightly Fátima Delatorre MD   70 Units at 01/15/23 2058    piperacillin-tazobactam (ZOSYN) 3,375 mg in dextrose 5 % 50 mL IVPB (mini-bag)  3,375 mg IntraVENous Once HAO Ayala CNP        sodium chloride flush 0.9 % injection 5-40 mL  5-40 mL IntraVENous 2 times per day HAO Ayala CNP   10 mL at 01/14/23 2107    sodium chloride flush 0.9 % injection 5-40 mL  5-40 mL IntraVENous PRN HAO Ayala CNP        0.9 % sodium chloride infusion   IntraVENous PRN HAO Ayala CNP        fentaNYL (SUBLIMAZE) injection 25 mcg  25 mcg IntraVENous Q1H PRN HAO Ayala CNP        Or    fentaNYL (SUBLIMAZE) injection 50 mcg  50 mcg IntraVENous Q1H PRN HAO Ayala CNP   50 mcg at 01/14/23 2054    hydrALAZINE (APRESOLINE) injection 5 mg  5 mg IntraVENous Q5 Min PRN HAO Ayala CNP        metoprolol (LOPRESSOR) injection 2.5 mg  2.5 mg IntraVENous Q10 Min PRN HAO Ayala CNP        mupirocin (BACTROBAN) 2 % ointment   Nasal BID HAO Ayala CNP   Given at 01/15/23 2110    diphenhydrAMINE (BENADRYL) tablet 25 mg  25 mg Oral Nightly PRN HAO Ayala CNP        polyethylene glycol (GLYCOLAX) packet 17 g  17 g Oral Daily HAO Ayala CNP   17 g at 01/15/23 0735    magnesium hydroxide (MILK OF MAGNESIA) 400 MG/5ML suspension 30 mL  30 mL Oral Daily PRN HAO Ayala CNP        bisacodyl (DULCOLAX) suppository 10 mg  10 mg Rectal Daily PRN HAO Ayala CNP        pantoprazole (PROTONIX) tablet 40 mg  40 mg Oral Daily HAO Ayala CNP   40 mg at 01/15/23 0815    magnesium sulfate 2000 mg in 50 mL IVPB premix  2,000 mg IntraVENous PRN HAO Ayala CNP ipratropium-albuterol (DUONEB) nebulizer solution 1 ampule  1 ampule Inhalation Q4H PRN Joe Lions, APRN - CNP        albumin human 5 % IV solution 25 g  25 g IntraVENous PRN Joe Lions, APRN - CNP        albumin human 25 % IV solution 25 g  25 g IntraVENous PRN Joe Lions, APRN - CNP        glucose chewable tablet 16 g  4 tablet Oral PRN Joe Lions, APRN - CNP        clopidogrel (PLAVIX) tablet 75 mg  75 mg Oral Daily Joe Kaitlynn, APRN - CNP   75 mg at 01/15/23 0814    enoxaparin Sodium (LOVENOX) injection 30 mg  30 mg SubCUTAneous BID Joe Libeverly, APRN - CNP   30 mg at 01/15/23 2103    furosemide (LASIX) tablet 40 mg  40 mg Oral BID Joe Kaitlynn, APRN - CNP   40 mg at 01/15/23 2057    oxyCODONE-acetaminophen (PERCOCET) 5-325 MG per tablet 1 tablet  1 tablet Oral Q6H PRN Joe Libeverly, APRN - CNP   1 tablet at 01/16/23 0606    0.9 % sodium chloride infusion   IntraVENous PRN Joe Libeverly, APRN - CNP   Stopped at 01/15/23 1244    acetaminophen (TYLENOL) tablet 650 mg  650 mg Oral Q6H PRN Joe Libeverly, APRN - CNP   650 mg at 01/13/23 2619    Or    acetaminophen (TYLENOL) suppository 650 mg  650 mg Rectal Q6H PRN Joe Lions, APRN - CNP        potassium chloride (KLOR-CON M) extended release tablet 40 mEq  40 mEq Oral PRN Joe Lions, APRN - CNP   40 mEq at 01/15/23 1304    Or    potassium bicarb-citric acid (EFFER-K) effervescent tablet 40 mEq  40 mEq Oral PRN Joe Lions, APRN - CNP        Or    potassium chloride 10 mEq/100 mL IVPB (Peripheral Line)  10 mEq IntraVENous PRN Joe Lions, APRN - CNP        piperacillin-tazobactam (ZOSYN) 3,375 mg in dextrose 5 % 50 mL IVPB extended infusion (mini-bag)  3,375 mg IntraVENous Q8H Joe Lions, APRN - CNP   Stopped at 01/16/23 0554    amiodarone (CORDARONE) tablet 200 mg  200 mg Oral TID HAO Porter - CNP   200 mg at 01/15/23 2057    amLODIPine (NORVASC) tablet 5 mg  5 mg Oral Daily Lestine Dance Karl Earl - CNP   5 mg at 01/15/23 0814    aspirin EC tablet 81 mg  81 mg Oral Daily HAO Villatoro CNP   81 mg at 01/15/23 0815    atorvastatin (LIPITOR) tablet 40 mg  40 mg Oral Nightly Negar Brunson APRN - CNP   40 mg at 01/15/23 2057    budesonide-formoterol (SYMBICORT) 80-4.5 MCG/ACT inhaler 2 puff  2 puff Inhalation BID HAO Villatoro CNP   2 puff at 01/16/23 0751    [Held by provider] busPIRone (BUSPAR) tablet 10 mg  10 mg Oral BID HAO Villatoro CNP   10 mg at 01/15/23 0816    citalopram (CELEXA) tablet 40 mg  40 mg Oral Daily HAO Villatoro - CNP   40 mg at 01/15/23 0815    gabapentin (NEURONTIN) capsule 400 mg  400 mg Oral TID HAO Villatoro CNP   400 mg at 01/15/23 2057    isosorbide mononitrate (IMDUR) extended release tablet 60 mg  60 mg Oral Daily HAO Villatoro - CNP   60 mg at 01/15/23 0814    levothyroxine (SYNTHROID) tablet 50 mcg  50 mcg Oral Daily HAO Villatoro - CNP   50 mcg at 01/15/23 0816    metoprolol tartrate (LOPRESSOR) tablet 12.5 mg  12.5 mg Oral BID HAO Villatoro - CNP   12.5 mg at 01/15/23 2057    [Held by provider] traZODone (DESYREL) tablet 50 mg  50 mg Oral Nightly HAO Villatoro - CNP   50 mg at 01/14/23 2029    [MAR Hold] glucagon (rDNA) injection 1 mg  1 mg SubCUTAneous PRN Junior Roy MD        insulin lispro (HUMALOG) injection vial 0-16 Units  0-16 Units SubCUTAneous TID WC HAO Villatoro CNP   8 Units at 01/15/23 1620    insulin lispro (HUMALOG) injection vial 0-4 Units  0-4 Units SubCUTAneous Nightly HAO Villatoro CNP           ASSESSMENT:     Principal Problem:    Incisional infection  Active Problems:    Pleural effusion on left    Lactic acid acidosis    Bandemia    Sternal wound dehiscence  Resolved Problems:    * No resolved hospital problems. *      PLAN:     Discussed care plan with nurse after getting her input.     Status post CABG 11/22/23 with subsequent surgical wound dehiscence and infection with abscess  -CT scan of the chest showed diastasis of mediastinal mass with air in the soft tissues as well as an abscess  -S/p I&D 1/13  -S/p sternal debridement and washout with wound VAC placement 1/14  -Pain control: Percocet 5-325 mg 1 tablet every 6 hours PRN and fentanyl pain panel PRN  -On aspirin 81 mg, plavix 75 mg QD, Lasix 40 mg twice daily  -Amiodarone 200 mg tid started post CABG  -Blood cultures NGTD  -Pulmonology on board:              -Want to continue patient on Zosyn as per ID recommendations,  -CTS on board              -Appreciate further recommendation  -ID on board              -Stopped Vanco, continue Zosyn      L sided pleural effusion  -Chest x-ray showed left-sided pleural effusion with almost complete collapse of the left lobe of lung  -chest tube placement on 1/13/22  -Chest tube to be replaced today by IR    Uncontrolled T2DM  -Glucose today 117  -Lantus 60 units lantus nightly with HDISS  -POC glucose checks  -A1c 7.5 11/18/22  -Hypoglycemia protocol  -Neuropathy - resumed Gabapentin 400 mg tid      HTN  -Norvasc 5 mg daily, Imdur 60 mg daily, Lopressor 12.5 mg twice daily  -Hydralazine 5 mg as needed     Prolonged QTC  -EKG 1/14:   -Repeat mag 1.7 this AM; will give 2G magnesium  -On multiple medications that can prolong the QTC including BuSpar, Celexa, and trazodone  -Trazodone and BuSpar held  -Repeat EKG ordered    Hypothyroidism  -Synthroid 50 mcg qd      Multivessel CAD status post CABG 11/28/22   -On Aspirin 81 mg, Plavix 75 mg qd, Lasix 40 mg bid, Imdur 60 mg qd. -Lipitor 40 mg nightly  -Last echo showed EF 54%- 11/14/22           DVT prophylaxis: Lovenox 30 mg bid  GI prophylaxis: Protonix 40 ,mg QD    Georgiana Waddell MD  Transitional year Resident  1/16/2023 8:01 AM            Please note that this chart was generated using voice recognition Dragon dictation software.   Although every effort was made to ensure the accuracy of this automated transcription, some errors in transcription may have occurred

## 2023-01-17 ENCOUNTER — APPOINTMENT (OUTPATIENT)
Dept: GENERAL RADIOLOGY | Age: 68
End: 2023-01-17
Payer: MEDICARE

## 2023-01-17 PROBLEM — J18.9 COMMUNITY ACQUIRED PNEUMONIA OF LEFT LOWER LOBE OF LUNG: Status: ACTIVE | Noted: 2023-01-17

## 2023-01-17 PROBLEM — R79.82 CRP ELEVATED: Status: ACTIVE | Noted: 2023-01-17

## 2023-01-17 LAB
ANION GAP SERPL CALCULATED.3IONS-SCNC: 8 MMOL/L (ref 9–17)
BUN BLDV-MCNC: 16 MG/DL (ref 8–23)
CALCIUM IONIZED: 1.06 MMOL/L (ref 1.13–1.33)
CALCIUM SERPL-MCNC: 8.3 MG/DL (ref 8.6–10.4)
CHLORIDE BLD-SCNC: 94 MMOL/L (ref 98–107)
CHOLESTEROL FLUID: 69 MG/DL
CO2: 34 MMOL/L (ref 20–31)
CREAT SERPL-MCNC: 0.8 MG/DL (ref 0.5–0.9)
CULTURE: NORMAL
CULTURE: NORMAL
GFR SERPL CREATININE-BSD FRML MDRD: >60 ML/MIN/1.73M2
GLUCOSE BLD-MCNC: 178 MG/DL (ref 65–105)
GLUCOSE BLD-MCNC: 184 MG/DL (ref 70–99)
GLUCOSE BLD-MCNC: 242 MG/DL (ref 65–105)
GLUCOSE BLD-MCNC: 259 MG/DL (ref 65–105)
GLUCOSE BLD-MCNC: 265 MG/DL (ref 65–105)
HCT VFR BLD CALC: 37.2 % (ref 36.3–47.1)
HEMOGLOBIN: 11 G/DL (ref 11.9–15.1)
Lab: NORMAL
Lab: NORMAL
MAGNESIUM: 1.9 MG/DL (ref 1.6–2.6)
MCH RBC QN AUTO: 27.3 PG (ref 25.2–33.5)
MCHC RBC AUTO-ENTMCNC: 29.6 G/DL (ref 28.4–34.8)
MCV RBC AUTO: 92.3 FL (ref 82.6–102.9)
NRBC AUTOMATED: 0 PER 100 WBC
PDW BLD-RTO: 13.2 % (ref 11.8–14.4)
PLATELET # BLD: 242 K/UL (ref 138–453)
PMV BLD AUTO: 10.1 FL (ref 8.1–13.5)
POTASSIUM SERPL-SCNC: 3.7 MMOL/L (ref 3.7–5.3)
RBC # BLD: 4.03 M/UL (ref 3.95–5.11)
SODIUM BLD-SCNC: 136 MMOL/L (ref 135–144)
SOURCE: NORMAL
SPECIMEN DESCRIPTION: NORMAL
SPECIMEN DESCRIPTION: NORMAL
WBC # BLD: 9.4 K/UL (ref 3.5–11.3)

## 2023-01-17 PROCEDURE — 6370000000 HC RX 637 (ALT 250 FOR IP): Performed by: STUDENT IN AN ORGANIZED HEALTH CARE EDUCATION/TRAINING PROGRAM

## 2023-01-17 PROCEDURE — 2060000000 HC ICU INTERMEDIATE R&B

## 2023-01-17 PROCEDURE — 71045 X-RAY EXAM CHEST 1 VIEW: CPT

## 2023-01-17 PROCEDURE — 36415 COLL VENOUS BLD VENIPUNCTURE: CPT

## 2023-01-17 PROCEDURE — 6360000002 HC RX W HCPCS

## 2023-01-17 PROCEDURE — 2580000003 HC RX 258

## 2023-01-17 PROCEDURE — 94761 N-INVAS EAR/PLS OXIMETRY MLT: CPT

## 2023-01-17 PROCEDURE — 82947 ASSAY GLUCOSE BLOOD QUANT: CPT

## 2023-01-17 PROCEDURE — 97535 SELF CARE MNGMENT TRAINING: CPT

## 2023-01-17 PROCEDURE — 83735 ASSAY OF MAGNESIUM: CPT

## 2023-01-17 PROCEDURE — 97110 THERAPEUTIC EXERCISES: CPT

## 2023-01-17 PROCEDURE — 6370000000 HC RX 637 (ALT 250 FOR IP)

## 2023-01-17 PROCEDURE — 99231 SBSQ HOSP IP/OBS SF/LOW 25: CPT | Performed by: FAMILY MEDICINE

## 2023-01-17 PROCEDURE — 99232 SBSQ HOSP IP/OBS MODERATE 35: CPT | Performed by: INTERNAL MEDICINE

## 2023-01-17 PROCEDURE — 97530 THERAPEUTIC ACTIVITIES: CPT

## 2023-01-17 PROCEDURE — 82330 ASSAY OF CALCIUM: CPT

## 2023-01-17 PROCEDURE — 94640 AIRWAY INHALATION TREATMENT: CPT

## 2023-01-17 PROCEDURE — 6360000002 HC RX W HCPCS: Performed by: STUDENT IN AN ORGANIZED HEALTH CARE EDUCATION/TRAINING PROGRAM

## 2023-01-17 PROCEDURE — 97116 GAIT TRAINING THERAPY: CPT

## 2023-01-17 PROCEDURE — 80048 BASIC METABOLIC PNL TOTAL CA: CPT

## 2023-01-17 PROCEDURE — 85027 COMPLETE CBC AUTOMATED: CPT

## 2023-01-17 PROCEDURE — 2700000000 HC OXYGEN THERAPY PER DAY

## 2023-01-17 PROCEDURE — 2580000003 HC RX 258: Performed by: STUDENT IN AN ORGANIZED HEALTH CARE EDUCATION/TRAINING PROGRAM

## 2023-01-17 RX ADMIN — AMIODARONE HYDROCHLORIDE 200 MG: 200 TABLET ORAL at 20:57

## 2023-01-17 RX ADMIN — POLYETHYLENE GLYCOL 3350 17 G: 17 POWDER, FOR SOLUTION ORAL at 08:31

## 2023-01-17 RX ADMIN — FENTANYL CITRATE 50 MCG: 50 INJECTION INTRAMUSCULAR; INTRAVENOUS at 14:07

## 2023-01-17 RX ADMIN — PIPERACILLIN AND TAZOBACTAM 3375 MG: 3; .375 INJECTION, POWDER, LYOPHILIZED, FOR SOLUTION INTRAVENOUS at 02:31

## 2023-01-17 RX ADMIN — INSULIN LISPRO 8 UNITS: 100 INJECTION, SOLUTION INTRAVENOUS; SUBCUTANEOUS at 17:19

## 2023-01-17 RX ADMIN — SODIUM CHLORIDE, PRESERVATIVE FREE 10 ML: 5 INJECTION INTRAVENOUS at 20:58

## 2023-01-17 RX ADMIN — GABAPENTIN 400 MG: 400 CAPSULE ORAL at 20:57

## 2023-01-17 RX ADMIN — DESMOPRESSIN ACETATE 40 MG: 0.2 TABLET ORAL at 20:57

## 2023-01-17 RX ADMIN — FENTANYL CITRATE 25 MCG: 50 INJECTION INTRAMUSCULAR; INTRAVENOUS at 20:58

## 2023-01-17 RX ADMIN — MUPIROCIN: 20 OINTMENT TOPICAL at 08:24

## 2023-01-17 RX ADMIN — CLOPIDOGREL 75 MG: 75 TABLET, FILM COATED ORAL at 08:22

## 2023-01-17 RX ADMIN — BUSPIRONE HYDROCHLORIDE 10 MG: 10 TABLET ORAL at 20:57

## 2023-01-17 RX ADMIN — MUPIROCIN: 20 OINTMENT TOPICAL at 20:58

## 2023-01-17 RX ADMIN — AMIODARONE HYDROCHLORIDE 200 MG: 200 TABLET ORAL at 12:41

## 2023-01-17 RX ADMIN — GABAPENTIN 400 MG: 400 CAPSULE ORAL at 08:21

## 2023-01-17 RX ADMIN — INSULIN GLARGINE 70 UNITS: 100 INJECTION, SOLUTION SUBCUTANEOUS at 20:59

## 2023-01-17 RX ADMIN — FENTANYL CITRATE 25 MCG: 50 INJECTION INTRAMUSCULAR; INTRAVENOUS at 02:33

## 2023-01-17 RX ADMIN — LEVOTHYROXINE SODIUM 50 MCG: 50 TABLET ORAL at 08:21

## 2023-01-17 RX ADMIN — DOCUSATE SODIUM 50 MG AND SENNOSIDES 8.6 MG 2 TABLET: 8.6; 5 TABLET, FILM COATED ORAL at 20:56

## 2023-01-17 RX ADMIN — OXYCODONE HYDROCHLORIDE AND ACETAMINOPHEN 1 TABLET: 5; 325 TABLET ORAL at 17:21

## 2023-01-17 RX ADMIN — AMIODARONE HYDROCHLORIDE 200 MG: 200 TABLET ORAL at 08:22

## 2023-01-17 RX ADMIN — ENOXAPARIN SODIUM 30 MG: 100 INJECTION SUBCUTANEOUS at 08:23

## 2023-01-17 RX ADMIN — WATER 5 MG: 1 INJECTION INTRAMUSCULAR; INTRAVENOUS; SUBCUTANEOUS at 12:00

## 2023-01-17 RX ADMIN — FENTANYL CITRATE 50 MCG: 50 INJECTION INTRAMUSCULAR; INTRAVENOUS at 09:50

## 2023-01-17 RX ADMIN — INSULIN LISPRO 8 UNITS: 100 INJECTION, SOLUTION INTRAVENOUS; SUBCUTANEOUS at 12:00

## 2023-01-17 RX ADMIN — FUROSEMIDE 40 MG: 40 TABLET ORAL at 08:22

## 2023-01-17 RX ADMIN — FUROSEMIDE 40 MG: 40 TABLET ORAL at 20:56

## 2023-01-17 RX ADMIN — BUDESONIDE AND FORMOTEROL FUMARATE DIHYDRATE 2 PUFF: 80; 4.5 AEROSOL RESPIRATORY (INHALATION) at 08:58

## 2023-01-17 RX ADMIN — OXYCODONE HYDROCHLORIDE AND ACETAMINOPHEN 1 TABLET: 5; 325 TABLET ORAL at 04:09

## 2023-01-17 RX ADMIN — SODIUM CHLORIDE, PRESERVATIVE FREE 10 ML: 5 INJECTION INTRAVENOUS at 08:20

## 2023-01-17 RX ADMIN — Medication 81 MG: at 08:22

## 2023-01-17 RX ADMIN — PANTOPRAZOLE SODIUM 40 MG: 40 TABLET, DELAYED RELEASE ORAL at 08:21

## 2023-01-17 RX ADMIN — ISOSORBIDE MONONITRATE 60 MG: 60 TABLET, EXTENDED RELEASE ORAL at 08:22

## 2023-01-17 RX ADMIN — BUDESONIDE AND FORMOTEROL FUMARATE DIHYDRATE 2 PUFF: 80; 4.5 AEROSOL RESPIRATORY (INHALATION) at 22:01

## 2023-01-17 RX ADMIN — DOCUSATE SODIUM 50 MG AND SENNOSIDES 8.6 MG 2 TABLET: 8.6; 5 TABLET, FILM COATED ORAL at 08:20

## 2023-01-17 RX ADMIN — BUSPIRONE HYDROCHLORIDE 10 MG: 10 TABLET ORAL at 08:21

## 2023-01-17 RX ADMIN — ALTEPLASE 10 MG: 2.2 INJECTION, POWDER, LYOPHILIZED, FOR SOLUTION INTRAVENOUS at 12:00

## 2023-01-17 RX ADMIN — METOPROLOL TARTRATE 12.5 MG: 25 TABLET ORAL at 20:57

## 2023-01-17 RX ADMIN — CEFTRIAXONE SODIUM 2000 MG: 10 INJECTION, POWDER, FOR SOLUTION INTRAVENOUS at 13:49

## 2023-01-17 RX ADMIN — ENOXAPARIN SODIUM 30 MG: 100 INJECTION SUBCUTANEOUS at 20:57

## 2023-01-17 RX ADMIN — OXYCODONE HYDROCHLORIDE AND ACETAMINOPHEN 1 TABLET: 5; 325 TABLET ORAL at 08:30

## 2023-01-17 RX ADMIN — GABAPENTIN 400 MG: 400 CAPSULE ORAL at 12:41

## 2023-01-17 ASSESSMENT — PAIN SCALES - GENERAL
PAINLEVEL_OUTOF10: 8
PAINLEVEL_OUTOF10: 4
PAINLEVEL_OUTOF10: 8
PAINLEVEL_OUTOF10: 8
PAINLEVEL_OUTOF10: 3
PAINLEVEL_OUTOF10: 7
PAINLEVEL_OUTOF10: 8
PAINLEVEL_OUTOF10: 6
PAINLEVEL_OUTOF10: 3
PAINLEVEL_OUTOF10: 2
PAINLEVEL_OUTOF10: 2
PAINLEVEL_OUTOF10: 3

## 2023-01-17 ASSESSMENT — ENCOUNTER SYMPTOMS
CHOKING: 0
ALLERGIC/IMMUNOLOGIC NEGATIVE: 1
ABDOMINAL PAIN: 0
WHEEZING: 0
PHOTOPHOBIA: 0
ABDOMINAL DISTENTION: 1
SHORTNESS OF BREATH: 0
EYE ITCHING: 0
COLOR CHANGE: 1
NAUSEA: 0
EYE DISCHARGE: 0
SHORTNESS OF BREATH: 1
VOMITING: 0
EYE REDNESS: 0
COUGH: 0
APNEA: 0
CHEST TIGHTNESS: 0
VOICE CHANGE: 0
TROUBLE SWALLOWING: 0
COUGH: 1

## 2023-01-17 ASSESSMENT — PAIN - FUNCTIONAL ASSESSMENT: PAIN_FUNCTIONAL_ASSESSMENT: PREVENTS OR INTERFERES SOME ACTIVE ACTIVITIES AND ADLS

## 2023-01-17 ASSESSMENT — PAIN DESCRIPTION - ORIENTATION
ORIENTATION: MID

## 2023-01-17 ASSESSMENT — PAIN DESCRIPTION - DESCRIPTORS
DESCRIPTORS: ACHING
DESCRIPTORS: ACHING

## 2023-01-17 ASSESSMENT — PAIN DESCRIPTION - FREQUENCY: FREQUENCY: CONTINUOUS

## 2023-01-17 ASSESSMENT — PAIN DESCRIPTION - ONSET
ONSET: ON-GOING

## 2023-01-17 ASSESSMENT — PAIN DESCRIPTION - LOCATION
LOCATION: CHEST

## 2023-01-17 ASSESSMENT — PAIN DESCRIPTION - PAIN TYPE
TYPE: ACUTE PAIN;SURGICAL PAIN

## 2023-01-17 NOTE — CARE COORDINATION
Met with patient to discuss transitional planning. SNF list given from insurance website. Requested more choices    [phone] met with patient and spouse to obtain SNF choices.  Referrals sent to [facility], [address] [place] [address], and [employer] as requested

## 2023-01-17 NOTE — PROGRESS NOTES
[facility] -   Infectious diseases evaluation  admission date 1/12/2023    reason for consultation:   Sternal infection    Impression :   Current:  Surgical wound dehiscence and infection w abscess - lower wound scabbed and infected - tan fluid seen today in bag   CAD, CABG November 2022  I/D lower sternal wound 1/13 - VAC  Left lobar collapse and mucous plug  Large Left loculated effusion - could be post op loculation - possibly collapsing the left lung  Left chest tube 1/13  Left adrenal tumor  Lactic acidosis  Bandemia 16    Other:    Discussion / summary of stay / plan of care   78 F s/p CABG last year. Coming in with chest pain. Imaging revealed infection at surgical inscision. Patient also had chest tube placed in LL due to large left loculated effusion. Recommendations   Cx from the sternal wound seems neg  BC still neg  MRSA nasal ng    Treating the sternal OM and the left lobar pneumonia   zosyn 1/12 stop 1/17 1/17 - downsize to ceftriaxone 2 g daily  FU CRP  Will need AB 6 weeks for sternal osteo  left loculated effusion 1/15 - left chest tube -TPAse -another chest tube  Resp tx fr the left lung collapse    Infection Control Recommendations   [place] Precautions  Contact Isolation     Antimicrobial Stewardship Recommendations   Simplification of therapy  Targeted therapy      History of Present Illness:   Initial history:  Migel Rodriguez is a 78y.o.-year-old female who had a CABG 11/2022, comes in with worsening chest pain, tightness, cough nonproductive nausea vomiting and finally fell twice in the last 2 days and hit her head due to dizziness.   She also describes diffuse abdominal pain and some shortness of breath with exertion, no fever or chills    Drainage from the surgical wound, with pain at the surgical site  Comes into the emergency room, started on 1 L of uses oxygen since her bypass, 1 L at home, she is diabetic on insulin, cirrhosis and obese, SHERITA    In the ER, the surgical wound looked infected with some drainage and swelling of the tissues. X-ray showed opacification of the left lung. CT shows a left adrenal mass, suggested an adrenal imaging    Patient started on antibiotic, infectious consulted for sternal wound dehiscence and infection        Interval changes  1/17/2023   Patient Vitals for the past 8 hrs:   BP Temp Temp src Pulse Resp SpO2 Weight   01/17/23 0858 -- -- -- 76 21 94 % --   01/17/23 0800 (!) 104/49 98.5 °F (36.9 °C) Oral 72 14 94 % --   01/17/23 0700 -- -- -- 69 17 97 % --   01/17/23 0600 -- -- -- -- -- -- 271 lb 9.7 oz (123.2 kg)   01/17/23 0439 -- -- -- -- 18 -- --   01/17/23 0409 -- -- -- -- 18 -- --   01/17/23 0406 129/65 98.6 °F (37 °C) Axillary 68 17 97 % --   01/17/23 0303 -- -- -- -- 18 -- --     1/14  Sternal debridement with wound vac placement 1/13  Culture no growth to this date   Blood culture no growth   Pain better - ox 3    1/15  All cx neg and CXR new left opacification  Chest tube in place on the left x 1/14  Sternal wound w VAC  Comfortable and ox 3  On NC    1/16  All Cx  are negative so far. Ir to place another chest tube  Afberile HDS. Some tan fluids in wound dressing. VAC changed and wound underlying is clean  CTS had debrided and resected some sternal bone at that level and all cx are still neg    1/17  IR chest tube placed yesterday  Patient still has some tan fluid in her wound vac dressing  Wound care consulted  Currently afebrile hds. Cx still negative     Summary of relevant labs:  Labs:  Lactic Acid, 4.7 High    WBC 9.4  Procalcitonin0.53 High    Creatinine0.8     Micro:  BC  Imaging:  CT of the chest  .  2 cm left adrenal mass with indeterminate Hounsfield numbers. Further   workup using adrenal CT protocol or MRI advised when the patient's condition   permits. Air seen in the mediastinum, an abscess around the sternotomy area    2.   Large left pleural effusion with almost complete collapse of the left   lobe of the lung. Bronchial wall thickening is noted with material within   the bronchi likely mucous plugging           I have personally reviewed the past medical history, past surgical history, medications, social history, and family history, and I haveupdated the database accordingly. Allergies:   Morphine, Shellfish-derived products, and Tape [adhesive tape]     Review of Systems:     Review of Systems   Constitutional:  Negative for activity change, appetite change, chills, fatigue, fever and unexpected weight change. HENT:  Negative for congestion and trouble swallowing. Eyes:  Negative for photophobia, discharge, redness and itching. Respiratory:  Negative for apnea, cough, choking, chest tightness and shortness of breath. Cardiovascular:  Positive for chest pain. Negative for palpitations. Gastrointestinal:  Positive for abdominal distention. Negative for abdominal pain and nausea. Endocrine: Negative for cold intolerance, heat intolerance and polyuria. Genitourinary:  Negative for dysuria and urgency. Musculoskeletal:  Negative for arthralgias and myalgias. Skin:  Positive for color change and wound. Allergic/Immunologic: Negative for immunocompromised state. Neurological:  Negative for dizziness and numbness. Hematological:  Negative for adenopathy. Psychiatric/Behavioral:  Negative for agitation. The patient is not nervous/anxious. Physical Examination :       Physical Exam  Constitutional:       General: She is not in acute distress. Appearance: Normal appearance. She is not ill-appearing, toxic-appearing or diaphoretic. HENT:      Head: Normocephalic and atraumatic. Nose: Nose normal. No congestion or rhinorrhea. Mouth/Throat:      Pharynx: Oropharynx is clear. No posterior oropharyngeal erythema. Eyes:      Conjunctiva/sclera: Conjunctivae normal.      Pupils: Pupils are equal, round, and reactive to light. Cardiovascular:      Rate and Rhythm: Normal rate. Pulses: Normal pulses. Heart sounds: Normal heart sounds. No murmur heard. Pulmonary:      Effort: No respiratory distress. Breath sounds: Normal breath sounds. No stridor. No wheezing or rhonchi. Abdominal:      Palpations: Abdomen is soft. There is no mass. Hernia: No hernia is present. Genitourinary:     Comments: Urine jackie  Musculoskeletal:         General: Signs of injury present. No swelling, tenderness or deformity. Cervical back: Neck supple. No rigidity or tenderness. Right lower leg: No edema. Left lower leg: No edema. Skin:     General: Skin is dry. Capillary Refill: Capillary refill takes less than 2 seconds. Coloration: Skin is not jaundiced or pale. Findings: No bruising or erythema. Comments: Purulent material coming out of surgical wound   Neurological:      Mental Status: She is alert and oriented to person, place, and time. Cranial Nerves: No cranial nerve deficit. Sensory: No sensory deficit. Psychiatric:         Mood and Affect: Mood normal.         Thought Content:  Thought content normal.       Past Medical History:     Past Medical History:   Diagnosis Date    Ambulates with cane     or walker    Anxiety     Borderline hypertension     CAD (coronary artery disease)     stents x 2 in 2020    Depression 07/28/2020    GERD (gastroesophageal reflux disease)     Heart attack ([facility] [address] 07/18/2020    Hypertension     Hypothyroidism     Neuropathy     Obesity     morbid    Osteoarthritis     Other cirrhosis of liver ([facility] [address] 07/27/2020    pt denies    Sleep apnea     possible    Type II or unspecified type diabetes mellitus without mention of complication, not stated as uncontrolled     Under care of service provider 11/18/2022    mvj-Dunnxxlh-ubymqHernan owen-last visit aug 2022    Under care of service provider 11/18/2022    card[facility]-last visit nov 2022    Vertebrogenic low back pain 03/29/2022       Past Surgical  History:     Past Surgical History:   Procedure Laterality Date    APPENDECTOMY      CARDIAC CATHETERIZATION      2 stents    CARDIAC CATHETERIZATION  11/01/2022    CARDIAC SURGERY N/A 1/14/2023    STERNAL WOUND WASHOUT, DEBRIDEMENT, WOUND VAC PLACEMENT performed by Jose Levy MD at [facility]  07/2020    CORONARY ARTERY BYPASS GRAFT N/A 11/28/2022    CABG CORONARY ARTERY BYPASS X3 ON PUMP , ATA, ENDO VEIN HARVEST performed by Jose Levy MD at [place] [address]  01/14/2023    STERNAL WOUND WASHOUT, DEBRIDEMENT, WOUND VAC PLACEMENT    DILATION AND CURETTAGE OF UTERUS      HIATAL HERNIA REPAIR      HYSTERECTOMY (CERVIX STATUS UNKNOWN)      IR CHEST TUBE INSERTION  01/13/2023    IR CHEST TUBE INSERTION 1/13/2023 Usha Ospina MD [facility] SPECIAL PROCEDURES    THROAT SURGERY      POLYPS REMOVED FROM VOCAL CORD    TOTAL KNEE ARTHROPLASTY Right 01/06/2015    TOTAL KNEE ARTHROPLASTY Left 05/26/2015    UPPER GASTROINTESTINAL ENDOSCOPY         Medications:      sennosides-docusate sodium  2 tablet Oral BID    alteplase (ACTIVASE) syringe  5 mg IntraPLEUral Once    And    dornase (PULMOZYME) syringe  5 mg IntraPLEUral Once    insulin glargine  70 Units SubCUTAneous Nightly    piperacillin-tazobactam  3,375 mg IntraVENous Once    sodium chloride flush  5-40 mL IntraVENous 2 times per day    mupirocin   Nasal BID    polyethylene glycol  17 g Oral Daily    pantoprazole  40 mg Oral Daily    clopidogrel  75 mg Oral Daily    enoxaparin  30 mg SubCUTAneous BID    furosemide  40 mg Oral BID    piperacillin-tazobactam  3,375 mg IntraVENous Q8H    amiodarone  200 mg Oral TID    amLODIPine  5 mg Oral Daily    aspirin  81 mg Oral Daily    atorvastatin  40 mg Oral Nightly    budesonide-formoterol  2 puff Inhalation BID    busPIRone  10 mg Oral BID    [Held by provider] citalopram  40 mg Oral Daily    gabapentin  400 mg Oral TID    isosorbide mononitrate  60 mg Oral Daily    levothyroxine  50 mcg Oral Daily    metoprolol tartrate  12.5 mg Oral BID    [Held by provider] traZODone  50 mg Oral Nightly    insulin lispro  0-16 Units SubCUTAneous TID WC    insulin lispro  0-4 Units SubCUTAneous Nightly       Social History:     Social History     Socioeconomic History    Marital status:      Spouse name: Anaid Vazquez    Number of children: 0    Years of education: Not on file    Highest education level: Not on file   Occupational History    Occupation: Retired      Employer: [employer]   Tobacco Use    Smoking status: Never    Smokeless tobacco: Never   Vaping Use    Vaping Use: Never used   Substance and Sexual Activity    Alcohol use: Yes     Comment: once a month    Drug use: No    Sexual activity: Yes     Partners: Male   Other Topics Concern    Not on file   Social History Narrative    Not on file     Social Determinants of Health     Financial Resource Strain: Low Risk     Difficulty of Paying Living Expenses: Not hard at all   Food Insecurity: No Food Insecurity    Worried About Running Out of Food in the Last Year: Never true    Ran Out of Food in the Last Year: Never true   Transportation Needs: Not on file   Physical Activity: Not on file   Stress: Not on file   Social Connections: Not on file   Intimate Partner Violence: Not on file   Housing Stability: Not on file       Family History:     Family History   Problem Relation Age of Onset    Heart Disease Mother     Arthritis Mother     Heart Disease Father     Arthritis Father     Cancer Father         \"oral\"    Diabetes Sister         gestational      Medical Decision Making:   I have independently reviewed/ordered the following labs:    CBC with Differential:   Recent Labs     01/16/23  0611 01/17/23  0641   WBC 9.8 9.4   HGB 10.8* 11.0*   HCT 35.2* 37.2    242       BMP:  Recent Labs     01/16/23  0611 01/17/23  0641    136   K 3.9 3.7 CL 95* 94*   CO2 33* 34*   BUN 15 16   CREATININE 0.74 0.80   MG 1.9 1.9       Hepatic Function Panel:   No results for input(s): PROT, LABALBU, BILIDIR, IBILI, BILITOT, ALKPHOS, ALT, AST in the last 72 hours. No results for input(s): RPR in the last 72 hours. No results for input(s): HIV in the last 72 hours. No results for input(s): BC in the last 72 hours. Lab Results   Component Value Date/Time    CREATININE 0.80 01/17/2023 06:41 AM    GLUCOSE 184 01/17/2023 06:41 AM    GLUCOSE 250 03/30/2012 03:15 PM       Detailed results: Thank you for allowing us to participate in the care of this patient. Please call with questions. This note is created with the assistance of a speech recognition program.  While intending to generate adocument that actually reflects the content of the visit, the document can still have some errors including those of syntax and sound a like substitutions which may escape proof reading. It such instances, actual meaningcan be extrapolated by contextual diversion. Claude Rico MD  Office: [phone]  Perfect serve / office [phone]        I have discussed the care of the patient, including pertinent history and exam findings,  with the resident. I have seen and examined the patient and the key elements of all parts of the encounter have been performed by me. I agree with the assessment, plan and orders as documented by the resident.     Gena Hopkins, Infectious Diseases

## 2023-01-17 NOTE — PLAN OF CARE
Problem: Chronic Conditions and Co-morbidities  Goal: Patient's chronic conditions and co-morbidity symptoms are monitored and maintained or improved  Outcome: Progressing     Problem: Pain  Goal: Verbalizes/displays adequate comfort level or baseline comfort level  Outcome: Progressing     Problem: Skin/Tissue Integrity  Goal: Absence of new skin breakdown  Description: 1. Monitor for areas of redness and/or skin breakdown  2. Assess vascular access sites hourly  3. Every 4-6 hours minimum:  Change oxygen saturation probe site  4. Every 4-6 hours:  If on nasal continuous positive airway pressure, respiratory therapy assess nares and determine need for appliance change or resting period.   Outcome: Progressing     Problem: Safety - Adult  Goal: Free from fall injury  Outcome: Progressing     Problem: ABCDS Injury Assessment  Goal: Absence of physical injury  Outcome: Progressing  Flowsheets (Taken 1/16/2023 1945)  Absence of Physical Injury: Implement safety measures based on patient assessment     Problem: Discharge Planning  Goal: Discharge to home or other facility with appropriate resources  Outcome: Progressing     Problem: Respiratory - Adult  Goal: Achieves optimal ventilation and oxygenation  Outcome: Progressing     Problem: Skin/Tissue Integrity - Adult  Goal: Skin integrity remains intact  Outcome: Progressing  Flowsheets (Taken 1/16/2023 1945)  Skin Integrity Remains Intact: Monitor for areas of redness and/or skin breakdown  Goal: Incisions, wounds, or drain sites healing without S/S of infection  Outcome: Progressing  Goal: Oral mucous membranes remain intact  Outcome: Progressing     Problem: Nutrition Deficit:  Goal: Optimize nutritional status  Outcome: Progressing

## 2023-01-17 NOTE — PROGRESS NOTES
Occupational Therapy  Facility/Department: [facility] CAR 2- STEPDOWN  Occupational Therapy Initial Assessment    Name: Dameon Santos  : 1955  MRN: [id number]  Date of Service: 2023    Discharge Recommendations:  Patient would benefit from continued therapy after discharge  OT Equipment Recommendations  Mobility Devices: Dala Ricardo: Rolling  ADL Assistive Devices: Reacher;Long-handled Shoe Horn;Long-handled Sponge;Sock-Aid Hard       Patient Diagnosis(es): The primary encounter diagnosis was Pleural effusion on left. Diagnoses of Abscess of sternal region and Infection were also pertinent to this visit. Past Medical History:  has a past medical history of Ambulates with cane, Anxiety, Borderline hypertension, CAD (coronary artery disease), Depression, GERD (gastroesophageal reflux disease), Heart attack ([facility] [address] Hypertension, Hypothyroidism, Neuropathy, Obesity, Osteoarthritis, Other cirrhosis of liver ([facility] [address] Sleep apnea, Type II or unspecified type diabetes mellitus without mention of complication, not stated as uncontrolled, Under care of service provider, Under care of service provider, and Vertebrogenic low back pain. Past Surgical History:  has a past surgical history that includes Hysterectomy; Colonoscopy; Upper gastrointestinal endoscopy; Dilation and curettage of uterus; hiatal hernia repair; Throat surgery; Appendectomy; Total knee arthroplasty (Right, 2015); Total knee arthroplasty (Left, 2015); Coronary angioplasty with stent (2020); Cardiac catheterization; Cardiac catheterization (2022); Coronary artery bypass graft (N/A, 2022); IR CHEST TUBE INSERTION (2023); Wound debridement (2023); Cardiac surgery (N/A, 2023); and IR CHEST TUBE INSERTION (2023). Assessment   Performance deficits / Impairments: Decreased functional mobility ; Decreased endurance;Decreased ADL status; Decreased high-level IADLs;Decreased strength;Decreased balance  Prognosis: Good  Decision Making: Medium Complexity  REQUIRES OT FOLLOW-UP: Yes  Activity Tolerance  Activity Tolerance: Patient Tolerated treatment well;Patient limited by fatigue        Plan   Occupational Therapy Plan  Times Per Week: 3-5x/wk  Current Treatment Recommendations: Balance training, Functional mobility training, Endurance training, Safety education & training, Patient/Caregiver education & training, Equipment evaluation, education, & procurement, Self-Care / ADL, Home management training     Restrictions  Restrictions/Precautions  Restrictions/Precautions: Fall Risk, Up as Tolerated  Required Braces or Orthoses?: No  Position Activity Restriction  Sternal Precautions: 5# Lifting Restrictions  Other position/activity restrictions: Pt had recent CABG on 11/28/22, sternal precautions . Chest tube. O2 NC 2 Lm. Wound vac. Subjective   General  Patient assessed for rehabilitation services?: Yes  Family / Caregiver Present: Yes (Spouse present and supportive)  General Comment  Comments: RN ok 'd for OT tx. Pt agreeable to session, pleasant/cooperative throughout. Pt reports 6/10 pain at incision. Objective                Safety Devices  Type of Devices: Call light within reach;Gait belt;Patient at risk for falls;Nurse notified; Left in chair  Restraints  Restraints Initially in Place: No  Bed Mobility Training  Bed Mobility Training: No  Balance  Sitting: Intact (Static/dynamic sitting sitting unsupported in recliner chair. ~20 minutes)  Standing: With support (~3 minutes total, one sitting rest break needed, pt. having 2 posterior LOB while reaching for items on tray table, needing min A to correct.)  Transfer Training  Transfer Training: Yes  Overall Level of Assistance: Contact-guard assistance; Additional time (STS 2 times from RW.)  Interventions: Verbal cues (Cues for pursed lip breathing, pt. velasquez MOULTON carryover.)  Sit to Stand: Contact-guard assistance; Additional time  Stand to Sit: Contact-guard assistance; Additional time        ADL  Grooming: Modified independent ;Setup; Increased time to complete;Minimal assistance  Grooming Skilled Clinical Factors: Standing in front of recliner set up on tray table, MI with use of RW to complete action of brushing teeth and washing face, with one sitting rest break. Pt. did have 2 LOB needing min A to correct both times d/t posterior LOB. Hair brushing at sitting level, increased time and effort, min A to tie hair back. UE Bathing Skilled Clinical Factors: Declined wash or dress d/t fatigue. Additional Comments: Pt. limited by fatigue and low endurance.  SP02 88-98% on [facility]     Activity Tolerance  Activity Tolerance: Patient limited by fatigue;Patient limited by endurance           Cognition  Overall Cognitive Status: WFL  Orientation  Overall Orientation Status: Within Functional Limits                  Education Given To: Patient  Education Provided Comments: OT POC, transfer training, sternal precautions, pursed lip breathing,  Education Method: Demonstration;Verbal  Barriers to Learning: None  Education Outcome: Verbalized understanding;Demonstrated understanding                                            AM-PAC Score        AM-[facility] Inpatient Daily Activity Raw Score: 17 (01/17/23 [facility])  AM-PAC Inpatient ADL T-Scale Score : 37.26 (01/17/23 [facility])  ADL Inpatient CMS 0-100% Score: 50.11 (01/17/23 [facility])  ADL Inpatient CMS G-Code Modifier : CK (01/17/23 [facility])           Goals  Short Term Goals  Time Frame for Short Term Goals: By discharge, pt will:  Short Term Goal 1: Demo bed mobility with Min A and use of adaptive strategies PRN  Short Term Goal 2: Demo functional sit<>stand transfers and functional mobility with SBA and LRD PRN  Short Term Goal 3: Demo 8 minutes of dynamic standing balance with CGA to promote increased engagement in ADLs  Short Term Goal 4: Demo UB ADLs with SUP and use of adpative tech PRN  Short Term Goal 5: Demo LB ADLs with CGA and use of DME PRN  Short Term Goal 6: Demonstrate independent implementation of EC/WS strategies throughout all functional activities with  0 VCs to initiate       Therapy Time   Individual Concurrent Group Co-treatment   Time In 1310         Time Out 1348         Minutes 38         Timed Code Treatment Minutes: [address] [place], R Elle Parker 46

## 2023-01-17 NOTE — PROGRESS NOTES
PULMONARY & CRITICAL CARE MEDICINE PROGRESS NOTE     Patient:  Lucy Irwin  MRN: [id number]  Admit date: 1/12/2023  Primary Care Physician: Tommy Durbin MD  Consulting Physician: Gareth Valladares DO  CODE Status: Full Code  LOS: 5     SUBJECTIVE     Chief Complaint/ Reason for consult:    large pleural effusion    Hospital Course: The patient is a 78 y.o. female with a history of hypertension, CAD s/p stents, s/p CABG 11/28/2022, hypothyroidism came to ED with chief complaint of chest pain and shortness of breath. Patient is known to CT surgery as she got CABG on 11/28/2022 and was recently seen in their office on 12/21/2022, during that visit patient was doing well, had no respiratory or cardiac issues. Patient was afebrile, NSR /55 and was room air was eventually put on 3 L nasal cannula due to respiratory distress, patient is on 1 L oxygen at home. Initial lab work show hyperglycemia with glucose 260, Pro-Jame 0.53.  proBNP 1814, troponin 36. WBC 16.7, hemoglobin 13.1, platelet 830. In the ED CT chest revealed large left pleural effusion with near collapse of left lung. ID, pulmonary and CT surgery were consulted. Patient was chest tube placed by IR on 1/13/2023. Patient is getting Zosyn as per ID recommendation. Interval History:  01/17/23  Pt seen and examined this morning, sitting in recliner. Stated that yesterday she had worse experience when she was taken for 12 [place] Pigtail placement by IR. She was in pain. Currently feeling better. Remained afebrile, HR 76, /69 this morning. Sat well on 2 L NC. Labs reviewed, no leukocytosis, HB 11. Platelets 457. Glucose 184. Cr 0.80. Sternal wound wound VAC is in place. Left pig tail has 240 mL drainage last 24 hours. Blood, wound and pleural fluid cultures negative so far. CXR 1/17/23 showed slight improvement, CT surgery planning for tPA and Dornase today. Wound care following.   Chest x-ray 01/16/2023 as compared to 01/15/2023 and 2023 showed improved aeration of left lung left pleural effusion is reduced pleural catheter is in place. Review Of Systems:  Review of Systems   Constitutional:  Positive for fatigue. HENT:  Negative for congestion, nosebleeds, trouble swallowing and voice change. Eyes:  Negative for visual disturbance. Respiratory:  Positive for cough and shortness of breath. Negative for apnea, chest tightness and wheezing. Cardiovascular:  Positive for chest pain. Negative for leg swelling. Gastrointestinal:  Negative for abdominal pain, nausea and vomiting. Genitourinary:  Negative for difficulty urinating. Allergic/Immunologic: Negative. Neurological:  Negative for dizziness, tremors and light-headedness. Hematological:  Negative for adenopathy. Does not bruise/bleed easily. Psychiatric/Behavioral: Negative. OBJECTIVE     PaO2/FiO2 RATIO:  No results for input(s): POCPO2 in the last 72 hours. VITAL SIGNS:   LAST:  BP (!) 104/49   Pulse 76   Temp 98.5 °F (36.9 °C) (Oral)   Resp 21   Ht 4' 11\" (1.499 m)   Wt 271 lb 9.7 oz (123.2 kg)   SpO2 94%   BMI 54.86 kg/m²   8-24 HR RANGE:  TEMP Temp  Av.4 °F (36.9 °C)  Min: 97.7 °F (36.5 °C)  Max: 98.8 °F (93.8 °C)   BP Systolic (16OLP), EPJ:717 , Min:97 , KSO:289      Diastolic (23ASA), EHH:64, Min:49, Max:73     PULSE Pulse  Av.4  Min: 67  Max: 79   RR Resp  Av.5  Min: 14  Max: 21   O2 SAT SpO2  Av.5 %  Min: 94 %  Max: 97 %   OXYGEN DELIVERY O2 Flow Rate (L/min)  Av L/min  Min: 2 L/min  Max: 2 L/min        Systemic Examination:   Physical Exam -  Constitutional:  Alert, cooperative and no distress. Mental Status:  Oriented to person, place and time and normal affect. Lungs: Left-sided chest tube is present. Sternal wound VAC is present. Air entry is present bilaterally decreased breath sound in left lower lung field as compared to right. Normal effort.   Heart:  Regular rate and rhythm, no murmur. Abdomen:  Soft, nontender, nondistended, normal bowel sounds. Extremities: Mild bilateral edema, negative for redness, tenderness in the calves. Skin:  Warm, dry, no gross lesions or rashes. DATA REVIEW     Medications: Current Inpatient  Scheduled Meds:   alteplase (ACTIVASE) syringe  10 mg IntraPLEUral Q12H    And    dornase (PULMOZYME) syringe  5 mg IntraPLEUral Q12H    cefTRIAXone (ROCEPHIN) IV  2,000 mg IntraVENous Q24H    sennosides-docusate sodium  2 tablet Oral BID    insulin glargine  70 Units SubCUTAneous Nightly    sodium chloride flush  5-40 mL IntraVENous 2 times per day    mupirocin   Nasal BID    polyethylene glycol  17 g Oral Daily    pantoprazole  40 mg Oral Daily    clopidogrel  75 mg Oral Daily    enoxaparin  30 mg SubCUTAneous BID    furosemide  40 mg Oral BID    amiodarone  200 mg Oral TID    [Held by provider] amLODIPine  5 mg Oral Daily    aspirin  81 mg Oral Daily    atorvastatin  40 mg Oral Nightly    budesonide-formoterol  2 puff Inhalation BID    busPIRone  10 mg Oral BID    [Held by provider] citalopram  40 mg Oral Daily    gabapentin  400 mg Oral TID    isosorbide mononitrate  60 mg Oral Daily    levothyroxine  50 mcg Oral Daily    metoprolol tartrate  12.5 mg Oral BID    [Held by provider] traZODone  50 mg Oral Nightly    insulin lispro  0-16 Units SubCUTAneous TID WC    insulin lispro  0-4 Units SubCUTAneous Nightly     Continuous Infusions:   sodium chloride      sodium chloride Stopped (01/17/23 0231)       INPUT/OUTPUT:  In: 1311.6 [P.O.:870;  I.V.:138.7]  Out: 2140 [Urine:1900]  Date 01/17/23 0000 - 01/17/23 2359   Shift 5199-7020 4605-3516 4322-2828 24 Hour Total   INTAKE   I.V.(mL/kg) 138.7(1.1)   138.7(1.1)   IV Piggyback(mL/kg) 302.9(2.5)   302.9(2.5)   Shift Total(mL/kg) 441.6(3.6)   441.6(3.6)   OUTPUT   Urine(mL/kg/hr) 800(0.8)   800   Chest Tube 60   60   Shift Total(mL/kg) 860(7)   860(7)   Weight (kg) 123.2 123.2 123.2 123.2 LABS:  ABGs:   No results for input(s): POCPH, POCPCO2, POCPO2, POCHCO3, LOSS7YWD in the last 72 hours. CBC:   Recent Labs     01/14/23  1141 01/15/23  0606 01/16/23  0611 01/17/23  0641   WBC 9.4 9.8 9.8 9.4   HGB 10.6* 10.8* 10.8* 11.0*   HCT 36.0* 35.5* 35.2* 37.2   MCV 93.8 92.2 90.5 92.3    205 237 242   RBC 3.84* 3.85* 3.89* 4.03   MCH 27.6 28.1 27.8 27.3   MCHC 29.4 30.4 30.7 29.6   RDW 13.5 13.6 13.4 13.2       CRP:   No results for input(s): CRP in the last 72 hours. LDH:   No results for input(s): LDH in the last 72 hours. BMP:   Recent Labs     01/14/23  1141 01/15/23  0606 01/16/23  0611 01/17/23  0641    133* 136 136   K 4.0 3.8 3.9 3.7   CL 97* 96* 95* 94*   CO2 31 29 33* 34*   BUN 22 19 15 16   CREATININE 1.11* 0.89 0.74 0.80   GLUCOSE 200* 262* 117* 184*       Liver Function Test:   No results for input(s): PROT, LABALBU, ALT, AST, GGT, ALKPHOS, BILITOT in the last 72 hours. Coagulation Profile:   Recent Labs     01/14/23  1141 01/15/23  0606 01/16/23  0611   INR 1.1 1.1 1.1   PROTIME 11.4 11.8 11.8   APTT 23.0  --   --        D-Dimer:  No results for input(s): DDIMER in the last 72 hours. Lactic Acid:  No results for input(s): LACTA in the last 72 hours. Cardiac Enzymes:  No results for input(s): CKTOTAL, CKMB, CKMBINDEX, TROPONINI in the last 72 hours. Invalid input(s): TROPONIN, HSTROP  BNP/ProBNP:   No results for input(s): BNP, PROBNP in the last 72 hours. Triglycerides:  No results for input(s): TRIG in the last 72 hours. Microbiology:  Urine Culture:  No components found for: CURINE  Blood Culture:  No components found for: CBLOOD, CFUNGUSBL  Sputum Culture:  No components found for: CSPUTUM  Recent Labs     01/15/23  1229   [facility] . NASAL SWAB       Recent Labs     01/15/23  1229   [facility] . NASAL SWAB          Pathology:    Radiology Reports:  XR CHEST PORTABLE   Preliminary Result   No significant interval change.   Left chest tube in place with small to moderate left pleural effusion and patchy opacities in the left lung. IR CHEST TUBE INSERTION   Final Result   Successful up sizing of left-sided chest drain. Fluoroscopic images show   majority of the pleural effusion has resolved. There may be some residual   atelectasis or consolidation in the left base. There did not appear to be   much fluid being aspirated. If tube is not draining would suggest follow-up   CT scan. XR CHEST PORTABLE   Final Result   Left chest tube remains in place. Interval reduction of left pleural   effusion and improved aeration of the left lung. XR CHEST PORTABLE   Final Result   Near complete opacification of the left hemithorax with decreased air   compared to prior exam.         XR CHEST PORTABLE   Final Result   Little change from prior study. Significant left lung opacity with left   hydropneumothorax suspected. Slight mediastinal shift toward the right is   noted diminished from prior study. Right lung is clear. XR CHEST PORTABLE   Final Result   Interval left chest tube placement with persistent opacification of the left   chest.  There is some lucency in the left lower chest, which could represent   hydropneumothorax. IR CHEST TUBE INSERTION   Final Result   Successful percutaneous placement of a 10 Arabic left pleural drain. There is marked loculation of the left pleural collection indicating complex   pleural fluid. Small bore drainage catheter may not successfully drained the   entire pleural collection given its complex nature. CT CHEST W CONTRAST   Final Result   1. Diastasis of median sternotomy with air in the soft tissues, and   appearance of abscess formation/dehiscence dural to the sternotomy. Air is   present in the mediastinum. 2.  Large left pleural effusion with almost complete collapse of the left   lobe of the lung.   Bronchial wall thickening is noted with material within   the bronchi likely mucous plugging. 3.  Hepatic steatosis. 4.  Small gallstones in the gallbladder. 5.  2 cm left adrenal mass with indeterminate Hounsfield numbers. Further   workup using adrenal CT protocol or MRI advised when the patient's condition   permits. RECOMMENDATIONS:   Advise adrenal protocol CT or MRI to evaluate a left adrenal mass. XR CHEST PORTABLE   Final Result   Progressive left pleural effusion with nearly complete opacification of the   left hemithorax. XR CHEST PORTABLE    (Results Pending)        Echocardiogram:   No results found for this or any previous visit. ASSESSMENT AND PLAN     Assessment:    // Acute hypoxic respiratory failure improving  //Left lobar collapse secondary to effusion  //Large left loculated effusion s/p chest tube 1/13/2023  //CAD s/p CABG  //Sternal wound s/p debridement and wound VAC  //Hypertension  //DM  //Probable obstructive sleep apnea        Plan:     Patient is currently on 2 L nasal cannula and maintaining saturation. Wean O2 to keep saturation above 90%. Sternal wound and chest tube management as per CT surgery. s/p sternal wound debridement with washout and wound VAC placement by CTS. S/p upsizing of pigtail acth 1/16/23,   Plan for  repeat tPA and Dosnase today as per CTS. On Zosyn as per ID recommendation. Follow-up with infectious disease. Blood, wound and body fluid cultures negative so far. Encourage incentive spirometry deep breathing and cough and ambulation physical therapy. On Lasix monitor intake and output and electrolytes. We will continue to follow. I will discuss with attending. Angelia Bermudez MD  Internal Medicine Resident, PGY-3  [facility] [address]         1/17/2023, 11:29 AM      Please note that this chart was generated using voice recognition Dragon dictation software.   Although every effort was made to ensure the accuracy of this automated transcription, some errors in transcription may have occurred. Attending Physician Statement  I have discussed the care of Too Jimenez, including pertinent history and exam findings with the resident. I have reviewed the key elements of all parts of the encounter with the resident. I have seen and examined the patient with the resident. I agree with the assessment and plan and status of the problem list as documented. I seen the patient during around today. Chart reviewed. Lab seen. CT surgery note seen and imaging studies reviewed. Overnight patient is hemodynamically stable she is afebrile T-max of 98.8 she is on 2 L nasal cannula maintaining saturation 95% to 97%. She had upsizing of the left-sided chest tube done by interventional radiology on 01/18/2023. Overnight she had 240 mL out from chest tube. Patient has dornase jen/alteplase instilled by CT surgeon currently chest tube is clamped. Chest x-ray reviewed from 01/17/2023 and is compared to chest x-ray on 01/16/2023 not much change with pleural parenchymal change at left base with pleural effusion and atelectasis/infiltrate. Low lung volumes    Patient is currently on Zosyn per infectious disease. Wound VAC is in place in the sternal wound. Continue to monitor drainage from the chest tube. Continue encourage incentive spirometry deep breathing out of bed to chair and physical therapy. Continue O2 and wean O2. Please note that this chart was generated using voice recognition Dragon dictation software. Although every effort was made to ensure the accuracy of this automated transcription, some errors in transcription may have occurred.      Norah Pelletier MD  1/17/2023 2:35 PM

## 2023-01-17 NOTE — PROGRESS NOTES
[address] [facility] [address]  PROGRESS NOTE    Shift date: 1/16/2023  Shift day: Monday   Shift # 3    Room # 2005/2005-01   Name: Mynor Foster                Rastafari: [address] [place]: Unknown    Referral: Routine Visit    Admit Date & Time: 1/12/2023  3:55 PM    Assessment:  Mynor Foster is a 78 y.o. female in the hospital because of \"Incisional Pain. \" Upon entering the room writer observes patient sitting up in hospital bed, watching TV. Intervention:  Writer introduced self and title as .  learned that patient underwent \"open heart surgery\" that resulted in some \"complications. \" Patient shared that she had a \"chest tube placed today. \" Patient indicated that she remains in pain tonight.  provided support and care in room.  offered prayer for patient's well-being and healing. Outcome:  Patient thanked  for visit and care. Plan:  Chaplains will remain available to offer spiritual and emotional support as needed. 01/16/23 2107   Encounter Summary   Service Provided For: Patient   Referral/Consult From: [facility]   Support System Spouse; Family members   Last Encounter  01/16/23   Complexity of Encounter Low   Begin Time 2107   End Time  2129   Total Time Calculated 22 min   Encounter    Type Initial Screen/Assessment   Spiritual/Emotional needs   Type Spiritual Support   Assessment/Intervention/Outcome   Assessment Anxious; Fearful;Powerlessness   Intervention Active listening;Discussed belief system/Taoism practices/pattie;Discussed illness injury and its impact; Explored/Affirmed feelings, thoughts, concerns;Explored Coping Skills/Resources;Nurtured Hope;Prayer (assurance of)/[place];Sustaining Presence/Ministry of presence   Outcome Comfort;Coping;Receptive   Plan and Referrals   Plan/Referrals Continue to visit, (comment)  (as needed)     Electronically signed by Juany Davey on 1/17/2023 at 6:15 AM.  [facility] [address] [facility]  [phone]

## 2023-01-17 NOTE — PLAN OF CARE
Problem: Chronic Conditions and Co-morbidities  Goal: Patient's chronic conditions and co-morbidity symptoms are monitored and maintained or improved  1/17/2023 0930 by Cam Alejo RN  Outcome: Progressing    Problem: Pain  Goal: Verbalizes/displays adequate comfort level or baseline comfort level  1/17/2023 0930 by Cam Alejo RN  Outcome: Progressing      Problem: Safety - Adult  Goal: Free from fall injury  1/17/2023 0930 by Cam Alejo RN  Outcome: Progressing    Problem: ABCDS Injury Assessment  Goal: Absence of physical injury  1/17/2023 0930 by Cam Alejo RN  Outcome: Progressing    Problem: Respiratory - Adult  Goal: Achieves optimal ventilation and oxygenation  1/17/2023 0930 by Cam Alejo RN  Outcome: Progressing    Problem: Skin/Tissue Integrity - Adult  Goal: Skin integrity remains intact  1/17/2023 0930 by Cam Alejo RN  Outcome: Progressing    Problem: Nutrition Deficit:  Goal: Optimize nutritional status  1/17/2023 0930 by Cam Alejo RN

## 2023-01-17 NOTE — PROGRESS NOTES
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  female with history of diabetes mellitus, GERD, painful neuropathy, multivessel CAD status post triple bypass CABG 11/28/2022. She presented today due to worsening symptoms of chest pain, which started after surgery but has worsened since last 3 days. She describes the chest pain as central, and is a tightness, nonradiating, better with tramadol, comes and goes, 8 out of 10 in severity and is getting worse. Patient was hospitalized for 1 week after surgery, then had cardiac rehab for 2 weeks which she describes the pain increasing during that time. She also had some cough which was nonproductive and was painful to her chest when coughing. She also experienced severe nausea and lack of appetite, and mentions she has eaten very minimal for the last 3 days. She also fell twice in the last 2 days, but did not hit her head or endorses dizziness. She also mentions she has some diffuse abdominal pain but is not severe. She also has some shortness of breath on exertion since the surgery, no fevers some chills, no night sweats. She is on 1 L of oxygen at home which started after her triple bypass surgery. She is on insulin nightly.   Her hypertension is managed with amlodipine and lopressor. She also takes amiodarone post CABG. The patient has an infected CABG incision site and ED CT chest showed diastasis of median sternotomy with air in the soft tissues, and abscess formation. And chest x-ray taken shows progressive pleural effusion with nearly complete opacification of the left hemithorax. BNP was elevated, Pro-Jame was elevated, and initially she needed 4 L of oxygen and she is normally on 1 L at home. CT also showed a 2cm left adrenal mass. Patient was placed on Zosyn and vancomycin in the ED    REVIEW OF SYSTEMS:      CONSTITUTIONAL:  no fevers, no headcahes  EYES: negative for blury vision  HEENT: No headaches, No nasal congestion, no difficulty swallowing  RESPIRATORY:negative for dyspnea, no wheezing, no Cough  CARDIOVASCULAR: negative for chest pain, no palpitations  GASTROINTESTINAL: no nausea, no vomiting, no change in bowel habits, no abdominal pain -improved appetite  GENITOURINARY: negative for dysuria, no hematuria   MUSCULOSKELETAL: no joint pains, no muscle aches, no swelling of joints or extremities  NEUROLOGICAL: No  Weakness or numbness      PAST MEDICAL HISTORY:      has a past medical history of Ambulates with cane, Anxiety, Borderline hypertension, CAD (coronary artery disease), Depression, GERD (gastroesophageal reflux disease), Heart attack ([address] Hypertension, Hypothyroidism, Neuropathy, Obesity, Osteoarthritis, Other cirrhosis of liver ([address] Sleep apnea, Type II or unspecified type diabetes mellitus without mention of complication, not stated as uncontrolled, Under care of service provider, Under care of service provider, and Vertebrogenic low back pain. PAST SURGICAL HISTORY:      has a past surgical history that includes Hysterectomy; Colonoscopy; Upper gastrointestinal endoscopy; Dilation and curettage of uterus; hiatal hernia repair; Throat surgery; Appendectomy; Total knee arthroplasty (Right, 01/06/2015);  Total knee arthroplasty (Left, 05/26/2015); Coronary angioplasty with stent (07/2020); Cardiac catheterization; Cardiac catheterization (11/01/2022); Coronary artery bypass graft (N/A, 11/28/2022); IR CHEST TUBE INSERTION (01/13/2023); Wound debridement (01/14/2023); and Cardiac surgery (N/A, 1/14/2023). SOCIAL HISTORY:      reports that she has never smoked. She has never used smokeless tobacco. She reports current alcohol use. She reports that she does not use drugs. FAMILY HISTORY:     family history includes Arthritis in her father and mother; Cancer in her father; Diabetes in her sister; Heart Disease in her father and mother. HOME MEDICATIONS:      Prior to Admission medications    Medication Sig Start Date End Date Taking?  Authorizing Provider   insulin lispro, 1 Unit Dial, (HUMALOG KWIKPEN) 100 UNIT/ML SOPN Inject 3 Units into the skin 3 times daily (before meals) 125 - 150 2 units   151 - 200 4 units   201 - 250 6 units   251 - 300 8 units  301 - 350 10 units 351 - 400 12 units 12/29/22   Toney Gallego MD   amLODIPine (NORVASC) 5 MG tablet  12/20/22   Historical Provider, MD   doxycycline monohydrate (MONODOX) 100 MG capsule  12/20/22   Historical Provider, MD [place] 100-25 MCG/ACT AEPB  12/20/22   Historical Provider, MD   isosorbide mononitrate (IMDUR) 60 MG extended release tablet  12/20/22   Historical Provider, MD   potassium chloride (KLOR-CON) 10 MEQ extended release tablet  12/20/22   Historical Provider, MD   traMADol Fabiano Smith) 50 MG tablet  12/20/22   Historical Provider, MD   traZODone (DESYREL) 50 MG tablet  12/20/22   Historical Provider, MD   aspirin 81 MG EC tablet Take 1 tablet by mouth daily 12/6/22   Paul Roland MD   amiodarone (CORDARONE) 200 MG tablet Take 1 tablet by mouth 3 times daily 12/5/22   Paul Roland MD   metoprolol tartrate (LOPRESSOR) 25 MG tablet Take 0.5 tablets by mouth 2 times daily 12/5/22   Paul Roland MD   clopidogrel (PLAVIX) 75 MG tablet Take 1 tablet by mouth daily 12/6/22   Lucio Magana MD   furosemide (LASIX) 40 MG tablet Take 1 tablet by mouth 2 times daily 12/5/22   Lucio Magana MD   potassium chloride (KLOR-CON M) 10 MEQ extended release tablet Take 2 tablets by mouth daily 12/5/22   Lucio Magana MD   atorvastatin (LIPITOR) 40 MG tablet Take 1 tablet by mouth nightly 11/16/22   Samy Martinez MD   empagliflozin (JARDIANCE) 10 MG tablet TAKE 1 TABLET BY MOUTH EVERY DAY 10/22/22   Olinda Wallis MD   levothyroxine (SYNTHROID) 50 MCG tablet TAKE 1 TABLET BY MOUTH EVERY DAY 10/22/22   Olinda Wallis MD   citalopram (CELEXA) 40 MG tablet TAKE 1 TABLET BY MOUTH ONCE DAILY *EMERGENCY REFILL* 9/29/22   Olinda Wallis MD   glimepiride (AMARYL) 4 MG tablet TAKE 1 TABLET BY MOUTH TWICE DAILY *EMERGENCY REFILL* 9/29/22   Olinda Wallis MD   omeprazole (PRILOSEC) 20 MG delayed release capsule TAKE 1 CAPSULE BY MOUTH ONCE DAILY 9/22/22   Quratulachiquita Turner DO   gabapentin (NEURONTIN) 400 MG capsule TAKE 1 CAPSULE BY MOUTH THREE TIMES DAILY 4/22/22 11/18/22  Nichole Meadows MD   sucralfate (CARAFATE) 1 GM tablet TAKE 1 TABLET BY MOUTH EVERY DAY  Patient not taking: Reported on 1/12/2023 4/20/22   Nichole Meadows MD   busPIRone (BUSPAR) 10 MG tablet TAKE ONE (1) TABLET BY MOUTH TWICE DAILY 3/11/22   Nichole Meadows MD   insulin glargine (BASAGLAR KWIKPEN) 100 UNIT/ML injection pen Inject 65 units subcutaneously once daily. Patient taking differently: Inject 20 Units into the skin Inject 65 units subcutaneously once daily. [facility]   Nichole Meadows MD   miconazole (MICOTIN) 2 % cream APPLY TO AFFECTED AREA TWICE DAILY  Patient not taking: Reported on 1/12/2023 11/12/21   Nichole Meadows MD   lidocaine (XYLOCAINE) 2 % jelly Apply topically with dressing changes every 3 days  Patient not taking: Reported on 1/12/2023 8/5/21   Nichole Meadows MD   blood glucose monitor strips Test before meals and at bedtime.  DX: DM, on insulin 10/8/20   Bette Lopez MD   miconazole (ZEASORB-AF) 2 % powder Apply topically 2 times daily. Patient not taking: Reported on 1/12/2023 10/1/20   Bette Lopez MD   blood glucose monitor kit and supplies Dispense sufficient amount for indicated testing frequency plus additional to accommodate PRN testing needs. Dispense all needed supplies to include: monitor, strips, lancing device, lancets, control solutions, alcohol swabs. 8/17/20   Brittney Thomas MD   magnesium hydroxide (MILK OF MAGNESIA) 400 MG/5ML suspension Take 30 mLs by mouth daily as needed for Constipation 7/29/20   Brittney Thomas MD   Handicap Placard MISC Is a permanent condition. DX: M19.90 5/14/19   Bette Lopez MD   Insulin Pen Needle (B-D UF III MINI PEN NEEDLES) 31G X 5 MM MISC USE 1 PEN NEEDLE DAILY 3/8/18   Bette Lopez MD   [employer] Lancets Thin MISC Test before meals and at bedtime. DX: DM, on insulin 4/22/14   Luann Flores MD   Alcohol Swabs (ALCOHOL PREP PADS) by Other route 2 times daily      Historical Provider, MD       ALLERGIES:     Morphine, Shellfish-derived products, and Tape [adhesive tape]      OBJECTIVE:       Vitals:    01/17/23 0406 01/17/23 0409 01/17/23 0439 01/17/23 0600   BP: 129/65      Pulse: 68      Resp: 17 18 18    Temp: 98.6 °F (37 °C)      TempSrc: Axillary      SpO2: 97%      Weight:    271 lb 9.7 oz (123.2 kg)   Height:             Intake/Output Summary (Last 24 hours) at 1/17/2023 0740  Last data filed at 1/17/2023 0553  Gross per 24 hour   Intake 1311.56 ml   Output 2140 ml   Net -828.44 ml         PHYSICAL EXAM:  General Appearance  Alert , awake , not in acute distress  HEENT - Head is normocephalic, atraumatic. Lungs - Bilateral equal air entry , no wheezes, rales or rhonchi, aeration good  Cardiovascular - Heart sounds are normal.  Regular rhythm, normal rate without murmur, gallop or rub.   Abdomen - Soft, nontender, nondistended, no masses or organomegaly  Neurologic - There are no new focal motor or sensory deficits  Skin - No bruising or bleeding on exposed skin area-improved wound  Extremities - No cyanosis, clubbing or edema      DIAGNOSTICS:     Laboratory Testing:    Recent Results (from the past 24 hour(s))   POC Glucose Fingerstick    Collection Time: 01/16/23  8:49 AM   Result Value Ref Range    POC Glucose 139 (H) 65 - 105 mg/dL   EKG 12 Lead    Collection Time: 01/16/23 11:47 AM   Result Value Ref Range    Ventricular Rate 79 BPM    Atrial Rate 79 BPM    P-R Interval 182 ms    QRS Duration 92 ms    Q-T Interval 430 ms    QTc Calculation (Bazett) 493 ms    P Axis 28 degrees    R Axis -22 degrees    T Axis 16 degrees   POC Glucose Fingerstick    Collection Time: 01/16/23 12:46 PM   Result Value Ref Range    POC Glucose 197 (H) 65 - 105 mg/dL   POC Glucose Fingerstick    Collection Time: 01/16/23  4:26 PM   Result Value Ref Range    POC Glucose 256 (H) 65 - 105 mg/dL   POC Glucose Fingerstick    Collection Time: 01/16/23  7:53 PM   Result Value Ref Range    POC Glucose 331 (H) 65 - 105 mg/dL   Basic Metabolic Panel    Collection Time: 01/17/23  6:41 AM   Result Value Ref Range    Glucose 184 (H) 70 - 99 mg/dL    BUN 16 8 - 23 mg/dL    Creatinine 0.80 0.50 - 0.90 mg/dL    Est, Glom Filt Rate >60 >60 mL/min/1.73m2    Calcium 8.3 (L) 8.6 - 10.4 mg/dL    Sodium 136 135 - 144 mmol/L    Potassium 3.7 3.7 - 5.3 mmol/L    Chloride 94 (L) 98 - 107 mmol/L    CO2 34 (H) 20 - 31 mmol/L    Anion Gap 8 (L) 9 - 17 mmol/L   CBC    Collection Time: 01/17/23  6:41 AM   Result Value Ref Range    WBC 9.4 3.5 - 11.3 k/uL    RBC 4.03 3.95 - 5.11 m/uL    Hemoglobin 11.0 (L) 11.9 - 15.1 g/dL    Hematocrit 37.2 36.3 - 47.1 %    MCV 92.3 82.6 - 102.9 fL    MCH 27.3 25.2 - 33.5 pg    MCHC 29.6 28.4 - 34.8 g/dL    RDW 13.2 11.8 - 14.4 %    Platelets 218 340 - 370 k/uL    MPV 10.1 8.1 - 13.5 fL    NRBC Automated 0.0 0.0 per 100 WBC   Magnesium    Collection Time: 01/17/23  6:41 AM   Result Value Ref Range    Magnesium 1.9 1.6 - 2.6 mg/dL   Calcium, Ionized    Collection Time: 01/17/23  6:41 AM   Result Value Ref Range    Calcium, Ionized 1.06 (L) 1.13 - 1.33 mmol/L         Imaging/Diagonstics:  EKG: prolonged QT interval.    CXR: Left chest tube remains in place    Current Facility-Administered Medications   Medication Dose Route Frequency Provider Last Rate Last Admin    oxyCODONE-acetaminophen (PERCOCET) 5-325 MG per tablet 1 tablet  1 tablet Oral Q4H PRN Chrissy Sharpe MD   1 tablet at 01/17/23 0409    sennosides-docusate sodium (SENOKOT-S) 8.6-50 MG tablet 2 tablet  2 tablet Oral BID Sarah Gray MD   2 tablet at 01/16/23 2043    alteplase (CATHFLO) 5 mg in sodium chloride 0.9 % 30 mL  5 mg IntraPLEUral Once Iris Danyelle, APRN - CNP        And    dornase alpha (PULMOZYME) 5 mg in sterile water 30 mL  5 mg IntraPLEUral Once Iris Danyelle, APRN - CNP        insulin glargine (LANTUS) injection vial 70 Units  70 Units SubCUTAneous Nightly Sarah Gary MD   70 Units at 01/16/23 2057    piperacillin-tazobactam (ZOSYN) 3,375 mg in dextrose 5 % 50 mL IVPB (mini-bag)  3,375 mg IntraVENous Once Iris Danyelle, APRN - CNP        sodium chloride flush 0.9 % injection 5-40 mL  5-40 mL IntraVENous 2 times per day Iris Danyelle, APRN - CNP   10 mL at 01/16/23 2044    sodium chloride flush 0.9 % injection 5-40 mL  5-40 mL IntraVENous PRN Iris Danyelle, APRN - CNP        0.9 % sodium chloride infusion   IntraVENous PRN Iris Danyelle, APRN - CNP        fentaNYL (SUBLIMAZE) injection 25 mcg  25 mcg IntraVENous Q1H PRN Iris Danyelle, APRN - CNP   25 mcg at 01/17/23 0233    Or    fentaNYL (SUBLIMAZE) injection 50 mcg  50 mcg IntraVENous Q1H PRN Iris Danyelle, APRN - CNP   50 mcg at 01/14/23 2054    hydrALAZINE (APRESOLINE) injection 5 mg  5 mg IntraVENous Q5 Min PRN Iris Danyelle, APRN - CNP        metoprolol (LOPRESSOR) injection 2.5 mg  2.5 mg IntraVENous Q10 Min PRN Iris Danyelle, APRN - CNP        mupirocin (BACTROBAN) 2 % ointment   Nasal BID Rick Deras, APRN - CNP   Given at 01/16/23 2044    diphenhydrAMINE (BENADRYL) tablet 25 mg  25 mg Oral Nightly PRN Rick Deras, APRN - CNP        polyethylene glycol (GLYCOLAX) packet 17 g  17 g Oral Daily Rick Deras, APRN - CNP   17 g at 01/16/23 0818    magnesium hydroxide (MILK OF MAGNESIA) 400 MG/5ML suspension 30 mL  30 mL Oral Daily PRN Rick Deras, APRN - CNP        bisacodyl (DULCOLAX) suppository 10 mg  10 mg Rectal Daily PRN Rick Deras, APRN - CNP        pantoprazole (PROTONIX) tablet 40 mg  40 mg Oral Daily Rick Deras, APRN - CNP   40 mg at 01/16/23 0816    magnesium sulfate 2000 mg in 50 mL IVPB premix  2,000 mg IntraVENous PRN Rick Deras, APRN - CNP   Stopped at 01/16/23 1308    ipratropium-albuterol (DUONEB) nebulizer solution 1 ampule  1 ampule Inhalation Q4H PRN Rick Deras, APRN - CNP        albumin human 5 % IV solution 25 g  25 g IntraVENous PRN Rick Deras, APRN - CNP        albumin human 25 % IV solution 25 g  25 g IntraVENous PRN Rick Deras, APRN - CNP        glucose chewable tablet 16 g  4 tablet Oral PRN Rick Deras, APRN - CNP        clopidogrel (PLAVIX) tablet 75 mg  75 mg Oral Daily Rick Deras APRN - CNP   75 mg at 01/16/23 1315    enoxaparin Sodium (LOVENOX) injection 30 mg  30 mg SubCUTAneous BID Rick Deras, APRN - CNP   30 mg at 01/16/23 2043    furosemide (LASIX) tablet 40 mg  40 mg Oral BID Rick Deras, APRN - CNP   40 mg at 01/16/23 2045    0.9 % sodium chloride infusion   IntraVENous PRN Rick Deras, APRN - CNP   Stopped at 01/17/23 0231    acetaminophen (TYLENOL) tablet 650 mg  650 mg Oral Q6H PRN Rick Deras, APRN - CNP   650 mg at 01/13/23 0878    Or    acetaminophen (TYLENOL) suppository 650 mg  650 mg Rectal Q6H PRN HAO Dove - CNP        potassium chloride (KLOR-CON M) extended release tablet 40 mEq  40 mEq Oral PRN [place] Dus, APRN - CNP   40 mEq at 01/15/23 1304    Or    potassium bicarb-citric acid (EFFER-K) effervescent tablet 40 mEq  40 mEq Oral PRN [place] Dus, APRN - CNP        Or    potassium chloride 10 mEq/100 mL IVPB (Peripheral Line)  10 mEq IntraVENous PRN [place] Dus, APRN - CNP        piperacillin-tazobactam (ZOSYN) 3,375 mg in dextrose 5 % 50 mL IVPB extended infusion (mini-bag)  3,375 mg IntraVENous Q8H [place] Dus, APRN - CNP   Stopped at 01/17/23 4374    amiodarone (CORDARONE) tablet 200 mg  200 mg Oral TID [place] Dus, APRN - CNP   200 mg at 01/16/23 2043    amLODIPine (NORVASC) tablet 5 mg  5 mg Oral Daily [place] Dus, APRN - CNP   5 mg at 01/16/23 0817    aspirin EC tablet 81 mg  81 mg Oral Daily [place] Dus, APRN - CNP   81 mg at 01/16/23 1315    atorvastatin (LIPITOR) tablet 40 mg  40 mg Oral Nightly [place] Dus, APRN - CNP   40 mg at 01/16/23 2045    budesonide-formoterol (SYMBICORT) 80-4.5 MCG/ACT inhaler 2 puff  2 puff Inhalation BID [place] Dus, APRN - CNP   2 puff at 01/16/23 0751    busPIRone (BUSPAR) tablet 10 mg  10 mg Oral BID [place] Dus, APRN - CNP   10 mg at 01/16/23 2043    [Held by provider] citalopram (CELEXA) tablet 40 mg  40 mg Oral Daily [place] Dus, APRN - CNP   40 mg at 01/16/23 0816    gabapentin (NEURONTIN) capsule 400 mg  400 mg Oral TID [place] Dus, APRN - CNP   400 mg at 01/16/23 2043    isosorbide mononitrate (IMDUR) extended release tablet 60 mg  60 mg Oral Daily [place] Dus, APRN - CNP   60 mg at 01/16/23 0816    levothyroxine (SYNTHROID) tablet 50 mcg  50 mcg Oral Daily [place] Dus, APRN - CNP   50 mcg at 01/16/23 0816    metoprolol tartrate (LOPRESSOR) tablet 12.5 mg  12.5 mg Oral BID [place] Dus, APRN - CNP   12.5 mg at 01/16/23 2044    [Held by provider] traZODone (DESYREL) tablet 50 mg  50 mg Oral Nightly Page Dus, APRN - CNP   50 mg at 01/14/23 2029    [MAR Hold] glucagon (rDNA) injection 1 mg  1 mg SubCUTAneous PRN Brody Lamas MD        insulin lispro (HUMALOG) injection vial 0-16 Units  0-16 Units SubCUTAneous TID WC HAO Aguilera - CNP   8 Units at 01/16/23 1824    insulin lispro (HUMALOG) injection vial 0-4 Units  0-4 Units SubCUTAneous Nightly HAO Aguilera CNP   4 Units at 01/16/23 2057       ASSESSMENT:     Principal Problem:    Incisional infection  Active Problems:    Pleural effusion on left    Lactic acid acidosis    Bandemia    Sternal wound dehiscence    Abscess of sternal region    S/P CABG (coronary artery bypass graft)  Resolved Problems:    * No resolved hospital problems. *      PLAN:     Discussed care plan with nurse after getting her input.     Status post CABG 11/22/23 with subsequent surgical wound dehiscence and infection with abscess  -CT scan of the chest showed diastasis of mediastinal mass with air in the soft tissues as well as an abscess  -S/p I&D 1/13  -S/p sternal debridement and washout with wound VAC placement 1/14  -Pain control: Percocet 5-325 mg 1 tablet every 6 hours PRN and fentanyl pain panel PRN  -On aspirin 81 mg, plavix 75 mg QD, Lasix 40 mg twice daily  -Amiodarone 200 mg tid started post CABG  -Blood cultures still negative  -Pulmonology on board:              -Want to continue patient on Zosyn as per ID recommendations,  -CTS on board              -issue with chest tube repaired   -Evaluating for TPA and dornase for empyema   -Discharge soon with wound vac  -ID on board              -ID want to keep Zosyn, and possibly switch to ceftriaxone if blood cultures remain negative-they also mention 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      L sided pleural effusion  -Chest x-ray showed left-sided pleural effusion with almost complete collapse of the left lobe of lung  -chest tube placement on 1/13/22  -Chest tube replaced, draining well now    Uncontrolled T2DM  -Glucose under control  -Lantus 70 nightly  -POC glucose checks  -A1c 7.5 11/18/22  -Hypoglycemia protocol  -Neuropathy - resumed Gabapentin 400 mg tid      HTN  -Norvasc 5 mg daily HELD- patient BP soft this morning,  - Imdur 60 mg daily, Lopressor 12.5 mg twice daily  -Hydralazine 5 mg as needed     Prolonged QTC  -EKG 1/14:   -Repeat mag 1.6 today, replacement initiated   -Trazodone and citalopram held due to Qtc prolongation  -Repeat EKG-     Hypothyroidism  -Synthroid 50 mcg qd      Multivessel CAD status post CABG 11/28/22   -On Aspirin 81 mg, Plavix 75 mg qd, Lasix 40 mg bid, Imdur 60 mg qd. -Lipitor 40 mg nightly  -Last echo showed EF 54%- 11/14/22           DVT prophylaxis: Lovenox 30 mg bid  GI prophylaxis: Protonix 40 ,mg QD    Vinh Guerrero MD  Transitional year Resident  1/17/2023 7:40 AM            Please note that this chart was generated using voice recognition Dragon dictation software.   Although every effort was made to ensure the accuracy of this automated transcription, some errors in transcription may have occurred

## 2023-01-17 NOTE — PROGRESS NOTES
Comprehensive Nutrition Assessment    Type and Reason for Visit:  Reassess    Nutrition Recommendations/Plan:   Modify diet to provide 4 Carb Choices with each meal.  Provide Glucerna oral supplements x 2 per day. Monitor labs, weights, and plan of care. Malnutrition Assessment:  Malnutrition Status:  Insufficient data (01/17/23 1149)    Context:  Acute Illness     Findings of the 6 clinical characteristics of malnutrition:  Energy Intake:  75% or less of estimated energy requirements for 7 or more days  Weight Loss:  Unable to assess - Weight fluctuations noted. Body Fat Loss:  No significant body fat loss   Muscle Mass Loss:  No significant muscle mass loss  Fluid Accumulation:  No significant fluid accumulation   Strength:  Not Performed    Nutrition Assessment:    Pt reports she ate 100% of breakfast this morning which was very good for her. States usually eats 50-75% of meals. Reports current appetite is \"pretty good\". States her appetite was not good when she came in and she had no appetite/minimal PO intakes x 3 days. Discussed oral supplements. Pt reports she drinks Ensure at home. Pt would like to receive oral supplements during admission - agreeable to try Glucerna oral supplements. Encouraged intakes of protein at all meals. S/p sternal debridement, washout, and wound vac placement on 1/14. Labs reviewed: Glucose 178-184 mg/dL. Meds include: Lasix, Lantus, Humalog SS. Nutrition Related Findings:    Meds/Labs reviewed. Last BM 1/11. Wound Type: Surgical Incision, Wound Vac       Current Nutrition Intake & Therapies:    Average Meal Intake: % (Reported intakes of % of meals per pt.)  Average Supplements Intake: None Ordered  ADULT DIET;  Regular; 3 carb choices (45 gm/meal)    Anthropometric Measures:  Height: 4' 11\" (149.9 cm)  Ideal Body Weight (IBW): 95 lbs (43 kg)    Admission Body Weight: 243 lb 9.7 oz (110.5 kg)  Current Body Weight: 271 lb 9.7 oz (123.2 kg), 285.9 % IBW. Weight Source: Bed Scale  Current BMI (kg/m2): 54.8  Usual Body Weight: 250 lb (113.4 kg)  % Weight Change (Calculated): -4.4  Weight Adjustment For: No Adjustment                 BMI Categories: Obese Class 3 (BMI 40.0 or greater)    Estimated Daily Nutrient Needs:  Energy Requirements Based On: Formula  Weight Used for Energy Requirements: Admission  Energy (kcal/day): 4770-9655 kcals/day  Weight Used for Protein Requirements: Ideal  Protein (g/day): 80-90gmsPRO/day  Fluid (ml/day): Per MD    Nutrition Diagnosis:   Increased nutrient needs related to  (healing) as evidenced by wounds    Nutrition Interventions:   Food and/or Nutrient Delivery: Modify Current Diet, Start Oral Nutrition Supplement (Modify diet to provide 4 Carb Choices with each meal.  Provide Glucerna oral supplements x 2 per day.)  Nutrition Education/Counseling: No recommendation at this time  Coordination of Nutrition Care: Continue to monitor while inpatient  Plan of Care discussed with: Patient    Goals:  Previous Goal Met: Progressing toward Goal(s)  Goals: Meet at least 75% of estimated needs, prior to discharge       Nutrition Monitoring and Evaluation:   Behavioral-Environmental Outcomes: None Identified  Food/Nutrient Intake Outcomes: Food and Nutrient Intake, Supplement Intake  Physical Signs/Symptoms Outcomes: Biochemical Data, GI Status, Fluid Status or Edema, Nutrition Focused Physical Findings, Skin, Weight    Discharge Planning:     Too soon to determine     Amy Salter RD, LD  Contact: [phone]

## 2023-01-17 NOTE — PROGRESS NOTES
Physical Therapy  Facility/Department: [facility] CAR 2- STEPDOWN  Physical Therapy daily treatment note    Name: Katrina Portillo  : 1955  MRN: [id number]  Date of Service: 2023    Discharge Recommendations:  Patient would benefit from continued therapy after discharge   PT Equipment Recommendations  Equipment Needed: Yes  Mobility Devices: Gabriela New: Rolling      Patient Diagnosis(es): The primary encounter diagnosis was Pleural effusion on left. Diagnoses of Abscess of sternal region and Infection were also pertinent to this visit. Past Medical History:  has a past medical history of Ambulates with cane, Anxiety, Borderline hypertension, CAD (coronary artery disease), Depression, GERD (gastroesophageal reflux disease), Heart attack ([facility] [address] Hypertension, Hypothyroidism, Neuropathy, Obesity, Osteoarthritis, Other cirrhosis of liver ([facility] [address] Sleep apnea, Type II or unspecified type diabetes mellitus without mention of complication, not stated as uncontrolled, Under care of service provider, Under care of service provider, and Vertebrogenic low back pain. Past Surgical History:  has a past surgical history that includes Hysterectomy; Colonoscopy; Upper gastrointestinal endoscopy; Dilation and curettage of uterus; hiatal hernia repair; Throat surgery; Appendectomy; Total knee arthroplasty (Right, 2015); Total knee arthroplasty (Left, 2015); Coronary angioplasty with stent (2020); Cardiac catheterization; Cardiac catheterization (2022); Coronary artery bypass graft (N/A, 2022); IR CHEST TUBE INSERTION (2023); Wound debridement (2023); Cardiac surgery (N/A, 2023); and IR CHEST TUBE INSERTION (2023). Assessment   Body Structures, Functions, Activity Limitations Requiring Skilled Therapeutic Intervention: Decreased functional mobility ; Decreased ADL status; Decreased body mechanics; Decreased balance;Decreased endurance;Decreased high-level IADLs;Decreased coordination; Increased pain;Decreased strength  Assessment: Pt ambulates 30 ft wtih RW and CGA. Pt currently is unsafe to return to prior living situation d/t need for skilled assistance with functional mobility. pt would benefit from continued therapy to promote endurance, balance, and strengthening. Therapy Prognosis: Good  Activity Tolerance  Activity Tolerance: Patient limited by fatigue;Patient limited by endurance     Plan   Physcial Therapy Plan  General Plan:  (5-6x)  Current Treatment Recommendations: Strengthening, Endurance training, Balance training, Functional mobility training, ADL/Self-care training, Transfer training, IADL training, Neuromuscular re-education, Gait training, Stair training, Home exercise program, Equipment evaluation, education, & procurement, Therapeutic activities, Patient/Caregiver education & training, Safety education & training  Safety Devices  Type of Devices: Call light within reach, Gait belt, Patient at risk for falls, Nurse notified, Left in chair  Restraints  Restraints Initially in Place: No     Restrictions  Restrictions/Precautions  Restrictions/Precautions: Fall Risk, Up as Tolerated  Required Braces or Orthoses?: No  Position Activity Restriction  Sternal Precautions: 5# Lifting Restrictions  Other position/activity restrictions: Pt had recent CABG on 11/28/22, sternal precautions . Chest tube-per RN required to remain on suction today. O2 NC 2 Lm. Wound vac. Subjective   General  Patient assessed for rehabilitation services?: Yes  Response To Previous Treatment: Patient with no complaints from previous session.   Family / Caregiver Present: No  Follows Commands: Within Functional Limits  Subjective  Subjective: Pt resting in bed upon arrival, agreeable to PT, pleasant and cooperative          Cognition   Orientation  Overall Orientation Status: Within Functional Limits     Objective   Bed mobility  Rolling to Left: Minimal assistance  Supine to Sit: Minimal assistance  Scooting: Contact guard assistance  Bed Mobility Comments: HOB elevated  Transfers  Sit to Stand: Minimal Assistance  Stand to Sit: Minimal Assistance  Comment: cues for hand placement  Ambulation  Surface: Level tile  Device: Rolling Walker  Other Apparatus: O2 (2L)  Assistance: Contact guard assistance  Quality of Gait: very slow jeramy, waddled gait, decreased step length and height  Distance: 30 ft  More Ambulation?: No     Balance  Posture: Good  Sitting - Static: Good  Sitting - Dynamic: Good;-  Standing - Static: Fair  Standing - Dynamic: Fair  Comments: Standing assessed with RW   Exercise  Supine Exercises: Ankle Pumps, Gluteal sets, Hamstring Sets, Heel Slides, Hip ABD/ADD, Hip IR/ER, Quad Sets, SAQ, SLR.  Reps: 15x, AROM      AM-PAC Score  AM-PAC Inpatient Mobility Raw Score : 17 (01/17/23 1245)  AM-PAC Inpatient T-Scale Score : 42.13 (01/17/23 1245)  Mobility Inpatient CMS 0-100% Score: 50.57 (01/17/23 1245)  Mobility Inpatient CMS G-Code Modifier : CK (01/17/23 1245)        Goals  Short Term Goals  Time Frame for Short Term Goals: 14 visits  Short Term Goal 1: Complete bed mobility with Shannon  Short Term Goal 2: Complete transfers with least restrictive AD and SBA  Short Term Goal 3: Complete 300 ft of gait with least restrictive AD and SBA  Short Term Goal 4: Complete 3 steps with LHR and SBA  Short Term Goal 5: Participate in 30 minutes of therapy to promote endurance       Therapy Time   Individual Concurrent Group Co-treatment   Time In 0827         Time Out 0909         Minutes 42         Timed Code Treatment Minutes: Anamaria Johnson 19, PTA

## 2023-01-17 NOTE — PROGRESS NOTES
[facility]  Daily Progress Note    Surgeon: Julio Pedersen   POD# 3  S/P :  sternal wound debridement with wound vac placement     Subjective:    Patient was seen and examined at bedside this morning without any complaints. Vital Signs: BP (!) 104/49   Pulse 76   Temp 98.5 °F (36.9 °C) (Oral)   Resp 21   Ht 4' 11\" (1.499 m)   Wt 271 lb 9.7 oz (123.2 kg)   SpO2 94%   BMI 54.86 kg/m²  O2 Flow Rate (L/min): 2 L/min   Admit Weight: Weight: 252 lb (114.3 kg)       I/O's:  I/O last 3 completed shifts: In: 1311.6 [P.O.:870; I.V.:138.7; IV Piggyback:302.9]  Out: 2750 [Urine:2200; Chest Tube:550]    Data:    CBC:   Recent Labs     01/15/23  0606 01/16/23  0611 01/17/23  0641   WBC 9.8 9.8 9.4   HGB 10.8* 10.8* 11.0*   HCT 35.5* 35.2* 37.2   MCV 92.2 90.5 92.3    237 242       BMP:   Recent Labs     01/15/23  0606 01/16/23  0611 01/17/23  0641   * 136 136   K 3.8 3.9 3.7   CL 96* 95* 94*   CO2 29 33* 34*   BUN 19 15 16   CREATININE 0.89 0.74 0.80       PT/INR:   Recent Labs     01/14/23  1141 01/15/23  0606 01/16/23  0611   PROTIME 11.4 11.8 11.8   INR 1.1 1.1 1.1       APTT:   Recent Labs     01/14/23  1141   APTT 23.0       Chest Xray:   FINDINGS:   Small bore left chest tube remains in place. Small to moderate left pleural   effusion and patchy opacities in the left lung are not significantly changed. Heart size is stable. Median sternotomy wires are noted. Right lung is   clear. No evidence of pneumothorax. Impression   No significant interval change. Left chest tube in place with small to   moderate left pleural effusion and patchy opacities in the left lung.                Scheduled Meds:    alteplase (ACTIVASE) syringe  10 mg IntraPLEUral Q12H    And    dornase (PULMOZYME) syringe  5 mg IntraPLEUral Q12H    sennosides-docusate sodium  2 tablet Oral BID    alteplase (ACTIVASE) syringe  5 mg IntraPLEUral Once    And    dornase (Maikol Upham) syringe  5 mg IntraPLEUral Once    insulin glargine  70 Units SubCUTAneous Nightly    piperacillin-tazobactam  3,375 mg IntraVENous Once    sodium chloride flush  5-40 mL IntraVENous 2 times per day    mupirocin   Nasal BID    polyethylene glycol  17 g Oral Daily    pantoprazole  40 mg Oral Daily    clopidogrel  75 mg Oral Daily    enoxaparin  30 mg SubCUTAneous BID    furosemide  40 mg Oral BID    piperacillin-tazobactam  3,375 mg IntraVENous Q8H    amiodarone  200 mg Oral TID    [Held by provider] amLODIPine  5 mg Oral Daily    aspirin  81 mg Oral Daily    atorvastatin  40 mg Oral Nightly    budesonide-formoterol  2 puff Inhalation BID    busPIRone  10 mg Oral BID    [Held by provider] citalopram  40 mg Oral Daily    gabapentin  400 mg Oral TID    isosorbide mononitrate  60 mg Oral Daily    levothyroxine  50 mcg Oral Daily    metoprolol tartrate  12.5 mg Oral BID    [Held by provider] traZODone  50 mg Oral Nightly    insulin lispro  0-16 Units SubCUTAneous TID WC    insulin lispro  0-4 Units SubCUTAneous Nightly     Continuous Infusions:    sodium chloride      sodium chloride Stopped (01/17/23 0231)           Physical Exam:    General: A&O x 3, NAD noted  Heart: Normal S1, S2, RRR, No murmurs noted   Sternum: negative pressure wound vac in place   Lungs: Diminished BS bilaterally at the bases. Pigtail CT inplace. Abdomen: Soft, non tender, non distended, Hypoactive BS x 4   : voids   Extremities: No edema noted bilateral LE.         Assessment & Plan:    Ms. Deshawn Rousseau is a 78y.o. year-old female status post sternal wound debridement with wound vac placement on 1/14/2023.    1/17/23:   IR placed pigtail, slight improvement in Cxr, will plan for TPA/Dornase today   Distill for 1.5hrs and then unclamp   Sternal wound to vac, wound care on board to change 3x weekly  Recommend tight glycemic control for wound healing     1-16-23  Pt going to IR this AM to upsize/change out pigtail  We will eval for TPA and dornase for empyema  Internal medicine to assist with better glucose control  Anticipate DC in next few days with wound vac.       1/15/2023  No issues or events noted with the patient overnight. Plan to [facility]. CXR complete while uot on left, prior CT with loculated pleural effusion along with mucous plugging. Attempted TPA  and dornase to break up effusion as CT with minimal drainage output. Will need new CT placed in IR tomorrow, currently clogged unable to flush, will try again tomorrow once CT is placed. ID on board to manage abx    The above recommendations including medications and orders were discussed and agreed upon with Dr. Bethanie Newell / Dr. Tarun Villalobos.        HAO Lam-CNP

## 2023-01-18 ENCOUNTER — APPOINTMENT (OUTPATIENT)
Dept: INTERVENTIONAL RADIOLOGY/VASCULAR | Age: 68
End: 2023-01-18
Payer: MEDICARE

## 2023-01-18 ENCOUNTER — APPOINTMENT (OUTPATIENT)
Dept: CT IMAGING | Age: 68
End: 2023-01-18
Payer: MEDICARE

## 2023-01-18 ENCOUNTER — APPOINTMENT (OUTPATIENT)
Dept: ULTRASOUND IMAGING | Age: 68
End: 2023-01-18
Payer: MEDICARE

## 2023-01-18 ENCOUNTER — APPOINTMENT (OUTPATIENT)
Dept: GENERAL RADIOLOGY | Age: 68
End: 2023-01-18
Payer: MEDICARE

## 2023-01-18 LAB
ANION GAP SERPL CALCULATED.3IONS-SCNC: 10 MMOL/L (ref 9–17)
BUN BLDV-MCNC: 16 MG/DL (ref 8–23)
CALCIUM IONIZED: 1.14 MMOL/L (ref 1.13–1.33)
CALCIUM SERPL-MCNC: 8.7 MG/DL (ref 8.6–10.4)
CHLORIDE BLD-SCNC: 93 MMOL/L (ref 98–107)
CO2: 32 MMOL/L (ref 20–31)
CREAT SERPL-MCNC: 0.74 MG/DL (ref 0.5–0.9)
CULTURE: NORMAL
DIRECT EXAM: NORMAL
DIRECT EXAM: NORMAL
GFR SERPL CREATININE-BSD FRML MDRD: >60 ML/MIN/1.73M2
GLUCOSE BLD-MCNC: 102 MG/DL (ref 65–105)
GLUCOSE BLD-MCNC: 134 MG/DL (ref 65–105)
GLUCOSE BLD-MCNC: 234 MG/DL (ref 65–105)
GLUCOSE BLD-MCNC: 79 MG/DL (ref 65–105)
GLUCOSE BLD-MCNC: 99 MG/DL (ref 70–99)
HCT VFR BLD CALC: 35.6 % (ref 36.3–47.1)
HEMOGLOBIN: 11.1 G/DL (ref 11.9–15.1)
MAGNESIUM: 1.7 MG/DL (ref 1.6–2.6)
MAGNESIUM: 2 MG/DL (ref 1.6–2.6)
MCH RBC QN AUTO: 27.7 PG (ref 25.2–33.5)
MCHC RBC AUTO-ENTMCNC: 31.2 G/DL (ref 28.4–34.8)
MCV RBC AUTO: 88.8 FL (ref 82.6–102.9)
NRBC AUTOMATED: 0 PER 100 WBC
PDW BLD-RTO: 13.3 % (ref 11.8–14.4)
PLATELET # BLD: 234 K/UL (ref 138–453)
PMV BLD AUTO: 10 FL (ref 8.1–13.5)
POTASSIUM SERPL-SCNC: 3.4 MMOL/L (ref 3.7–5.3)
RBC # BLD: 4.01 M/UL (ref 3.95–5.11)
SODIUM BLD-SCNC: 135 MMOL/L (ref 135–144)
SPECIMEN DESCRIPTION: NORMAL
WBC # BLD: 7.9 K/UL (ref 3.5–11.3)

## 2023-01-18 PROCEDURE — 6370000000 HC RX 637 (ALT 250 FOR IP)

## 2023-01-18 PROCEDURE — 97605 NEG PRS WND THER DME<=50SQCM: CPT

## 2023-01-18 PROCEDURE — 99231 SBSQ HOSP IP/OBS SF/LOW 25: CPT | Performed by: FAMILY MEDICINE

## 2023-01-18 PROCEDURE — 2709999900 HC NON-CHARGEABLE SUPPLY

## 2023-01-18 PROCEDURE — C1729 CATH, DRAINAGE: HCPCS

## 2023-01-18 PROCEDURE — 82947 ASSAY GLUCOSE BLOOD QUANT: CPT

## 2023-01-18 PROCEDURE — 6370000000 HC RX 637 (ALT 250 FOR IP): Performed by: STUDENT IN AN ORGANIZED HEALTH CARE EDUCATION/TRAINING PROGRAM

## 2023-01-18 PROCEDURE — 0W9B3ZZ DRAINAGE OF LEFT PLEURAL CAVITY, PERCUTANEOUS APPROACH: ICD-10-PCS | Performed by: RADIOLOGY

## 2023-01-18 PROCEDURE — 71045 X-RAY EXAM CHEST 1 VIEW: CPT

## 2023-01-18 PROCEDURE — 94761 N-INVAS EAR/PLS OXIMETRY MLT: CPT

## 2023-01-18 PROCEDURE — 85027 COMPLETE CBC AUTOMATED: CPT

## 2023-01-18 PROCEDURE — 83735 ASSAY OF MAGNESIUM: CPT

## 2023-01-18 PROCEDURE — 2700000000 HC OXYGEN THERAPY PER DAY

## 2023-01-18 PROCEDURE — 80048 BASIC METABOLIC PNL TOTAL CA: CPT

## 2023-01-18 PROCEDURE — 36415 COLL VENOUS BLD VENIPUNCTURE: CPT

## 2023-01-18 PROCEDURE — 32555 ASPIRATE PLEURA W/ IMAGING: CPT

## 2023-01-18 PROCEDURE — 76000 FLUOROSCOPY <1 HR PHYS/QHP: CPT

## 2023-01-18 PROCEDURE — 6360000002 HC RX W HCPCS

## 2023-01-18 PROCEDURE — 2580000003 HC RX 258: Performed by: STUDENT IN AN ORGANIZED HEALTH CARE EDUCATION/TRAINING PROGRAM

## 2023-01-18 PROCEDURE — 2709999900 CT CHEST WO CONTRAST

## 2023-01-18 PROCEDURE — 2060000000 HC ICU INTERMEDIATE R&B

## 2023-01-18 PROCEDURE — 2580000003 HC RX 258

## 2023-01-18 PROCEDURE — C1769 GUIDE WIRE: HCPCS

## 2023-01-18 PROCEDURE — 82330 ASSAY OF CALCIUM: CPT

## 2023-01-18 PROCEDURE — 94640 AIRWAY INHALATION TREATMENT: CPT

## 2023-01-18 PROCEDURE — 6360000002 HC RX W HCPCS: Performed by: STUDENT IN AN ORGANIZED HEALTH CARE EDUCATION/TRAINING PROGRAM

## 2023-01-18 RX ORDER — FENTANYL CITRATE 50 UG/ML
25 INJECTION, SOLUTION INTRAMUSCULAR; INTRAVENOUS ONCE
Status: DISCONTINUED | OUTPATIENT
Start: 2023-01-18 | End: 2023-01-20 | Stop reason: HOSPADM

## 2023-01-18 RX ADMIN — AMIODARONE HYDROCHLORIDE 200 MG: 200 TABLET ORAL at 21:31

## 2023-01-18 RX ADMIN — FENTANYL CITRATE 50 MCG: 50 INJECTION INTRAMUSCULAR; INTRAVENOUS at 09:10

## 2023-01-18 RX ADMIN — DOCUSATE SODIUM 50 MG AND SENNOSIDES 8.6 MG 2 TABLET: 8.6; 5 TABLET, FILM COATED ORAL at 09:51

## 2023-01-18 RX ADMIN — METOPROLOL TARTRATE 12.5 MG: 25 TABLET ORAL at 21:31

## 2023-01-18 RX ADMIN — OXYCODONE HYDROCHLORIDE AND ACETAMINOPHEN 1 TABLET: 5; 325 TABLET ORAL at 10:07

## 2023-01-18 RX ADMIN — INSULIN GLARGINE 70 UNITS: 100 INJECTION, SOLUTION SUBCUTANEOUS at 21:39

## 2023-01-18 RX ADMIN — MUPIROCIN: 20 OINTMENT TOPICAL at 21:31

## 2023-01-18 RX ADMIN — BUSPIRONE HYDROCHLORIDE 10 MG: 10 TABLET ORAL at 09:52

## 2023-01-18 RX ADMIN — FENTANYL CITRATE 50 MCG: 50 INJECTION INTRAMUSCULAR; INTRAVENOUS at 02:05

## 2023-01-18 RX ADMIN — PANTOPRAZOLE SODIUM 40 MG: 40 TABLET, DELAYED RELEASE ORAL at 09:50

## 2023-01-18 RX ADMIN — SODIUM CHLORIDE, PRESERVATIVE FREE 10 ML: 5 INJECTION INTRAVENOUS at 09:50

## 2023-01-18 RX ADMIN — FENTANYL CITRATE 25 MCG: 50 INJECTION INTRAMUSCULAR; INTRAVENOUS at 11:45

## 2023-01-18 RX ADMIN — LEVOTHYROXINE SODIUM 50 MCG: 50 TABLET ORAL at 09:51

## 2023-01-18 RX ADMIN — FUROSEMIDE 40 MG: 40 TABLET ORAL at 21:31

## 2023-01-18 RX ADMIN — BUDESONIDE AND FORMOTEROL FUMARATE DIHYDRATE 2 PUFF: 80; 4.5 AEROSOL RESPIRATORY (INHALATION) at 21:06

## 2023-01-18 RX ADMIN — BUDESONIDE AND FORMOTEROL FUMARATE DIHYDRATE 2 PUFF: 80; 4.5 AEROSOL RESPIRATORY (INHALATION) at 07:47

## 2023-01-18 RX ADMIN — ISOSORBIDE MONONITRATE 60 MG: 60 TABLET, EXTENDED RELEASE ORAL at 09:53

## 2023-01-18 RX ADMIN — ENOXAPARIN SODIUM 30 MG: 100 INJECTION SUBCUTANEOUS at 21:32

## 2023-01-18 RX ADMIN — AMIODARONE HYDROCHLORIDE 200 MG: 200 TABLET ORAL at 09:51

## 2023-01-18 RX ADMIN — FENTANYL CITRATE 50 MCG: 50 INJECTION INTRAMUSCULAR; INTRAVENOUS at 22:23

## 2023-01-18 RX ADMIN — DOCUSATE SODIUM 50 MG AND SENNOSIDES 8.6 MG 2 TABLET: 8.6; 5 TABLET, FILM COATED ORAL at 21:31

## 2023-01-18 RX ADMIN — GABAPENTIN 400 MG: 400 CAPSULE ORAL at 21:31

## 2023-01-18 RX ADMIN — BUSPIRONE HYDROCHLORIDE 10 MG: 10 TABLET ORAL at 21:31

## 2023-01-18 RX ADMIN — METOPROLOL TARTRATE 12.5 MG: 25 TABLET ORAL at 09:53

## 2023-01-18 RX ADMIN — Medication 81 MG: at 09:55

## 2023-01-18 RX ADMIN — CLOPIDOGREL 75 MG: 75 TABLET, FILM COATED ORAL at 09:51

## 2023-01-18 RX ADMIN — POTASSIUM CHLORIDE 40 MEQ: 1500 TABLET, EXTENDED RELEASE ORAL at 09:44

## 2023-01-18 RX ADMIN — DESMOPRESSIN ACETATE 40 MG: 0.2 TABLET ORAL at 21:31

## 2023-01-18 RX ADMIN — POLYETHYLENE GLYCOL 3350 17 G: 17 POWDER, FOR SOLUTION ORAL at 09:50

## 2023-01-18 RX ADMIN — FENTANYL CITRATE 50 MCG: 50 INJECTION INTRAMUSCULAR; INTRAVENOUS at 15:50

## 2023-01-18 RX ADMIN — MAGNESIUM SULFATE HEPTAHYDRATE 2000 MG: 40 INJECTION, SOLUTION INTRAVENOUS at 09:16

## 2023-01-18 RX ADMIN — CEFTRIAXONE SODIUM 2000 MG: 10 INJECTION, POWDER, FOR SOLUTION INTRAVENOUS at 15:33

## 2023-01-18 RX ADMIN — GABAPENTIN 400 MG: 400 CAPSULE ORAL at 15:35

## 2023-01-18 RX ADMIN — FUROSEMIDE 40 MG: 40 TABLET ORAL at 09:53

## 2023-01-18 RX ADMIN — GABAPENTIN 400 MG: 400 CAPSULE ORAL at 09:52

## 2023-01-18 RX ADMIN — OXYCODONE HYDROCHLORIDE AND ACETAMINOPHEN 1 TABLET: 5; 325 TABLET ORAL at 21:38

## 2023-01-18 RX ADMIN — ENOXAPARIN SODIUM 30 MG: 100 INJECTION SUBCUTANEOUS at 09:55

## 2023-01-18 RX ADMIN — MUPIROCIN: 20 OINTMENT TOPICAL at 09:56

## 2023-01-18 RX ADMIN — SODIUM CHLORIDE, PRESERVATIVE FREE 10 ML: 5 INJECTION INTRAVENOUS at 21:32

## 2023-01-18 RX ADMIN — OXYCODONE HYDROCHLORIDE AND ACETAMINOPHEN 1 TABLET: 5; 325 TABLET ORAL at 01:22

## 2023-01-18 RX ADMIN — FENTANYL CITRATE 50 MCG: 50 INJECTION INTRAMUSCULAR; INTRAVENOUS at 05:53

## 2023-01-18 RX ADMIN — AMIODARONE HYDROCHLORIDE 200 MG: 200 TABLET ORAL at 15:35

## 2023-01-18 ASSESSMENT — PAIN DESCRIPTION - ORIENTATION
ORIENTATION: RIGHT;LEFT
ORIENTATION: MID
ORIENTATION: RIGHT;LEFT
ORIENTATION: LOWER
ORIENTATION: MID
ORIENTATION: LOWER

## 2023-01-18 ASSESSMENT — PAIN DESCRIPTION - DESCRIPTORS
DESCRIPTORS: ACHING
DESCRIPTORS: PRESSURE
DESCRIPTORS: PRESSURE
DESCRIPTORS: ACHING
DESCRIPTORS: ACHING;DISCOMFORT
DESCRIPTORS: ACHING;DULL
DESCRIPTORS: DISCOMFORT;ACHING
DESCRIPTORS: DISCOMFORT;ACHING

## 2023-01-18 ASSESSMENT — ENCOUNTER SYMPTOMS
COUGH: 1
CHOKING: 0
CHEST TIGHTNESS: 0
APNEA: 0
ABDOMINAL PAIN: 0
EYE REDNESS: 0
EYE DISCHARGE: 0
TROUBLE SWALLOWING: 0
VOMITING: 0
WHEEZING: 0
PHOTOPHOBIA: 0
COUGH: 0
SHORTNESS OF BREATH: 1
SHORTNESS OF BREATH: 0
NAUSEA: 0
VOICE CHANGE: 0
COLOR CHANGE: 1
ALLERGIC/IMMUNOLOGIC NEGATIVE: 1
EYE ITCHING: 0
ABDOMINAL DISTENTION: 1

## 2023-01-18 ASSESSMENT — PAIN SCALES - GENERAL
PAINLEVEL_OUTOF10: 7
PAINLEVEL_OUTOF10: 8
PAINLEVEL_OUTOF10: 6
PAINLEVEL_OUTOF10: 6
PAINLEVEL_OUTOF10: 4
PAINLEVEL_OUTOF10: 5
PAINLEVEL_OUTOF10: 4
PAINLEVEL_OUTOF10: 8
PAINLEVEL_OUTOF10: 8
PAINLEVEL_OUTOF10: 4
PAINLEVEL_OUTOF10: 8
PAINLEVEL_OUTOF10: 7
PAINLEVEL_OUTOF10: 7
PAINLEVEL_OUTOF10: 8

## 2023-01-18 ASSESSMENT — PAIN - FUNCTIONAL ASSESSMENT
PAIN_FUNCTIONAL_ASSESSMENT: ACTIVITIES ARE NOT PREVENTED
PAIN_FUNCTIONAL_ASSESSMENT: ACTIVITIES ARE NOT PREVENTED

## 2023-01-18 ASSESSMENT — PAIN DESCRIPTION - PAIN TYPE
TYPE: SURGICAL PAIN

## 2023-01-18 ASSESSMENT — PAIN DESCRIPTION - LOCATION
LOCATION: INCISION
LOCATION: CHEST
LOCATION: INCISION
LOCATION: INCISION
LOCATION: CHEST
LOCATION: INCISION;STERNUM
LOCATION: CHEST
LOCATION: CHEST
LOCATION: INCISION

## 2023-01-18 ASSESSMENT — PAIN SCALES - WONG BAKER: WONGBAKER_NUMERICALRESPONSE: 0

## 2023-01-18 ASSESSMENT — PAIN DESCRIPTION - ONSET
ONSET: ON-GOING
ONSET: ON-GOING

## 2023-01-18 NOTE — PROGRESS NOTES
01/18/23 1145   Negative Pressure Wound Therapy Sternum Anterior; Lower   Placement Date/Time: 01/14/23 0942   Present on Admission/Arrival: No  Location: Sternum  Wound Location Orientation: Anterior; Lower   Wound Type Surgical   Unit Type 3M VAC Ulta   Dressing Type White Foam;Black Foam   Number of pieces used 2  (1 white & 1 Black)   Number of pieces removed 2   Cycle Continuous; On   Target Pressure (mmHg) 125   Canister changed? No   Dressing Status New dressing applied   Dressing Changed Changed/New   Drainage Amount Moderate   Drainage Description Serosanguinous   Dressing Change Due 01/20/23   Wound Assessment Exposed structure bone;Exposed structure muscle;Subcutaneous  (adipose tissue)   Jeannie-wound Assessment   (Bordered with drape; silver hyrdorfiber and drape to old CT and pacer sites and tape tears in breast fold)   Incision 11/28/22 Sternum   Date First Assessed/Time First Assessed: 11/28/22 0941   Present on Hospital Admission: No  Location: Sternum  Incision Description (Comments): CORONARY ARTERY BYPASS GRAFT X3, INCISION EDGES WELL APPROXIMATED, DRESSING PLACED POST-OP AND CLEAR. Wound Image    Dressing Status New dressing applied   Dressing Change Due 01/20/23   Incision Cleansed Cleansed with saline   Dressing/Treatment Negative pressure wound therapy   Incision Assessment   (yellow adipose; pink subcutaneous; exposed bone)   Drainage Amount Moderate   Drainage Description Serosanguinous         NPWT dressing changed to sternal wound using white foam to wound base under black granufoam.  Good seal was achieved. Pre-medicated with Fentanyl 25 mcg IV; c/o pain at site prior to dressing change as well as throughout procedure. Opticell AG applied to superficial wounds to periwound skin and secured with drape. We could try Humalog U200. It is off label use, but we can discuss at next visit. He will need to contact [facility] to update them about his need for more supplies and we will sign off his prescription. If he does run out of supplies, of course he can go back to his Lantus and Humalog injections.

## 2023-01-18 NOTE — PROGRESS NOTES
[facility]  Progress Note    Date:   1/18/2023  Patient name:  Isaiah Alexander  Date of admission:  1/12/2023  3:55 PM  MRN:   [id number]  YOB: 1955    SUBJECTIVE/Last 24 hours update:     Patient seen and examined at bedside    Patient is vitally stable today  Magnesium 1.7, will replace again- potassium 3.4 will replace again  Patient on Rocephin, patient received TPAse for left chest tube loculated effusion  pulm trying to wean off oxygen, patient currently on 2 L  Glucose 242    HPI:     78 y.o.  female with history of diabetes mellitus, GERD, painful neuropathy, multivessel CAD status post triple bypass CABG 11/28/2022. She presented today due to worsening symptoms of chest pain, which started after surgery but has worsened since last 3 days. She describes the chest pain as central, and is a tightness, nonradiating, better with tramadol, comes and goes, 8 out of 10 in severity and is getting worse. Patient was hospitalized for 1 week after surgery, then had cardiac rehab for 2 weeks which she describes the pain increasing during that time. She also had some cough which was nonproductive and was painful to her chest when coughing. She also experienced severe nausea and lack of appetite, and mentions she has eaten very minimal for the last 3 days. She also fell twice in the last 2 days, but did not hit her head or endorses dizziness. She also mentions she has some diffuse abdominal pain but is not severe. She also has some shortness of breath on exertion since the surgery, no fevers some chills, no night sweats. She is on 1 L of oxygen at home which started after her triple bypass surgery. She is on insulin nightly. Her hypertension is managed with amlodipine and lopressor. She also takes amiodarone post CABG.      The patient has an infected CABG incision site and ED CT chest showed diastasis of median sternotomy with air in the soft tissues, and abscess formation. And chest x-ray taken shows progressive pleural effusion with nearly complete opacification of the left hemithorax. BNP was elevated, Pro-Jame was elevated, and initially she needed 4 L of oxygen and she is normally on 1 L at home. CT also showed a 2cm left adrenal mass. Patient was placed on Zosyn and vancomycin in the ED    REVIEW OF SYSTEMS:      CONSTITUTIONAL:  no fevers, no headcahes  EYES: negative for blury vision  HEENT: No headaches, No nasal congestion, no difficulty swallowing  RESPIRATORY:negative for dyspnea, no wheezing, no Cough  CARDIOVASCULAR: negative for chest pain, no palpitations  GASTROINTESTINAL: no nausea, no vomiting, no change in bowel habits, no abdominal pain -improved appetite  GENITOURINARY: negative for dysuria, no hematuria   MUSCULOSKELETAL: no joint pains, no muscle aches, no swelling of joints or extremities  NEUROLOGICAL: No  Weakness or numbness      PAST MEDICAL HISTORY:      has a past medical history of Ambulates with cane, Anxiety, Borderline hypertension, CAD (coronary artery disease), Depression, GERD (gastroesophageal reflux disease), Heart attack ([address] Hypertension, Hypothyroidism, Neuropathy, Obesity, Osteoarthritis, Other cirrhosis of liver ([facility] [address] Sleep apnea, Type II or unspecified type diabetes mellitus without mention of complication, not stated as uncontrolled, Under care of service provider, Under care of service provider, and Vertebrogenic low back pain. PAST SURGICAL HISTORY:      has a past surgical history that includes Hysterectomy; Colonoscopy; Upper gastrointestinal endoscopy; Dilation and curettage of uterus; hiatal hernia repair; Throat surgery; Appendectomy; Total knee arthroplasty (Right, 01/06/2015); Total knee arthroplasty (Left, 05/26/2015); Coronary angioplasty with stent (07/2020); Cardiac catheterization; Cardiac catheterization (11/01/2022); Coronary artery bypass graft (N/A, 11/28/2022);  IR CHEST TUBE INSERTION (01/13/2023); Wound debridement (01/14/2023); Cardiac surgery (N/A, 1/14/2023); and IR CHEST TUBE INSERTION (1/16/2023). SOCIAL HISTORY:      reports that she has never smoked. She has never used smokeless tobacco. She reports current alcohol use. She reports that she does not use drugs. FAMILY HISTORY:     family history includes Arthritis in her father and mother; Cancer in her father; Diabetes in her sister; Heart Disease in her father and mother. HOME MEDICATIONS:      Prior to Admission medications    Medication Sig Start Date End Date Taking?  Authorizing Provider   insulin lispro, 1 Unit Dial, (HUMALOG KWIKPEN) 100 UNIT/ML SOPN Inject 3 Units into the skin 3 times daily (before meals) 125 - 150 2 units   151 - 200 4 units   201 - 250 6 units   251 - 300 8 units  301 - 350 10 units 351 - 400 12 units 12/29/22   Milli Dao MD   amLODIPine (NORVASC) 5 MG tablet  12/20/22   Historical Provider, MD   doxycycline monohydrate (MONODOX) 100 MG capsule  12/20/22   Historical Provider, MD Carlie Ling 100-25 MCG/ACT AEPB  12/20/22   Historical Provider, MD   isosorbide mononitrate (IMDUR) 60 MG extended release tablet  12/20/22   Historical Provider, MD   potassium chloride (KLOR-CON) 10 MEQ extended release tablet  12/20/22   Historical Provider, MD   traMADol Iris Ripper) 50 MG tablet  12/20/22   Historical Provider, MD   traZODone (DESYREL) 50 MG tablet  12/20/22   Historical Provider, MD   aspirin 81 MG EC tablet Take 1 tablet by mouth daily 12/6/22   Елена Madison MD   amiodarone (CORDARONE) 200 MG tablet Take 1 tablet by mouth 3 times daily 12/5/22   Елена Madison MD   metoprolol tartrate (LOPRESSOR) 25 MG tablet Take 0.5 tablets by mouth 2 times daily 12/5/22   Елена Madison MD   clopidogrel (PLAVIX) 75 MG tablet Take 1 tablet by mouth daily 12/6/22   Елена Madison MD   furosemide (LASIX) 40 MG tablet Take 1 tablet by mouth 2 times daily 12/5/22   Елена Madison MD potassium chloride (KLOR-CON M) 10 MEQ extended release tablet Take 2 tablets by mouth daily 12/5/22   Shabbir Arroyo MD   atorvastatin (LIPITOR) 40 MG tablet Take 1 tablet by mouth nightly 11/16/22   George King MD   empagliflozin (JARDIANCE) 10 MG tablet TAKE 1 TABLET BY MOUTH EVERY DAY 10/22/22   Ghanshyam Meyers MD   levothyroxine (SYNTHROID) 50 MCG tablet TAKE 1 TABLET BY MOUTH EVERY DAY 10/22/22   Ghanshyam Meyers MD   citalopram (CELEXA) 40 MG tablet TAKE 1 TABLET BY MOUTH ONCE DAILY *EMERGENCY REFILL* 9/29/22   Ghanshyam Meyers MD   glimepiride (AMARYL) 4 MG tablet TAKE 1 TABLET BY MOUTH TWICE DAILY *EMERGENCY REFILL* 9/29/22   Ghanshyam Meyers MD   omeprazole (PRILOSEC) 20 MG delayed release capsule TAKE 1 CAPSULE BY MOUTH ONCE DAILY 9/22/22   Paulette Turner DO   gabapentin (NEURONTIN) 400 MG capsule TAKE 1 CAPSULE BY MOUTH THREE TIMES DAILY 4/22/22 11/18/22  Mora Restrepo MD   sucralfate (CARAFATE) 1 GM tablet TAKE 1 TABLET BY MOUTH EVERY DAY  Patient not taking: Reported on 1/12/2023 4/20/22   Mora Retsrepo MD   busPIRone (BUSPAR) 10 MG tablet TAKE ONE (1) TABLET BY MOUTH TWICE DAILY 3/11/22   Mora Restrepo MD   insulin glargine (BASAGLAR KWIKPEN) 100 UNIT/ML injection pen Inject 65 units subcutaneously once daily. Patient taking differently: Inject 20 Units into the skin Inject 65 units subcutaneously once daily. Changed at [facility] 3/1/22   Mora Restrepo MD   miconazole (MICOTIN) 2 % cream APPLY TO AFFECTED AREA TWICE DAILY  Patient not taking: Reported on 1/12/2023 11/12/21   Mora Restrepo MD   lidocaine (XYLOCAINE) 2 % jelly Apply topically with dressing changes every 3 days  Patient not taking: Reported on 1/12/2023 8/5/21   Mora Restrepo MD   blood glucose monitor strips Test before meals and at bedtime. DX: DM, on insulin 10/8/20   Mora Restrepo MD   miconazole (ZEASORB-AF) 2 % powder Apply topically 2 times daily.   Patient not taking: Reported on 1/12/2023 10/1/20   Oral Thomas MD   blood glucose monitor kit and supplies Dispense sufficient amount for indicated testing frequency plus additional to accommodate PRN testing needs. Dispense all needed supplies to include: monitor, strips, lancing device, lancets, control solutions, alcohol swabs. 8/17/20   Guillermo Dutton MD   magnesium hydroxide (MILK OF MAGNESIA) 400 MG/5ML suspension Take 30 mLs by mouth daily as needed for Constipation 7/29/20   Guillermo Dutton MD   Handicap Placard MISC Is a permanent condition. DX: M19.90 5/14/19   Oral Thomas MD   Insulin Pen Needle (B-D UF III MINI PEN NEEDLES) 31G X 5 MM MISC USE 1 PEN NEEDLE DAILY 3/8/18   Oral Thomas MD   [employer] Lancets Thin MISC Test before meals and at bedtime. DX: DM, on insulin 4/22/14   David Carlin MD   Alcohol Swabs (ALCOHOL PREP PADS) by Other route 2 times daily      Historical Provider, MD       ALLERGIES:     Morphine, Shellfish-derived products, and Tape [adhesive tape]      OBJECTIVE:       Vitals:    01/18/23 0235 01/18/23 0332 01/18/23 0549 01/18/23 0623   BP:  127/65     Pulse:  73     Resp: 18 18  16   Temp:  97.9 °F (36.6 °C)     TempSrc:  Oral     SpO2:  93%     Weight:   267 lb 6.7 oz (121.3 kg)    Height:             Intake/Output Summary (Last 24 hours) at 1/18/2023 0735  Last data filed at 1/18/2023 0400  Gross per 24 hour   Intake --   Output 2068 ml   Net -2068 ml         PHYSICAL EXAM:  General Appearance  Alert , awake , not in acute distress  HEENT - Head is normocephalic, atraumatic. Lungs - Bilateral equal air entry , no wheezes, rales or rhonchi, aeration good  Cardiovascular - Heart sounds are normal.  Regular rhythm, normal rate without murmur, gallop or rub.   Abdomen - Soft, nontender, nondistended, no masses or organomegaly  Neurologic - There are no new focal motor or sensory deficits  Skin - No bruising or bleeding on exposed skin area-improved wound  Extremities - No cyanosis, clubbing or edema      DIAGNOSTICS:     Laboratory Testing:    Recent Results (from the past 24 hour(s))   POC Glucose Fingerstick    Collection Time: 01/17/23 11:28 AM   Result Value Ref Range    POC Glucose 265 (H) 65 - 105 mg/dL   POC Glucose Fingerstick    Collection Time: 01/17/23  4:21 PM   Result Value Ref Range    POC Glucose 259 (H) 65 - 105 mg/dL   POC Glucose Fingerstick    Collection Time: 01/17/23  7:44 PM   Result Value Ref Range    POC Glucose 242 (H) 65 - 105 mg/dL   Basic Metabolic Panel    Collection Time: 01/18/23  5:52 AM   Result Value Ref Range    Glucose 99 70 - 99 mg/dL    BUN 16 8 - 23 mg/dL    Creatinine 0.74 0.50 - 0.90 mg/dL    Est, Glom Filt Rate >60 >60 mL/min/1.73m2    Calcium 8.7 8.6 - 10.4 mg/dL    Sodium 135 135 - 144 mmol/L    Potassium 3.4 (L) 3.7 - 5.3 mmol/L    Chloride 93 (L) 98 - 107 mmol/L    CO2 32 (H) 20 - 31 mmol/L    Anion Gap 10 9 - 17 mmol/L   CBC    Collection Time: 01/18/23  5:52 AM   Result Value Ref Range    WBC 7.9 3.5 - 11.3 k/uL    RBC 4.01 3.95 - 5.11 m/uL    Hemoglobin 11.1 (L) 11.9 - 15.1 g/dL    Hematocrit 35.6 (L) 36.3 - 47.1 %    MCV 88.8 82.6 - 102.9 fL    MCH 27.7 25.2 - 33.5 pg    MCHC 31.2 28.4 - 34.8 g/dL    RDW 13.3 11.8 - 14.4 %    Platelets 557 415 - 363 k/uL    MPV 10.0 8.1 - 13.5 fL    NRBC Automated 0.0 0.0 per 100 WBC   Magnesium    Collection Time: 01/18/23  5:52 AM   Result Value Ref Range    Magnesium 1.7 1.6 - 2.6 mg/dL   Calcium, Ionized    Collection Time: 01/18/23  5:52 AM   Result Value Ref Range    Calcium, Ionized 1.14 1.13 - 1.33 mmol/L         Imaging/Diagonstics:  EKG: prolonged QT interval.    CXR: Left chest tube remains in place    Current Facility-Administered Medications   Medication Dose Route Frequency Provider Last Rate Last Admin    alteplase (CATHFLO) 10 mg in sodium chloride 0.9 % 30 mL  10 mg IntraPLEUral Q12H HAO Clemens CNP   10 mg at 01/17/23 1200    And    dornase alpha (PULMOZYME) 5 mg in sterile water 30 mL  5 mg IntraPLEUral Q12H Gisella Glance, APRN - CNP   5 mg at 01/17/23 1200    cefTRIAXone (ROCEPHIN) 2,000 mg in sterile water 20 mL IV syringe  2,000 mg IntraVENous Q24H Chris Doll MD   2,000 mg at 01/17/23 1349    oxyCODONE-acetaminophen (PERCOCET) 5-325 MG per tablet 1 tablet  1 tablet Oral Q4H PRN Jaminmed Tammy Wu MD   1 tablet at 01/18/23 0122    sennosides-docusate sodium (SENOKOT-S) 8.6-50 MG tablet 2 tablet  2 tablet Oral BID Ifeoma Clifton MD   2 tablet at 01/17/23 2056    insulin glargine (LANTUS) injection vial 70 Units  70 Units SubCUTAneous Nightly Ifeoma Clifton MD   70 Units at 01/17/23 2059    sodium chloride flush 0.9 % injection 5-40 mL  5-40 mL IntraVENous 2 times per day Gisella Glance, APRN - CNP   10 mL at 01/17/23 2058    sodium chloride flush 0.9 % injection 5-40 mL  5-40 mL IntraVENous PRN Gisella Glance, APRN - CNP        0.9 % sodium chloride infusion   IntraVENous PRN Gisella Glance, APRN - CNP        fentaNYL (SUBLIMAZE) injection 25 mcg  25 mcg IntraVENous Q1H PRN Gisella Glance, APRN - CNP   25 mcg at 01/17/23 2058    Or    fentaNYL (SUBLIMAZE) injection 50 mcg  50 mcg IntraVENous Q1H PRN Gisella Glance, APRN - CNP   50 mcg at 01/18/23 0553    hydrALAZINE (APRESOLINE) injection 5 mg  5 mg IntraVENous Q5 Min PRN Gisella Glance, APRN - CNP        metoprolol (LOPRESSOR) injection 2.5 mg  2.5 mg IntraVENous Q10 Min PRN Gisella Glance, APRN - CNP        mupirocin (BACTROBAN) 2 % ointment   Nasal BID Gisella Glance, APRN - CNP   Given at 01/17/23 2058    diphenhydrAMINE (BENADRYL) tablet 25 mg  25 mg Oral Nightly PRN Gisella Glance, APRN - CNP        polyethylene glycol (GLYCOLAX) packet 17 g  17 g Oral Daily Gisella Glance, APRN - CNP   17 g at 01/17/23 0831    magnesium hydroxide (MILK OF MAGNESIA) 400 MG/5ML suspension 30 mL  30 mL Oral Daily PRN Gisella Glance, APRN - CNP        bisacodyl (DULCOLAX) suppository 10 mg  10 mg Rectal Daily PRN Mauricio [place] Kesha, APRN - CNP        pantoprazole (PROTONIX) tablet 40 mg  40 mg Oral Daily Laqueta Cart, APRN - CNP   40 mg at 01/17/23 4651    magnesium sulfate 2000 mg in 50 mL IVPB premix  2,000 mg IntraVENous PRN Laqueta Cart, APRN - CNP   Stopped at 01/16/23 1308    ipratropium-albuterol (DUONEB) nebulizer solution 1 ampule  1 ampule Inhalation Q4H PRN Laqueta Cart, APRN - CNP        albumin human 5 % IV solution 25 g  25 g IntraVENous PRN Laqueta Cart, APRN - CNP        albumin human 25 % IV solution 25 g  25 g IntraVENous PRN Laqueta Cart, APRN - CNP        glucose chewable tablet 16 g  4 tablet Oral PRN Laqueta Cart, APRN - CNP        clopidogrel (PLAVIX) tablet 75 mg  75 mg Oral Daily Laqueta Cart, APRN - CNP   75 mg at 01/17/23 0822    enoxaparin Sodium (LOVENOX) injection 30 mg  30 mg SubCUTAneous BID Laqueta Cart, APRN - CNP   30 mg at 01/17/23 2057    furosemide (LASIX) tablet 40 mg  40 mg Oral BID Laqueta Cart, APRN - CNP   40 mg at 01/17/23 2056    0.9 % sodium chloride infusion   IntraVENous PRN Laqueta Cart, APRN - CNP   Stopped at 01/17/23 0231    acetaminophen (TYLENOL) tablet 650 mg  650 mg Oral Q6H PRN Laqueta Cart, APRN - CNP   650 mg at 01/13/23 4112    Or    acetaminophen (TYLENOL) suppository 650 mg  650 mg Rectal Q6H PRN Laqueta Cart, APRN - CNP        potassium chloride (KLOR-CON M) extended release tablet 40 mEq  40 mEq Oral PRN Laqueta Cart, APRN - CNP   40 mEq at 01/15/23 1304    Or    potassium bicarb-citric acid (EFFER-K) effervescent tablet 40 mEq  40 mEq Oral PRN Laqueta Cart, APRN - CNP        Or    potassium chloride 10 mEq/100 mL IVPB (Peripheral Line)  10 mEq IntraVENous PRN Laqueta Cart, APRN - CNP        amiodarone (CORDARONE) tablet 200 mg  200 mg Oral TID Laqueta Cart, APRN - CNP   200 mg at 01/17/23 2057    [Held by provider] amLODIPine (NORVASC) tablet 5 mg  5 mg Oral Daily HAO Bo - NEELAM   5 mg at 01/16/23 0817    aspirin EC tablet 81 mg  81 mg Oral Daily Joe Calderón, APRN - CNP   81 mg at 01/17/23 2252    atorvastatin (LIPITOR) tablet 40 mg  40 mg Oral Nightly Joe Calderón, APRN - CNP   40 mg at 01/17/23 2057    budesonide-formoterol (SYMBICORT) 80-4.5 MCG/ACT inhaler 2 puff  2 puff Inhalation BID Joe Calderón, APRN - CNP   2 puff at 01/17/23 2201    busPIRone (BUSPAR) tablet 10 mg  10 mg Oral BID Joe Calderón, APRN - CNP   10 mg at 01/17/23 2057    [Held by provider] citalopram (CELEXA) tablet 40 mg  40 mg Oral Daily Joe Calderón, APRN - CNP   40 mg at 01/16/23 0816    gabapentin (NEURONTIN) capsule 400 mg  400 mg Oral TID Joe Calderón, APRN - CNP   400 mg at 01/17/23 2057    isosorbide mononitrate (IMDUR) extended release tablet 60 mg  60 mg Oral Daily Joe Calderón, APRN - CNP   60 mg at 01/17/23 9466    levothyroxine (SYNTHROID) tablet 50 mcg  50 mcg Oral Daily Joe Calderón, APRN - CNP   50 mcg at 01/17/23 6354    metoprolol tartrate (LOPRESSOR) tablet 12.5 mg  12.5 mg Oral BID Joe Calderón, APRN - CNP   12.5 mg at 01/17/23 2057    [Held by provider] traZODone (DESYREL) tablet 50 mg  50 mg Oral Nightly Joe Calderón, APRN - CNP   50 mg at 01/14/23 2029    [MAR Hold] glucagon (rDNA) injection 1 mg  1 mg SubCUTAneous PRN Benjaman Dakin, MD        insulin lispro (HUMALOG) injection vial 0-16 Units  0-16 Units SubCUTAneous TID WC Joe Calderón, APRN - CNP   8 Units at 01/17/23 1719    insulin lispro (HUMALOG) injection vial 0-4 Units  0-4 Units SubCUTAneous Nightly Joe Calderón, APRN - CNP   4 Units at 01/16/23 2057       ASSESSMENT:     Principal Problem:    Incisional infection  Active Problems:    Pleural effusion on left    Lactic acid acidosis    Bandemia    Sternal wound dehiscence    Abscess of sternal region    S/P CABG (coronary artery bypass graft)    CRP elevated    Community acquired pneumonia of left lower lobe of lung  Resolved Problems:    * No resolved hospital problems. *      PLAN:     Discussed care plan with nurse after getting her input. Status post CABG 11/22/23 with subsequent surgical wound dehiscence and infection with abscess  -CT scan of the chest showed diastasis of mediastinal mass with air in the soft tissues as well as an abscess  -S/p I&D 1/13  -S/p sternal debridement and washout with wound VAC placement 1/14  -Pain control: Percocet 5-325 mg 1 tablet every 6 hours PRN and fentanyl pain panel PRN  -On aspirin 81 mg, plavix 75 mg QD, Lasix 40 mg twice daily  -Amiodarone 200 mg tid started post CABG  -Blood cultures still negative  -Pulmonology on board:              -now on rocephin  -weaning off O2    -CTS on board              -issue with chest tube repaired   -TPA/ dornase   -Discharge soon with wound vac  -ID on board  -Patient on Rocephin      L sided pleural effusion  -Chest x-ray showed left-sided pleural effusion with almost complete collapse of the left lobe of lung  -chest tube placement on 1/13/22  -Chest tube replaced, draining well now  -New Xray shows improvements    Uncontrolled T2DM  -Glucose in 240s  -Lantus 70 nightly  -POC glucose checks  -A1c 7.5 11/18/22  -Hypoglycemia protocol  -Neuropathy - resumed Gabapentin 400 mg tid      HTN  -Norvasc 5 mg daily HELD- patient BP soft this morning,  - Imdur 60 mg daily, Lopressor 12.5 mg twice daily  -Hydralazine 5 mg as needed     Prolonged QTC  -EKG 1/14:   -Repeat mag 1.6 today, replacement initiated   -Trazodone and citalopram held due to Qtc prolongation  -Repeat EKG-     Hypothyroidism  -Synthroid 50 mcg qd      Multivessel CAD status post CABG 11/28/22   -On Aspirin 81 mg, Plavix 75 mg qd, Lasix 40 mg bid, Imdur 60 mg qd.   -Lipitor 40 mg nightly  -Last echo showed EF 54%- 11/14/22           DVT prophylaxis: Lovenox 30 mg bid  GI prophylaxis: Protonix 40 ,mg QD    Venus Negron MD  Transitional year Resident  1/18/2023 7:35 AM            Please note that this chart was generated using voice recognition Dragon dictation software.   Although every effort was made to ensure the accuracy of this automated transcription, some errors in transcription may have occurred

## 2023-01-18 NOTE — BRIEF OP NOTE
Brief Postoperative Note for Thoracentesis    John Johns  YOB: 1955  [id number]    Pre-operative Diagnosis: left Pleural effusion      Post-operative Diagnosis: Same    Procedure: Thoracentesis     Anesthesia: 1% Lidocaine     Surgeons/Assistants: Jesus Steen MD    Complications: none    EBL: Minimal    Specimens: were obtained    Successful left thoracentesis performed after discussion with Jie Clifford.        Electronically signed by QUENTIN Pritchard on 1/18/2023 at 3:22 PM

## 2023-01-18 NOTE — PROGRESS NOTES
[facility] -   Infectious diseases evaluation  admission date 1/12/2023    reason for consultation:   Sternal infection    Impression :   Current:  Surgical wound dehiscence and infection w abscess - lower wound scabbed and infected - tan fluid seen today in bag   CAD, CABG November 2022  I/D lower sternal wound 1/13 - VAC  Left lobar collapse and mucous plug  Large Left loculated effusion - could be post op loculation - possibly collapsing the left lung  Left chest tube 1/13  Left adrenal tumor  Lactic acidosis  Bandemia 16    Other:    Discussion / summary of stay / plan of care   78 F s/p CABG last year. Coming in with chest pain. Imaging revealed infection at surgical inscision. Patient also had chest tube placed in LL due to large left loculated effusion. Recommendations   Cx from the sternal wound seems neg  BC still neg  MRSA nasal ng    Treating the sternal OM and the left lobar pneumonia   zosyn 1/12 stop 1/17 1/17 - downsize to ceftriaxone 2 g daily  FU CRP  Will need AB 6 weeks for sternal osteo    left loculated effusion 1/15 - left chest tube -TPAse -CT chest 1/18 residual improved effusion - left thoracentesis 1/18  Resp tx fr the left lung collapse    Infection Control Recommendations   [place] Precautions  Contact Isolation     Antimicrobial Stewardship Recommendations   Simplification of therapy  Targeted therapy      History of Present Illness:   Initial history:  Alexis Martinez is a 78y.o.-year-old female who had a CABG 11/2022, comes in with worsening chest pain, tightness, cough nonproductive nausea vomiting and finally fell twice in the last 2 days and hit her head due to dizziness.   She also describes diffuse abdominal pain and some shortness of breath with exertion, no fever or chills    Drainage from the surgical wound, with pain at the surgical site  Comes into the emergency room, started on 1 L of uses oxygen since her bypass, 1 L at home, she is diabetic on insulin, cirrhosis and obese, SHERITA    In the ER, the surgical wound looked infected with some drainage and swelling of the tissues. X-ray showed opacification of the left lung. CT shows a left adrenal mass, suggested an adrenal imaging    Patient started on antibiotic, infectious consulted for sternal wound dehiscence and infection        Interval changes  1/18/2023   Patient Vitals for the past 8 hrs:   BP Temp Temp src Pulse Resp SpO2   01/18/23 1450 130/69 -- -- 72 17 --   01/18/23 1449 130/81 -- -- 72 14 --   01/18/23 1445 130/81 -- -- 72 15 --   01/18/23 1440 133/75 -- -- 72 20 --   01/18/23 1435 135/66 -- -- 72 16 --   01/18/23 1430 (!) 110/93 -- -- 72 12 --   01/18/23 1429 (!) 141/72 -- -- 72 14 --   01/18/23 1425 137/72 -- -- 72 14 --   01/18/23 1424 (!) 146/68 -- -- 72 17 --   01/18/23 1420 (!) 141/68 -- -- 71 18 --   01/18/23 1409 122/82 -- -- -- -- --   01/18/23 1401 138/65 -- -- 72 14 --   01/18/23 1400 138/65 -- -- 72 12 --   01/18/23 1356 126/64 -- -- 71 18 --   01/18/23 1354 126/64 -- -- 72 12 --   01/18/23 1353 -- -- -- 72 17 --   01/18/23 1352 -- -- -- 71 -- --   01/18/23 1100 (!) 120/47 98.8 °F (37.1 °C) Oral 76 16 95 %   01/18/23 0900 122/60 98.6 °F (37 °C) Oral 80 14 94 %   01/18/23 0747 -- -- -- 75 -- 99 %     1/14  Sternal debridement with wound vac placement 1/13  Culture no growth to this date   Blood culture no growth   Pain better - ox 3    1/15  All cx neg and CXR new left opacification  Chest tube in place on the left x 1/14  Sternal wound w VAC  Comfortable and ox 3  On NC    1/16  All Cx  are negative so far. Ir to place another chest tube  Afberile HDS. Some tan fluids in wound dressing.    VAC changed and wound underlying is clean  CTS had debrided and resected some sternal bone at that level and all cx are still neg    1/17  IR chest tube placed yesterday  Patient still has some tan fluid in her wound vac dressing  Wound care consulted  Currently afebrile hds.   Cx still negative     1/18  Chest x-ray today reveals that the chest tube is extrathoracic. Patient going down to IR for adjustment. Wound VAC showing tan material up top I suspect that this material is fat rather than pus. Given that the patient has not shown any signs of sepsis nor do her labs reveal any elevated white blood cell count or any positive cultures. Patient okay to be discharged on ceftriaxone 2 g daily for 2 weeks. Follow-up with Dr. Burns Brought outpatient. Discharge planning per primary team      Summary of relevant labs:  Labs:  Lactic Acid, 4.7 High    WBC 7.9  Procalcitonin0.53 High    Creatinine0.74     Micro:  BC  Imaging:  CT of the chest  .  2 cm left adrenal mass with indeterminate Hounsfield numbers. Further   workup using adrenal CT protocol or MRI advised when the patient's condition   permits. Air seen in the mediastinum, an abscess around the sternotomy area    2. Large left pleural effusion with almost complete collapse of the left   lobe of the lung. Bronchial wall thickening is noted with material within   the bronchi likely mucous plugging           I have personally reviewed the past medical history, past surgical history, medications, social history, and family history, and I haveupdated the database accordingly. Allergies:   Morphine, Shellfish-derived products, and Tape [adhesive tape]     Review of Systems:     Review of Systems   Constitutional:  Negative for activity change, appetite change, chills, fatigue, fever and unexpected weight change. HENT:  Negative for congestion, drooling and trouble swallowing. Eyes:  Negative for photophobia, discharge, redness and itching. Respiratory:  Negative for apnea, cough, choking, chest tightness and shortness of breath. Cardiovascular:  Positive for chest pain. Negative for palpitations and leg swelling. Gastrointestinal:  Positive for abdominal distention. Negative for abdominal pain and nausea. Endocrine: Negative for cold intolerance, heat intolerance and polyuria. Genitourinary:  Negative for dysuria and urgency. Musculoskeletal:  Negative for arthralgias and myalgias. Skin:  Positive for color change and wound. Allergic/Immunologic: Negative for immunocompromised state. Neurological:  Negative for dizziness and numbness. Hematological:  Negative for adenopathy. Psychiatric/Behavioral:  Negative for agitation. The patient is not nervous/anxious. Physical Examination :       Physical Exam  Constitutional:       General: She is not in acute distress. Appearance: Normal appearance. She is not ill-appearing, toxic-appearing or diaphoretic. HENT:      Head: Normocephalic and atraumatic. Nose: Nose normal. No congestion or rhinorrhea. Mouth/Throat:      Pharynx: Oropharynx is clear. No posterior oropharyngeal erythema. Eyes:      Conjunctiva/sclera: Conjunctivae normal.      Pupils: Pupils are equal, round, and reactive to light. Cardiovascular:      Rate and Rhythm: Normal rate. Pulses: Normal pulses. Heart sounds: Normal heart sounds. No murmur heard. No friction rub. Pulmonary:      Effort: No respiratory distress. Breath sounds: Normal breath sounds. No stridor. No wheezing or rhonchi. Chest:      Chest wall: Tenderness present. Abdominal:      Palpations: Abdomen is soft. There is no mass. Hernia: No hernia is present. Genitourinary:     Comments: Urine jackie  Musculoskeletal:         General: Signs of injury present. No swelling, tenderness or deformity. Cervical back: Neck supple. No rigidity or tenderness. Right lower leg: No edema. Left lower leg: No edema. Skin:     General: Skin is dry. Capillary Refill: Capillary refill takes less than 2 seconds. Coloration: Skin is not jaundiced or pale. Findings: No bruising or erythema.       Comments: Purulent material coming out of surgical wound Neurological:      Mental Status: She is alert and oriented to person, place, and time. Cranial Nerves: No cranial nerve deficit. Sensory: No sensory deficit. Psychiatric:         Mood and Affect: Mood normal.         Thought Content:  Thought content normal.       Past Medical History:     Past Medical History:   Diagnosis Date    Ambulates with cane     or walker    Anxiety     Borderline hypertension     CAD (coronary artery disease)     stents x 2 in 2020    Depression 07/28/2020    GERD (gastroesophageal reflux disease)     Heart attack ([facility] [address] 07/18/2020    Hypertension     Hypothyroidism     Neuropathy     Obesity     morbid    Osteoarthritis     Other cirrhosis of liver ([facility] [address] 07/27/2020    pt denies    Sleep apnea     possible    Type II or unspecified type diabetes mellitus without mention of complication, not stated as uncontrolled     Under care of service provider 11/18/2022    ipy-[facility]-last visit aug 2022    Under care of service provider 11/18/2022    cardiology-[facility]-last visit nov 2022    Vertebrogenic low back pain 03/29/2022       Past Surgical  History:     Past Surgical History:   Procedure Laterality Date    APPENDECTOMY      CARDIAC CATHETERIZATION      2 stents    CARDIAC CATHETERIZATION  11/01/2022    CARDIAC SURGERY N/A 1/14/2023    STERNAL WOUND WASHOUT, DEBRIDEMENT, WOUND VAC PLACEMENT performed by Lucio Magana MD at [facility]  07/2020    CORONARY ARTERY BYPASS GRAFT N/A 11/28/2022    CABG CORONARY ARTERY BYPASS X3 ON PUMP , ATA, ENDO VEIN HARVEST performed by Lucio Magana MD at [place] [id number]  01/14/2023    STERNAL WOUND WASHOUT, DEBRIDEMENT, WOUND VAC PLACEMENT    DILATION AND CURETTAGE OF UTERUS      HIATAL HERNIA REPAIR      HYSTERECTOMY (CERVIX STATUS UNKNOWN)      IR CHEST TUBE INSERTION  01/13/2023    IR CHEST TUBE INSERTION 1/13/2023 Bonita Dutton MD [facility] SPECIAL PROCEDURES IR CHEST TUBE INSERTION  1/16/2023    IR CHEST TUBE INSERTION 1/16/2023 Halie Anderson MD [facility] SPECIAL PROCEDURES    THROAT SURGERY      POLYPS REMOVED FROM VOCAL CORD    TOTAL KNEE ARTHROPLASTY Right 01/06/2015    TOTAL KNEE ARTHROPLASTY Left 05/26/2015    UPPER GASTROINTESTINAL ENDOSCOPY         Medications:      alteplase (ACTIVASE) syringe  10 mg IntraPLEUral Once    And    dornase (PULMOZYME) syringe  5 mg IntraPLEUral Once    fentanNYL  25 mcg IntraVENous Once    cefTRIAXone (ROCEPHIN) IV  2,000 mg IntraVENous Q24H    sennosides-docusate sodium  2 tablet Oral BID    insulin glargine  70 Units SubCUTAneous Nightly    sodium chloride flush  5-40 mL IntraVENous 2 times per day    mupirocin   Nasal BID    polyethylene glycol  17 g Oral Daily    pantoprazole  40 mg Oral Daily    clopidogrel  75 mg Oral Daily    enoxaparin  30 mg SubCUTAneous BID    furosemide  40 mg Oral BID    amiodarone  200 mg Oral TID    [Held by provider] amLODIPine  5 mg Oral Daily    aspirin  81 mg Oral Daily    atorvastatin  40 mg Oral Nightly    budesonide-formoterol  2 puff Inhalation BID    busPIRone  10 mg Oral BID    [Held by provider] citalopram  40 mg Oral Daily    gabapentin  400 mg Oral TID    isosorbide mononitrate  60 mg Oral Daily    levothyroxine  50 mcg Oral Daily    metoprolol tartrate  12.5 mg Oral BID    [Held by provider] traZODone  50 mg Oral Nightly    insulin lispro  0-16 Units SubCUTAneous TID WC    insulin lispro  0-4 Units SubCUTAneous Nightly       Social History:     Social History     Socioeconomic History    Marital status:      Spouse name: Kinjal Mckeon    Number of children: 0    Years of education: Not on file    Highest education level: Not on file   Occupational History    Occupation: Retired      Employer: [employer]   Tobacco Use    Smoking status: Never    Smokeless tobacco: Never   Vaping Use    Vaping Use: Never used   Substance and Sexual Activity    Alcohol use: Yes     Comment: once a month    Drug use: No    Sexual activity: Yes     Partners: Male   Other Topics Concern    Not on file   Social History Narrative    Not on file     Social Determinants of Health     Financial Resource Strain: Low Risk     Difficulty of Paying Living Expenses: Not hard at all   Food Insecurity: No Food Insecurity    Worried About Running Out of Food in the Last Year: Never true    Ran Out of Food in the Last Year: Never true   Transportation Needs: Not on file   Physical Activity: Not on file   Stress: Not on file   Social Connections: Not on file   Intimate Partner Violence: Not on file   Housing Stability: Not on file       Family History:     Family History   Problem Relation Age of Onset    Heart Disease Mother     Arthritis Mother     Heart Disease Father     Arthritis Father     Cancer Father         \"oral\"    Diabetes Sister         gestational      Medical Decision Making:   I have independently reviewed/ordered the following labs:    CBC with Differential:   Recent Labs     01/17/23  0641 01/18/23  0552   WBC 9.4 7.9   HGB 11.0* 11.1*   HCT 37.2 35.6*    234       BMP:  Recent Labs     01/17/23 0641 01/18/23  0552    135   K 3.7 3.4*   CL 94* 93*   CO2 34* 32*   BUN 16 16   CREATININE 0.80 0.74   MG 1.9 1.7       Hepatic Function Panel:   No results for input(s): PROT, LABALBU, BILIDIR, IBILI, BILITOT, ALKPHOS, ALT, AST in the last 72 hours. No results for input(s): RPR in the last 72 hours. No results for input(s): HIV in the last 72 hours. No results for input(s): BC in the last 72 hours. Lab Results   Component Value Date/Time    CREATININE 0.74 01/18/2023 05:52 AM    GLUCOSE 99 01/18/2023 05:52 AM    GLUCOSE 250 03/30/2012 03:15 PM       Detailed results: Thank you for allowing us to participate in the care of this patient. Please call with questions.     This note is created with the assistance of a speech recognition program.  While intending to generate adocument that actually reflects the content of the visit, the document can still have some errors including those of syntax and sound a like substitutions which may escape proof reading. It such instances, actual meaningcan be extrapolated by contextual diversion. Jessica Daigle MD  Office: [phone]  Perfect serve / office [phone]      I have discussed the care of the patient, including pertinent history and exam findings,  with the resident. I have seen and examined the patient and the key elements of all parts of the encounter have been performed by me. I agree with the assessment, plan and orders as documented by the resident.     Gena Hopkins, Infectious Diseases

## 2023-01-18 NOTE — PROGRESS NOTES
[facility]  Daily Progress Note    Surgeon: Kalyani Morrison   POD# 4  S/P :  sternal wound debridement with wound vac placement     Subjective:    Patient was seen and examined at bedside this morning without any complaints. Vital Signs: /60   Pulse 80   Temp 98.6 °F (37 °C) (Oral)   Resp 14   Ht 4' 11\" (1.499 m)   Wt 267 lb 6.7 oz (121.3 kg)   SpO2 94%   BMI 54.01 kg/m²  O2 Flow Rate (L/min): 2 L/min   Admit Weight: Weight: 252 lb (114.3 kg)       I/O's:  I/O last 3 completed shifts: In: 561.6 [P.O.:120; I.V.:138.7; IV Piggyback:302.9]  Out: 2928 [Urine:2675; Drains:125; Chest Tube:128]    Data:    CBC:   Recent Labs     01/16/23  0611 01/17/23  0641 01/18/23  0552   WBC 9.8 9.4 7.9   HGB 10.8* 11.0* 11.1*   HCT 35.2* 37.2 35.6*   MCV 90.5 92.3 88.8    242 234       BMP:   Recent Labs     01/16/23  0611 01/17/23  0641 01/18/23  0552    136 135   K 3.9 3.7 3.4*   CL 95* 94* 93*   CO2 33* 34* 32*   BUN 15 16 16   CREATININE 0.74 0.80 0.74       PT/INR:   Recent Labs     01/16/23 0611   PROTIME 11.8   INR 1.1       APTT:   No results for input(s): APTT in the last 72 hours. Chest Xray:   FINDINGS:   Overlying ECG monitor leads, gown snaps and respiratory tubing. Midline   sternotomy wires and clips. Left chest tube dislodged and now extrathoracic. Slightly increased opacity of the left hemithorax, likely a combination of   pleural fluid, atelectasis and possibly underlying infiltrate or other   airspace opacity. Right lung and right costophrenic angle are clear. Bones unchanged. Impression   Left chest tube extrathoracic, with slightly increased opacity of the left   hemithorax with a similar differential diagnosis.          Scheduled Meds:    alteplase (ACTIVASE) syringe  10 mg IntraPLEUral Once    And    dornase (PULMOZYME) syringe  5 mg IntraPLEUral Once    cefTRIAXone (ROCEPHIN) IV  2,000 mg IntraVENous Q24H sennosides-docusate sodium  2 tablet Oral BID    insulin glargine  70 Units SubCUTAneous Nightly    sodium chloride flush  5-40 mL IntraVENous 2 times per day    mupirocin   Nasal BID    polyethylene glycol  17 g Oral Daily    pantoprazole  40 mg Oral Daily    clopidogrel  75 mg Oral Daily    enoxaparin  30 mg SubCUTAneous BID    furosemide  40 mg Oral BID    amiodarone  200 mg Oral TID    [Held by provider] amLODIPine  5 mg Oral Daily    aspirin  81 mg Oral Daily    atorvastatin  40 mg Oral Nightly    budesonide-formoterol  2 puff Inhalation BID    busPIRone  10 mg Oral BID    [Held by provider] citalopram  40 mg Oral Daily    gabapentin  400 mg Oral TID    isosorbide mononitrate  60 mg Oral Daily    levothyroxine  50 mcg Oral Daily    metoprolol tartrate  12.5 mg Oral BID    [Held by provider] traZODone  50 mg Oral Nightly    insulin lispro  0-16 Units SubCUTAneous TID WC    insulin lispro  0-4 Units SubCUTAneous Nightly     Continuous Infusions:    sodium chloride      sodium chloride Stopped (01/17/23 0231)           Physical Exam:    General: A&O x 3, NAD noted  Heart: Normal S1, S2, RRR, No murmurs noted   Sternum: negative pressure wound vac in place   Lungs: Diminished BS bilaterally at the bases. Pigtail CT inplace. Abdomen: Soft, non tender, non distended, Hypoactive BS x 4   : voids   Extremities: No edema noted bilateral LE.         Assessment & Plan:    Ms. Renee Decker is a 78y.o. year-old female status post sternal wound debridement with wound vac placement on 1/14/2023.    1/18/2023:   Concern for extrathoracic placement of CT on XR, plan for IR to replace CT and will distill TPA/dornase after     1/17/23:   IR placed pigtail, slight improvement in Cxr, will plan for TPA/Dornase today   Distill for 1.5hrs and then unclamp   Sternal wound to vac, wound care on board to change 3x weekly  Recommend tight glycemic control for wound healing     1-16-23  Pt going to IR this AM to upsize/change out pigtail  We will eval for TPA and dornase for empyema  Internal medicine to assist with better glucose control  Anticipate DC in next few days with wound vac.       1/15/2023  No issues or events noted with the patient overnight. Plan to transfer to [facility] today. CXR complete while uot on left, prior CT with loculated pleural effusion along with mucous plugging. Attempted TPA  and dornase to break up effusion as CT with minimal drainage output. Will need new CT placed in IR tomorrow, currently clogged unable to flush, will try again tomorrow once CT is placed. ID on board to manage abx    The above recommendations including medications and orders were discussed and agreed upon with Dr. Kaylyn Fletcher / Dr. Vale Ames.        Willa Wright, HAO-CNP

## 2023-01-18 NOTE — CARE COORDINATION
Transitional planning:  Received call from Terese at [facility]. They cannot accept with chest tube. Notified from report in nurse rounds today, chest tube will be discontinued. No need for bed alarms. Terese said then, they can accept. Spoke with unit RN, chest tube being prepared for removal tomorrow. 1025 Met with pt at bedside, she is pleased with being accepted to [facility]. [address] in admissions back and notified of above. [facility] doing 3 day waiver for covid. She is confident they will accept, but needs to review with nursing. 12 Met with pt and spouse at bedside and notified of acceptance by [address]. 1250 Rx for Rocephin infusion received from Dr. Claude Rico MD. Placed at [facility] desk, no chart in slot at room.

## 2023-01-18 NOTE — PROGRESS NOTES
Opened chart to review pt's MAR with charge RN from [address].

## 2023-01-18 NOTE — PLAN OF CARE
Problem: Chronic Conditions and Co-morbidities  Goal: Patient's chronic conditions and co-morbidity symptoms are monitored and maintained or improved  Outcome: Progressing     Problem: Pain  Goal: Verbalizes/displays adequate comfort level or baseline comfort level  Outcome: Progressing     Problem: Skin/Tissue Integrity  Goal: Absence of new skin breakdown  Description: 1. Monitor for areas of redness and/or skin breakdown  2. Assess vascular access sites hourly  3. Every 4-6 hours minimum:  Change oxygen saturation probe site  4. Every 4-6 hours:  If on nasal continuous positive airway pressure, respiratory therapy assess nares and determine need for appliance change or resting period.   Outcome: Progressing     Problem: Safety - Adult  Goal: Free from fall injury  Outcome: Progressing     Problem: ABCDS Injury Assessment  Goal: Absence of physical injury  Outcome: Progressing     Problem: Discharge Planning  Goal: Discharge to home or other facility with appropriate resources  Outcome: Progressing     Problem: Respiratory - Adult  Goal: Achieves optimal ventilation and oxygenation  Outcome: Progressing     Problem: Skin/Tissue Integrity - Adult  Goal: Skin integrity remains intact  Outcome: Progressing  Goal: Incisions, wounds, or drain sites healing without S/S of infection  Outcome: Progressing  Goal: Oral mucous membranes remain intact  Outcome: Progressing     Problem: Nutrition Deficit:  Goal: Optimize nutritional status  Outcome: Progressing

## 2023-01-18 NOTE — PROGRESS NOTES
PULMONARY & CRITICAL CARE MEDICINE PROGRESS NOTE     Patient:  Aysha Chou  MRN: [id number]  Admit date: 1/12/2023  Primary Care Physician: Taylor Esqueda MD  Consulting Physician: Gertrudis Tellez DO  CODE Status: Full Code  LOS: 6     SUBJECTIVE     Chief Complaint/ Reason for consult:    large pleural effusion    Hospital Course: The patient is a 78 y.o. female with a history of hypertension, CAD s/p stents, s/p CABG 11/28/2022, hypothyroidism came to ED with chief complaint of chest pain and shortness of breath. Patient is known to CT surgery as she got CABG on 11/28/2022 and was recently seen in their office on 12/21/2022, during that visit patient was doing well, had no respiratory or cardiac issues. Patient was afebrile, NSR /55 and was room air was eventually put on 3 L nasal cannula due to respiratory distress, patient is on 1 L oxygen at home. Initial lab work show hyperglycemia with glucose 260, Pro-Jame 0.53.  proBNP 1814, troponin 36. WBC 16.7, hemoglobin 13.1, platelet 368. In the ED CT chest revealed large left pleural effusion with near collapse of left lung. ID, pulmonary and CT surgery were consulted. Patient was chest tube placed by IR on 1/13/2023. Patient is getting Zosyn as per ID recommendation. Interval History:  01/18/23  Patient seen and examined bedside. Remained afebrile, heart rate 88, blood pressure 122/60, saturating well on 3 L nasal cannula. No episode of desaturation  Labs reviewed, sodium 135, potassium 3.4, magnesium 1.7, glucose 99. No leukocytosis, hemoglobin 11.1, platelet 079. Glucose 184. Cr 0.80. Sternal wound wound VAC is in place. Sternal drain has 125 normal.  Left pig tail has 68 mL drainage last 24 hours. Blood, wound and pleural fluid cultures negative so far. CXR 1/18/2023 left chest tube extrathoracic opacity on the left. CT surgery planning to call IR to replace chest tube followed by tPA and dornase.  S/p tPA and Dornase 1 dose 23. CXR 23 showed slight improvement, CT surgery planning for tPA and Dornase today. Wound care following. Chest x-ray 2023 as compared to 01/15/2023 and 2023 showed improved aeration of left lung left pleural effusion is reduced pleural catheter is in place. Review Of Systems:  Review of Systems   Constitutional:  Positive for fatigue. HENT:  Negative for congestion, nosebleeds, trouble swallowing and voice change. Eyes:  Negative for visual disturbance. Respiratory:  Positive for cough and shortness of breath. Negative for apnea, chest tightness and wheezing. Cardiovascular:  Positive for chest pain. Negative for leg swelling. Gastrointestinal:  Negative for abdominal pain, nausea and vomiting. Genitourinary:  Negative for difficulty urinating. Allergic/Immunologic: Negative. Neurological:  Negative for dizziness, tremors and light-headedness. Hematological:  Negative for adenopathy. Does not bruise/bleed easily. Psychiatric/Behavioral: Negative. OBJECTIVE     PaO2/FiO2 RATIO:  No results for input(s): POCPO2 in the last 72 hours. VITAL SIGNS:   LAST:  BP (!) 120/47   Pulse 76   Temp 98.8 °F (37.1 °C) (Oral)   Resp 16   Ht 4' 11\" (1.499 m)   Wt 267 lb 6.7 oz (121.3 kg)   SpO2 95%   BMI 54.01 kg/m²   8-24 HR RANGE:  TEMP Temp  Av.6 °F (37 °C)  Min: 97.9 °F (36.6 °C)  Max: 98.9 °F (93.8 °C)   BP Systolic (78HTN), MRE:054 , Min:105 , EJI:380      Diastolic (45YPY), GJZ:61, Min:47, Max:65     PULSE Pulse  Av.5  Min: 72  Max: 82   RR Resp  Avg: 15.3  Min: 14  Max: 16   O2 SAT SpO2  Av %  Min: 94 %  Max: 99 %   OXYGEN DELIVERY O2 Flow Rate (L/min)  Av L/min  Min: 2 L/min  Max: 2 L/min        Systemic Examination:   Physical Exam -  Constitutional:  Alert, cooperative and no distress. Mental Status:  Oriented to person, place and time and normal affect. Lungs: Left-sided chest tube is present.   Sternal wound VAC is present. Air entry is present bilaterally decreased breath sound in left lower lung field as compared to right. Normal effort. Heart:  Regular rate and rhythm, no murmur. Abdomen:  Soft, nontender, nondistended, normal bowel sounds. Extremities: Mild bilateral edema, negative for redness, tenderness in the calves. Skin:  Warm, dry, no gross lesions or rashes.     DATA REVIEW     Medications: Current Inpatient  Scheduled Meds:   alteplase (ACTIVASE) syringe  10 mg IntraPLEUral Once    And    dornase (PULMOZYME) syringe  5 mg IntraPLEUral Once    cefTRIAXone (ROCEPHIN) IV  2,000 mg IntraVENous Q24H    sennosides-docusate sodium  2 tablet Oral BID    insulin glargine  70 Units SubCUTAneous Nightly    sodium chloride flush  5-40 mL IntraVENous 2 times per day    mupirocin   Nasal BID    polyethylene glycol  17 g Oral Daily    pantoprazole  40 mg Oral Daily    clopidogrel  75 mg Oral Daily    enoxaparin  30 mg SubCUTAneous BID    furosemide  40 mg Oral BID    amiodarone  200 mg Oral TID    [Held by provider] amLODIPine  5 mg Oral Daily    aspirin  81 mg Oral Daily    atorvastatin  40 mg Oral Nightly    budesonide-formoterol  2 puff Inhalation BID    busPIRone  10 mg Oral BID    [Held by provider] citalopram  40 mg Oral Daily    gabapentin  400 mg Oral TID    isosorbide mononitrate  60 mg Oral Daily    levothyroxine  50 mcg Oral Daily    metoprolol tartrate  12.5 mg Oral BID    [Held by provider] traZODone  50 mg Oral Nightly    insulin lispro  0-16 Units SubCUTAneous TID WC    insulin lispro  0-4 Units SubCUTAneous Nightly     Continuous Infusions:   sodium chloride      sodium chloride Stopped (01/17/23 0231)       INPUT/OUTPUT:  In: -   Out: 2068 [Urine:1875; Drains:125]  Date 01/18/23 0000 - 01/18/23 2359   Shift 6189-0135 4945-6493 7105-8333 24 Hour Total   INTAKE   Shift Total(mL/kg)       OUTPUT   Urine(mL/kg/hr) 900(0.9)   900   Chest Tube 8   8   Shift Total(mL/kg) 908(7.5)   908(7.5)   Weight (kg) 121.3 121.3 121.3 121.3          LABS:  ABGs:   No results for input(s): POCPH, POCPCO2, POCPO2, POCHCO3, JQNH0GTD in the last 72 hours. CBC:   Recent Labs     01/16/23  0611 01/17/23  0641 01/18/23  0552   WBC 9.8 9.4 7.9   HGB 10.8* 11.0* 11.1*   HCT 35.2* 37.2 35.6*   MCV 90.5 92.3 88.8    242 234   RBC 3.89* 4.03 4.01   MCH 27.8 27.3 27.7   MCHC 30.7 29.6 31.2   RDW 13.4 13.2 13.3       CRP:   No results for input(s): CRP in the last 72 hours. LDH:   No results for input(s): LDH in the last 72 hours. BMP:   Recent Labs     01/16/23  0611 01/17/23  0641 01/18/23  0552    136 135   K 3.9 3.7 3.4*   CL 95* 94* 93*   CO2 33* 34* 32*   BUN 15 16 16   CREATININE 0.74 0.80 0.74   GLUCOSE 117* 184* 99       Liver Function Test:   No results for input(s): PROT, LABALBU, ALT, AST, GGT, ALKPHOS, BILITOT in the last 72 hours. Coagulation Profile:   Recent Labs     01/16/23  0611   INR 1.1   PROTIME 11.8       D-Dimer:  No results for input(s): DDIMER in the last 72 hours. Lactic Acid:  No results for input(s): LACTA in the last 72 hours. Cardiac Enzymes:  No results for input(s): CKTOTAL, CKMB, CKMBINDEX, TROPONINI in the last 72 hours. Invalid input(s): TROPONIN, HSTROP  BNP/ProBNP:   No results for input(s): BNP, PROBNP in the last 72 hours. Triglycerides:  No results for input(s): TRIG in the last 72 hours. Microbiology:  Urine Culture:  No components found for: CURINE  Blood Culture:  No components found for: CBLOOD, CFUNGUSBL  Sputum Culture:  No components found for: CSPUTUM  Recent Labs     01/15/23  1229   [address] [facility] . NASAL SWAB       Recent Labs     01/15/23  1229   [address] [facility] . NASAL SWAB          Pathology:    Radiology Reports:  XR CHEST PORTABLE   Preliminary Result   Left chest tube extrathoracic, with slightly increased opacity of the left   hemithorax with a similar differential diagnosis. XR CHEST PORTABLE   Final Result   No significant interval change.   Left chest tube in place with small to   moderate left pleural effusion and patchy opacities in the left lung. IR CHEST TUBE INSERTION   Final Result   Successful up sizing of left-sided chest drain. Fluoroscopic images show   majority of the pleural effusion has resolved. There may be some residual   atelectasis or consolidation in the left base. There did not appear to be   much fluid being aspirated. If tube is not draining would suggest follow-up   CT scan. XR CHEST PORTABLE   Final Result   Left chest tube remains in place. Interval reduction of left pleural   effusion and improved aeration of the left lung. XR CHEST PORTABLE   Final Result   Near complete opacification of the left hemithorax with decreased air   compared to prior exam.         XR CHEST PORTABLE   Final Result   Little change from prior study. Significant left lung opacity with left   hydropneumothorax suspected. Slight mediastinal shift toward the right is   noted diminished from prior study. Right lung is clear. XR CHEST PORTABLE   Final Result   Interval left chest tube placement with persistent opacification of the left   chest.  There is some lucency in the left lower chest, which could represent   hydropneumothorax. IR CHEST TUBE INSERTION   Final Result   Successful percutaneous placement of a 10 Turkish left pleural drain. There is marked loculation of the left pleural collection indicating complex   pleural fluid. Small bore drainage catheter may not successfully drained the   entire pleural collection given its complex nature. CT CHEST W CONTRAST   Final Result   1. Diastasis of median sternotomy with air in the soft tissues, and   appearance of abscess formation/dehiscence dural to the sternotomy. Air is   present in the mediastinum. 2.  Large left pleural effusion with almost complete collapse of the left   lobe of the lung.   Bronchial wall thickening is noted with material within   the bronchi likely mucous plugging. 3.  Hepatic steatosis. 4.  Small gallstones in the gallbladder. 5.  2 cm left adrenal mass with indeterminate Hounsfield numbers. Further   workup using adrenal CT protocol or MRI advised when the patient's condition   permits. RECOMMENDATIONS:   Advise adrenal protocol CT or MRI to evaluate a left adrenal mass. XR CHEST PORTABLE   Final Result   Progressive left pleural effusion with nearly complete opacification of the   left hemithorax. XR CHEST PORTABLE    (Results Pending)   IR CHEST TUBE INSERTION    (Results Pending)        Echocardiogram:   No results found for this or any previous visit. ASSESSMENT AND PLAN     Assessment:    // Acute hypoxic respiratory failure improving  //Left lobar collapse secondary to effusion  //Large left loculated effusion s/p chest tube 1/13/2023  //CAD s/p CABG  //Sternal wound s/p debridement and wound VAC  //Hypertension  //DM  //Probable obstructive sleep apnea        Plan:     Patient is currently on 2 L nasal cannula and maintaining saturation. Wean O2 to keep saturation above 90%. Sternal wound and chest tube management as per CT surgery. s/p sternal wound debridement with washout and wound VAC placement by CTS. S/p upsizing of pigtail acth 1/16/23,   Plan for  repeat tPA and Dosnase today CTS, after chest tube placement by IR as chest x-ray is concerning for extrathoracic chest tube . On Zosyn as per ID recommendation. Follow-up with infectious disease. Blood, wound and body fluid cultures negative so far. Encourage incentive spirometry deep breathing and cough and ambulation physical therapy. On Lasix monitor intake and output and electrolytes. We will continue to follow. I will discuss with attending.     Ibis Nam MD  Internal Medicine Resident, PGY-3  [address]         1/18/2023, 12:02 PM      Please note that this chart was generated using voice recognition Dragon dictation software. Although every effort was made to ensure the accuracy of this automated transcription, some errors in transcription may have occurred.

## 2023-01-18 NOTE — FLOWSHEET NOTE
IR/CT- THORACENTESIS    STANTON OCHOA-IR TECH  SHARON -US TECH  HARMAN SETH-RN  DR. MIGUELANGEL KASPER-CT TECH    VITALS AS CHARTED IN VS TAB IN Epic FLOWSHEET    25 MCG IV FENTANYL ADM AT 1420 PRIOR TO START OF PROCEDURE    PT POSITIONED WITH LEFT SIDE UP ON CT SCAN TABLE-MONITORS ON  DR. MEANS PERFORMED THORACENTESIS TO LEFT POSTERIOR CHEST DRAINING DIDIER COLORED FLUID. 20 MLS REMOVED. DR. Alvarez Records REMOVED EXISTING CHEST TUBE.   REPORT CALLED TO RN  PT AWAITING TRANSPORT IN IR HOLDING AREA

## 2023-01-18 NOTE — PROGRESS NOTES
Occupational [facility] [employer]  Occupational Therapy Not Seen Note    DATE: 2023    NAME: Marline Mar  MRN: [id number]   : 1955      Patient not seen this date for Occupational Therapy due to:    Surgery/Procedure: off unit, IR for thoracentesis    Electronically signed by RADHA Dela Cruz on 2023 at 3:36 PM

## 2023-01-18 NOTE — PROGRESS NOTES
Physical Therapy        Physical Therapy Cancel Note      DATE: 2023    NAME: Roxana Wilkes  MRN: [id number]   : 1955      Patient not seen this date for Physical Therapy due to:    Surgery/Procedure: off unit it IR for thoracentesis      Electronically signed by Richi Kay PTA on 2023 at 3:12 PM

## 2023-01-19 ENCOUNTER — APPOINTMENT (OUTPATIENT)
Dept: GENERAL RADIOLOGY | Age: 68
End: 2023-01-19
Payer: MEDICARE

## 2023-01-19 PROBLEM — E11.8 TYPE 2 DIABETES MELLITUS WITH COMPLICATION, WITH LONG-TERM CURRENT USE OF INSULIN (HCC): Status: ACTIVE | Noted: 2020-07-28

## 2023-01-19 LAB
ANION GAP SERPL CALCULATED.3IONS-SCNC: 9 MMOL/L (ref 9–17)
BUN BLDV-MCNC: 15 MG/DL (ref 8–23)
C-REACTIVE PROTEIN: 70.5 MG/L (ref 0–5)
CALCIUM IONIZED: 1.23 MMOL/L (ref 1.13–1.33)
CALCIUM SERPL-MCNC: 8.7 MG/DL (ref 8.6–10.4)
CHLORIDE BLD-SCNC: 95 MMOL/L (ref 98–107)
CO2: 34 MMOL/L (ref 20–31)
CREAT SERPL-MCNC: 0.7 MG/DL (ref 0.5–0.9)
EKG ATRIAL RATE: 0 BPM
EKG Q-T INTERVAL: 0 MS
EKG QRS DURATION: 0 MS
EKG QTC CALCULATION (BAZETT): 0 MS
EKG R AXIS: 0 DEGREES
EKG T AXIS: 0 DEGREES
EKG VENTRICULAR RATE: 0 BPM
GFR SERPL CREATININE-BSD FRML MDRD: >60 ML/MIN/1.73M2
GLUCOSE BLD-MCNC: 145 MG/DL (ref 65–105)
GLUCOSE BLD-MCNC: 159 MG/DL (ref 70–99)
GLUCOSE BLD-MCNC: 246 MG/DL (ref 65–105)
GLUCOSE BLD-MCNC: 285 MG/DL (ref 65–105)
HCT VFR BLD CALC: 37.8 % (ref 36.3–47.1)
HEMOGLOBIN: 11.1 G/DL (ref 11.9–15.1)
MAGNESIUM: 1.8 MG/DL (ref 1.6–2.6)
MAGNESIUM: 2.1 MG/DL (ref 1.6–2.6)
MCH RBC QN AUTO: 27.1 PG (ref 25.2–33.5)
MCHC RBC AUTO-ENTMCNC: 29.4 G/DL (ref 28.4–34.8)
MCV RBC AUTO: 92.4 FL (ref 82.6–102.9)
NRBC AUTOMATED: 0 PER 100 WBC
PDW BLD-RTO: 13.2 % (ref 11.8–14.4)
PLATELET # BLD: 269 K/UL (ref 138–453)
PMV BLD AUTO: 9.8 FL (ref 8.1–13.5)
POTASSIUM SERPL-SCNC: 3.6 MMOL/L (ref 3.7–5.3)
RBC # BLD: 4.09 M/UL (ref 3.95–5.11)
SODIUM BLD-SCNC: 138 MMOL/L (ref 135–144)
WBC # BLD: 7.9 K/UL (ref 3.5–11.3)

## 2023-01-19 PROCEDURE — 6360000002 HC RX W HCPCS: Performed by: STUDENT IN AN ORGANIZED HEALTH CARE EDUCATION/TRAINING PROGRAM

## 2023-01-19 PROCEDURE — 94640 AIRWAY INHALATION TREATMENT: CPT

## 2023-01-19 PROCEDURE — 6370000000 HC RX 637 (ALT 250 FOR IP): Performed by: STUDENT IN AN ORGANIZED HEALTH CARE EDUCATION/TRAINING PROGRAM

## 2023-01-19 PROCEDURE — 83735 ASSAY OF MAGNESIUM: CPT

## 2023-01-19 PROCEDURE — 2060000000 HC ICU INTERMEDIATE R&B

## 2023-01-19 PROCEDURE — 02HV33Z INSERTION OF INFUSION DEVICE INTO SUPERIOR VENA CAVA, PERCUTANEOUS APPROACH: ICD-10-PCS | Performed by: INTERNAL MEDICINE

## 2023-01-19 PROCEDURE — 94761 N-INVAS EAR/PLS OXIMETRY MLT: CPT

## 2023-01-19 PROCEDURE — 6370000000 HC RX 637 (ALT 250 FOR IP)

## 2023-01-19 PROCEDURE — 71045 X-RAY EXAM CHEST 1 VIEW: CPT

## 2023-01-19 PROCEDURE — 97535 SELF CARE MNGMENT TRAINING: CPT

## 2023-01-19 PROCEDURE — 2700000000 HC OXYGEN THERAPY PER DAY

## 2023-01-19 PROCEDURE — 2580000003 HC RX 258: Performed by: STUDENT IN AN ORGANIZED HEALTH CARE EDUCATION/TRAINING PROGRAM

## 2023-01-19 PROCEDURE — 99231 SBSQ HOSP IP/OBS SF/LOW 25: CPT | Performed by: FAMILY MEDICINE

## 2023-01-19 PROCEDURE — 82947 ASSAY GLUCOSE BLOOD QUANT: CPT

## 2023-01-19 PROCEDURE — 6360000002 HC RX W HCPCS

## 2023-01-19 PROCEDURE — 82330 ASSAY OF CALCIUM: CPT

## 2023-01-19 PROCEDURE — 2580000003 HC RX 258

## 2023-01-19 PROCEDURE — 85027 COMPLETE CBC AUTOMATED: CPT

## 2023-01-19 PROCEDURE — 36415 COLL VENOUS BLD VENIPUNCTURE: CPT

## 2023-01-19 PROCEDURE — 80048 BASIC METABOLIC PNL TOTAL CA: CPT

## 2023-01-19 PROCEDURE — 86140 C-REACTIVE PROTEIN: CPT

## 2023-01-19 RX ORDER — OXYCODONE HYDROCHLORIDE AND ACETAMINOPHEN 5; 325 MG/1; MG/1
1 TABLET ORAL EVERY 6 HOURS PRN
Qty: 20 TABLET | Refills: 0 | Status: SHIPPED | OUTPATIENT
Start: 2023-01-19 | End: 2023-01-24

## 2023-01-19 RX ORDER — INSULIN GLARGINE 100 [IU]/ML
70 INJECTION, SOLUTION SUBCUTANEOUS NIGHTLY
Qty: 10 ML | Refills: 3 | Status: SHIPPED | OUTPATIENT
Start: 2023-01-19

## 2023-01-19 RX ADMIN — LEVOTHYROXINE SODIUM 50 MCG: 50 TABLET ORAL at 08:23

## 2023-01-19 RX ADMIN — ENOXAPARIN SODIUM 30 MG: 100 INJECTION SUBCUTANEOUS at 20:53

## 2023-01-19 RX ADMIN — OXYCODONE HYDROCHLORIDE AND ACETAMINOPHEN 1 TABLET: 5; 325 TABLET ORAL at 02:11

## 2023-01-19 RX ADMIN — BUSPIRONE HYDROCHLORIDE 10 MG: 10 TABLET ORAL at 08:23

## 2023-01-19 RX ADMIN — POLYETHYLENE GLYCOL 3350 17 G: 17 POWDER, FOR SOLUTION ORAL at 08:26

## 2023-01-19 RX ADMIN — MAGNESIUM SULFATE HEPTAHYDRATE 2000 MG: 40 INJECTION, SOLUTION INTRAVENOUS at 15:53

## 2023-01-19 RX ADMIN — DESMOPRESSIN ACETATE 40 MG: 0.2 TABLET ORAL at 20:52

## 2023-01-19 RX ADMIN — GABAPENTIN 400 MG: 400 CAPSULE ORAL at 14:56

## 2023-01-19 RX ADMIN — BUDESONIDE AND FORMOTEROL FUMARATE DIHYDRATE 2 PUFF: 80; 4.5 AEROSOL RESPIRATORY (INHALATION) at 08:30

## 2023-01-19 RX ADMIN — INSULIN GLARGINE 70 UNITS: 100 INJECTION, SOLUTION SUBCUTANEOUS at 20:57

## 2023-01-19 RX ADMIN — METOPROLOL TARTRATE 12.5 MG: 25 TABLET ORAL at 20:52

## 2023-01-19 RX ADMIN — AMIODARONE HYDROCHLORIDE 200 MG: 200 TABLET ORAL at 08:23

## 2023-01-19 RX ADMIN — SODIUM CHLORIDE, PRESERVATIVE FREE 10 ML: 5 INJECTION INTRAVENOUS at 20:53

## 2023-01-19 RX ADMIN — ISOSORBIDE MONONITRATE 60 MG: 60 TABLET, EXTENDED RELEASE ORAL at 08:24

## 2023-01-19 RX ADMIN — GABAPENTIN 400 MG: 400 CAPSULE ORAL at 08:24

## 2023-01-19 RX ADMIN — SODIUM CHLORIDE, PRESERVATIVE FREE 10 ML: 5 INJECTION INTRAVENOUS at 08:26

## 2023-01-19 RX ADMIN — BUSPIRONE HYDROCHLORIDE 10 MG: 10 TABLET ORAL at 20:52

## 2023-01-19 RX ADMIN — BUDESONIDE AND FORMOTEROL FUMARATE DIHYDRATE 2 PUFF: 80; 4.5 AEROSOL RESPIRATORY (INHALATION) at 20:38

## 2023-01-19 RX ADMIN — DIPHENHYDRAMINE HCL 25 MG: 25 TABLET ORAL at 02:11

## 2023-01-19 RX ADMIN — PANTOPRAZOLE SODIUM 40 MG: 40 TABLET, DELAYED RELEASE ORAL at 08:26

## 2023-01-19 RX ADMIN — ENOXAPARIN SODIUM 30 MG: 100 INJECTION SUBCUTANEOUS at 08:25

## 2023-01-19 RX ADMIN — Medication 81 MG: at 08:23

## 2023-01-19 RX ADMIN — FUROSEMIDE 40 MG: 40 TABLET ORAL at 08:23

## 2023-01-19 RX ADMIN — METOPROLOL TARTRATE 12.5 MG: 25 TABLET ORAL at 08:23

## 2023-01-19 RX ADMIN — INSULIN LISPRO 4 UNITS: 100 INJECTION, SOLUTION INTRAVENOUS; SUBCUTANEOUS at 14:56

## 2023-01-19 RX ADMIN — CLOPIDOGREL 75 MG: 75 TABLET, FILM COATED ORAL at 08:25

## 2023-01-19 RX ADMIN — OXYCODONE HYDROCHLORIDE AND ACETAMINOPHEN 1 TABLET: 5; 325 TABLET ORAL at 08:17

## 2023-01-19 RX ADMIN — GABAPENTIN 400 MG: 400 CAPSULE ORAL at 20:52

## 2023-01-19 RX ADMIN — AMIODARONE HYDROCHLORIDE 200 MG: 200 TABLET ORAL at 20:52

## 2023-01-19 RX ADMIN — FENTANYL CITRATE 50 MCG: 50 INJECTION INTRAMUSCULAR; INTRAVENOUS at 00:34

## 2023-01-19 RX ADMIN — CEFTRIAXONE SODIUM 2000 MG: 10 INJECTION, POWDER, FOR SOLUTION INTRAVENOUS at 14:55

## 2023-01-19 RX ADMIN — FENTANYL CITRATE 25 MCG: 50 INJECTION INTRAMUSCULAR; INTRAVENOUS at 11:51

## 2023-01-19 RX ADMIN — AMIODARONE HYDROCHLORIDE 200 MG: 200 TABLET ORAL at 14:55

## 2023-01-19 RX ADMIN — FUROSEMIDE 40 MG: 40 TABLET ORAL at 20:52

## 2023-01-19 RX ADMIN — INSULIN LISPRO 8 UNITS: 100 INJECTION, SOLUTION INTRAVENOUS; SUBCUTANEOUS at 18:28

## 2023-01-19 RX ADMIN — POTASSIUM CHLORIDE 40 MEQ: 1500 TABLET, EXTENDED RELEASE ORAL at 08:26

## 2023-01-19 RX ADMIN — DOCUSATE SODIUM 50 MG AND SENNOSIDES 8.6 MG 2 TABLET: 8.6; 5 TABLET, FILM COATED ORAL at 08:26

## 2023-01-19 ASSESSMENT — PAIN SCALES - GENERAL
PAINLEVEL_OUTOF10: 3
PAINLEVEL_OUTOF10: 8
PAINLEVEL_OUTOF10: 8
PAINLEVEL_OUTOF10: 3
PAINLEVEL_OUTOF10: 3
PAINLEVEL_OUTOF10: 7
PAINLEVEL_OUTOF10: 7
PAINLEVEL_OUTOF10: 0
PAINLEVEL_OUTOF10: 2

## 2023-01-19 ASSESSMENT — ENCOUNTER SYMPTOMS
ABDOMINAL PAIN: 0
COLOR CHANGE: 1
ABDOMINAL DISTENTION: 0
TROUBLE SWALLOWING: 0
STRIDOR: 0
VOMITING: 0
VOICE CHANGE: 0
COUGH: 1
SHORTNESS OF BREATH: 0
ALLERGIC/IMMUNOLOGIC NEGATIVE: 1
CHOKING: 0
COUGH: 0
NAUSEA: 0
CONSTIPATION: 0
APNEA: 0
WHEEZING: 0
ANAL BLEEDING: 0
EYE ITCHING: 0
EYE REDNESS: 0
PHOTOPHOBIA: 0
EYE DISCHARGE: 0
SHORTNESS OF BREATH: 1
CHEST TIGHTNESS: 0

## 2023-01-19 ASSESSMENT — PAIN DESCRIPTION - DESCRIPTORS
DESCRIPTORS: ACHING
DESCRIPTORS: ACHING;DULL

## 2023-01-19 ASSESSMENT — PAIN - FUNCTIONAL ASSESSMENT
PAIN_FUNCTIONAL_ASSESSMENT: PREVENTS OR INTERFERES SOME ACTIVE ACTIVITIES AND ADLS
PAIN_FUNCTIONAL_ASSESSMENT: PREVENTS OR INTERFERES SOME ACTIVE ACTIVITIES AND ADLS

## 2023-01-19 ASSESSMENT — PAIN DESCRIPTION - ONSET: ONSET: ON-GOING

## 2023-01-19 ASSESSMENT — PAIN DESCRIPTION - FREQUENCY: FREQUENCY: CONTINUOUS

## 2023-01-19 ASSESSMENT — PAIN DESCRIPTION - LOCATION
LOCATION: STERNUM;INCISION
LOCATION: STERNUM;INCISION
LOCATION: CHEST
LOCATION: HEAD

## 2023-01-19 ASSESSMENT — PAIN DESCRIPTION - ORIENTATION
ORIENTATION: MID
ORIENTATION: MID;LOWER
ORIENTATION: MID;LOWER

## 2023-01-19 ASSESSMENT — PAIN DESCRIPTION - PAIN TYPE: TYPE: SURGICAL PAIN

## 2023-01-19 NOTE — PLAN OF CARE
Problem: Chronic Conditions and Co-morbidities  Goal: Patient's chronic conditions and co-morbidity symptoms are monitored and maintained or improved  1/19/2023 1259 by David Duran RN  Outcome: Progressing  1/19/2023 0114 by Ira John RN  Outcome: Progressing  Flowsheets (Taken 1/18/2023 2000)  Care Plan - Patient's Chronic Conditions and Co-Morbidity Symptoms are Monitored and Maintained or Improved:   Monitor and assess patient's chronic conditions and comorbid symptoms for stability, deterioration, or improvement   Collaborate with multidisciplinary team to address chronic and comorbid conditions and prevent exacerbation or deterioration     Problem: Pain  Goal: Verbalizes/displays adequate comfort level or baseline comfort level  1/19/2023 1259 by David Duran RN  Outcome: Progressing  1/19/2023 0114 by Ira John RN  Outcome: Progressing     Problem: Skin/Tissue Integrity  Goal: Absence of new skin breakdown  Description: 1. Monitor for areas of redness and/or skin breakdown  2. Assess vascular access sites hourly  3. Every 4-6 hours minimum:  Change oxygen saturation probe site  4. Every 4-6 hours:  If on nasal continuous positive airway pressure, respiratory therapy assess nares and determine need for appliance change or resting period.   1/19/2023 1259 by David Duran RN  Outcome: Progressing  1/19/2023 0114 by Ira John RN  Outcome: Progressing     Problem: Safety - Adult  Goal: Free from fall injury  1/19/2023 1259 by David Duran RN  Outcome: Progressing  1/19/2023 0114 by Ira John RN  Outcome: Progressing     Problem: ABCDS Injury Assessment  Goal: Absence of physical injury  1/19/2023 1259 by David Duran RN  Outcome: Progressing  1/19/2023 0114 by Ira John RN  Outcome: Progressing     Problem: Discharge Planning  Goal: Discharge to home or other facility with appropriate resources  1/19/2023 1259 by David Duran RN  Outcome: Progressing  1/19/2023 0114 by La Sutherland RN  Outcome: Progressing  Flowsheets (Taken 1/18/2023 2000)  Discharge to home or other facility with appropriate resources:   Identify barriers to discharge with patient and caregiver   Arrange for needed discharge resources and transportation as appropriate   Identify discharge learning needs (meds, wound care, etc)     Problem: Respiratory - Adult  Goal: Achieves optimal ventilation and oxygenation  1/19/2023 1259 by Everlean Lesch, RN  Outcome: Progressing  1/19/2023 0114 by La Sutherland RN  Outcome: Progressing  Flowsheets (Taken 1/18/2023 2000)  Achieves optimal ventilation and oxygenation:   Assess for changes in respiratory status   Assess for changes in mentation and behavior   Position to facilitate oxygenation and minimize respiratory effort   Oxygen supplementation based on oxygen saturation or arterial blood gases     Problem: Skin/Tissue Integrity - Adult  Goal: Skin integrity remains intact  1/19/2023 1259 by Everlean Lesch, RN  Outcome: Progressing  1/19/2023 0114 by La Sutherland RN  Outcome: Progressing  Goal: Incisions, wounds, or drain sites healing without S/S of infection  1/19/2023 1259 by Everlean Lesch, RN  Outcome: Progressing  1/19/2023 0114 by La Sutherland RN  Outcome: Progressing  Goal: Oral mucous membranes remain intact  1/19/2023 1259 by Everlean Lesch, RN  Outcome: Progressing  1/19/2023 0114 by La Sutherland RN  Outcome: Progressing     Problem: Nutrition Deficit:  Goal: Optimize nutritional status  1/19/2023 1259 by Everlean Lesch, RN  Outcome: Progressing  1/19/2023 0114 by La Sutherland RN  Outcome: Progressing

## 2023-01-19 NOTE — PROCEDURES
Picc placement order:    Benefits include stable, long term intravenous access. Blood draws. Peripheral vein preservation. Safely deliver vesicant medications. Risks include failure to obtain the desired result(s) of the procedure, discomfort, injury, the need for additional procedures/therapies, permanent loss of body function, bleeding/bruising, arterial puncture, air embolism, nerve damage, hematoma, phlebitis, catheter fracture/rupture, catheter embolism, catheter occlusion, catheter migration, catheter site infection, unintentional/accidental removal of catheter, bloodstream infection, infiltration, cardiac arrhythmia, vein thrombosis, difficult catheter removal.   Alternatives discussed including centrally inserted central catheter, as well as less invasive procedures such as multiple peripheral IVs, extended dwell catheters and midline placements. Consent signed and obtained by proceduralist, from patient/DPOA. Picc placement note:    Prescribed IV Therapy = AB 6 weeks for sternal osteo  Peripheral ultrasound assessment done. Plan for right basilic vein insertion. Vein measurement =  0.34 cm  and area based CVR= 15.1%. Preferable CVR based on area would be less than 11%-20% (which correlates to less than 33% to 45% linear CVR). Product type: Bard 4 fr single lumen Power PICC.   History/Labs/Allergies Reviewed  Placed By: Sagrario Yang IV Team  Assisted By: Francisca Collins RN  Time out Performed using Two Identifiers  Lot # [id number]  Expiration date = 2023-11-30  Trimmed at 41 cm  Total length inserted 37 cm  External catheter length 4 cm  Number of attempts 1  Estimated blood loss = 5 ml  Placement verified by- VPS Tip confirmation system (Neg deflection p-wave and max p-wave noted), positive blood return, and flushes easily  Special equipment used- VPS Tip tracker, ultrasound, and micro-introducer technique   Catheter securement = adhesive 3M securement device  Dressing applied= Tegaderm CHG  Lidocaine administered intradermally conc.1%, approx 2 ml. RN aware picc placed with VPS ECG technology and is confirmed in the distal 1/3 SVC. Picc is released for use. Rn aware new iv tubing required. PICC education:     [ x] Discussed with patient/Family or POA prior to procedure. Risks and Benefits along with reason for procedure were discussed and teaching was reinforced with an education handout on line  insertion. [facility] FAQ Catheter Associated Blood Stream Infections and [address] [address] REV. 7/13 Nursing and Booklet left at bedside or in chart. Patient (Family or POA) acknowledged understanding of information taught and agreed to procedure. [  ] Was not discussed with patient/family or POA due to pts medical status at time of procedure. pts family or POA not available to discuss line education.  [facility] FAQ Catheter Associated Blood Stream Infections and [address] [address] REV. 7/13 Nursing and Booklet left at bedside or in chart

## 2023-01-19 NOTE — PROGRESS NOTES
[facility]  Progress Note    Date:   1/19/2023  Patient name:  Kenrick Gomes  Date of admission:  1/12/2023  3:55 PM  MRN:   [id number]  YOB: 1955    SUBJECTIVE/Last 24 hours update:     Patient seen and examined at bedside  Pulmonology did thoracentesis yesterday- 20mL clear dark fluid and did not place another chest tube as effusion is small  Mg normalized at 2.1, K+ 3.6  Patient accepted to SNF    HPI:     78 y.o.  female with history of diabetes mellitus, GERD, painful neuropathy, multivessel CAD status post triple bypass CABG 11/28/2022. She presented today due to worsening symptoms of chest pain, which started after surgery but has worsened since last 3 days. She describes the chest pain as central, and is a tightness, nonradiating, better with tramadol, comes and goes, 8 out of 10 in severity and is getting worse. Patient was hospitalized for 1 week after surgery, then had cardiac rehab for 2 weeks which she describes the pain increasing during that time. She also had some cough which was nonproductive and was painful to her chest when coughing. She also experienced severe nausea and lack of appetite, and mentions she has eaten very minimal for the last 3 days. She also fell twice in the last 2 days, but did not hit her head or endorses dizziness. She also mentions she has some diffuse abdominal pain but is not severe. She also has some shortness of breath on exertion since the surgery, no fevers some chills, no night sweats. She is on 1 L of oxygen at home which started after her triple bypass surgery. She is on insulin nightly. Her hypertension is managed with amlodipine and lopressor. She also takes amiodarone post CABG. The patient has an infected CABG incision site and ED CT chest showed diastasis of median sternotomy with air in the soft tissues, and abscess formation.   And chest x-ray taken shows progressive pleural effusion with nearly complete opacification of the left hemithorax. BNP was elevated, Pro-Jame was elevated, and initially she needed 4 L of oxygen and she is normally on 1 L at home. CT also showed a 2cm left adrenal mass. Patient was placed on Zosyn and vancomycin in the ED    REVIEW OF SYSTEMS:      CONSTITUTIONAL:  no fevers, no headcahes  EYES: negative for blury vision  HEENT: No headaches, No nasal congestion, no difficulty swallowing  RESPIRATORY:negative for dyspnea, no wheezing, no Cough  CARDIOVASCULAR: negative for chest pain, no palpitations  GASTROINTESTINAL: no nausea, no vomiting, no change in bowel habits, no abdominal pain -improved appetite  GENITOURINARY: negative for dysuria, no hematuria   MUSCULOSKELETAL: no joint pains, no muscle aches, no swelling of joints or extremities  NEUROLOGICAL: No  Weakness or numbness      PAST MEDICAL HISTORY:      has a past medical history of Ambulates with cane, Anxiety, Borderline hypertension, CAD (coronary artery disease), Depression, GERD (gastroesophageal reflux disease), Heart attack ([facility] [address] Hypertension, Hypothyroidism, Neuropathy, Obesity, Osteoarthritis, Other cirrhosis of liver ([facility] [address] Sleep apnea, Type II or unspecified type diabetes mellitus without mention of complication, not stated as uncontrolled, Under care of service provider, Under care of service provider, and Vertebrogenic low back pain. PAST SURGICAL HISTORY:      has a past surgical history that includes Hysterectomy; Colonoscopy; Upper gastrointestinal endoscopy; Dilation and curettage of uterus; hiatal hernia repair; Throat surgery; Appendectomy; Total knee arthroplasty (Right, 01/06/2015); Total knee arthroplasty (Left, 05/26/2015); Coronary angioplasty with stent (07/2020); Cardiac catheterization; Cardiac catheterization (11/01/2022); Coronary artery bypass graft (N/A, 11/28/2022); IR CHEST TUBE INSERTION (01/13/2023); Wound debridement (01/14/2023);  Cardiac surgery (N/A, 1/14/2023); and IR CHEST TUBE INSERTION (1/16/2023). SOCIAL HISTORY:      reports that she has never smoked. She has never used smokeless tobacco. She reports current alcohol use. She reports that she does not use drugs. FAMILY HISTORY:     family history includes Arthritis in her father and mother; Cancer in her father; Diabetes in her sister; Heart Disease in her father and mother. HOME MEDICATIONS:      Prior to Admission medications    Medication Sig Start Date End Date Taking?  Authorizing Provider   cefTRIAXone (ROCEPHIN) infusion Infuse 2,000 mg intravenously every 24 hours Compound per protocol 1/18/23 3/19/23 Yes Jacy Rosales MD   insulin lispro, 1 Unit Dial, (HUMALOG KWIKPEN) 100 UNIT/ML SOPN Inject 3 Units into the skin 3 times daily (before meals) 125 - 150 2 units   151 - 200 4 units   201 - 250 6 units   251 - 300 8 units  301 - 350 10 units 351 - 400 12 units 12/29/22   Rondi Blocker, MD   amLODIPine (NORVASC) 5 MG tablet  12/20/22   Historical Provider, MD   doxycycline monohydrate (MONODOX) 100 MG capsule  12/20/22   Historical Provider, MD Jacklyn Doll 100-25 MCG/ACT AEPB  12/20/22   Historical Provider, MD   isosorbide mononitrate (IMDUR) 60 MG extended release tablet  12/20/22   Historical Provider, MD   potassium chloride (KLOR-CON) 10 MEQ extended release tablet  12/20/22   Historical Provider, MD   traMADol [place]) 50 MG tablet  12/20/22   Historical Provider, MD   traZODone (DESYREL) 50 MG tablet  12/20/22   Historical Provider, MD   aspirin 81 MG EC tablet Take 1 tablet by mouth daily 12/6/22   Andrey Villalobos MD   amiodarone (CORDARONE) 200 MG tablet Take 1 tablet by mouth 3 times daily 12/5/22   Andrey Villalobos MD   metoprolol tartrate (LOPRESSOR) 25 MG tablet Take 0.5 tablets by mouth 2 times daily 12/5/22   Andrey Villalobos MD   clopidogrel (PLAVIX) 75 MG tablet Take 1 tablet by mouth daily 12/6/22   Andrey Villalobos MD   furosemide (LASIX) 40 MG tablet Take 1 tablet by mouth 2 times daily 12/5/22   Phil Robins MD   potassium chloride (KLOR-CON M) 10 MEQ extended release tablet Take 2 tablets by mouth daily 12/5/22   Phil Robins MD   atorvastatin (LIPITOR) 40 MG tablet Take 1 tablet by mouth nightly 11/16/22   Eunice Chau MD   empagliflozin (JARDIANCE) 10 MG tablet TAKE 1 TABLET BY MOUTH EVERY DAY 10/22/22   Maribel Gillespie MD   levothyroxine (SYNTHROID) 50 MCG tablet TAKE 1 TABLET BY MOUTH EVERY DAY 10/22/22   Maribel Gillespie MD   citalopram (CELEXA) 40 MG tablet TAKE 1 TABLET BY MOUTH ONCE DAILY *EMERGENCY REFILL* 9/29/22   Maribel Gillespie MD   glimepiride (AMARYL) 4 MG tablet TAKE 1 TABLET BY MOUTH TWICE DAILY *EMERGENCY REFILL* 9/29/22   Maribel Gillespie MD   omeprazole (PRILOSEC) 20 MG delayed release capsule TAKE 1 CAPSULE BY MOUTH ONCE DAILY 9/22/22   Paulette Turner DO   gabapentin (NEURONTIN) 400 MG capsule TAKE 1 CAPSULE BY MOUTH THREE TIMES DAILY 4/22/22 11/18/22  Marlene Jason MD   sucralfate (CARAFATE) 1 GM tablet TAKE 1 TABLET BY MOUTH EVERY DAY  Patient not taking: Reported on 1/12/2023 4/20/22   Marlene Jason MD   busPIRone (BUSPAR) 10 MG tablet TAKE ONE (1) TABLET BY MOUTH TWICE DAILY 3/11/22   Marlene Jason MD   insulin glargine (BASAGLAR KWIKPEN) 100 UNIT/ML injection pen Inject 65 units subcutaneously once daily. Patient taking differently: Inject 20 Units into the skin Inject 65 units subcutaneously once daily. [facility]   Marlene Jason MD   miconazole (MICOTIN) 2 % cream APPLY TO AFFECTED AREA TWICE DAILY  Patient not taking: Reported on 1/12/2023 11/12/21   Marlene Jason MD   lidocaine (XYLOCAINE) 2 % jelly Apply topically with dressing changes every 3 days  Patient not taking: Reported on 1/12/2023 8/5/21   Marlene Jason MD   blood glucose monitor strips Test before meals and at bedtime.  DX: DM, on insulin 10/8/20   Marlene Jason MD   miconazole (ZEASORB-AF) 2 % powder Apply topically 2 times daily. Patient not taking: Reported on 1/12/2023 10/1/20   Sang Mcginnis MD   blood glucose monitor kit and supplies Dispense sufficient amount for indicated testing frequency plus additional to accommodate PRN testing needs. Dispense all needed supplies to include: monitor, strips, lancing device, lancets, control solutions, alcohol swabs. 8/17/20   Zayda Melo MD   magnesium hydroxide (MILK OF MAGNESIA) 400 MG/5ML suspension Take 30 mLs by mouth daily as needed for Constipation 7/29/20   Zayda Melo MD   Handicap Placard MISC Is a permanent condition. DX: M19.90 5/14/19   Sang Mcginnis MD   Insulin Pen Needle (B-D UF III MINI PEN NEEDLES) 31G X 5 MM MISC USE 1 PEN NEEDLE DAILY 3/8/18   Sang Mcginnis MD   [employer] Lancets Thin MISC Test before meals and at bedtime. DX: DM, on insulin 4/22/14   Rick Hair MD   Alcohol Swabs (ALCOHOL PREP PADS) by Other route 2 times daily      Historical Provider, MD       ALLERGIES:     Morphine, Shellfish-derived products, and Tape [adhesive tape]      OBJECTIVE:       Vitals:    01/19/23 0104 01/19/23 0211 01/19/23 0241 01/19/23 0415   BP:    121/78   Pulse:    68   Resp: 14 16 18 16   Temp:    98.1 °F (36.7 °C)   TempSrc:    Oral   SpO2:    97%   Weight:    263 lb 10.7 oz (119.6 kg)   Height:             Intake/Output Summary (Last 24 hours) at 1/19/2023 0813  Last data filed at 1/19/2023 0500  Gross per 24 hour   Intake 619.62 ml   Output 2100 ml   Net -1480.38 ml         PHYSICAL EXAM:  General Appearance  Alert , awake , not in acute distress  HEENT - Head is normocephalic, atraumatic. Lungs - Bilateral equal air entry , no wheezes, rales or rhonchi, aeration good  Cardiovascular - Heart sounds are normal.  Regular rhythm, normal rate without murmur, gallop or rub.   Abdomen - Soft, nontender, nondistended, no masses or organomegaly  Neurologic - There are no new focal motor or sensory deficits  Skin - No bruising or bleeding on exposed skin area-improved wound  Extremities - No cyanosis, clubbing or edema      DIAGNOSTICS:     Laboratory Testing:    Recent Results (from the past 24 hour(s))   POC Glucose Fingerstick    Collection Time: 01/18/23 11:29 AM   Result Value Ref Range    POC Glucose 134 (H) 65 - 105 mg/dL   POC Glucose Fingerstick    Collection Time: 01/18/23  3:54 PM   Result Value Ref Range    POC Glucose 102 65 - 105 mg/dL   Magnesium    Collection Time: 01/18/23  4:52 PM   Result Value Ref Range    Magnesium 2.0 1.6 - 2.6 mg/dL   POC Glucose Fingerstick    Collection Time: 01/18/23  7:38 PM   Result Value Ref Range    POC Glucose 234 (H) 65 - 105 mg/dL   Basic Metabolic Panel    Collection Time: 01/19/23  7:04 AM   Result Value Ref Range    Glucose 159 (H) 70 - 99 mg/dL    BUN 15 8 - 23 mg/dL    Creatinine 0.70 0.50 - 0.90 mg/dL    Est, Glom Filt Rate >60 >60 mL/min/1.73m2    Calcium 8.7 8.6 - 10.4 mg/dL    Sodium 138 135 - 144 mmol/L    Potassium 3.6 (L) 3.7 - 5.3 mmol/L    Chloride 95 (L) 98 - 107 mmol/L    CO2 34 (H) 20 - 31 mmol/L    Anion Gap 9 9 - 17 mmol/L   CBC    Collection Time: 01/19/23  7:04 AM   Result Value Ref Range    WBC 7.9 3.5 - 11.3 k/uL    RBC 4.09 3.95 - 5.11 m/uL    Hemoglobin 11.1 (L) 11.9 - 15.1 g/dL    Hematocrit 37.8 36.3 - 47.1 %    MCV 92.4 82.6 - 102.9 fL    MCH 27.1 25.2 - 33.5 pg    MCHC 29.4 28.4 - 34.8 g/dL    RDW 13.2 11.8 - 14.4 %    Platelets 299 337 - 960 k/uL    MPV 9.8 8.1 - 13.5 fL    NRBC Automated 0.0 0.0 per 100 WBC   Magnesium    Collection Time: 01/19/23  7:04 AM   Result Value Ref Range    Magnesium 2.1 1.6 - 2.6 mg/dL   Calcium, Ionized    Collection Time: 01/19/23  7:04 AM   Result Value Ref Range    Calcium, Ionized 1.23 1.13 - 1.33 mmol/L   POC Glucose Fingerstick    Collection Time: 01/19/23  7:41 AM   Result Value Ref Range    POC Glucose 145 (H) 65 - 105 mg/dL         Imaging/Diagonstics:  EKG: prolonged QT interval.    CXR: Left chest tube remains in place    Current Facility-Administered Medications   Medication Dose Route Frequency Provider Last Rate Last Admin    fentaNYL (SUBLIMAZE) injection 25 mcg  25 mcg IntraVENous Once QUENTIN Velázquez        cefTRIAXone (ROCEPHIN) 2,000 mg in sterile water 20 mL IV syringe  2,000 mg IntraVENous Q24H Aaron Jack MD   2,000 mg at 01/18/23 1533    oxyCODONE-acetaminophen (PERCOCET) 5-325 MG per tablet 1 tablet  1 tablet Oral Q4H PRN Chrissy Ramirez MD   1 tablet at 01/19/23 0211    sennosides-docusate sodium (SENOKOT-S) 8.6-50 MG tablet 2 tablet  2 tablet Oral BID Pastora Amaro MD   2 tablet at 01/18/23 2131    insulin glargine (LANTUS) injection vial 70 Units  70 Units SubCUTAneous Nightly Pastora Amaro MD   70 Units at 01/18/23 2139    sodium chloride flush 0.9 % injection 5-40 mL  5-40 mL IntraVENous 2 times per day [place] Crosser, APRN - CNP   10 mL at 01/18/23 2132    sodium chloride flush 0.9 % injection 5-40 mL  5-40 mL IntraVENous PRN [place] Crosser, APRN - CNP        0.9 % sodium chloride infusion   IntraVENous PRN [place] Crosser, APRN - CNP        fentaNYL (SUBLIMAZE) injection 25 mcg  25 mcg IntraVENous Q1H PRN [place] Crosser, APRN - CNP   25 mcg at 01/18/23 1145    Or    fentaNYL (SUBLIMAZE) injection 50 mcg  50 mcg IntraVENous Q1H PRN Hema Crosser, APRN - CNP   50 mcg at 01/19/23 0034    hydrALAZINE (APRESOLINE) injection 5 mg  5 mg IntraVENous Q5 Min PRN Hema Crosser, APRN - CNP        metoprolol (LOPRESSOR) injection 2.5 mg  2.5 mg IntraVENous Q10 Min PRN Hema Crosser, APRN - CNP        diphenhydrAMINE (BENADRYL) tablet 25 mg  25 mg Oral Nightly PRN Hema Crosser, APRN - CNP   25 mg at 01/19/23 0211    polyethylene glycol (GLYCOLAX) packet 17 g  17 g Oral Daily [place] Crosser, APRN - CNP   17 g at 01/18/23 0950    magnesium hydroxide (MILK OF MAGNESIA) 400 MG/5ML suspension 30 mL  30 mL Oral Daily PRN Hema Crosser, APRN - CNP        bisacodyl (DULCOLAX) suppository 10 mg  10 mg Rectal Daily PRN Joe Kaitlynn, APRN - CNP        pantoprazole (PROTONIX) tablet 40 mg  40 mg Oral Daily Joe Calderón, APRN - CNP   40 mg at 01/18/23 0950    magnesium sulfate 2000 mg in 50 mL IVPB premix  2,000 mg IntraVENous PRN Joe Kaitlynn, APRN - CNP   Stopped at 01/18/23 1116    ipratropium-albuterol (DUONEB) nebulizer solution 1 ampule  1 ampule Inhalation Q4H PRN Joe Kaitlynn, APRN - CNP        albumin human 5 % IV solution 25 g  25 g IntraVENous PRN Joe Libeverly, APRN - CNP        albumin human 25 % IV solution 25 g  25 g IntraVENous PRN Joe Kaitlynn, APRN - CNP        glucose chewable tablet 16 g  4 tablet Oral PRN Joe Kaitlynn, APRN - CNP        clopidogrel (PLAVIX) tablet 75 mg  75 mg Oral Daily Joe Calderón, APRN - CNP   75 mg at 01/18/23 0951    enoxaparin Sodium (LOVENOX) injection 30 mg  30 mg SubCUTAneous BID Joe Calderón, APRN - CNP   30 mg at 01/18/23 2132    furosemide (LASIX) tablet 40 mg  40 mg Oral BID Joe Calderón, APRN - CNP   40 mg at 01/18/23 2131    0.9 % sodium chloride infusion   IntraVENous PRN Joe Calderón, APRN - CNP   Stopped at 01/18/23 1523    acetaminophen (TYLENOL) tablet 650 mg  650 mg Oral Q6H PRN Joe Calderón, APRN - CNP   650 mg at 01/13/23 5206    Or    acetaminophen (TYLENOL) suppository 650 mg  650 mg Rectal Q6H PRN Joe Kaitlynn, APRN - CNP        potassium chloride (KLOR-CON M) extended release tablet 40 mEq  40 mEq Oral PRN Joe Lions, APRN - CNP   40 mEq at 01/18/23 0944    Or    potassium bicarb-citric acid (EFFER-K) effervescent tablet 40 mEq  40 mEq Oral PRN Joe Lions, APRN - CNP        Or    potassium chloride 10 mEq/100 mL IVPB (Peripheral Line)  10 mEq IntraVENous PRN Joe Lions, APRN - CNP        amiodarone (CORDARONE) tablet 200 mg  200 mg Oral TID HAO Porter CNP   200 mg at 01/18/23 2131    [Held by provider] amLODIPine (NORVASC) tablet 5 mg  5 mg Oral Daily Lestine Dance Juli Kinds - CNP   5 mg at 01/16/23 0817    aspirin EC tablet 81 mg  81 mg Oral Daily HAO Ramirez CNP   81 mg at 01/18/23 0955    atorvastatin (LIPITOR) tablet 40 mg  40 mg Oral Nightly HAO Ramirez CNP   40 mg at 01/18/23 2131    budesonide-formoterol (SYMBICORT) 80-4.5 MCG/ACT inhaler 2 puff  2 puff Inhalation BID HAO Ramirez CNP   2 puff at 01/18/23 2106    busPIRone (BUSPAR) tablet 10 mg  10 mg Oral BID HAO Ramirez CNP   10 mg at 01/18/23 2131    [Held by provider] citalopram (CELEXA) tablet 40 mg  40 mg Oral Daily HAO Ramirez CNP   40 mg at 01/16/23 0816    gabapentin (NEURONTIN) capsule 400 mg  400 mg Oral TID HAO Ramirez CNP   400 mg at 01/18/23 2131    isosorbide mononitrate (IMDUR) extended release tablet 60 mg  60 mg Oral Daily HAO Ramirez CNP   60 mg at 01/18/23 0953    levothyroxine (SYNTHROID) tablet 50 mcg  50 mcg Oral Daily HAO Ramirez CNP   50 mcg at 01/18/23 0951    metoprolol tartrate (LOPRESSOR) tablet 12.5 mg  12.5 mg Oral BID HAO Ramirez CNP   12.5 mg at 01/18/23 2131    [Held by provider] traZODone (DESYREL) tablet 50 mg  50 mg Oral Nightly HAO Ramirez CNP   50 mg at 01/14/23 2029    [MAR Hold] glucagon (rDNA) injection 1 mg  1 mg SubCUTAneous PRN Mata Tarango MD        insulin lispro (HUMALOG) injection vial 0-16 Units  0-16 Units SubCUTAneous TID WC HAO Ramirez CNP   8 Units at 01/17/23 1719    insulin lispro (HUMALOG) injection vial 0-4 Units  0-4 Units SubCUTAneous Nightly HAO Ramirez CNP   4 Units at 01/16/23 2057       ASSESSMENT:     Principal Problem:    Incisional infection  Active Problems:    Pleural effusion on left    Lactic acid acidosis    Bandemia    Sternal wound dehiscence    Abscess of sternal region    S/P CABG (coronary artery bypass graft)    CRP elevated    Community acquired pneumonia of left lower lobe of lung  Resolved Problems:    * No resolved hospital problems. *      PLAN:     Discussed care plan with nurse after getting her input. Status post CABG 11/22/23 with subsequent surgical wound dehiscence and infection with abscess  -CT scan of the chest showed diastasis of mediastinal mass with air in the soft tissues as well as an abscess  -S/p I&D 1/13  -S/p sternal debridement and washout with wound VAC placement 1/14  -Pain control: Percocet 5-325 mg 1 tablet every 6 hours PRN and fentanyl pain panel PRN  -On aspirin 81 mg, plavix 75 mg QD, Lasix 40 mg twice daily  -Amiodarone 200 mg tid started post CABG  -Blood cultures still negative  -Pulmonology on board:              -now on rocephin  -weaning off O2  - Performed thoracocentesis and yielded 20mL of dark clear fluid. Did not put in new chest tube  -CTS on board              -issue with chest tube repaired   -TPA/ dornase   -Discharge soon with wound vac  -ID on board  -Patient on Rocephin      L sided pleural effusion  -Chest x-ray showed left-sided pleural effusion with almost complete collapse of the left lobe of lung  -Pulmonology did thoracentesis yesterday- 20mL clear dark fluid and did not place another chest tube as effusion is small    Uncontrolled T2DM  -Glucose in 159  -Lantus 70 nightly  -POC glucose checks  -A1c 7.5 11/18/22  -Hypoglycemia protocol  -Neuropathy - resumed Gabapentin 400 mg tid      HTN  -Norvasc 5 mg daily HELD- patient BP soft this morning,  - Imdur 60 mg daily, Lopressor 12.5 mg twice daily  -Hydralazine 5 mg as needed     Prolonged QTC  -EKG 1/14:   -Mg normalized at 2.1, K+ 3.6 will replace  -Trazodone and citalopram held due to Qtc prolongation  -Repeat EKG-     Hypothyroidism  -Synthroid 50 mcg qd      Multivessel CAD status post CABG 11/28/22   -On Aspirin 81 mg, Plavix 75 mg qd, Lasix 40 mg bid, Imdur 60 mg qd.   -Lipitor 40 mg nightly  -Last echo showed EF 54%- 11/14/22           DVT prophylaxis: Lovenox 30 mg bid  GI prophylaxis: Protonix 40 ,mg QD    Saurabh Esquivel MD  Transitional year Resident  1/19/2023 8:13 AM            Please note that this chart was generated using voice recognition Dragon dictation software.   Although every effort was made to ensure the accuracy of this automated transcription, some errors in transcription may have occurred

## 2023-01-19 NOTE — PROGRESS NOTES
PULMONARY & CRITICAL CARE MEDICINE PROGRESS NOTE     Patient:  Edyta Turner  MRN: [id number]  Admit date: 1/12/2023  Primary Care Physician: Devante Mcdowell MD  Consulting Physician: Linda De La Fuente DO  CODE Status: Full Code  LOS: 7     SUBJECTIVE     Chief Complaint/ Reason for consult:    large pleural effusion    Hospital Course: The patient is a 78 y.o. female with a history of hypertension, CAD s/p stents, s/p CABG 11/28/2022, hypothyroidism came to ED with chief complaint of chest pain and shortness of breath. Patient is known to CT surgery as she got CABG on 11/28/2022 and was recently seen in their office on 12/21/2022, during that visit patient was doing well, had no respiratory or cardiac issues. Patient was afebrile, NSR /55 and was room air was eventually put on 3 L nasal cannula due to respiratory distress, patient is on 1 L oxygen at home. Initial lab work show hyperglycemia with glucose 260, Pro-Jame 0.53.  proBNP 1814, troponin 36. WBC 16.7, hemoglobin 13.1, platelet 140. In the ED CT chest revealed large left pleural effusion with near collapse of left lung. ID, pulmonary and CT surgery were consulted. Patient was chest tube placed by IR on 1/13/2023. Patient is getting Zosyn as per ID recommendation. Interval History:  01/19/23  Patient seen and examined bedside. Remained afebrile, vitals stable, sat well on 2 L NC. No episode of desaturation  Labs reviewed. Sternal wound wound VAC is in place. Sternal drain has 100ml / 24 hrs. Blood, wound and pleural fluid cultures negative so far. CXR 1/18/2023 left chest tube extrathoracic opacity on the left. S/p tPA and Dornase 1 dose 1/17/23. CXR 1/17/23 showed slight improvement, CT surgery planning for tPA and Dornase today. Wound care following.   Chest x-ray 01/16/2023 as compared to 01/15/2023 and 01/14/2023 showed improved aeration of left lung left pleural effusion is reduced pleural catheter is in place. Left Pig tail was removed yesterday 23. And thoracentesis was done by IR, 20 ml dark fluid obtained. CXR this morning s/o mod left pleural effusion which is increased from previous imaging. Review Of Systems:  Review of Systems   Constitutional:  Positive for fatigue. HENT:  Negative for congestion, nosebleeds, trouble swallowing and voice change. Eyes:  Negative for visual disturbance. Respiratory:  Positive for cough and shortness of breath. Negative for apnea, chest tightness and wheezing. Cardiovascular:  Positive for chest pain. Negative for leg swelling. Gastrointestinal:  Negative for abdominal pain, nausea and vomiting. Genitourinary:  Negative for difficulty urinating. Allergic/Immunologic: Negative. Neurological:  Negative for dizziness, tremors and light-headedness. Hematological:  Negative for adenopathy. Does not bruise/bleed easily. Psychiatric/Behavioral: Negative. OBJECTIVE     PaO2/FiO2 RATIO:  No results for input(s): POCPO2 in the last 72 hours. VITAL SIGNS:   LAST:  /78   Pulse 68   Temp 98.1 °F (36.7 °C) (Oral)   Resp 16   Ht 4' 11\" (1.499 m)   Wt 263 lb 10.7 oz (119.6 kg)   SpO2 97%   BMI 53.25 kg/m²   8-24 HR RANGE:  TEMP Temp  Av.5 °F (36.9 °C)  Min: 98.1 °F (36.7 °C)  Max: 98.8 °F (68.8 °C)   BP Systolic (41YJJ), YFL:829 , Min:110 , PWY:987      Diastolic (41YKE), PVX:65, Min:47, Max:103     PULSE Pulse  Av.1  Min: 68  Max: 80   RR Resp  Av  Min: 14  Max: 18   O2 SAT SpO2  Av %  Min: 97 %  Max: 97 %   OXYGEN DELIVERY O2 Flow Rate (L/min)  Av L/min  Min: 2 L/min  Max: 2 L/min        Systemic Examination:   Physical Exam -  Constitutional:  Alert, cooperative and no distress. Mental Status:  Oriented to person, place and time and normal affect. Lungs: Left-sided chest tube is present. Sternal wound VAC is present.   Air entry is present bilaterally decreased breath sound in left lower lung field as compared to right. Normal effort. Heart:  Regular rate and rhythm, no murmur. Abdomen:  Soft, nontender, nondistended, normal bowel sounds. Extremities: Mild bilateral edema, negative for redness, tenderness in the calves. Skin:  Warm, dry, no gross lesions or rashes. DATA REVIEW     Medications: Current Inpatient  Scheduled Meds:   fentanNYL  25 mcg IntraVENous Once    cefTRIAXone (ROCEPHIN) IV  2,000 mg IntraVENous Q24H    sennosides-docusate sodium  2 tablet Oral BID    insulin glargine  70 Units SubCUTAneous Nightly    sodium chloride flush  5-40 mL IntraVENous 2 times per day    polyethylene glycol  17 g Oral Daily    pantoprazole  40 mg Oral Daily    clopidogrel  75 mg Oral Daily    enoxaparin  30 mg SubCUTAneous BID    furosemide  40 mg Oral BID    amiodarone  200 mg Oral TID    [Held by provider] amLODIPine  5 mg Oral Daily    aspirin  81 mg Oral Daily    atorvastatin  40 mg Oral Nightly    budesonide-formoterol  2 puff Inhalation BID    busPIRone  10 mg Oral BID    [Held by provider] citalopram  40 mg Oral Daily    gabapentin  400 mg Oral TID    isosorbide mononitrate  60 mg Oral Daily    levothyroxine  50 mcg Oral Daily    metoprolol tartrate  12.5 mg Oral BID    [Held by provider] traZODone  50 mg Oral Nightly    insulin lispro  0-16 Units SubCUTAneous TID WC    insulin lispro  0-4 Units SubCUTAneous Nightly     Continuous Infusions:   sodium chloride      sodium chloride Stopped (01/18/23 1523)       INPUT/OUTPUT:  In: 619.6 [P.O.:550; I.V.:19.6]  Out: 2100 [Urine:2000; Drains:100]  Date 01/19/23 0000 - 01/19/23 2359   Shift 2966-2619 0281-5701 2220-8253 24 Hour Total   INTAKE   P.O.(mL/kg/hr) 200(0.2)   200   Shift Total(mL/kg) 200(1.7)   200(1.7)   OUTPUT   Urine(mL/kg/hr) 450(0.5)   450   Shift Total(mL/kg) 450(3.8)   450(3.8)   Weight (kg) 119.6 119.6 119.6 119.6          LABS:  ABGs:   No results for input(s): POCPH, POCPCO2, POCPO2, POCHCO3, AVHU5CSC in the last 72 hours.   CBC: Recent Labs     01/17/23  0641 01/18/23  0552 01/19/23  0704   WBC 9.4 7.9 7.9   HGB 11.0* 11.1* 11.1*   HCT 37.2 35.6* 37.8   MCV 92.3 88.8 92.4    234 269   RBC 4.03 4.01 4.09   MCH 27.3 27.7 27.1   MCHC 29.6 31.2 29.4   RDW 13.2 13.3 13.2       CRP:   No results for input(s): CRP in the last 72 hours. LDH:   No results for input(s): LDH in the last 72 hours. BMP:   Recent Labs     01/17/23  0641 01/18/23  0552 01/19/23  0704    135 138   K 3.7 3.4* 3.6*   CL 94* 93* 95*   CO2 34* 32* 34*   BUN 16 16 15   CREATININE 0.80 0.74 0.70   GLUCOSE 184* 99 159*       Liver Function Test:   No results for input(s): PROT, LABALBU, ALT, AST, GGT, ALKPHOS, BILITOT in the last 72 hours. Coagulation Profile:   No results for input(s): INR, PROTIME, APTT in the last 72 hours. D-Dimer:  No results for input(s): DDIMER in the last 72 hours. Lactic Acid:  No results for input(s): LACTA in the last 72 hours. Cardiac Enzymes:  No results for input(s): CKTOTAL, CKMB, CKMBINDEX, TROPONINI in the last 72 hours. Invalid input(s): TROPONIN, HSTROP  BNP/ProBNP:   No results for input(s): BNP, PROBNP in the last 72 hours. Triglycerides:  No results for input(s): TRIG in the last 72 hours. Microbiology:  Urine Culture:  No components found for: CURINE  Blood Culture:  No components found for: CBLOOD, CFUNGUSBL  Sputum Culture:  No components found for: CSPUTUM  No results for input(s): SPECDESC, SPECIAL, CULTURE, STATUS, ORG, CDIFFTOXPCR, CAMPYLOBPCR, SALMONELLAPC, SHIGAPCR, SHIGELLAPCR, MPNEUG, MPNEUM, LACTOQL in the last 72 hours. No results for input(s): SPUTUM, SPECDESC, SPECIAL, CULTURE, STATUS, ORG, CDIFFTOXPCR, MPNEUM, MPNEUG in the last 72 hours. Invalid input(s): JESSE ARCE, CFUNGUSBL       Pathology:    Radiology Reports:  XR CHEST PORTABLE   Preliminary Result   Interval removal of the left chest tube with moderate left pleural effusion,   mildly increased from previous radiographs. Unchanged patchy airspace   opacities in the left lung. CT CHEST WO CONTRAST   Preliminary Result   Limited chest CT shows a relatively small residual loculated left pleural   effusion, considerably improved since the prior CT. There is adjacent left   lower lobe atelectasis/consolidation. Ultrasound shows a relatively small loculated/septated pleural effusion. Ultrasound-guided thoracentesis performed as described above. Findings were discussed with FLORIDALMA MCGEE at 2:30 pm on 1/18/2023. US THORACENTESIS Which side should the procedure be performed? Left   Preliminary Result   Limited chest CT shows a relatively small residual loculated left pleural   effusion, considerably improved since the prior CT. There is adjacent left   lower lobe atelectasis/consolidation. Ultrasound shows a relatively small loculated/septated pleural effusion. Ultrasound-guided thoracentesis performed as described above. Findings were discussed with FLORIDALMA MCGEE at 2:30 pm on 1/18/2023. IR FLUOROSCOPY LESS THAN 1 HOUR   Final Result   Limited fluoroscopy shows the chest tube to be out of the thoracic cavity. XR CHEST PORTABLE   Preliminary Result   Left chest tube extrathoracic, with slightly increased opacity of the left   hemithorax with a similar differential diagnosis. XR CHEST PORTABLE   Final Result   No significant interval change. Left chest tube in place with small to   moderate left pleural effusion and patchy opacities in the left lung. IR CHEST TUBE INSERTION   Final Result   Successful up sizing of left-sided chest drain. Fluoroscopic images show   majority of the pleural effusion has resolved. There may be some residual   atelectasis or consolidation in the left base. There did not appear to be   much fluid being aspirated. If tube is not draining would suggest follow-up   CT scan.          XR CHEST PORTABLE   Final Result   Left chest tube remains in place. Interval reduction of left pleural   effusion and improved aeration of the left lung. XR CHEST PORTABLE   Final Result   Near complete opacification of the left hemithorax with decreased air   compared to prior exam.         XR CHEST PORTABLE   Final Result   Little change from prior study. Significant left lung opacity with left   hydropneumothorax suspected. Slight mediastinal shift toward the right is   noted diminished from prior study. Right lung is clear. XR CHEST PORTABLE   Final Result   Interval left chest tube placement with persistent opacification of the left   chest.  There is some lucency in the left lower chest, which could represent   hydropneumothorax. IR CHEST TUBE INSERTION   Final Result   Successful percutaneous placement of a 10 [place] left pleural drain. There is marked loculation of the left pleural collection indicating complex   pleural fluid. Small bore drainage catheter may not successfully drained the   entire pleural collection given its complex nature. CT CHEST W CONTRAST   Final Result   1. Diastasis of median sternotomy with air in the soft tissues, and   appearance of abscess formation/dehiscence dural to the sternotomy. Air is   present in the mediastinum. 2.  Large left pleural effusion with almost complete collapse of the left   lobe of the lung. Bronchial wall thickening is noted with material within   the bronchi likely mucous plugging. 3.  Hepatic steatosis. 4.  Small gallstones in the gallbladder. 5.  2 cm left adrenal mass with indeterminate Hounsfield numbers. Further   workup using adrenal CT protocol or MRI advised when the patient's condition   permits. RECOMMENDATIONS:   Advise adrenal protocol CT or MRI to evaluate a left adrenal mass. XR CHEST PORTABLE   Final Result   Progressive left pleural effusion with nearly complete opacification of the   left hemithorax.          XR CHEST PORTABLE    (Results Pending)        Echocardiogram:   No results found for this or any previous visit. ASSESSMENT AND PLAN     Assessment:    // Acute hypoxic respiratory failure improving  //Left lobar collapse secondary to effusion  //Large left loculated effusion s/p chest tube 1/13/2023  //CAD s/p CABG  //Sternal wound s/p debridement and wound VAC  //Hypertension  //DM  //Probable obstructive sleep apnea        Plan:     Patient is currently on 2 L nasal cannula and maintaining saturation. Wean O2 to keep saturation above 90%. Sternal wound and chest tube management as per CT surgery. s/p sternal wound debridement with washout and wound VAC placement by CTS. S/p upsizing of pigtail acth 1/16/23,   Pig tail cath removed 1/18/23. On Zosyn as per ID recommendation. Follow-up with infectious disease. Blood, wound and body fluid cultures negative so far. Encourage incentive spirometry deep breathing and cough and ambulation physical therapy. On Lasix monitor intake and output and electrolytes. We will continue to follow. I will discuss with attending. Anne Marie Gregory MD  Internal Medicine Resident, PGY-3  [facility] [id number]          1/19/2023, 8:37 AM      Please note that this chart was generated using voice recognition Dragon dictation software. Although every effort was made to ensure the accuracy of this automated transcription, some errors in transcription may have occurred.

## 2023-01-19 NOTE — PROGRESS NOTES
Physical Therapy        Physical Therapy Cancel Note      DATE: 2023    NAME: Rojas Clancy  MRN: [id number]   : 1955      Patient not seen this date for Physical Therapy due to:    Patient Declined: Pt refused due to increased fatigue after just working with OT      Electronically signed by Hien Page PTA on 2023 at 2:54 PM

## 2023-01-19 NOTE — PLAN OF CARE
Problem: Chronic Conditions and Co-morbidities  Goal: Patient's chronic conditions and co-morbidity symptoms are monitored and maintained or improved  Outcome: Progressing  Flowsheets (Taken 1/18/2023 2000)  Care Plan - Patient's Chronic Conditions and Co-Morbidity Symptoms are Monitored and Maintained or Improved:   Monitor and assess patient's chronic conditions and comorbid symptoms for stability, deterioration, or improvement   Collaborate with multidisciplinary team to address chronic and comorbid conditions and prevent exacerbation or deterioration     Problem: Pain  Goal: Verbalizes/displays adequate comfort level or baseline comfort level  Outcome: Progressing     Problem: Skin/Tissue Integrity  Goal: Absence of new skin breakdown  Description: 1. Monitor for areas of redness and/or skin breakdown  2. Assess vascular access sites hourly  3. Every 4-6 hours minimum:  Change oxygen saturation probe site  4. Every 4-6 hours:  If on nasal continuous positive airway pressure, respiratory therapy assess nares and determine need for appliance change or resting period.   Outcome: Progressing     Problem: Safety - Adult  Goal: Free from fall injury  Outcome: Progressing     Problem: ABCDS Injury Assessment  Goal: Absence of physical injury  Outcome: Progressing     Problem: Discharge Planning  Goal: Discharge to home or other facility with appropriate resources  Outcome: Progressing  Flowsheets (Taken 1/18/2023 2000)  Discharge to home or other facility with appropriate resources:   Identify barriers to discharge with patient and caregiver   Arrange for needed discharge resources and transportation as appropriate   Identify discharge learning needs (meds, wound care, etc)     Problem: Respiratory - Adult  Goal: Achieves optimal ventilation and oxygenation  Outcome: Progressing  Flowsheets (Taken 1/18/2023 2000)  Achieves optimal ventilation and oxygenation:   Assess for changes in respiratory status   Assess for changes in mentation and behavior   Position to facilitate oxygenation and minimize respiratory effort   Oxygen supplementation based on oxygen saturation or arterial blood gases     Problem: Skin/Tissue Integrity - Adult  Goal: Skin integrity remains intact  Outcome: Progressing  Goal: Incisions, wounds, or drain sites healing without S/S of infection  Outcome: Progressing  Goal: Oral mucous membranes remain intact  Outcome: Progressing     Problem: Nutrition Deficit:  Goal: Optimize nutritional status  Outcome: Progressing

## 2023-01-19 NOTE — PROGRESS NOTES
Occupational Therapy  Facility/Department: [facility]- [facility]  Occupational Therapy Daily Treatment Note    Name: Farhad Haynes  : 1955  MRN: [id number]  Date of Service: 2023    Discharge Recommendations:  Patient would benefit from continued therapy after discharge    Patient Diagnosis(es): The primary encounter diagnosis was Pleural effusion on left. Diagnoses of Abscess of sternal region, Infection, Incisional infection, and Community acquired pneumonia of left lower lobe of lung were also pertinent to this visit. Past Medical History:  has a past medical history of Ambulates with cane, Anxiety, Borderline hypertension, CAD (coronary artery disease), Depression, GERD (gastroesophageal reflux disease), Heart attack ([facility] [address] Hypertension, Hypothyroidism, Neuropathy, Obesity, Osteoarthritis, Other cirrhosis of liver ([facility] [address] Sleep apnea, Type II or unspecified type diabetes mellitus without mention of complication, not stated as uncontrolled, Under care of service provider, Under care of service provider, and Vertebrogenic low back pain. Past Surgical History:  has a past surgical history that includes Hysterectomy; Colonoscopy; Upper gastrointestinal endoscopy; Dilation and curettage of uterus; hiatal hernia repair; Throat surgery; Appendectomy; Total knee arthroplasty (Right, 2015); Total knee arthroplasty (Left, 2015); Coronary angioplasty with stent (2020); Cardiac catheterization; Cardiac catheterization (2022); Coronary artery bypass graft (N/A, 2022); IR CHEST TUBE INSERTION (2023); Wound debridement (2023); Cardiac surgery (N/A, 2023); and IR CHEST TUBE INSERTION (2023). Assessment   Performance deficits / Impairments: Decreased functional mobility ; Decreased endurance;Decreased ADL status; Decreased high-level IADLs;Decreased strength;Decreased balance  Assessment: Pt demonstrated increased participation in OT session wtih focus on self-care tasks, transfer and safety. Pt making steady gains towards goals and will continue to benefit from further OT services following discharge from acute care hospital  Prognosis: Good  Activity Tolerance  Activity Tolerance: Patient Tolerated treatment well  Activity Tolerance Comments: Mild fatigue with SOB upon conclusion of session. Noted patient recovered within 1 minute of seated and resting        Plan   Occupational Therapy Plan  Times Per Week: 3-5x/wk  Current Treatment Recommendations: Balance training, Functional mobility training, Endurance training, Safety education & training, Patient/Caregiver education & training, Equipment evaluation, education, & procurement, Self-Care / ADL, Home management training     Restrictions  Restrictions/Precautions  Restrictions/Precautions: Fall Risk, Up as Tolerated, General Precautions, Cardiac  Required Braces or Orthoses?: No  Position Activity Restriction  Sternal Precautions: 5# Lifting Restrictions  Other position/activity restrictions: Pt had recent CABG on 11/28/22, sternal precautions . Chest tube. O2 NC 2 Lm. Wound vac. Subjective   General  Patient assessed for rehabilitation services?: Yes  Response to previous treatment: Patient with no complaints from previous session  Family / Caregiver Present: Yes ()  General Comment  Comments: RN ok'd patient for OT treatment this afternoon. Pt supine in bed upon ORR arrival. Pt agreeable to session and reports 8/10 incsional/chest pain. Patient able to continue session       Objective   Safety Devices  Type of Devices: Call light within reach;Gait belt;Patient at risk for falls;Nurse notified; Left in chair  Restraints  Restraints Initially in Place: No  Balance  Sitting: Intact (On toilet 10 minutes with SBA and no LOB)  Standing: With support (RW CGA 1-2 minutes x2 stands for ADL tasks)  Gait  Overall Level of Assistance: Contact-guard assistance  Interventions: Verbal cues; Safety awareness training (hand placement and safe walker use)  Distance (ft):  (Room distances)  Assistive Device: Walker, rolling;Gait belt  Toilet Transfers  Toilet - Technique: Ambulating  Equipment Used: Standard toilet  Toilet Transfer: Contact guard assistance  Toilet Transfers Comments: Verbal cues for approach to toilet and use of grab rail on left     ADL  Grooming: Contact guard assistance  Grooming Skilled Clinical Factors: wash hands stand at sink for balance and safety  UE Dressing: Contact guard assistance  UE Dressing Skilled Clinical Factors: hike of gown while preparing for toileting task  LE Dressing: Maximum assistance  LE Dressing Skilled Clinical Factors: Don/doff slipper socks duw to significant abdominal habitus preventing functional trunk flexion to complete task  Toileting: Moderate assistance;Maximum assistance; Increased time to complete  Toileting Skilled Clinical Factors: MOD>MAX A for rear fabio hygiene following BM this session. Additional Comments: Pt requires occasional verbal cues for safety during tasks and encouragement to attempt own fabio hygiene        Bed mobility  Supine to Sit: Stand by assistance  Sit to Supine: Unable to assess  Scooting: Stand by assistance  Bed Mobility Comments: HOB elevated, bedrail use and increased time to complete. Pt concluded session seated in recliner  Transfers  Sit to stand: Contact guard assistance  Stand to sit: Contact guard assistance     Cognition  Overall Cognitive Status: WFL  Orientation  Overall Orientation Status: Within Functional Limits          Education Given To: Patient  Education Provided: Role of Therapy; ADL Adaptive Strategies;Precautions;Transfer Training  Education Provided Comments: Sternal precautions, safety, body mechancis  Education Method: Demonstration;Verbal  Barriers to Learning: None  Education Outcome: Verbalized understanding;Demonstrated understanding;Continued education needed    AM-PAC Score  AM-PAC Inpatient Daily Activity Raw Score: 17 (01/19/23 1429)  AM-PAC Inpatient ADL T-Scale Score : 37.26 (01/19/23 1429)  ADL Inpatient CMS 0-100% Score: 50.11 (01/19/23 1429)  ADL Inpatient CMS G-Code Modifier : CK (01/19/23 1429)      Goals  Short Term Goals  Time Frame for Short Term Goals: By discharge, pt will:  Short Term Goal 1: Demo bed mobility with Min A and use of adaptive strategies PRN  Short Term Goal 2: Demo functional sit<>stand transfers and functional mobility with SBA and LRD PRN  Short Term Goal 3: Demo 8 minutes of dynamic standing balance with CGA to promote increased engagement in ADLs  Short Term Goal 4: Demo UB ADLs with SUP and use of adpative tech PRN  Short Term Goal 5: Demo LB ADLs with CGA and use of DME PRN  Short Term Goal 6: Demonstrate independent implementation of EC/WS strategies throughout all functional activities with  0 VCs to initiate       Therapy Time   Individual Concurrent Group Co-treatment   Time In [address] [place] [address] [id number]         Time Out 1417         Minutes 39         Timed Code Treatment Minutes: 435 H Jak, ORR/L

## 2023-01-19 NOTE — CARE COORDINATION
Spoke to Ivan at [facility]. They are able to accept patient today. Spoke to Kyoger from [employer]. They do not have wheelchair transportation available today. Transport request faxed for tomorrow morning. Patient and spouse updated    1 spoke to Kyoger from [facility]. Patient is scheduled for 3pm tomorrow. Patient updated and agreeable.  PAS completed

## 2023-01-19 NOTE — PROGRESS NOTES
[facility] -   Infectious diseases evaluation  admission date 1/12/2023    reason for consultation:   Sternal infection    Impression :   Current:  Surgical wound dehiscence and infection w abscess - lower wound scabbed and infected - tan fluid seen today in bag   CAD, CABG November 2022  I/D lower sternal wound 1/13 - VAC  Left lobar collapse and mucous plug  Large Left loculated effusion - could be post op loculation - possibly collapsing the left lung  Left chest tube 1/13  Left adrenal tumor  Lactic acidosis  Bandemia 16    Other:    Discussion / summary of stay / plan of care   78 F s/p CABG last year. Coming in with chest pain. Imaging revealed infection at surgical inscision. Patient also had chest tube placed in LL due to large left loculated effusion. Recommendations   Cx from the sternal wound w cx neg pus  BC still neg  MRSA nasal ng    Treating the sternal OM and the left lobar pneumonia   zosyn 1/12 stop 1/17 1/17 - downsize to ceftriaxone 2 g daily x 6 weeks for sternal osteo till 2/28  VAC in place  FU CRP  reconsiled    left loculated effusion 1/15 - left chest tube -TPAse --left thoracentesis 1/18 - 1/18 left chest tube out      Infection Control Recommendations   [place] Precautions  Contact Isolation     Antimicrobial Stewardship Recommendations   Simplification of therapy  Targeted therapy      History of Present Illness:   Initial history:  John Johns is a 78y.o.-year-old female who had a CABG 11/2022, comes in with worsening chest pain, tightness, cough nonproductive nausea vomiting and finally fell twice in the last 2 days and hit her head due to dizziness.   She also describes diffuse abdominal pain and some shortness of breath with exertion, no fever or chills    Drainage from the surgical wound, with pain at the surgical site  Comes into the emergency room, started on 1 L of uses oxygen since her bypass, 1 L at home, she is diabetic on insulin, cirrhosis and obese, SHERITA    In the ER, the surgical wound looked infected with some drainage and swelling of the tissues. X-ray showed opacification of the left lung. CT shows a left adrenal mass, suggested an adrenal imaging    Patient started on antibiotic, infectious consulted for sternal wound dehiscence and infection        Interval changes  1/19/2023   Patient Vitals for the past 8 hrs:   BP Temp Temp src Pulse Resp SpO2 Weight   01/19/23 0415 121/78 98.1 °F (36.7 °C) Oral 68 16 97 % 263 lb 10.7 oz (119.6 kg)   01/19/23 0241 -- -- -- -- 18 -- --   01/19/23 0211 -- -- -- -- 16 -- --     1/14  Sternal debridement with wound vac placement 1/13  Culture no growth to this date   Blood culture no growth   Pain better - ox 3    1/15  All cx neg and CXR new left opacification  Chest tube in place on the left x 1/14  Sternal wound w VAC  Comfortable and ox 3  On NC    1/16  All Cx  are negative so far. Ir to place another chest tube  Afberile HDS. Some tan fluids in wound dressing. VAC changed and wound underlying is clean  CTS had debrided and resected some sternal bone at that level and all cx are still neg    1/17  IR chest tube placed yesterday  Patient still has some tan fluid in her wound vac dressing  Wound care consulted  Currently afebrile hds. Cx still negative     1/18  Chest x-ray today reveals that the chest tube is extrathoracic. Patient going down to IR for adjustment. Wound VAC showing tan material up top I suspect that this material is fat rather than pus. Given that the patient has not shown any signs of sepsis nor do her labs reveal any elevated white blood cell count or any positive cultures. Patient okay to be discharged on ceftriaxone 2 g daily for 2 weeks. Follow-up with Dr. Juan Carlos Hauser outpatient. Discharge planning per primary team    1/19  Chest tube removed yesterday. Molina Meraz still in place. Continue ceftriaxone for 2 week. Thoracentesis performed yesterday. Apparently patient is to be DC with wound vac. Afebrile HDS. Summary of relevant labs:  Labs:  Lactic Acid, 4.7 High    WBC 7.9  Procalcitonin0.53 High    Creatinine0.7  CRP 70     Micro:  BC  Imaging:  CT of the chest  .  2 cm left adrenal mass with indeterminate Hounsfield numbers. Further   workup using adrenal CT protocol or MRI advised when the patient's condition   permits. Air seen in the mediastinum, an abscess around the sternotomy area    2. Large left pleural effusion with almost complete collapse of the left   lobe of the lung. Bronchial wall thickening is noted with material within   the bronchi likely mucous plugging           I have personally reviewed the past medical history, past surgical history, medications, social history, and family history, and I haveupdated the database accordingly. Allergies:   Morphine, Shellfish-derived products, and Tape [adhesive tape]     Review of Systems:     Review of Systems   Constitutional:  Negative for activity change, appetite change, chills, fatigue, fever and unexpected weight change. HENT:  Negative for congestion, drooling and trouble swallowing. Eyes:  Negative for photophobia, discharge, redness and itching. Respiratory:  Negative for apnea, cough, choking, chest tightness, shortness of breath, wheezing and stridor. Cardiovascular:  Negative for chest pain, palpitations and leg swelling. Gastrointestinal:  Negative for abdominal distention, abdominal pain, anal bleeding, constipation and nausea. Endocrine: Negative for cold intolerance, heat intolerance and polyuria. Genitourinary:  Negative for dysuria and urgency. Musculoskeletal:  Negative for arthralgias and myalgias. Skin:  Positive for color change and wound. Allergic/Immunologic: Negative for immunocompromised state. Neurological:  Negative for dizziness and numbness. Hematological:  Negative for adenopathy.    Psychiatric/Behavioral:  Negative for agitation. The patient is not nervous/anxious. Physical Examination :       Physical Exam  Constitutional:       General: She is not in acute distress. Appearance: Normal appearance. She is not ill-appearing, toxic-appearing or diaphoretic. HENT:      Head: Normocephalic and atraumatic. Nose: Nose normal. No congestion or rhinorrhea. Mouth/Throat:      Pharynx: Oropharynx is clear. No posterior oropharyngeal erythema. Eyes:      Conjunctiva/sclera: Conjunctivae normal.      Pupils: Pupils are equal, round, and reactive to light. Cardiovascular:      Rate and Rhythm: Normal rate and regular rhythm. Pulses: Normal pulses. Heart sounds: Normal heart sounds. No murmur heard. No friction rub. No gallop. Pulmonary:      Effort: No respiratory distress. Breath sounds: Normal breath sounds. No stridor. No wheezing or rhonchi. Chest:      Chest wall: Tenderness present. Abdominal:      Palpations: Abdomen is soft. There is no mass. Hernia: No hernia is present. Genitourinary:     Comments: Urine jackie  Musculoskeletal:         General: Signs of injury present. No swelling, tenderness or deformity. Cervical back: Neck supple. No rigidity or tenderness. Right lower leg: No edema. Left lower leg: No edema. Skin:     General: Skin is dry. Capillary Refill: Capillary refill takes less than 2 seconds. Coloration: Skin is not jaundiced or pale. Findings: No bruising or erythema. Comments: Purulent material coming out of surgical wound   Neurological:      Mental Status: She is alert and oriented to person, place, and time. Cranial Nerves: No cranial nerve deficit. Sensory: No sensory deficit. Psychiatric:         Mood and Affect: Mood normal.         Thought Content:  Thought content normal.       Past Medical History:     Past Medical History:   Diagnosis Date    Ambulates with cane     or walker    Anxiety     Borderline hypertension     CAD (coronary artery disease)     stents x 2 in 2020    Depression 07/28/2020    GERD (gastroesophageal reflux disease)     Heart attack ([facility] [address] 07/18/2020    Hypertension     Hypothyroidism     Neuropathy     Obesity     morbid    Osteoarthritis     Other cirrhosis of liver ([facility] 07/27/2020    pt denies    Sleep apnea     possible    Type II or unspecified type diabetes mellitus without mention of complication, not stated as uncontrolled     Under care of service provider 11/18/2022    [facility]ak-[facility]ycxgzsn-mupjuHernan owen-last visit aug 2022    Under care of service provider 11/18/2022    cardiology-[facility]-last visit nov 2022    Vertebrogenic low back pain 03/29/2022       Past Surgical  History:     Past Surgical History:   Procedure Laterality Date    APPENDECTOMY      CARDIAC CATHETERIZATION      2 stents    CARDIAC CATHETERIZATION  11/01/2022    CARDIAC SURGERY N/A 1/14/2023    STERNAL WOUND WASHOUT, DEBRIDEMENT, WOUND VAC PLACEMENT performed by Wallace Rodriguez MD at [facility]  07/2020    CORONARY ARTERY BYPASS GRAFT N/A 11/28/2022    CABG CORONARY ARTERY BYPASS X3 ON PUMP , ATA, ENDO VEIN HARVEST performed by Wallace Rodriguez MD at [place] [address]  01/14/2023    STERNAL WOUND WASHOUT, DEBRIDEMENT, WOUND VAC PLACEMENT    DILATION AND CURETTAGE OF UTERUS      HIATAL HERNIA REPAIR      HYSTERECTOMY (CERVIX STATUS UNKNOWN)      IR CHEST TUBE INSERTION  01/13/2023    IR CHEST TUBE INSERTION 1/13/2023 MD SCARLET MacedoWHITNEY [facility]    IR CHEST TUBE INSERTION  1/16/2023    IR CHEST TUBE INSERTION 1/16/2023 MD MAX Renee [facility]    THROAT SURGERY      POLYPS REMOVED FROM VOCAL CORD    TOTAL KNEE ARTHROPLASTY Right 01/06/2015    TOTAL KNEE ARTHROPLASTY Left 05/26/2015    UPPER GASTROINTESTINAL ENDOSCOPY         Medications:      fentanNYL  25 mcg IntraVENous Once    cefTRIAXone (ROCEPHIN) IV  2,000 mg IntraVENous Q24H    sennosides-docusate sodium  2 tablet Oral BID    insulin glargine  70 Units SubCUTAneous Nightly    sodium chloride flush  5-40 mL IntraVENous 2 times per day    polyethylene glycol  17 g Oral Daily    pantoprazole  40 mg Oral Daily    clopidogrel  75 mg Oral Daily    enoxaparin  30 mg SubCUTAneous BID    furosemide  40 mg Oral BID    amiodarone  200 mg Oral TID    [Held by provider] amLODIPine  5 mg Oral Daily    aspirin  81 mg Oral Daily    atorvastatin  40 mg Oral Nightly    budesonide-formoterol  2 puff Inhalation BID    busPIRone  10 mg Oral BID    [Held by provider] citalopram  40 mg Oral Daily    gabapentin  400 mg Oral TID    isosorbide mononitrate  60 mg Oral Daily    levothyroxine  50 mcg Oral Daily    metoprolol tartrate  12.5 mg Oral BID    [Held by provider] traZODone  50 mg Oral Nightly    insulin lispro  0-16 Units SubCUTAneous TID WC    insulin lispro  0-4 Units SubCUTAneous Nightly       Social History:     Social History     Socioeconomic History    Marital status:      Spouse name: Naseem Downs    Number of children: 0    Years of education: Not on file    Highest education level: Not on file   Occupational History    Occupation: Retired      Employer: [employer]   Tobacco Use    Smoking status: Never    Smokeless tobacco: Never   Vaping Use    Vaping Use: Never used   Substance and Sexual Activity    Alcohol use: Yes     Comment: once a month    Drug use: No    Sexual activity: Yes     Partners: Male   Other Topics Concern    Not on file   Social History Narrative    Not on file     Social Determinants of Health     Financial Resource Strain: Low Risk     Difficulty of Paying Living Expenses: Not hard at all   Food Insecurity: No Food Insecurity    Worried About Running Out of Food in the Last Year: Never true    Ran Out of Food in the Last Year: Never true   Transportation Needs: Not on file   Physical Activity: Not on file   Stress: Not on file   Social Connections: Not on file   Intimate Partner Violence: Not on file   Housing Stability: Not on file       Family History:     Family History   Problem Relation Age of Onset    Heart Disease Mother     Arthritis Mother     Heart Disease Father     Arthritis Father     Cancer Father         \"oral\"    Diabetes Sister         gestational      Medical Decision Making:   I have independently reviewed/ordered the following labs:    CBC with Differential:   Recent Labs     01/18/23  0552 01/19/23  0704   WBC 7.9 7.9   HGB 11.1* 11.1*   HCT 35.6* 37.8    269       BMP:  Recent Labs     01/18/23  0552 01/18/23  1652 01/19/23  0704     --  138   K 3.4*  --  3.6*   CL 93*  --  95*   CO2 32*  --  34*   BUN 16  --  15   CREATININE 0.74  --  0.70   MG 1.7 2.0 2.1       Hepatic Function Panel:   No results for input(s): PROT, LABALBU, BILIDIR, IBILI, BILITOT, ALKPHOS, ALT, AST in the last 72 hours. No results for input(s): RPR in the last 72 hours. No results for input(s): HIV in the last 72 hours. No results for input(s): BC in the last 72 hours. Lab Results   Component Value Date/Time    CREATININE 0.70 01/19/2023 07:04 AM    GLUCOSE 159 01/19/2023 07:04 AM    GLUCOSE 250 03/30/2012 03:15 PM       Detailed results: Thank you for allowing us to participate in the care of this patient. Please call with questions. This note is created with the assistance of a speech recognition program.  While intending to generate adocument that actually reflects the content of the visit, the document can still have some errors including those of syntax and sound a like substitutions which may escape proof reading. It such instances, actual meaningcan be extrapolated by contextual diversion. Yunior Jones MD  Office: [phone]  Perfect serve / office [phone]      I have discussed the care of the patient, including pertinent history and exam findings,  with the resident.  I have seen and examined the patient and the key elements of all parts of the encounter have been performed by me. I agree with the assessment, plan and orders as documented by the resident.     Gena Hopkins, Infectious Diseases

## 2023-01-19 NOTE — DISCHARGE INSTR - COC
Continuity of Care Form    Patient Name: Yesenia Azevedo   :    MRN:  [id number]    Admit date:  2023  Discharge date:  2023    Code Status Order: Full Code   Advance Directives:     Admitting Physician:  Elisa Cardenas DO  PCP: Jeny Stoddard MD    Discharging Nurse:  [facility] Unit/Room#:   Discharging Unit Phone Number: [phone]    Emergency Contact:   Extended Emergency Contact Information  Primary Emergency Contact: Bertin Patricia  Address: [address] [place] [address], [facility] Phone: [phone]  Mobile Phone: [phone]  Relation: Spouse  Secondary Emergency Contact: [address], [address] Phone: [phone]  Relation: Brother/Sister    Past Surgical History:  Past Surgical History:   Procedure Laterality Date    APPENDECTOMY      CARDIAC CATHETERIZATION      2 stents    CARDIAC CATHETERIZATION  2022    CARDIAC SURGERY N/A 2023    STERNAL WOUND WASHOUT, DEBRIDEMENT, WOUND VAC PLACEMENT performed by Jer Chandler MD at [facility]  2020    CORONARY ARTERY BYPASS GRAFT N/A 2022    CABG CORONARY ARTERY BYPASS X3 ON PUMP , ATA, ENDO VEIN HARVEST performed by Jer Chandler MD at [place] [address]  2023    STERNAL WOUND WASHOUT, DEBRIDEMENT, WOUND VAC PLACEMENT    DILATION AND CURETTAGE OF UTERUS      HIATAL HERNIA REPAIR      HYSTERECTOMY (CERVIX STATUS UNKNOWN)      IR CHEST TUBE INSERTION  2023    IR CHEST TUBE INSERTION 2023 Rosetta Triplett MD [facility]    IR CHEST TUBE INSERTION  2023    IR CHEST TUBE INSERTION 2023 Maci Flores MD [facility]    THROAT SURGERY      POLYPS REMOVED FROM VOCAL CORD    TOTAL KNEE ARTHROPLASTY Right 2015    TOTAL KNEE ARTHROPLASTY Left 2015    UPPER GASTROINTESTINAL ENDOSCOPY         Immunization History:   Immunization History   Administered Date(s) Administered    COVID-19, MODERNA BLUE border, Primary or Immunocompromised, (age 12y+), IM, 100 mcg/0.5mL 04/15/2021, 05/13/2021    COVID-19, MODERNA Booster BLUE border, (age 18y+), IM, 50mcg/0.25mL 12/05/2021    DTaP 07/07/2011, 09/01/2011, 12/15/2011, 05/29/2012    Hepatitis A Adult (Havrix, Vaqta) 08/17/2020    Influenza Vaccine, unspecified formulation 09/27/2012    Influenza Virus Vaccine 09/27/2012, 09/30/2014    Influenza Whole 11/15/2005    Influenza, FLUAD, (age 72 y+), Adjuvanted, 0.5mL 12/05/2021    Influenza, FLUARIX, FLULAVAL, FLUZONE (age 10 mo+) AND AFLURIA, (age 1 y+), PF, 0.5mL 09/14/2020    Pneumococcal Polysaccharide ([id number]) 09/27/2012, 01/07/2015    Tdap (Boostrix, Adacel) 06/27/2014       Active Problems:  Patient Active Problem List   Diagnosis Code    Anxiety F41.9    GERD (gastroesophageal reflux disease) K21.9    OA (osteoarthritis) M19.90    Neuropathy G62.9    Right knee DJD M17.11    Hypothyroidism E03.9    S/P left total knee arthroplasty Z96.659    Ischemic heart disease I25.9    Essential hypertension I10    NSTEMI (non-ST elevated myocardial infarction) ([facility] [address] I21.4    Other cirrhosis of liver (HCC) K74.69    Elevated lipase R74.8    Depression F32. A    Type 2 diabetes mellitus with complication, with long-term current use of insulin (HCC) E11.8, Z79.4    SHERITA (obstructive sleep apnea) G47.33    Morbid obesity with BMI of 45.0-49.9, adult (HCC) E66.01, Z68.42    PVD (peripheral vascular disease) ([facility]) I73.9    Numbness and tingling of both feet R20.0, R20.2    Long term (current) use of antithrombotics/antiplatelets E32.43    Painful diabetic neuropathy ([facility]) E11.40    Vertebrogenic low back pain M54.51    Spinal stenosis of lumbar region with neurogenic claudication M48.062    CAD in native artery I25.10    Vulvovaginitis N76.0    Incisional infection T81.49XA    Pleural effusion on left J90    Lactic acid acidosis E87.20    Bandemia D72.825    Sternal wound dehiscence T81.32XA    Abscess of sternal region L02.213    S/P CABG (coronary artery bypass graft) Z95.1    CRP elevated R79.82    Community acquired pneumonia of left lower lobe of lung J18.9       Isolation/Infection:   Isolation            No Isolation          Patient Infection Status       Infection Onset Added Last Indicated Last Indicated By Review Planned Expiration Resolved Resolved By    None active    Resolved    COVID-19 (Rule Out) 10/13/20 10/13/20 10/14/20 COVID-19 (Ordered)   10/14/20 Rule-Out Test Resulted    COVID-19 (Rule Out) 10/02/20 10/02/20 10/03/20 Covid-19 Ambulatory (Ordered)   10/05/20 Rule-Out Test Resulted    COVID-19 (Rule Out) 06/04/20 06/04/20 06/04/20 COVID-19 Ambulatory (Ordered)   06/08/20 Rule-Out Test Resulted            Nurse Assessment:  Last Vital Signs: /61   Pulse 76   Temp 98.8 °F (37.1 °C) (Oral)   Resp 17   Ht 4' 11\" (1.499 m)   Wt 263 lb 10.7 oz (119.6 kg)   SpO2 97%   BMI 53.25 kg/m²     Last documented pain score (0-10 scale): Pain Level: 2  Last Weight:   Wt Readings from Last 1 Encounters:   01/19/23 263 lb 10.7 oz (119.6 kg)     Mental Status:  oriented and alert    IV Access:  - PICC - site  R Basilic, insertion date: 1/19/2023    Nursing Mobility/ADLs:  Walking   Assisted  Transfer  Assisted  Bathing  Assisted  Dressing  Assisted  Toileting Assisted  Feeding  Independent  Med Admin  Independent  Med Delivery   whole    Wound Care Documentation and Therapy:          01/18/23 1145   Negative Pressure Wound Therapy Sternum Anterior; Lower   Placement Date/Time: 01/14/23 0942   Present on Admission/Arrival: No  Location: Sternum  Wound Location Orientation: Anterior; Lower   Wound Type Surgical   Unit Type 3M VAC Ulta   Dressing Type White Foam;Black Foam   Number of pieces used 2  (1 white & 1 Black)   Number of pieces removed 2   Cycle Continuous; On   Target Pressure (mmHg) 125   Canister changed? No   Dressing Status New dressing applied   Dressing Changed Changed/New   Drainage Amount Moderate   Drainage Description Serosanguinous   Dressing Change Due 01/20/23   Wound Assessment Exposed structure bone;Exposed structure muscle;Subcutaneous  (adipose tissue)   Jeannie-wound Assessment    (Bordered with drape; silver hydrofiber and drape to old CT and pacer sites and tape tears in breast fold)   Incision 11/28/22 Sternum   Date First Assessed/Time First Assessed: 11/28/22 0941   Present on Hospital Admission: No  Location: Sternum  Incision Description (Comments): CORONARY ARTERY BYPASS GRAFT X3, INCISION EDGES WELL APPROXIMATED, DRESSING PLACED POST-OP AND CLEAR. Wound Image    Dressing Status New dressing applied   Dressing Change Due 01/20/23   Incision Cleansed Cleansed with saline   Dressing/Treatment Negative pressure wound therapy   Incision Assessment    (yellow adipose; pink subcutaneous; exposed bone)   Drainage Amount Moderate   Drainage Description Serosanguinous          NPWT dressing changed to sternal wound using white foam to wound base under black granufoam.  Good seal was achieved. Opticell AG applied to superficial wounds to periwound skin and secured with drape. Change the dressing M-W-F. -125 mmhg, White Foam under Black/Granufoam. Change the canister weekly and prn full. Elimination:  Continence: Bowel: No  Bladder: No  Urinary Catheter: None   Colostomy/Ileostomy/Ileal Conduit: No       Date of Last BM: 1/19/2023      Intake/Output Summary (Last 24 hours) at 1/20/2023 1143  Last data filed at 1/20/2023 0339  Gross per 24 hour   Intake 300 ml   Output 1100 ml   Net -800 ml     I/O last 3 completed shifts: In: 1150 [P.O.:1150]  Out: [address] [Urine:1850]    Safety Concerns: At Risk for Falls    Impairments/Disabilities:      None    Nutrition Therapy:  Current Nutrition Therapy:   - Oral Diet:  General    Routes of Feeding: Oral  Liquids:  Thin Liquids  Daily Fluid Restriction: no  Last Modified Barium Swallow with Video (Video Swallowing Test): not done    Treatments at the Time of Hospital Discharge:   Respiratory Treatments: O2  Oxygen Therapy:  is on oxygen at 2 L/min per nasal cannula. Ventilator:    - No ventilator support    Rehab Therapies: Physical Therapy and Occupational Therapy  Weight Bearing Status/Restrictions: No weight bearing restrictions  Other Medical Equipment (for information only, NOT a DME order):  walker and wound vac    Other Treatments: ***    Patient's personal belongings (please select all that are sent with patient):  None    RN SIGNATURE:  Electronically signed by Nasim Colvin RN on 1/20/23 at 11:32 AM EST    CASE MANAGEMENT/SOCIAL WORK SECTION    Inpatient Status Date: ***    Readmission Risk Assessment Score:  Readmission Risk              Risk of Unplanned Readmission:  22           Discharging to Facility/ Agency   Name: [facility]  Address: [address] [place] [address]  Phone: [phone]    / signature: Electronically signed by Billy Bledsoe RN on 1/19/23 at 2:58 PM EST    PHYSICIAN SECTION    Prognosis: Good    Condition at Discharge: Stable    Rehab Potential (if transferring to Rehab): Good    Recommended Labs or Other Treatments After Discharge: CRP weekly, CBC with diff weekly, creatinine weekly; all for 6 weeks    Physician Certification: I certify the above information and transfer of Donna Hedrick  is necessary for the continuing treatment of the diagnosis listed and that she requires [facility] for greater 30 days.      Update Admission H&P: No change in H&P    PHYSICIAN SIGNATURE:  Electronically signed by Farhan Germain DO on 1/19/23 at 6:13 PM EST

## 2023-01-20 ENCOUNTER — APPOINTMENT (OUTPATIENT)
Dept: GENERAL RADIOLOGY | Age: 68
End: 2023-01-20
Payer: MEDICARE

## 2023-01-20 VITALS
BODY MASS INDEX: 53.16 KG/M2 | OXYGEN SATURATION: 97 % | WEIGHT: 263.67 LBS | HEIGHT: 59 IN | SYSTOLIC BLOOD PRESSURE: 132 MMHG | DIASTOLIC BLOOD PRESSURE: 60 MMHG | HEART RATE: 70 BPM | TEMPERATURE: 98.2 F | RESPIRATION RATE: 17 BRPM

## 2023-01-20 LAB
ANION GAP SERPL CALCULATED.3IONS-SCNC: 8 MMOL/L (ref 9–17)
BUN BLDV-MCNC: 14 MG/DL (ref 8–23)
C-REACTIVE PROTEIN: 77.1 MG/L (ref 0–5)
CALCIUM IONIZED: 1.08 MMOL/L (ref 1.13–1.33)
CALCIUM SERPL-MCNC: 8.6 MG/DL (ref 8.6–10.4)
CHLORIDE BLD-SCNC: 92 MMOL/L (ref 98–107)
CO2: 34 MMOL/L (ref 20–31)
CREAT SERPL-MCNC: 0.62 MG/DL (ref 0.5–0.9)
GFR SERPL CREATININE-BSD FRML MDRD: >60 ML/MIN/1.73M2
GLUCOSE BLD-MCNC: 255 MG/DL (ref 65–105)
GLUCOSE BLD-MCNC: 262 MG/DL (ref 65–105)
GLUCOSE BLD-MCNC: 272 MG/DL (ref 65–105)
GLUCOSE BLD-MCNC: 281 MG/DL (ref 70–99)
HCT VFR BLD CALC: 33.4 % (ref 36.3–47.1)
HEMOGLOBIN: 9.8 G/DL (ref 11.9–15.1)
MAGNESIUM: 1.9 MG/DL (ref 1.6–2.6)
MCH RBC QN AUTO: 27.6 PG (ref 25.2–33.5)
MCHC RBC AUTO-ENTMCNC: 29.3 G/DL (ref 28.4–34.8)
MCV RBC AUTO: 94.1 FL (ref 82.6–102.9)
NRBC AUTOMATED: 0 PER 100 WBC
PDW BLD-RTO: 13.1 % (ref 11.8–14.4)
PLATELET # BLD: 253 K/UL (ref 138–453)
PMV BLD AUTO: 9.9 FL (ref 8.1–13.5)
POTASSIUM SERPL-SCNC: 3.7 MMOL/L (ref 3.7–5.3)
RBC # BLD: 3.55 M/UL (ref 3.95–5.11)
SODIUM BLD-SCNC: 134 MMOL/L (ref 135–144)
WBC # BLD: 7.7 K/UL (ref 3.5–11.3)

## 2023-01-20 PROCEDURE — 36415 COLL VENOUS BLD VENIPUNCTURE: CPT

## 2023-01-20 PROCEDURE — 6360000002 HC RX W HCPCS

## 2023-01-20 PROCEDURE — 6370000000 HC RX 637 (ALT 250 FOR IP)

## 2023-01-20 PROCEDURE — 2580000003 HC RX 258: Performed by: STUDENT IN AN ORGANIZED HEALTH CARE EDUCATION/TRAINING PROGRAM

## 2023-01-20 PROCEDURE — 99232 SBSQ HOSP IP/OBS MODERATE 35: CPT | Performed by: INTERNAL MEDICINE

## 2023-01-20 PROCEDURE — 86140 C-REACTIVE PROTEIN: CPT

## 2023-01-20 PROCEDURE — 99239 HOSP IP/OBS DSCHRG MGMT >30: CPT | Performed by: STUDENT IN AN ORGANIZED HEALTH CARE EDUCATION/TRAINING PROGRAM

## 2023-01-20 PROCEDURE — 71045 X-RAY EXAM CHEST 1 VIEW: CPT

## 2023-01-20 PROCEDURE — 6360000002 HC RX W HCPCS: Performed by: STUDENT IN AN ORGANIZED HEALTH CARE EDUCATION/TRAINING PROGRAM

## 2023-01-20 PROCEDURE — 2700000000 HC OXYGEN THERAPY PER DAY

## 2023-01-20 PROCEDURE — 99232 SBSQ HOSP IP/OBS MODERATE 35: CPT | Performed by: NURSE PRACTITIONER

## 2023-01-20 PROCEDURE — 80048 BASIC METABOLIC PNL TOTAL CA: CPT

## 2023-01-20 PROCEDURE — 2580000003 HC RX 258

## 2023-01-20 PROCEDURE — 94761 N-INVAS EAR/PLS OXIMETRY MLT: CPT

## 2023-01-20 PROCEDURE — 83735 ASSAY OF MAGNESIUM: CPT

## 2023-01-20 PROCEDURE — 85027 COMPLETE CBC AUTOMATED: CPT

## 2023-01-20 PROCEDURE — 82947 ASSAY GLUCOSE BLOOD QUANT: CPT

## 2023-01-20 PROCEDURE — 82330 ASSAY OF CALCIUM: CPT

## 2023-01-20 RX ADMIN — PANTOPRAZOLE SODIUM 40 MG: 40 TABLET, DELAYED RELEASE ORAL at 08:54

## 2023-01-20 RX ADMIN — DIPHENHYDRAMINE HCL 25 MG: 25 TABLET ORAL at 03:00

## 2023-01-20 RX ADMIN — LEVOTHYROXINE SODIUM 50 MCG: 50 TABLET ORAL at 08:53

## 2023-01-20 RX ADMIN — INSULIN LISPRO 4 UNITS: 100 INJECTION, SOLUTION INTRAVENOUS; SUBCUTANEOUS at 09:31

## 2023-01-20 RX ADMIN — FUROSEMIDE 40 MG: 40 TABLET ORAL at 08:53

## 2023-01-20 RX ADMIN — GABAPENTIN 400 MG: 400 CAPSULE ORAL at 08:53

## 2023-01-20 RX ADMIN — BUDESONIDE AND FORMOTEROL FUMARATE DIHYDRATE 2 PUFF: 80; 4.5 AEROSOL RESPIRATORY (INHALATION) at 07:53

## 2023-01-20 RX ADMIN — CEFTRIAXONE SODIUM 2000 MG: 10 INJECTION, POWDER, FOR SOLUTION INTRAVENOUS at 12:26

## 2023-01-20 RX ADMIN — Medication 81 MG: at 08:52

## 2023-01-20 RX ADMIN — SODIUM CHLORIDE, PRESERVATIVE FREE 10 ML: 5 INJECTION INTRAVENOUS at 08:54

## 2023-01-20 RX ADMIN — AMIODARONE HYDROCHLORIDE 200 MG: 200 TABLET ORAL at 08:51

## 2023-01-20 RX ADMIN — ENOXAPARIN SODIUM 30 MG: 100 INJECTION SUBCUTANEOUS at 08:52

## 2023-01-20 RX ADMIN — AMIODARONE HYDROCHLORIDE 200 MG: 200 TABLET ORAL at 14:49

## 2023-01-20 RX ADMIN — METOPROLOL TARTRATE 12.5 MG: 25 TABLET ORAL at 08:54

## 2023-01-20 RX ADMIN — OXYCODONE HYDROCHLORIDE AND ACETAMINOPHEN 1 TABLET: 5; 325 TABLET ORAL at 03:00

## 2023-01-20 RX ADMIN — INSULIN LISPRO 8 UNITS: 100 INJECTION, SOLUTION INTRAVENOUS; SUBCUTANEOUS at 12:46

## 2023-01-20 RX ADMIN — ISOSORBIDE MONONITRATE 60 MG: 60 TABLET, EXTENDED RELEASE ORAL at 08:53

## 2023-01-20 RX ADMIN — GABAPENTIN 400 MG: 400 CAPSULE ORAL at 14:49

## 2023-01-20 RX ADMIN — CLOPIDOGREL 75 MG: 75 TABLET, FILM COATED ORAL at 08:55

## 2023-01-20 RX ADMIN — BUSPIRONE HYDROCHLORIDE 10 MG: 10 TABLET ORAL at 08:52

## 2023-01-20 ASSESSMENT — PAIN SCALES - GENERAL
PAINLEVEL_OUTOF10: 0
PAINLEVEL_OUTOF10: 2
PAINLEVEL_OUTOF10: 7

## 2023-01-20 ASSESSMENT — ENCOUNTER SYMPTOMS
NAUSEA: 0
VOMITING: 0
CONSTIPATION: 0
EYE REDNESS: 0
COUGH: 1
CHOKING: 0
WHEEZING: 0
ALLERGIC/IMMUNOLOGIC NEGATIVE: 1
EYE ITCHING: 0
ABDOMINAL PAIN: 0
PHOTOPHOBIA: 0
TROUBLE SWALLOWING: 0
STRIDOR: 0
SHORTNESS OF BREATH: 0
ABDOMINAL DISTENTION: 0
APNEA: 0
CHEST TIGHTNESS: 0
COLOR CHANGE: 1
COUGH: 0
NAUSEA: 1
EYE DISCHARGE: 0
VOICE CHANGE: 0
SHORTNESS OF BREATH: 1
ANAL BLEEDING: 0

## 2023-01-20 NOTE — PLAN OF CARE
Patient seen and examined at bedside. No acute events reported overnight. Discharge orders were placed yesterday, however transport could not be set up to patient's skilled nursing facility. Transport is currently scheduled today for 3 PM.  Patient this morning feels better, denies chest pain, shortness of breath, abdominal pain, nausea and vomiting.     [facility] Medicine Resident, PGY 2    1/20/2023  10:22 AM

## 2023-01-20 NOTE — PROGRESS NOTES
PULMONARY & CRITICAL CARE MEDICINE PROGRESS NOTE     Patient:  Axel Rodgers  MRN: [id number]  Admit date: 1/12/2023  Primary Care Physician: Maribel Gillespie MD  Consulting Physician: Rebecca Rodriguez DO  CODE Status: Full Code  LOS: 8     SUBJECTIVE     Chief Complaint/ Reason for consult:    large pleural effusion    Hospital Course: The patient is a 78 y.o. female with a history of hypertension, CAD s/p stents, s/p CABG 11/28/2022, hypothyroidism came to ED with chief complaint of chest pain and shortness of breath. Patient is known to CT surgery as she got CABG on 11/28/2022 and was recently seen in their office on 12/21/2022, during that visit patient was doing well, had no respiratory or cardiac issues. Patient was afebrile, NSR /55 and was room air was eventually put on 3 L nasal cannula due to respiratory distress, patient is on 1 L oxygen at home. Initial lab work show hyperglycemia with glucose 260, Pro-Jame 0.53.  proBNP 1814, troponin 36. WBC 16.7, hemoglobin 13.1, platelet 202. In the ED CT chest revealed large left pleural effusion with near collapse of left lung. ID, pulmonary and CT surgery were consulted. Patient was chest tube placed by IR on 1/13/2023. Patient was getting Zosyn as per ID recommendation. Interval History:  01/20/23  I seen the patient today, chart reviewed, last imaging studies seen and labs reviewed. Overnight she is afebrile T-max is 98.8 she is hemodynamically stable no acute hypoxic events were reported she is on 2 L nasal cannula maintaining saturation 96% to 97%. Sternal wound VAC is present. Patient complains of only mild cough does have weakness shortness of breath on ambulating she is out of bed to chair denies dysphagia denies purulent sputum according patient she is doing Acapella. She is a status post chest tube removal on 01/17/2023. She is currently on Rocephin 2 g once daily per infectious disease.   She is on Lasix 40 mg twice daily urine output 1100 mL in last 24-hour    Chest x-ray on 2023 and 2023 shows similar-appearing pleural parenchymal change with decreased volume on the left side with left pleural effusion with atelectasis without much change on chest x-ray. Patient had a CT of the chest done on 2023 which shows a small loculated effusion with left basilar atelectasis, by IR thoracentesis done only 20 mm fluid was withdrawn by IR. Labs show sodium 134 potassium 3.7 BUN 14 creatinine 0.62 WBC 7.7 hemoglobin 9.8. Review Of Systems:  Review of Systems   Constitutional:  Positive for fatigue. HENT:  Negative for congestion, nosebleeds, trouble swallowing and voice change. Eyes:  Negative for visual disturbance. Respiratory:  Positive for cough and shortness of breath. Negative for apnea, chest tightness and wheezing. Cardiovascular:  Positive for chest pain. Negative for leg swelling. Gastrointestinal:  Negative for abdominal pain, nausea and vomiting. Genitourinary:  Negative for difficulty urinating. Allergic/Immunologic: Negative. Neurological:  Negative for dizziness, tremors and light-headedness. Hematological:  Negative for adenopathy. Does not bruise/bleed easily. Psychiatric/Behavioral: Negative. OBJECTIVE     PaO2/FiO2 RATIO:  No results for input(s): POCPO2 in the last 72 hours.          VITAL SIGNS:   LAST:  /61   Pulse 76   Temp 98.8 °F (37.1 °C)   Resp 14   Ht 4' 11\" (1.499 m)   Wt 263 lb 10.7 oz (119.6 kg)   SpO2 97%   BMI 53.25 kg/m²   8-24 HR RANGE:  TEMP Temp  Av.2 °F (36.8 °C)  Min: 97.7 °F (36.5 °C)  Max: 98.8 °F (97.3 °C)   BP Systolic (85AQQ), QBO:432 , Min:109 , MTS:737      Diastolic (72DEJ), XTP:69, Min:56, Max:80     PULSE Pulse  Av.4  Min: 67  Max: 81   RR Resp  Av  Min: 14  Max: 14   O2 SAT SpO2  Av.3 %  Min: 96 %  Max: 97 %   OXYGEN DELIVERY O2 Flow Rate (L/min)  Av L/min  Min: 2 L/min  Max: 2 L/min        Systemic Examination:   Physical Exam -  Constitutional:  Alert, cooperative and no distress. Mental Status:  Oriented to person, place and time and normal affect. Lungs: Left-sided chest tube is present. Sternal wound VAC is present. Air entry is present bilaterally decreased breath sound in left lower lung field as compared to right. Normal effort. Heart:  Regular rate and rhythm, no murmur. Abdomen:  Soft, nontender, nondistended, normal bowel sounds. Extremities: Mild bilateral edema, negative for redness, tenderness in the calves. Skin:  Warm, dry, no gross lesions or rashes.     DATA REVIEW     Medications: Current Inpatient  Scheduled Meds:   fentanNYL  25 mcg IntraVENous Once    cefTRIAXone (ROCEPHIN) IV  2,000 mg IntraVENous Q24H    sennosides-docusate sodium  2 tablet Oral BID    insulin glargine  70 Units SubCUTAneous Nightly    sodium chloride flush  5-40 mL IntraVENous 2 times per day    polyethylene glycol  17 g Oral Daily    pantoprazole  40 mg Oral Daily    clopidogrel  75 mg Oral Daily    enoxaparin  30 mg SubCUTAneous BID    furosemide  40 mg Oral BID    amiodarone  200 mg Oral TID    [Held by provider] amLODIPine  5 mg Oral Daily    aspirin  81 mg Oral Daily    atorvastatin  40 mg Oral Nightly    budesonide-formoterol  2 puff Inhalation BID    busPIRone  10 mg Oral BID    [Held by provider] citalopram  40 mg Oral Daily    gabapentin  400 mg Oral TID    isosorbide mononitrate  60 mg Oral Daily    levothyroxine  50 mcg Oral Daily    metoprolol tartrate  12.5 mg Oral BID    [Held by provider] traZODone  50 mg Oral Nightly    insulin lispro  0-16 Units SubCUTAneous TID WC    insulin lispro  0-4 Units SubCUTAneous Nightly     Continuous Infusions:   sodium chloride      sodium chloride Stopped (01/18/23 1523)       INPUT/OUTPUT:  In: 600 [P.O.:600]  Out: 1100 [Urine:1100]  Date 01/20/23 0000 - 01/20/23 2359   Shift 6778-9290 8635-7010 8976-3124 24 Hour Total   INTAKE   Shift Total(mL/kg)       OUTPUT Urine(mL/kg/hr) 650(0.7)   650   Shift Total(mL/kg) 650(5.4)   650(5.4)   Weight (kg) 119.6 119.6 119.6 119.6          LABS:  ABGs:   No results for input(s): POCPH, POCPCO2, POCPO2, POCHCO3, PQYB5TDU in the last 72 hours. CBC:   Recent Labs     01/18/23  0552 01/19/23  0704 01/20/23  0712   WBC 7.9 7.9 7.7   HGB 11.1* 11.1* 9.8*   HCT 35.6* 37.8 33.4*   MCV 88.8 92.4 94.1    269 253   RBC 4.01 4.09 3.55*   MCH 27.7 27.1 27.6   MCHC 31.2 29.4 29.3   RDW 13.3 13.2 13.1       CRP:   Recent Labs     01/19/23  1234 01/20/23  0712   CRP 70.5* 77.1*     LDH:   No results for input(s): LDH in the last 72 hours. BMP:   Recent Labs     01/18/23  0552 01/19/23  0704 01/20/23  0712    138 134*   K 3.4* 3.6* 3.7   CL 93* 95* 92*   CO2 32* 34* 34*   BUN 16 15 14   CREATININE 0.74 0.70 0.62   GLUCOSE 99 159* 281*       Liver Function Test:   No results for input(s): PROT, LABALBU, ALT, AST, GGT, ALKPHOS, BILITOT in the last 72 hours. Coagulation Profile:   No results for input(s): INR, PROTIME, APTT in the last 72 hours. D-Dimer:  No results for input(s): DDIMER in the last 72 hours. Lactic Acid:  No results for input(s): LACTA in the last 72 hours. Cardiac Enzymes:  No results for input(s): CKTOTAL, CKMB, CKMBINDEX, TROPONINI in the last 72 hours. Invalid input(s): TROPONIN, HSTROP  BNP/ProBNP:   No results for input(s): BNP, PROBNP in the last 72 hours. Triglycerides:  No results for input(s): TRIG in the last 72 hours. Microbiology:  Urine Culture:  No components found for: CURINE  Blood Culture:  No components found for: CBLOOD, CFUNGUSBL  Sputum Culture:  No components found for: CSPUTUM  No results for input(s): SPECDESC, SPECIAL, CULTURE, STATUS, ORG, CDIFFTOXPCR, CAMPYLOBPCR, SALMONELLAPC, SHIGAPCR, SHIGELLAPCR, MPNEUG, MPNEUM, LACTOQL in the last 72 hours.     No results for input(s): SPUTUM, SPECDESC, SPECIAL, CULTURE, STATUS, ORG, CDIFFTOXPCR, MPNEUM, MPNEUG in the last 72 hours.    Invalid input(s): CURINE, CBLOOD, CFUNGUSBL       Pathology:    Radiology Reports:  XR CHEST PORTABLE   Preliminary Result   1. New right arm PICC with distal tip at the cavoatrial junction. 2.  Otherwise no significant interval change. Moderate left pleural effusion   and patchy airspace opacities throughout the left lung. XR CHEST PORTABLE   Final Result   Interval removal of the left chest tube with moderate left pleural effusion,   mildly increased from previous radiographs. Unchanged patchy airspace   opacities in the left lung. CT CHEST WO CONTRAST   Final Result   Limited chest CT shows a relatively small residual loculated left pleural   effusion, considerably improved since the prior CT. There is adjacent left   lower lobe atelectasis/consolidation. Ultrasound shows a relatively small loculated/septated pleural effusion. Ultrasound-guided thoracentesis performed as described above. Findings were discussed with FLORIDALMA MCGEE at 2:30 pm on 1/18/2023. US THORACENTESIS Which side should the procedure be performed? Left   Final Result   Limited chest CT shows a relatively small residual loculated left pleural   effusion, considerably improved since the prior CT. There is adjacent left   lower lobe atelectasis/consolidation. Ultrasound shows a relatively small loculated/septated pleural effusion. Ultrasound-guided thoracentesis performed as described above. Findings were discussed with FLORIDALMA MCGEE at 2:30 pm on 1/18/2023. IR FLUOROSCOPY LESS THAN 1 HOUR   Final Result   Limited fluoroscopy shows the chest tube to be out of the thoracic cavity. XR CHEST PORTABLE   Final Result   Left chest tube extrathoracic, with slightly increased opacity of the left   hemithorax with a similar differential diagnosis.       The findings were sent to the Radiology Results [address] at 8:39   am on 1/18/2023 to be communicated to a licensed caregiver. XR CHEST PORTABLE   Final Result   No significant interval change. Left chest tube in place with small to   moderate left pleural effusion and patchy opacities in the left lung. IR CHEST TUBE INSERTION   Final Result   Successful up sizing of left-sided chest drain. Fluoroscopic images show   majority of the pleural effusion has resolved. There may be some residual   atelectasis or consolidation in the left base. There did not appear to be   much fluid being aspirated. If tube is not draining would suggest follow-up   CT scan. XR CHEST PORTABLE   Final Result   Left chest tube remains in place. Interval reduction of left pleural   effusion and improved aeration of the left lung. XR CHEST PORTABLE   Final Result   Near complete opacification of the left hemithorax with decreased air   compared to prior exam.         XR CHEST PORTABLE   Final Result   Little change from prior study. Significant left lung opacity with left   hydropneumothorax suspected. Slight mediastinal shift toward the right is   noted diminished from prior study. Right lung is clear. XR CHEST PORTABLE   Final Result   Interval left chest tube placement with persistent opacification of the left   chest.  There is some lucency in the left lower chest, which could represent   hydropneumothorax. IR CHEST TUBE INSERTION   Final Result   Successful percutaneous placement of a 10 [place] left pleural drain. There is marked loculation of the left pleural collection indicating complex   pleural fluid. Small bore drainage catheter may not successfully drained the   entire pleural collection given its complex nature. CT CHEST W CONTRAST   Final Result   1. Diastasis of median sternotomy with air in the soft tissues, and   appearance of abscess formation/dehiscence dural to the sternotomy. Air is   present in the mediastinum.       2.  Large left pleural effusion with almost complete collapse of the left   lobe of the lung. Bronchial wall thickening is noted with material within   the bronchi likely mucous plugging. 3.  Hepatic steatosis. 4.  Small gallstones in the gallbladder. 5.  2 cm left adrenal mass with indeterminate Hounsfield numbers. Further   workup using adrenal CT protocol or MRI advised when the patient's condition   permits. RECOMMENDATIONS:   Advise adrenal protocol CT or MRI to evaluate a left adrenal mass. XR CHEST PORTABLE   Final Result   Progressive left pleural effusion with nearly complete opacification of the   left hemithorax. XR CHEST PORTABLE    (Results Pending)        Echocardiogram:   No results found for this or any previous visit. ASSESSMENT AND PLAN     Assessment:    // Acute hypoxic respiratory failure improving  //Left lobar collapse secondary to effusion  //Large left loculated effusion s/p chest tube 1/13/2023  //CAD s/p CABG  //Sternal wound s/p debridement and wound VAC  //Hypertension  //DM  //Probable obstructive sleep apnea        Plan:     Patient is currently on 2 L nasal cannula and maintaining saturation. Wean O2 to keep saturation above 90%. Sternal wound per CT surgery. s/p sternal wound debridement with washout and wound VAC placement by CTS. CT surgery had attempted tPA/dornase alpha with minimal drainage but now Patient had she is Status post chest tube/status post dornase jen tPA and status post removal of the chest tube on 01/17/2023. She continues to have a small loculated pleural effusion and pleural parenchymal change on chest x-ray and on CT  She is currently on Rocephin 2 g once daily. Follow-up with infectious disease. Blood, wound and body fluid cultures were negative  On Zosyn as per ID recommendation. Follow-up with infectious disease. Blood, wound and body fluid cultures negative so far.   DVT prophylaxis on Lovenox 30 mg twice daily  Encourage incentive spirometry deep breathing and cough and ambulation physical therapy. On Lasix monitor intake and output and electrolytes. We will continue to follow. I will discuss with attending. Lilian Blue MD.  Pulmonary critical care attending  Viola Mcfadden         1/20/2023, 11:19 AM    Please note that this chart was generated using voice recognition Dragon dictation software. Although every effort was made to ensure the accuracy of this automated transcription, some errors in transcription may have occurred.

## 2023-01-20 NOTE — PLAN OF CARE
Problem: Chronic Conditions and Co-morbidities  Goal: Patient's chronic conditions and co-morbidity symptoms are monitored and maintained or improved  1/19/2023 2142 by Vitaliy Akbar RN  Outcome: Progressing  Flowsheets (Taken 1/19/2023 2000)  Care Plan - Patient's Chronic Conditions and Co-Morbidity Symptoms are Monitored and Maintained or Improved: Monitor and assess patient's chronic conditions and comorbid symptoms for stability, deterioration, or improvement  1/19/2023 1259 by Benji Duran RN  Outcome: Progressing  Flowsheets (Taken 1/19/2023 0900)  Care Plan - Patient's Chronic Conditions and Co-Morbidity Symptoms are Monitored and Maintained or Improved: Monitor and assess patient's chronic conditions and comorbid symptoms for stability, deterioration, or improvement     Problem: Pain  Goal: Verbalizes/displays adequate comfort level or baseline comfort level  1/19/2023 2142 by Vitaliy Akbar RN  Outcome: Progressing  1/19/2023 1259 by Benji Duran RN  Outcome: Progressing     Problem: Skin/Tissue Integrity  Goal: Absence of new skin breakdown  Description: 1. Monitor for areas of redness and/or skin breakdown  2. Assess vascular access sites hourly  3. Every 4-6 hours minimum:  Change oxygen saturation probe site  4. Every 4-6 hours:  If on nasal continuous positive airway pressure, respiratory therapy assess nares and determine need for appliance change or resting period.   1/19/2023 2142 by Vitaliy Akbar RN  Outcome: Progressing  1/19/2023 1259 by Benji Duran RN  Outcome: Progressing     Problem: Safety - Adult  Goal: Free from fall injury  1/19/2023 2142 by Vitaliy Akbar RN  Outcome: Progressing  1/19/2023 1259 by Benji Duran RN  Outcome: Progressing     Problem: ABCDS Injury Assessment  Goal: Absence of physical injury  1/19/2023 2142 by Vitaliy Akbar RN  Outcome: Progressing  1/19/2023 1259 by Benji Duran RN  Outcome: Progressing     Problem: Discharge Planning  Goal: Discharge to home or other facility with appropriate resources  1/19/2023 2142 by Blanch Buerger, RN  Outcome: Progressing  Flowsheets (Taken 1/19/2023 2000)  Discharge to home or other facility with appropriate resources: Identify barriers to discharge with patient and caregiver  1/19/2023 1259 by Denia Rojas RN  Outcome: Progressing  Flowsheets (Taken 1/19/2023 0900)  Discharge to home or other facility with appropriate resources:   Identify barriers to discharge with patient and caregiver   Identify discharge learning needs (meds, wound care, etc)     Problem: Respiratory - Adult  Goal: Achieves optimal ventilation and oxygenation  1/19/2023 2142 by Blanch Buerger, RN  Outcome: Progressing  Flowsheets  Taken 1/19/2023 2000 by Blanch Buerger, RN  Achieves optimal ventilation and oxygenation: Assess for changes in respiratory status  Taken 1/19/2023 1300 by Denia Rojas RN  Achieves optimal ventilation and oxygenation: Assess for changes in respiratory status  1/19/2023 1259 by Denia Rojas RN  Outcome: Progressing  Flowsheets (Taken 1/19/2023 0900)  Achieves optimal ventilation and oxygenation:   Assess for changes in respiratory status   Assess for changes in mentation and behavior   Position to facilitate oxygenation and minimize respiratory effort     Problem: Skin/Tissue Integrity - Adult  Goal: Skin integrity remains intact  1/19/2023 2142 by Blanch Buerger, RN  Outcome: Progressing  Flowsheets (Taken 1/19/2023 2000)  Skin Integrity Remains Intact: Monitor for areas of redness and/or skin breakdown  1/19/2023 1259 by Denia Rojas RN  Outcome: Progressing  Flowsheets (Taken 1/19/2023 0900)  Skin Integrity Remains Intact: Monitor for areas of redness and/or skin breakdown  Goal: Incisions, wounds, or drain sites healing without S/S of infection  1/19/2023 2142 by Blanch Buerger, RN  Outcome: Progressing  Flowsheets (Taken 1/19/2023 2000)  Incisions, Wounds, or Drain Sites Healing Without Sign and Symptoms of Infection: TWICE DAILY: Assess and document skin integrity  1/19/2023 1259 by Elyssa Lincoln RN  Outcome: Progressing  Flowsheets (Taken 1/19/2023 0900)  Incisions, Wounds, or Drain Sites Healing Without Sign and Symptoms of Infection: TWICE DAILY: Assess and document skin integrity  Goal: Oral mucous membranes remain intact  1/19/2023 2142 by Carl Dutton RN  Outcome: Progressing  Flowsheets (Taken 1/19/2023 1300 by Elyssa Lincoln RN)  Oral Mucous Membranes Remain Intact: Assess oral mucosa and hygiene practices  1/19/2023 1259 by Elyssa Lincoln RN  Outcome: Progressing  Flowsheets (Taken 1/19/2023 0900)  Oral Mucous Membranes Remain Intact: Assess oral mucosa and hygiene practices     Problem: Nutrition Deficit:  Goal: Optimize nutritional status  1/19/2023 2142 by Carl Dutton RN  Outcome: Progressing  1/19/2023 1259 by Elyssa Lincoln RN  Outcome: Progressing

## 2023-01-20 NOTE — PROGRESS NOTES
[facility] -   Infectious diseases evaluation  admission date 1/12/2023    reason for consultation:   Sternal infection    Impression :   Current:  Surgical wound dehiscence and infection w abscess - lower wound scabbed and infected - tan fluid seen today in bag   CAD, CABG November 2022  I/D lower sternal wound 1/13 - VAC  Left lobar collapse and mucous plug  Large Left loculated effusion - could be post op loculation - possibly collapsing the left lung  Left chest tube 1/13  Left adrenal tumor  Lactic acidosis  Bandemia 16    Other:    Discussion / summary of stay / plan of care   78 F s/p CABG last year. Coming in with chest pain. Imaging revealed infection at surgical inscision. Patient also had chest tube placed in LL due to large left loculated effusion. Recommendations   Cx from the sternal wound w cx neg pus  BC still neg  MRSA nasal ng    Treating the sternal OM and the left lobar pneumonia   zosyn 1/12 stop 1/17 1/17 - downsize to ceftriaxone 2 g daily x 6 weeks for sternal osteo till 2/28  VAC in place  FU CRP  reconsiled    left loculated effusion 1/15 - left chest tube -TPAse --left thoracentesis 1/18 - 1/18 left chest tube out      Infection Control Recommendations   Carpenter Precautions  Contact Isolation     Antimicrobial Stewardship Recommendations   Simplification of therapy  Targeted therapy      History of Present Illness:   Initial history:  John Johns is a 78y.o.-year-old female who had a CABG 11/2022, comes in with worsening chest pain, tightness, cough nonproductive nausea vomiting and finally fell twice in the last 2 days and hit her head due to dizziness.   She also describes diffuse abdominal pain and some shortness of breath with exertion, no fever or chills    Drainage from the surgical wound, with pain at the surgical site  Comes into the emergency room, started on 1 L of uses oxygen since her bypass, 1 L at home, she is diabetic on insulin, cirrhosis and obese, SHERITA    In the ER, the surgical wound looked infected with some drainage and swelling of the tissues. X-ray showed opacification of the left lung. CT shows a left adrenal mass, suggested an adrenal imaging    Patient started on antibiotic, infectious consulted for sternal wound dehiscence and infection        Interval changes  1/20/2023   Patient Vitals for the past 8 hrs:   BP Temp Pulse Resp SpO2   01/20/23 0851 124/61 -- 76 -- --   01/20/23 0845 124/61 98.8 °F (37.1 °C) 75 -- 97 %   01/20/23 0755 -- -- 74 -- 96 %   01/20/23 0412 129/63 98 °F (36.7 °C) 67 14 96 %   1/14  Sternal debridement with wound vac placement 1/13  Culture no growth to this date   Blood culture no growth   Pain better - ox 3    1/15  All cx neg and CXR new left opacification  Chest tube in place on the left x 1/14  Sternal wound w VAC  Comfortable and ox 3  On NC    1/16  All Cx  are negative so far. Ir to place another chest tube  Afberile HDS. Some tan fluids in wound dressing. VAC changed and wound underlying is clean  CTS had debrided and resected some sternal bone at that level and all cx are still neg    1/17  IR chest tube placed yesterday  Patient still has some tan fluid in her wound vac dressing  Wound care consulted  Currently afebrile hds. Cx still negative     1/18  Chest x-ray today reveals that the chest tube is extrathoracic. Patient going down to IR for adjustment. Wound VAC showing tan material up top I suspect that this material is fat rather than pus. Given that the patient has not shown any signs of sepsis nor do her labs reveal any elevated white blood cell count or any positive cultures. Patient okay to be discharged on ceftriaxone 2 g daily for 2 weeks. Follow-up with Dr. Rene Serrano outpatient. Discharge planning per primary team    1/19  Chest tube removed yesterday. Dossie Chalet still in place. Continue ceftriaxone for 2 week. Thoracentesis performed yesterday. Apparently patient is to be DC with wound vac. Afebrile HDS.     1/20  Afebrile. CXR showing moderate left pleural effusion with patchy airspace opacities t/o the left lung. No bacteria to sternal wound cx. MRSA Nasal swab negative. Feeling good. Denies any SOB, v/d.  C/o incisional pain, nausea. Discharging today. Summary of relevant labs:  Labs:  Lactic Acid, 4.7 High    WBC 7.9-7.7-  Procalcitonin0.53 High    Creatinine0.7-0.62  CRP 70     Micro:  BC  Imaging:  CT of the chest  .  2 cm left adrenal mass with indeterminate Hounsfield numbers. Further   workup using adrenal CT protocol or MRI advised when the patient's condition   permits. Air seen in the mediastinum, an abscess around the sternotomy area    2. Large left pleural effusion with almost complete collapse of the left   lobe of the lung. Bronchial wall thickening is noted with material within   the bronchi likely mucous plugging           I have personally reviewed the past medical history, past surgical history, medications, social history, and family history, and I haveupdated the database accordingly. Allergies:   Morphine, Shellfish-derived products, and Tape [adhesive tape]     Review of Systems:     Review of Systems   Constitutional:  Negative for activity change, appetite change, chills, fatigue, fever and unexpected weight change. HENT:  Negative for congestion, drooling and trouble swallowing. Eyes:  Negative for photophobia, discharge, redness and itching. Respiratory:  Negative for apnea, cough, choking, chest tightness, shortness of breath, wheezing and stridor. Cardiovascular:  Negative for chest pain, palpitations and leg swelling. Gastrointestinal:  Positive for nausea. Negative for abdominal distention, abdominal pain, anal bleeding and constipation. Endocrine: Negative for cold intolerance, heat intolerance and polyuria. Genitourinary:  Negative for dysuria and urgency.    Musculoskeletal:  Negative for arthralgias and myalgias. Skin:  Positive for color change and wound. Allergic/Immunologic: Negative for immunocompromised state. Neurological:  Negative for dizziness and numbness. Incisional pain. Hematological:  Negative for adenopathy. Psychiatric/Behavioral:  Negative for agitation. The patient is not nervous/anxious. Physical Examination :       Physical Exam  Vitals and nursing note reviewed. Constitutional:       General: She is not in acute distress. Appearance: Normal appearance. She is obese. She is not ill-appearing, toxic-appearing or diaphoretic. HENT:      Head: Normocephalic and atraumatic. Right Ear: External ear normal.      Left Ear: External ear normal.      Nose: Nose normal. No congestion or rhinorrhea. Mouth/Throat:      Mouth: Mucous membranes are moist.      Pharynx: Oropharynx is clear. No posterior oropharyngeal erythema. Eyes:      Extraocular Movements: Extraocular movements intact. Conjunctiva/sclera: Conjunctivae normal.      Pupils: Pupils are equal, round, and reactive to light. Cardiovascular:      Rate and Rhythm: Normal rate and regular rhythm. Pulses: Normal pulses. Heart sounds: Normal heart sounds. No murmur heard. No friction rub. No gallop. Pulmonary:      Effort: No respiratory distress. Breath sounds: Normal breath sounds. No stridor. No wheezing or rhonchi. Comments: Incisional tenderness. Chest:      Chest wall: Tenderness present. Abdominal:      General: Bowel sounds are normal. There is no distension. Palpations: Abdomen is soft. There is no mass. Tenderness: There is no abdominal tenderness. Hernia: No hernia is present. Genitourinary:     Comments: Urine jackie  Musculoskeletal:         General: No signs of injury. Cervical back: Neck supple. No rigidity or tenderness. Right lower leg: No edema. Left lower leg: No edema.    Skin:     General: Skin is warm and dry. Capillary Refill: Capillary refill takes less than 2 seconds. Coloration: Skin is not jaundiced or pale. Comments: Wound vac to sternal incision. Neurological:      Mental Status: She is alert and oriented to person, place, and time. Cranial Nerves: No cranial nerve deficit. Sensory: No sensory deficit.    Psychiatric:         Mood and Affect: Mood normal.       Past Medical History:     Past Medical History:   Diagnosis Date    Ambulates with cane     or walker    Anxiety     Borderline hypertension     CAD (coronary artery disease)     stents x 2 in 2020    Depression 07/28/2020    GERD (gastroesophageal reflux disease)     Heart attack ([facility] [address] 07/18/2020    Hypertension     Hypothyroidism     Neuropathy     Obesity     morbid    Osteoarthritis     Other cirrhosis of liver ([facility] [address] 07/27/2020    pt denies    Sleep apnea     possible    Type II or unspecified type diabetes mellitus without mention of complication, not stated as uncontrolled     Under care of service provider 11/18/2022    [facility]-last visit aug 2022    Under care of service provider 11/18/2022    cardiology-[facility]-last visit nov 2022    Vertebrogenic low back pain 03/29/2022       Past Surgical  History:     Past Surgical History:   Procedure Laterality Date    APPENDECTOMY      CARDIAC CATHETERIZATION      2 stents    CARDIAC CATHETERIZATION  11/01/2022    CARDIAC SURGERY N/A 1/14/2023    STERNAL WOUND WASHOUT, DEBRIDEMENT, WOUND VAC PLACEMENT performed by Diane Garcia MD at [facility]  07/2020    CORONARY ARTERY BYPASS GRAFT N/A 11/28/2022    CABG CORONARY ARTERY BYPASS X3 ON PUMP , ATA, ENDO VEIN HARVEST performed by Diane Garcia MD at [place] [address]  01/14/2023    STERNAL WOUND WASHOUT, DEBRIDEMENT, WOUND VAC PLACEMENT    DILATION AND CURETTAGE OF UTERUS      HIATAL HERNIA REPAIR      HYSTERECTOMY (CERVIX STATUS UNKNOWN) IR CHEST TUBE INSERTION  01/13/2023    IR CHEST TUBE INSERTION 1/13/2023 Angi Lora MD [facility] PROCED[facility]    IR CHEST TUBE INSERTION  1/16/2023    IR CHEST TUBE INSERTION 1/16/2023 Paula Singh MD [facility] SPECIAL PROCEDURES    THROAT SURGERY      POLYPS REMOVED FROM VOCAL CORD    TOTAL KNEE ARTHROPLASTY Right 01/06/2015    TOTAL KNEE ARTHROPLASTY Left 05/26/2015    UPPER GASTROINTESTINAL ENDOSCOPY         Medications:      fentanNYL  25 mcg IntraVENous Once    cefTRIAXone (ROCEPHIN) IV  2,000 mg IntraVENous Q24H    sennosides-docusate sodium  2 tablet Oral BID    insulin glargine  70 Units SubCUTAneous Nightly    sodium chloride flush  5-40 mL IntraVENous 2 times per day    polyethylene glycol  17 g Oral Daily    pantoprazole  40 mg Oral Daily    clopidogrel  75 mg Oral Daily    enoxaparin  30 mg SubCUTAneous BID    furosemide  40 mg Oral BID    amiodarone  200 mg Oral TID    [Held by provider] amLODIPine  5 mg Oral Daily    aspirin  81 mg Oral Daily    atorvastatin  40 mg Oral Nightly    budesonide-formoterol  2 puff Inhalation BID    busPIRone  10 mg Oral BID    [Held by provider] citalopram  40 mg Oral Daily    gabapentin  400 mg Oral TID    isosorbide mononitrate  60 mg Oral Daily    levothyroxine  50 mcg Oral Daily    metoprolol tartrate  12.5 mg Oral BID    [Held by provider] traZODone  50 mg Oral Nightly    insulin lispro  0-16 Units SubCUTAneous TID WC    insulin lispro  0-4 Units SubCUTAneous Nightly       Social History:     Social History     Socioeconomic History    Marital status:      Spouse name: Gretchen Aguayo    Number of children: 0    Years of education: Not on file    Highest education level: Not on file   Occupational History    Occupation: Retired      Employer: [employer]   Tobacco Use    Smoking status: Never    Smokeless tobacco: Never   Vaping Use    Vaping Use: Never used   Substance and Sexual Activity    Alcohol use: Yes     Comment: once a month    Drug use: No    Sexual activity: Yes     Partners: Male   Other Topics Concern    Not on file   Social History Narrative    Not on file     Social Determinants of Health     Financial Resource Strain: Low Risk     Difficulty of Paying Living Expenses: Not hard at all   Food Insecurity: No Food Insecurity    Worried About Running Out of Food in the Last Year: Never true    Ran Out of Food in the Last Year: Never true   Transportation Needs: Not on file   Physical Activity: Not on file   Stress: Not on file   Social Connections: Not on file   Intimate Partner Violence: Not on file   Housing Stability: Not on file       Family History:     Family History   Problem Relation Age of Onset    Heart Disease Mother     Arthritis Mother     Heart Disease Father     Arthritis Father     Cancer Father         \"oral\"    Diabetes Sister         gestational      Medical Decision Making:   I have independently reviewed/ordered the following labs:    CBC with Differential:   Recent Labs     01/19/23  0704 01/20/23  0712   WBC 7.9 7.7   HGB 11.1* 9.8*   HCT 37.8 33.4*    253     BMP:  Recent Labs     01/19/23  0704 01/19/23  1234 01/20/23  0712     --  134*   K 3.6*  --  3.7   CL 95*  --  92*   CO2 34*  --  34*   BUN 15  --  14   CREATININE 0.70  --  0.62   MG 2.1 1.8 1.9     Hepatic Function Panel:   No results for input(s): PROT, LABALBU, BILIDIR, IBILI, BILITOT, ALKPHOS, ALT, AST in the last 72 hours. No results for input(s): RPR in the last 72 hours. No results for input(s): HIV in the last 72 hours. No results for input(s): BC in the last 72 hours. Lab Results   Component Value Date/Time    CREATININE 0.62 01/20/2023 07:12 AM    GLUCOSE 281 01/20/2023 07:12 AM    GLUCOSE 250 03/30/2012 03:15 PM       Detailed results: Thank you for allowing us to participate in the care of this patient. Please call with questions.     This note is created with the assistance of a speech recognition program. While intending to generate adocument that actually reflects the content of the visit, the document can still have some errors including those of syntax and sound a like substitutions which may escape proof reading. It such instances, actual meaningcan be extrapolated by contextual diversion.     HAO Elizabeth - CNP  Office: [phone]  Perfect serve / office [phone]

## 2023-01-20 NOTE — CARE COORDINATION
AVS faxed to [facility].  Cami notified of transport time    Discharge [facility] Case Management Department  Written by: Narciso Obrien RN    Patient Name: Lucy Irwin  Attending Provider: Gareth Valladares DO  Admit Date: 2023  3:55 PM  MRN: [id number]  Account: [de-identified]                     : 1955  Discharge Date: 2023      Disposition: SNF    Narciso Obrien RN

## 2023-01-20 NOTE — DISCHARGE SUMMARY
Department of [address] [place] [address]    Discharge Summary      NAME:  Lesley Shen  :    MRN:  [id number]    Admit date:  2023  Discharge date:  2023    Admitting Physician:  Maida Hurst DO    Primary Diagnosis on Admission:   Present on Admission:   Incisional infection   Pleural effusion on left   Lactic acid acidosis   Bandemia   Sternal wound dehiscence   Abscess of sternal region   S/P CABG (coronary artery bypass graft)   CRP elevated   Community acquired pneumonia of left lower lobe of lung   Type 2 diabetes mellitus with complication, with long-term current use of insulin ([facility] [address]      Secondary Diagnoses:  does not have any pertinent problems on file. Admission Condition:  fair     Discharged Condition: good    Hospital Course:         52-year-old female with a past medical history of type 2 diabetes mellitus, GERD, neuropathy, multivessel CAD status post triple bypass CABG 2022 presented to the ED due to symptoms of chest pain which began after her surgery in November of last year, and has worsened 3 days before her admission. Her chest pain was central and she said it was nonradiating and more of a tightness, and was 8 out of 10 on presentation and improved with tramadol. Patient was found to have an infected CABG incision site and CT showed diastasis of median sternotomy with air in the soft tissues, and abscess formation. She was initially placed on Zosyn and vancomycin in the ED. she also had a left-sided pleural effusion for which chest tube was placed and removed prior to discharge    The patient received I&D on 2023, she also had washout with wound VAC placement on , she also received Percocet for pain control and fentanyl as needed.   ID then switch antibiotics to Rocephin    The patient was accepted to SNF and left to SNF with the PICC line inserted for continuation of antibiotic treatment      Consults:  pulmonary/intensive care, ID, and orthopedic surgery, CT surgery    Significant Diagnostic/theraputic interventions: Chest tube, IV antibiotics, PICC line, CT scan, thoracentesis, I&D      Disposition:   SNF    Instructions to Patient: Please take all your medications as advised, and if you feel any discharge or worsening symptoms from the chest wound, follow-up with your physician or return to ED     Follow up with Stephen Haynes MD in  2 weeks    Discharge Medications:       Medication List        START taking these medications      cefTRIAXone  infusion  Commonly known as: ROCEPHIN  Infuse 2,000 mg intravenously every 24 hours Compound per protocol     insulin glargine 100 UNIT/ML injection vial  Commonly known as: LANTUS  Inject 70 Units into the skin nightly  Replaces: Basaglar KwikPen 100 UNIT/ML injection pen     oxyCODONE-acetaminophen 5-325 MG per tablet  Commonly known as: Percocet  Take 1 tablet by mouth every 6 hours as needed for Pain for up to 5 days. Intended supply: 3 days. Take lowest dose possible to manage pain Max Daily Amount: 4 tablets            CONTINUE taking these medications      ALCOHOL PREP PADS     amiodarone 200 MG tablet  Commonly known as: CORDARONE  Take 1 tablet by mouth 3 times daily     amLODIPine 5 MG tablet  Commonly known as: NORVASC     aspirin 81 MG EC tablet  Take 1 tablet by mouth daily     atorvastatin 40 MG tablet  Commonly known as: LIPITOR  Take 1 tablet by mouth nightly     blood glucose monitor kit and supplies  Dispense sufficient amount for indicated testing frequency plus additional to accommodate PRN testing needs. Dispense all needed supplies to include: monitor, strips, lancing device, lancets, control solutions, alcohol swabs. blood glucose test strips  Test before meals and at bedtime.  DX: DM, on insulin     Breo Ellipta 100-25 MCG/ACT Aepb  Generic drug: Fluticasone Furoate-Vilanterol     busPIRone 10 MG tablet  Commonly known as: BUSPAR  TAKE ONE (1) TABLET BY MOUTH TWICE DAILY     clopidogrel 75 MG tablet  Commonly known as: PLAVIX  Take 1 tablet by mouth daily     empagliflozin 10 MG tablet  Commonly known as: Jardiance  TAKE 1 TABLET BY MOUTH EVERY DAY     furosemide 40 MG tablet  Commonly known as: Lasix  Take 1 tablet by mouth 2 times daily     gabapentin 400 MG capsule  Commonly known as: NEURONTIN  TAKE 1 CAPSULE BY MOUTH THREE TIMES DAILY     glimepiride 4 MG tablet  Commonly known as: AMARYL  TAKE 1 TABLET BY MOUTH TWICE DAILY *EMERGENCY REFILL*     Handicap Placard Misc  Is a permanent condition. DX: M19.90     insulin lispro (1 Unit Dial) 100 UNIT/ML Sopn  Commonly known as: HumaLOG KwikPen  Inject 3 Units into the skin 3 times daily (before meals) 125 - 150 2 units   151 - 200 4 units   201 - 250 6 units   251 - 300 8 units  301 - 350 10 units 351 - 400 12 units     Insulin Pen Needle 31G X 5 MM Misc  Commonly known as: B-D UF III MINI PEN NEEDLES  USE 1 PEN NEEDLE DAILY     isosorbide mononitrate 60 MG extended release tablet  Commonly known as: IMDUR     Krogecandace Lancets Thin Misc  Test before meals and at bedtime.  DX: DM, on insulin     levothyroxine 50 MCG tablet  Commonly known as: SYNTHROID  TAKE 1 TABLET BY MOUTH EVERY DAY     magnesium hydroxide 400 MG/5ML suspension  Commonly known as: Milk of Magnesia  Take 30 mLs by mouth daily as needed for Constipation     metoprolol tartrate 25 MG tablet  Commonly known as: LOPRESSOR  Take 0.5 tablets by mouth 2 times daily     omeprazole 20 MG delayed release capsule  Commonly known as: PRILOSEC  TAKE 1 CAPSULE BY MOUTH ONCE DAILY     potassium chloride 10 MEQ extended release tablet  Commonly known as: KLOR-CON M  Take 2 tablets by mouth daily            STOP taking these medications      Basaglar KwikPen 100 UNIT/ML injection pen  Generic drug: insulin glargine  Replaced by: insulin glargine 100 UNIT/ML injection vial     citalopram 40 MG tablet  Commonly known as: CELEXA     doxycycline monohydrate 100 MG capsule  Commonly known as: MONODOX     lidocaine 2 % jelly  Commonly known as: XYLOCAINE     miconazole 2 % cream  Commonly known as: MICOTIN     potassium chloride 10 MEQ extended release tablet  Commonly known as: KLOR-CON     sucralfate 1 GM tablet  Commonly known as: CARAFATE     traMADol 50 MG tablet  Commonly known as: ULTRAM     traZODone 50 MG tablet  Commonly known as: DESYREL     Zeasorb-AF 2 % powder  Generic drug: miconazole               Where to Get Your Medications        These medications were sent to [facility], [place]yn [place] [address] [place] [address]      Phone: [phone]   insulin glargine 100 UNIT/ML injection vial       You can get these medications from any pharmacy    Bring a paper prescription for each of these medications  cefTRIAXone  infusion  oxyCODONE-acetaminophen 5-325 MG per tablet         Send Copies to: Antonio Redding MD,       Note that over 30 minutes was spent in preparing discharge papers, discussing discharge with patient and family, medication review, etc.      Felipe Harman MD  Family Medicine Resident  Family Medicine Inpatient Service  1/20/2023 4:42 PM          Please note that this chart was generated using voice recognition Dragon dictation software.   Although every effort was made to ensure the accuracy of this automated transcription, some errors in transcription may have occurred

## 2023-01-21 LAB
CULTURE: ABNORMAL
CULTURE: ABNORMAL
DIRECT EXAM: ABNORMAL
DIRECT EXAM: ABNORMAL
SPECIMEN DESCRIPTION: ABNORMAL

## 2023-01-24 ENCOUNTER — TELEPHONE (OUTPATIENT)
Dept: FAMILY MEDICINE CLINIC | Age: 68
End: 2023-01-24

## 2023-01-27 NOTE — PROGRESS NOTES
Physician Progress Note      PATIENT:               Soren German  CSN #:                  [id number]  :                       1955  ADMIT DATE:       2023 3:55 PM  [address] [place] DATE:        2023 3:25 PM  RESPONDING  PROVIDER #:        Jose R Plasencia MD          QUERY TEXT:    Patient admitted with wound dehiscence from CABG. Per Op note dated   2023 documentation of debridement of the sternum with no notation as to   the type of instrumentation used and or if sternum debridement was excisional   or non excisional debridement. To accurately reflect the procedure performed   please document if debridement was excisional or nonexcisional and the   instrumentation that was used. Excisional debridement would need to have   instrumentation of a blade or scalpel used to cut out the bone: The medical record reflects the following:  Risk Factors: wound dehiscence of sternum form CABG. Clinical Indicators: OP note of 2023 - Cautery was used to excise the   fat. A pocket of purulent pus was identified. This was  drained out. Some bone and sternal wires were removed. No instrumentation   identified or notation if excisional vs non -excisional debridement of the   sternum bone  Treatment: Debridement of wound with cautery for removal of fat. Vancomycin   irrigation, wound vac, removal of bone and sternal wires. Thank you, please contact if questions. Anni Gill RN, LILIANA [email or web address] com  cell- [phone]  Options provided:  -- Nonexcisional debridement of bone  -- Excisional debridement of bone  -- Other - I will add my own diagnosis  -- Disagree - Not applicable / Not valid  -- Disagree - Clinically unable to determine / Unknown  -- Refer to Clinical Documentation Reviewer    PROVIDER RESPONSE TEXT:    Non-excisional debridement of bone of sternum was performed during procedure   on 2023.     Query created by: Ricardo Li on 2023 1:12 PM      Electronically signed by:  Colby Muñiz MD 1/27/2023 1:11 PM

## 2023-02-01 ENCOUNTER — TELEPHONE (OUTPATIENT)
Dept: INFECTIOUS DISEASES | Age: 68
End: 2023-02-01

## 2023-02-01 DIAGNOSIS — M86.9 STERNAL OSTEOMYELITIS (HCC): Primary | ICD-10-CM

## 2023-02-01 NOTE — TELEPHONE ENCOUNTER
Dr Lexi Guevara called back line. Requesting order for line to be placed as pts previous line has been removed. Called pt talked to her and her spouse Paige Barragan. Verified pt no longer has a line, last dose of Rocephin was on 1/31. Pt is receiving home care through [facility]. Pt cannot come to [facility] today for line to be placed, she can comeone tomorrow (2/2) before or after her f/u appt at 1145 at [facility]. Pts spouse stated they have Rocephin on had and it is scheduled to be delivered for each dose. Placed order for line, please sign pending order and I will schedule line placement.

## 2023-02-02 ENCOUNTER — OFFICE VISIT (OUTPATIENT)
Dept: CARDIOTHORACIC SURGERY | Age: 68
End: 2023-02-02

## 2023-02-02 ENCOUNTER — APPOINTMENT (OUTPATIENT)
Dept: CT IMAGING | Age: 68
DRG: 920 | End: 2023-02-02
Payer: MEDICARE

## 2023-02-02 ENCOUNTER — HOSPITAL ENCOUNTER (INPATIENT)
Age: 68
LOS: 4 days | Discharge: ANOTHER HOSPITAL | DRG: 920 | End: 2023-02-06
Attending: EMERGENCY MEDICINE | Admitting: HOSPITALIST
Payer: MEDICARE

## 2023-02-02 ENCOUNTER — APPOINTMENT (OUTPATIENT)
Dept: INTERVENTIONAL RADIOLOGY/VASCULAR | Age: 68
DRG: 920 | End: 2023-02-02
Payer: MEDICARE

## 2023-02-02 ENCOUNTER — APPOINTMENT (OUTPATIENT)
Dept: GENERAL RADIOLOGY | Age: 68
DRG: 920 | End: 2023-02-02
Payer: MEDICARE

## 2023-02-02 ENCOUNTER — HOSPITAL ENCOUNTER (OUTPATIENT)
Dept: ONCOLOGY | Age: 68
Discharge: HOME | DRG: 920 | End: 2023-02-02
Attending: INTERNAL MEDICINE | Admitting: INTERNAL MEDICINE
Payer: MEDICARE

## 2023-02-02 VITALS
TEMPERATURE: 98.6 F | RESPIRATION RATE: 16 BRPM | SYSTOLIC BLOOD PRESSURE: 128 MMHG | HEART RATE: 84 BPM | HEIGHT: 59 IN | DIASTOLIC BLOOD PRESSURE: 68 MMHG | OXYGEN SATURATION: 90 % | WEIGHT: 263 LBS | BODY MASS INDEX: 53.02 KG/M2

## 2023-02-02 DIAGNOSIS — T81.31XD SURGICAL WOUND BREAKDOWN, SUBSEQUENT ENCOUNTER: Primary | ICD-10-CM

## 2023-02-02 DIAGNOSIS — S21.101A STERNAL WOUND INFECTION: Primary | ICD-10-CM

## 2023-02-02 DIAGNOSIS — L08.9 SOFT TISSUE INFECTION: ICD-10-CM

## 2023-02-02 DIAGNOSIS — L08.9 STERNAL WOUND INFECTION: Primary | ICD-10-CM

## 2023-02-02 PROBLEM — J90 PLEURAL EFFUSION: Status: ACTIVE | Noted: 2023-02-02

## 2023-02-02 PROBLEM — M86.9 OSTEOMYELITIS (HCC): Status: ACTIVE | Noted: 2023-02-02

## 2023-02-02 LAB
ABSOLUTE EOS #: 0 K/UL (ref 0–0.4)
ABSOLUTE IMMATURE GRANULOCYTE: 0 K/UL (ref 0–0.3)
ABSOLUTE LYMPH #: 0.92 K/UL (ref 1–4.8)
ABSOLUTE MONO #: 1.85 K/UL (ref 0.1–0.8)
ALBUMIN SERPL-MCNC: 2.9 G/DL (ref 3.5–5.2)
ALBUMIN/GLOBULIN RATIO: 0.8 (ref 1–2.5)
ALP SERPL-CCNC: 136 U/L (ref 35–104)
ALT SERPL-CCNC: 19 U/L (ref 5–33)
ANION GAP SERPL CALCULATED.3IONS-SCNC: 13 MMOL/L (ref 9–17)
AST SERPL-CCNC: 46 U/L
BASOPHILS # BLD: 0 % (ref 0–2)
BASOPHILS ABSOLUTE: 0 K/UL (ref 0–0.2)
BILIRUB SERPL-MCNC: 0.6 MG/DL (ref 0.3–1.2)
BUN SERPL-MCNC: 9 MG/DL (ref 8–23)
CALCIUM SERPL-MCNC: 8.8 MG/DL (ref 8.6–10.4)
CHLORIDE SERPL-SCNC: 96 MMOL/L (ref 98–107)
CO2 SERPL-SCNC: 28 MMOL/L (ref 20–31)
CREAT SERPL-MCNC: 0.85 MG/DL (ref 0.5–0.9)
CRP SERPL HS-MCNC: 152.4 MG/L (ref 0–5)
EOSINOPHILS RELATIVE PERCENT: 0 % (ref 1–4)
ERYTHROCYTE [SEDIMENTATION RATE] IN BLOOD BY WESTERGREN METHOD: 45 MM/HR (ref 0–30)
GFR SERPL CREATININE-BSD FRML MDRD: >60 ML/MIN/1.73M2
GLUCOSE BLD-MCNC: 98 MG/DL (ref 65–105)
GLUCOSE SERPL-MCNC: 101 MG/DL (ref 70–99)
GLUCOSE, FLUID: 95 MG/DL
HCT VFR BLD AUTO: 38.7 % (ref 36.3–47.1)
HGB BLD-MCNC: 11.3 G/DL (ref 11.9–15.1)
IMMATURE GRANULOCYTES: 0 %
INR PPP: 1.1
LACTATE DEHYDROGENASE, FLUID: 288 U/L
LACTIC ACID, WHOLE BLOOD: 1.2 MMOL/L (ref 0.7–2.1)
LACTIC ACID, WHOLE BLOOD: 2.6 MMOL/L (ref 0.7–2.1)
LYMPHOCYTES # BLD: 7 % (ref 24–44)
MCH RBC QN AUTO: 27 PG (ref 25.2–33.5)
MCHC RBC AUTO-ENTMCNC: 29.2 G/DL (ref 28.4–34.8)
MCV RBC AUTO: 92.4 FL (ref 82.6–102.9)
MONOCYTES # BLD: 14 % (ref 1–7)
MORPHOLOGY: ABNORMAL
NRBC AUTOMATED: 0.3 PER 100 WBC
PARTIAL THROMBOPLASTIN TIME: 23.6 SEC (ref 20.5–30.5)
PDW BLD-RTO: 15.5 % (ref 11.8–14.4)
PLATELET # BLD AUTO: 277 K/UL (ref 138–453)
PMV BLD AUTO: 9.5 FL (ref 8.1–13.5)
POTASSIUM SERPL-SCNC: 4.3 MMOL/L (ref 3.7–5.3)
PROT SERPL-MCNC: 6.7 G/DL (ref 6.4–8.3)
PROTHROMBIN TIME: 11.8 SEC (ref 9.1–12.3)
RBC # BLD: 4.19 M/UL (ref 3.95–5.11)
SEG NEUTROPHILS: 79 % (ref 36–66)
SEGMENTED NEUTROPHILS ABSOLUTE COUNT: 10.43 K/UL (ref 1.8–7.7)
SODIUM SERPL-SCNC: 137 MMOL/L (ref 135–144)
SPECIMEN TYPE: NORMAL
TOTAL PROTEIN, BODY FLUID: 3.8 G/DL
TROPONIN I SERPL DL<=0.01 NG/ML-MCNC: 63 NG/L (ref 0–14)
TSH SERPL-ACNC: 2.44 UIU/ML (ref 0.3–5)
WBC # BLD AUTO: 13.2 K/UL (ref 3.5–11.3)

## 2023-02-02 PROCEDURE — 36415 COLL VENOUS BLD VENIPUNCTURE: CPT

## 2023-02-02 PROCEDURE — 84484 ASSAY OF TROPONIN QUANT: CPT

## 2023-02-02 PROCEDURE — 2700000000 HC OXYGEN THERAPY PER DAY

## 2023-02-02 PROCEDURE — 0W9B3ZZ DRAINAGE OF LEFT PLEURAL CAVITY, PERCUTANEOUS APPROACH: ICD-10-PCS | Performed by: FAMILY MEDICINE

## 2023-02-02 PROCEDURE — 83605 ASSAY OF LACTIC ACID: CPT

## 2023-02-02 PROCEDURE — 85610 PROTHROMBIN TIME: CPT

## 2023-02-02 PROCEDURE — 84157 ASSAY OF PROTEIN OTHER: CPT

## 2023-02-02 PROCEDURE — 2580000003 HC RX 258

## 2023-02-02 PROCEDURE — 85730 THROMBOPLASTIN TIME PARTIAL: CPT

## 2023-02-02 PROCEDURE — 83615 LACTATE (LD) (LDH) ENZYME: CPT

## 2023-02-02 PROCEDURE — 32555 ASPIRATE PLEURA W/ IMAGING: CPT

## 2023-02-02 PROCEDURE — 85652 RBC SED RATE AUTOMATED: CPT

## 2023-02-02 PROCEDURE — 6360000002 HC RX W HCPCS

## 2023-02-02 PROCEDURE — 2709999900 HC NON-CHARGEABLE SUPPLY

## 2023-02-02 PROCEDURE — 87075 CULTR BACTERIA EXCEPT BLOOD: CPT

## 2023-02-02 PROCEDURE — 86140 C-REACTIVE PROTEIN: CPT

## 2023-02-02 PROCEDURE — 2060000000 HC ICU INTERMEDIATE R&B

## 2023-02-02 PROCEDURE — 80053 COMPREHEN METABOLIC PANEL: CPT

## 2023-02-02 PROCEDURE — 71046 X-RAY EXAM CHEST 2 VIEWS: CPT

## 2023-02-02 PROCEDURE — 82947 ASSAY GLUCOSE BLOOD QUANT: CPT

## 2023-02-02 PROCEDURE — 99285 EMERGENCY DEPT VISIT HI MDM: CPT

## 2023-02-02 PROCEDURE — 76937 US GUIDE VASCULAR ACCESS: CPT

## 2023-02-02 PROCEDURE — 93005 ELECTROCARDIOGRAM TRACING: CPT | Performed by: STUDENT IN AN ORGANIZED HEALTH CARE EDUCATION/TRAINING PROGRAM

## 2023-02-02 PROCEDURE — 6370000000 HC RX 637 (ALT 250 FOR IP)

## 2023-02-02 PROCEDURE — 82945 GLUCOSE OTHER FLUID: CPT

## 2023-02-02 PROCEDURE — 94761 N-INVAS EAR/PLS OXIMETRY MLT: CPT

## 2023-02-02 PROCEDURE — 84443 ASSAY THYROID STIM HORMONE: CPT

## 2023-02-02 PROCEDURE — 87205 SMEAR GRAM STAIN: CPT

## 2023-02-02 PROCEDURE — 87070 CULTURE OTHR SPECIMN AEROBIC: CPT

## 2023-02-02 PROCEDURE — 89051 BODY FLUID CELL COUNT: CPT

## 2023-02-02 PROCEDURE — 71250 CT THORAX DX C-: CPT

## 2023-02-02 PROCEDURE — 85025 COMPLETE CBC W/AUTO DIFF WBC: CPT

## 2023-02-02 RX ORDER — INSULIN LISPRO 100 [IU]/ML
0-16 INJECTION, SOLUTION INTRAVENOUS; SUBCUTANEOUS
Status: DISCONTINUED | OUTPATIENT
Start: 2023-02-02 | End: 2023-02-03

## 2023-02-02 RX ORDER — LEVOTHYROXINE SODIUM 0.05 MG/1
50 TABLET ORAL DAILY
Status: DISCONTINUED | OUTPATIENT
Start: 2023-02-02 | End: 2023-02-07 | Stop reason: HOSPADM

## 2023-02-02 RX ORDER — INSULIN LISPRO 100 [IU]/ML
0-4 INJECTION, SOLUTION INTRAVENOUS; SUBCUTANEOUS NIGHTLY
Status: DISCONTINUED | OUTPATIENT
Start: 2023-02-02 | End: 2023-02-03

## 2023-02-02 RX ORDER — MAGNESIUM SULFATE IN WATER 40 MG/ML
2000 INJECTION, SOLUTION INTRAVENOUS PRN
Status: DISCONTINUED | OUTPATIENT
Start: 2023-02-02 | End: 2023-02-07 | Stop reason: HOSPADM

## 2023-02-02 RX ORDER — SODIUM CHLORIDE 0.9 % (FLUSH) 0.9 %
5-40 SYRINGE (ML) INJECTION EVERY 12 HOURS SCHEDULED
Status: CANCELLED | OUTPATIENT
Start: 2023-02-02

## 2023-02-02 RX ORDER — OXYCODONE HYDROCHLORIDE AND ACETAMINOPHEN 5; 325 MG/1; MG/1
1 TABLET ORAL EVERY 8 HOURS PRN
Status: DISCONTINUED | OUTPATIENT
Start: 2023-02-02 | End: 2023-02-04

## 2023-02-02 RX ORDER — SODIUM CHLORIDE 9 MG/ML
INJECTION, SOLUTION INTRAVENOUS PRN
Status: DISCONTINUED | OUTPATIENT
Start: 2023-02-02 | End: 2023-02-07 | Stop reason: HOSPADM

## 2023-02-02 RX ORDER — CLOPIDOGREL BISULFATE 75 MG/1
75 TABLET ORAL DAILY
Status: DISCONTINUED | OUTPATIENT
Start: 2023-02-02 | End: 2023-02-07 | Stop reason: HOSPADM

## 2023-02-02 RX ORDER — INSULIN GLARGINE 100 [IU]/ML
70 INJECTION, SOLUTION SUBCUTANEOUS NIGHTLY
Status: DISCONTINUED | OUTPATIENT
Start: 2023-02-02 | End: 2023-02-03

## 2023-02-02 RX ORDER — ATORVASTATIN CALCIUM 40 MG/1
40 TABLET, FILM COATED ORAL NIGHTLY
Status: DISCONTINUED | OUTPATIENT
Start: 2023-02-02 | End: 2023-02-07 | Stop reason: HOSPADM

## 2023-02-02 RX ORDER — ENOXAPARIN SODIUM 100 MG/ML
30 INJECTION SUBCUTANEOUS 2 TIMES DAILY
Status: DISCONTINUED | OUTPATIENT
Start: 2023-02-02 | End: 2023-02-07 | Stop reason: HOSPADM

## 2023-02-02 RX ORDER — SODIUM CHLORIDE 9 MG/ML
INJECTION, SOLUTION INTRAVENOUS PRN
Status: CANCELLED | OUTPATIENT
Start: 2023-02-02

## 2023-02-02 RX ORDER — MAGNESIUM SULFATE IN WATER 40 MG/ML
2000 INJECTION, SOLUTION INTRAVENOUS PRN
Status: CANCELLED | OUTPATIENT
Start: 2023-02-02

## 2023-02-02 RX ORDER — POLYETHYLENE GLYCOL 3350 17 G/17G
17 POWDER, FOR SOLUTION ORAL DAILY PRN
Status: CANCELLED | OUTPATIENT
Start: 2023-02-02

## 2023-02-02 RX ORDER — POLYETHYLENE GLYCOL 3350 17 G/17G
17 POWDER, FOR SOLUTION ORAL DAILY PRN
Status: DISCONTINUED | OUTPATIENT
Start: 2023-02-02 | End: 2023-02-07 | Stop reason: HOSPADM

## 2023-02-02 RX ORDER — ASPIRIN 81 MG/1
81 TABLET ORAL DAILY
Status: DISCONTINUED | OUTPATIENT
Start: 2023-02-02 | End: 2023-02-07 | Stop reason: HOSPADM

## 2023-02-02 RX ORDER — POTASSIUM CHLORIDE 7.45 MG/ML
10 INJECTION INTRAVENOUS PRN
Status: CANCELLED | OUTPATIENT
Start: 2023-02-02

## 2023-02-02 RX ORDER — FUROSEMIDE 40 MG/1
40 TABLET ORAL 2 TIMES DAILY
Status: DISCONTINUED | OUTPATIENT
Start: 2023-02-02 | End: 2023-02-07 | Stop reason: HOSPADM

## 2023-02-02 RX ORDER — ACETAMINOPHEN 325 MG/1
650 TABLET ORAL EVERY 6 HOURS PRN
Status: DISCONTINUED | OUTPATIENT
Start: 2023-02-02 | End: 2023-02-07 | Stop reason: HOSPADM

## 2023-02-02 RX ORDER — ONDANSETRON 2 MG/ML
4 INJECTION INTRAMUSCULAR; INTRAVENOUS EVERY 6 HOURS PRN
Status: CANCELLED | OUTPATIENT
Start: 2023-02-02

## 2023-02-02 RX ORDER — SODIUM CHLORIDE 0.9 % (FLUSH) 0.9 %
5-40 SYRINGE (ML) INJECTION PRN
Status: CANCELLED | OUTPATIENT
Start: 2023-02-02

## 2023-02-02 RX ORDER — ACETAMINOPHEN 650 MG/1
650 SUPPOSITORY RECTAL EVERY 6 HOURS PRN
Status: CANCELLED | OUTPATIENT
Start: 2023-02-02

## 2023-02-02 RX ORDER — ACETAMINOPHEN 325 MG/1
650 TABLET ORAL EVERY 6 HOURS PRN
Status: CANCELLED | OUTPATIENT
Start: 2023-02-02

## 2023-02-02 RX ORDER — SODIUM CHLORIDE 0.9 % (FLUSH) 0.9 %
5-40 SYRINGE (ML) INJECTION PRN
Status: DISCONTINUED | OUTPATIENT
Start: 2023-02-02 | End: 2023-02-07 | Stop reason: HOSPADM

## 2023-02-02 RX ORDER — ENOXAPARIN SODIUM 100 MG/ML
40 INJECTION SUBCUTANEOUS DAILY
Status: CANCELLED | OUTPATIENT
Start: 2023-02-02

## 2023-02-02 RX ORDER — PANTOPRAZOLE SODIUM 40 MG/1
40 TABLET, DELAYED RELEASE ORAL
Status: DISCONTINUED | OUTPATIENT
Start: 2023-02-03 | End: 2023-02-07 | Stop reason: HOSPADM

## 2023-02-02 RX ORDER — POTASSIUM CHLORIDE 7.45 MG/ML
10 INJECTION INTRAVENOUS PRN
Status: DISCONTINUED | OUTPATIENT
Start: 2023-02-02 | End: 2023-02-07 | Stop reason: HOSPADM

## 2023-02-02 RX ORDER — BUSPIRONE HYDROCHLORIDE 10 MG/1
10 TABLET ORAL 2 TIMES DAILY
Status: DISCONTINUED | OUTPATIENT
Start: 2023-02-02 | End: 2023-02-07 | Stop reason: HOSPADM

## 2023-02-02 RX ORDER — GABAPENTIN 400 MG/1
400 CAPSULE ORAL NIGHTLY
Status: DISCONTINUED | OUTPATIENT
Start: 2023-02-02 | End: 2023-02-07 | Stop reason: HOSPADM

## 2023-02-02 RX ORDER — ONDANSETRON 4 MG/1
4 TABLET, ORALLY DISINTEGRATING ORAL EVERY 8 HOURS PRN
Status: DISCONTINUED | OUTPATIENT
Start: 2023-02-02 | End: 2023-02-07 | Stop reason: HOSPADM

## 2023-02-02 RX ORDER — ONDANSETRON 4 MG/1
4 TABLET, ORALLY DISINTEGRATING ORAL EVERY 8 HOURS PRN
Status: CANCELLED | OUTPATIENT
Start: 2023-02-02

## 2023-02-02 RX ORDER — SODIUM CHLORIDE 0.9 % (FLUSH) 0.9 %
5-40 SYRINGE (ML) INJECTION EVERY 12 HOURS SCHEDULED
Status: DISCONTINUED | OUTPATIENT
Start: 2023-02-02 | End: 2023-02-07 | Stop reason: HOSPADM

## 2023-02-02 RX ORDER — AMLODIPINE BESYLATE 5 MG/1
5 TABLET ORAL DAILY
Status: DISCONTINUED | OUTPATIENT
Start: 2023-02-02 | End: 2023-02-07 | Stop reason: HOSPADM

## 2023-02-02 RX ORDER — ACETAMINOPHEN 650 MG/1
650 SUPPOSITORY RECTAL EVERY 6 HOURS PRN
Status: DISCONTINUED | OUTPATIENT
Start: 2023-02-02 | End: 2023-02-07 | Stop reason: HOSPADM

## 2023-02-02 RX ORDER — ONDANSETRON 2 MG/ML
4 INJECTION INTRAMUSCULAR; INTRAVENOUS EVERY 6 HOURS PRN
Status: DISCONTINUED | OUTPATIENT
Start: 2023-02-02 | End: 2023-02-07 | Stop reason: HOSPADM

## 2023-02-02 RX ORDER — ISOSORBIDE MONONITRATE 60 MG/1
60 TABLET, EXTENDED RELEASE ORAL DAILY
Status: DISCONTINUED | OUTPATIENT
Start: 2023-02-02 | End: 2023-02-07 | Stop reason: HOSPADM

## 2023-02-02 RX ORDER — AMIODARONE HYDROCHLORIDE 200 MG/1
200 TABLET ORAL 3 TIMES DAILY
Status: DISCONTINUED | OUTPATIENT
Start: 2023-02-02 | End: 2023-02-07 | Stop reason: HOSPADM

## 2023-02-02 RX ADMIN — SODIUM CHLORIDE, PRESERVATIVE FREE 10 ML: 5 INJECTION INTRAVENOUS at 21:31

## 2023-02-02 RX ADMIN — OXYCODONE HYDROCHLORIDE AND ACETAMINOPHEN 1 TABLET: 5; 325 TABLET ORAL at 17:53

## 2023-02-02 RX ADMIN — METOPROLOL TARTRATE 12.5 MG: 25 TABLET ORAL at 21:29

## 2023-02-02 RX ADMIN — ENOXAPARIN SODIUM 30 MG: 100 INJECTION SUBCUTANEOUS at 21:24

## 2023-02-02 RX ADMIN — ISOSORBIDE MONONITRATE 60 MG: 60 TABLET, EXTENDED RELEASE ORAL at 18:13

## 2023-02-02 RX ADMIN — BUSPIRONE HYDROCHLORIDE 10 MG: 10 TABLET ORAL at 21:30

## 2023-02-02 RX ADMIN — GABAPENTIN 400 MG: 400 CAPSULE ORAL at 21:29

## 2023-02-02 RX ADMIN — AMLODIPINE BESYLATE 5 MG: 5 TABLET ORAL at 17:49

## 2023-02-02 RX ADMIN — Medication 2000 MG: at 18:11

## 2023-02-02 RX ADMIN — FUROSEMIDE 40 MG: 40 TABLET ORAL at 21:24

## 2023-02-02 RX ADMIN — CLOPIDOGREL 75 MG: 75 TABLET, FILM COATED ORAL at 18:12

## 2023-02-02 RX ADMIN — LEVOTHYROXINE SODIUM 50 MCG: 50 TABLET ORAL at 18:13

## 2023-02-02 RX ADMIN — Medication 81 MG: at 17:49

## 2023-02-02 RX ADMIN — DESMOPRESSIN ACETATE 40 MG: 0.2 TABLET ORAL at 21:30

## 2023-02-02 RX ADMIN — AMIODARONE HYDROCHLORIDE 200 MG: 200 TABLET ORAL at 21:30

## 2023-02-02 ASSESSMENT — PAIN DESCRIPTION - LOCATION: LOCATION: CHEST

## 2023-02-02 ASSESSMENT — PAIN DESCRIPTION - DESCRIPTORS: DESCRIPTORS: ACHING

## 2023-02-02 ASSESSMENT — ENCOUNTER SYMPTOMS
SHORTNESS OF BREATH: 1
WHEEZING: 0
ABDOMINAL PAIN: 0

## 2023-02-02 ASSESSMENT — PAIN SCALES - GENERAL
PAINLEVEL_OUTOF10: 0
PAINLEVEL_OUTOF10: 8

## 2023-02-02 NOTE — PROGRESS NOTES
LOVENOX: Patient weighs between 100-149 kg (current weight = 108 kg) and has adequate renal function. The recommended DVT prophylaxis dose is Lovenox (enoxaparin) 30 mg twice a day.   Thank you,  Juju SENA.9/5/1762  4:48 PM

## 2023-02-02 NOTE — PLAN OF CARE
Problem: Discharge Planning  Goal: Discharge to home or other facility with appropriate resources  Outcome: Progressing     Problem: Pain  Goal: Verbalizes/displays adequate comfort level or baseline comfort level  Outcome: Progressing     Problem: Skin/Tissue Integrity  Goal: Absence of new skin breakdown  Outcome: Progressing     Problem: Safety - Adult  Goal: Free from fall injury  Outcome: Progressing     Problem: ABCDS Injury Assessment  Goal: Absence of physical injury  Outcome: Progressing  Flowsheets (Taken 2/2/2023 5392)  Absence of Physical Injury: Implement safety measures based on patient assessment

## 2023-02-02 NOTE — CONSULTS
[facility] -   Infectious diseases evaluation  admission date 2/2/2023    reason for consultation:   Post CABG November 2022  : Dehiscence    Impression :   Current:  CABG November 2022  Lower sternal wound infection / dehiscence w pus,   culture negative 1/13 - VAC  2/3 pus again -  Large left loculated effusion  Left thoracentesis difficult due to scapula - 5 cc fluid out  Left adrenal tumor  Mild bandemia 13  Severe anxiety    Other:    Discussion / summary of stay / plan of care   Lower sternal ischemia and infection - pus seen and cx was neg 1/13/23  Was getting at the time lots of pus in the canister  Sent to to NH on ceftriaxone  Today back for social issues  Pus again seen in the lower part of the sternum, large amounts,   under VAC, after more than 2 weeks of AB -  Will switch to broader coverage and get pus cx again  Recommendations   Large pus still lower part of the sternum under the [facility] - asking for a cx  Pus was cx neg in past admission, raising the concern that it is possibly just related to the ischemia   CTS trying to reach plastic for a  muscle flap    Since she is draining pus again, will get another cx and   Remove the mid line and start a picc  Switch AB to zosyn  and vanco till cx is  out  FU cx and CRP  Awaiting CTS  plans for further I/D    Infection Control Recommendations   Mallie Precautions  Contact Isolation       Antimicrobial Stewardship Recommendations   Simplification of therapy  Targeted therapy    History of Present Illness:   Initial history:  Hugo Ho is a 78y.o.-year-old female this is a lady who had an open heart surgery November 5571 complicated with a lower sternal wound poor healing, presented earlier January with abdominal wound dehiscence, had a debridement was chipping of some bone, all the cultures were negative although pus was found intraoperatively.     Was ultimately discharged on ceftriaxone plan to be given for 6 weeks for bone infection, sent to the nursing home she stayed there for a short while decided to come back home but could not tolerate, realizing that she has to be in a nursing home, she went back to the ER to be readmitted to the nursing home. No fever no chills, continues with a VAC over the lower sternal wound    The patient did have a thoracentesis on the left last time culture negative as well    Current chest x-ray 2/2 shows the same left effusion with underlying atelectasis    2/2 left thoracentesis very small amount of fluid difficult to accessed due to scapula, 5 mL of clear fluid out    Interval changes  2/3/2023   Patient Vitals for the past 8 hrs:   BP Temp Temp src Pulse Resp SpO2   02/03/23 0830 -- -- -- -- -- (!) 82 %   02/03/23 0810 -- -- -- -- -- 94 %   02/03/23 0705 124/69 98.8 °F (37.1 °C) Oral 74 12 99 %   02/03/23 0417 119/67 97.2 °F (36.2 °C) Oral 73 14 97 %       Summary of relevant labs:  Labs:  Wbc 13  Creat 0.85  Micro:  Left pleural fluid cx 2/2/23  Imaging:  CXR left effusion worse    I have personally reviewed the past medical history, past surgical history, medications, social history, and family history, and I haveupdated the database accordingly. Allergies:   Morphine, Shellfish-derived products, and Tape [adhesive tape]     Review of Systems:     Review of Systems   Constitutional:  Positive for activity change. HENT:  Negative for congestion. Eyes:  Negative for discharge. Respiratory:  Negative for apnea. Cardiovascular:  Positive for chest pain. Gastrointestinal:  Negative for abdominal distention. Endocrine: Negative for cold intolerance. Genitourinary:  Negative for dysuria. Musculoskeletal:  Negative for arthralgias. Skin:  Positive for color change (red stable sdiscoloration of the  mid sternal area) and wound. Allergic/Immunologic: Negative for immunocompromised state. Neurological:  Negative for dizziness.    Hematological: Negative for adenopathy. Psychiatric/Behavioral:  Negative for agitation. Physical Examination :       Physical Exam  Constitutional:       Appearance: Normal appearance. She is obese. She is not ill-appearing. HENT:      Head: Normocephalic and atraumatic. Nose: Nose normal.      Mouth/Throat:      Mouth: Mucous membranes are moist.   Eyes:      General: No scleral icterus. Pupils: Pupils are equal, round, and reactive to light. Cardiovascular:      Rate and Rhythm: Normal rate and regular rhythm. Heart sounds: Normal heart sounds. No murmur heard. Pulmonary:      Effort: No respiratory distress. Breath sounds: No rales. Abdominal:      General: There is no distension. Tenderness: There is no abdominal tenderness. Genitourinary:     Comments: No matson  Musculoskeletal:         General: No swelling or deformity. Cervical back: Neck supple. No rigidity. Skin:     General: Skin is dry. Coloration: Skin is not jaundiced. Neurological:      General: No focal deficit present. Mental Status: She is alert and oriented to person, place, and time. Psychiatric:         Mood and Affect: Mood normal.         Thought Content:  Thought content normal.       Past Medical History:     Past Medical History:   Diagnosis Date    Ambulates with cane     or walker    Anxiety     Borderline hypertension     CAD (coronary artery disease)     stents x 2 in 2020    Depression 07/28/2020    GERD (gastroesophageal reflux disease)     Heart attack ([facility] 07/18/2020    Hypertension     Hypothyroidism     Neuropathy     Obesity     morbid    Osteoarthritis     Other cirrhosis of liver ([facility] 07/27/2020    pt denies    Sleep apnea     possible    Type II or unspecified type diabetes mellitus without mention of complication, not stated as uncontrolled     Under care of service provider 11/18/2022    oyt-Kobwfski-hxcjaHernan owen-last visit aug 2022    Under care of service provider 11/18/2022 cardiology-ali-last visit nov 2022    Vertebrogenic low back pain 03/29/2022       Past Surgical  History:     Past Surgical History:   Procedure Laterality Date    APPENDECTOMY      CARDIAC CATHETERIZATION      2 stents    CARDIAC CATHETERIZATION  11/01/2022    CARDIAC SURGERY N/A 1/14/2023    STERNAL WOUND WASHOUT, DEBRIDEMENT, WOUND VAC PLACEMENT performed by Lawson Moon MD at [address] [place] [address]  07/2020    CORONARY ARTERY BYPASS GRAFT N/A 11/28/2022    CABG CORONARY ARTERY BYPASS X3 ON PUMP , ATA, ENDO VEIN HARVEST performed by Lawson Moon MD at [place] [address]  01/14/2023    STERNAL WOUND WASHOUT, DEBRIDEMENT, WOUND VAC PLACEMENT    DILATION AND CURETTAGE OF UTERUS      HIATAL HERNIA REPAIR      HYSTERECTOMY (CERVIX STATUS UNKNOWN)      IR CHEST TUBE INSERTION  01/13/2023    IR CHEST TUBE INSERTION 1/13/2023 Danielle Gonsalez MD [facility]    IR CHEST TUBE INSERTION  1/16/2023    IR CHEST TUBE INSERTION 1/16/2023 Johnna Green MD [facility]    THROAT SURGERY      POLYPS REMOVED FROM VOCAL CORD    TOTAL KNEE ARTHROPLASTY Right 01/06/2015    TOTAL KNEE ARTHROPLASTY Left 05/26/2015    UPPER GASTROINTESTINAL ENDOSCOPY         Medications:       Social History:     Social History     Socioeconomic History    Marital status:      Spouse name: Fabiola Arroyo    Number of children: 0    Years of education: Not on file    Highest education level: Not on file   Occupational History    Occupation: Retired      Employer: [employer]   Tobacco Use    Smoking status: Never    Smokeless tobacco: Never   Vaping Use    Vaping Use: Never used   Substance and Sexual Activity    Alcohol use: Yes     Comment: once a month    Drug use: No    Sexual activity: Yes     Partners: Male   Other Topics Concern    Not on file   Social History Narrative    Not on file     Social Determinants of Health Financial Resource Strain: Low Risk     Difficulty of Paying Living Expenses: Not hard at all   Food Insecurity: No Food Insecurity    Worried About Running Out of Food in the Last Year: Never true    Ran Out of Food in the Last Year: Never true   Transportation Needs: Not on file   Physical Activity: Not on file   Stress: Not on file   Social Connections: Not on file   Intimate Partner Violence: Not on file   Housing Stability: Not on file       Family History:     Family History   Problem Relation Age of Onset    Heart Disease Mother     Arthritis Mother     Heart Disease Father     Arthritis Father     Cancer Father         \"oral\"    Diabetes Sister         gestational      Medical Decision Making:   I have independently reviewed/ordered the following labs:    CBC with Differential:   Recent Labs     02/02/23  1334 02/03/23  0704   WBC 13.2* 9.7   HGB 11.3* 10.7*   HCT 38.7 37.8    274   LYMPHOPCT 7* 9*   MONOPCT 14* 13*     BMP:  Recent Labs     02/02/23  1334 02/03/23  0704    137   K 4.3 3.4*   CL 96* 97*   CO2 28 29   BUN 9 8   CREATININE 0.85 0.64   MG  --  2.1     Hepatic Function Panel:   Recent Labs     02/02/23  1334   PROT 6.7   LABALBU 2.9*   BILITOT 0.6   ALKPHOS 136*   ALT 19   AST 46*     No results for input(s): RPR in the last 72 hours. No results for input(s): HIV in the last 72 hours. No results for input(s): BC in the last 72 hours. Lab Results   Component Value Date/Time    CREATININE 0.64 02/03/2023 07:04 AM    GLUCOSE 41 02/03/2023 07:04 AM    GLUCOSE 250 03/30/2012 03:15 PM       Detailed results: Thank you for allowing us to participate in the care of this patient. Please call with questions.     This note is created with the assistance of a speech recognition program.  While intending to generate adocument that actually reflects the content of the visit, the document can still have some errors including those of syntax and sound a like substitutions which may escape proof reading. It such instances, actual meaningcan be extrapolated by contextual diversion.     Josie Andujar MD  Office: [phone]  Perfect serve / office [phone]

## 2023-02-02 NOTE — ED PROVIDER NOTES
[facility]  Emergency Department  Faculty Attestation     I performed a history and physical examination of the patient and discussed management with the resident. I reviewed the residents note and agree with the documented findings and plan of care. Any areas of disagreement are noted on the chart. I was personally present for the key portions of any procedures. I have documented in the chart those procedures where I was not present during the key portions. I have reviewed the emergency nurses triage note. I agree with the chief complaint, past medical history, past surgical history, allergies, medications, social and family history as documented unless otherwise noted below. For Physician Assistant/ Nurse Practitioner cases/documentation I have personally evaluated this patient and have completed at least one if not all key elements of the E/M (history, physical exam, and MDM). Additional findings are as noted. Primary Care Physician: Inna Loyola MD    Screenings:  [unfilled]    CHIEF COMPLAINT       Chief Complaint   Patient presents with    Difficulty Walking     Pt.  Had CT surgery done, left SNF AMA, upon arrival home unable to care for self          RECENT VITALS:   Temp: 97.9 °F (36.6 °C),  Heart Rate: 82, Resp: 16, BP: (!) 141/76    LABS:  Labs Reviewed   CBC WITH AUTO DIFFERENTIAL - Abnormal; Notable for the following components:       Result Value    WBC 13.2 (*)     Hemoglobin 11.3 (*)     RDW 15.5 (*)     NRBC Automated 0.3 (*)     Seg Neutrophils 79 (*)     Lymphocytes 7 (*)     Monocytes 14 (*)     Eosinophils % 0 (*)     Segs Absolute 10.43 (*)     Absolute Lymph # 0.92 (*)     Absolute Mono # 1.85 (*)     All other components within normal limits   COMPREHENSIVE METABOLIC PANEL - Abnormal; Notable for the following components:    Glucose 101 (*)     Chloride 96 (*)     Alkaline Phosphatase 136 (*)     AST 46 (*)     Albumin 2.9 (*) Albumin/Globulin Ratio 0.8 (*)     All other components within normal limits   C-REACTIVE PROTEIN - Abnormal; Notable for the following components:    .4 (*)     All other components within normal limits   SEDIMENTATION RATE - Abnormal; Notable for the following components:    Sed Rate 45 (*)     All other components within normal limits   LACTIC ACID - Abnormal; Notable for the following components:    Lactic Acid, Whole Blood 2.6 (*)     All other components within normal limits   TROPONIN - Abnormal; Notable for the following components:    Troponin, High Sensitivity 63 (*)     All other components within normal limits   CULTURE, BLOOD 1   CULTURE, BLOOD 1   CULTURE, BODY FLUID   PROTIME-INR   APTT   TROPONIN   LACTATE DEHYDROGENASE, BODY FLUID   PROTEIN, BODY FLUID       Radiology  CT CHEST WO CONTRAST   Final Result   1. Small left pleural effusion with atelectasis in the majority of the left   lower lobe and subsegmental atelectasis in the left upper lobe. 2.  Unchanged findings related to median sternotomy and open wound, as   described. XR CHEST (2 VW)   Final Result   Right PICC removed      Left pleural effusion and left lung pulmonary opacities persist suggesting   atelectasis or infection         CT THORACENTESIS W TUBE    (Results Pending)   IR CHEST TUBE INSERTION    (Results Pending)       CRITICAL CARE: There was a high probability of clinically significant/life threatening deterioration in this patient's condition which required my urgent intervention. Total critical care time was none minutes. This excludes any time for separately reportable procedures.      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 She left ECF and is requesting not to go back to ECF. She believes she will need admission. She has no help at home and is unable to ambulate. She has been on IV Rocephin for sternal wound infection per ID recommendations. She has wound VAC in place. She states that he has persistent pain with redness and what appears to be drainage into the wound. She states the pain is persistent worse with movement and breathing and position changes. She has been on oxygen since her surgery in late November of last year. On exam she has elevated pain score, vital signs are normal with exception of saturation of 92% on supplemental oxygen nasal cannula. There is erythema and tenderness across the anterior lower sternum. Breath sounds are diminished on the left. No obvious JVD. No obvious peripheral edema. GCS is 15. Normal speech mentation and motor strength. Impression is wound infection, hypoxemia, probable left pleural effusion, and inability ambulate. Plan is laboratory studies, analgesics, imaging, placement versus admission. Will get social work and case management involved early. Patient has elevated white blood count, troponin elevation, low albumin and slight elevation in alkaline phosphatase and AST, elevated CRP to 152. Infectious disease was consulted and they would strongly recommend discussing with thoracic surgery a left thoracentesis for empyema. Thoracic surgery suggest CT guided thoracentesis by interventional radiology. Will discuss with interventional radiology. Mira Goldberg.  Eladio Garcia MD, [address]  Attending Emergency  Physician           Review of medical record, [facility] chart, consultant notes: Medical information obtained from medical record, ECF and consultants including history of coronary disease, incision inspection, wound dehiscence, abscess of the sternum, immune acquired pneumonia, GERD, arthritis, neuropathy, hypothyroidism, pleural effusion,    Interview of family member, witness, nurse, teacher or EMS: TS NP    Ordering and review of tests: left effusion, mild leukocytosis, elevated CRP and ESR, alkaline phosphatase, AST.     Use of clinical prediction rules: N/A    Differential diagnosis: Weakness, pleural effusion, consider empyema, ongoing sternal and/or soft tissue infection, consider osteomyelitis    Medication reconciliation and monitoring (especially parenteral controlled substances): N/A    Consent (risks, benefit, alternatives) for procedures: N/A    Responses to interventions (especially BP, ECG, O2Sat monitoring) and drug therapy: Improvement in pain with analgesics here    Consultations with PCP and specialists: Case discussed with thoracic surgery, infectious disease, family practice    Social work consultation to address social imitations (housing insecurity, insurance, unemployment): N/A    Shared decision-making (risks, benefit, alternatives) with patient and family members including admission, discharge and resuscitation status: Patient agreeable with initial work-up and plan and ultimate disposition       Nitin Ford MD  02/02/23 [address], MD  02/02/23 1348       Nitin Ford MD  02/02/23 1432       Nitin Ford MD  02/02/23 1243

## 2023-02-02 NOTE — PLAN OF CARE
Patient seen in [facility] today. CXR today with recurrent left pleural effusion. Plan for CT guided thoracentesis and possible CT placement. Repeat Cx to r/o infection. Patient currently on rocephin per ID for sternal wound infection. Will re-evaluate wound and consider plastics consult if necessary for assistance in closure.

## 2023-02-02 NOTE — ED PROVIDER NOTES
[facility] ED  Emergency Department Encounter  Emergency Medicine Resident     Pt Abiola Siegel  MRN: [id number]  Birthdate 1955  Date of evaluation: 2/2/23  PCP:  David Packer MD      [address] [place] [address]       Chief Complaint   Patient presents with    Difficulty Walking     Pt. Had CT surgery done, left SNF AMA, upon arrival home unable to care for self          HISTORY OF PRESENT ILLNESS  (Location/Symptom, Timing/Onset, Context/Setting, Quality, Duration, Modifying Factors, Severity.)      Hemal Walker is a 78 y.o. female who presents with multiple falls. History of CABG at the end of November. Follows with CT surgery here. She had infection of her surgical wound that required wound debridement in the OR. This was followed by wound VAC. Currently following with ID. She has had a midline placed. Currently on a 60-day course of Rocephin 2 g daily. She states that she has chronic chest pain every day that is unresolved without the use of her Percocet medication. She was actually in an extended care facility but left to go home as she thought she was ready. When she got home she realized that she was unable to care for herself. She went to the CT surgery office today for evaluation and they sent her in for placement as they were concerned with her ability to care for herself. PAST MEDICAL / SURGICAL / SOCIAL / FAMILY HISTORY      has a past medical history of Ambulates with cane, Anxiety, Borderline hypertension, CAD (coronary artery disease), Depression, GERD (gastroesophageal reflux disease), Heart attack ([address] Hypertension, Hypothyroidism, Neuropathy, Obesity, Osteoarthritis, Other cirrhosis of liver ([address] Sleep apnea, Type II or unspecified type diabetes mellitus without mention of complication, not stated as uncontrolled, Under care of service provider, Under care of service provider, and Vertebrogenic low back pain.        has a past surgical history that includes Hysterectomy; Colonoscopy; Upper gastrointestinal endoscopy; Dilation and curettage of uterus; hiatal hernia repair; Throat surgery; Appendectomy; Total knee arthroplasty (Right, 01/06/2015); Total knee arthroplasty (Left, 05/26/2015); Coronary angioplasty with stent (07/2020); Cardiac catheterization; Cardiac catheterization (11/01/2022); Coronary artery bypass graft (N/A, 11/28/2022); IR CHEST TUBE INSERTION (01/13/2023); Wound debridement (01/14/2023); Cardiac surgery (N/A, 1/14/2023); and IR CHEST TUBE INSERTION (1/16/2023).       Social History     Socioeconomic History    Marital status:      Spouse name: Rhett Area    Number of children: 0    Years of education: Not on file    Highest education level: Not on file   Occupational History    Occupation: Retired      Employer: [employer]   Tobacco Use    Smoking status: Never    Smokeless tobacco: Never   Vaping Use    Vaping Use: Never used   Substance and Sexual Activity    Alcohol use: Yes     Comment: once a month    Drug use: No    Sexual activity: Yes     Partners: Male   Other Topics Concern    Not on file   Social History Narrative    Not on file     Social Determinants of Health     Financial Resource Strain: Low Risk     Difficulty of Paying Living Expenses: Not hard at all   Food Insecurity: No Food Insecurity    Worried About Running Out of Food in the Last Year: Never true    Ran Out of Food in the Last Year: Never true   Transportation Needs: Not on file   Physical Activity: Not on file   Stress: Not on file   Social Connections: Not on file   Intimate Partner Violence: Not on file   Housing Stability: Not on file       Family History   Problem Relation Age of Onset    Heart Disease Mother     Arthritis Mother     Heart Disease Father     Arthritis Father     Cancer Father         \"oral\"    Diabetes Sister         gestational       Allergies:  Morphine, Shellfish-derived products, and [place] tape]    Home Medications:  Prior to Admission medications    Medication Sig Start Date End Date Taking?  Authorizing Provider   insulin glargine (LANTUS) 100 UNIT/ML injection vial Inject 70 Units into the skin nightly 1/19/23   Gissel Paige MD   cefTRIAXone (ROCEPHIN) infusion Infuse 2,000 mg intravenously every 24 hours Compound per protocol 1/18/23 3/19/23  Bennett Metcalf MD   insulin lispro, 1 Unit Dial, (HUMALOG KWIKPEN) 100 UNIT/ML SOPN Inject 3 Units into the skin 3 times daily (before meals) 125 - 150 2 units   151 - 200 4 units   201 - 250 6 units   251 - 300 8 units  301 - 350 10 units 351 - 400 12 units 12/29/22   Homero Ng MD   amLODIPine (NORVASC) 5 MG tablet  12/20/22   Historical Provider, MD Ramsey Noble ELLIPTA 100-25 MCG/ACT AEPB  12/20/22   Historical Provider, MD   isosorbide mononitrate (IMDUR) 60 MG extended release tablet  12/20/22   Historical Provider, MD   aspirin 81 MG EC tablet Take 1 tablet by mouth daily 12/6/22   Low Shankar MD   amiodarone (CORDARONE) 200 MG tablet Take 1 tablet by mouth 3 times daily 12/5/22   Low Shankar MD   metoprolol tartrate (LOPRESSOR) 25 MG tablet Take 0.5 tablets by mouth 2 times daily 12/5/22   Low Shankar MD   clopidogrel (PLAVIX) 75 MG tablet Take 1 tablet by mouth daily 12/6/22   Low Shankar MD   furosemide (LASIX) 40 MG tablet Take 1 tablet by mouth 2 times daily 12/5/22   Low Shankar MD   potassium chloride (KLOR-CON M) 10 MEQ extended release tablet Take 2 tablets by mouth daily 12/5/22   Low Shankar MD   atorvastatin (LIPITOR) 40 MG tablet Take 1 tablet by mouth nightly 11/16/22   Cindy Singh MD   empagliflozin (JARDIANCE) 10 MG tablet TAKE 1 TABLET BY MOUTH EVERY DAY 10/22/22   Homero Ng MD   levothyroxine (SYNTHROID) 50 MCG tablet TAKE 1 TABLET BY MOUTH EVERY DAY 10/22/22   Homero Ng MD   glimepiride (AMARYL) 4 MG tablet TAKE 1 TABLET BY MOUTH TWICE DAILY *EMERGENCY REFILL* 9/29/22   Homero Ng, MD   omeprazole (PRILOSEC) 20 MG delayed release capsule TAKE 1 CAPSULE BY MOUTH ONCE DAILY 9/22/22   Qudeana Turner DO   gabapentin (NEURONTIN) 400 MG capsule TAKE 1 CAPSULE BY MOUTH THREE TIMES DAILY 4/22/22 11/18/22  Mohinder Mckee MD   busPIRone (BUSPAR) 10 MG tablet TAKE ONE (1) TABLET BY MOUTH TWICE DAILY 3/11/22   Mohinder Mckee MD   blood glucose monitor strips Test before meals and at bedtime. DX: DM, on insulin 10/8/20   Mohinder Mckee MD   blood glucose monitor kit and supplies Dispense sufficient amount for indicated testing frequency plus additional to accommodate PRN testing needs. Dispense all needed supplies to include: monitor, strips, lancing device, lancets, control solutions, alcohol swabs. 8/17/20   Grupo Rowley MD   magnesium hydroxide (MILK OF MAGNESIA) 400 MG/5ML suspension Take 30 mLs by mouth daily as needed for Constipation 7/29/20   Grupo Rowley MD   Handicap Placard MISC Is a permanent condition. DX: M19.90 5/14/19   Mohinder Mckee MD   Insulin Pen Needle (B-D UF III MINI PEN NEEDLES) 31G X 5 MM MISC USE 1 PEN NEEDLE DAILY 3/8/18   Mohinder Mckee MD   [employer]ger Lancets Thin MISC Test before meals and at bedtime. DX: DM, on insulin 4/22/14   Ben Rubi MD   Alcohol Swabs (ALCOHOL PREP PADS) by Other route 2 times daily      Historical Provider, MD           REVIEW OF SYSTEMS       Review of Systems  Fall, weakness, chest pain, wound    PHYSICAL EXAM      INITIAL VITALS:   BP (!) 141/76   Pulse 82   Temp 97.9 °F (36.6 °C) (Oral)   Resp 16   SpO2 93%     Physical Exam  Wound VAC in place over the anterior chest wall with surrounding erythema. Bilateral breath sounds present, obese, no lower extremity swelling, belly soft nontender, scattered abrasions over the knees    DDX/DIAGNOSTIC RESULTS / EMERGENCY DEPARTMENT COURSE / MDM     Medical Decision Making  Amount and/or Complexity of Data Reviewed  Labs: ordered. Radiology: ordered.   ECG/medicine tests: ordered. Risk  Decision regarding hospitalization. This is a 79-year-old female coming emergency room today for evaluation. She reportedly had multiple falls at home after she left her SNF. She was not discharged but left in her own will she thought she was ready at home. She got home and had multiple falls and she realized that she was too weak to take care of herself on her own. She had a CABG at the end of November there was an infection that led to wound debridement and wound VAC. She then had a PICC line and has been under the care of ID. Today she continues to have the wound VAC in place she just had a midline placed in her right upper extremity. There is some surrounding erythema. Hemodynamically she stable she has no temperature. She has mild leukocytosis and her CRP is markedly elevated. In addition her troponin is elevated. Her chest x-ray shows an enlarging pleural effusion. I spoke with the infectious disease team and CT surgery. CT surgery is recommending CT-guided thoracentesis with tube placement. I ordered culture, protein and LDH with request of CT surgery. The patient follows with family medicine. She will need to come in for management of the chest tube if placed by IR, management of her pleural effusion, management of her anterior chest wall wound and PT/OT with placement      EMERGENCY DEPARTMENT COURSE:         PROCEDURES:      Procedures    CONSULTS:  IP CONSULT TO INFECTIOUS DISEASES  IP CONSULT TO CASE MANAGEMENT  IP CONSULT TO FAMILY MEDICINE        FINAL IMPRESSION      1. Surgical wound breakdown, subsequent encounter    2. Soft tissue infection          DISPOSITION / PLAN     DISPOSITION Admitted 02/02/2023 03:02:58 PM      PATIENT REFERRED TO:  No follow-up provider specified.     DISCHARGE MEDICATIONS:  New Prescriptions    No medications on file       Bandar Vizcarra MD  Emergency Medicine Resident    (Please note that portions of thisnote were completed with a voice recognition program.  Efforts were made to edit the dictations but occasionally words are mis-transcribed.)       Agustin Connolly MD  Resident  02/02/23 [id number]

## 2023-02-02 NOTE — ED NOTES
Pt NPO d/t potential pleural effusion drainage. She is aware.       Mino Alcantara RN  02/02/23 [id number]

## 2023-02-02 NOTE — BRIEF OP NOTE
Brief Postoperative Note for Thoracentesis    Lilian Byrnes  YOB: 1955  [id number]    Pre-operative Diagnosis: left Pleural effusion      Post-operative Diagnosis: Same    Procedure: Ultrasound guided Thoracentesis     Anesthesia: 1% Lidocaine     Surgeons/Assistants: Gene Tovar MD    Complications: none    EBL: Minimal    Specimens: were obtained    Very small collection of fluid, difficult to access due to scapula. 5 ml clear yellow fluid obtained. Dressing applied.       Electronically signed by QUENTIN Adkins on 2/2/2023 at 4:03 PM

## 2023-02-02 NOTE — PROGRESS NOTES
[facility]  Office Visit      Subjective:  Ms. Meron Arvizu is a 78 y.o. female s/p sternal incision infection and failure to thrive. Patient presents to cardiothoracic clinic today tearful and severe anxiety. Patient states she decided yesterday that she wanted to leave her rehab facility. This was not the facility's decision. When she went home she states she fell twice with no loss of consciousness. She states these are mechanical falls. She states that her  is unable to care for her at home. She has a difficult time managing her diabetes. She is a difficult time managing with a wound VAC in place. She is currently still on nasal cannula oxygen that she is dependent upon when she overexerts herself. Patient is requesting to get back into a facility but does not know how in the outpatient well. She does have home care but she states home care is not enough to take care of her. Physical Exam  Vitals:  Vitals:    02/02/23 1206   BP: 128/68   Pulse: 84   Resp: 16   Temp: 98.6 °F (37 °C)   SpO2: 90%       General: Alert and Oriented x3. Ambulatory. No apparent distress. Chest:  No abnormality. Equal and symmetric expansion with respiration. Lungs:  diminished left lower lobe  Cardiac:  Regular rate and rhythm without murmurs, rubs or gallops. Abdomen:  Soft, non-tender, normoactive bowel sounds. Extremities:  No edema. Intact pulses in all four extremities. Psychiatric: Mood and affect are appropriate. She has significant drainage noted in the wound VAC canister. She has a wound VAC in place with erythematous borders. I did not take down the wound VAC to look at the incision due to not having the supply to put on dressing in office. Patient had a significant sternal wound infection.   Current Medications:    Current Outpatient Medications:     insulin glargine (LANTUS) 100 UNIT/ML injection vial, Inject 70 Units into the skin nightly, Disp: 10 mL, Rfl: 3 cefTRIAXone (ROCEPHIN) infusion, Infuse 2,000 mg intravenously every 24 hours Compound per protocol, Disp: 60 g, Rfl: 1    insulin lispro, 1 Unit Dial, (HUMALOG KWIKPEN) 100 UNIT/ML SOPN, Inject 3 Units into the skin 3 times daily (before meals) 125 - 150 2 units  151 - 200 4 units  201 - 250 6 units  251 - 300 8 units 301 - 350 10 units 351 - 400 12 units, Disp: 1 Adjustable Dose Pre-filled Pen Syringe, Rfl: 5    amLODIPine (NORVASC) 5 MG tablet, , Disp: , Rfl:     BREO ELLIPTA 100-25 MCG/ACT AEPB, , Disp: , Rfl:     isosorbide mononitrate (IMDUR) 60 MG extended release tablet, , Disp: , Rfl:     aspirin 81 MG EC tablet, Take 1 tablet by mouth daily, Disp: 30 tablet, Rfl: 3    amiodarone (CORDARONE) 200 MG tablet, Take 1 tablet by mouth 3 times daily, Disp: 90 tablet, Rfl: 0    metoprolol tartrate (LOPRESSOR) 25 MG tablet, Take 0.5 tablets by mouth 2 times daily, Disp: 60 tablet, Rfl: 3    clopidogrel (PLAVIX) 75 MG tablet, Take 1 tablet by mouth daily, Disp: 30 tablet, Rfl: 3    furosemide (LASIX) 40 MG tablet, Take 1 tablet by mouth 2 times daily, Disp: 60 tablet, Rfl: 3    potassium chloride (KLOR-CON M) 10 MEQ extended release tablet, Take 2 tablets by mouth daily, Disp: 90 tablet, Rfl: 1    atorvastatin (LIPITOR) 40 MG tablet, Take 1 tablet by mouth nightly, Disp: 90 tablet, Rfl: 1    empagliflozin (JARDIANCE) 10 MG tablet, TAKE 1 TABLET BY MOUTH EVERY DAY, Disp: 30 tablet, Rfl: 2    levothyroxine (SYNTHROID) 50 MCG tablet, TAKE 1 TABLET BY MOUTH EVERY DAY, Disp: 60 tablet, Rfl: 2    glimepiride (AMARYL) 4 MG tablet, TAKE 1 TABLET BY MOUTH TWICE DAILY *EMERGENCY REFILL*, Disp: 60 tablet, Rfl: 5    omeprazole (PRILOSEC) 20 MG delayed release capsule, TAKE 1 CAPSULE BY MOUTH ONCE DAILY, Disp: 30 capsule, Rfl: 10    busPIRone (BUSPAR) 10 MG tablet, TAKE ONE (1) TABLET BY MOUTH TWICE DAILY, Disp: 60 tablet, Rfl: 10    blood glucose monitor strips, Test before meals and at bedtime.  DX: DM, on insulin, Disp: 100 strip, Rfl: 11    blood glucose monitor kit and supplies, Dispense sufficient amount for indicated testing frequency plus additional to accommodate PRN testing needs. Dispense all needed supplies to include: monitor, strips, lancing device, lancets, control solutions, alcohol swabs., Disp: 1 kit, Rfl: 0    magnesium hydroxide (MILK OF MAGNESIA) 400 MG/5ML suspension, Take 30 mLs by mouth daily as needed for Constipation, Disp: 900 mL, Rfl: 0    Handicap Placard MISC, Is a permanent condition. DX: M19.90, Disp: 1 each, Rfl: 0    Insulin Pen Needle (B-D UF III MINI PEN NEEDLES) 31G X 5 MM MISC, USE 1 PEN NEEDLE DAILY, Disp: 50 each, Rfl: 1    [employer] Lancets Thin MISC, Test before meals and at bedtime.  DX: DM, on insulin, Disp: 100 each, Rfl: 11    Alcohol Swabs (ALCOHOL PREP PADS), by Other route 2 times daily  , Disp: , Rfl:     gabapentin (NEURONTIN) 400 MG capsule, TAKE 1 CAPSULE BY MOUTH THREE TIMES DAILY, Disp: 90 capsule, Rfl: 0    Past Surgical History:   Procedure Laterality Date    APPENDECTOMY      CARDIAC CATHETERIZATION      2 stents    CARDIAC CATHETERIZATION  11/01/2022    CARDIAC SURGERY N/A 1/14/2023    STERNAL WOUND WASHOUT, DEBRIDEMENT, WOUND VAC PLACEMENT performed by Michael Chavez MD at [facility]  07/2020    CORONARY ARTERY BYPASS GRAFT N/A 11/28/2022    CABG CORONARY ARTERY BYPASS X3 ON PUMP , ATA, ENDO VEIN HARVEST performed by Michael Chavez MD at [place] [id number]  01/14/2023    STERNAL WOUND WASHOUT, DEBRIDEMENT, WOUND VAC PLACEMENT    DILATION AND CURETTAGE OF UTERUS      HIATAL HERNIA REPAIR      HYSTERECTOMY (CERVIX STATUS UNKNOWN)      IR CHEST TUBE INSERTION  01/13/2023    IR CHEST TUBE INSERTION 1/13/2023 Sam Jones MD [facility] PROCEDURES    IR CHEST TUBE INSERTION  1/16/2023    IR CHEST TUBE INSERTION 1/16/2023 Antoine Marrufo MD [facility] SPECIAL PROCEDURES    THROAT SURGERY      POLYPS REMOVED FROM VOCAL CORD    TOTAL KNEE ARTHROPLASTY Right 01/06/2015    TOTAL KNEE ARTHROPLASTY Left 05/26/2015    UPPER GASTROINTESTINAL ENDOSCOPY         Social Hx: reports that she has never smoked. She has never used smokeless tobacco.    Assessment & Plan:   Due to the fear the patient has taking care of herself at home and the inability of family members to assist due to comorbidities I will have patient go to the emergency department. I contacted  in the ER attending to assist with getting patient back in placement. We will see patient back within 2 weeks to 1 month to evaluate the sternal incision. She will continue to have wound VAC changes 3 times a week along with wound care. She is to follow-up with infectious disease at the end of the month.     [facility], APRN - NP,APRN CNP

## 2023-02-02 NOTE — ED NOTES
Pt. Arrives to ED via wheelchair for c/o inability to care for self post-operative. Pt. Had CT surgery done at this hospital and was discharged to a rehab facility where pt left AMA because she just wanted to be at home. Pt. States that upon arrival home she was unable to walk and has difficulty caring for herself and needs assistance. Pt. Went to CT surgery f/u appointment today and was sent to the ED for placement assistance, see CT surgery note. Pt. Has new oxygen requirements but states that she is out of oxygen because she gave it to her . Pt. States that her percocet prescription has made her very  Forgetful. Writer asks pt. If she feels she needs percocet and she said no, but she is having pain. Dr. Mark Lloyd and Dr. Hendricks Pulse at bedside.        Alexis Lee, HUSAM  02/02/23 [id number]

## 2023-02-02 NOTE — H&P
[address] [facility]     HISTORY AND PHYSICAL EXAMINATION            Date:   2/2/2023  Patient name:  Domo Matta  Date of admission:  2/2/2023  1:07 PM  MRN:   [id number]  Account:  [de-identified]  YOB: 1955  PCP:    Patrick Ayala MD  Room:   30/30  Code Status:    Prior    Chief Complaint:     Chief Complaint   Patient presents with    Difficulty Walking     Pt. Had CT surgery done, left SNF AMA, upon arrival home unable to care for self          History Obtained From:     patient, electronic medical record    History of Present Illness:     78years old female with Hx of DM type II, essential hypertension, depression, recurrent falls, CAD s/p CABG 11/2022. Recently admitted for CABG incision infection and osteomyelitis of the chest wall s/p debridement and VAC placement, seen at that time by ID who recommended 6 weeks treatment of Rocephin for which she had a PICC line with which she lost a few days ago, last Rocephin dose was 1/31st.  Patient also found to have left-sided pleural effusion 2/2 CABG on the same admission s/p chest tube which removed before discharge. She has been on [place] oxygen 2 used during exertion. Patient was placed at [facility], however she left [address] Today, patient was seen at thoracic surgery clinic for follow-up, reportedly patient has not been able to take care of her wound VAC at home, with limited benefit from home care. She has multiple falls at the same day of leaving the facility likely mechanical.  Patient requested to get back into the facility. Patient was sent to the ER. In the ED, patient was hemodynamically stable. Labs showed mild leukocytosis, elevated CRP and troponin. CXR showed enlarging pleural effusion.   ID and CT surgery were consulted who recommended a CT-guided thoracocentesis which was performed by IR with removal of 5 mill serosanguineous and pleural fluid analysis pending. Patient was admitted for management of pleural effusion, chest wall wound, PT/OT and plan for placement in an ECF.       Past Medical History:     Past Medical History:   Diagnosis Date    Ambulates with cane     or walker    Anxiety     Borderline hypertension     CAD (coronary artery disease)     stents x 2 in 2020    Depression 07/28/2020    GERD (gastroesophageal reflux disease)     [facility] 07/18/2020    Hypertension     Hypothyroidism     Neuropathy     Obesity     morbid    Osteoarthritis     Other cirrhosis of liver ([facility] 07/27/2020    pt denies    Sleep apnea     possible    Type II or unspecified type diabetes mellitus without mention of complication, not stated as uncontrolled     Under care of service provider 11/18/2022    tco-[id number]-ivybkHernan owen-last visit aug 2022    Under care of service provider 11/18/2022    cardiology-ali-last visit nov 2022    Vertebrogenic low back pain 03/29/2022        Past SurgicalHistory:     Past Surgical History:   Procedure Laterality Date    APPENDECTOMY      CARDIAC CATHETERIZATION      2 stents    CARDIAC CATHETERIZATION  11/01/2022    CARDIAC SURGERY N/A 1/14/2023    STERNAL WOUND WASHOUT, DEBRIDEMENT, WOUND VAC PLACEMENT performed by Cindy Rosales MD at [facility]  07/2020    CORONARY ARTERY BYPASS GRAFT N/A 11/28/2022    CABG CORONARY ARTERY BYPASS X3 ON PUMP , ATA, ENDO VEIN HARVEST performed by Cindy Rosales MD at [place] [id number]  01/14/2023    STERNAL WOUND WASHOUT, DEBRIDEMENT, WOUND VAC PLACEMENT    DILATION AND CURETTAGE OF UTERUS      HIATAL HERNIA REPAIR      HYSTERECTOMY (CERVIX STATUS UNKNOWN)      IR CHEST TUBE INSERTION  01/13/2023    IR CHEST TUBE INSERTION 1/13/2023 Sherin Erickson MD [facility] SPECIAL PROCEDURES    IR CHEST TUBE INSERTION  1/16/2023    IR CHEST TUBE INSERTION 1/16/2023 Braulio Mark, MD [facility] SPECIAL PROCEDURES    THROAT SURGERY      POLYPS REMOVED FROM VOCAL CORD    TOTAL KNEE ARTHROPLASTY Right 01/06/2015    TOTAL KNEE ARTHROPLASTY Left 05/26/2015    UPPER GASTROINTESTINAL ENDOSCOPY          Medications Prior to Admission:        Prior to Admission medications    Medication Sig Start Date End Date Taking?  Authorizing Provider   insulin glargine (LANTUS) 100 UNIT/ML injection vial Inject 70 Units into the skin nightly 1/19/23   Janiya Negrete MD   cefTRIAXone (ROCEPHIN) infusion Infuse 2,000 mg intravenously every 24 hours Compound per protocol 1/18/23 3/19/23  Radha Simons MD   insulin lispro, 1 Unit Dial, (HUMALOG KWIKPEN) 100 UNIT/ML SOPN Inject 3 Units into the skin 3 times daily (before meals) 125 - 150 2 units   151 - 200 4 units   201 - 250 6 units   251 - 300 8 units  301 - 350 10 units 351 - 400 12 units 12/29/22   Gus Mccullough MD   amLODIPine (NORVASC) 5 MG tablet  12/20/22   Historical Provider, MD Virginia Serrano ELLIPTA 100-25 MCG/ACT AEPB  12/20/22   Historical Provider, MD   isosorbide mononitrate (IMDUR) 60 MG extended release tablet  12/20/22   Historical Provider, MD   aspirin 81 MG EC tablet Take 1 tablet by mouth daily 12/6/22   Edgar Fontenot MD   amiodarone (CORDARONE) 200 MG tablet Take 1 tablet by mouth 3 times daily 12/5/22   Edgar Fontenot MD   metoprolol tartrate (LOPRESSOR) 25 MG tablet Take 0.5 tablets by mouth 2 times daily 12/5/22   Edgar Fontenot MD   clopidogrel (PLAVIX) 75 MG tablet Take 1 tablet by mouth daily 12/6/22   Edgar Fontenot MD   furosemide (LASIX) 40 MG tablet Take 1 tablet by mouth 2 times daily 12/5/22   Edgar Fontenot MD   potassium chloride (KLOR-CON M) 10 MEQ extended release tablet Take 2 tablets by mouth daily 12/5/22   Edgar Fontenot MD   atorvastatin (LIPITOR) 40 MG tablet Take 1 tablet by mouth nightly 11/16/22   Moriah Hawkins MD   empagliflozin (JARDIANCE) 10 MG tablet TAKE 1 TABLET BY MOUTH EVERY DAY 10/22/22   Gus Mccullough MD levothyroxine (SYNTHROID) 50 MCG tablet TAKE 1 TABLET BY MOUTH EVERY DAY 10/22/22   Maurie Sandifer, MD   glimepiride (AMARYL) 4 MG tablet TAKE 1 TABLET BY MOUTH TWICE DAILY *EMERGENCY REFILL* 9/29/22   Maurie Sandifer, MD   omeprazole (PRILOSEC) 20 MG delayed release capsule TAKE 1 CAPSULE BY MOUTH ONCE DAILY 9/22/22   Quratulain DO Johnny   gabapentin (NEURONTIN) 400 MG capsule TAKE 1 CAPSULE BY MOUTH THREE TIMES DAILY 4/22/22 11/18/22  Gee Norwood MD   busPIRone (BUSPAR) 10 MG tablet TAKE ONE (1) TABLET BY MOUTH TWICE DAILY 3/11/22   Gee Norwood MD   blood glucose monitor strips Test before meals and at bedtime. DX: DM, on insulin 10/8/20   Gee Norwood MD   blood glucose monitor kit and supplies Dispense sufficient amount for indicated testing frequency plus additional to accommodate PRN testing needs. Dispense all needed supplies to include: monitor, strips, lancing device, lancets, control solutions, alcohol swabs. 8/17/20   Imelda Ceja MD   magnesium hydroxide (MILK OF MAGNESIA) 400 MG/5ML suspension Take 30 mLs by mouth daily as needed for Constipation 7/29/20   Imelda Ceja MD   Handicap Placard MISC Is a permanent condition. DX: M19.90 5/14/19   Gee Norwood MD   Insulin Pen Needle (B-D UF III MINI PEN NEEDLES) 31G X 5 MM MISC USE 1 PEN NEEDLE DAILY 3/8/18   Gee Norwood MD   [employer] Lancets Thin MISC Test before meals and at bedtime. DX: DM, on insulin 4/22/14   Sindhu Phillips MD   Alcohol Swabs (ALCOHOL PREP PADS) by Other route 2 times daily      Historical Provider, MD        Allergies:     Morphine, Shellfish-derived products, and Tape [adhesive tape]    Social History:     Tobacco:    reports that she has never smoked. She has never used smokeless tobacco.  Alcohol:      reports current alcohol use. Drug Use:  reports no history of drug use.     Family History:     Family History   Problem Relation Age of Onset    Heart Disease Mother     Arthritis Mother     Heart Disease Father     Arthritis Father     Cancer Father         \"oral\"    Diabetes Sister         gestational       Review of Systems:     Positive and Negative as described in HPI. Review of Systems   Constitutional:  Positive for activity change, appetite change and fatigue. Negative for fever. Respiratory:  Positive for shortness of breath. Negative for wheezing. Cardiovascular:  Positive for chest pain. Gastrointestinal:  Negative for abdominal pain. Musculoskeletal:  Positive for arthralgias (Chronic). Skin:  Positive for wound. Neurological:  Negative for light-headedness and headaches. Psychiatric/Behavioral:  The patient is nervous/anxious. Physical Exam:   BP (!) 141/76   Pulse 82   Temp 97.9 °F (36.6 °C) (Oral)   Resp 16   SpO2 93%   Temp (24hrs), Av.3 °F (36.8 °C), Min:97.9 °F (36.6 °C), Max:98.6 °F (37 °C)    No results for input(s): POCGLU in the last 72 hours. No intake or output data in the 24 hours ending 23 [id number]    Physical Exam  Cardiovascular:      Rate and Rhythm: Normal rate and regular rhythm. Comments: Wound VAC in the place of her midline chest, with surrounding erythema. Pulmonary:      Effort: Pulmonary effort is normal.      Breath sounds: Rales (Left lower side) present. Abdominal:      Palpations: Abdomen is soft. Tenderness: There is no abdominal tenderness. Musculoskeletal:      Right lower leg: No edema. Left lower leg: No edema. Skin:     Findings: Erythema present.        Investigations:     Laboratory Testing:  Recent Results (from the past 24 hour(s))   CBC with Auto Differential    Collection Time: 23  1:34 PM   Result Value Ref Range    WBC 13.2 (H) 3.5 - 11.3 k/uL    RBC 4.19 3.95 - 5.11 m/uL    Hemoglobin 11.3 (L) 11.9 - 15.1 g/dL    Hematocrit 38.7 36.3 - 47.1 %    MCV 92.4 82.6 - 102.9 fL    MCH 27.0 25.2 - 33.5 pg    MCHC 29.2 28.4 - 34.8 g/dL    RDW 15.5 (H) 11.8 - 14.4 %    Platelets 061 945 - 453 k/uL    MPV 9.5 8.1 - 13.5 fL    NRBC Automated 0.3 (H) 0.0 per 100 WBC    Immature Granulocytes 0 0 %    Seg Neutrophils 79 (H) 36 - 66 %    Lymphocytes 7 (L) 24 - 44 %    Monocytes 14 (H) 1 - 7 %    Eosinophils % 0 (L) 1 - 4 %    Basophils 0 0 - 2 %    Absolute Immature Granulocyte 0.00 0.00 - 0.30 k/uL    Segs Absolute 10.43 (H) 1.8 - 7.7 k/uL    Absolute Lymph # 0.92 (L) 1.0 - 4.8 k/uL    Absolute Mono # 1.85 (H) 0.1 - 0.8 k/uL    Absolute Eos # 0.00 0.0 - 0.4 k/uL    Basophils Absolute 0.00 0.0 - 0.2 k/uL    Morphology ANISOCYTOSIS PRESENT    Comprehensive Metabolic Panel    Collection Time: 02/02/23  1:34 PM   Result Value Ref Range    Glucose 101 (H) 70 - 99 mg/dL    BUN 9 8 - 23 mg/dL    Creatinine 0.85 0.50 - 0.90 mg/dL    Est, Glom Filt Rate >60 >60 mL/min/1.73m2    Calcium 8.8 8.6 - 10.4 mg/dL    Sodium 137 135 - 144 mmol/L    Potassium 4.3 3.7 - 5.3 mmol/L    Chloride 96 (L) 98 - 107 mmol/L    CO2 28 20 - 31 mmol/L    Anion Gap 13 9 - 17 mmol/L    Alkaline Phosphatase 136 (H) 35 - 104 U/L    ALT 19 5 - 33 U/L    AST 46 (H) <32 U/L    Total Bilirubin 0.6 0.3 - 1.2 mg/dL    Total Protein 6.7 6.4 - 8.3 g/dL    Albumin 2.9 (L) 3.5 - 5.2 g/dL    Albumin/Globulin Ratio 0.8 (L) 1.0 - 2.5   C-Reactive Protein    Collection Time: 02/02/23  1:34 PM   Result Value Ref Range    .4 (H) 0.0 - 5.0 mg/L   Sedimentation Rate    Collection Time: 02/02/23  1:34 PM   Result Value Ref Range    Sed Rate 45 (H) 0 - 30 mm/Hr   Lactic Acid    Collection Time: 02/02/23  1:34 PM   Result Value Ref Range    Lactic Acid, Whole Blood 2.6 (H) 0.7 - 2.1 mmol/L   Troponin    Collection Time: 02/02/23  1:34 PM   Result Value Ref Range    Troponin, High Sensitivity 63 (HH) 0 - 14 ng/L   Protime-INR    Collection Time: 02/02/23  1:34 PM   Result Value Ref Range    Protime 11.8 9.1 - 12.3 sec    INR 1.1    APTT    Collection Time: 02/02/23  1:34 PM   Result Value Ref Range    PTT 23.6 20.5 - 30.5 sec Imaging/Diagnostics:  XR CHEST (2 VW)    Result Date: 2/2/2023  EXAMINATION: TWO XRAY VIEWS OF THE CHEST 2/2/2023 1:50 pm COMPARISON: 01/20/2023 HISTORY: ORDERING SYSTEM PROVIDED HISTORY: shortness of breath TECHNOLOGIST PROVIDED HISTORY: shortness of breath FINDINGS: Right PICC removed. Stable cardiomegaly. Right lung remains clear. Airspace opacities seen left lung. Probable left pleural effusion. Right PICC removed Left pleural effusion and left lung pulmonary opacities persist suggesting atelectasis or infection     CT CHEST WO CONTRAST    Result Date: 2/2/2023  EXAMINATION: CT OF THE CHEST WITHOUT CONTRAST 2/2/2023 2:25 pm TECHNIQUE: CT of the chest was performed without the administration of intravenous contrast. Multiplanar reformatted images are provided for review. Automated exposure control, iterative reconstruction, and/or weight based adjustment of the mA/kV was utilized to reduce the radiation dose to as low as reasonably achievable. COMPARISON: Chest radiograph today. Chest CT 01/18/2023, 01/12/2023. HISTORY: ORDERING SYSTEM PROVIDED HISTORY: Pleural effusion TECHNOLOGIST PROVIDED HISTORY: Pleural effusion Decision Support Exception - unselect if not a suspected or confirmed emergency medical condition->Emergency Medical Condition (MA) FINDINGS: Mediastinum: Postoperative findings compatible with recent median sternotomy. Minimal induration of the anterior mediastinal fat. No discrete fluid collection identified. Status post CABG. No significant pericardial effusion. Temporary pacing leads are present. No enlarged or suspicious-appearing lymph nodes identified. Lungs/pleura: Small left pleural effusion. Atelectasis of the majority of the left lower lobe is noted along with subsegmental atelectasis in the left upper lobe. Minimal ground-glass attenuation in the right upper lobe. No septal thickening. No pneumothorax identified. The central airway is patent.  Upper Abdomen: Cholelithiasis. Calcifications in the renal arnulfo bilaterally may be vascular versus nonobstructing stones. Soft Tissues/Bones: Postoperative findings related to median sternotomy is noted, unchanged since prior chest CT imaging notable for mild diastasis. Subcutaneous gas and gas at the sternotomy site noted. Open wound and packing material present. 1.  Small left pleural effusion with atelectasis in the majority of the left lower lobe and subsegmental atelectasis in the left upper lobe. 2.  Unchanged findings related to median sternotomy and open wound, as described. CT CHEST WO CONTRAST    Result Date: 1/19/2023  EXAMINATION: CT OF THE CHEST WITHOUT CONTRAST 1/18/2023 2:10 pm TECHNIQUE: CT of the chest was performed without the administration of intravenous contrast. Multiplanar reformatted images are provided for review. Automated exposure control, iterative reconstruction, and/or weight based adjustment of the mA/kV was utilized to reduce the radiation dose to as low as reasonably achievable. COMPARISON: 01/18/2023, 01/16/2023, 01/12/2023 HISTORY: ORDERING SYSTEM PROVIDED HISTORY: tube not in correct location. TECHNOLOGIST PROVIDED HISTORY: tube not in correct location. Reason for Exam: tube not in correct location FINDINGS: Informed consent was obtained from the patient after discussing risks, indications, and benefits for the procedure. Limited chest CT is performed without contrast for planned possible chest tube placement. Images are obtained with patient in a right lateral decubitus position. The previously placed left pleural pigtail catheter is outside of the chest cavity. This tube was removed uneventfully and a sterile gauze dressing applied. Evidence of prior CABG. Small gallstones. Borderline splenomegaly. Similar 2 cm left adrenal gland nodule, likely an adenoma. Punctate nonobstructing renal calculi.   Open wound by the inferior aspect of the sternotomy with an apparent wound VAC in place.  Compared to the prior CT, the left pleural effusion which appears partially loculated has decreased considerably in size. There is adjacent lung consolidation in the left mid and lower chest.  Tiny amount of air in the pleural space related to prior catheter placement. Targeted ultrasound was performed and shows a small pleural effusion which appears to contain septations/loculations. These findings were discussed with the patient. Placing a pigtail catheter would be difficult due to the relatively small size of the collection. Options include surveillance imaging, thoracentesis, or pigtail catheter placement. The patient elected for thoracentesis only at this time. Findings were discussed with Luisa Oneil NP and a decision was made to perform thoracentesis only at this time. Left posterior chest was prepped and draped in standard sterile fashion. 1% lidocaine used for local anesthesia using real-time ultrasound guidance. A 5 [place] centesis catheter was advanced into the small residual loculated left pleural effusion. Ultrasound images show a safe tract and access into the space. Approximately 20 mL of fairly clear dark yellow fluid was obtained. Catheter was removed and gentle manual compression was used to achieve hemostasis. Band-Aid was applied. EBL: None. There are no immediate complications. The patient left the department in stable condition. Limited chest CT shows a relatively small residual loculated left pleural effusion, considerably improved since the prior CT. There is adjacent left lower lobe atelectasis/consolidation. Ultrasound shows a relatively small loculated/septated pleural effusion. Ultrasound-guided thoracentesis performed as described above. Findings were discussed with FLORIDALMA MCGEE at 2:30 pm on 1/18/2023.      CT CHEST W CONTRAST    Result Date: 1/12/2023  EXAMINATION: CT OF THE CHEST WITH CONTRAST 1/12/2023 6:14 pm TECHNIQUE: CT of the chest was performed with the administration of intravenous contrast. Multiplanar reformatted images are provided for review. Automated exposure control, iterative reconstruction, and/or weight based adjustment of the mA/kV was utilized to reduce the radiation dose to as low as reasonably achievable. COMPARISON: None. HISTORY: ORDERING SYSTEM PROVIDED HISTORY: recent CABG, incisional infection TECHNOLOGIST PROVIDED HISTORY: recent CABG, incisional infection Decision Support Exception - unselect if not a suspected or confirmed emergency medical condition->Emergency Medical Condition (MA) Reason for Exam: recent CABG, incisional infection FINDINGS: Mediastinum: Air in the anterior mediastinum is noted extending through the sternotomy to the soft tissues ventral to the sternum. Diastasis of the sternotomy is noted. Along the ventral aspect of the sternotomy fluid and air is noted suggesting abscess collection 2.4 x 1.8 cm. No acute aortic abnormality. Mild to moderate calcified plaque in the aorta. Moderate cardiomegaly. Trace pericardial fluid without significant pericardial accumulation. Shotty epicardial lymph nodes are present without cara adenopathy. Electrodes extend from the skin surface through the inferior sternal anterior chest terminating in the epicardial area. No cara mediastinal adenopathy. Shotty left suprahilar lymph node. Lungs/pleura: Right lung is clear. No right effusion. Large left pleural effusion is noted with almost complete collapse of the left lung with mild aeration in the left upper lobe medially. Mucus is noted in the bifurcation of the left mainstem bronchus suggesting a mucous plug. Upper Abdomen: Hepatic steatosis is noted. Small gallstones in the gallbladder. Spleen is top-normal.  Atherosclerotic calcification of the aorta and major branches is demonstrated. Left adrenal mass of 2 cm is noted with indeterminate Hounsfield numbers. Advise follow-up with adrenal CT scan or MRI.  Soft Tissues/Bones: Diastasis of the patient's median sternotomy is noted. No significant sternal wire bridging below the most cranial 2 of the sternal wires. 1.  Diastasis of median sternotomy with air in the soft tissues, and appearance of abscess formation/dehiscence dural to the sternotomy. Air is present in the mediastinum. 2.  Large left pleural effusion with almost complete collapse of the left lobe of the lung. Bronchial wall thickening is noted with material within the bronchi likely mucous plugging. 3.  Hepatic steatosis. 4.  Small gallstones in the gallbladder. 5.  2 cm left adrenal mass with indeterminate Hounsfield numbers. Further workup using adrenal CT protocol or MRI advised when the patient's condition permits. RECOMMENDATIONS: Advise adrenal protocol CT or MRI to evaluate a left adrenal mass. XR CHEST PORTABLE    Result Date: 1/20/2023  EXAMINATION: ONE XRAY VIEW OF THE CHEST 1/20/2023 5:40 am COMPARISON: 01/19/2023 HISTORY: ORDERING SYSTEM PROVIDED HISTORY: Post op open heart surgery TECHNOLOGIST PROVIDED HISTORY: Post op open heart surgery FINDINGS: Median sternotomy wires are noted. Cardiomegaly is unchanged. There is persistent moderate left pleural effusion and patchy opacities in the left chest, not significantly changed. Right arm PICC has been placed with distal tip in the cavoatrial junction. No evidence of pneumothorax. 1.  New right arm PICC with distal tip at the cavoatrial junction. 2.  Otherwise no significant interval change. Moderate left pleural effusion and patchy airspace opacities throughout the left lung. XR CHEST PORTABLE    Result Date: 1/19/2023  EXAMINATION: ONE XRAY VIEW OF THE CHEST 1/19/2023 6:08 am COMPARISON: 01/18/2023 HISTORY: ORDERING SYSTEM PROVIDED HISTORY: Post op open heart surgery TECHNOLOGIST PROVIDED HISTORY: Post op open heart surgery FINDINGS: Left chest tube has been removed. Median sternotomy wires are noted.   There is moderate left pleural effusion which appears mildly increased. There are patchy airspace opacities in the left lung which are unchanged. Right lung remains clear. No evidence of pneumothorax. Interval removal of the left chest tube with moderate left pleural effusion, mildly increased from previous radiographs. Unchanged patchy airspace opacities in the left lung. XR CHEST PORTABLE    Result Date: 1/19/2023  EXAMINATION: ONE XRAY VIEW OF THE CHEST 1/18/2023 6:06 am COMPARISON: AP chest from 01/17/2023. HISTORY: ORDERING SYSTEM PROVIDED HISTORY: Post op open heart surgery TECHNOLOGIST PROVIDED HISTORY: Post op open heart surgery History of GERD, morbid obesity, CABG, and type 2 diabetes. FINDINGS: Overlying ECG monitor leads, gown snaps and respiratory tubing. Midline sternotomy wires and clips. Left chest tube dislodged and now extrathoracic. Slightly increased opacity of the left hemithorax, likely a combination of pleural fluid, atelectasis and possibly underlying infiltrate or other airspace opacity. Right lung and right costophrenic angle are clear. Bones unchanged. Left chest tube extrathoracic, with slightly increased opacity of the left hemithorax with a similar differential diagnosis. The findings were sent to the Radiology Results [id number] at 8:39 am on 1/18/2023 to be communicated to a licensed caregiver. XR CHEST PORTABLE    Result Date: 1/17/2023  EXAMINATION: ONE XRAY VIEW OF THE CHEST 1/17/2023 6:28 am COMPARISON: 01/16/2023 HISTORY: ORDERING SYSTEM PROVIDED HISTORY: Post op open heart surgery TECHNOLOGIST PROVIDED HISTORY: Post op open heart surgery FINDINGS: Small bore left chest tube remains in place. Small to moderate left pleural effusion and patchy opacities in the left lung are not significantly changed. Heart size is stable. Median sternotomy wires are noted. Right lung is clear. No evidence of pneumothorax. No significant interval change.   Left chest tube in place with small to moderate left pleural effusion and patchy opacities in the left lung. XR CHEST PORTABLE    Result Date: 1/16/2023  EXAMINATION: ONE XRAY VIEW OF THE CHEST 1/16/2023 6:20 am COMPARISON: 01/15/2023, 01/14/2023 HISTORY: ORDERING SYSTEM PROVIDED HISTORY: Post op open heart surgery TECHNOLOGIST PROVIDED HISTORY: Post op open heart surgery FINDINGS: Pigtail catheter projects over the inferior left hemithorax. Interval reduction of left pleural effusion in improved aeration of the left lung. Streaky opacities throughout the left lung and pleural fluid tracking laterally remains. No new findings identified on the right. No pneumothorax. Status post median sternotomy. Left chest tube remains in place. Interval reduction of left pleural effusion and improved aeration of the left lung. XR CHEST PORTABLE    Result Date: 1/15/2023  EXAMINATION: ONE XRAY VIEW OF THE CHEST 1/15/2023 4:59 am COMPARISON: 14 January 2023 HISTORY: ORDERING SYSTEM PROVIDED HISTORY: Post op open heart surgery TECHNOLOGIST PROVIDED HISTORY: Post op open heart surgery FINDINGS: AP portable view of the chest time stamped at 526 hours demonstrates prior median sternotomy and overlying cardiac monitoring electrodes. Pigtail terminus chest tube on the left is unchanged ending in the left mid lung field laterally. Complete opacification of the left hemithorax is unchanged from prior examination. Slightly decreased air in the left hemithorax is noted. Hydropneumothorax on the left not excluded. Lung demonstrates no acute infiltrate or extrapleural air.      Near complete opacification of the left hemithorax with decreased air compared to prior exam.     XR CHEST PORTABLE    Result Date: 1/14/2023  EXAMINATION: ONE XRAY VIEW OF THE CHEST 1/14/2023 12:11 pm COMPARISON: 13 January 2023 HISTORY: ORDERING SYSTEM PROVIDED HISTORY: Post op open heart surgery TECHNOLOGIST PROVIDED HISTORY: Post op open heart surgery FINDINGS: AP portable view of the chest time stamped at 1151 hours demonstrates overlying cardiac monitoring electrodes and prior median sternotomy. Pigtail terminus left-sided chest tube lies over the left base. Almost complete opacification of the right hemithorax is redemonstrated without little aeration. Minimal rightward shift is noted slightly reduced over prior study. Right lung is clear. Some lucency at the base is noted with hydropneumothorax suspected. Little change from prior study. Significant left lung opacity with left hydropneumothorax suspected. Slight mediastinal shift toward the right is noted diminished from prior study. Right lung is clear. XR CHEST PORTABLE    Result Date: 1/13/2023  EXAMINATION: ONE XRAY VIEW OF THE CHEST 1/13/2023 6:19 pm COMPARISON: 01/12/2023 HISTORY: ORDERING SYSTEM PROVIDED HISTORY: interval f/u for pleural effusion s/p chest tube placement TECHNOLOGIST PROVIDED HISTORY: interval f/u for pleural effusion s/p chest tube placement FINDINGS: Prior sternotomy. The cardiomediastinal silhouette is partially obscured. There is interval placement of a pigtail catheter in the left pleural space. There is persistent opacification of the left hemithorax with some suspected rightward mediastinal shift. There is some lucency along the lower chest, possibly suggesting hydropneumothorax. Right lung remains predominantly clear. No acute osseous abnormality. Interval left chest tube placement with persistent opacification of the left chest.  There is some lucency in the left lower chest, which could represent hydropneumothorax. XR CHEST PORTABLE    Result Date: 1/12/2023  EXAMINATION: ONE XRAY VIEW OF THE CHEST 1/12/2023 1:24 pm COMPARISON: 12/05/2022 HISTORY: ORDERING SYSTEM PROVIDED HISTORY: chest pain, recent CABG TECHNOLOGIST PROVIDED HISTORY: chest pain, recent CABG FINDINGS: Cardiac size is enlarged. No acute infiltrates are seen .  The visualized pulmonary vascularity is stable. No pneumothorax. Progressive left pleural effusion with nearly complete opacification of the left hemithorax. Aviva Achille Postsurgical changes overlying the mediastinum. Progressive left pleural effusion with nearly complete opacification of the left hemithorax. US THORACENTESIS Which side should the procedure be performed? Left    Result Date: 1/19/2023  EXAMINATION: CT OF THE CHEST WITHOUT CONTRAST 1/18/2023 2:10 pm TECHNIQUE: CT of the chest was performed without the administration of intravenous contrast. Multiplanar reformatted images are provided for review. Automated exposure control, iterative reconstruction, and/or weight based adjustment of the mA/kV was utilized to reduce the radiation dose to as low as reasonably achievable. COMPARISON: 01/18/2023, 01/16/2023, 01/12/2023 HISTORY: ORDERING SYSTEM PROVIDED HISTORY: tube not in correct location. TECHNOLOGIST PROVIDED HISTORY: tube not in correct location. Reason for Exam: tube not in correct location FINDINGS: Informed consent was obtained from the patient after discussing risks, indications, and benefits for the procedure. Limited chest CT is performed without contrast for planned possible chest tube placement. Images are obtained with patient in a right lateral decubitus position. The previously placed left pleural pigtail catheter is outside of the chest cavity. This tube was removed uneventfully and a sterile gauze dressing applied. Evidence of prior CABG. Small gallstones. Borderline splenomegaly. Similar 2 cm left adrenal gland nodule, likely an adenoma. Punctate nonobstructing renal calculi. Open wound by the inferior aspect of the sternotomy with an apparent wound VAC in place. Compared to the prior CT, the left pleural effusion which appears partially loculated has decreased considerably in size.   There is adjacent lung consolidation in the left mid and lower chest.  Tiny amount of air in the pleural space related to prior catheter placement. Targeted ultrasound was performed and shows a small pleural effusion which appears to contain septations/loculations. These findings were discussed with the patient. Placing a pigtail catheter would be difficult due to the relatively small size of the collection. Options include surveillance imaging, thoracentesis, or pigtail catheter placement. The patient elected for thoracentesis only at this time. Findings were discussed with Elda Riddle NP and a decision was made to perform thoracentesis only at this time. Left posterior chest was prepped and draped in standard sterile fashion. 1% lidocaine used for local anesthesia using real-time ultrasound guidance. A 5 Icelandic centesis catheter was advanced into the small residual loculated left pleural effusion. Ultrasound images show a safe tract and access into the space. Approximately 20 mL of fairly clear dark yellow fluid was obtained. Catheter was removed and gentle manual compression was used to achieve hemostasis. Band-Aid was applied. EBL: None. There are no immediate complications. The patient left the department in stable condition. Limited chest CT shows a relatively small residual loculated left pleural effusion, considerably improved since the prior CT. There is adjacent left lower lobe atelectasis/consolidation. Ultrasound shows a relatively small loculated/septated pleural effusion. Ultrasound-guided thoracentesis performed as described above. Findings were discussed with FLORIDALMA MCGEE at 2:30 pm on 1/18/2023. IR CHEST TUBE INSERTION    Result Date: 1/17/2023  PROCEDURE: VR CHEST TUBE INSERTION - PERC PLEURAL DRAINAGE MODERATE CONSCIOUS SEDATION 1/16/2023 HISTORY: ORDERING SYSTEM PROVIDED HISTORY: CT currnetly clogged, not draining, unable to flush TECHNOLOGIST PROVIDED HISTORY: CT currnetly clogged, not draining, unable to flush Which side should the procedure be performed? ->Left CONTRAST: None SEDATION: 0 mgversed and 50 mcg fentanyl were titrated intravenously for moderate sedation monitored under my direction.  Total intraservice time of sedation was 15 minutes.  The patient's vital signs were monitored throughout the procedure and recorded in the patient's medical record by the nurse. FLUOROSCOPY DOSE AND TYPE OR TIME AND EXPOSURES: 3.4 minutes, DAP 2538.58 micro gray meter squared DESCRIPTION OF PROCEDURE: Informed consent was obtained after a detailed explanation of the procedure including risks, benefits, and alternatives.  Universal protocol was observed. Sterile gowns, masks, hats and gloves utilized for maximal sterile barrier. Patient was placed supine on the table.  Left chest prepped and draped sterile fashion.  Saline was placed through the indwelling chest drain. There appear to be some clot within it.  A 0.35 guidewire was then advanced through the catheter this was removed over wire.  A 12 Macedonian dilator was inserted and then a 12 Macedonian pigtail drain was inserted into the chest over wire.  This was secured with 2 0 Ethilon.  Dressing was applied.  Spot image obtained.  Was placed to Pleur-evac. FINDINGS: Upsized drain in place in the left base.     Successful up sizing of left-sided chest drain.  Fluoroscopic images show majority of the pleural effusion has resolved.  There may be some residual atelectasis or consolidation in the left base.  There did not appear to be much fluid being aspirated.  If tube is not draining would suggest follow-up CT scan.     IR CHEST TUBE INSERTION    Result Date: 1/13/2023  PROCEDURE: Ultrasound and fluoroscopic GUIDED left pleural drain PLACEMENT 1/13/2023 HISTORY: ORDERING SYSTEM PROVIDED HISTORY: left pleural effusion TECHNOLOGIST PROVIDED HISTORY: left pleural effusion Which side should the procedure be performed?->Left Shortness of breath SEDATION: 100 micro g IV fentanyl for pain Fluoro: DAP 1738 cGy cm squared TECHNIQUE: Informed consent was obtained after a detailed  explanation of the procedure including risks, benefits, and alternatives. Universal protocol was followed. A suitable skin site was prepped and draped in sterile fashion following ultrasound localization. An 18 gauge needle was advanced into the left pleural space. Sonogram image confirmed satisfactory needle tip position and safe tract access. Non turbid yellow fluid was aspirated. A sample was sent for laboratory analysis. A 0.035 guidewire was used to place a 10 Cypriot chest tube after the fascial tract was dilated under fluoroscopic guidance. The catheter was sutured to the skin and the patient tolerated the procedure well. The catheter was attached to atrium drainage. FINDINGS: Preprocedural sonogram demonstrated marked loculations of the left pleural collection. Post procedure images demonstrate the left pleural drain in good position     Successful percutaneous placement of a 10 Cypriot left pleural drain. There is marked loculation of the left pleural collection indicating complex pleural fluid. Small bore drainage catheter may not successfully drained the entire pleural collection given its complex nature. IR FLUOROSCOPY LESS THAN 1 HOUR    Result Date: 1/18/2023  EXAMINATION: SPOT FLUOROSCOPIC IMAGES 1/18/2023 3:07 pm TECHNIQUE: Fluoroscopy was provided by the radiology department for procedure. Radiologist was not present during examination. FLUOROSCOPY DOSE AND TYPE OR TIME AND EXPOSURES: 0.2 minutes; D  cGy cm2 COMPARISON: 01/18/2023, 01/16/2023 HISTORY: ORDERING SYSTEM PROVIDED HISTORY: check chest tube position TECHNOLOGIST PROVIDED HISTORY: check chest tube position FINDINGS: Limited fluoroscopy of the chest was performed for planned left chest tube repositioning. Fluoroscopy shows the pigtail chest tube to be out of the thoracic cavity. The tube was subsequently removed. Limited fluoroscopy shows the chest tube to be out of the thoracic cavity.        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 -WBC 13.2, , ESR 45 in the ED  -Blood culture X2 pending      Essential HTN and CAD s/p CABG  -Last echo showed ejection fraction 54% 11/2022  -Continue home meds aspirin, Plavix, atorvastatin, Lasix, Imdur, metoprolol and Norvasc    Diabetes type 2 with peripheral neuropathy  -Continue Lantus 70 units nightly  -High-dose sliding scale  -Hypoglycemia protocol  -POCT glucose X4  -We will restart gabapentin 400 mg nightly (home dose 400 mg 3 times daily)    Hypothyroidism  -Last TSH 11 months ago was 2.4, repeat TSH pending  -Continue Synthroid 50 MCG daily      DVT prophylaxis: Lovenox 30 mg twice daily  GI prophylaxis: Protonix 40 mg daily          Consultations:   IP CONSULT TO INFECTIOUS DISEASES  IP CONSULT TO CASE MANAGEMENT  IP CONSULT TO FAMILY MEDICINE  IP CONSULT TO SOCIAL WORK     Patient is admitted as inpatient status because of co-morbiditieslisted above, severity of signs and symptoms as outlined, requirement for current medical therapies and most importantly because of direct risk to patient if care not provided in a hospital setting.     Tobias Lindsey MD  2/2/2023  3:44 PM    Copy sent to Dr. Tobias Lindsey MD

## 2023-02-02 NOTE — PROCEDURES
Midline insertion note: Patient has had multiple picc/midlines over the last month. Patient updated on need to observe more care towards lines. Patient states Alexandria Setter just fall out. \" Patient and  also relay that she/they cannot take care of herself at home. Patient requesting assistance to getting to Dr Stephanie Ramirez Dr appointment after midline placement. Writer to assist patient to wheelchair and taken to [id number] after procedure was finished. Prescribed IV Therapy: Rocephin IV X 10 days  Product type Bard 3fr single lumen PowerMidline  History/Labs/Allergies Reviewed  Ultrasound assessment done bilateral upper extremities. Placed By: Kvng Gaitan - RN IV Team  Assisted By: Ruben Caldwell. Perez  Time out Performed using Two Identifiers  Lot # [id number]  Expiration date = 8/31/2023  Trimmed at 11cm  Total length inserted 11cm  External catheter length 0cm  Location = right cephalic vein, vessel size = 0.28cm with CVR of 12.5%. (Preferred area based CVR is less than 20%). Number of attempts 1  Estimated blood loss 1 ml  Placement verified by- positive blood return & flushes easily  Special equipment used- ultrasound guidance & micro-introducer technique   Catheter secured with adhesive 3M securement device. Dressing applied- Tegaderm CHG  Lidocaine administered intradermally conc.1%, approx 2 ml. Midline education:     [ x ] Discussed with patient/Family or POA prior to procedure. Risks and Benefits along with reason for procedure were discussed and teaching was reinforced with an education handout on Midline insertion. [facility] FAQ Catheter Associated Blood Stream Infections and [address] [facility] [id number] REV. 7/13 Nursing and Booklet left at bedside or in chart. Patient (Family or POA) acknowledged understanding of information taught and agreed to procedure. [  ] Was not discussed with patient/family or POA due to pts medical status at time of procedure. pts family or POA not available to discuss Midline education.  CDC FAQ Catheter Associated Blood Stream Infections and [address] [facility] [address] REV. 7/13 Nursing and Booklet left at bedside or in chart

## 2023-02-02 NOTE — PROGRESS NOTES
Pt arrives to room for aspiration of left pleural effusion or chest tube placement  MC RT and CM RT to bedside  Dr Denise Rivas to bedside  DENISE Asif PA to bedside  Site prepped and draped  Upon imaging decision made to aspirate left fluid collection    5ml of clear serosang fluid removed  Sent to lab  Attempt to call report to 4A x 3   Please call [phone] if any questions  Pt to room 408  Dsd applied

## 2023-02-03 PROBLEM — T81.31XD SURGICAL WOUND BREAKDOWN, SUBSEQUENT ENCOUNTER: Status: ACTIVE | Noted: 2023-01-13

## 2023-02-03 LAB
ABSOLUTE EOS #: 0.1 K/UL (ref 0–0.4)
ABSOLUTE IMMATURE GRANULOCYTE: 0.1 K/UL (ref 0–0.3)
ABSOLUTE LYMPH #: 0.87 K/UL (ref 1–4.8)
ABSOLUTE MONO #: 1.26 K/UL (ref 0.1–0.8)
ANION GAP SERPL CALCULATED.3IONS-SCNC: 11 MMOL/L (ref 9–17)
BASOPHILS # BLD: 0 % (ref 0–2)
BASOPHILS ABSOLUTE: 0 K/UL (ref 0–0.2)
BUN SERPL-MCNC: 8 MG/DL (ref 8–23)
CALCIUM SERPL-MCNC: 8.3 MG/DL (ref 8.6–10.4)
CHLORIDE SERPL-SCNC: 97 MMOL/L (ref 98–107)
CO2 SERPL-SCNC: 29 MMOL/L (ref 20–31)
CREAT SERPL-MCNC: 0.64 MG/DL (ref 0.5–0.9)
CRP SERPL HS-MCNC: 163.1 MG/L (ref 0–5)
EOSINOPHILS RELATIVE PERCENT: 1 % (ref 1–4)
GFR SERPL CREATININE-BSD FRML MDRD: >60 ML/MIN/1.73M2
GLUCOSE BLD-MCNC: 120 MG/DL (ref 65–105)
GLUCOSE BLD-MCNC: 142 MG/DL (ref 65–105)
GLUCOSE BLD-MCNC: 182 MG/DL (ref 65–105)
GLUCOSE BLD-MCNC: 38 MG/DL (ref 65–105)
GLUCOSE BLD-MCNC: 69 MG/DL (ref 65–105)
GLUCOSE SERPL-MCNC: 41 MG/DL (ref 70–99)
HCT VFR BLD AUTO: 37.8 % (ref 36.3–47.1)
HGB BLD-MCNC: 10.7 G/DL (ref 11.9–15.1)
IMMATURE GRANULOCYTES: 1 %
LDLC SERPL-MCNC: 758 U/L (ref 135–214)
LYMPHOCYTES # BLD: 9 % (ref 24–44)
MAGNESIUM SERPL-MCNC: 2.1 MG/DL (ref 1.6–2.6)
MCH RBC QN AUTO: 27 PG (ref 25.2–33.5)
MCHC RBC AUTO-ENTMCNC: 28.3 G/DL (ref 28.4–34.8)
MCV RBC AUTO: 95.2 FL (ref 82.6–102.9)
MONOCYTES # BLD: 13 % (ref 1–7)
MORPHOLOGY: ABNORMAL
NRBC AUTOMATED: 0 PER 100 WBC
PDW BLD-RTO: 15.3 % (ref 11.8–14.4)
PLATELET # BLD AUTO: 274 K/UL (ref 138–453)
PMV BLD AUTO: 9.2 FL (ref 8.1–13.5)
POTASSIUM SERPL-SCNC: 3.4 MMOL/L (ref 3.7–5.3)
RBC # BLD: 3.97 M/UL (ref 3.95–5.11)
SEG NEUTROPHILS: 76 % (ref 36–66)
SEGMENTED NEUTROPHILS ABSOLUTE COUNT: 7.37 K/UL (ref 1.8–7.7)
SODIUM SERPL-SCNC: 137 MMOL/L (ref 135–144)
WBC # BLD AUTO: 9.7 K/UL (ref 3.5–11.3)

## 2023-02-03 PROCEDURE — 99222 1ST HOSP IP/OBS MODERATE 55: CPT | Performed by: NURSE PRACTITIONER

## 2023-02-03 PROCEDURE — 87070 CULTURE OTHR SPECIMN AEROBIC: CPT

## 2023-02-03 PROCEDURE — 97535 SELF CARE MNGMENT TRAINING: CPT

## 2023-02-03 PROCEDURE — 2580000003 HC RX 258: Performed by: INTERNAL MEDICINE

## 2023-02-03 PROCEDURE — 36415 COLL VENOUS BLD VENIPUNCTURE: CPT

## 2023-02-03 PROCEDURE — 87040 BLOOD CULTURE FOR BACTERIA: CPT

## 2023-02-03 PROCEDURE — 97605 NEG PRS WND THER DME<=50SQCM: CPT

## 2023-02-03 PROCEDURE — 6370000000 HC RX 637 (ALT 250 FOR IP)

## 2023-02-03 PROCEDURE — 6360000002 HC RX W HCPCS: Performed by: INTERNAL MEDICINE

## 2023-02-03 PROCEDURE — 99222 1ST HOSP IP/OBS MODERATE 55: CPT | Performed by: INTERNAL MEDICINE

## 2023-02-03 PROCEDURE — 2580000003 HC RX 258

## 2023-02-03 PROCEDURE — 6360000002 HC RX W HCPCS

## 2023-02-03 PROCEDURE — 87075 CULTR BACTERIA EXCEPT BLOOD: CPT

## 2023-02-03 PROCEDURE — 83735 ASSAY OF MAGNESIUM: CPT

## 2023-02-03 PROCEDURE — 97162 PT EVAL MOD COMPLEX 30 MIN: CPT

## 2023-02-03 PROCEDURE — 2709999900 HC NON-CHARGEABLE SUPPLY

## 2023-02-03 PROCEDURE — 99232 SBSQ HOSP IP/OBS MODERATE 35: CPT | Performed by: HOSPITALIST

## 2023-02-03 PROCEDURE — 85025 COMPLETE CBC W/AUTO DIFF WBC: CPT

## 2023-02-03 PROCEDURE — 80048 BASIC METABOLIC PNL TOTAL CA: CPT

## 2023-02-03 PROCEDURE — 82947 ASSAY GLUCOSE BLOOD QUANT: CPT

## 2023-02-03 PROCEDURE — 97530 THERAPEUTIC ACTIVITIES: CPT

## 2023-02-03 PROCEDURE — 2060000000 HC ICU INTERMEDIATE R&B

## 2023-02-03 PROCEDURE — 87205 SMEAR GRAM STAIN: CPT

## 2023-02-03 PROCEDURE — 2W04X6Z CHANGE PRESSURE DRESSING ON CHEST WALL: ICD-10-PCS | Performed by: NURSE PRACTITIONER

## 2023-02-03 PROCEDURE — 97166 OT EVAL MOD COMPLEX 45 MIN: CPT

## 2023-02-03 PROCEDURE — 86140 C-REACTIVE PROTEIN: CPT

## 2023-02-03 RX ORDER — INSULIN GLARGINE 100 [IU]/ML
60 INJECTION, SOLUTION SUBCUTANEOUS NIGHTLY
Status: DISCONTINUED | OUTPATIENT
Start: 2023-02-03 | End: 2023-02-03

## 2023-02-03 RX ORDER — INSULIN LISPRO 100 [IU]/ML
0-8 INJECTION, SOLUTION INTRAVENOUS; SUBCUTANEOUS
Status: DISCONTINUED | OUTPATIENT
Start: 2023-02-03 | End: 2023-02-07 | Stop reason: HOSPADM

## 2023-02-03 RX ORDER — POTASSIUM CHLORIDE 7.45 MG/ML
10 INJECTION INTRAVENOUS PRN
Status: DISCONTINUED | OUTPATIENT
Start: 2023-02-03 | End: 2023-02-07 | Stop reason: HOSPADM

## 2023-02-03 RX ORDER — INSULIN LISPRO 100 [IU]/ML
0-4 INJECTION, SOLUTION INTRAVENOUS; SUBCUTANEOUS NIGHTLY
Status: DISCONTINUED | OUTPATIENT
Start: 2023-02-03 | End: 2023-02-07 | Stop reason: HOSPADM

## 2023-02-03 RX ORDER — DEXTROSE MONOHYDRATE 100 MG/ML
INJECTION, SOLUTION INTRAVENOUS CONTINUOUS PRN
Status: DISCONTINUED | OUTPATIENT
Start: 2023-02-03 | End: 2023-02-07 | Stop reason: HOSPADM

## 2023-02-03 RX ORDER — INSULIN GLARGINE 100 [IU]/ML
35 INJECTION, SOLUTION SUBCUTANEOUS NIGHTLY
Status: DISCONTINUED | OUTPATIENT
Start: 2023-02-03 | End: 2023-02-06

## 2023-02-03 RX ORDER — POTASSIUM CHLORIDE 20 MEQ/1
40 TABLET, EXTENDED RELEASE ORAL PRN
Status: DISCONTINUED | OUTPATIENT
Start: 2023-02-03 | End: 2023-02-07 | Stop reason: HOSPADM

## 2023-02-03 RX ADMIN — CLOPIDOGREL 75 MG: 75 TABLET, FILM COATED ORAL at 08:06

## 2023-02-03 RX ADMIN — PANTOPRAZOLE SODIUM 40 MG: 40 TABLET, DELAYED RELEASE ORAL at 05:56

## 2023-02-03 RX ADMIN — LEVOTHYROXINE SODIUM 50 MCG: 50 TABLET ORAL at 05:56

## 2023-02-03 RX ADMIN — FUROSEMIDE 40 MG: 40 TABLET ORAL at 20:45

## 2023-02-03 RX ADMIN — POTASSIUM CHLORIDE 40 MEQ: 1500 TABLET, EXTENDED RELEASE ORAL at 08:49

## 2023-02-03 RX ADMIN — BUSPIRONE HYDROCHLORIDE 10 MG: 10 TABLET ORAL at 08:06

## 2023-02-03 RX ADMIN — FUROSEMIDE 40 MG: 40 TABLET ORAL at 08:06

## 2023-02-03 RX ADMIN — ENOXAPARIN SODIUM 30 MG: 100 INJECTION SUBCUTANEOUS at 20:46

## 2023-02-03 RX ADMIN — DESMOPRESSIN ACETATE 40 MG: 0.2 TABLET ORAL at 20:45

## 2023-02-03 RX ADMIN — PIPERACILLIN AND TAZOBACTAM 3375 MG: 3; .375 INJECTION, POWDER, FOR SOLUTION INTRAVENOUS at 15:04

## 2023-02-03 RX ADMIN — METOPROLOL TARTRATE 12.5 MG: 25 TABLET ORAL at 08:06

## 2023-02-03 RX ADMIN — ISOSORBIDE MONONITRATE 60 MG: 60 TABLET, EXTENDED RELEASE ORAL at 08:06

## 2023-02-03 RX ADMIN — PIPERACILLIN AND TAZOBACTAM 3375 MG: 3; .375 INJECTION, POWDER, FOR SOLUTION INTRAVENOUS at 20:55

## 2023-02-03 RX ADMIN — METOPROLOL TARTRATE 12.5 MG: 25 TABLET ORAL at 20:44

## 2023-02-03 RX ADMIN — GABAPENTIN 400 MG: 400 CAPSULE ORAL at 20:45

## 2023-02-03 RX ADMIN — SODIUM CHLORIDE, PRESERVATIVE FREE 10 ML: 5 INJECTION INTRAVENOUS at 08:06

## 2023-02-03 RX ADMIN — AMIODARONE HYDROCHLORIDE 200 MG: 200 TABLET ORAL at 08:06

## 2023-02-03 RX ADMIN — INSULIN GLARGINE 35 UNITS: 100 INJECTION, SOLUTION SUBCUTANEOUS at 20:47

## 2023-02-03 RX ADMIN — OXYCODONE HYDROCHLORIDE AND ACETAMINOPHEN 1 TABLET: 5; 325 TABLET ORAL at 13:57

## 2023-02-03 RX ADMIN — OXYCODONE HYDROCHLORIDE AND ACETAMINOPHEN 1 TABLET: 5; 325 TABLET ORAL at 22:10

## 2023-02-03 RX ADMIN — Medication 81 MG: at 08:06

## 2023-02-03 RX ADMIN — ENOXAPARIN SODIUM 30 MG: 100 INJECTION SUBCUTANEOUS at 08:06

## 2023-02-03 RX ADMIN — VANCOMYCIN HYDROCHLORIDE 2000 MG: 1 INJECTION, POWDER, LYOPHILIZED, FOR SOLUTION INTRAVENOUS at 12:41

## 2023-02-03 RX ADMIN — AMIODARONE HYDROCHLORIDE 200 MG: 200 TABLET ORAL at 13:57

## 2023-02-03 RX ADMIN — AMIODARONE HYDROCHLORIDE 200 MG: 200 TABLET ORAL at 20:45

## 2023-02-03 RX ADMIN — SODIUM CHLORIDE, PRESERVATIVE FREE 10 ML: 5 INJECTION INTRAVENOUS at 20:46

## 2023-02-03 RX ADMIN — BUSPIRONE HYDROCHLORIDE 10 MG: 10 TABLET ORAL at 20:45

## 2023-02-03 ASSESSMENT — ENCOUNTER SYMPTOMS
COLOR CHANGE: 1
ABDOMINAL DISTENTION: 0
APNEA: 0
EYE DISCHARGE: 0

## 2023-02-03 ASSESSMENT — PAIN DESCRIPTION - DESCRIPTORS: DESCRIPTORS: DISCOMFORT

## 2023-02-03 ASSESSMENT — PAIN DESCRIPTION - ORIENTATION: ORIENTATION: MID

## 2023-02-03 ASSESSMENT — PAIN SCALES - WONG BAKER: WONGBAKER_NUMERICALRESPONSE: 0

## 2023-02-03 ASSESSMENT — PAIN DESCRIPTION - LOCATION: LOCATION: CHEST

## 2023-02-03 ASSESSMENT — PAIN SCALES - GENERAL
PAINLEVEL_OUTOF10: 4
PAINLEVEL_OUTOF10: 3
PAINLEVEL_OUTOF10: 6

## 2023-02-03 NOTE — PROGRESS NOTES
Comprehensive Nutrition Assessment    Type and Reason for Visit:  Consult    Nutrition Recommendations/Plan:   Recommend starting diabetic oral nutrition supplement BID to aid in meeting nutrition needs and hypoglycemia. Obtain new weight as able. Weights obtained upon admission indicate discrepancy. Continue to monitor weights, intakes, labs, wound healing and follow up. Malnutrition Assessment:  Malnutrition Status:  Insufficient data (02/03/23 1130)    Context:  Chronic Illness     Findings of the 6 clinical characteristics of malnutrition:  Energy Intake:  75% or less estimated energy requirements for 1 month or longer  Weight Loss:  Unable to assess     Body Fat Loss:  Unable to assess     Muscle Mass Loss:  Unable to assess    Fluid Accumulation:  No significant fluid accumulation     Strength:  Not Performed    Nutrition Assessment:    Chart reviewed for consult related to evaluate for malnutrition. Patient is known to writer due to recent admission. Patient admitted related to pleural effusion, osteomyelitis. Patient had CABG 11/2022, surgical site became infected, currently has wound vac to sternum, discharged from this facility to SNF, left AMA and is now readmitted due to weakness and inability to care for self. Patient receiving therapeutic diet related to DM. Two weights obtained on 2/2 indicating discrepancy, will order for new weight to evaluate if patient has had a significant weight loss since last admission. Will recommend diabetic ONS BID to aid in meeting nutrition needs as intakes prior to admission predicted inadequate. Nutrition Related Findings:    Labs indicate hypoglycemia, will add diabetic ONS BID; Meds reviewed Wound Type: Surgical Incision, Wound Vac (Sternum)       Current Nutrition Intake & Therapies:    Average Meal Intake: Unable to assess  Average Supplements Intake: None Ordered  ADULT DIET;  Regular; 3 carb choices (45 gm/meal)    Anthropometric Measures:  Height: 4' 11\" (149.9 cm)  Ideal Body Weight (IBW): 95 lbs (43 kg)    Current Body Weight: 239 lb 3.2 oz (108.5 kg), 251.8 % IBW. Weight Source: Bed Scale  Current BMI (kg/m2): 48.3  BMI Categories: Obese Class 3 (BMI 40.0 or greater)    Estimated Daily Nutrient Needs:  Energy Requirements Based On: Formula  Weight Used for Energy Requirements: Current  Energy (kcal/day): 1800-2100kcal/day  Weight Used for Protein Requirements: Ideal  Protein (g/day): 65-90gmsPRO/day  Fluid (ml/day): Per MD    Nutrition Diagnosis:   Predicted inadequate energy intake related to  (decreased appetite) as evidenced by poor intake prior to admission    Nutrition Interventions:   Food and/or Nutrient Delivery: Continue Current Diet, Start Oral Nutrition Supplement  Nutrition Education/Counseling: No recommendation at this time  Coordination of Nutrition Care: Continue to monitor while inpatient       Goals:     Goals: Meet at least 75% of estimated needs, within 7 days, PO intake 50% or greater       Nutrition Monitoring and Evaluation:   Behavioral-Environmental Outcomes: None Identified  Food/Nutrient Intake Outcomes: Food and Nutrient Intake, Supplement Intake  Physical Signs/Symptoms Outcomes: Biochemical Data, Weight, Skin, Fluid Status or Edema    Discharge Planning:     Too soon to determine     Terry Reeves RD  Contact: [phone]

## 2023-02-03 NOTE — PROGRESS NOTES
Occupational Therapy  Facility/Department: [facility]  Occupational Therapy Initial Assessment    Name: Elisabeth Jules  : 1955  MRN: [id number]  Date of Service: 2/3/2023    Chief Complaint   Patient presents with    Difficulty Walking     Pt. Had CT surgery done, left SNF AMA, upon arrival home unable to care for self          Discharge Recommendations:  Patient would benefit from continued therapy after discharge  OT Equipment Recommendations  Equipment Needed: Yes  Mobility Devices: ADL Assistive Devices  ADL Assistive Devices: Toileting - 3-in-1 Commode;Reacher;Sock-Aid Hard;Long-handled Sponge;Long-handled Shoe Horn       Patient Diagnosis(es): The primary encounter diagnosis was Surgical wound breakdown, subsequent encounter. A diagnosis of Soft tissue infection was also pertinent to this visit. Past Medical History:  has a past medical history of Ambulates with cane, Anxiety, Borderline hypertension, CAD (coronary artery disease), Depression, GERD (gastroesophageal reflux disease), Heart attack ([facility] [address] Hypertension, Hypothyroidism, Neuropathy, Obesity, Osteoarthritis, Other cirrhosis of liver ([facility] [address] Sleep apnea, Type II or unspecified type diabetes mellitus without mention of complication, not stated as uncontrolled, Under care of service provider, Under care of service provider, and Vertebrogenic low back pain. Past Surgical History:  has a past surgical history that includes Hysterectomy; Colonoscopy; Upper gastrointestinal endoscopy; Dilation and curettage of uterus; hiatal hernia repair; Throat surgery; Appendectomy; Total knee arthroplasty (Right, 2015); Total knee arthroplasty (Left, 2015); Coronary angioplasty with stent (2020); Cardiac catheterization; Cardiac catheterization (2022); Coronary artery bypass graft (N/A, 2022); IR CHEST TUBE INSERTION (2023); Wound debridement (2023);  Cardiac surgery (N/A, 2023); and IR CHEST TUBE INSERTION (1/16/2023). Assessment   Performance deficits / Impairments: Decreased functional mobility ; Decreased ADL status; Decreased cognition;Decreased endurance;Decreased balance;Decreased high-level IADLs  Assessment: Pt agreeable to OT eval this date. Pt exhibits deficits in endurance and balance noted throughout functional assessments. Pt requires increased assistance for LB ADLs and toileting tasks at this time d/t limitations in functional reach and balance deficits. Pt engaged in functional mobility and transfers with CGA and use of RW this date. Pt will require continued OT services to increase independence and safety with ADLs/IADLs and functional transfers/mobility. Prognosis: Good  Decision Making: Medium Complexity  REQUIRES OT FOLLOW-UP: Yes  Activity Tolerance  Activity Tolerance: Patient Tolerated treatment well        Plan   Occupational Therapy Plan  Times Per Week: 3-5 x/wk  Current Treatment Recommendations: Balance training, Functional mobility training, Endurance training, Cognitive reorientation, Patient/Caregiver education & training, Self-Care / ADL, Safety education & training, Home management training     Restrictions  Restrictions/Precautions  Required Braces or Orthoses?: No  Position Activity Restriction  Other position/activity restrictions: up w/assist; Recent CABG 11/28/22, sternal washout with wound vac placement 1/14/23    Subjective   General  Patient assessed for rehabilitation services?: Yes  Family / Caregiver Present: No  General Comment  Comments: RN ok'd pt for OT eval this date. Pt agreeable to session and pleasant/cooperative throughout.  Pt reports no pain    Social/Functional History  Social/Functional History  Lives With: Spouse  Type of Home: House  Home Layout: One level  Home Access: Stairs to enter with rails  Entrance Stairs - Number of Steps: 3  Entrance Stairs - Rails: Right  Bathroom Shower/Tub: Walk-in shower, Shower chair with back  Bathroom Toilet: Standard  Bathroom Equipment: Grab bars in shower  Bathroom Accessibility: Accessible  Home Equipment: Lester Simmons, 4 wheeled, [address] Help From: Family  ADL Assistance: Independent  Bath: Stand by assistance  Dressing: Stand by assistance  Grooming: Stand by assistance  Feeding: Independent  Toileting: Independent  Homemaking Assistance: Needs assistance ( performs)  Meal Prep: Maximal  Laundry: Maximal  Vacuuming: Maximal  Cleaning: Maximal  Gardening: Maximal  Yard Work: Maximal  Driving: Total  Shopping: Total  Homemaking Responsibilities: No  Ambulation Assistance: Independent  Transfer Assistance: Independent  Active : No  Mode of Transportation: Family  Occupation: Retired  Additional Comments: Pt reports discharge from facility and returning home for ~12 hours prior to returning to hospital due to inability to care for self. Objective        Safety Devices  Type of Devices: Nurse notified;Gait belt;Left in chair;Call light within reach; Chair alarm in place  Restraints  Restraints Initially in Place: No    Bed Mobility Training  Bed Mobility Training: Yes  Overall Level of Assistance: Minimum assistance; Additional time (pt engaged in sup>sit with Min A for trunk/hip progression)  Interventions: Safety awareness training;Verbal cues  Supine to Sit: Minimum assistance; Additional time  Sit to Supine:  (pt retired to recliner)  Balance  Sitting: Intact (pt completed static and dynamic sitting at EOB and on toilet with SBA-SUP for safety ~15 minutes)  Standing: With support (pt engaged in static and dynamic standing at toilet and at EOB with CGA. Poor functional reach noted when pt attempted pericare ~5 min collectively this date)  Transfer Training  Transfer Training: Yes  Overall Level of Assistance: Contact-guard assistance; Additional time; Adaptive equipment (pt engaged in functional transfers from EOB with CGA and use of RW and also completed toilet transfers with Min A d/t low height of toilet. VCs for appropriate hand placement)  Stand to Sit: Contact-guard assistance; Additional time; Adaptive equipment  Stand Pivot Transfers: Contact-guard assistance; Additional time; Adaptive equipment  Toilet Transfer: Minimum assistance; Additional time; Adaptive equipment  Gait  Overall Level of Assistance: Contact-guard assistance; Additional time; Adaptive equipment (Pt engaged in functional mobility from EOB <> bathroom with CGA and use of RW.)    AROM: Within functional limits  Strength: Within functional limits (grossly 4+/5)  Coordination: Within functional limits  Tone: Normal  Sensation: Intact    ADL  Feeding: Modified independent ;Setup  Grooming: Modified independent ;Setup  UE Bathing: Stand by assistance;Setup; Increased time to complete  LE Bathing: Minimal assistance;Setup; Increased time to complete  UE Dressing: Stand by assistance;Setup; Increased time to complete  LE Dressing: Minimal assistance;Setup; Increased time to complete  Toileting: Minimal assistance;Setup; Increased time to complete  Toileting Skilled Clinical Factors: pt exhibits poor functional reach requiring Min A to complete pericare while standing following toileting task. Min A also required for toilet transfer with increased time and grab bars utilized    Vision  Vision: Within Functional Limits  Hearing  Hearing: Within functional limits  Cognition  Overall Cognitive Status: Exceptions  Problem Solving: Assistance required to identify errors made;Assistance required to generate solutions  Initiation: Requires cues for some  Sequencing: Requires cues for some  Orientation  Overall Orientation Status: Within Functional Limits                Education Provided Comments: Pt ed on OT role, OT POC, safety awareness, transfer training, adaptive ADL tech, fall prevention, and importance of continued OT services. Good return noted.     LUE AROM (degrees)  LUE AROM : WFL  Left Hand AROM (degrees)  Left Hand AROM: WFL  RUE AROM (degrees)  RUE AROM : WFL  Right Hand AROM (degrees)  Right Hand AROM: WFL       Hand Dominance  Hand Dominance: Right            AM-PAC Score  AM-PAC Inpatient Daily Activity Raw Score: 20 (02/03/23 1655)  AM-PAC Inpatient ADL T-Scale Score : 42.03 (02/03/23 1655)  ADL Inpatient CMS 0-100% Score: 38.32 (02/03/23 1655)  ADL Inpatient CMS G-Code Modifier : CJ (02/03/23 1655)    Goals  Short Term Goals  Time Frame for Short Term Goals: By discharge, pt will:  Short Term Goal 1: Demo SUP for functional transfers and functional mobility with use of LRD for engagement in ADLs/IADLs  Short Term Goal 2: Demo I with UB ADLs  Short Term Goal 3: Demo Mod I for LB ADLs and toileting tasks with use of AE PRN  Short Term Goal 4: Demo 8 min dynamic standing and reaching outside BARBARA with unilateral hand release and SUP for improved balance during ADL/IADL participation  Short Term Goal 5: Verbalize/incorporate 2 EC/WS tech into therapy session with 0 VCs       Therapy Time   Individual Concurrent Group Co-treatment   Time In 1029         Time Out 1116         Minutes 47         Timed Code Treatment Minutes: 25 Minutes       Stewart Jimenez OTR/L

## 2023-02-03 NOTE — CARE COORDINATION
Transitional Planning:  Referrals sent to [facility] and [facility] per preference. 15:30  Left vm with [address] at [facility]  that pt is agreeable to returning should the other 2 facilities decline. 16:00  Retrieved vm from [facility] [facility] at [place]. They cannot accept. 16:35  Call to [address] at [facility]  Left vm that we would like pt to go back to their facility. 17:09  Call back to [address] at [facility] in response to vm she left stating they can accept. CM left vm for [address] to begin precert. It will be started on Monday as it is now after hrs.

## 2023-02-03 NOTE — CARE COORDINATION
Case Management Assessment  Initial Evaluation    Date/Time of Evaluation: 2/3/2023 11:29 AM  Assessment Completed by: JUDITH Rai    If patient is discharged prior to next notation, then this note serves as note for discharge by case management. Patient Name: Hemal Walker                   YOB: 1955  Diagnosis: Pleural effusion [J90]  Soft tissue infection [L08.9]  Surgical wound breakdown, subsequent encounter Keon Bal                   Date / Time: 2/2/2023  1:07 PM    Patient Admission Status: Inpatient   Readmission Risk (Low < 19, Mod (19-27), High > 27): Readmission Risk Score: 21.7    Current PCP: David Packer MD  PCP verified by CM? Yes    Chart Reviewed: Yes      History Provided by: Patient  Patient Orientation: Alert and Oriented    Patient Cognition: Alert    Hospitalization in the last 30 days (Readmission):  Yes    If yes, Readmission Assessment in  Navigator will be completed. Advance Directives:      Code Status: Full Code   Patient's Primary Decision Maker is: Legal Next of Kin      Discharge Planning:    Patient lives with: Spouse/Significant Other Type of Home: Skilled Nursing Facility  Primary Care Giver: Self  Patient Support Systems include: Spouse/Significant Other, Family Members   Current Financial resources: Medicare, Medicaid  Current community resources:    Current services prior to admission: None            Current DME:              Type of Home Care services:  None    ADLS  Prior functional level: Assistance with the following:, Bathing, Dressing, Toileting, Cooking, Housework, Shopping, Mobility  Current functional level: Assistance with the following:, Bathing, Dressing, Toileting, Cooking, Housework, Shopping, Mobility    PT AM-PAC:   /24  OT AM-PAC:   /24    Family can provide assistance at DC: Yes  Would you like Case Management to discuss the discharge plan with any other family members/significant others, and if so, who?  Yes ()  Plans to Return to Present Housing: No  Other Identified Issues/Barriers to RETURNING to current housing: Unable to care for self at home  Potential Assistance needed at discharge: Gio Ruiz            Potential DME:    Patient expects to discharge to: SNF  Plan for transportation at discharge:  ambulance/ambulette    Financial    Payor: Cyn Rasmussen / Plan: [address]  HMO / Product Type: Medicare /     Does insurance require precert for SNF: Yes    Potential assistance Purchasing Medications: No  Meds-to-Beds request:        [facility], [address]  [phone] - F [phone]  [address] [address]  Phone: [phone] Fax:  [id number] [place] [address] [address] [facility] - P [phone] - F [phone]  [address] [facility] [place] [address]  Phone: [phone] Fax: [phone]      Notes:    Factors facilitating achievement of predicted outcomes: Family support, Motivated, Cooperative, Pleasant, Good insight into deficits, and Has needed Durable Medical Equipment at home    Barriers to discharge: No barriers identified    Additional Case Management Notes: Pt was a [facility] and left AMA to go home. Pt states it was a huge mistake she just wanted to go home so badly. Pt unable to care for self at  home and needs SNF placement. The Plan for Transition of Care is related to the following treatment goals of Pleural effusion [J90]  Soft tissue infection [L08.9]  Surgical wound breakdown, subsequent encounter [K94.86NT]    IF APPLICABLE: The Patient and/or patient representative Geovanny Hilario and her family were provided with a choice of provider and agrees with the discharge plan.  Freedom of choice list with basic dialogue that supports the patient's individualized plan of care/goals and shares the quality data associated with the providers was provided to: Patient   Patient Representative Name:       The Patient and/or Patient Representative Agree with the Discharge Plan?       JUDITH Hernandez  Case Management Department  Ph:  Fax:

## 2023-02-03 NOTE — PROGRESS NOTES
Pt found to have a BS of 38. Pt was awake and talking. 120mL of Orange juice provided. Breakfast at bedside and pt was assisted to get that set up and pt began eating. Will recheck FSBS shortly.

## 2023-02-03 NOTE — PROGRESS NOTES
[address] Pharmacokinetic Monitoring Service - Vancomycin     Leyla Rivera is a 78 y.o. female starting on vancomycin therapy for lower sternal ischemia and infection. Pharmacy consulted by Dr. Xuan Johansen for monitoring and adjustment. Target Concentration: Goal AUC/LATANYA 400-600 mg*hr/L    Additional Antimicrobials: Zosyn    Pertinent Laboratory Values: Wt Readings from Last 1 Encounters:   02/03/23 240 lb (108.9 kg)     Temp Readings from Last 1 Encounters:   02/03/23 98.8 °F (37.1 °C) (Oral)     Estimated Creatinine Clearance: 99 mL/min (based on SCr of 0.64 mg/dL).   Recent Labs     02/02/23  1334 02/03/23  0704   CREATININE 0.85 0.64   WBC 13.2* 9.7     Procalcitonin: n/a    Pertinent Cultures: See micro    MRSA Nasal Swab: N/A    Plan:  Dosing recommendations based on Bayesian software  Start vancomycin 2000 mg IV once followed by 1000 mg IV q12h  Anticipated AUC of 562 and trough concentration of 16.9 at steady state  Renal labs as indicated  Pharmacy will continue to monitor patient and adjust therapy as indicated    Thank you for the consult,  Ant Brooks [facility]  2/3/2023 12:49 PM

## 2023-02-03 NOTE — PROGRESS NOTES
[facility]  Progress Note    Date:   2/3/2023  Patient name:  Lilian Byrnes  Date of admission:  2/2/2023  1:07 PM  MRN:   [id number]  YOB: 1955    SUBJECTIVE/Last 24 hours update:   Patient was seen and examined at bedside. Patient was having breakfast at the time of exam.  However patient has not been eating well since last night. This morning patient had a blood sugar level of 38, was complaining of shaking and sweating. No loss of consciousness. Patient is on 70 units Lantus at home switched to 60 units with medium dose correction scale. Patient has no new complaints overnight, states that she is feeling good overall. Patient denies any shortness of breath or chest pain, fever or chills. Brief Hospital course:   45-year-old female patient with history of type 2 diabetes, hypertension, depression, recurrent falls, CAD with CABG on November 2022. Patient was recently admitted for CABG that was complicated with incisional infection and osteomyelitis of the chest wall s/p debridement and VAC placement. Patient was supposed to continue a course of 6 weeks of Rocephin, lost her PICC line 2 days ago. In the ED patient was found to have pleural effusion, CT surgery was consulted, ultrasound-guided thoracocentesis was performed with a collection of 5 mL serosanguineous fluid , patient was admitted for further work-up and management.     REVIEW OF SYSTEMS:      CONSTITUTIONAL:  no fevers, no headcahes  EYES: negative for blury vision  HEENT: No headaches, No nasal congestion, no difficulty swallowing  RESPIRATORY:negative for dyspnea, no wheezing, no Cough  CARDIOVASCULAR: negative for chest pain, no palpitations  GASTROINTESTINAL: no nausea, no vomiting, constipation, no abdominal pain   GENITOURINARY: negative for dysuria, no hematuria   MUSCULOSKELETAL: no joint pains, no muscle aches, no swelling of joints or extremities  NEUROLOGICAL: No  Weakness or numbness      PAST MEDICAL HISTORY:      has a past medical history of Ambulates with cane, Anxiety, Borderline hypertension, CAD (coronary artery disease), Depression, GERD (gastroesophageal reflux disease), Heart attack ([facility] [address] Hypertension, Hypothyroidism, Neuropathy, Obesity, Osteoarthritis, Other [facility] Sleep apnea, Type II or unspecified type diabetes mellitus without mention of complication, not stated as uncontrolled, Under care of service provider, Under care of service provider, and Vertebrogenic low back pain. PAST SURGICAL HISTORY:      has a past surgical history that includes Hysterectomy; Colonoscopy; Upper gastrointestinal endoscopy; Dilation and curettage of uterus; hiatal hernia repair; Throat surgery; Appendectomy; Total knee arthroplasty (Right, 01/06/2015); Total knee arthroplasty (Left, 05/26/2015); Coronary angioplasty with stent (07/2020); Cardiac catheterization; Cardiac catheterization (11/01/2022); Coronary artery bypass graft (N/A, 11/28/2022); IR CHEST TUBE INSERTION (01/13/2023); Wound debridement (01/14/2023); Cardiac surgery (N/A, 1/14/2023); and IR CHEST TUBE INSERTION (1/16/2023). SOCIAL HISTORY:      reports that she has never smoked. She has never used smokeless tobacco. She reports current alcohol use. She reports that she does not use drugs. FAMILY HISTORY:     family history includes Arthritis in her father and mother; Cancer in her father; Diabetes in her sister; Heart Disease in her father and mother.     ALLERGIES:     Morphine, Shellfish-derived products, and Tape [adhesive tape]      OBJECTIVE:       Vitals:    02/03/23 0021 02/03/23 0417 02/03/23 0705 02/03/23 0810   BP: 106/62 119/67 124/69    Pulse: 66 73 74    Resp: 14 14 12    Temp: 97.5 °F (36.4 °C) 97.2 °F (36.2 °C) 98.8 °F (37.1 °C)    TempSrc: Oral Oral Oral    SpO2: 99% 97% 99% 94%   Weight:       Height:             Intake/Output Summary (Last 24 hours) at 2/3/2023 0818  Last data filed at 2/2/2023 2129  Gross per 24 hour   Intake 10 ml   Output --   Net 10 ml       PHYSICAL EXAM:  General Appearance  Alert , awake , not in acute distress  HEENT - Head is normocephalic, atraumatic. Lungs - Bilateral equal air entry , no wheezes, rales or rhonchi, aeration good, Wound vac is appreciated, mild tenderness on palpation at the edges of wound vac  Cardiovascular - Heart sounds are normal.  Regular rhythm, normal rate   Abdomen - Soft, nontender, nondistended, no masses or organomegaly  Neurologic - There are no new focal motor or sensory deficits  Skin - Multiple bruises and scratches signs are appreciated    Extremities - No cyanosis, clubbing or edema      ASSESSMENT:     Principal Problem:    Pleural effusion  Active Problems:    S/P CABG (coronary artery bypass graft)    Osteomyelitis (HCC)    Hypothyroidism  Resolved Problems:    * No resolved hospital problems.  *        PLAN:     Left-sided pleural effusion likely due to recent CABG  -S/p thoracocentesis yesterday by IR  -Collection of 5 mL of clear yellow fluid  -Fluid analysis showed few neutrophils, gram stain pending  -Cardiothoracic surgery on board  -On 2 L nasal cannula, O2 sat 94%    CAD status post CABG  -Patient had CABG X3 in [place] [address]  -Was complicated by incision site infection and recurrent pleural effusion  -Wound VAC on place  -Wound ostomy consulted  -Denies any chest pain    Surgical wound dehiscence and infection status post CABG  -Continue Rocephin once daily per ID recommendation on previous admission  -Wound VAC on place  -Continue wound care per wound ostomy    Type 2 diabetes  -Last hemoglobin A1c 7.3  -On 70 units Lantus at home  -Switch to 60 units with medium correction scale  -Continue POCT glucose  -Continue tight glycemic control for wound healing    Hypertension  -Controlled  -Continue home meds     DVT prophylaxis: Lovenox 30 mg BID  GI prophylaxis: Protonix 40 mg daily      Cody Guzman MD  Family Medicine Resident PGY1  2/3/2023 8:18 AM        Please note that this chart was generated using voice recognition Dragon dictation software.   Although every effort was made to ensure the accuracy of this automated transcription, some errors in transcription may have occurred

## 2023-02-03 NOTE — PROGRESS NOTES
[id number] [facility] Wound Ostomy Continence Nurse  Consult Note       NAME:  Filemon Otero  MEDICAL RECORD NUMBER:  [id number]  AGE: 78 y.o. GENDER: female  : 1955  TODAY'S DATE:  2/3/2023    Subjective:     Reason for WOCN Evaluation and Assessment: \"wound vac of the chest\"      Filemon Otero is a 78 y.o. female referred by:   [x] Physician  [] Nursing  [] Other:     Wound Identification:  Wound Type: non-healing surgical  Contributing Factors: diabetes, decreased mobility, obesity, decreased tissue oxygenation, anticoagulation therapy, and malnutrition        Initial visit to patient at 0900 found the ooma machine battery is discharged. Patient does not have or know where the  is currently. The vacuum dressing is in place over her sternum. Discussed with patient's attending physician and resident physician orders for continued NPWT use. Orders received. Supplies obtained. Returned to bedside. CTS providers in, dressing has been removed. Ok to reapply NPWT.      Objective:      /69   Pulse 74   Temp 98.8 °F (37.1 °C) (Oral)   Resp 12   Ht 4' 11\" (1.499 m)   Wt 239 lb 3.2 oz (108.5 kg)   SpO2 (!) 82%   BMI 48.31 kg/m²   Jacob Risk Score: Jacob Scale Score: 19    LABS    CBC:   Lab Results   Component Value Date/Time    WBC 9.7 2023 07:04 AM    RBC 3.97 2023 07:04 AM    HGB 10.7 2023 07:04 AM     CMP:  Albumin:    Lab Results   Component Value Date/Time    LABALBU 2.9 2023 01:34 PM     PT/INR:    Lab Results   Component Value Date/Time    PROTIME 11.8 2023 01:34 PM    INR 1.1 2023 01:34 PM     HgBA1c:    Lab Results   Component Value Date/Time    LABA1C 7.5 2022 09:53 AM     PTT: No components found for: LABPTT      Assessment:       Measurements:       23 1016   Wound 23 Other (Comment) Medial;Upper epigastric   Date First Assessed/Time First Assessed: 23 1533   Present on Hospital Admission: Yes  Primary Wound Type: Surgical Type  Location: Other (Comment)  Wound Location Orientation: Medial;Upper  Wound Description (Comments): epigastric   Wound Etiology Non-Healing Surgical   Wound Cleansed Cleansed with saline   Dressing/Treatment Open to air   Wound Assessment Dry;Epithelialization   Drainage Amount None   Odor None   Negative Pressure Wound Therapy Sternum   No placement date or time found. Present on Admission/Arrival: Yes  Location: Sternum   Wound Type Surgical   Unit Type KCI VAC [place]   Dressing Type White Foam;Black Foam   Number of pieces used 2   Cycle Continuous; On   Target Pressure (mmHg) 125   Canister changed? Yes   Dressing Changed Changed/New   Drainage Amount Moderate   Drainage Description Thin;Purulent;Yellow   Dressing Change Due 02/06/23   Wound Assessment Subcutaneous; Exposed structure bone;Slough;Granulation tissue   Jeannie-wound Assessment Intact   Odor Mild   Incision 11/28/22 Sternum   Date First Assessed/Time First Assessed: 11/28/22 0941   Present on Hospital Admission: No  Location: Sternum  Incision Description (Comments): CORONARY ARTERY BYPASS GRAFT X3, INCISION EDGES WELL APPROXIMATED, DRESSING PLACED POST-OP AND CLEAR. Wound Image    Dressing Status New dressing applied   Dressing Change Due 02/06/23   Incision Cleansed Cleansed with saline   Dressing/Treatment Negative pressure wound therapy   Incision Length (cm) 6.5   Incision Width (cm) 8.2 cm   Incision Depth (cm) 4 cm   Incision Assessment Devitalized tissue   Drainage Amount Moderate   Drainage Description Purulent;Thin;Yellow   Odor Mild     Carefully irrigated the sternal wound with saline and wicked away drainage using dry gauze. Periwound ski prepped with barrier film and drape. New dressing applied. Response to treatment:  Well tolerated by patient. Plan:     Plan of Care:   NPWT dressing reapplied. White foam under Granufoam  -125 mmhg  New canister palced.    Will plan M-W-F dressing changes unless other orders received from the physician. Specialty Bed Required : Yes  [] Low Air Loss   [x] Pressure Redistribution  [] Fluid Immersion  [] Bariatric  [] Total Pressure Relief  [] Other:     Discharge Plan:  TBD  SNF  Call placed to [facility] regarding the [address] The [place] are leased to the individual patient. Patient will need the powder cord to utilize this machine. We will also need the ActiVAC canister if she will discharge using the [place] machine. Patient/Caregiver Teaching:  Step by step dressing application reviewed, asked patient to determine where her power cord is. She states she will ask her .   [] Indicates understanding       [] Needs reinforcement  [] Unsuccessful      [x] Verbal Understanding  [] Demonstrated understanding       [] No evidence of learning  [] Refused teaching         [] N/A    Contact the Wound Ostomy RN on-call Monday-Friday [phone] via [employer] by searching \"wound\" under \"groups\" and selecting the on-call clinician.         Electronically signed by Mega Do RN, Nano Gallo on 2/3/2023 at 10:21 AM

## 2023-02-03 NOTE — CONSULTS
[id number] [facility] Cardiothoracic Surgery  Consult    Patient's Name/Date of Birth: Matias Horvath / [birth year] (70 y.o.)    Date: February 3, 2023     Chief Complaint: sternal infection    HPI: Matias Horvath is a 78 y.o.  female who presented to cardiothoracic surgery after recent clinic appointment due to inability to take care of herself at home. Patient was very tearful bedside stating she needs help with care and she should never have AMA the care facility she was at. Is also recovering from a serious sternal infection with recent debridement and closure. In office the dressing was not removed due to lack of supplies in the clinic. Today on rounds we took down the sternal dressing after readmission to the hospital.  There is no improvement in the sternal incision. Noted sternal dehiscence with purulent drainage. No bleeding present. Patient has no white count at this time. Patient shows no acute distress. IR placed a small pigtail drain yesterday due to a small left-sided pleural effusion. After we took down the dressing wound care arrived to replace a sternal wound VAC. At this time we will need to reach out to plastic surgery for flap closure. Patient will stay admitted in the hospital.  Social work and work on future placement. ROS:   CONSTITUTIONAL: Alert and oriented x4  Respiratory: Does require 2 L nasal cannula  Cardiovascular: negative  Gastrointestinal: negative  Genitourinary:negative  Hematologic/lymphatic: negative  Musculoskeletal: Sternal pain with wound VAC in place  Neurological: negative  Endocrine: Poorly controlled diabetic  Psychiatric: Very depressed with current condition.   Tearful bedside  Past Medical History:   Diagnosis Date    Ambulates with cane     or walker    Anxiety     Borderline hypertension     CAD (coronary artery disease)     stents x 2 in 2020    Depression 07/28/2020    GERD (gastroesophageal reflux disease)     Heart attack ([facility] [address] 07/18/2020 Hypertension     Hypothyroidism     Neuropathy     Obesity     morbid    Osteoarthritis     Other cirrhosis of liver ([facility] [address] 07/27/2020    pt denies    Sleep apnea     possible    Type II or unspecified type diabetes mellitus without mention of complication, not stated as uncontrolled     Under care of service provider 11/18/2022    [id number]-last visit aug 2022    Under care of service provider 11/18/2022    cardiology-ali-last visit nov 2022    Vertebrogenic low back pain 03/29/2022     Past Surgical History:   Procedure Laterality Date    APPENDECTOMY      CARDIAC CATHETERIZATION      2 stents    CARDIAC CATHETERIZATION  11/01/2022    CARDIAC SURGERY N/A 1/14/2023    STERNAL WOUND WASHOUT, DEBRIDEMENT, WOUND VAC PLACEMENT performed by Liliana Chan MD at [facility]  07/2020    CORONARY ARTERY BYPASS GRAFT N/A 11/28/2022    CABG CORONARY ARTERY BYPASS X3 ON PUMP , ATA, ENDO VEIN HARVEST performed by Liliana Chan MD at [place] [id number]  01/14/2023    STERNAL WOUND WASHOUT, DEBRIDEMENT, WOUND VAC PLACEMENT    DILATION AND CURETTAGE OF UTERUS      HIATAL HERNIA REPAIR      HYSTERECTOMY (CERVIX STATUS UNKNOWN)      IR CHEST TUBE INSERTION  01/13/2023    IR CHEST TUBE INSERTION 1/13/2023 MD [facility]    IR CHEST TUBE INSERTION  1/16/2023    IR CHEST TUBE INSERTION 1/16/2023 Yris Enamorado MD [facility]    THROAT SURGERY      POLYPS REMOVED FROM VOCAL CORD    TOTAL KNEE ARTHROPLASTY Right 01/06/2015    TOTAL KNEE ARTHROPLASTY Left 05/26/2015    UPPER GASTROINTESTINAL ENDOSCOPY       Allergies   Allergen Reactions    Morphine Other (See Comments)     HALLUCINATIONS      Shellfish-Derived Products Nausea Only    Tape Rondi Given Tape] Rash     Family History   Problem Relation Age of Onset    Heart Disease Mother     Arthritis Mother     Heart Disease Father     Arthritis Father Cancer Father         \"oral\"    Diabetes Sister         gestational     Social History     Socioeconomic History    Marital status:      Spouse name: Sobeida Zimmerman    Number of children: 0    Years of education: Not on file    Highest education level: Not on file   Occupational History    Occupation: Retired      Employer: [employer]   Tobacco Use    Smoking status: Never    Smokeless tobacco: Never   Vaping Use    Vaping Use: Never used   Substance and Sexual Activity    Alcohol use: Yes     Comment: once a month    Drug use: No    Sexual activity: Yes     Partners: Male   Other Topics Concern    Not on file   Social History Narrative    Not on file     Social Determinants of Health     Financial Resource Strain: Low Risk     Difficulty of Paying Living Expenses: Not hard at all   Food Insecurity: No Food Insecurity    Worried About Running Out of Food in the Last Year: Never true    Ran Out of Food in the Last Year: Never true   Transportation Needs: Not on file   Physical Activity: Not on file   Stress: Not on file   Social Connections: Not on file   Intimate Partner Violence: Not on file   Housing Stability: Not on file       Current Facility-Administered Medications   Medication Dose Route Frequency Provider Last Rate Last Admin    glucose chewable tablet 16 g  4 tablet Oral PRN Juarez Amor MD        dextrose bolus 10% 125 mL  125 mL IntraVENous PRN Juarez Amor MD        Or    dextrose bolus 10% 250 mL  250 mL IntraVENous PRN Juarez Amor MD        glucagon (rDNA) injection 1 mg  1 mg SubCUTAneous PRN Juarez Amor MD        dextrose 10 % infusion   IntraVENous Continuous PRN Juarez Amor MD        insulin glargine (LANTUS) injection vial 60 Units  60 Units SubCUTAneous Nightly Juarez Amor MD        insulin lispro (HUMALOG) injection vial 0-8 Units  0-8 Units SubCUTAneous TID WC Juarez Amor MD        insulin lispro (HUMALOG) injection vial 0-4 Units  0-4 Units SubCUTAneous Nightly Zenaida New MD        potassium chloride (KLOR-CON M) extended release tablet 40 mEq  40 mEq Oral PRN Breann Powers MD   40 mEq at 02/03/23 0849    Or    potassium bicarb-citric acid (EFFER-K) effervescent tablet 40 mEq  40 mEq Oral PRN Breann Powers MD        Or    potassium chloride 10 mEq/100 mL IVPB (Peripheral Line)  10 mEq IntraVENous PRN Breann Powers MD        sodium chloride flush 0.9 % injection 5-40 mL  5-40 mL IntraVENous 2 times per day Breann Powers MD   10 mL at 02/03/23 0806    sodium chloride flush 0.9 % injection 5-40 mL  5-40 mL IntraVENous PRN Breann Powers MD        0.9 % sodium chloride infusion   IntraVENous PRN Breann Powers MD        enoxaparin Sodium (LOVENOX) injection 30 mg  30 mg SubCUTAneous BID Breann Powers MD   30 mg at 02/03/23 0806    ondansetron (ZOFRAN-ODT) disintegrating tablet 4 mg  4 mg Oral Q8H PRN Breann Powers MD        Or    ondansetron [facility]) injection 4 mg  4 mg IntraVENous Q6H PRN Breann Powers MD        polyethylene glycol (GLYCOLAX) packet 17 g  17 g Oral Daily PRN Breann Powers MD        acetaminophen (TYLENOL) tablet 650 mg  650 mg Oral Q6H PRN Breann Powers MD        Or    acetaminophen (TYLENOL) suppository 650 mg  650 mg Rectal Q6H PRN Breann Powers MD        potassium chloride 10 mEq/100 mL IVPB (Peripheral Line)  10 mEq IntraVENous PRN Breann Powers MD        magnesium sulfate 2000 mg in 50 mL IVPB premix  2,000 mg IntraVENous PRN Breann Powers MD        amiodarone (CORDARONE) tablet 200 mg  200 mg Oral TID Breann Powers MD   200 mg at 02/03/23 0806    aspirin EC tablet 81 mg  81 mg Oral Daily Breann Powers MD   81 mg at 02/03/23 0806    atorvastatin (LIPITOR) tablet 40 mg  40 mg Oral Nightly Breann Powers MD   40 mg at 02/02/23 2130    [Held by provider] amLODIPine (NORVASC) tablet 5 mg  5 mg Oral Daily Breann Powers MD   5 mg at 02/02/23 [phone] busPIRone (BUSPAR) tablet 10 mg  10 mg Oral BID Rosi Arrieta MD   10 mg at 02/03/23 0806    cefTRIAXone (ROCEPHIN) 2000 mg in sterile water 20 mL IV syringe  2,000 mg IntraVENous Q24H Rosi Arrieta MD   2,000 mg at 02/02/23 1811    clopidogrel (PLAVIX) tablet 75 mg  75 mg Oral Daily Rosi Arrieta MD   75 mg at 02/03/23 0806    furosemide (LASIX) tablet 40 mg  40 mg Oral BID Rosi Arrieta MD   40 mg at 02/03/23 0806    gabapentin (NEURONTIN) capsule 400 mg  400 mg Oral Nightly Rosi Arrieta MD   400 mg at 02/02/23 2129    isosorbide mononitrate (IMDUR) extended release tablet 60 mg  60 mg Oral Daily Rosi Arrieta MD   60 mg at 02/03/23 0806    levothyroxine (SYNTHROID) tablet 50 mcg  50 mcg Oral Daily Rosi Arrieta MD   50 mcg at 02/03/23 0556    metoprolol tartrate (LOPRESSOR) tablet 12.5 mg  12.5 mg Oral BID Rosi Arrieta MD   12.5 mg at 02/03/23 0806    pantoprazole (PROTONIX) tablet 40 mg  40 mg Oral QAM AC Rosi Arrieta MD   40 mg at 02/03/23 0556    oxyCODONE-acetaminophen (PERCOCET) 5-325 MG per tablet 1 tablet  1 tablet Oral Q8H PRN Rosi Arrieta MD   1 tablet at 02/02/23 1753       Physical Exam:    Weight: Weight: 239 lb 3.2 oz (108.5 kg)    Weight: 239 lb 3.2 oz (108.5 kg)      Reviewed vital signs bedside. Oxygen saturation 94. Blood pressure is 124/69 heart rate is 74 bpm.    General: Alert and Oriented x3. Sitting up in bed. No apparent distress. HEENT:  Normocephalic and atraumatic. PERRL. EOMI. Lips and oral mucosa moist and without lesions. Neck:  Supple. Trachea midline. Chest see diagram in media. Purulent drainage noted. sternum is . Lungs:  Clear to auscultation. Cardiac:  Regular rate and rhythm without murmurs, rubs or gallops. Abdomen:  Soft, non-tender, normoactive bowel sounds. No masses or organomegaly. Extremities:  No cyanosis, clubbing, or edema. Intact pulses in all four extremities.   Musculoskeletal:  Intact range of motion of peripheral joints. Normal muscular strength. Neurologic:  Cranial nerves are grossly intact. Non-focal sensory deficits on exam.  Psychiatric: Mood and affect are appropriate. Media Information  Document Information    Wound Care Image:  Wound   2-3-23   02/03/2023 09:56   Attached To: Hospital Encounter on 2/2/23     Source Information    Torrance Lederer, APRN - NP Karenann Castleman         Imaging Studies:      CT:  Mediastinum: Postoperative findings compatible with recent median sternotomy. Minimal induration of the anterior mediastinal fat. No discrete fluid   collection identified. Status post CABG. No significant pericardial   effusion. Temporary pacing leads are present. No enlarged or   suspicious-appearing lymph nodes identified. Lungs/pleura: Small left pleural effusion. Atelectasis of the majority of   the left lower lobe is noted along with subsegmental atelectasis in the left   upper lobe. Minimal ground-glass attenuation in the right upper lobe. No   septal thickening. No pneumothorax identified. The central airway is patent. Upper Abdomen: Cholelithiasis. Calcifications in the renal arnulfo bilaterally   may be vascular versus nonobstructing stones. Soft Tissues/Bones: Postoperative findings related to median sternotomy is   noted, unchanged since prior chest CT imaging notable for mild diastasis. Subcutaneous gas and gas at the sternotomy site noted. Open wound and   packing material present. Prior Labs reviewed: Negative leukocytosis. Normal BMP.     Assessment   Patient Active Problem List   Diagnosis    Anxiety    GERD (gastroesophageal reflux disease)    OA (osteoarthritis)    Neuropathy    Right knee DJD    Hypothyroidism    S/P left total knee arthroplasty    Ischemic heart disease    Essential hypertension    NSTEMI (non-ST elevated myocardial infarction) ([facility] [address]    Other cirrhosis of liver (HCC)    Elevated lipase    Depression Type 2 diabetes mellitus with complication, with long-term current use of insulin (HCC)    SHERITA (obstructive sleep apnea)    Morbid obesity with BMI of 45.0-49.9, adult (HCC)    PVD (peripheral vascular disease) (HCC)    Numbness and tingling of both feet    Long term (current) use of antithrombotics/antiplatelets    Painful diabetic neuropathy (HCC)    Vertebrogenic low back pain    Spinal stenosis of lumbar region with neurogenic claudication    CAD in native artery    Vulvovaginitis    Incisional infection    Pleural effusion on left    Lactic acid acidosis    Bandemia    Sternal wound dehiscence    Abscess of sternal region    S/P CABG (coronary artery bypass graft)    CRP elevated    Community acquired pneumonia of left lower lobe of lung    Osteomyelitis (HCC)    Pleural effusion             PLAN:  Reaching out to plastics surgery [place]. Unfortunately Dr. Jasmin Dietz and Dr. Melvi Monroy do not do these closures at this time. Dr Kiana Magdaleno is out for 2 weeks and only does uncomplicated flap closures. We may have to consider transfer  At this time please continue with wound vac management and antibiotics. Continue with wound care  Pt shows no distress at this time.      aNvya Lockhart, HAO - NP, CNP  Phone: [phone]

## 2023-02-03 NOTE — PLAN OF CARE
Problem: Discharge Planning  Goal: Discharge to home or other facility with appropriate resources  2/3/2023 0030 by Mayela Cloud RN  Outcome: Progressing  Flowsheets (Taken 2/2/2023 1712 by Aishwarya Michelle RN)  Discharge to home or other facility with appropriate resources:   Identify barriers to discharge with patient and caregiver   Arrange for needed discharge resources and transportation as appropriate  2/2/2023 1710 by Aishwarya Michelle RN  Outcome: Progressing     Problem: Pain  Goal: Verbalizes/displays adequate comfort level or baseline comfort level  2/3/2023 0030 by Mayela Cloud RN  Outcome: Progressing  2/2/2023 1710 by Aishwarya Michelle RN  Outcome: Progressing     Problem: Skin/Tissue Integrity  Goal: Absence of new skin breakdown  Description: 1. Monitor for areas of redness and/or skin breakdown  2. Assess vascular access sites hourly  3. Every 4-6 hours minimum:  Change oxygen saturation probe site  4. Every 4-6 hours:  If on nasal continuous positive airway pressure, respiratory therapy assess nares and determine need for appliance change or resting period.   2/3/2023 0030 by Mayela Cloud RN  Outcome: Progressing  2/2/2023 1710 by Aishwarya Michelle RN  Outcome: Progressing     Problem: Safety - Adult  Goal: Free from fall injury  2/3/2023 0030 by Mayela Cloud RN  Outcome: Progressing  2/2/2023 1710 by Aishwarya Michelle RN  Outcome: Progressing     Problem: ABCDS Injury Assessment  Goal: Absence of physical injury  2/3/2023 0030 by Mayela Cloud RN  Outcome: Progressing  2/2/2023 1710 by Aishwarya Michelle RN  Outcome: Progressing  Flowsheets (Taken 2/2/2023 1652)  Absence of Physical Injury: Implement safety measures based on patient assessment

## 2023-02-03 NOTE — PLAN OF CARE
Problem: Discharge Planning  Goal: Discharge to home or other facility with appropriate resources  2/3/2023 0853 by Jose Nava RN  Outcome: Progressing  2/3/2023 0030 by Kehinde Houston RN  Outcome: Progressing  Flowsheets (Taken 2/2/2023 1712 by Lewis Gonzales RN)  Discharge to home or other facility with appropriate resources:   Identify barriers to discharge with patient and caregiver   Arrange for needed discharge resources and transportation as appropriate     Problem: Pain  Goal: Verbalizes/displays adequate comfort level or baseline comfort level  2/3/2023 0853 by Jose Nava RN  Outcome: Progressing  2/3/2023 0030 by Kehinde Houston RN  Outcome: Progressing     Problem: Skin/Tissue Integrity  Goal: Absence of new skin breakdown  Description: 1. Monitor for areas of redness and/or skin breakdown  2. Assess vascular access sites hourly  3. Every 4-6 hours minimum:  Change oxygen saturation probe site  4. Every 4-6 hours:  If on nasal continuous positive airway pressure, respiratory therapy assess nares and determine need for appliance change or resting period.   2/3/2023 0853 by Jose Nava RN  Outcome: Progressing  2/3/2023 0030 by Kehinde Houston RN  Outcome: Progressing     Problem: Safety - Adult  Goal: Free from fall injury  2/3/2023 0853 by Jose Nava RN  Outcome: Progressing  2/3/2023 0030 by Kehinde Houston RN  Outcome: Progressing     Problem: ABCDS Injury Assessment  Goal: Absence of physical injury  2/3/2023 0853 by Jose Nava RN  Outcome: Progressing  2/3/2023 0030 by Kehinde Houston RN  Outcome: Progressing     Problem: Chronic Conditions and Co-morbidities  Goal: Patient's chronic conditions and co-morbidity symptoms are monitored and maintained or improved  Outcome: Progressing

## 2023-02-03 NOTE — PROGRESS NOTES
Physical Therapy  Facility/Department: [facility]  Physical Therapy Initial Assessment    Name: Deangelo Bueno  :   MRN: [id number]  Date of Service: 2/3/2023  Chief Complaint   Patient presents with    Difficulty Walking     Pt. Had CT surgery done, left SNF AMA, upon arrival home unable to care for self          Discharge Recommendations:  Patient would benefit from continued therapy after discharge   PT Equipment Recommendations  Equipment Needed: No      Patient Diagnosis(es): The primary encounter diagnosis was Surgical wound breakdown, subsequent encounter. A diagnosis of Soft tissue infection was also pertinent to this visit. Past Medical History:  has a past medical history of Ambulates with cane, Anxiety, Borderline hypertension, CAD (coronary artery disease), Depression, GERD (gastroesophageal reflux disease), Heart attack ([facility] [address] Hypertension, Hypothyroidism, Neuropathy, Obesity, Osteoarthritis, Other cirrhosis of liver ([facility] [address] Sleep apnea, Type II or unspecified type diabetes mellitus without mention of complication, not stated as uncontrolled, Under care of service provider, Under care of service provider, and Vertebrogenic low back pain. Past Surgical History:  has a past surgical history that includes Hysterectomy; Colonoscopy; Upper gastrointestinal endoscopy; Dilation and curettage of uterus; hiatal hernia repair; Throat surgery; Appendectomy; Total knee arthroplasty (Right, 2015); Total knee arthroplasty (Left, 2015); Coronary angioplasty with stent (2020); Cardiac catheterization; Cardiac catheterization (2022); Coronary artery bypass graft (N/A, 2022); IR CHEST TUBE INSERTION (2023); Wound debridement (2023); Cardiac surgery (N/A, 2023); and IR CHEST TUBE INSERTION (2023). Assessment   Body Structures, Functions, Activity Limitations Requiring Skilled Therapeutic Intervention: Decreased functional mobility ; Increased pain;Decreased ROM; Decreased cognition;Decreased endurance;Decreased balance;Decreased body mechanics; Decreased posture;Decreased strength  Assessment: Pt ambulated 10ft x 2 with RW CGA, pt fatiguing extremely quickly with functional mobility. Pt is a high fall risk due to current endurance deficits and will require continued skilled physical therapy upon discharge to address these deficits and maximize patient function upon discharge. Therapy Prognosis: Fair  Decision Making: Medium Complexity  Requires PT Follow-Up: Yes  Activity Tolerance  Activity Tolerance: Patient limited by endurance; Patient limited by fatigue     Plan   Physcial Therapy Plan  General Plan:  (5-6x/week)  Current Treatment Recommendations: Strengthening, Balance training, ROM, Endurance training, Safety education & training, Patient/Caregiver education & training, Therapeutic activities, Equipment evaluation, education, & procurement, Gait training, Stair training, Transfer training, Home exercise program  Safety Devices  Type of Devices: Nurse notified, Gait belt, Patient at risk for falls, Left in chair, Call light within reach, Chair alarm in place  Restraints  Restraints Initially in Place: No     Restrictions  Restrictions/Precautions  Required Braces or Orthoses?: No  Position Activity Restriction  Other position/activity restrictions: up w/assist; Recent CABG 11/28/22, sternal washout with wound vac placement 1/14/23     Subjective   General  Patient assessed for rehabilitation services?: Yes  Response To Previous Treatment: Not applicable  Family / Caregiver Present: No  Follows Commands: Within Functional Limits  General Comment  Comments: pt denying pain at time of evaluation. Subjective  Subjective: RN and pt in agreement for PT eval. Pt supine in bed upon PT arrival, pt on 4L NC. Pt pleasant and cooperative throughout session.          Social/Functional History  Social/Functional History  Lives With: Spouse  Type of Home: House  Home Layout: One level  Home Access: Stairs to enter with rails  Entrance Stairs - Number of Steps: 3  Entrance Stairs - Rails: Right  Bathroom Shower/Tub: Walk-in shower, Shower chair with back  Bathroom Toilet: Standard  Bathroom Equipment: Grab bars in shower  Bathroom Accessibility: Accessible  Home Equipment: Aracelis Pal, 4 wheeled, [address] Help From: Family  ADL Assistance: Independent  Bath: Stand by assistance  Dressing: Stand by assistance  Grooming: Stand by assistance  Feeding: Independent  Toileting: Independent  Homemaking Assistance: Needs assistance ( performs)  Meal Prep: Maximal  Laundry: Maximal  Vacuuming: Maximal  Cleaning: Maximal  Gardening: Maximal  Yard Work: Maximal  Driving: Total  Shopping: Total  Homemaking Responsibilities: No  Ambulation Assistance: Independent  Transfer Assistance: Independent  Active : No  Mode of Transportation: Family  Occupation: Retired  Additional Comments: Pt reports discharge from facility and returning home for ~12 hours prior to returning to hospital due to inability to care for self.   Vision/Hearing  Vision  Vision: Within Functional Limits  Hearing  Hearing: Within functional limits    Cognition   Orientation  Overall Orientation Status: Within Functional Limits  Cognition  Overall Cognitive Status: Exceptions  Problem Solving: Assistance required to identify errors made;Assistance required to generate solutions  Initiation: Requires cues for some  Sequencing: Requires cues for some     Objective   Gross Assessment  Sensation: Impaired (pt reports chronic numbness of bilateral feet and hands)     Joint Mobility  ROM RLE: WFL  ROM LLE: WFL  ROM RUE: See OT assessment- PT/ OT coeval  ROM LUE: See OT assessment- PT/ OT coeval  Strength RLE  Strength RLE: WFL  Strength LLE  Strength LLE: WFL  Strength RUE  Comment: See OT assessment- PT/ OT coeval  Strength LUE  Comment: See OT assessment- PT/ OT coeval           Bed mobility  Supine to Sit: Minimal assistance (Increased time required to complete)  Sit to Supine:  (Did not assess- pt seated in bedside chair upon writer's exit)  Bed Mobility Comments: HOB elevated to ~45 degrees  Transfers  Sit to Stand: Contact guard assistance;Minimal Assistance  Stand to Sit: Contact guard assistance  Comment: STS performed from EOB and toliet, pt requiring verbal cueing for proper hand placement with RW. Pt requiring CGA to perform STS from bed, Shannon from toliet. Ambulation  Surface: Level tile  Device: Rolling Walker  Other Apparatus: O2 (4L)  Assistance: Minimal assistance  Gait Deviations: Slow Kamini; Shuffles  Distance: 10ft; 10ft  Comments: Pt ambulated to/from restroom within room. Pt with significant dyspnea on exertion, SpO2 96% following ambulation on 4L. More Ambulation?: No  Stairs/Curb  Stairs?: No     Balance  Posture: Fair  Sitting - Static: Fair;+  Sitting - Dynamic: Fair;+  Standing - Static: Fair;-  Standing - Dynamic: Poor;+  Comments: standing balance assessed w/ RW; pt able to sit EOB ~20 minutes CGA    AM-PAC Score  AM-PAC Inpatient Mobility Raw Score : 16 (02/03/23 1525)  AM-PAC Inpatient T-Scale Score : 40.78 (02/03/23 1525)  Mobility Inpatient CMS 0-100% Score: 54.16 (02/03/23 1525)  Mobility Inpatient CMS G-Code Modifier : CK (02/03/23 1525)    Goals  Short Term Goals  Time Frame for Short Term Goals: 14  Short Term Goal 1: Pt to perform bed mobility independently  Short Term Goal 2: Pt to demonstrate functional transfers with supervision  Short Term Goal 3: Pt to ambulate 100ft w/ RW with supervision  Short Term Goal 4: Tolerate 45 minutes of therapy to demo increased endurnace  Patient Goals   Patient Goals :  To get stronger and go home       Education  Patient Education  Education Given To: Patient  Education Provided: Role of Therapy;Plan of Care  Education Provided Comments: Pursed lip breathing  Education Method: Demonstration  Barriers to Learning: None  Education Outcome: Verbalized understanding;Demonstrated understanding      Therapy Time   Individual Concurrent Group Co-treatment   Time In 1030         Time Out 1117         Minutes 47         Timed Code Treatment Minutes: 10 Minutes       Preeti Lamas PT

## 2023-02-03 NOTE — CARE COORDINATION
Consult received as pt has crippling anxiety, perhaps some resources to assist her with this would help  Met with pt who shared recent stays at [facility] and SNF [facility]). Pt stated she was at [facility] for ~ 2weeks and left yest as she wanted to be home. Stated she realized when she got home, that neither she nor her husb were going to be able to care for her. Pt plans to go back to a SNF - advised her CM will f/u with her to arrange  Pt shared she has a long hx of anxiety, stated \"forever\". Stated she is on medication and stated it is helpful. However, later stated her anxiety is worse. Pt stated she does not keep her PCP updated as much as she should on how she is feeling. Discussed importance of doing so so meds can be adjusted, if needed. Pt reports her husb is very supportive. Pt stated she has never been in counseling but is open to it. Provided pt with resources on tips on dealing with anxiety and counseling resources. Explained to pt that SW unfortunately unable to schedule her an appt since she is going to a SNF. Discussed with pt that some SNFs have psychologists available and to ask when she gets there. Also discussed pt advising the SW at the SNF when it is close to discharge, to schedule her an appt for counseling. Pt asked that SW writer these down so she doesn't forget - info written down for pt to f/u on at the SNF. Pt appreciative of info/resources. Support provided.

## 2023-02-04 PROBLEM — M86.9 OSTEOMYELITIS OF STERNUM (HCC): Status: ACTIVE | Noted: 2023-02-04

## 2023-02-04 LAB
25(OH)D3 SERPL-MCNC: 16.2 NG/ML
ABSOLUTE EOS #: 0.27 K/UL (ref 0–0.44)
ABSOLUTE IMMATURE GRANULOCYTE: 0.03 K/UL (ref 0–0.3)
ABSOLUTE LYMPH #: 0.74 K/UL (ref 1.1–3.7)
ABSOLUTE MONO #: 0.9 K/UL (ref 0.1–1.2)
ANION GAP SERPL CALCULATED.3IONS-SCNC: 8 MMOL/L (ref 9–17)
BASOPHILS # BLD: 1 % (ref 0–2)
BASOPHILS ABSOLUTE: 0.03 K/UL (ref 0–0.2)
BUN SERPL-MCNC: 8 MG/DL (ref 8–23)
CALCIUM SERPL-MCNC: 8.4 MG/DL (ref 8.6–10.4)
CHLORIDE SERPL-SCNC: 98 MMOL/L (ref 98–107)
CO2 SERPL-SCNC: 32 MMOL/L (ref 20–31)
CREAT SERPL-MCNC: 0.57 MG/DL (ref 0.5–0.9)
CRP SERPL HS-MCNC: 108.6 MG/L (ref 0–5)
EOSINOPHILS RELATIVE PERCENT: 4 % (ref 1–4)
GFR SERPL CREATININE-BSD FRML MDRD: >60 ML/MIN/1.73M2
GLUCOSE BLD-MCNC: 141 MG/DL (ref 65–105)
GLUCOSE BLD-MCNC: 162 MG/DL (ref 65–105)
GLUCOSE BLD-MCNC: 172 MG/DL (ref 65–105)
GLUCOSE BLD-MCNC: 187 MG/DL (ref 65–105)
GLUCOSE BLD-MCNC: 95 MG/DL (ref 65–105)
GLUCOSE SERPL-MCNC: 131 MG/DL (ref 70–99)
HCT VFR BLD AUTO: 36.3 % (ref 36.3–47.1)
HGB BLD-MCNC: 10.5 G/DL (ref 11.9–15.1)
IMMATURE GRANULOCYTES: 1 %
LYMPHOCYTES # BLD: 12 % (ref 24–43)
LYMPHOCYTES, BODY FLUID: 25 %
MCH RBC QN AUTO: 26.6 PG (ref 25.2–33.5)
MCHC RBC AUTO-ENTMCNC: 28.9 G/DL (ref 28.4–34.8)
MCV RBC AUTO: 91.9 FL (ref 82.6–102.9)
MONOCYTES # BLD: 15 % (ref 3–12)
NEUTROPHIL, FLUID: 60 %
NRBC AUTOMATED: 0 PER 100 WBC
OTHER CELLS FLUID: NORMAL %
PDW BLD-RTO: 15 % (ref 11.8–14.4)
PLATELET # BLD AUTO: 260 K/UL (ref 138–453)
PMV BLD AUTO: 9.2 FL (ref 8.1–13.5)
POTASSIUM SERPL-SCNC: 4 MMOL/L (ref 3.7–5.3)
RBC # BLD: 3.95 M/UL (ref 3.95–5.11)
RBC # BLD: ABNORMAL 10*6/UL
RBC FLUID: 5000 CELLS/UL
SEG NEUTROPHILS: 68 % (ref 36–65)
SEGMENTED NEUTROPHILS ABSOLUTE COUNT: 4.11 K/UL (ref 1.5–8.1)
SODIUM SERPL-SCNC: 138 MMOL/L (ref 135–144)
SPECIMEN TYPE: NORMAL
WBC # BLD AUTO: 6.1 K/UL (ref 3.5–11.3)
WBC FLUID: 20 CELLS/UL

## 2023-02-04 PROCEDURE — 6360000002 HC RX W HCPCS: Performed by: INTERNAL MEDICINE

## 2023-02-04 PROCEDURE — 6360000002 HC RX W HCPCS

## 2023-02-04 PROCEDURE — 82947 ASSAY GLUCOSE BLOOD QUANT: CPT

## 2023-02-04 PROCEDURE — 6370000000 HC RX 637 (ALT 250 FOR IP)

## 2023-02-04 PROCEDURE — 2580000003 HC RX 258

## 2023-02-04 PROCEDURE — 86140 C-REACTIVE PROTEIN: CPT

## 2023-02-04 PROCEDURE — 99232 SBSQ HOSP IP/OBS MODERATE 35: CPT | Performed by: HOSPITALIST

## 2023-02-04 PROCEDURE — 2580000003 HC RX 258: Performed by: INTERNAL MEDICINE

## 2023-02-04 PROCEDURE — 85025 COMPLETE CBC W/AUTO DIFF WBC: CPT

## 2023-02-04 PROCEDURE — 36415 COLL VENOUS BLD VENIPUNCTURE: CPT

## 2023-02-04 PROCEDURE — 82306 VITAMIN D 25 HYDROXY: CPT

## 2023-02-04 PROCEDURE — 99232 SBSQ HOSP IP/OBS MODERATE 35: CPT | Performed by: INTERNAL MEDICINE

## 2023-02-04 PROCEDURE — 80048 BASIC METABOLIC PNL TOTAL CA: CPT

## 2023-02-04 PROCEDURE — 2060000000 HC ICU INTERMEDIATE R&B

## 2023-02-04 RX ORDER — ERGOCALCIFEROL 1.25 MG/1
50000 CAPSULE ORAL WEEKLY
Status: DISCONTINUED | OUTPATIENT
Start: 2023-02-04 | End: 2023-02-07 | Stop reason: HOSPADM

## 2023-02-04 RX ORDER — SODIUM CHLORIDE 9 MG/ML
INJECTION, SOLUTION INTRAVENOUS CONTINUOUS
Status: DISCONTINUED | OUTPATIENT
Start: 2023-02-04 | End: 2023-02-06

## 2023-02-04 RX ORDER — OXYCODONE HYDROCHLORIDE AND ACETAMINOPHEN 5; 325 MG/1; MG/1
1 TABLET ORAL EVERY 6 HOURS PRN
Status: DISCONTINUED | OUTPATIENT
Start: 2023-02-04 | End: 2023-02-07 | Stop reason: HOSPADM

## 2023-02-04 RX ORDER — OXYCODONE HYDROCHLORIDE AND ACETAMINOPHEN 5; 325 MG/1; MG/1
1 TABLET ORAL ONCE
Status: COMPLETED | OUTPATIENT
Start: 2023-02-04 | End: 2023-02-04

## 2023-02-04 RX ORDER — MIRTAZAPINE 15 MG/1
15 TABLET, ORALLY DISINTEGRATING ORAL NIGHTLY
Status: DISCONTINUED | OUTPATIENT
Start: 2023-02-04 | End: 2023-02-07 | Stop reason: HOSPADM

## 2023-02-04 RX ADMIN — PIPERACILLIN AND TAZOBACTAM 3375 MG: 3; .375 INJECTION, POWDER, FOR SOLUTION INTRAVENOUS at 20:49

## 2023-02-04 RX ADMIN — ENOXAPARIN SODIUM 30 MG: 100 INJECTION SUBCUTANEOUS at 08:20

## 2023-02-04 RX ADMIN — BUSPIRONE HYDROCHLORIDE 10 MG: 10 TABLET ORAL at 20:38

## 2023-02-04 RX ADMIN — PANTOPRAZOLE SODIUM 40 MG: 40 TABLET, DELAYED RELEASE ORAL at 06:02

## 2023-02-04 RX ADMIN — AMIODARONE HYDROCHLORIDE 200 MG: 200 TABLET ORAL at 13:53

## 2023-02-04 RX ADMIN — MIRTAZAPINE 15 MG: 15 TABLET, ORALLY DISINTEGRATING ORAL at 21:21

## 2023-02-04 RX ADMIN — ACETAMINOPHEN 650 MG: 325 TABLET ORAL at 12:14

## 2023-02-04 RX ADMIN — ISOSORBIDE MONONITRATE 60 MG: 60 TABLET, EXTENDED RELEASE ORAL at 08:20

## 2023-02-04 RX ADMIN — INSULIN GLARGINE 35 UNITS: 100 INJECTION, SOLUTION SUBCUTANEOUS at 20:36

## 2023-02-04 RX ADMIN — PIPERACILLIN AND TAZOBACTAM 3375 MG: 3; .375 INJECTION, POWDER, FOR SOLUTION INTRAVENOUS at 04:03

## 2023-02-04 RX ADMIN — BUSPIRONE HYDROCHLORIDE 10 MG: 10 TABLET ORAL at 08:20

## 2023-02-04 RX ADMIN — ENOXAPARIN SODIUM 30 MG: 100 INJECTION SUBCUTANEOUS at 20:38

## 2023-02-04 RX ADMIN — SODIUM CHLORIDE 25 ML: 9 INJECTION, SOLUTION INTRAVENOUS at 12:22

## 2023-02-04 RX ADMIN — OXYCODONE HYDROCHLORIDE AND ACETAMINOPHEN 1 TABLET: 5; 325 TABLET ORAL at 08:20

## 2023-02-04 RX ADMIN — PIPERACILLIN AND TAZOBACTAM 3375 MG: 3; .375 INJECTION, POWDER, FOR SOLUTION INTRAVENOUS at 13:44

## 2023-02-04 RX ADMIN — AMIODARONE HYDROCHLORIDE 200 MG: 200 TABLET ORAL at 20:38

## 2023-02-04 RX ADMIN — GABAPENTIN 400 MG: 400 CAPSULE ORAL at 20:38

## 2023-02-04 RX ADMIN — SODIUM CHLORIDE: 9 INJECTION, SOLUTION INTRAVENOUS at 18:01

## 2023-02-04 RX ADMIN — Medication 1000 MG: at 00:58

## 2023-02-04 RX ADMIN — AMIODARONE HYDROCHLORIDE 200 MG: 200 TABLET ORAL at 08:20

## 2023-02-04 RX ADMIN — METOPROLOL TARTRATE 12.5 MG: 25 TABLET ORAL at 08:20

## 2023-02-04 RX ADMIN — DESMOPRESSIN ACETATE 40 MG: 0.2 TABLET ORAL at 20:38

## 2023-02-04 RX ADMIN — SODIUM CHLORIDE, PRESERVATIVE FREE 10 ML: 5 INJECTION INTRAVENOUS at 08:59

## 2023-02-04 RX ADMIN — CLOPIDOGREL 75 MG: 75 TABLET, FILM COATED ORAL at 08:20

## 2023-02-04 RX ADMIN — ACETAMINOPHEN 650 MG: 325 TABLET ORAL at 20:38

## 2023-02-04 RX ADMIN — ERGOCALCIFEROL 50000 UNITS: 1.25 CAPSULE ORAL at 13:52

## 2023-02-04 RX ADMIN — Medication 81 MG: at 08:20

## 2023-02-04 RX ADMIN — OXYCODONE HYDROCHLORIDE AND ACETAMINOPHEN 1 TABLET: 5; 325 TABLET ORAL at 13:40

## 2023-02-04 RX ADMIN — METOPROLOL TARTRATE 12.5 MG: 25 TABLET ORAL at 20:38

## 2023-02-04 RX ADMIN — LEVOTHYROXINE SODIUM 50 MCG: 50 TABLET ORAL at 06:01

## 2023-02-04 RX ADMIN — Medication 1000 MG: at 12:25

## 2023-02-04 ASSESSMENT — ENCOUNTER SYMPTOMS
NAUSEA: 0
ABDOMINAL DISTENTION: 0
VOMITING: 0
ABDOMINAL PAIN: 0
EYE DISCHARGE: 0
DIARRHEA: 0
APNEA: 0
SHORTNESS OF BREATH: 0
COLOR CHANGE: 1
CONSTIPATION: 0

## 2023-02-04 ASSESSMENT — PAIN DESCRIPTION - ORIENTATION
ORIENTATION: RIGHT;LEFT
ORIENTATION: MID
ORIENTATION: RIGHT;LEFT
ORIENTATION: MID

## 2023-02-04 ASSESSMENT — PAIN DESCRIPTION - LOCATION
LOCATION: CHEST

## 2023-02-04 ASSESSMENT — PAIN SCALES - GENERAL
PAINLEVEL_OUTOF10: 9
PAINLEVEL_OUTOF10: 7
PAINLEVEL_OUTOF10: 8
PAINLEVEL_OUTOF10: 7
PAINLEVEL_OUTOF10: 0
PAINLEVEL_OUTOF10: 7

## 2023-02-04 ASSESSMENT — PAIN SCALES - WONG BAKER: WONGBAKER_NUMERICALRESPONSE: 0

## 2023-02-04 ASSESSMENT — PAIN DESCRIPTION - DESCRIPTORS
DESCRIPTORS: ACHING

## 2023-02-04 NOTE — PLAN OF CARE
Discussed with plastics. No surgeon to do pec flap. Recommend wound vac and abx. Okay for discharge and follow up in clinic from our standpoint.     QUENTIN Mcgill

## 2023-02-04 NOTE — PROGRESS NOTES
[address]    PROGRESS NOTE             2/4/2023    9:53 AM    Name:   Sterling Alexandra  MRN:     [id number]     Acct:      [de-identified]   Room:   [address] [place] [address]  IP Day:  2  Admit Date:  2/2/2023  1:07 PM    PCP:  Fermin Lua MD  Code Status:  Full Code    Subjective:     C/C:   Chief Complaint   Patient presents with    Difficulty Walking     Pt. Had CT surgery done, left SNF AMA, upon arrival home unable to care for self        Interval History Status: improved. Patient was seen and examined  Reported decrease appetite and generalized weakness. Working with Physical therapy and doing well   Blood pressure on the lower side, will hold Lasix. We will continue metoprolol, Imdur, and amiodarone with holding parameter. On 2 L nasal cannula saturating at 100%  Glucose readings well controlled ranging between 140_180, currently on Lasix 35 units nightly and medium dose sliding scale. Blood cultures negative  Pleural fluid culture for the last 16 hours remain negative  ID following, large amount of blasts at today wound side, switched to broader spectrum antibiotic Zosyn and vancomycin until culture results. CTS considering to transfer patient to different hospital for possible muscle flap. Wound care followin  Review of Systems:     Review of Systems   Constitutional:  Positive for activity change, appetite change (Decreased) and fatigue. Negative for fever. Respiratory:  Negative for shortness of breath. Cardiovascular:  Positive for chest pain (mild related to recent wound change). Negative for palpitations and leg swelling. Gastrointestinal:  Negative for abdominal pain, constipation, diarrhea, nausea and vomiting. Genitourinary:  Negative for difficulty urinating, frequency and urgency. Skin:  Positive for wound (with wound vac). Neurological:  Positive for weakness (generalized). Negative for tremors, light-headedness and headaches.         BL lower extremities neuropathic pain        Medications: Allergies:     Allergies   Allergen Reactions    Morphine Other (See Comments)     HALLUCINATIONS      Shellfish-Derived Products Nausea Only    Tape Michelle Centeno Tape] Rash       Current Meds:   Scheduled Meds:    insulin lispro  0-8 Units SubCUTAneous TID WC    insulin lispro  0-4 Units SubCUTAneous Nightly    piperacillin-tazobactam  3,375 mg IntraVENous Q8H    insulin glargine  35 Units SubCUTAneous Nightly    vancomycin (VANCOCIN) intermittent dosing (placeholder)   Other RX Placeholder    vancomycin  1,000 mg IntraVENous Q12H    sodium chloride flush  5-40 mL IntraVENous 2 times per day    enoxaparin  30 mg SubCUTAneous BID    amiodarone  200 mg Oral TID    aspirin  81 mg Oral Daily    atorvastatin  40 mg Oral Nightly    [Held by provider] amLODIPine  5 mg Oral Daily    busPIRone  10 mg Oral BID    clopidogrel  75 mg Oral Daily    [Held by provider] furosemide  40 mg Oral BID    gabapentin  400 mg Oral Nightly    isosorbide mononitrate  60 mg Oral Daily    levothyroxine  50 mcg Oral Daily    metoprolol tartrate  12.5 mg Oral BID    pantoprazole  40 mg Oral QAM AC     Continuous Infusions:    dextrose      sodium chloride       PRN Meds: glucose, dextrose bolus **OR** dextrose bolus, glucagon (rDNA), dextrose, potassium chloride **OR** potassium alternative oral replacement **OR** potassium chloride, sodium chloride flush, sodium chloride, ondansetron **OR** ondansetron, polyethylene glycol, acetaminophen **OR** acetaminophen, potassium chloride, magnesium sulfate, oxyCODONE-acetaminophen    Data:     Past Medical History:   has a past medical history of Ambulates with cane, Anxiety, Borderline hypertension, CAD (coronary artery disease), Depression, GERD (gastroesophageal reflux disease), Heart attack ([address] Hypertension, Hypothyroidism, Neuropathy, Obesity, Osteoarthritis, Other cirrhosis of liver ([facility] [address] Sleep apnea, Type II or unspecified type diabetes mellitus without mention of complication, not stated as uncontrolled, Under care of service provider, Under care of service provider, and Vertebrogenic low back pain. Social History:   reports that she has never smoked. She has never used smokeless tobacco. She reports current alcohol use. She reports that she does not use drugs. Family History:   Family History   Problem Relation Age of Onset    Heart Disease Mother     Arthritis Mother     Heart Disease Father     Arthritis Father     Cancer Father         \"oral\"    Diabetes Sister         gestational       Vitals:  BP (!) 161/69   Pulse 71   Temp 98.2 °F (36.8 °C) (Oral)   Resp 16   Ht 4' 11\" (1.499 m)   Wt 240 lb (108.9 kg)   SpO2 100%   BMI 48.47 kg/m²   Temp (24hrs), Av.3 °F (36.8 °C), Min:98.1 °F (36.7 °C), Max:98.4 °F (36.9 °C)    Recent Labs     23  1623 23  1939 23  0403 23  0721   POCGLU 142* 182* 141* 95       I/O(24Hr): Intake/Output Summary (Last 24 hours) at 2023 0953  Last data filed at 2023 0247  Gross per 24 hour   Intake 10 ml   Output 1000 ml   Net -990 ml       Labs:  [unfilled]    Lab Results   Component Value Date/Time    SPECIAL LT HAND 1 ML 2023 08:45 AM     Lab Results   Component Value Date/Time    CULTURE PENDING 2023 02:22 PM       [facility]    Radiology:    XR CHEST (2 VW)    Result Date: 2023  EXAMINATION: TWO XRAY VIEWS OF THE CHEST 2023 1:50 pm COMPARISON: 2023 HISTORY: ORDERING SYSTEM PROVIDED HISTORY: shortness of breath TECHNOLOGIST PROVIDED HISTORY: shortness of breath FINDINGS: Right PICC removed. Stable cardiomegaly. Right lung remains clear. Airspace opacities seen left lung. Probable left pleural effusion.      Right PICC removed Left pleural effusion and left lung pulmonary opacities persist suggesting atelectasis or infection     CT CHEST WO CONTRAST    Result Date: 2023  EXAMINATION: CT OF THE CHEST WITHOUT CONTRAST 2023 2:25 pm TECHNIQUE: CT of the chest was performed without the administration of intravenous contrast. Multiplanar reformatted images are provided for review. Automated exposure control, iterative reconstruction, and/or weight based adjustment of the mA/kV was utilized to reduce the radiation dose to as low as reasonably achievable. COMPARISON: Chest radiograph today. Chest CT 01/18/2023, 01/12/2023. HISTORY: ORDERING SYSTEM PROVIDED HISTORY: Pleural effusion TECHNOLOGIST PROVIDED HISTORY: Pleural effusion Decision Support Exception - unselect if not a suspected or confirmed emergency medical condition->Emergency Medical Condition (MA) FINDINGS: Mediastinum: Postoperative findings compatible with recent median sternotomy. Minimal induration of the anterior mediastinal fat. No discrete fluid collection identified. Status post CABG. No significant pericardial effusion. Temporary pacing leads are present. No enlarged or suspicious-appearing lymph nodes identified. Lungs/pleura: Small left pleural effusion. Atelectasis of the majority of the left lower lobe is noted along with subsegmental atelectasis in the left upper lobe. Minimal ground-glass attenuation in the right upper lobe. No septal thickening. No pneumothorax identified. The central airway is patent. Upper Abdomen: Cholelithiasis. Calcifications in the renal arnulfo bilaterally may be vascular versus nonobstructing stones. Soft Tissues/Bones: Postoperative findings related to median sternotomy is noted, unchanged since prior chest CT imaging notable for mild diastasis. Subcutaneous gas and gas at the sternotomy site noted. Open wound and packing material present. 1.  Small left pleural effusion with atelectasis in the majority of the left lower lobe and subsegmental atelectasis in the left upper lobe. 2.  Unchanged findings related to median sternotomy and open wound, as described.      CT CHEST WO CONTRAST    Result Date: 1/19/2023  EXAMINATION: CT OF THE CHEST WITHOUT CONTRAST 1/18/2023 2:10 pm TECHNIQUE: CT of the chest was performed without the administration of intravenous contrast. Multiplanar reformatted images are provided for review. Automated exposure control, iterative reconstruction, and/or weight based adjustment of the mA/kV was utilized to reduce the radiation dose to as low as reasonably achievable. COMPARISON: 01/18/2023, 01/16/2023, 01/12/2023 HISTORY: ORDERING SYSTEM PROVIDED HISTORY: tube not in correct location. TECHNOLOGIST PROVIDED HISTORY: tube not in correct location. Reason for Exam: tube not in correct location FINDINGS: Informed consent was obtained from the patient after discussing risks, indications, and benefits for the procedure. Limited chest CT is performed without contrast for planned possible chest tube placement. Images are obtained with patient in a right lateral decubitus position. The previously placed left pleural pigtail catheter is outside of the chest cavity. This tube was removed uneventfully and a sterile gauze dressing applied. Evidence of prior CABG. Small gallstones. Borderline splenomegaly. Similar 2 cm left adrenal gland nodule, likely an adenoma. Punctate nonobstructing renal calculi. Open wound by the inferior aspect of the sternotomy with an apparent wound VAC in place. Compared to the prior CT, the left pleural effusion which appears partially loculated has decreased considerably in size. There is adjacent lung consolidation in the left mid and lower chest.  Tiny amount of air in the pleural space related to prior catheter placement. Targeted ultrasound was performed and shows a small pleural effusion which appears to contain septations/loculations. These findings were discussed with the patient. Placing a pigtail catheter would be difficult due to the relatively small size of the collection. Options include surveillance imaging, thoracentesis, or pigtail catheter placement.   The patient elected for thoracentesis only at this time. Findings were discussed with Molly Garsia NP and a decision was made to perform thoracentesis only at this time. Left posterior chest was prepped and draped in standard sterile fashion. 1% lidocaine used for local anesthesia using real-time ultrasound guidance. A 5 [place] centesis catheter was advanced into the small residual loculated left pleural effusion. Ultrasound images show a safe tract and access into the space. Approximately 20 mL of fairly clear dark yellow fluid was obtained. Catheter was removed and gentle manual compression was used to achieve hemostasis. Band-Aid was applied. EBL: None. There are no immediate complications. The patient left the department in stable condition. Limited chest CT shows a relatively small residual loculated left pleural effusion, considerably improved since the prior CT. There is adjacent left lower lobe atelectasis/consolidation. Ultrasound shows a relatively small loculated/septated pleural effusion. Ultrasound-guided thoracentesis performed as described above. Findings were discussed with FLORIDALMA MCGEE at 2:30 pm on 1/18/2023. CT CHEST W CONTRAST    Result Date: 1/12/2023  EXAMINATION: CT OF THE CHEST WITH CONTRAST 1/12/2023 6:14 pm TECHNIQUE: CT of the chest was performed with the administration of intravenous contrast. Multiplanar reformatted images are provided for review. Automated exposure control, iterative reconstruction, and/or weight based adjustment of the mA/kV was utilized to reduce the radiation dose to as low as reasonably achievable. COMPARISON: None.  HISTORY: ORDERING SYSTEM PROVIDED HISTORY: recent CABG, incisional infection TECHNOLOGIST PROVIDED HISTORY: recent CABG, incisional infection Decision Support Exception - unselect if not a suspected or confirmed emergency medical condition->Emergency Medical Condition (MA) Reason for Exam: recent CABG, incisional infection FINDINGS: Mediastinum: Air in the anterior mediastinum is noted extending through the sternotomy to the soft tissues ventral to the sternum. Diastasis of the sternotomy is noted. Along the ventral aspect of the sternotomy fluid and air is noted suggesting abscess collection 2.4 x 1.8 cm. No acute aortic abnormality. Mild to moderate calcified plaque in the aorta. Moderate cardiomegaly. Trace pericardial fluid without significant pericardial accumulation. Shotty epicardial lymph nodes are present without cara adenopathy. Electrodes extend from the skin surface through the inferior sternal anterior chest terminating in the epicardial area. No cara mediastinal adenopathy. Shotty left suprahilar lymph node. Lungs/pleura: Right lung is clear. No right effusion. Large left pleural effusion is noted with almost complete collapse of the left lung with mild aeration in the left upper lobe medially. Mucus is noted in the bifurcation of the left mainstem bronchus suggesting a mucous plug. Upper Abdomen: Hepatic steatosis is noted. Small gallstones in the gallbladder. Spleen is top-normal.  Atherosclerotic calcification of the aorta and major branches is demonstrated. Left adrenal mass of 2 cm is noted with indeterminate Hounsfield numbers. Advise follow-up with adrenal CT scan or MRI. Soft Tissues/Bones: Diastasis of the patient's median sternotomy is noted. No significant sternal wire bridging below the most cranial 2 of the sternal wires. 1.  Diastasis of median sternotomy with air in the soft tissues, and appearance of abscess formation/dehiscence dural to the sternotomy. Air is present in the mediastinum. 2.  Large left pleural effusion with almost complete collapse of the left lobe of the lung. Bronchial wall thickening is noted with material within the bronchi likely mucous plugging. 3.  Hepatic steatosis. 4.  Small gallstones in the gallbladder. 5.  2 cm left adrenal mass with indeterminate Hounsfield numbers.   Further workup using adrenal CT protocol or MRI advised when the patient's condition permits. RECOMMENDATIONS: Advise adrenal protocol CT or MRI to evaluate a left adrenal mass. XR CHEST PORTABLE    Result Date: 1/20/2023  EXAMINATION: ONE XRAY VIEW OF THE CHEST 1/20/2023 5:40 am COMPARISON: 01/19/2023 HISTORY: ORDERING SYSTEM PROVIDED HISTORY: Post op open heart surgery TECHNOLOGIST PROVIDED HISTORY: Post op open heart surgery FINDINGS: Median sternotomy wires are noted. Cardiomegaly is unchanged. There is persistent moderate left pleural effusion and patchy opacities in the left chest, not significantly changed. Right arm PICC has been placed with distal tip in the cavoatrial junction. No evidence of pneumothorax. 1.  New right arm PICC with distal tip at the cavoatrial junction. 2.  Otherwise no significant interval change. Moderate left pleural effusion and patchy airspace opacities throughout the left lung. XR CHEST PORTABLE    Result Date: 1/19/2023  EXAMINATION: ONE XRAY VIEW OF THE CHEST 1/19/2023 6:08 am COMPARISON: 01/18/2023 HISTORY: ORDERING SYSTEM PROVIDED HISTORY: Post op open heart surgery TECHNOLOGIST PROVIDED HISTORY: Post op open heart surgery FINDINGS: Left chest tube has been removed. Median sternotomy wires are noted. There is moderate left pleural effusion which appears mildly increased. There are patchy airspace opacities in the left lung which are unchanged. Right lung remains clear. No evidence of pneumothorax. Interval removal of the left chest tube with moderate left pleural effusion, mildly increased from previous radiographs. Unchanged patchy airspace opacities in the left lung. XR CHEST PORTABLE    Result Date: 1/19/2023  EXAMINATION: ONE XRAY VIEW OF THE CHEST 1/18/2023 6:06 am COMPARISON: AP chest from 01/17/2023.  HISTORY: ORDERING SYSTEM PROVIDED HISTORY: Post op open heart surgery TECHNOLOGIST PROVIDED HISTORY: Post op open heart surgery History of GERD, morbid obesity, CABG, and type 2 diabetes. FINDINGS: Overlying ECG monitor leads, gown snaps and respiratory tubing. Midline sternotomy wires and clips. Left chest tube dislodged and now extrathoracic. Slightly increased opacity of the left hemithorax, likely a combination of pleural fluid, atelectasis and possibly underlying infiltrate or other airspace opacity. Right lung and right costophrenic angle are clear. Bones unchanged. Left chest tube extrathoracic, with slightly increased opacity of the left hemithorax with a similar differential diagnosis. The findings were sent to the Radiology Results [id number] at 8:39 am on 1/18/2023 to be communicated to a licensed caregiver. XR CHEST PORTABLE    Result Date: 1/17/2023  EXAMINATION: ONE XRAY VIEW OF THE CHEST 1/17/2023 6:28 am COMPARISON: 01/16/2023 HISTORY: ORDERING SYSTEM PROVIDED HISTORY: Post op open heart surgery TECHNOLOGIST PROVIDED HISTORY: Post op open heart surgery FINDINGS: Small bore left chest tube remains in place. Small to moderate left pleural effusion and patchy opacities in the left lung are not significantly changed. Heart size is stable. Median sternotomy wires are noted. Right lung is clear. No evidence of pneumothorax. No significant interval change. Left chest tube in place with small to moderate left pleural effusion and patchy opacities in the left lung. XR CHEST PORTABLE    Result Date: 1/16/2023  EXAMINATION: ONE XRAY VIEW OF THE CHEST 1/16/2023 6:20 am COMPARISON: 01/15/2023, 01/14/2023 HISTORY: ORDERING SYSTEM PROVIDED HISTORY: Post op open heart surgery TECHNOLOGIST PROVIDED HISTORY: Post op open heart surgery FINDINGS: Pigtail catheter projects over the inferior left hemithorax. Interval reduction of left pleural effusion in improved aeration of the left lung. Streaky opacities throughout the left lung and pleural fluid tracking laterally remains. No new findings identified on the right. No pneumothorax.   Status post median sternotomy. Left chest tube remains in place. Interval reduction of left pleural effusion and improved aeration of the left lung. XR CHEST PORTABLE    Result Date: 1/15/2023  EXAMINATION: ONE XRAY VIEW OF THE CHEST 1/15/2023 4:59 am COMPARISON: 14 January 2023 HISTORY: ORDERING SYSTEM PROVIDED HISTORY: Post op open heart surgery TECHNOLOGIST PROVIDED HISTORY: Post op open heart surgery FINDINGS: AP portable view of the chest time stamped at 526 hours demonstrates prior median sternotomy and overlying cardiac monitoring electrodes. Pigtail terminus chest tube on the left is unchanged ending in the left mid lung field laterally. Complete opacification of the left hemithorax is unchanged from prior examination. Slightly decreased air in the left hemithorax is noted. Hydropneumothorax on the left not excluded. Lung demonstrates no acute infiltrate or extrapleural air. Near complete opacification of the left hemithorax with decreased air compared to prior exam.     XR CHEST PORTABLE    Result Date: 1/14/2023  EXAMINATION: ONE XRAY VIEW OF THE CHEST 1/14/2023 12:11 pm COMPARISON: 13 January 2023 HISTORY: ORDERING SYSTEM PROVIDED HISTORY: Post op open heart surgery TECHNOLOGIST PROVIDED HISTORY: Post op open heart surgery FINDINGS: AP portable view of the chest time stamped at 1151 hours demonstrates overlying cardiac monitoring electrodes and prior median sternotomy. Pigtail terminus left-sided chest tube lies over the left base. Almost complete opacification of the right hemithorax is redemonstrated without little aeration. Minimal rightward shift is noted slightly reduced over prior study. Right lung is clear. Some lucency at the base is noted with hydropneumothorax suspected. Little change from prior study. Significant left lung opacity with left hydropneumothorax suspected. Slight mediastinal shift toward the right is noted diminished from prior study. Right lung is clear.      XR CHEST PORTABLE    Result Date: 1/13/2023  EXAMINATION: ONE XRAY VIEW OF THE CHEST 1/13/2023 6:19 pm COMPARISON: 01/12/2023 HISTORY: ORDERING SYSTEM PROVIDED HISTORY: interval f/u for pleural effusion s/p chest tube placement TECHNOLOGIST PROVIDED HISTORY: interval f/u for pleural effusion s/p chest tube placement FINDINGS: Prior sternotomy. The cardiomediastinal silhouette is partially obscured. There is interval placement of a pigtail catheter in the left pleural space. There is persistent opacification of the left hemithorax with some suspected rightward mediastinal shift. There is some lucency along the lower chest, possibly suggesting hydropneumothorax. Right lung remains predominantly clear. No acute osseous abnormality. Interval left chest tube placement with persistent opacification of the left chest.  There is some lucency in the left lower chest, which could represent hydropneumothorax. XR CHEST PORTABLE    Result Date: 1/12/2023  EXAMINATION: ONE XRAY VIEW OF THE CHEST 1/12/2023 1:24 pm COMPARISON: 12/05/2022 HISTORY: ORDERING SYSTEM PROVIDED HISTORY: chest pain, recent CABG TECHNOLOGIST PROVIDED HISTORY: chest pain, recent CABG FINDINGS: Cardiac size is enlarged. No acute infiltrates are seen . The visualized pulmonary vascularity is stable. No pneumothorax. Progressive left pleural effusion with nearly complete opacification of the left hemithorax. Dolly Collins Postsurgical changes overlying the mediastinum. Progressive left pleural effusion with nearly complete opacification of the left hemithorax. US THORACENTESIS Which side should the procedure be performed? Left    Result Date: 1/19/2023  EXAMINATION: CT OF THE CHEST WITHOUT CONTRAST 1/18/2023 2:10 pm TECHNIQUE: CT of the chest was performed without the administration of intravenous contrast. Multiplanar reformatted images are provided for review.  Automated exposure control, iterative reconstruction, and/or weight based adjustment of the mA/kV was utilized to reduce the radiation dose to as low as reasonably achievable. COMPARISON: 01/18/2023, 01/16/2023, 01/12/2023 HISTORY: ORDERING SYSTEM PROVIDED HISTORY: tube not in correct location. TECHNOLOGIST PROVIDED HISTORY: tube not in correct location. Reason for Exam: tube not in correct location FINDINGS: Informed consent was obtained from the patient after discussing risks, indications, and benefits for the procedure. Limited chest CT is performed without contrast for planned possible chest tube placement. Images are obtained with patient in a right lateral decubitus position. The previously placed left pleural pigtail catheter is outside of the chest cavity. This tube was removed uneventfully and a sterile gauze dressing applied. Evidence of prior CABG. Small gallstones. Borderline splenomegaly. Similar 2 cm left adrenal gland nodule, likely an adenoma. Punctate nonobstructing renal calculi. Open wound by the inferior aspect of the sternotomy with an apparent wound VAC in place. Compared to the prior CT, the left pleural effusion which appears partially loculated has decreased considerably in size. There is adjacent lung consolidation in the left mid and lower chest.  Tiny amount of air in the pleural space related to prior catheter placement. Targeted ultrasound was performed and shows a small pleural effusion which appears to contain septations/loculations. These findings were discussed with the patient. Placing a pigtail catheter would be difficult due to the relatively small size of the collection. Options include surveillance imaging, thoracentesis, or pigtail catheter placement. The patient elected for thoracentesis only at this time. Findings were discussed with Camille Casiano NP and a decision was made to perform thoracentesis only at this time. Left posterior chest was prepped and draped in standard sterile fashion.   1% lidocaine used for local anesthesia using real-time ultrasound guidance. A 5 [place] centesis catheter was advanced into the small residual loculated left pleural effusion. Ultrasound images show a safe tract and access into the space. Approximately 20 mL of fairly clear dark yellow fluid was obtained. Catheter was removed and gentle manual compression was used to achieve hemostasis. Band-Aid was applied. EBL: None. There are no immediate complications. The patient left the department in stable condition. Limited chest CT shows a relatively small residual loculated left pleural effusion, considerably improved since the prior CT. There is adjacent left lower lobe atelectasis/consolidation. Ultrasound shows a relatively small loculated/septated pleural effusion. Ultrasound-guided thoracentesis performed as described above. Findings were discussed with FLORIDALMA MCGEE at 2:30 pm on 1/18/2023. IR CHEST TUBE INSERTION    Result Date: 1/17/2023  PROCEDURE: VR CHEST TUBE INSERTION - PERC PLEURAL DRAINAGE MODERATE CONSCIOUS SEDATION 1/16/2023 HISTORY: ORDERING SYSTEM PROVIDED HISTORY: CT currnetly clogged, not draining, unable to flush TECHNOLOGIST PROVIDED HISTORY: CT currnetly clogged, not draining, unable to flush Which side should the procedure be performed? ->Left CONTRAST: None SEDATION: 0 mgversed and 50 mcg fentanyl were titrated intravenously for moderate sedation monitored under my direction. Total intraservice time of sedation was 15 minutes. The patient's vital signs were monitored throughout the procedure and recorded in the patient's medical record by the nurse. FLUOROSCOPY DOSE AND TYPE OR TIME AND EXPOSURES: 3.4 minutes, DAP 2538. 58 micro gray meter squared DESCRIPTION OF PROCEDURE: Informed consent was obtained after a detailed explanation of the procedure including risks, benefits, and alternatives. Universal protocol was observed. Sterile gowns, masks, hats and gloves utilized for maximal sterile barrier. Patient was placed supine on the table. Left chest prepped and draped sterile fashion. Saline was placed through the indwelling chest drain. There appear to be some clot within it. A 0.35 guidewire was then advanced through the catheter this was removed over wire. A 12 Guinean dilator was inserted and then a 12 Guinean pigtail drain was inserted into the chest over wire. This was secured with 2 0 Ethilon. Dressing was applied. Spot image obtained. Was placed to Pleur-evac. FINDINGS: Upsized drain in place in the left base. Successful up sizing of left-sided chest drain. Fluoroscopic images show majority of the pleural effusion has resolved. There may be some residual atelectasis or consolidation in the left base. There did not appear to be much fluid being aspirated. If tube is not draining would suggest follow-up CT scan. IR CHEST TUBE INSERTION    Result Date: 1/13/2023  PROCEDURE: Ultrasound and fluoroscopic GUIDED left pleural drain PLACEMENT 1/13/2023 HISTORY: ORDERING SYSTEM PROVIDED HISTORY: left pleural effusion TECHNOLOGIST PROVIDED HISTORY: left pleural effusion Which side should the procedure be performed? ->Left Shortness of breath SEDATION: 100 micro g IV fentanyl for pain Fluoro: DAP 1738 cGy cm squared TECHNIQUE: Informed consent was obtained after a detailed explanation of the procedure including risks, benefits, and alternatives. Universal protocol was followed. A suitable skin site was prepped and draped in sterile fashion following ultrasound localization. An 18 gauge needle was advanced into the left pleural space. Sonogram image confirmed satisfactory needle tip position and safe tract access. Non turbid yellow fluid was aspirated. A sample was sent for laboratory analysis. A 0.035 guidewire was used to place a 10 Guinean chest tube after the fascial tract was dilated under fluoroscopic guidance. The catheter was sutured to the skin and the patient tolerated the procedure well.   The catheter was attached to atrium drainage. FINDINGS: Preprocedural sonogram demonstrated marked loculations of the left pleural collection. Post procedure images demonstrate the left pleural drain in good position     Successful percutaneous placement of a 10 Pashto left pleural drain. There is marked loculation of the left pleural collection indicating complex pleural fluid. Small bore drainage catheter may not successfully drained the entire pleural collection given its complex nature. IR FLUOROSCOPY LESS THAN 1 HOUR    Result Date: 1/18/2023  EXAMINATION: SPOT FLUOROSCOPIC IMAGES 1/18/2023 3:07 pm TECHNIQUE: Fluoroscopy was provided by the radiology department for procedure. Radiologist was not present during examination. FLUOROSCOPY DOSE AND TYPE OR TIME AND EXPOSURES: 0.2 minutes; D  cGy cm2 COMPARISON: 01/18/2023, 01/16/2023 HISTORY: ORDERING SYSTEM PROVIDED HISTORY: check chest tube position TECHNOLOGIST PROVIDED HISTORY: check chest tube position FINDINGS: Limited fluoroscopy of the chest was performed for planned left chest tube repositioning. Fluoroscopy shows the pigtail chest tube to be out of the thoracic cavity. The tube was subsequently removed. Limited fluoroscopy shows the chest tube to be out of the thoracic cavity. IR GUIDED THORACENTESIS PLEURAL    Result Date: 2/3/2023  PROCEDURE: ULTRASOUNDGUIDED left THORACENTESIS 2/2/2023 HISTORY: ORDERING SYSTEM PROVIDED HISTORY: pleural effusion TECHNOLOGIST PROVIDED HISTORY: pleural effusion Which side should the procedure be performed? ->Left TECHNIQUE: Informed consent was obtained after a detailed explanation of the procedure including risks, benefits, and alternatives. Universal protocol was performed. The left chest was prepped and draped in sterile fashion and local anesthesia was achieved with lidocaine. An 5 Pashto needle sheath was advanced under ultrasound guidance into pleural effusion and thoracentesis was performed.  The patient tolerated the procedure well. FINDINGS: A total of 5 mL was removed. Yellow fluid     Successful ultrasound guided thoracentesis. Only a small amount of fluid was aspirated due to the small size of the fluid pocket high up in the chest which was obscured by pacing scapula and also difficult to reach due to patient's body habitus. Large fluid collection was not identified by ultrasound. Physical Examination:        Physical Exam  Cardiovascular:      Rate and Rhythm: Normal rate and regular rhythm. Pulses: Normal pulses. Heart sounds: Normal heart sounds. Pulmonary:      Effort: Pulmonary effort is normal.      Breath sounds: Normal breath sounds. Abdominal:      General: There is no distension. Palpations: Abdomen is soft. Tenderness: There is no abdominal tenderness. Hernia: No hernia is present. Genitourinary:     Comments: EUC in place with clear urine output   Musculoskeletal:      Right lower leg: No edema. Left lower leg: No edema. Skin:     Comments: Wound Vac in place    Neurological:      Mental Status: She is oriented to person, place, and time. Motor: No weakness.    Psychiatric:         Mood and Affect: Mood normal.         Assessment:        Primary Problem  Pleural effusion    Active Hospital Problems    Diagnosis Date Noted    Pleural effusion [J90] 02/02/2023     Priority: Medium    Osteomyelitis ([address] [M86.9] 02/02/2023     Priority: Medium    S/P CABG (coronary artery bypass graft) [Z95.1] 01/16/2023     Priority: Medium    Surgical wound breakdown, subsequent encounter [T81.31XD] 01/13/2023     Priority: Medium    Type 2 diabetes mellitus with complication, with long-term current use of insulin ([address] [E11.8, Z79.4] 07/28/2020    Hypothyroidism [E03.9]     Neuropathy [G62.9] 11/29/2011       Plan:        Recurrent left-sided pleural effusion s/p thoracocentesis likely 2/2 recent CABG, needs to rule out other causes:  -Thoracocentesis by IR on 2/23, drainage of 5 small serosanguineous fluid  -Pleural fluid culture remains negative to date  -On 2 L nasal cannula saturate at 95%     CABG sternal wound dehiscence and osteomyelitis of the chest wall s/p I&D with wound VAC placement (1/23)  -Pain management with Percocet as needed  -CRP trending up  -ID following              Currently on IV Zosyn and vancomycin   Pending wound cultures  -Blood cultures negative  -CTS recommending muscle flap for wound closure        Essential HTN and CAD s/p CABG  -Last echo showed ejection fraction 54% 11/2022  -Continue home meds aspirin, Plavix, atorvastatin,, Imdur, metoprolol   -Norvasc and Lasix on hold  -Holding med Hamiter on blood pressure medication       Diabetes type 2 with peripheral neuropathy  -Continue Lantus 35 units nightly  -Medium-dose sliding scale  -Hypoglycemia protocol  -POCT glucose X4  -We will restart gabapentin 400 mg nightly (home dose 400 mg 3 times daily)     Hypothyroidism  -TSH on admission 2.44  -Continue Synthroid 50 MCG daily        DVT prophylaxis: Lovenox 30 mg twice daily  GI prophylaxis: Protonix 40 mg daily    Crow Awan MD  2/4/2023  9:53 AM

## 2023-02-04 NOTE — CONSULTS
[facility] -   Infectious diseases evaluation  admission date 2/2/2023    reason for consultation:   Post CABG November 2022  : Dehiscence    Impression :     CABG November 2022  Lower sternal wound infection / dehiscence w pus,   culture negative 1/13 - VAC  2/3 pus again -  Large left loculated effusion  Left thoracentesis difficult due to scapula - 5 cc fluid out  Left adrenal tumor  Mild bandemia 13  Severe anxiety    Discussion / summary of stay / plan of care   Lower sternal ischemia and infection - pus seen and cx was neg 1/13/23  Was getting at the time lots of pus in the canister  Sent to to NH on ceftriaxone  Today back for social issues  Pus again seen in the lower part of the sternum, large amounts,   under VAC, after more than 2 weeks of AB -  Will switch to broader coverage and get pus cx again  Recommendations   Large amounts of pus still in the lower part of the sternum under the [facility] - asking for a cx  Pus was cx neg in past admission, raising the concern that it is possibly just related to the tissue liquefaction with ischemia     Zosyn 3.3 gm IV q 8 hr and vanco 1 gm q 12 hr  Awaiting CTS  plans for further I&D  Plastics has indicated they do not do complicated flaps    Infection Control Recommendations   [place] Precautions  Contact Isolation       Antimicrobial Stewardship Recommendations   Simplification of therapy  Targeted therapy    History of Present Illness:   Initial history:  Debra Kruse is a 78y.o.-year-old female this is a lady who had an open heart surgery November 1245 complicated with a lower sternal wound poor healing, presented earlier January with abdominal wound dehiscence, had a debridement was chipping of some bone, all the cultures were negative although pus was found intraoperatively.     Was ultimately discharged on ceftriaxone plan to be given for 6 weeks for bone infection, sent to the nursing home she stayed there for a short while decided to come back home but could not tolerate, realizing that she has to be in a nursing home, she went back to the ER to be readmitted to the nursing home. No fever no chills, continues with a VAC over the lower sternal wound    The patient did have a thoracentesis on the left last time culture negative as well    Current chest x-ray 2/2 shows the same left effusion with underlying atelectasis    2/2 left thoracentesis very small amount of fluid difficult to accessed due to scapula, 5 mL of clear fluid out    Interval changes  2/4/2023   Patient Vitals for the past 8 hrs:   BP Temp Temp src Pulse Resp SpO2   02/04/23 0710 (!) 161/69 98.2 °F (36.8 °C) Oral 71 16 100 %   02/04/23 0400 (!) 106/54 98.2 °F (36.8 °C) -- 65 16 100 %       CURRENT EVALUATION : 2/4/2023      Afebrile, hemodynamically stable  Anxious, but positive  Cx from chest would 2/3 pending -- follow results  BC from 5/3 NGTD -- follow results  WBC 6.1    Summary of relevant labs: 2/4/2023    Labs:  Wbc 13  Creat 0.85  Micro:  BC 2/3 pend  Cx from Chest wound 2/3 pend  Left pleural fluid cx 2/2/23  Imaging:  CXR left effusion worse    I have personally reviewed the past medical history, past surgical history, medications, social history, and family history, and I haveupdated the database accordingly. Allergies:   Morphine, Shellfish-derived products, and Tape [adhesive tape]     Review of Systems:     Review of Systems   Constitutional:  Positive for activity change. Negative for chills and diaphoresis. HENT:  Negative for congestion. Eyes:  Negative for discharge. Respiratory:  Negative for apnea. Cardiovascular:  Positive for chest pain. Gastrointestinal:  Negative for abdominal distention. Endocrine: Negative for cold intolerance. Genitourinary:  Negative for dysuria. Musculoskeletal:  Negative for arthralgias.    Skin:  Positive for color change (red stable sdiscoloration of the  mid sternal area) and wound. Allergic/Immunologic: Negative for immunocompromised state. Neurological:  Negative for dizziness and headaches. Hematological:  Negative for adenopathy. Psychiatric/Behavioral:  Negative for agitation. Physical Examination :       Physical Exam  Constitutional:       Appearance: Normal appearance. She is obese. She is not ill-appearing. HENT:      Head: Normocephalic and atraumatic. Nose: Nose normal.      Mouth/Throat:      Mouth: Mucous membranes are moist.   Eyes:      General: No scleral icterus. Right eye: No discharge. Left eye: No discharge. Pupils: Pupils are equal, round, and reactive to light. Cardiovascular:      Rate and Rhythm: Normal rate and regular rhythm. Heart sounds: Normal heart sounds. No murmur heard. Pulmonary:      Effort: No respiratory distress. Breath sounds: No rales. Abdominal:      General: There is no distension. Tenderness: There is no abdominal tenderness. Genitourinary:     Comments: No matson  Musculoskeletal:         General: No swelling or deformity. Cervical back: Neck supple. No rigidity. Skin:     General: Skin is dry. Coloration: Skin is not jaundiced or pale. Neurological:      General: No focal deficit present. Mental Status: She is alert and oriented to person, place, and time. Psychiatric:         Mood and Affect: Mood normal.         Behavior: Behavior normal.         Thought Content:  Thought content normal.       Past Medical History:     Past Medical History:   Diagnosis Date    Ambulates with cane     or walker    Anxiety     Borderline hypertension     CAD (coronary artery disease)     stents x 2 in 2020    Depression 07/28/2020    GERD (gastroesophageal reflux disease)     Heart attack ([facility] 07/18/2020    Hypertension     Hypothyroidism     Neuropathy     Obesity     morbid    Osteoarthritis     Other cirrhosis of liver ([facility] 07/27/2020    pt denies    Sleep apnea     possible Type II or unspecified type diabetes mellitus without mention of complication, not stated as uncontrolled     Under care of service provider 11/18/2022    [id number]-last visit aug 2022    Under care of service provider 11/18/2022    cardiology-[facility]-last visit nov 2022    Vertebrogenic low back pain 03/29/2022       Past Surgical  History:     Past Surgical History:   Procedure Laterality Date    APPENDECTOMY      CARDIAC CATHETERIZATION      2 stents    CARDIAC CATHETERIZATION  11/01/2022    CARDIAC SURGERY N/A 1/14/2023    STERNAL WOUND WASHOUT, DEBRIDEMENT, WOUND VAC PLACEMENT performed by Lencho [facility]ala MD at [facility]  07/2020    CORONARY ARTERY BYPASS GRAFT N/A 11/28/2022    CABG CORONARY ARTERY BYPASS X3 ON PUMP , ATA, ENDO VEIN HARVEST performed by Lencho Ayala MD at [place] [id number]  01/14/2023    STERNAL WOUND WASHOUT, DEBRIDEMENT, WOUND VAC PLACEMENT    DILATION AND CURETTAGE OF UTERUS      HIATAL HERNIA REPAIR      HYSTERECTOMY (CERVIX STATUS UNKNOWN)      IR CHEST TUBE INSERTION  01/13/2023    IR CHEST TUBE INSERTION 1/13/2023 Gisella Yadav MD [facility]    IR CHEST TUBE INSERTION  1/16/2023    IR CHEST TUBE INSERTION 1/16/2023 Frances Encarnacion MD [facility]    THROAT SURGERY      POLYPS REMOVED FROM VOCAL CORD    TOTAL KNEE ARTHROPLASTY Right 01/06/2015    TOTAL KNEE ARTHROPLASTY Left 05/26/2015    UPPER GASTROINTESTINAL ENDOSCOPY         Medications:       Social History:     Social History     Socioeconomic History    Marital status:      Spouse name: Christine Sanon    Number of children: 0    Years of education: Not on file    Highest education level: Not on file   Occupational History    Occupation: Retired      Employer: [employer]   Tobacco Use    Smoking status: Never    Smokeless tobacco: Never   Vaping Use    Vaping Use: Never used Substance and Sexual Activity    Alcohol use: Yes     Comment: once a month    Drug use: No    Sexual activity: Yes     Partners: Male   Other Topics Concern    Not on file   Social History Narrative    Not on file     Social Determinants of Health     Financial Resource Strain: Low Risk     Difficulty of Paying Living Expenses: Not hard at all   Food Insecurity: No Food Insecurity    Worried About Running Out of Food in the Last Year: Never true    Ran Out of Food in the Last Year: Never true   Transportation Needs: Not on file   Physical Activity: Not on file   Stress: Not on file   Social Connections: Not on file   Intimate Partner Violence: Not on file   Housing Stability: Not on file       Family History:     Family History   Problem Relation Age of Onset    Heart Disease Mother     Arthritis Mother     Heart Disease Father     Arthritis Father     Cancer Father         \"oral\"    Diabetes Sister         gestational      Medical Decision Making:   I have independently reviewed/ordered the following labs:    CBC with Differential:   Recent Labs     02/02/23  1334 02/03/23  0704   WBC 13.2* 9.7   HGB 11.3* 10.7*   HCT 38.7 37.8    274   LYMPHOPCT 7* 9*   MONOPCT 14* 13*       BMP:  Recent Labs     02/02/23  1334 02/03/23  0704    137   K 4.3 3.4*   CL 96* 97*   CO2 28 29   BUN 9 8   CREATININE 0.85 0.64   MG  --  2.1       Hepatic Function Panel:   Recent Labs     02/02/23  1334   PROT 6.7   LABALBU 2.9*   BILITOT 0.6   ALKPHOS 136*   ALT 19   AST 46*       No results for input(s): RPR in the last 72 hours. No results for input(s): HIV in the last 72 hours. No results for input(s): BC in the last 72 hours. Lab Results   Component Value Date/Time    CREATININE 0.64 02/03/2023 07:04 AM    GLUCOSE 41 02/03/2023 07:04 AM    GLUCOSE 250 03/30/2012 03:15 PM       Detailed results: Thank you for allowing us to participate in the care of this patient. Please call with questions.     This note is created with the assistance of a speech recognition program.  While intending to generate adocument that actually reflects the content of the visit, the document can still have some errors including those of syntax and sound a like substitutions which may escape proof reading. It such instances, actual meaningcan be extrapolated by contextual diversion. Jamellillian Esa participated in the evaluation of the patient. ATTESTATION:    I have discussed the case, including pertinent history and exam findings with the residents and students. I have seen and examined the patient and the key elements of the encounter have been performed by me. I was present when the student obtained his information or examined the patient. I have reviewed the laboratory data, other diagnostic studies and discussed them with the residents. I have updated the medical record where necessary. I agree with the assessment, plan and orders as documented by the resident/ student.     Demetria Flynn MD.    Office: [phone]  Perfect serve / office [phone]

## 2023-02-05 LAB
ABSOLUTE EOS #: 0.33 K/UL (ref 0–0.44)
ABSOLUTE IMMATURE GRANULOCYTE: 0.04 K/UL (ref 0–0.3)
ABSOLUTE LYMPH #: 0.88 K/UL (ref 1.1–3.7)
ABSOLUTE MONO #: 1.04 K/UL (ref 0.1–1.2)
ANION GAP SERPL CALCULATED.3IONS-SCNC: 10 MMOL/L (ref 9–17)
BASOPHILS # BLD: 0 % (ref 0–2)
BASOPHILS ABSOLUTE: 0.03 K/UL (ref 0–0.2)
BUN SERPL-MCNC: 8 MG/DL (ref 8–23)
CALCIUM SERPL-MCNC: 8.6 MG/DL (ref 8.6–10.4)
CHLORIDE SERPL-SCNC: 100 MMOL/L (ref 98–107)
CO2 SERPL-SCNC: 30 MMOL/L (ref 20–31)
CREAT SERPL-MCNC: 0.58 MG/DL (ref 0.5–0.9)
EKG ATRIAL RATE: 86 BPM
EKG P AXIS: 46 DEGREES
EKG P-R INTERVAL: 176 MS
EKG Q-T INTERVAL: 400 MS
EKG QRS DURATION: 74 MS
EKG QTC CALCULATION (BAZETT): 478 MS
EKG R AXIS: -21 DEGREES
EKG T AXIS: -4 DEGREES
EKG VENTRICULAR RATE: 86 BPM
EOSINOPHILS RELATIVE PERCENT: 4 % (ref 1–4)
GFR SERPL CREATININE-BSD FRML MDRD: >60 ML/MIN/1.73M2
GLUCOSE BLD-MCNC: 126 MG/DL (ref 65–105)
GLUCOSE BLD-MCNC: 207 MG/DL (ref 65–105)
GLUCOSE SERPL-MCNC: 104 MG/DL (ref 70–99)
HCT VFR BLD AUTO: 35.1 % (ref 36.3–47.1)
HGB BLD-MCNC: 10.4 G/DL (ref 11.9–15.1)
IMMATURE GRANULOCYTES: 1 %
LYMPHOCYTES # BLD: 12 % (ref 24–43)
MCH RBC QN AUTO: 26.3 PG (ref 25.2–33.5)
MCHC RBC AUTO-ENTMCNC: 29.6 G/DL (ref 28.4–34.8)
MCV RBC AUTO: 88.9 FL (ref 82.6–102.9)
MONOCYTES # BLD: 14 % (ref 3–12)
NRBC AUTOMATED: 0 PER 100 WBC
PDW BLD-RTO: 14.7 % (ref 11.8–14.4)
PLATELET # BLD AUTO: 258 K/UL (ref 138–453)
PMV BLD AUTO: 9.3 FL (ref 8.1–13.5)
POTASSIUM SERPL-SCNC: 3.8 MMOL/L (ref 3.7–5.3)
RBC # BLD: 3.95 M/UL (ref 3.95–5.11)
RBC # BLD: ABNORMAL 10*6/UL
SEG NEUTROPHILS: 69 % (ref 36–65)
SEGMENTED NEUTROPHILS ABSOLUTE COUNT: 5.15 K/UL (ref 1.5–8.1)
SODIUM SERPL-SCNC: 140 MMOL/L (ref 135–144)
VANCOMYCIN RANDOM: 20 UG/ML
WBC # BLD AUTO: 7.5 K/UL (ref 3.5–11.3)

## 2023-02-05 PROCEDURE — 6360000002 HC RX W HCPCS

## 2023-02-05 PROCEDURE — 80048 BASIC METABOLIC PNL TOTAL CA: CPT

## 2023-02-05 PROCEDURE — 97110 THERAPEUTIC EXERCISES: CPT

## 2023-02-05 PROCEDURE — 99233 SBSQ HOSP IP/OBS HIGH 50: CPT | Performed by: INTERNAL MEDICINE

## 2023-02-05 PROCEDURE — 2580000003 HC RX 258: Performed by: INTERNAL MEDICINE

## 2023-02-05 PROCEDURE — 2060000000 HC ICU INTERMEDIATE R&B

## 2023-02-05 PROCEDURE — 97530 THERAPEUTIC ACTIVITIES: CPT

## 2023-02-05 PROCEDURE — 85025 COMPLETE CBC W/AUTO DIFF WBC: CPT

## 2023-02-05 PROCEDURE — 36415 COLL VENOUS BLD VENIPUNCTURE: CPT

## 2023-02-05 PROCEDURE — 6360000002 HC RX W HCPCS: Performed by: INTERNAL MEDICINE

## 2023-02-05 PROCEDURE — 6370000000 HC RX 637 (ALT 250 FOR IP)

## 2023-02-05 PROCEDURE — 2700000000 HC OXYGEN THERAPY PER DAY

## 2023-02-05 PROCEDURE — 99232 SBSQ HOSP IP/OBS MODERATE 35: CPT | Performed by: HOSPITALIST

## 2023-02-05 PROCEDURE — 80202 ASSAY OF VANCOMYCIN: CPT

## 2023-02-05 PROCEDURE — 97116 GAIT TRAINING THERAPY: CPT

## 2023-02-05 PROCEDURE — 93010 ELECTROCARDIOGRAM REPORT: CPT | Performed by: INTERNAL MEDICINE

## 2023-02-05 PROCEDURE — 82947 ASSAY GLUCOSE BLOOD QUANT: CPT

## 2023-02-05 PROCEDURE — 94761 N-INVAS EAR/PLS OXIMETRY MLT: CPT

## 2023-02-05 PROCEDURE — 2580000003 HC RX 258

## 2023-02-05 RX ADMIN — Medication 1000 MG: at 00:51

## 2023-02-05 RX ADMIN — SODIUM CHLORIDE, PRESERVATIVE FREE 10 ML: 5 INJECTION INTRAVENOUS at 08:09

## 2023-02-05 RX ADMIN — PIPERACILLIN AND TAZOBACTAM 3375 MG: 3; .375 INJECTION, POWDER, FOR SOLUTION INTRAVENOUS at 04:51

## 2023-02-05 RX ADMIN — LEVOTHYROXINE SODIUM 50 MCG: 50 TABLET ORAL at 06:28

## 2023-02-05 RX ADMIN — CLOPIDOGREL 75 MG: 75 TABLET, FILM COATED ORAL at 08:01

## 2023-02-05 RX ADMIN — METOPROLOL TARTRATE 12.5 MG: 25 TABLET ORAL at 07:59

## 2023-02-05 RX ADMIN — BUSPIRONE HYDROCHLORIDE 10 MG: 10 TABLET ORAL at 21:17

## 2023-02-05 RX ADMIN — PIPERACILLIN AND TAZOBACTAM 3375 MG: 3; .375 INJECTION, POWDER, FOR SOLUTION INTRAVENOUS at 21:15

## 2023-02-05 RX ADMIN — ACETAMINOPHEN 650 MG: 325 TABLET ORAL at 07:55

## 2023-02-05 RX ADMIN — AMIODARONE HYDROCHLORIDE 200 MG: 200 TABLET ORAL at 21:17

## 2023-02-05 RX ADMIN — MIRTAZAPINE 15 MG: 15 TABLET, ORALLY DISINTEGRATING ORAL at 21:17

## 2023-02-05 RX ADMIN — BUSPIRONE HYDROCHLORIDE 10 MG: 10 TABLET ORAL at 08:00

## 2023-02-05 RX ADMIN — ENOXAPARIN SODIUM 30 MG: 100 INJECTION SUBCUTANEOUS at 09:00

## 2023-02-05 RX ADMIN — PANTOPRAZOLE SODIUM 40 MG: 40 TABLET, DELAYED RELEASE ORAL at 06:28

## 2023-02-05 RX ADMIN — ISOSORBIDE MONONITRATE 60 MG: 60 TABLET, EXTENDED RELEASE ORAL at 09:59

## 2023-02-05 RX ADMIN — ENOXAPARIN SODIUM 30 MG: 100 INJECTION SUBCUTANEOUS at 21:17

## 2023-02-05 RX ADMIN — INSULIN GLARGINE 35 UNITS: 100 INJECTION, SOLUTION SUBCUTANEOUS at 21:09

## 2023-02-05 RX ADMIN — PIPERACILLIN AND TAZOBACTAM 3375 MG: 3; .375 INJECTION, POWDER, FOR SOLUTION INTRAVENOUS at 12:06

## 2023-02-05 RX ADMIN — Medication 81 MG: at 08:00

## 2023-02-05 RX ADMIN — OXYCODONE HYDROCHLORIDE AND ACETAMINOPHEN 1 TABLET: 5; 325 TABLET ORAL at 16:48

## 2023-02-05 RX ADMIN — AMIODARONE HYDROCHLORIDE 200 MG: 200 TABLET ORAL at 08:00

## 2023-02-05 RX ADMIN — METOPROLOL TARTRATE 12.5 MG: 25 TABLET ORAL at 21:17

## 2023-02-05 RX ADMIN — GABAPENTIN 400 MG: 400 CAPSULE ORAL at 21:17

## 2023-02-05 RX ADMIN — DESMOPRESSIN ACETATE 40 MG: 0.2 TABLET ORAL at 21:17

## 2023-02-05 RX ADMIN — AMIODARONE HYDROCHLORIDE 200 MG: 200 TABLET ORAL at 14:43

## 2023-02-05 RX ADMIN — OXYCODONE HYDROCHLORIDE AND ACETAMINOPHEN 1 TABLET: 5; 325 TABLET ORAL at 09:59

## 2023-02-05 ASSESSMENT — PAIN DESCRIPTION - LOCATION
LOCATION: CHEST

## 2023-02-05 ASSESSMENT — ENCOUNTER SYMPTOMS
EYE DISCHARGE: 0
ABDOMINAL DISTENTION: 0
COLOR CHANGE: 1
APNEA: 0

## 2023-02-05 ASSESSMENT — PAIN SCALES - GENERAL
PAINLEVEL_OUTOF10: 6
PAINLEVEL_OUTOF10: 8
PAINLEVEL_OUTOF10: 3
PAINLEVEL_OUTOF10: 7
PAINLEVEL_OUTOF10: 2
PAINLEVEL_OUTOF10: 4
PAINLEVEL_OUTOF10: 1
PAINLEVEL_OUTOF10: 0
PAINLEVEL_OUTOF10: 6
PAINLEVEL_OUTOF10: 6
PAINLEVEL_OUTOF10: 8

## 2023-02-05 ASSESSMENT — PAIN DESCRIPTION - DESCRIPTORS: DESCRIPTORS: SORE

## 2023-02-05 NOTE — PROGRESS NOTES
Physical Therapy  [facility]  Physical Therapy Daily Treatment Note    Name: Rajiv Urrutia  : 1955  MRN: [id number]  Date of Service: 2023    Discharge Recommendations:  Patient would benefit from continued therapy after discharge   PT Equipment Recommendations  Equipment Needed: No      Patient Diagnosis(es): The primary encounter diagnosis was Surgical wound breakdown, subsequent encounter. A diagnosis of Soft tissue infection was also pertinent to this visit. Past Medical History:  has a past medical history of Ambulates with cane, Anxiety, Borderline hypertension, CAD (coronary artery disease), Depression, GERD (gastroesophageal reflux disease), Heart attack ([facility] [address] Hypertension, Hypothyroidism, Neuropathy, Obesity, Osteoarthritis, Other cirrhosis of liver ([facility] [address] Sleep apnea, Type II or unspecified type diabetes mellitus without mention of complication, not stated as uncontrolled, Under care of service provider, Under care of service provider, and Vertebrogenic low back pain. Past Surgical History:  has a past surgical history that includes Hysterectomy; Colonoscopy; Upper gastrointestinal endoscopy; Dilation and curettage of uterus; hiatal hernia repair; Throat surgery; Appendectomy; Total knee arthroplasty (Right, 2015); Total knee arthroplasty (Left, 2015); Coronary angioplasty with stent (2020); Cardiac catheterization; Cardiac catheterization (2022); Coronary artery bypass graft (N/A, 2022); IR CHEST TUBE INSERTION (2023); Wound debridement (2023); Cardiac surgery (N/A, 2023); and IR CHEST TUBE INSERTION (2023). Assessment   Body Structures, Functions, Activity Limitations Requiring Skilled Therapeutic Intervention: Decreased functional mobility ; Increased pain;Decreased ROM; Decreased cognition;Decreased endurance;Decreased balance;Decreased body mechanics; Decreased posture;Decreased strength  Assessment: Pt ambulated 20ft x 2 with RW Shannon, pt fatiguing extremely quickly with functional mobility. Pt is a high fall risk due to current endurance deficits and will require continued skilled physical therapy upon discharge to address deficits and progress toward prior level of independence. Therapy Prognosis: Good  Requires PT Follow-Up: Yes  Activity Tolerance  Activity Tolerance: Patient limited by endurance; Patient limited by fatigue     Plan   Physcial Therapy Plan  General Plan:  (5-6x/wk)  Current Treatment Recommendations: Strengthening, Balance training, ROM, Endurance training, Safety education & training, Patient/Caregiver education & training, Therapeutic activities, Equipment evaluation, education, & procurement, Gait training, Stair training, Transfer training, Home exercise program  Safety Devices  Type of Devices: Nurse notified, Gait belt, Left in chair, Call light within reach, Chair alarm in place  Restraints  Restraints Initially in Place: No     Restrictions  Restrictions/Precautions  Required Braces or Orthoses?: No  Position Activity Restriction  Other position/activity restrictions: up w/assist; Recent CABG 11/28/22, sternal washout with wound vac placement 1/14/23     Subjective   Pain: pt reports pain in B LEs and \"sores\" on buttocks, does not rate  General  Chart Reviewed: Yes  Patient assessed for rehabilitation services?: Yes  Response To Previous Treatment: Patient with no complaints from previous session. Family / Caregiver Present: No  Follows Commands: Within Functional Limits  General Comment  Comments: Pt left seated in recliner with call light within reach, alarm activated  Subjective  Subjective: Pt and RN agreeable to PT. Pt alert in recliner upon arrival, states she is \"having a rough day\".  Pt c/o pain in B LEs and buttocks, very pleasant and cooperative with treatment       Cognition   Orientation  Overall Orientation Status: Within Functional Limits  Orientation Level: Oriented X4  Cognition  Overall Cognitive Status: Exceptions  Arousal/Alertness: Appropriate responses to stimuli  Following Commands: Follows all commands without difficulty  Attention Span: Appears intact  Memory: Appears intact  Safety Judgement: Good awareness of safety precautions  Problem Solving: Assistance required to identify errors made;Assistance required to generate solutions  Insights: Fully aware of deficits  Initiation: Requires cues for some  Sequencing: Requires cues for some  Cognition Comment: pt very talkative and at times tearful re: current medical issues but easlily redirected to tasks and cooperative t/o     Objective   Heart Rate: 80  Heart Rate Source: Monitor  SpO2: 97 %  O2 Device: Nasal cannula (2L)     Observation/Palpation  Posture: Good     Bed mobility  Supine to Sit:  (vc's for UE placement with good return demo)  Bed Mobility Comments: pt began and concluded session in recliner  Transfers  Sit to Stand: Minimal Assistance  Stand to Sit: Contact guard assistance  Comment: STS performed from recliner, pt requiring verbal cueing for proper hand placement with RW  Ambulation  Surface: Level tile  Other Apparatus: O2  Assistance: Minimal assistance  Quality of Gait: large lateral sways, shuffling gait  Gait Deviations: Shuffles; Slow Kamini; Increased BARBARA; Decreased step length  Distance: 20ft x2  Comments: significant dyspnea on exertion, SpO2 93% following ambulation on 2L. More Ambulation?: No  Stairs/Curb  Stairs?: No     Balance  Posture: Fair  Sitting - Static: Good;-  Sitting - Dynamic: Fair;+  Standing - Static: Fair  Standing - Dynamic: Fair;-  Comments: standing balance assessed w/ RW  Exercise Treatment:   Seated LE exercise program: Long Arc Quads, hip abduction/adduction, heel/toe raises, and marches. Reps: 10x   Supine Exercises: Ankle Pumps, Gluteal sets, Hamstring Sets, Heel Slides, Quad Sets, SLR.  Reps:  10x   Upper extremity exercises: Bicep curl, shoulder flexion/extension, punches, tricep curl, shoulder abduction/adduction. Reps: 10x     AM-PAC Score  AM-PAC Inpatient Mobility Raw Score : 16 (02/05/23 1528)  AM-PAC Inpatient T-Scale Score : 40.78 (02/05/23 1528)  Mobility Inpatient CMS 0-100% Score: 54.16 (02/05/23 1528)  Mobility Inpatient CMS G-Code Modifier : CK (02/05/23 1528)    Goals  Short Term Goals  Time Frame for Short Term Goals: 14  Short Term Goal 1: Pt to perform bed mobility independently  Short Term Goal 2: Pt to demonstrate functional transfers with supervision  Short Term Goal 3: Pt to ambulate 100ft w/ RW with supervision  Short Term Goal 4: Tolerate 45 minutes of therapy to demo increased endurnace  Patient Goals   Patient Goals :  To get stronger and go home       Education  Patient Education  Education Given To: Patient  Education Provided: Role of Therapy;Plan of Care;Equipment;Transfer Training;Energy Conservation  Education Provided Comments: Pursed lip breathing  Education Method: Demonstration;Verbal  Barriers to Learning: None  Education Outcome: Verbalized understanding;Demonstrated understanding      Therapy Time   Individual Concurrent Group Co-treatment   Time In 0937         Time Out 1030         Minutes 53         Timed Code Treatment Minutes: [address] [place], [place]

## 2023-02-05 NOTE — PROGRESS NOTES
[address] [facility] Pharmacokinetic Monitoring Service - Vancomycin    Consulting Provider: Dr Mayuri Sousa   Indication: Lower sternal ischemia and infection   Target Concentration: Goal AUC/LATANYA 400-600 mg*hr/L  Day of Therapy: 3  Additional Antimicrobials: Zosyn     Pertinent Laboratory Values: Wt Readings from Last 1 Encounters:   02/03/23 240 lb (108.9 kg)     Temp Readings from Last 1 Encounters:   02/05/23 97.8 °F (36.6 °C) (Temporal)     Estimated Creatinine Clearance: 110 mL/min (based on SCr of 0.58 mg/dL). Recent Labs     02/04/23  0825 02/05/23  0544   CREATININE 0.57 0.58   WBC 6.1 7.5     Procalcitonin:     Pertinent Cultures:  Culture Date Source Results   02/03 Chest swab No growth   MRSA Nasal Swab: N/A. Non-respiratory infection.     Recent vancomycin administrations                     vancomycin (VANCOCIN) 1000 mg in sodium chloride 0.9% 250 mL IVPB (mg) 1,000 mg New Bag 02/05/23 0051     1,000 mg New Bag 02/04/23 1225     1,000 mg New Bag  0058    vancomycin (VANCOCIN) 2,000 mg in sodium chloride 0.9 % 500 mL IVPB (mg) 2,000 mg New Bag 02/03/23 1241                    Assessment:  Date/Time Current Dose Concentration Timing of Concentration (h) AUC   02/5 1000 mg q 12 hr 20 4 hrs post dose  457   Note: Serum concentrations collected for AUC dosing may appear elevated if collected in close proximity to the dose administered, this is not necessarily an indication of toxicity    Plan:  Current dosing regimen is therapeutic  Continue current dose  Repeat vancomycin concentration not ordered    Pharmacy will continue to monitor patient and adjust therapy as indicated    Thank you for the consult,  CARMITA Padilla [facility]  2/5/2023 9:37 AM

## 2023-02-05 NOTE — PROGRESS NOTES
[address] [facility]  Progress Note    Date:   2/5/2023  Patient name:  Sterling Alexandra  Date of admission:  2/2/2023  1:07 PM  MRN:   [id number]  YOB: 1955    SUBJECTIVE/Last 24 hours update:   Patient was seen and examined at bedside. No new events overnight. Vitals are stable. Cardiothoracic surgery the patient does not qualify for flap per plastic surgery. Recommended to continue wound VAC and antibiotics. Patient denies any fever, chills or shortness of breath. Plan to discharge today. Brief Hospital course:   71-year-old female patient with history of type 2 diabetes, hypertension, depression, recurrent falls, CAD with CABG on November 2022. Patient was recently admitted for CABG that was complicated with incisional infection and osteomyelitis of the chest wall s/p debridement and VAC placement. Patient was supposed to continue a course of 6 weeks of Rocephin, lost her PICC line 2 days ago. In the ED patient was found to have pleural effusion, CT surgery was consulted, ultrasound-guided thoracocentesis was performed with a collection of 5 mL serosanguineous fluid , patient was admitted for further work-up and management.     REVIEW OF SYSTEMS:      CONSTITUTIONAL:  no fevers, no headcahes  EYES: negative for blury vision  HEENT: No headaches, No nasal congestion, no difficulty swallowing  RESPIRATORY:negative for dyspnea, no wheezing, no Cough, skin wound  CARDIOVASCULAR: negative for chest pain, no palpitations  GASTROINTESTINAL: no nausea, no vomiting, no change in bowel habits, no abdominal pain   GENITOURINARY: negative for dysuria, no hematuria   MUSCULOSKELETAL: no joint pains, no muscle aches, no swelling of joints or extremities  NEUROLOGICAL: No  Weakness or numbness      PAST MEDICAL HISTORY:      has a past medical history of Ambulates with cane, Anxiety, Borderline hypertension, CAD (coronary artery disease), Depression, GERD (gastroesophageal reflux disease), Heart attack ([facility] [address] Hypertension, Hypothyroidism, Neuropathy, Obesity, Osteoarthritis, Other cirrhosis of liver ([facility] [address] Sleep apnea, Type II or unspecified type diabetes mellitus without mention of complication, not stated as uncontrolled, Under care of service provider, Under care of service provider, and Vertebrogenic low back pain. PAST SURGICAL HISTORY:      has a past surgical history that includes Hysterectomy; Colonoscopy; Upper gastrointestinal endoscopy; Dilation and curettage of uterus; hiatal hernia repair; Throat surgery; Appendectomy; Total knee arthroplasty (Right, 01/06/2015); Total knee arthroplasty (Left, 05/26/2015); Coronary angioplasty with stent (07/2020); Cardiac catheterization; Cardiac catheterization (11/01/2022); Coronary artery bypass graft (N/A, 11/28/2022); IR CHEST TUBE INSERTION (01/13/2023); Wound debridement (01/14/2023); Cardiac surgery (N/A, 1/14/2023); and IR CHEST TUBE INSERTION (1/16/2023). SOCIAL HISTORY:      reports that she has never smoked. She has never used smokeless tobacco. She reports current alcohol use. She reports that she does not use drugs. FAMILY HISTORY:     family history includes Arthritis in her father and mother; Cancer in her father; Diabetes in her sister; Heart Disease in her father and mother. ALLERGIES:     Morphine, Shellfish-derived products, and Tape [adhesive tape]      OBJECTIVE:       Vitals:    02/04/23 2000 02/05/23 0000 02/05/23 0400 02/05/23 0415   BP:  (!) 106/52  (!) 109/50   Pulse: 70 68 71 71   Resp:  16  15   Temp:  97.9 °F (36.6 °C)  97.8 °F (36.6 °C)   TempSrc:  Temporal  Temporal   SpO2:  100%  98%   Weight:       Height:           No intake or output data in the 24 hours ending 02/05/23 0731    PHYSICAL EXAM:  General Appearance  Alert , awake , not in acute distress  HEENT - Head is normocephalic, atraumatic.   Lungs - Bilateral equal air entry , no wheezes, rales or rhonchi, aeration good, Wound vac is in place, mild tenderness on palpation at the edges of wound vac  Cardiovascular - Heart sounds are normal.  Regular rhythm, normal rate without murmur, gallop or rub. Abdomen - Soft, nontender, nondistended, no masses or organomegaly  Neurologic - There are no new focal motor or sensory deficits  Skin - No bruising or bleeding on exposed skin area  Extremities - No cyanosis, clubbing or edema      ASSESSMENT:     Principal Problem:    Pleural effusion  Active Problems:    Surgical wound breakdown, subsequent encounter    S/P CABG (coronary artery bypass graft)    Osteomyelitis (HCC)    Osteomyelitis of sternum (HCC)    Neuropathy    Hypothyroidism    Type 2 diabetes mellitus with complication, with long-term current use of insulin (HCC)  Resolved Problems:    * No resolved hospital problems. *        PLAN:      Left-sided pleural effusion likely due to recent CABG-resolved  -S/p thoracocentesis  -Collection of 5 mL of clear yellow fluid  -Fluid analysis showed few neutrophils  -Cardiothoracic surgery ok to d/c  -On 2 L nasal cannula, O2 sat 98%, will try to wean down     CAD status post CABG  -Patient had CABG X3 in [place] [address]  -Was complicated by incision site infection and recurrent pleural effusion  -Wound VAC on place  -Denies any chest pain     Surgical wound dehiscence and infection status post CABG  -Continue Zosyn and Vanco per ID  -Wound swab culture is pending, no organisms for 2 days  -Waiting on ID recommendation for D/c  -Wound VAC on place  -Wound ostomy to change wound VAC Monday, Wednesday, Friday  -Per CT surgery, pt does not qualify for flap       Type 2 diabetes  -Last hemoglobin A1c 7.3  -Cont. 35 units Lantus at night with medium correction scale  -Continue POCT glucose  -Continue tight glycemic control for wound healing     Hypertension  -Controlled  -Continue home meds     Hypothyroidism   - Cont.  Synthroid 50mcg daily   - TSH on 02/23 is 2.44    DVT prophylaxis:  Lovenox 30 mg BID   GI prophylaxis: Protonix 40 mg daily      Zenaida New MD  Family Medicine Resident PGY1  2/5/2023 7:31 AM        Please note that this chart was generated using voice recognition Dragon dictation software.   Although every effort was made to ensure the accuracy of this automated transcription, some errors in transcription may have occurred

## 2023-02-05 NOTE — PROGRESS NOTES
[facility] -   Infectious diseases evaluation  admission date 2/2/2023    reason for consultation:   Post CABG November 2022  : Dehiscence    Impression :     CABG November 2022  Lower sternal wound infection / dehiscence w purulence   culture negative 1/13 - VAC  2/3 pus again  Large left loculated effusion  Left thoracentesis difficult due to scapula - 5 cc fluid out  Left adrenal tumor  Mild bandemia 13  Severe anxiety    Discussion / summary of stay / plan of care   Lower sternal ischemia and infection - pus seen and cx was neg 1/13/23  Was getting at the time lots of pus in the canister  Sent to to NH on ceftriaxone  Came back because of social issues  Pus seen again in the lower part of the sternum, large amounts,   under VAC, after more than 2 weeks of AB -  Will switch to broader coverage and get pus cx again  Recommendations   Large amounts of pus still in the lower part of the sternum under the [facility] - asking for a cx  Pus was cx neg in past admission, raising the concern that it is possibly just related to the tissue liquefaction with ischemia     Zosyn 3.3 gm IV q 8 hr and vanco 1 gm q 12 hr  Awaiting CTS  plans for further I&D  Plastics has indicated they do not do complicated flaps    Infection Control Recommendations   [place] Precautions  Contact Isolation       Antimicrobial Stewardship Recommendations   Simplification of therapy  Targeted therapy    History of Present Illness:   Initial history:  Lucero Smith is a 78y.o.-year-old female this is a lady who had an open heart surgery November 2069 complicated with a lower sternal wound poor healing, presented earlier January with abdominal wound dehiscence, had a debridement was chipping of some bone, all the cultures were negative although pus was found intraoperatively.     Was ultimately discharged on ceftriaxone plan to be given for 6 weeks for bone infection, sent to the nursing home she stayed Jimenez Mccabe is a 36 y.o.  male and presents with initial immigration physical. He denies any Class A or B conditions. He denies any other chronic medical conditions and takes no meds on a regular basis. Chief Complaint   Patient presents with    Physical     Subjective: Additional Concerns: none    No Known Allergies  No past medical history on file. No past surgical history on file. No family history on file.   Social History   Substance Use Topics    Smoking status: Not on file    Smokeless tobacco: Not on file    Alcohol use Not on file     ROS     General: negative for - chills, fatigue, fever, weight change  Endo: negative for - hot flashes, polydipsia/polyuria or temperature intolerance  Resp: negative for - cough, shortness of breath or wheezing  CV: negative for - chest pain, edema or palpitations  GI: negative for - abdominal pain, change in bowel habits, constipation, diarrhea or nausea/vomiting  : negative for - dysuria, hematuria, incontinence, pelvic pain or vulvar/vaginal symptoms  MSK: negative for - joint pain, joint swelling or muscle pain  Neuro: negative for - confusion, headaches, seizures or weakness  Derm: negative for - dry skin, hair changes, rash or skin lesion changes    Objective:  Vitals:    12/26/17 1510   BP: 119/90   Pulse: 83   Resp: 17   Temp: 97.6 °F (36.4 °C)   TempSrc: Oral   SpO2: 100%   Weight: 158 lb 3.2 oz (71.8 kg)   Height: 6' (1.829 m)   PainSc:   0 - No pain     PE    Alert, well appearing, and in no distress, oriented to person, place, and time and normal appearing weight  General appearance - alert, well appearing, and in no distress and oriented to person, place, and time  Mental status - alert, oriented to person, place, and time  Chest - clear to auscultation, no wheezes, rales or rhonchi, symmetric air entry  Heart - normal rate, regular rhythm, normal S1, S2, no murmurs, rubs, clicks or gallops  Extremities - peripheral pulses normal, no pedal edema, no clubbing or cyanosis    LABS   No results found for any previous visit. TESTS  No results found for this or any previous visit. Assessment/Plan:      History and physical examination, immigration - No Class A or B conditions. MMR, TDap and flu to be done and documented  - QUANTIFERON TB GOLD(CLIENT INCUB.); Future  - RPR; Future  - N GONORRHOEA AMPLIFICATION; Future    Lab review: orders written for new lab studies as appropriate; see orders. We will call for results and further plan. I have discussed the diagnosis with the patient and the intended plan as seen in the above orders. The patient has received an after-visit summary and questions were answered concerning future plans. I have discussed medication side effects and warnings with the patient as well. I have reviewed the plan of care with the patient, accepted their input and they are in agreement with the treatment goals. F/U as needed.      Jacques Maher MD there for a short while decided to come back home but could not tolerate, realizing that she has to be in a nursing home, she went back to the ER to be readmitted to the nursing home. No fever no chills, continues with a VAC over the lower sternal wound    The patient did have a thoracentesis on the left last time culture negative as well    Current chest x-ray 2/2 shows the same left effusion with underlying atelectasis    2/2 left thoracentesis very small amount of fluid difficult to accessed due to scapula, 5 mL of clear fluid out    Interval changes  2/5/2023   Patient Vitals for the past 8 hrs:   BP Temp Temp src Pulse Resp SpO2   02/05/23 0925 -- -- -- -- -- 98 %   02/05/23 0900 -- -- -- 96 22 --   02/05/23 0812 -- -- -- 79 22 98 %   02/05/23 0800 -- -- -- 78 17 99 %   02/05/23 0759 (!) 145/64 -- -- 77 19 --   02/05/23 0415 (!) 109/50 97.8 °F (36.6 °C) Temporal 71 15 98 %   02/05/23 0400 -- -- -- 71 -- --       CURRENT EVALUATION : 2/5/2023    Afebrile  VS stable    Patient stable  Anxious, but positive  No complaints  No new issues per RN    CT 2-2-23 shows Lt pleural effusion with compressive atelectasis. Unchanged appearance of distal sternum with mild diastasis, gas in the sternotomy site and subcutaneous area. Difficult clinical problem. Would benefit from surgical intervention to achieve closure. Unfortunately, Plastics has indicated they do not do the type of complicated flap that would be required. Antibiotic treatment alone will not control this problem. Medications reviewed: On Vancomycin  On zosyn  Plan to d/C vancomycin. MRSA not isolated. MRSA screens: negative x 2      Cultures;   Wound:   1-13-23: Lt Chest tube fluid: No growth   1-13-23: Wound swab: skin issac  1-14-23: Chest wound: Skin issac  2-3-23: Chest wound: Skin issac  Blood:  1-12-23: No growth   2-3-23: No growth x 2   Urine:  11-8-22: No growth       MRSA:  11-18-22: negative   1-15-23: negative         Summary of relevant labs: 2/5/2023    Labs:  Wbc 13  Creat 0.85  Micro:  BC 2/3 pend  Cx from Chest wound 2/3 pend  Left pleural fluid cx 2/2/23  Imaging:  CXR left effusion worse    I have personally reviewed the past medical history, past surgical history, medications, social history, and family history, and I haveupdated the database accordingly. Allergies:   Morphine, Shellfish-derived products, and Tape [adhesive tape]     Review of Systems:     Review of Systems   Constitutional:  Positive for activity change. Negative for chills and diaphoresis. HENT:  Negative for congestion. Eyes:  Negative for discharge. Respiratory:  Negative for apnea. Cardiovascular:  Positive for chest pain. Gastrointestinal:  Negative for abdominal distention. Endocrine: Negative for cold intolerance. Genitourinary:  Negative for dysuria. Musculoskeletal:  Negative for arthralgias. Skin:  Positive for color change (red stable sdiscoloration of the  mid sternal area) and wound. Allergic/Immunologic: Negative for immunocompromised state. Neurological:  Negative for dizziness and headaches. Hematological:  Negative for adenopathy. Psychiatric/Behavioral:  Negative for agitation. Physical Examination :       Physical Exam  Constitutional:       Appearance: Normal appearance. She is obese. She is not ill-appearing. HENT:      Head: Normocephalic and atraumatic. Nose: Nose normal.      Mouth/Throat:      Mouth: Mucous membranes are moist.   Eyes:      General: No scleral icterus. Right eye: No discharge. Left eye: No discharge. Pupils: Pupils are equal, round, and reactive to light. Cardiovascular:      Rate and Rhythm: Normal rate and regular rhythm. Heart sounds: Normal heart sounds. No murmur heard. Pulmonary:      Effort: No respiratory distress. Breath sounds: No rales. Abdominal:      General: There is no distension. Tenderness: There is no abdominal tenderness. Genitourinary:     Comments: No matson  Musculoskeletal:         General: No swelling or deformity. Cervical back: Neck supple. No rigidity. Skin:     General: Skin is dry. Coloration: Skin is not jaundiced or pale. Neurological:      General: No focal deficit present. Mental Status: She is alert and oriented to person, place, and time. Psychiatric:         Mood and Affect: Mood normal.         Behavior: Behavior normal.         Thought Content:  Thought content normal.       Past Medical History:     Past Medical History:   Diagnosis Date    Ambulates with cane     or walker    Anxiety     Borderline hypertension     CAD (coronary artery disease)     stents x 2 in 2020    Depression 07/28/2020    GERD (gastroesophageal reflux disease)     Heart attack ([facility] [address] 07/18/2020    Hypertension     Hypothyroidism     Neuropathy     Obesity     morbid    Osteoarthritis     Other cirrhosis of liver ([facility] [address] 07/27/2020    pt denies    Sleep apnea     possible    Type II or unspecified type diabetes mellitus without mention of complication, not stated as uncontrolled     Under care of service provider 11/18/2022    [facility]-last visit aug 2022    Under care of service provider 11/18/2022    cardiology-[facility]-last visit nov 2022    Vertebrogenic low back pain 03/29/2022       Past Surgical  History:     Past Surgical History:   Procedure Laterality Date    APPENDECTOMY      CARDIAC CATHETERIZATION      2 stents    CARDIAC CATHETERIZATION  11/01/2022    CARDIAC SURGERY N/A 1/14/2023    STERNAL WOUND WASHOUT, DEBRIDEMENT, WOUND VAC PLACEMENT performed by Luiza Houston MD at [facility]  07/2020    CORONARY ARTERY BYPASS GRAFT N/A 11/28/2022    CABG CORONARY ARTERY BYPASS X3 ON PUMP , ATA, ENDO VEIN HARVEST performed by Luiza Houston MD at [place] [id number]  01/14/2023    STERNAL WOUND WASHOUT, DEBRIDEMENT, WOUND VAC PLACEMENT DILATION AND CURETTAGE OF UTERUS      HIATAL HERNIA REPAIR      HYSTERECTOMY (CERVIX STATUS UNKNOWN)      IR CHEST TUBE INSERTION  01/13/2023    IR CHEST TU[facility]20[facility]    IR CHEST TUBE INSERTION  1/16/2023    IR CHEST TUBE INSERTION 1/16/2023 MD [facility] SPECIAL PROCEDURES    THROAT SURGERY      POLYPS REMOVED FROM VOCAL CORD    TOTAL KNEE ARTHROPLASTY Right 01/06/2015    TOTAL KNEE ARTHROPLASTY Left 05/26/2015    UPPER GASTROINTESTINAL ENDOSCOPY         Medications:       Social History:     Social History     Socioeconomic History    Marital status:      Spouse name: Jose Raul Caldwell    Number of children: 0    Years of education: Not on file    Highest education level: Not on file   Occupational History    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     Employer: [employer]   Tobacco Use    Smoking status: Never    Smokeless tobacco: Never   Vaping Use    Vaping Use: Never used   Substance and Sexual Activity    Alcohol use: Yes     Comment: once a month    Drug use: No    Sexual activity: Yes     Partners: Male   Other Topics Concern    Not on file   Social History Narrative    Not on file     Social Determinants of Health     Financial Resource Strain: Low Risk     Difficulty of Paying Living Expenses: Not hard at all   Food Insecurity: No Food Insecurity    Worried About Running Out of Food in the Last Year: Never true    Ran Out of Food in the Last Year: Never true   Transportation Needs: Not on file   Physical Activity: Not on file   Stress: Not on file   Social Connections: Not on file   Intimate Partner Violence: Not on file   Housing Stability: Not on file       Family History:     Family History   Problem Relation Age of Onset    Heart Disease Mother     Arthritis Mother     Heart Disease Father     Arthritis Father     Cancer Father         \"oral\"    Diabetes Sister         gestational      Medical Decision Making:   I have independently reviewed/ordered the following labs:    CBC with Differential:   Recent Labs     02/04/23  0825 02/05/23  0544   WBC 6.1 7.5   HGB 10.5* 10.4*   HCT 36.3 35.1*    258   LYMPHOPCT 12* 12*   MONOPCT 15* 14*       BMP:  Recent Labs     02/03/23  0704 02/04/23  0825 02/05/23  0544    138 140   K 3.4* 4.0 3.8   CL 97* 98 100   CO2 29 32* 30   BUN 8 8 8   CREATININE 0.64 0.57 0.58   MG 2.1  --   --        Hepatic Function Panel:   Recent Labs     02/02/23  1334   PROT 6.7   LABALBU 2.9*   BILITOT 0.6   ALKPHOS 136*   ALT 19   AST 46*       No results for input(s): RPR in the last 72 hours.  No results for input(s): HIV in the last 72 hours.  No results for input(s): BC in the last 72 hours.  Lab Results   Component Value Date/Time    CREATININE 0.58 02/05/2023 05:44 AM    GLUCOSE 104 02/05/2023 05:44 AM    GLUCOSE 250 03/30/2012 03:15 PM       Detailed results:    EXAMINATION:   CT OF THE CHEST WITHOUT CONTRAST 2/2/2023 2:25 pm       TECHNIQUE:   CT of the chest was performed without the administration of intravenous   contrast. Multiplanar reformatted images are provided for review. Automated   exposure control, iterative reconstruction, and/or weight based adjustment of   the mA/kV was utilized to reduce the radiation dose to as low as reasonably   achievable.       COMPARISON:   Chest radiograph today.  Chest CT 01/18/2023, 01/12/2023.       HISTORY:   ORDERING SYSTEM PROVIDED HISTORY: Pleural effusion   TECHNOLOGIST PROVIDED HISTORY:   Pleural effusion   Decision Support Exception - unselect if not a suspected or confirmed   emergency medical condition->Emergency Medical Condition (MA)       FINDINGS:   Mediastinum: Postoperative findings compatible with recent median sternotomy.   Minimal induration of the anterior mediastinal fat.  No discrete fluid   collection identified.  Status post CABG.  No significant pericardial   effusion.  Temporary pacing leads are present.  No enlarged or  suspicious-appearing lymph nodes identified. Lungs/pleura: Small left pleural effusion. Atelectasis of the majority of   the left lower lobe is noted along with subsegmental atelectasis in the left   upper lobe. Minimal ground-glass attenuation in the right upper lobe. No   septal thickening. No pneumothorax identified. The central airway is patent. Upper Abdomen: Cholelithiasis. Calcifications in the renal arnulfo bilaterally   may be vascular versus nonobstructing stones. Soft Tissues/Bones: Postoperative findings related to median sternotomy is   noted, unchanged since prior chest CT imaging notable for mild diastasis. Subcutaneous gas and gas at the sternotomy site noted. Open wound and   packing material present. Impression   1. Small left pleural effusion with atelectasis in the majority of the left   lower lobe and subsegmental atelectasis in the left upper lobe. 2.  Unchanged findings related to median sternotomy and open wound, as   described. Thank you for allowing us to participate in the care of this patient. Please call with questions. This note is created with the assistance of a speech recognition program.  While intending to generate adocument that actually reflects the content of the visit, the document can still have some errors including those of syntax and sound a like substitutions which may escape proof reading. It such instances, actual meaningcan be extrapolated by contextual diversion.     Jad Amin MD      Office: [phone]  Perfect serve / office [phone]

## 2023-02-05 NOTE — PLAN OF CARE
Problem: Discharge Planning  Goal: Discharge to home or other facility with appropriate resources  2/5/2023 1017 by Ilana Owens RN  Outcome: Progressing  Flowsheets (Taken 2/5/2023 0800)  Discharge to home or other facility with appropriate resources:   Identify barriers to discharge with patient and caregiver   Arrange for needed discharge resources and transportation as appropriate  2/5/2023 0304 by Darinel Heath RN  Outcome: Progressing     Problem: Pain  Goal: Verbalizes/displays adequate comfort level or baseline comfort level  2/5/2023 1017 by Ilana Owens RN  Outcome: Progressing  Flowsheets (Taken 2/5/2023 0800)  Verbalizes/displays adequate comfort level or baseline comfort level:   Encourage patient to monitor pain and request assistance   Assess pain using appropriate pain scale  2/5/2023 0304 by Darinel Heath RN  Outcome: Progressing     Problem: Skin/Tissue Integrity  Goal: Absence of new skin breakdown  Description: 1. Monitor for areas of redness and/or skin breakdown  2. Assess vascular access sites hourly  3. Every 4-6 hours minimum:  Change oxygen saturation probe site  4. Every 4-6 hours:  If on nasal continuous positive airway pressure, respiratory therapy assess nares and determine need for appliance change or resting period.   2/5/2023 1017 by Ilana Owens RN  Outcome: Progressing  2/5/2023 0304 by Darinel Heath RN  Outcome: Progressing     Problem: Safety - Adult  Goal: Free from fall injury  2/5/2023 1017 by Ilana Owens RN  Outcome: Progressing  2/5/2023 0304 by Darinel Heath RN  Outcome: Progressing     Problem: ABCDS Injury Assessment  Goal: Absence of physical injury  2/5/2023 1017 by Ilana Owens RN  Outcome: Progressing  2/5/2023 0304 by Darinel Heath RN  Outcome: Progressing     Problem: Chronic Conditions and Co-morbidities  Goal: Patient's chronic conditions and co-morbidity symptoms are monitored and maintained or improved  2/5/2023 1017 by Ezella Primrose Mylene Guillen RN  Outcome: Progressing  Flowsheets (Taken 2/5/2023 0800)  Care Plan - Patient's Chronic Conditions and Co-Morbidity Symptoms are Monitored and Maintained or Improved: Monitor and assess patient's chronic conditions and comorbid symptoms for stability, deterioration, or improvement  2/5/2023 0304 by Puma Stovall RN  Outcome: Progressing

## 2023-02-05 NOTE — PLAN OF CARE
Problem: Discharge Planning  Goal: Discharge to home or other facility with appropriate resources  Outcome: Progressing     Problem: Pain  Goal: Verbalizes/displays adequate comfort level or baseline comfort level  2/5/2023 0304 by Dirk Quinteros RN  Outcome: Progressing  2/4/2023 1732 by Lit Choi RN  Outcome: Progressing     Problem: Skin/Tissue Integrity  Goal: Absence of new skin breakdown  Description: 1. Monitor for areas of redness and/or skin breakdown  2. Assess vascular access sites hourly  3. Every 4-6 hours minimum:  Change oxygen saturation probe site  4. Every 4-6 hours:  If on nasal continuous positive airway pressure, respiratory therapy assess nares and determine need for appliance change or resting period.   Outcome: Progressing     Problem: Safety - Adult  Goal: Free from fall injury  2/5/2023 0304 by Dirk Quinteros RN  Outcome: Progressing  2/4/2023 1732 by Lit Choi RN  Outcome: Progressing     Problem: ABCDS Injury Assessment  Goal: Absence of physical injury  2/5/2023 0304 by Dirk Quinteros RN  Outcome: Progressing  2/4/2023 1732 by Lit Choi RN  Outcome: Progressing     Problem: Chronic Conditions and Co-morbidities  Goal: Patient's chronic conditions and co-morbidity symptoms are monitored and maintained or improved  Outcome: Progressing

## 2023-02-06 ENCOUNTER — TELEPHONE (OUTPATIENT)
Dept: VASCULAR SURGERY | Age: 68
End: 2023-02-06

## 2023-02-06 VITALS
BODY MASS INDEX: 48.38 KG/M2 | DIASTOLIC BLOOD PRESSURE: 63 MMHG | WEIGHT: 240 LBS | HEIGHT: 59 IN | RESPIRATION RATE: 18 BRPM | TEMPERATURE: 98 F | SYSTOLIC BLOOD PRESSURE: 139 MMHG | HEART RATE: 79 BPM | OXYGEN SATURATION: 99 %

## 2023-02-06 LAB
ABSOLUTE EOS #: 0.08 K/UL (ref 0–0.44)
ABSOLUTE IMMATURE GRANULOCYTE: 0.08 K/UL (ref 0–0.3)
ABSOLUTE LYMPH #: 0.83 K/UL (ref 1.1–3.7)
ABSOLUTE MONO #: 1.25 K/UL (ref 0.1–1.2)
ANION GAP SERPL CALCULATED.3IONS-SCNC: 8 MMOL/L (ref 9–17)
BASOPHILS # BLD: 1 % (ref 0–2)
BASOPHILS ABSOLUTE: 0.08 K/UL (ref 0–0.2)
BUN SERPL-MCNC: 7 MG/DL (ref 8–23)
CALCIUM SERPL-MCNC: 8.5 MG/DL (ref 8.6–10.4)
CHLORIDE SERPL-SCNC: 104 MMOL/L (ref 98–107)
CO2 SERPL-SCNC: 28 MMOL/L (ref 20–31)
CREAT SERPL-MCNC: 0.57 MG/DL (ref 0.5–0.9)
EOSINOPHILS RELATIVE PERCENT: 1 % (ref 1–4)
FERRITIN SERPL-MCNC: 140 NG/ML (ref 13–150)
FLUAV AG SPEC QL: NEGATIVE
FLUBV AG SPEC QL: NEGATIVE
FOLATE SERPL-MCNC: 17.4 NG/ML
GFR SERPL CREATININE-BSD FRML MDRD: >60 ML/MIN/1.73M2
GLUCOSE BLD-MCNC: 101 MG/DL (ref 65–105)
GLUCOSE BLD-MCNC: 142 MG/DL (ref 65–105)
GLUCOSE BLD-MCNC: 45 MG/DL (ref 65–105)
GLUCOSE BLD-MCNC: 49 MG/DL (ref 65–105)
GLUCOSE BLD-MCNC: 69 MG/DL (ref 65–105)
GLUCOSE BLD-MCNC: 81 MG/DL (ref 65–105)
GLUCOSE SERPL-MCNC: 46 MG/DL (ref 70–99)
HCT VFR BLD AUTO: 39.5 % (ref 36.3–47.1)
HGB BLD-MCNC: 10.9 G/DL (ref 11.9–15.1)
IMMATURE GRANULOCYTES: 1 %
IRON SATURATION: 11 % (ref 20–55)
IRON SERPL-MCNC: 23 UG/DL (ref 37–145)
LYMPHOCYTES # BLD: 10 % (ref 24–43)
MCH RBC QN AUTO: 26.1 PG (ref 25.2–33.5)
MCHC RBC AUTO-ENTMCNC: 27.6 G/DL (ref 28.4–34.8)
MCV RBC AUTO: 94.7 FL (ref 82.6–102.9)
MONOCYTES # BLD: 15 % (ref 3–12)
MORPHOLOGY: ABNORMAL
NRBC AUTOMATED: 0 PER 100 WBC
PDW BLD-RTO: 14.6 % (ref 11.8–14.4)
PLATELET # BLD AUTO: 281 K/UL (ref 138–453)
PMV BLD AUTO: 9.2 FL (ref 8.1–13.5)
POTASSIUM SERPL-SCNC: 3.9 MMOL/L (ref 3.7–5.3)
RBC # BLD: 4.17 M/UL (ref 3.95–5.11)
SARS-COV-2 RDRP RESP QL NAA+PROBE: NOT DETECTED
SEG NEUTROPHILS: 72 % (ref 36–65)
SEGMENTED NEUTROPHILS ABSOLUTE COUNT: 5.98 K/UL (ref 1.5–8.1)
SODIUM SERPL-SCNC: 140 MMOL/L (ref 135–144)
SPECIMEN DESCRIPTION: NORMAL
TIBC SERPL-MCNC: 207 UG/DL (ref 250–450)
UNSATURATED IRON BINDING CAPACITY: 184 UG/DL (ref 112–347)
VIT B12 SERPL-MCNC: 900 PG/ML (ref 232–1245)
WBC # BLD AUTO: 8.3 K/UL (ref 3.5–11.3)

## 2023-02-06 PROCEDURE — 36415 COLL VENOUS BLD VENIPUNCTURE: CPT

## 2023-02-06 PROCEDURE — 85025 COMPLETE CBC W/AUTO DIFF WBC: CPT

## 2023-02-06 PROCEDURE — 2W04X6Z CHANGE PRESSURE DRESSING ON CHEST WALL: ICD-10-PCS | Performed by: NURSE PRACTITIONER

## 2023-02-06 PROCEDURE — 82746 ASSAY OF FOLIC ACID SERUM: CPT

## 2023-02-06 PROCEDURE — 6360000002 HC RX W HCPCS: Performed by: INTERNAL MEDICINE

## 2023-02-06 PROCEDURE — 82947 ASSAY GLUCOSE BLOOD QUANT: CPT

## 2023-02-06 PROCEDURE — 80048 BASIC METABOLIC PNL TOTAL CA: CPT

## 2023-02-06 PROCEDURE — 99233 SBSQ HOSP IP/OBS HIGH 50: CPT | Performed by: STUDENT IN AN ORGANIZED HEALTH CARE EDUCATION/TRAINING PROGRAM

## 2023-02-06 PROCEDURE — 87635 SARS-COV-2 COVID-19 AMP PRB: CPT

## 2023-02-06 PROCEDURE — 2580000003 HC RX 258

## 2023-02-06 PROCEDURE — 82607 VITAMIN B-12: CPT

## 2023-02-06 PROCEDURE — 82728 ASSAY OF FERRITIN: CPT

## 2023-02-06 PROCEDURE — 87804 INFLUENZA ASSAY W/OPTIC: CPT

## 2023-02-06 PROCEDURE — 97605 NEG PRS WND THER DME<=50SQCM: CPT

## 2023-02-06 PROCEDURE — 83540 ASSAY OF IRON: CPT

## 2023-02-06 PROCEDURE — 6360000002 HC RX W HCPCS

## 2023-02-06 PROCEDURE — 6370000000 HC RX 637 (ALT 250 FOR IP)

## 2023-02-06 PROCEDURE — 83550 IRON BINDING TEST: CPT

## 2023-02-06 PROCEDURE — 99232 SBSQ HOSP IP/OBS MODERATE 35: CPT | Performed by: INTERNAL MEDICINE

## 2023-02-06 PROCEDURE — 2580000003 HC RX 258: Performed by: INTERNAL MEDICINE

## 2023-02-06 PROCEDURE — 2060000000 HC ICU INTERMEDIATE R&B

## 2023-02-06 RX ORDER — MULTIVITAMIN WITH IRON
1 TABLET ORAL DAILY
Status: DISCONTINUED | OUTPATIENT
Start: 2023-02-06 | End: 2023-02-07 | Stop reason: HOSPADM

## 2023-02-06 RX ORDER — INSULIN GLARGINE 100 [IU]/ML
30 INJECTION, SOLUTION SUBCUTANEOUS NIGHTLY
Status: DISCONTINUED | OUTPATIENT
Start: 2023-02-06 | End: 2023-02-07 | Stop reason: HOSPADM

## 2023-02-06 RX ADMIN — DESMOPRESSIN ACETATE 40 MG: 0.2 TABLET ORAL at 20:28

## 2023-02-06 RX ADMIN — BUSPIRONE HYDROCHLORIDE 10 MG: 10 TABLET ORAL at 08:43

## 2023-02-06 RX ADMIN — ALCOHOL 1 TABLET: 70.47 GEL TOPICAL at 11:27

## 2023-02-06 RX ADMIN — METOPROLOL TARTRATE 12.5 MG: 25 TABLET ORAL at 20:30

## 2023-02-06 RX ADMIN — OXYCODONE HYDROCHLORIDE AND ACETAMINOPHEN 1 TABLET: 5; 325 TABLET ORAL at 15:59

## 2023-02-06 RX ADMIN — GABAPENTIN 400 MG: 400 CAPSULE ORAL at 20:27

## 2023-02-06 RX ADMIN — ISOSORBIDE MONONITRATE 60 MG: 60 TABLET, EXTENDED RELEASE ORAL at 08:43

## 2023-02-06 RX ADMIN — BUSPIRONE HYDROCHLORIDE 10 MG: 10 TABLET ORAL at 20:28

## 2023-02-06 RX ADMIN — Medication 81 MG: at 08:43

## 2023-02-06 RX ADMIN — PIPERACILLIN AND TAZOBACTAM 3375 MG: 3; .375 INJECTION, POWDER, FOR SOLUTION INTRAVENOUS at 04:32

## 2023-02-06 RX ADMIN — PANTOPRAZOLE SODIUM 40 MG: 40 TABLET, DELAYED RELEASE ORAL at 06:27

## 2023-02-06 RX ADMIN — METOPROLOL TARTRATE 12.5 MG: 25 TABLET ORAL at 08:43

## 2023-02-06 RX ADMIN — OXYCODONE HYDROCHLORIDE AND ACETAMINOPHEN 1 TABLET: 5; 325 TABLET ORAL at 08:47

## 2023-02-06 RX ADMIN — ENOXAPARIN SODIUM 30 MG: 100 INJECTION SUBCUTANEOUS at 08:43

## 2023-02-06 RX ADMIN — SODIUM CHLORIDE, PRESERVATIVE FREE 10 ML: 5 INJECTION INTRAVENOUS at 08:54

## 2023-02-06 RX ADMIN — ENOXAPARIN SODIUM 30 MG: 100 INJECTION SUBCUTANEOUS at 20:30

## 2023-02-06 RX ADMIN — PIPERACILLIN AND TAZOBACTAM 3375 MG: 3; .375 INJECTION, POWDER, FOR SOLUTION INTRAVENOUS at 11:31

## 2023-02-06 RX ADMIN — AMIODARONE HYDROCHLORIDE 200 MG: 200 TABLET ORAL at 15:59

## 2023-02-06 RX ADMIN — AMIODARONE HYDROCHLORIDE 200 MG: 200 TABLET ORAL at 20:28

## 2023-02-06 RX ADMIN — CLOPIDOGREL 75 MG: 75 TABLET, FILM COATED ORAL at 08:43

## 2023-02-06 RX ADMIN — LEVOTHYROXINE SODIUM 50 MCG: 50 TABLET ORAL at 06:27

## 2023-02-06 RX ADMIN — MIRTAZAPINE 15 MG: 15 TABLET, ORALLY DISINTEGRATING ORAL at 20:28

## 2023-02-06 RX ADMIN — AMIODARONE HYDROCHLORIDE 200 MG: 200 TABLET ORAL at 08:43

## 2023-02-06 ASSESSMENT — ENCOUNTER SYMPTOMS
APNEA: 0
COLOR CHANGE: 1
DIARRHEA: 0
ABDOMINAL DISTENTION: 0
VOMITING: 0
NAUSEA: 0
EYE DISCHARGE: 0
CONSTIPATION: 0

## 2023-02-06 ASSESSMENT — PAIN SCALES - GENERAL: PAINLEVEL_OUTOF10: 7

## 2023-02-06 ASSESSMENT — PAIN DESCRIPTION - LOCATION: LOCATION: CHEST

## 2023-02-06 ASSESSMENT — PAIN DESCRIPTION - ORIENTATION: ORIENTATION: MID

## 2023-02-06 NOTE — PROGRESS NOTES
[address] [facility]  Progress Note    Date:   2/6/2023  Patient name:  Glenys Bowman  Date of admission:  2/2/2023  1:07 PM  MRN:   [id number]  YOB: 1955    SUBJECTIVE/Last 24 hours update:     Patient was seen and examined at bedside. No new events overnight. Vitals are stable. Patient denies any fever or chills or shortness of breath or chest pain. Plan for wound VAC change today. Per cardiothoracic surgery patient will be transferred to another hospital in [place] for further evaluation and wound management. Brief Hospital course:   72-year-old female patient with history of type 2 diabetes, hypertension, depression, recurrent falls, CAD with CABG on November 2022. Patient was recently admitted for CABG that was complicated with incisional infection and osteomyelitis of the chest wall s/p debridement and VAC placement. Patient was supposed to continue a course of 6 weeks of Rocephin, lost her PICC line 2 days ago. In the ED patient was found to have pleural effusion, CT surgery was consulted, ultrasound-guided thoracocentesis was performed with a collection of 5 mL serosanguineous fluid , patient was admitted for further work-up and management.     REVIEW OF SYSTEMS:      CONSTITUTIONAL:  no fevers, no headcahes  EYES: negative for blury vision  HEENT: No headaches, No nasal congestion, no difficulty swallowing  RESPIRATORY:negative for dyspnea, no wheezing, no Cough  CARDIOVASCULAR: negative for chest pain, no palpitations  GASTROINTESTINAL: no nausea, no vomiting, no change in bowel habits, no abdominal pain   GENITOURINARY: negative for dysuria, no hematuria   MUSCULOSKELETAL: no joint pains, no muscle aches, no swelling of joints or extremities  NEUROLOGICAL: No  Weakness or numbness, bilateral lower limbs pain      PAST MEDICAL HISTORY:      has a past medical history of Ambulates with cane, Anxiety, Borderline hypertension, CAD (coronary artery disease), Depression, GERD (gastroesophageal reflux disease), Heart attack ([facility] [address] Hypertension, Hypothyroidism, Neuropathy, Obesity, Osteoarthritis, Other cirrhosis of liver ([facility] [address] Sleep apnea, Type II or unspecified type diabetes mellitus without mention of complication, not stated as uncontrolled, Under care of service provider, Under care of service provider, and Vertebrogenic low back pain. PAST SURGICAL HISTORY:      has a past surgical history that includes Hysterectomy; Colonoscopy; Upper gastrointestinal endoscopy; Dilation and curettage of uterus; hiatal hernia repair; Throat surgery; Appendectomy; Total knee arthroplasty (Right, 01/06/2015); Total knee arthroplasty (Left, 05/26/2015); Coronary angioplasty with stent (07/2020); Cardiac catheterization; Cardiac catheterization (11/01/2022); Coronary artery bypass graft (N/A, 11/28/2022); IR CHEST TUBE INSERTION (01/13/2023); Wound debridement (01/14/2023); Cardiac surgery (N/A, 1/14/2023); and IR CHEST TUBE INSERTION (1/16/2023). SOCIAL HISTORY:      reports that she has never smoked. She has never used smokeless tobacco. She reports current alcohol use. She reports that she does not use drugs. FAMILY HISTORY:     family history includes Arthritis in her father and mother; Cancer in her father; Diabetes in her sister; Heart Disease in her father and mother.     ALLERGIES:     Morphine, Shellfish-derived products, and Tape [adhesive tape]      OBJECTIVE:       Vitals:    02/06/23 0000 02/06/23 0400 02/06/23 0417 02/06/23 0839   BP:   (!) 144/52 (!) 107/92   Pulse: 73 80 81 83   Resp:   18 19   Temp:   98.3 °F (36.8 °C) 98.1 °F (36.7 °C)   TempSrc:   Temporal Oral   SpO2:   100% 99%   Weight:       Height:             Intake/Output Summary (Last 24 hours) at 2/6/2023 0950  Last data filed at 2/6/2023 0850  Gross per 24 hour   Intake 1330.35 ml   Output --   Net 1330.35 ml       PHYSICAL EXAM:  General Appearance  Alert , awake , not in acute distress  HEENT - Head is normocephalic, atraumatic. Lungs - Bilateral equal air entry , no wheezes, Chest wound vac appreciated, mild tenderness on palpation   Cardiovascular - Heart sounds are normal.  Regular rhythm, normal rate without murmur, gallop or rub. Abdomen - Soft, nontender, nondistended, no masses or organomegaly  Neurologic - There are no new focal motor or sensory deficits  Skin - No bruising or bleeding on exposed skin area  Extremities - No cyanosis, clubbing or edema      ASSESSMENT:     Principal Problem:    Pleural effusion  Active Problems:    Surgical wound breakdown, subsequent encounter    S/P CABG (coronary artery bypass graft)    Osteomyelitis (HCC)    Osteomyelitis of sternum (HCC)    Neuropathy    Hypothyroidism    Type 2 diabetes mellitus with complication, with long-term current use of insulin (HCC)  Resolved Problems:    * No resolved hospital problems.  *        PLAN:     Left-sided pleural effusion likely due to recent CABG-resolved  -S/p thoracocentesis  -Collection of 5 mL of clear yellow fluid  -Fluid analysis showed few neutrophils, culture is negative   -On 2 L nasal cannula, O2 sat 99%, will try to wean down     CAD status post CABG  -Patient had CABG X3 in [id number]  -Was complicated by incision site infection and recurrent pleural effusion  -Wound VAC on place  -Denies any chest pain or shortness of breath  -Continue home meds    Surgical wound dehiscence and infection status post CABG  -Continue Zosyn per ID  -Wound swab culture is negative for 3 days  -Waiting on ID recommendation for D/c  -Wound VAC on place, plan to change today  -Wound ostomy to change wound VAC Monday, Wednesday, Friday  -Cardiothoracic surgery will transfer patient to hospital admission given for further wound care and possible flap  -Cardiothoracic team to initiate and manage the transfer process  -Waiting on ID recommendation for duration of IV Zosyn as outpatient     Type 2 diabetes  -Last hemoglobin A1c 7.3  -Low blood sugar in the morning  -Cont. 30 units Lantus at night with medium correction scale  -Continue POCT glucose  -Continue tight glycemic control for wound healing     Hypertension  -Controlled  -Continue home meds      Hypothyroidism   - Cont. Synthroid 50mcg daily   - TSH on 02/23 is 2.44    Anxiety and depression  -Patient denies any suicidal ideation or homicidal thoughts or hallucinations  -Continue BuSpar  -Psych is consulted    DVT prophylaxis:  Lovenox 30 mg SC BID  GI prophylaxis: Protonix 40 mg daily      Darrick Magdaleno MD  Family Medicine Resident PGY1  2/6/2023 9:50 AM        Please note that this chart was generated using voice recognition Dragon dictation software.   Although every effort was made to ensure the accuracy of this automated transcription, some errors in transcription may have occurred

## 2023-02-06 NOTE — PLAN OF CARE
Pt accepted to [address] [place] [address] under cardiology for plastics  Await bed number at this time  CTS will sign off at this time now that she is accepted

## 2023-02-06 NOTE — PROGRESS NOTES
Facesheet and rapid COVID results faxed to [facility] per CT surgery request.    Electronically signed by Roxie Shirley RN on 2/6/2023 at 12:31 PM

## 2023-02-06 NOTE — PLAN OF CARE
Problem: Discharge Planning  Goal: Discharge to home or other facility with appropriate resources  Outcome: Progressing     Problem: Pain  Goal: Verbalizes/displays adequate comfort level or baseline comfort level  Outcome: Progressing  Flowsheets (Taken 2/5/2023 1200 by Hannah Avalos RN)  Verbalizes/displays adequate comfort level or baseline comfort level:   Encourage patient to monitor pain and request assistance   Assess pain using appropriate pain scale     Problem: Skin/Tissue Integrity  Goal: Absence of new skin breakdown  Description: 1. Monitor for areas of redness and/or skin breakdown  2. Assess vascular access sites hourly  3. Every 4-6 hours minimum:  Change oxygen saturation probe site  4. Every 4-6 hours:  If on nasal continuous positive airway pressure, respiratory therapy assess nares and determine need for appliance change or resting period.   Outcome: Progressing     Problem: Safety - Adult  Goal: Free from fall injury  Outcome: Progressing     Problem: ABCDS Injury Assessment  Goal: Absence of physical injury  Outcome: Progressing     Problem: Chronic Conditions and Co-morbidities  Goal: Patient's chronic conditions and co-morbidity symptoms are monitored and maintained or improved  Outcome: Progressing

## 2023-02-06 NOTE — ACP (ADVANCE CARE PLANNING)
..Advance Care Planning     Advance Care Planning Activator (Inpatient)  Conversation Note      Date of ACP Conversation: 2/6/2023     Conversation Conducted with: Patient with Decision Making Capacity    ACP Activator: Sayra Gonzalez RN      Health Care Decision Maker:     Current [facility] Decision Maker:   Donovan Sandoval [phone]        Ventilation: \"If you were in your present state of health and suddenly became very ill and were unable to breathe on your own, what would your preference be about the use of a ventilator (breathing machine) if it were available to you? \"      Would the patient desire the use of ventilator (breathing machine)?: yes    \"If your health worsens and it becomes clear that your chance of recovery is unlikely, what would your preference be about the use of a ventilator (breathing machine) if it were available to you? \"     Would the patient desire the use of ventilator (breathing machine)?: No      Resuscitation  \"CPR works best to restart the heart when there is a sudden event, like a heart attack, in someone who is otherwise healthy. Unfortunately, CPR does not typically restart the heart for people who have serious health conditions or who are very sick. \"    \"In the event your heart stopped as a result of an underlying serious health condition, would you want attempts to be made to restart your heart (answer \"yes\" for attempt to resuscitate) or would you prefer a natural death (answer \"no\" for do not attempt to resuscitate)? \" no

## 2023-02-06 NOTE — PROGRESS NOTES
[facility] -   Infectious diseases evaluation  admission date 2/2/2023    reason for consultation:   Post CABG November 2022  : Dehiscence    Impression :   Current:  CABG November 2022  Lower sternal wound infection / dehiscence w pus,   culture negative 1/13 - VAC  2/3 pus again -  Large left loculated effusion  Left thoracentesis difficult due to scapula - 5 cc fluid out  Left adrenal tumor  Mild bandemia 13  Severe anxiety    Other:    Discussion / summary of stay / plan of care   Lower sternal ischemia and infection - pus seen and cx was neg 1/13/23  Was getting at the time lots of pus in the canister  Sent to to NH on ceftriaxone  Today back for social issues  Pus again seen in the lower part of the sternum, large amounts,   under VAC, after more than 2 weeks of AB -  Will switch to broader coverage and get pus cx again  Recommendations   Large pus still lower part of the sternum under the [facility] - asking for a cx  Pus was cx neg in past admission, raising the concern that it is possibly just related to the ischemia   Plastics has indicated that they do not do complicated flaps --? Transfer to [facility]? Cx have shown normal skin issac, no growth  On Zosyn 3.3g IV q 8 hr   D/C Vanco, MRSA not isolated -- negative x 2    Infection Control Recommendations   [place] Precautions  Contact Isolation       Antimicrobial Stewardship Recommendations   Simplification of therapy  Targeted therapy    History of Present Illness:   Initial history:  Lady Felty is a 78y.o.-year-old female this is a lady who had an open heart surgery November 8409 complicated with a lower sternal wound poor healing, presented earlier January with abdominal wound dehiscence, had a debridement was chipping of some bone, all the cultures were negative although pus was found intraoperatively.     Was ultimately discharged on ceftriaxone plan to be given for 6 weeks for bone infection, sent to the nursing home she stayed there for a short while decided to come back home but could not tolerate, realizing that she has to be in a nursing home, she went back to the ER to be readmitted to the nursing home. No fever no chills, continues with a VAC over the lower sternal wound    The patient did have a thoracentesis on the left last time culture negative as well    Current chest x-ray 2/2 shows the same left effusion with underlying atelectasis    2/2 left thoracentesis very small amount of fluid difficult to accessed due to scapula, 5 mL of clear fluid out    Interval changes  2/6/2023   Patient Vitals for the past 8 hrs:   BP Temp Temp src Pulse Resp SpO2   02/06/23 0417 (!) 144/52 98.3 °F (36.8 °C) Temporal 81 18 100 %   02/06/23 0400 -- -- -- 80 -- --       2/6  Afebrile, VS stable  Anxious, but positive  No surgical intervention to close flap at this time, awaiting CTS -- would benefit from surgical intervention, cannot control this alone with antibiotics  On Zosyn  Labs reviewed      Summary of relevant labs:  Labs:  Wbc 13  Creat 0.85  Micro:  Left pleural fluid cx 2/2/23 no growth  2/3 chest wound -- skin issac  BC 2/3 NG    Imaging:    CT 2/2  Lt pleural effusion with compressive atelectasis. Unchanged appearance of distal sternum with mild diastasis, gas in the sternotomy site and subcutaneous area. CXR left effusion worse    I have personally reviewed the past medical history, past surgical history, medications, social history, and family history, and I haveupdated the database accordingly. Allergies:   Morphine, Shellfish-derived products, and Tape [adhesive tape]     Review of Systems:     Review of Systems   Constitutional:  Positive for activity change. HENT:  Negative for congestion. Eyes:  Negative for discharge. Respiratory:  Negative for apnea. Cardiovascular:  Negative for chest pain.    Gastrointestinal:  Negative for abdominal distention, constipation, diarrhea, nausea and vomiting. Endocrine: Negative for cold intolerance. Genitourinary:  Negative for dysuria. Musculoskeletal:  Negative for arthralgias. Skin:  Positive for color change (red stable sdiscoloration of the  mid sternal area) and wound. Allergic/Immunologic: Negative for immunocompromised state. Neurological:  Negative for dizziness, light-headedness and headaches. Hematological:  Negative for adenopathy. Psychiatric/Behavioral:  Negative for agitation. Physical Examination :       Physical Exam  Constitutional:       Appearance: Normal appearance. She is obese. She is not ill-appearing. HENT:      Head: Normocephalic and atraumatic. Nose: Nose normal.      Mouth/Throat:      Mouth: Mucous membranes are moist.   Eyes:      General: No scleral icterus. Pupils: Pupils are equal, round, and reactive to light. Cardiovascular:      Rate and Rhythm: Normal rate and regular rhythm. Heart sounds: Normal heart sounds. No murmur heard. No friction rub. No gallop. Pulmonary:      Effort: No respiratory distress. Breath sounds: No stridor. No wheezing, rhonchi or rales. Abdominal:      General: There is no distension. Tenderness: There is no abdominal tenderness. There is no guarding. Genitourinary:     Comments: No matson  Musculoskeletal:         General: No swelling or deformity. Cervical back: Neck supple. No rigidity. Skin:     General: Skin is dry. Coloration: Skin is not jaundiced. Neurological:      General: No focal deficit present. Mental Status: She is alert and oriented to person, place, and time. Psychiatric:         Mood and Affect: Mood normal.         Behavior: Behavior normal.         Thought Content:  Thought content normal.       Past Medical History:     Past Medical History:   Diagnosis Date    Ambulates with cane     or walker    Anxiety     Borderline hypertension     CAD (coronary artery disease)     stents x 2 in 2020    Depression 07/28/2020    GERD (gastroesophageal reflux disease)     Heart attack ([facility] [address] 07/18/2020    Hypertension     Hypothyroidism     Neuropathy     Obesity     morbid    Osteoarthritis     Other cirrhosis of liver ([facility] [address] 07/27/2020    pt denies    Sleep apnea     possible    Type II or unspecified type diabetes mellitus without mention of complication, not stated as uncontrolled     Under care of service provider 11/18/2022    [id number]-last visit aug 2022    Under care of service provider 11/18/2022    cardiology-[facility]-last visit nov 2022    Vertebrogenic low back pain 03/29/2022       Past Surgical  History:     Past Surgical History:   Procedure Laterality Date    APPENDECTOMY      CARDIAC CATHETERIZATION      2 stents    CARDIAC CATHETERIZATION  11/01/2022    CARDIAC SURGERY N/A 1/14/2023    STERNAL WOUND WASHOUT, DEBRIDEMENT, WOUND VAC PLACEMENT performed by Lencho Ayala MD at [facility]  07/2020    CORONARY ARTERY BYPASS GRAFT N/A 11/28/2022    CABG CORONARY ARTERY BYPASS X3 ON PUMP , ATA, ENDO VEIN HARVEST performed by Lencho Ayala MD at [place] [id number]  01/14/2023    STERNAL WOUND WASHOUT, DEBRIDEMENT, WOUND VAC PLACEMENT    DILATION AND CURETTAGE OF UTERUS      HIATAL HERNIA REPAIR      HYSTERECTOMY (CERVIX STATUS UNKNOWN)      IR CHEST TUBE INSERTION  01/13/2023    IR CHEST TUBE INSERTION 1/13/2023 MD [facility]    IR CHEST TUBE INSERTION  1/16/2023    IR CHEST TUBE INSERTION 1/16/2023 MD [facility]    THROAT SURGERY      POLYPS REMOVED FROM VOCAL CORD    TOTAL KNEE ARTHROPLASTY Right 01/06/2015    TOTAL KNEE ARTHROPLASTY Left 05/26/2015    UPPER GASTROINTESTINAL ENDOSCOPY         Medications:       Social History:     Social History     Socioeconomic History    Marital status:      Spouse name: Christine Sanon    Number of children: 0    Years of education: Not on file    Highest education level: Not on file   Occupational History    Occupation: Retired      Employer: [employer]   Tobacco Use    Smoking status: Never    Smokeless tobacco: Never   Vaping Use    Vaping Use: Never used   Substance and Sexual Activity    Alcohol use: Yes     Comment: once a month    Drug use: No    Sexual activity: Yes     Partners: Male   Other Topics Concern    Not on file   Social History Narrative    Not on file     Social Determinants of Health     Financial Resource Strain: Low Risk     Difficulty of Paying Living Expenses: Not hard at all   Food Insecurity: No Food Insecurity    Worried About Running Out of Food in the Last Year: Never true    Ran Out of Food in the Last Year: Never true   Transportation Needs: Not on file   Physical Activity: Not on file   Stress: Not on file   Social Connections: Not on file   Intimate Partner Violence: Not on file   Housing Stability: Not on file       Family History:     Family History   Problem Relation Age of Onset    Heart Disease Mother     Arthritis Mother     Heart Disease Father     Arthritis Father     Cancer Father         \"oral\"    Diabetes Sister         gestational      Medical Decision Making:   I have independently reviewed/ordered the following labs:    CBC with Differential:   Recent Labs     02/05/23  0544 02/06/23  0601   WBC 7.5 8.3   HGB 10.4* 10.9*   HCT 35.1* 39.5    281   LYMPHOPCT 12* 10*   MONOPCT 14* 15*       BMP:  Recent Labs     02/05/23  0544 02/06/23  0601    140   K 3.8 3.9    104   CO2 30 28   BUN 8 7*   CREATININE 0.58 0.57       Hepatic Function Panel:   No results for input(s): PROT, LABALBU, BILIDIR, IBILI, BILITOT, ALKPHOS, ALT, AST in the last 72 hours. No results for input(s): RPR in the last 72 hours. No results for input(s): HIV in the last 72 hours. No results for input(s): BC in the last 72 hours.   Lab Results   Component Value Date/Time    CREATININE 0.57 02/06/2023 06:01 AM    GLUCOSE 46 02/06/2023 06:01 AM    GLUCOSE 250 03/30/2012 03:15 PM       Detailed results: Thank you for allowing us to participate in the care of this patient. Please call with questions. This note is created with the assistance of a speech recognition program.  While intending to generate adocument that actually reflects the content of the visit, the document can still have some errors including those of syntax and sound a like substitutions which may escape proof reading. It such instances, actual meaningcan be extrapolated by contextual diversion. Myesha Stanford  Office: [phone]  Perfect serve / office [phone]        I have discussed the care of the patient, including pertinent history and exam findings,  with the resident. I have seen and examined the patient and the key elements of all parts of the encounter have been performed by me. I agree with the assessment, plan and orders as documented by the resident.     Gena Hopkins, Infectious Diseases

## 2023-02-06 NOTE — CARE COORDINATION
02/06/23 1430   Readmission Assessment   Number of Days since last admission? 8-30 days   Previous Disposition SNF   Who is being Interviewed Patient   What was the patient's/caregiver's perception as to why they think they needed to return back to the hospital?   (I fell twice and layed on the floor for 5 hrs)   Did you visit your Primary Care Physician after you left the hospital, before you returned this time? No  (SNF)   Did you see a specialist, such as Cardiac, Pulmonary, Orthopedic Physician, etc. after you left the hospital? No  (SNF)   Who advised the patient to return to the hospital? Skilled Unit   Does the patient report anything that got in the way of taking their medications?  No   In our efforts to provide the best possible care to you and others like you, can you think of anything that we could have done to help you after you left the hospital the first time, so that you might not have needed to return so soon?   (\"no\")

## 2023-02-06 NOTE — TELEPHONE ENCOUNTER
Vijay Howell from [facility] step down called stating [facility] clinic is requesting for Influenza A and B lab orders to get complete on patient . Vijay Howell states she will call the Doctor on call to put the orders in .

## 2023-02-06 NOTE — CARE COORDINATION
Transitional Planning:  Call from Brenda Aguillon at [facility], they cannot accept. No beds available. Call received from [facility] at [address]. Paty states pt is current with them and she will follow in [facility]. Call to [place] at [facility] . Left  for cb re: precert. 12:10  Received call from Reji [phone] at [facility]. She states they need the facesheet, COVID and influenza results faxed to . Abdias Taylor states Dr. Tiffanie Ho initiated the transfer some time ago. Informed her I will need to contact the doctor re: influenza testing. COVID in progress. Facesheet faxed. Call to Cardiothoracic for order for influenza A & B testing. Spoke with Kelli Weaver. She is in an OP office. PS attending for order. 14:12  Spoke with Ezekiel arce at [facility]. Explained that cardiothoracic has requested a transfer to [facility] but we do not how long that will take. Per [place] pt would need a precert if she comes back to [facility].   CM informed Ezekiel arce to start the precert as pt may need to return to [facility] while waiting for a bed at [facility]

## 2023-02-06 NOTE — PROGRESS NOTES
Reason for WOCN Evaluation and Assessment: \"wound vac of the chest\"    Wound Identification:  Wound Type: non-healing surgical  Contributing Factors: diabetes, decreased mobility, obesity, decreased tissue oxygenation, anticoagulation therapy, and malnutrition                                        Initial visit to patient at 0900 found the [employer] machine battery is discharged. Patient does not have or know where the  is currently. The vacuum dressing is in place over her sternum. Discussed with patient's attending physician and resident physician orders for continued NPWT use. Orders received. Supplies obtained. Returned to bedside. CTS providers in, dressing has been removed. Ok to reapply NPWT.      02/06/23 1545   Wound 01/16/23 Other (Comment) Medial;Upper epigastric   Date First Assessed/Time First Assessed: 01/16/23 1533   Present on Hospital Admission: Yes  Primary Wound Type: Surgical Type  Location: Other (Comment)  Wound Location Orientation: Medial;Upper  Wound Description (Comments): epigastric   Wound Image    Wound Etiology Skin Tear   Dressing Status New dressing applied   Wound Cleansed Cleansed with saline   Dressing/Treatment Honey gel/honey paste   Dressing Change Due 02/08/23   Wound Assessment Pink/red;Eschar dry   Drainage Amount Small   Drainage Description Serosanguinous   Negative Pressure Wound Therapy Sternum   No placement date or time found. Present on Admission/Arrival: Yes  Location: Sternum   Wound Type Surgical   Unit Type KCI VAC Ulta   Dressing Type White Foam;Black Foam   Number of pieces used 2   Number of pieces removed 2   Cycle Continuous; On   Target Pressure (mmHg) 125   Canister changed?  No   Dressing Changed Changed/New   Drainage Amount Moderate   Drainage Description Serosanguinous;Gray   Dressing Change Due 02/08/23   Wound Assessment Granulation tissue;Fibrin;Slough  (adipose tissue)   Jeannie-wound Assessment Intact   Incision 11/28/22 Sternum   Date First Assessed/Time First Assessed: 11/28/22 0941   Present on Hospital Admission: No  Location: Sternum  Incision Description (Comments): CORONARY ARTERY BYPASS GRAFT X3, INCISION EDGES WELL APPROXIMATED, DRESSING PLACED POST-OP AND CLEAR. Wound Image    Dressing Status New dressing applied   Dressing Change Due 02/08/23   Incision Cleansed Cleansed with saline   Dressing/Treatment Negative pressure wound therapy   Drainage Amount Moderate   Drainage Description Serosanguinous;Gray     Carefully irrigated the sternal wound with saline and wicked away drainage using dry gauze. Periwound ski prepped with barrier film hydrocolloid to inframammary folds and drape. New dressing applied    Plan of Care:   NPWT dressing reapplied. White foam under Granufoam  -125 mmhg  New canister palced. Will plan M-W-F dressing changes unless other orders received from the physician. Norma Alicea

## 2023-02-06 NOTE — PLAN OF CARE
I called [facility] to begin potential transfer for sternal flap closure under [facility] team  Facesheet and COVID results will be faxed over

## 2023-02-06 NOTE — PLAN OF CARE
Await call back from [address] [place] [address] for admission for plastics flap closure.

## 2023-02-07 NOTE — DISCHARGE INSTR - COC
Continuity of Care Form    Patient Name: Buddy Hummel   :    MRN:  [id number]    Admit date:  2023  Discharge date:  ***    Code Status Order: Full Code   Advance Directives:     Admitting Physician:  Sumanth Florentino MD  PCP: Jairo Greco MD    Discharging Nurse: [facility] Unit/Room#: 9238/4995-92  Discharging Unit Phone Number: ***    Emergency Contact:   Extended Emergency Contact Information  Primary Emergency Contact: BelemBertin ALBERTS  Address: [address] [place] [address], [address] [facility] of [address] [place] [address] Phone: [phone]  Mobile Phone: [phone]  Relation: Spouse  Secondary Emergency Contact: [address], [address] [facility] of [facility] Phone: [phone]  Relation: Brother/Sister    Past Surgical History:  Past Surgical History:   Procedure Laterality Date    APPENDECTOMY      CARDIAC CATHETERIZATION      2 stents    CARDIAC CATHETERIZATION  2022    CARDIAC SURGERY N/A 2023    STERNAL WOUND WASHOUT, DEBRIDEMENT, WOUND VAC PLACEMENT performed by Abebe Tan MD at [facility]  2020    CORONARY ARTERY BYPASS GRAFT N/A 2022    CABG CORONARY ARTERY BYPASS X3 ON PUMP , ATA, ENDO VEIN HARVEST performed by Abebe Tan MD at [place] [address]  2023    STERNAL WOUND WASHOUT, DEBRIDEMENT, WOUND VAC PLACEMENT    DILATION AND CURETTAGE OF UTERUS      HIATAL HERNIA REPAIR      HYSTERECTOMY (CERVIX STATUS UNKNOWN)      IR CHEST TUBE INSERTION  2023    IR CHEST TUBE INSERTION 2023 Humaira Arguelles MD [facility]    IR CHEST TUBE INSERTION  2023    IR CHEST TUBE INSERTION 2023 Ashli Hope MD [facility]    THROAT SURGERY      POLYPS REMOVED FROM VOCAL CORD    TOTAL KNEE ARTHROPLASTY Right 2015    TOTAL KNEE ARTHROPLASTY Left 2015    UPPER GASTROINTESTINAL ENDOSCOPY Immunization History:   Immunization History   Administered Date(s) Administered    COVID-19, MODERNA BLUE border, Primary or Immunocompromised, (age 12y+), IM, 100 mcg/0.5mL 04/15/2021, 05/13/2021    COVID-19, MODERNA Booster BLUE border, (age 18y+), IM, 50mcg/0.25mL 12/05/2021    DTaP 07/07/2011, 09/01/2011, 12/15/2011, 05/29/2012    Hepatitis A Adult (Havrix, Vaqta) 08/17/2020    Influenza Vaccine, unspecified formulation 09/27/2012    Influenza Virus Vaccine 09/27/2012, 09/30/2014    Influenza Whole 11/15/2005    Influenza, FLUAD, (age 72 y+), Adjuvanted, 0.5mL 12/05/2021    Influenza, FLUARIX, FLULAVAL, FLUZONE (age 10 mo+) AND AFLURIA, (age 1 y+), PF, 0.5mL 09/14/2020    Pneumococcal Polysaccharide ([id number]) 09/27/2012, 01/07/2015    Tdap (Boostrix, Adacel) 06/27/2014       Active Problems:  Patient Active Problem List   Diagnosis Code    Anxiety F41.9    GERD (gastroesophageal reflux disease) K21.9    OA (osteoarthritis) M19.90    Neuropathy G62.9    Right knee DJD M17.11    Hypothyroidism E03.9    S/P left total knee arthroplasty Z96.659    Ischemic heart disease I25.9    Essential hypertension I10    NSTEMI (non-ST elevated myocardial infarction) ([facility] I21.4    Other cirrhosis of liver ([facility]) K74.69    Elevated lipase R74.8    Depression F32. A    Type 2 diabetes mellitus with complication, with long-term current use of insulin (HCC) E11.8, Z79.4    SHERITA (obstructive sleep apnea) G47.33    Morbid obesity with BMI of 45.0-49.9, adult (HCC) E66.01, Z68.42    PVD (peripheral vascular disease) ([facility]) I73.9    Numbness and tingling of both feet R20.0, R20.2    Long term (current) use of antithrombotics/antiplatelets O79.20    Painful diabetic neuropathy (HCC) E11.40    Vertebrogenic low back pain M54.51    Spinal stenosis of lumbar region with neurogenic claudication M48.062    CAD in native artery I25.10    Vulvovaginitis N76.0    Incisional infection T81.49XA    Pleural effusion on left J90    Lactic acid acidosis E87.20    Bandemia D72.825    Surgical wound breakdown, subsequent encounter T81. 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       Isolation/Infection:   Isolation            No Isolation          Patient Infection Status       Infection Onset Added Last Indicated Last Indicated By Review Planned Expiration Resolved Resolved By    None active    Resolved    COVID-19 (Rule Out) 10/13/20 10/13/20 10/14/20 COVID-19 (Ordered)   10/14/20 Rule-Out Test Resulted    COVID-19 (Rule Out) 10/02/20 10/02/20 10/03/20 Covid-19 Ambulatory (Ordered)   10/05/20 Rule-Out Test Resulted    COVID-19 (Rule Out) 06/04/20 06/04/20 06/04/20 COVID-19 Ambulatory (Ordered)   06/08/20 Rule-Out Test Resulted            Nurse Assessment:  Last Vital Signs: /63   Pulse 79   Temp 98 °F (36.7 °C) (Oral)   Resp 18   Ht 4' 11\" (1.499 m)   Wt 240 lb (108.9 kg)   SpO2 99%   BMI 48.47 kg/m²     Last documented pain score (0-10 scale): Pain Level: 7  Last Weight:   Wt Readings from Last 1 Encounters:   02/03/23 240 lb (108.9 kg)     Mental Status:  {IP PT MENTAL STATUS:31555}    IV Access:  { DIOGENES IV ACCESS:546722391}    Nursing Mobility/ADLs:  Walking   {[facility] OMNC:403791093}  Transfer  {[facility] DME ABXN:559703152}  Bathing  {[facility] HIWL:322811482}  Dressing  {[facility] DME GSJM:059107870}  Toileting  {[facility] DME MNBV:387869841}  Feeding  {[facility] UADL:412391292}  Med Admin  {[facility] DME BIWW:026773117}  Med Delivery   {[facility] MED Delivery:761268689}    Wound Care Documentation and Therapy:  Negative Pressure Wound Therapy Sternum (Active)   Wound Type Surgical 02/06/23 1545   Unit Type [facility] 02/06/23 1545   Dressing Type White Foam;Black Foam 02/06/23 1545   Number of pieces used 2 02/06/23 1545   Number of pieces removed 2 02/06/23 1545   Cycle Continuous; On 02/06/23 1545   Target Pressure (mmHg) 125 02/06/23 1545   Canister changed? No 02/06/23 1545   Dressing Changed Changed/New 02/06/23 1545   Drainage Amount Moderate 02/06/23 1545   Drainage Description Serosanguinous;Felix 02/06/23 1545   Dressing Change Due 02/08/23 02/06/23 1545   Wound Assessment Granulation tissue;Fibrin;Slough 02/06/23 1545   Jeannie-wound Assessment Intact 02/06/23 1545   Odor Mild 02/04/23 1600   Number of days:        Wound 01/16/23 Other (Comment) Medial;Upper epigastric (Active)   Wound Image   02/06/23 1545   Wound Etiology Skin Tear 02/06/23 2043   Dressing Status Clean;Dry; Intact 02/06/23 2043   Wound Cleansed Not Cleansed 02/06/23 2043   Dressing/Treatment Honey gel/honey paste 02/06/23 2043   Dressing Change Due 02/08/23 02/06/23 1545   Wound Assessment Other (Comment) 02/06/23 2043   Drainage Amount Other (Comment) 02/06/23 2043   Drainage Description Serosanguinous 02/06/23 1545   Odor None 02/06/23 2043   Jeannie-wound Assessment Other (Comment) 02/06/23 2043   Number of days: 21       Incision 11/28/22 Sternum (Active)   Wound Image   02/06/23 1545   Dressing Status New dressing applied 02/06/23 1545   Dressing Change Due 02/08/23 02/06/23 1545   Incision Cleansed Cleansed with saline 02/06/23 1545   Dressing/Treatment Negative pressure wound therapy 02/06/23 1545   Incision Length (cm) 6.5 02/03/23 1016   Incision Width (cm) 8.2 cm 02/03/23 1016   Incision Depth (cm) 4 cm 02/03/23 1016   Incision Assessment Devitalized tissue 02/04/23 1600   Drainage Amount Moderate 02/06/23 1545   Drainage Description Serosanguinous;Felix 02/06/23 1545   Odor Mild 02/04/23 1600   Number of days: 70        Elimination:  Continence:    Bowel: {YES / VM:51214}  Bladder: {YES / FB:13339}  Urinary Catheter: {Urinary Catheter:391174266}   Colostomy/Ileostomy/Ileal Conduit: {YES / AJ:65488}       Date of Last BM: ***    Intake/Output Summary (Last 24 hours) at 20238  Last data filed at 2023 0850  Gross per 24 hour   Intake 200 ml   Output --   Net 200 ml     I/O last 3 completed shifts: In: 1680.4 [P.O.:900;  I.V.:146; IV Piggyback:634.4]  Out: -     Safety Concerns:     { DIOGENES Safety Concerns:286282926}    Impairments/Disabilities:      [facility] Impairments/Disabilities:431630018}    Nutrition Therapy:  Current Nutrition Therapy:   [facility] DIOGENES Diet List:803737874}    Routes of Feeding: {CHP DME Other Feedings:297932997}  Liquids: {Slp liquid thickness:12361}  Daily Fluid Restriction: {CHP DME Yes amt example:573246893}  Last Modified Barium Swallow with Video (Video Swallowing Test): {Done Not Done PHNW:149781576}    Treatments at the Time of Hospital Discharge:   Respiratory Treatments: ***  Oxygen Therapy:  {Therapy; copd oxygen:69649}  Ventilator:    { CC Vent JEGF:275470581}    Rehab Therapies: {THERAPEUTIC INTERVENTION:5344160607}  Weight Bearing Status/Restrictions: [address] [place] [address] Weight Bearin}  Other Medical Equipment (for information only, NOT a DME order):  {EQUIPMENT:132344232}  Other Treatments: ***    Patient's personal belongings (please select all that are sent with patient):  {[facility] DME Belongings:377361288}    RN SIGNATURE:  {Esignature:089560326}    CASE MANAGEMENT/SOCIAL WORK SECTION    Inpatient Status Date: ***    Readmission Risk Assessment Score:  Readmission Risk              Risk of Unplanned Readmission:  24           Discharging to Facility/ Agency   Name:   Address:  Phone:  Fax:    Dialysis Facility (if applicable)   Name:  Address:  Dialysis Schedule:  Phone:  Fax:    / signature: {Esignature:749704782}    PHYSICIAN SECTION    Prognosis: {Prognosis:4370245605}    Condition at Discharge: [address] Wilson Patient Condition:534052874}    Rehab Potential (if transferring to Rehab): {Prognosis:8205555809}    Recommended Labs or Other Treatments After Discharge: ***    Physician Certification: I certify the above information and transfer of Glenys Bowman  is necessary for the continuing treatment of the diagnosis listed and that she requires {Admit to Appropriate Level of Care:16708} for {GREATER/LESS:431051289} 30 days.      Update Admission H&P: {CHP DME Changes in TVKGI:485584302}    PHYSICIAN SIGNATURE:  {Esignature:833815930}

## 2023-02-07 NOTE — PLAN OF CARE
Problem: Discharge Planning  Goal: Discharge to home or other facility with appropriate resources  2/6/2023 2221 by Roel Slaughter RN  Outcome: Progressing  Flowsheets (Taken 2/6/2023 2043)  Discharge to home or other facility with appropriate resources: Identify barriers to discharge with patient and caregiver  2/6/2023 1530 by Pieter Campoverde RN  Outcome: Progressing     Problem: Pain  Goal: Verbalizes/displays adequate comfort level or baseline comfort level  2/6/2023 2221 by Roel Slaughter RN  Outcome: Progressing  2/6/2023 1530 by Pieter Campoverde RN  Outcome: Progressing     Problem: Skin/Tissue Integrity  Goal: Absence of new skin breakdown  Description: 1. Monitor for areas of redness and/or skin breakdown  2. Assess vascular access sites hourly  3. Every 4-6 hours minimum:  Change oxygen saturation probe site  4. Every 4-6 hours:  If on nasal continuous positive airway pressure, respiratory therapy assess nares and determine need for appliance change or resting period.   2/6/2023 2221 by Roel Slaughter RN  Outcome: Progressing  2/6/2023 1530 by Pieter Campoverde RN  Outcome: Progressing     Problem: Safety - Adult  Goal: Free from fall injury  2/6/2023 2221 by Roel Slaughter RN  Outcome: Progressing  Flowsheets (Taken 2/6/2023 2220)  Free From Fall Injury: Instruct family/caregiver on patient safety  2/6/2023 1530 by Pieter Campoverde RN  Outcome: Progressing     Problem: ABCDS Injury Assessment  Goal: Absence of physical injury  2/6/2023 2221 by Roel Slaughter RN  Outcome: Progressing  Flowsheets (Taken 2/6/2023 2220)  Absence of Physical Injury: Implement safety measures based on patient assessment  2/6/2023 1530 by Pieter Campoverde RN  Outcome: Progressing     Problem: Chronic Conditions and Co-morbidities  Goal: Patient's chronic conditions and co-morbidity symptoms are monitored and maintained or improved  2/6/2023 2221 by Roel Slaughter RN  Outcome: Progressing  Flowsheets (Taken 2/6/2023 2043)  Care Plan - Patient's Chronic Conditions and Co-Morbidity Symptoms are Monitored and Maintained or Improved: Monitor and assess patient's chronic conditions and comorbid symptoms for stability, deterioration, or improvement  2/6/2023 1530 by Omi Farnsworth RN  Outcome: Progressing

## 2023-02-07 NOTE — DISCHARGE SUMMARY
Department of [address] [place] [address]    Discharge Summary      NAME:  Lilian Byrnes  :    MRN:  [id number]    Admit date:  2023  Discharge date:  2023    Admitting Physician:  Hannah Correia MD    Primary Diagnosis on Admission:   Present on Admission:   Pleural effusion   S/P CABG (coronary artery bypass graft)   Hypothyroidism   Osteomyelitis ([address]   Type 2 diabetes mellitus with complication, with long-term current use of insulin ([address]   Neuropathy   Surgical wound breakdown, subsequent encounter   Sternal osteomyelitis ([address]      Secondary Diagnoses:  does not have any pertinent problems on file. Admission Condition:  fair     Discharged Condition: good    Hospital Course:   79-year-old female patient with history of type 2 diabetes, hypertension, depression, hypothyroidism, CAD status post CABG on 2022. CABG was complicated by incisional infection and osteomyelitis of the chest wall status post debridement and wound VAC placement. Patient was sent on IV Rocephin on PICC line for 6 weeks per ID but patient lost her access 2 days before arriving to the ED. in the ED patient had ultrasound-guided thoracocentesis for left-sided pleural effusion with the collection of 5 mL of clear yellow fluid, fluid culture was negative. Patient was admitted and cardiothoracic surgery were consulted for further wound management. Continue to have wound VAC changes 3 times a week, and was started on Zosyn per ID. Flap for wound was not possible per plastic surgery according to CT surgery. However CT surgery contacted thoracic surgery at [facility] and they were agreeable to transfer patient for possible surgical intervention for the wound. Transfer process was started on  and patient was transferred to [facility] in stable condition.       Consults:  CT surgery   ID, Wound ostomy       Disposition:   Transfer to [facility] for Wound surgical intervention per CT surgery          Follow up with Megha Pak MD after Discharge    Discharge Medications:       Medication List        CONTINUE taking these medications      ALCOHOL PREP PADS     blood glucose monitor kit and supplies  Dispense sufficient amount for indicated testing frequency plus additional to accommodate PRN testing needs. Dispense all needed supplies to include: monitor, strips, lancing device, lancets, control solutions, alcohol swabs.     Handicap Placard Misc  Is a permanent condition.  DX: M19.90     Insulin Pen Needle 31G X 5 MM Misc  Commonly known as: B-D UF III MINI PEN NEEDLES  USE 1 PEN NEEDLE DAILY     [employer] Lancets Thin Misc  Test before meals and at bedtime. DX: DM, on insulin            ASK your doctor about these medications      amiodarone 200 MG tablet  Commonly known as: CORDARONE  Take 1 tablet by mouth 3 times daily     amLODIPine 5 MG tablet  Commonly known as: NORVASC     aspirin 81 MG EC tablet  Take 1 tablet by mouth daily     atorvastatin 40 MG tablet  Commonly known as: LIPITOR  Take 1 tablet by mouth nightly     blood glucose test strips  Test before meals and at bedtime. DX: DM, on insulin     Breo Ellipta 100-25 MCG/ACT Aepb  Generic drug: Fluticasone Furoate-Vilanterol     busPIRone 10 MG tablet  Commonly known as: BUSPAR  TAKE ONE (1) TABLET BY MOUTH TWICE DAILY     cefTRIAXone  infusion  Commonly known as: ROCEPHIN  Infuse 2,000 mg intravenously every 24 hours Compound per protocol     clopidogrel 75 MG tablet  Commonly known as: PLAVIX  Take 1 tablet by mouth daily     empagliflozin 10 MG tablet  Commonly known as: Jardiance  TAKE 1 TABLET BY MOUTH EVERY DAY     furosemide 40 MG tablet  Commonly known as: Lasix  Take 1 tablet by mouth 2 times daily     gabapentin 400 MG capsule  Commonly known as: NEURONTIN  TAKE 1 CAPSULE BY MOUTH THREE TIMES DAILY     glimepiride 4 MG tablet  Commonly known as: AMARYL  TAKE 1 TABLET BY MOUTH TWICE DAILY  *EMERGENCY REFILL*     insulin glargine 100 UNIT/ML injection vial  Commonly known as: LANTUS  Inject 70 Units into the skin nightly     insulin lispro (1 Unit Dial) 100 UNIT/ML Sopn  Commonly known as: HumaLOG KwikPen  Inject 3 Units into the skin 3 times daily (before meals) 125 - 150 2 units   151 - 200 4 units   201 - 250 6 units   251 - 300 8 units  301 - 350 10 units 351 - 400 12 units     isosorbide mononitrate 60 MG extended release tablet  Commonly known as: IMDUR     levothyroxine 50 MCG tablet  Commonly known as: SYNTHROID  TAKE 1 TABLET BY MOUTH EVERY DAY     magnesium hydroxide 400 MG/5ML suspension  Commonly known as: Milk of Magnesia  Take 30 mLs by mouth daily as needed for Constipation     metoprolol tartrate 25 MG tablet  Commonly known as: LOPRESSOR  Take 0.5 tablets by mouth 2 times daily     omeprazole 20 MG delayed release capsule  Commonly known as: PRILOSEC  TAKE 1 CAPSULE BY MOUTH ONCE DAILY     potassium chloride 10 MEQ extended release tablet  Commonly known as: KLOR-CON M  Take 2 tablets by mouth daily              Send Copies to: [place] Santos MD      Note that over 30 minutes was spent in preparing discharge papers, discussing discharge with patient and family, medication review, etc.      Radha Lindsay MD  Family Medicine Resident  Family Medicine Inpatient Service  2/7/2023 2:24 PM          Please note that this chart was generated using voice recognition Dragon dictation software.   Although every effort was made to ensure the accuracy of this automated transcription, some errors in transcription may have occurred

## 2023-02-07 NOTE — PROGRESS NOTES
Report given to [facility]. All questions answered.

## 2023-02-08 ENCOUNTER — TELEPHONE (OUTPATIENT)
Dept: FAMILY MEDICINE CLINIC | Age: 68
End: 2023-02-08

## 2023-02-08 LAB
MICROORGANISM SPEC CULT: ABNORMAL
MICROORGANISM SPEC CULT: NORMAL
MICROORGANISM/AGENT SPEC: ABNORMAL
MICROORGANISM/AGENT SPEC: NORMAL
MICROORGANISM/AGENT SPEC: NORMAL
SERVICE CMNT-IMP: NORMAL
SERVICE CMNT-IMP: NORMAL
SPECIMEN DESCRIPTION: ABNORMAL
SPECIMEN DESCRIPTION: NORMAL

## 2023-02-08 NOTE — TELEPHONE ENCOUNTER
Patient transferred to [address] [place] [address] Road [facility] for post op wound infection, patient is to follow up with pcp after discharge from [address] [place] [address].

## 2023-02-09 ENCOUNTER — TELEPHONE (OUTPATIENT)
Dept: FAMILY MEDICINE CLINIC | Age: 68
End: 2023-02-09

## 2023-12-26 NOTE — ED NOTES
Family Med. At bedside.      Emperatriz Caceres RN  07/27/20 5066
Pt resting on stretcher, no respiratory distress noted, pt updated on plan of care, will continue to monitor, call light in reach.        Amy Bean RN  07/27/20 2026
Pt resting on stretcher, no respiratory distress noted, pt updated on plan of care, will continue to monitor, call light in reach.        Jerlene Bence, RN  07/27/20 2354
Pt. Given ice water. Pt. Resting on stretcher in room 27. Pt. Updated on plan of care. All questions answered at this time. Will continue to monitor and update as needed. Call light within reach and bed in lowest position.       Carol Valenzuela RN  07/27/20 5070
Pt. Resting on stretcher in room 27. Meal tray order placed. Pt. Updated on plan of care. Call light within reach and bed in the lowest position.      Meliza Bill RN  07/27/20 9514
Pt. To carrilloay     Meliza Bill, HUSAM  07/27/20 1929
Pt. Updated on plan of care. All questions answered at this time. Will continue to monitor. Call light within reach and bed in lowest position.       Diaz Cordero RN  07/27/20 0983
Pt. Updated on plan of care. No other questions at this time. Call light within reach and bed in lowest position. Will continue to monitor and update as needed.      Cruzito Quigley RN  07/27/20 4903
Report given to [facility].      Shamar Garcia RN  07/27/20 2778
Report taken from [address], [facility]  07/27/20 1398
Resident at bedside to update patient     Cinthya Roy RN  07/27/20 5588
Ultrasound at bedside     [facility]  07/27/20 7767
All other review of systems negative, except as noted in HPI

## 2024-07-03 NOTE — PROGRESS NOTES
[address] [facility] Pharmacokinetic Monitoring Service - Vancomycin    Consulting Provider: Dr Jose Miles   Indication: Sternal wound  Target Concentration: Goal AUC/LATANYA 400-600 mg*hr/L  Day of Therapy: 3  Additional Antimicrobials: zosyn    Pertinent Laboratory Values: Wt Readings from Last 1 Encounters:   01/13/23 239 lb (108.4 kg)     Temp Readings from Last 1 Encounters:   01/14/23 97.5 °F (36.4 °C)     Estimated Creatinine Clearance: 52 mL/min (A) (based on SCr of 1.21 mg/dL (H)). Recent Labs     01/13/23  2338 01/14/23  0656   CREATININE 1.38* 1.21*   WBC 12.8* 10.6     Procalcitonin: N/A    Pertinent Cultures:  Culture Date Source Results   1/12 Blood NG x 1 day   1/13 Aspirate chest tube pending   1/14 Swab from chest pending   MRSA Nasal Swab: N/A. Non-respiratory infection.     Recent vancomycin administrations                     vancomycin (VANCOCIN) injection (mg) 1,000 mg Given 01/14/23 0910    vancomycin (VANCOCIN) 750 mg in sodium chloride 0.9 % 250 mL IVPB (mg) 750 mg New Bag 01/13/23 1756     750 mg New Bag  0650    vancomycin (VANCOCIN) 1750 mg in sodium chloride 0.9 % 500 mL IVPB (mg) 1,750 mg New Bag 01/12/23 1812                    Assessment:  Date/Time Current Dose Concentration Timing of Concentration (h) AUC   1/14 750 mg IVPB q12h 18.1 13 hours post dose 670   Note: Serum concentrations collected for AUC dosing may appear elevated if collected in close proximity to the dose administered, this is not necessarily an indication of toxicity    Plan:  Current dosing regimen is supra-therapeutic  Patient had increase in Scr 0.77-  1.38- 1.21 today, was in OR as well, will decrease dose to 500 mg IVPB q12h  Repeat vancomycin concentration not ordered  Pharmacy will continue to monitor patient and adjust therapy as indicated    Thank you for the consult,  Katrin Bates, [address] [facility]  1/14/2023 11:35 AM Pt wheeled to restroom with niece and requires minimal assistance.

## (undated) DEVICE — SENSOR O2 AD 40 KG SATURATION INVOS LF DISP

## (undated) DEVICE — Z DISCONTINUED NO SUB IDED DRAIN SURG 2 COLL PT TB FOR ATS BG OASIS

## (undated) DEVICE — TIP APPL GEL PLT ENDO 5MMX32CM

## (undated) DEVICE — CANISTER NEG PRSS 500ML WND THER W/ TBNG NO PRSS RANG W/

## (undated) DEVICE — GLOVE SURG SZ 75 L12IN FNGR THK79MIL GRN LTX FREE

## (undated) DEVICE — SUTURE PERMA-HAND SZ 0 L18IN NONABSORBABLE BLK CT-2 L26MM C027D

## (undated) DEVICE — BLADE ES L6IN ELASTOMERIC COAT EXT DURABLE BEND UPTO 90DEG

## (undated) DEVICE — GLOVE SURG SZ 6 CRM LTX FREE POLYISOPRENE POLYMER BEAD ANTI

## (undated) DEVICE — GLOVE SURG SZ 65 L12IN FNGR THK79MIL GRN LTX FREE

## (undated) DEVICE — SUMP INTCARD W/ 1/4INCH CONN 20FRENCH 15INCH DLP

## (undated) DEVICE — EZ GLIDE AORTIC CANNULA: Brand: EDWARDS LIFESCIENCES EZ GLIDE AORTIC CANNULA

## (undated) DEVICE — BNDG,ELSTC,MATRIX,STRL,6"X5YD,LF,HOOK&LP: Brand: MEDLINE

## (undated) DEVICE — KIT APPL 11:1 PROC W/ FIBRIJET MED CUP APPL TIP TY

## (undated) DEVICE — DRAIN SURG SGL COLL PT TB FOR ATS BG OASIS

## (undated) DEVICE — GLOVE SURG SZ 75 CRM LTX FREE POLYISOPRENE POLYMER BEAD ANTI

## (undated) DEVICE — Device

## (undated) DEVICE — CONNECTOR PERF W3/8XL1/2IN STR REDUC

## (undated) DEVICE — PACK PROCEDURE SURG OPN HRT

## (undated) DEVICE — WAX SURG 2.5GM HEMSTAT BNE BEESWAX PARAFFIN ISO PALMITATE

## (undated) DEVICE — BLOOD TRANSFER PACK 600 CC W/ CPLR

## (undated) DEVICE — GLOVE SURG SZ 8 L12IN FNGR THK79MIL GRN LTX FREE

## (undated) DEVICE — SUTURE VCRL + SZ 4-0 L18IN ABSRB UD L19MM PS-2 3/8 CIR PRIM VCP496H

## (undated) DEVICE — PENCIL ES L3M BTTN SWCH HOLSTER W/ BLDE ELECTRD EDGE

## (undated) DEVICE — SUTURE PROL SZ 4-0 L36IN NONABSORBABLE BLU L26MM SH 1/2 CIR 8521H

## (undated) DEVICE — SUTURE NONABSORBABLE MONOFILAMENT 4-0 RB-1 36 IN BLU PROLENE 8557H

## (undated) DEVICE — BRA COMPR 3XL NYL LYCRA SPANDEX CURAD

## (undated) DEVICE — KIT SURG PREP POVIDONE IOD WET FOR UNIV USE SPNG STK

## (undated) DEVICE — CATHETER,URETHRAL,REDRUBBER,STRL,18FR: Brand: MEDLINE

## (undated) DEVICE — TUBING, SUCTION, 9/32" X 20', STRAIGHT: Brand: MEDLINE INDUSTRIES, INC.

## (undated) DEVICE — ADHESIVE SKIN CLOSURE TOP 36 CC HI VISC DERMBND MINI

## (undated) DEVICE — .: Brand: PERFECTCUT AORTOTOMY SYSTEM

## (undated) DEVICE — SUTURE S STL SZ 6 L18IN NONABSORBABLE SIL L48MM CCS 1/2 CIR M654G

## (undated) DEVICE — MPS® DELIVERY SET W/ARREST AGENT AND ADDITIVE CASSETTES, HEAT EXCHANGER & 10 FT. DELIVERY TUBING: Brand: MPS

## (undated) DEVICE — SUTURE PROL SZ 7-0 L24IN NONABSORBABLE BLU L8MM BV175-6 3/8 8735H

## (undated) DEVICE — GEL US 20GM NONIRRITATING OVERWRAPPED FILE PCH TRNSMIT

## (undated) DEVICE — GLOVE SURG SZ 7 L12IN FNGR THK79MIL GRN LTX FREE

## (undated) DEVICE — INTENDED FOR TISSUE SEPARATION, AND OTHER PROCEDURES THAT REQUIRE A SHARP SURGICAL BLADE TO PUNCTURE OR CUT.: Brand: BARD-PARKER ® CARBON RIB-BACK BLADES

## (undated) DEVICE — APPLICATOR MEDICATED 26 CC SOLUTION HI LT ORNG CHLORAPREP

## (undated) DEVICE — PLEDGET SURG W3.5XL7MM THK1.5MM WHT PTFE RECT SFT TFE

## (undated) DEVICE — GOWN,SIRUS,NONRNF,SETINSLV,XL,20/CS: Brand: MEDLINE

## (undated) DEVICE — 1/4 FORCE SURGICAL SPRING CLIP: Brand: STEALTH® SPRING CLIP

## (undated) DEVICE — CONNECTOR TBNG Y 6IN 1 PLAS LTWT

## (undated) DEVICE — AGENT HEMSTAT W2XL14IN OXIDIZED REGENERATED CELOS ABSRB FOR

## (undated) DEVICE — TTL1LYR 16FR10ML 100%SIL TMPST TR: Brand: MEDLINE

## (undated) DEVICE — 3M™ STERI-STRIP™ COMPOUND BENZOIN TINCTURE 40 BAGS/CARTON 4 CARTONS/CASE C1544: Brand: 3M™ STERI-STRIP™

## (undated) DEVICE — Device: Brand: PERFECTCUT AORTOTOMY SYSTEM

## (undated) DEVICE — GLOVE SURG SZ 7 CRM LTX FREE POLYISOPRENE POLYMER BEAD ANTI

## (undated) DEVICE — POSITIONER,HEAD,MULTIRING,36CS: Brand: MEDLINE

## (undated) DEVICE — FOGARTY - HYDRAGRIP SURGICAL - CLAMP INSERTS: Brand: FOGARTY HYDRAJAW

## (undated) DEVICE — SPONGE LAP W18XL18IN WHT COT 4 PLY FLD STRUNG RADPQ DISP ST

## (undated) DEVICE — CATHETER THOR 36FR L23IN PVC R ANG 5 EYE TAPR CONN TIP SFT

## (undated) DEVICE — WIRE TEMP PACE SZ 0 L24IN LIGHT/DARK BLU S STL

## (undated) DEVICE — INSUFFLATION TUBING SET WITH FILTER, FUNNEL CONNECTOR AND LUER LOCK: Brand: JOSNOE MEDICAL INC

## (undated) DEVICE — PACK PROCEDURE SURG SVMMC THORACOTOMY

## (undated) DEVICE — APPLICATOR MEDICATED 10.5 CC SOLUTION HI LT ORNG CHLORAPREP

## (undated) DEVICE — SOLUTION SCRB 4OZ 4% CHG H2O AIDED FOR PREOPERATIVE SKIN

## (undated) DEVICE — SUTURE VCRL + SZ 2-0 L27IN ABSRB CLR CT-1 1/2 CIR TAPERCUT VCP259H

## (undated) DEVICE — SUTURE PROL SZ 6-0 L24IN NONABSORBABLE BLU L13MM C-1 3/8 8726H

## (undated) DEVICE — CATHETER THOR 36FR L23CM DIA12MM POLYVI CHL TAPR CONN TIP

## (undated) DEVICE — Device: Brand: VIRTUOSAPH PLUS WITH RADIAL INDICATION

## (undated) DEVICE — PREVENA PLUS INCISION MANAGEMENT SYSTEM: Brand: PREVENA PLUS™

## (undated) DEVICE — SOLUTION PREP POVIDONE IOD FOR SKIN MUCOUS MEM PRIOR TO

## (undated) DEVICE — STERNUM BLADE, OFFSET (32.0 X 0.8 X 6.3MM)

## (undated) DEVICE — YANKAUER,BULB TIP,W/O VENT,RIGID,STERILE: Brand: MEDLINE

## (undated) DEVICE — SUTURE NONABSORBABLE MONOFILAMENT 5-0 C-1 1X24 IN PROLENE 8725H

## (undated) DEVICE — DRAPE SLUSH DISC W44XL66IN ST FOR RND BSIN HUSH SLUSH SYS

## (undated) DEVICE — CATHETER IV 24GA L0.56IN SM VEIN SHLD SFTY WNG AUTOGRD

## (undated) DEVICE — SUTURE VCRL SZ 0 L27IN ABSRB VLT L48MM CTX 1/2 CIR TAPR PNT J364H

## (undated) DEVICE — SUTURE ETHLN SZ 3-0 L30IN NONABSORBABLE BLK L60MM KS STR 627H

## (undated) DEVICE — PREMIUM DRY TRAY LF: Brand: MEDLINE INDUSTRIES, INC.

## (undated) DEVICE — TUBE CARDIAC SUCTION 6FR SOFT TIP 10FR

## (undated) DEVICE — BNDG,ELSTC,MATRIX,STRL,4"X5YD,LF,HOOK&LP: Brand: MEDLINE

## (undated) DEVICE — CANNULA TIP SPRY MAGELLAN

## (undated) DEVICE — PACK PROCEDURE SURG OPN HRT ADD ON

## (undated) DEVICE — PACK PROCEDURE SMARTPREP 2 14-20ML

## (undated) DEVICE — SUTURE VCRL + SZ 4-0 L27IN ABSRB UD L26MM SH 1/2 CIR VCP415H

## (undated) DEVICE — CANNULA PERF L2IN BLNT TIP 2MM VES CLR RADPQ BODY FEM LUER

## (undated) DEVICE — DRESSING TRNSPAR W5XL4.5IN FLM SHT SEMIPERMEABLE WIND

## (undated) DEVICE — PROTECTOR ULN NRV PUR FOAM HK LOOP STRP ANATOMICALLY

## (undated) DEVICE — GOWN,AURORA,NONREINFORCED,LARGE: Brand: MEDLINE

## (undated) DEVICE — CANNULA AORT L55IN OD9FR STD TIP FLOW GRD

## (undated) DEVICE — TOWEL,OR,DSP,ST,BLUE,DLX,XR,4/PK,20PK/CS: Brand: MEDLINE

## (undated) DEVICE — CONNECTOR PERF 1/4X1/4 STR

## (undated) DEVICE — COVER,LIGHT HANDLE,FLX,2/PK: Brand: MEDLINE INDUSTRIES, INC.

## (undated) DEVICE — BLADE OPHTH D5MM 15DEG GRN W/ RND KNURLED HNDL MICRO-SHARP

## (undated) DEVICE — SUTURE ETHIB EXCL BR GRN TAPR PT 2-0 30 X563H X563H